# Patient Record
Sex: FEMALE | Race: WHITE | NOT HISPANIC OR LATINO | Employment: OTHER | URBAN - METROPOLITAN AREA
[De-identification: names, ages, dates, MRNs, and addresses within clinical notes are randomized per-mention and may not be internally consistent; named-entity substitution may affect disease eponyms.]

---

## 2017-01-09 ENCOUNTER — ALLSCRIPTS OFFICE VISIT (OUTPATIENT)
Dept: OTHER | Facility: OTHER | Age: 67
End: 2017-01-09

## 2017-01-09 DIAGNOSIS — R60.0 LOCALIZED EDEMA: ICD-10-CM

## 2017-01-10 ENCOUNTER — GENERIC CONVERSION - ENCOUNTER (OUTPATIENT)
Dept: OTHER | Facility: OTHER | Age: 67
End: 2017-01-10

## 2017-01-10 ENCOUNTER — APPOINTMENT (EMERGENCY)
Dept: CT IMAGING | Facility: HOSPITAL | Age: 67
DRG: 299 | End: 2017-01-10
Payer: COMMERCIAL

## 2017-01-10 ENCOUNTER — HOSPITAL ENCOUNTER (OUTPATIENT)
Dept: ULTRASOUND IMAGING | Facility: HOSPITAL | Age: 67
Discharge: HOME | DRG: 299 | End: 2017-01-10
Payer: COMMERCIAL

## 2017-01-10 ENCOUNTER — HOSPITAL ENCOUNTER (INPATIENT)
Facility: HOSPITAL | Age: 67
LOS: 2 days | Discharge: HOME | DRG: 299 | End: 2017-01-12
Attending: EMERGENCY MEDICINE | Admitting: HOSPITALIST
Payer: COMMERCIAL

## 2017-01-10 DIAGNOSIS — I82.401 RIGHT LEG DVT (HCC): ICD-10-CM

## 2017-01-10 DIAGNOSIS — I26.99 BILATERAL PULMONARY EMBOLISM (HCC): Primary | ICD-10-CM

## 2017-01-10 DIAGNOSIS — R60.0 LOCALIZED EDEMA: ICD-10-CM

## 2017-01-10 PROBLEM — E03.9 HYPOTHYROIDISM: Chronic | Status: ACTIVE | Noted: 2017-01-10

## 2017-01-10 PROBLEM — I82.409 DVT, LOWER EXTREMITY (HCC): Status: ACTIVE | Noted: 2017-01-10

## 2017-01-10 PROBLEM — M35.3 POLYMYALGIA RHEUMATICA (HCC): Chronic | Status: ACTIVE | Noted: 2017-01-10

## 2017-01-10 LAB
ANION GAP SERPL CALCULATED.3IONS-SCNC: 7 MMOL/L (ref 4–13)
APTT PPP: 23 SECONDS (ref 24–36)
BASOPHILS # BLD AUTO: 0.06 THOUSANDS/ΜL (ref 0–0.1)
BASOPHILS NFR BLD AUTO: 1 % (ref 0–1)
BUN SERPL-MCNC: 20 MG/DL (ref 5–25)
CALCIUM SERPL-MCNC: 9.7 MG/DL (ref 8.3–10.1)
CHLORIDE SERPL-SCNC: 107 MMOL/L (ref 100–108)
CO2 SERPL-SCNC: 30 MMOL/L (ref 21–32)
CREAT SERPL-MCNC: 0.45 MG/DL (ref 0.6–1.3)
EOSINOPHIL # BLD AUTO: 0.32 THOUSAND/ΜL (ref 0–0.61)
EOSINOPHIL NFR BLD AUTO: 3 % (ref 0–6)
ERYTHROCYTE [DISTWIDTH] IN BLOOD BY AUTOMATED COUNT: 14.4 % (ref 11.6–15.1)
GFR SERPL CREATININE-BSD FRML MDRD: >60 ML/MIN/1.73SQ M
GLUCOSE SERPL-MCNC: 81 MG/DL (ref 65–140)
HCT VFR BLD AUTO: 41.5 % (ref 34.8–46.1)
HGB BLD-MCNC: 13 G/DL (ref 11.5–15.4)
INR PPP: 1.1 (ref 0.86–1.16)
LYMPHOCYTES # BLD AUTO: 2.57 THOUSANDS/ΜL (ref 0.6–4.47)
LYMPHOCYTES NFR BLD AUTO: 20 % (ref 14–44)
MCH RBC QN AUTO: 27.8 PG (ref 26.8–34.3)
MCHC RBC AUTO-ENTMCNC: 31.3 G/DL (ref 31.4–37.4)
MCV RBC AUTO: 89 FL (ref 82–98)
MONOCYTES # BLD AUTO: 0.95 THOUSAND/ΜL (ref 0.17–1.22)
MONOCYTES NFR BLD AUTO: 7 % (ref 4–12)
NEUTROPHILS # BLD AUTO: 9.07 THOUSANDS/ΜL (ref 1.85–7.62)
NEUTS SEG NFR BLD AUTO: 69 % (ref 43–75)
PLATELET # BLD AUTO: 340 THOUSANDS/UL (ref 149–390)
PMV BLD AUTO: 8.6 FL (ref 8.9–12.7)
POTASSIUM SERPL-SCNC: 4.3 MMOL/L (ref 3.5–5.3)
PROTHROMBIN TIME: 13.9 SECONDS (ref 12–14.3)
RBC # BLD AUTO: 4.68 MILLION/UL (ref 3.81–5.12)
SODIUM SERPL-SCNC: 144 MMOL/L (ref 136–145)
TROPONIN I SERPL-MCNC: <0.02 NG/ML
TROPONIN I SERPL-MCNC: <0.02 NG/ML
WBC # BLD AUTO: 12.97 THOUSAND/UL (ref 4.31–10.16)

## 2017-01-10 PROCEDURE — 86147 CARDIOLIPIN ANTIBODY EA IG: CPT | Performed by: EMERGENCY MEDICINE

## 2017-01-10 PROCEDURE — 93970 EXTREMITY STUDY: CPT

## 2017-01-10 PROCEDURE — 36415 COLL VENOUS BLD VENIPUNCTURE: CPT | Performed by: EMERGENCY MEDICINE

## 2017-01-10 PROCEDURE — 99285 EMERGENCY DEPT VISIT HI MDM: CPT

## 2017-01-10 PROCEDURE — 85610 PROTHROMBIN TIME: CPT | Performed by: EMERGENCY MEDICINE

## 2017-01-10 PROCEDURE — 84484 ASSAY OF TROPONIN QUANT: CPT | Performed by: EMERGENCY MEDICINE

## 2017-01-10 PROCEDURE — 85025 COMPLETE CBC W/AUTO DIFF WBC: CPT | Performed by: EMERGENCY MEDICINE

## 2017-01-10 PROCEDURE — 80048 BASIC METABOLIC PNL TOTAL CA: CPT | Performed by: EMERGENCY MEDICINE

## 2017-01-10 PROCEDURE — 85730 THROMBOPLASTIN TIME PARTIAL: CPT | Performed by: EMERGENCY MEDICINE

## 2017-01-10 PROCEDURE — 93005 ELECTROCARDIOGRAM TRACING: CPT | Performed by: EMERGENCY MEDICINE

## 2017-01-10 PROCEDURE — 71275 CT ANGIOGRAPHY CHEST: CPT

## 2017-01-10 PROCEDURE — 84484 ASSAY OF TROPONIN QUANT: CPT | Performed by: PHYSICIAN ASSISTANT

## 2017-01-10 RX ORDER — B-COMPLEX WITH VITAMIN C
1 TABLET ORAL
Status: DISCONTINUED | OUTPATIENT
Start: 2017-01-11 | End: 2017-01-12 | Stop reason: HOSPADM

## 2017-01-10 RX ORDER — LEVOTHYROXINE SODIUM 0.05 MG/1
50 TABLET ORAL
Status: DISCONTINUED | OUTPATIENT
Start: 2017-01-11 | End: 2017-01-12 | Stop reason: HOSPADM

## 2017-01-10 RX ORDER — ACETAMINOPHEN 325 MG/1
650 TABLET ORAL EVERY 6 HOURS PRN
Status: DISCONTINUED | OUTPATIENT
Start: 2017-01-10 | End: 2017-01-11

## 2017-01-10 RX ORDER — CHLORAL HYDRATE 500 MG
2000 CAPSULE ORAL DAILY
Status: DISCONTINUED | OUTPATIENT
Start: 2017-01-11 | End: 2017-01-12 | Stop reason: HOSPADM

## 2017-01-10 RX ORDER — PREDNISONE 20 MG/1
20 TABLET ORAL
Status: DISCONTINUED | OUTPATIENT
Start: 2017-01-11 | End: 2017-01-10

## 2017-01-10 RX ORDER — UBIDECARENONE 50 MG
2 CAPSULE ORAL DAILY
Status: DISCONTINUED | OUTPATIENT
Start: 2017-01-11 | End: 2017-01-10

## 2017-01-10 RX ORDER — HEPARIN SODIUM 1000 [USP'U]/ML
5600 INJECTION, SOLUTION INTRAVENOUS; SUBCUTANEOUS ONCE
Status: COMPLETED | OUTPATIENT
Start: 2017-01-10 | End: 2017-01-10

## 2017-01-10 RX ORDER — ASCORBIC ACID 500 MG
250 TABLET ORAL DAILY
Status: DISCONTINUED | OUTPATIENT
Start: 2017-01-11 | End: 2017-01-12 | Stop reason: HOSPADM

## 2017-01-10 RX ORDER — ONDANSETRON 4 MG/1
4 TABLET, ORALLY DISINTEGRATING ORAL EVERY 8 HOURS PRN
Status: DISCONTINUED | OUTPATIENT
Start: 2017-01-10 | End: 2017-01-12 | Stop reason: HOSPADM

## 2017-01-10 RX ORDER — OXYCODONE HYDROCHLORIDE AND ACETAMINOPHEN 5; 325 MG/1; MG/1
1 TABLET ORAL EVERY 4 HOURS PRN
Status: DISCONTINUED | OUTPATIENT
Start: 2017-01-10 | End: 2017-01-11

## 2017-01-10 RX ORDER — PREDNISONE 20 MG/1
20 TABLET ORAL
Status: DISCONTINUED | OUTPATIENT
Start: 2017-01-10 | End: 2017-01-12 | Stop reason: HOSPADM

## 2017-01-10 RX ORDER — FLUTICASONE PROPIONATE 50 MCG
1 SPRAY, SUSPENSION (ML) NASAL AS NEEDED
Status: DISCONTINUED | OUTPATIENT
Start: 2017-01-10 | End: 2017-01-11

## 2017-01-10 RX ORDER — PREDNISONE 20 MG/1
7 TABLET ORAL DAILY
COMMUNITY
End: 2018-06-26

## 2017-01-10 RX ORDER — HEPARIN SODIUM 10000 [USP'U]/100ML
3-30 INJECTION, SOLUTION INTRAVENOUS
Status: DISCONTINUED | OUTPATIENT
Start: 2017-01-10 | End: 2017-01-12

## 2017-01-10 RX ORDER — PREDNISONE 20 MG/1
20 TABLET ORAL DAILY
Status: DISCONTINUED | OUTPATIENT
Start: 2017-01-11 | End: 2017-01-10

## 2017-01-10 RX ORDER — HEPARIN SODIUM 1000 [USP'U]/ML
5600 INJECTION, SOLUTION INTRAVENOUS; SUBCUTANEOUS AS NEEDED
Status: DISCONTINUED | OUTPATIENT
Start: 2017-01-10 | End: 2017-01-12

## 2017-01-10 RX ORDER — HEPARIN SODIUM 1000 [USP'U]/ML
2800 INJECTION, SOLUTION INTRAVENOUS; SUBCUTANEOUS AS NEEDED
Status: DISCONTINUED | OUTPATIENT
Start: 2017-01-10 | End: 2017-01-12

## 2017-01-10 RX ADMIN — PREDNISONE 20 MG: 20 TABLET ORAL at 22:51

## 2017-01-10 RX ADMIN — IOHEXOL 100 ML: 350 INJECTION, SOLUTION INTRAVENOUS at 17:24

## 2017-01-10 RX ADMIN — HEPARIN SODIUM AND DEXTROSE 18 UNITS/KG/HR: 10000; 5 INJECTION INTRAVENOUS at 20:10

## 2017-01-10 RX ADMIN — HEPARIN SODIUM 5600 UNITS: 1000 INJECTION, SOLUTION INTRAVENOUS; SUBCUTANEOUS at 20:10

## 2017-01-11 ENCOUNTER — APPOINTMENT (INPATIENT)
Dept: NON INVASIVE DIAGNOSTICS | Facility: HOSPITAL | Age: 67
DRG: 299 | End: 2017-01-11
Payer: COMMERCIAL

## 2017-01-11 PROBLEM — D72.829 LEUKOCYTOSIS: Status: ACTIVE | Noted: 2017-01-11

## 2017-01-11 LAB
ANION GAP SERPL CALCULATED.3IONS-SCNC: 7 MMOL/L (ref 4–13)
APTT PPP: 62 SECONDS (ref 24–36)
APTT PPP: 65 SECONDS (ref 24–36)
APTT PPP: 68 SECONDS (ref 24–36)
ATRIAL RATE: 77 BPM
BUN SERPL-MCNC: 17 MG/DL (ref 5–25)
CALCIUM SERPL-MCNC: 8.9 MG/DL (ref 8.3–10.1)
CHLORIDE SERPL-SCNC: 107 MMOL/L (ref 100–108)
CO2 SERPL-SCNC: 28 MMOL/L (ref 21–32)
CREAT SERPL-MCNC: 0.57 MG/DL (ref 0.6–1.3)
ERYTHROCYTE [DISTWIDTH] IN BLOOD BY AUTOMATED COUNT: 14.6 % (ref 11.6–15.1)
GFR SERPL CREATININE-BSD FRML MDRD: >60 ML/MIN/1.73SQ M
GLUCOSE SERPL-MCNC: 117 MG/DL (ref 65–140)
HCT VFR BLD AUTO: 38.1 % (ref 34.8–46.1)
HGB BLD-MCNC: 12 G/DL (ref 11.5–15.4)
MCH RBC QN AUTO: 28 PG (ref 26.8–34.3)
MCHC RBC AUTO-ENTMCNC: 31.5 G/DL (ref 31.4–37.4)
MCV RBC AUTO: 89 FL (ref 82–98)
P AXIS: 27 DEGREES
PLATELET # BLD AUTO: 329 THOUSANDS/UL (ref 149–390)
PMV BLD AUTO: 8.8 FL (ref 8.9–12.7)
POTASSIUM SERPL-SCNC: 4 MMOL/L (ref 3.5–5.3)
PR INTERVAL: 150 MS
QRS AXIS: -44 DEGREES
QRSD INTERVAL: 86 MS
QT INTERVAL: 370 MS
QTC INTERVAL: 418 MS
RBC # BLD AUTO: 4.29 MILLION/UL (ref 3.81–5.12)
SODIUM SERPL-SCNC: 142 MMOL/L (ref 136–145)
T WAVE AXIS: 13 DEGREES
TROPONIN I SERPL-MCNC: <0.02 NG/ML
VENTRICULAR RATE: 77 BPM
WBC # BLD AUTO: 11.99 THOUSAND/UL (ref 4.31–10.16)

## 2017-01-11 PROCEDURE — 93306 TTE W/DOPPLER COMPLETE: CPT

## 2017-01-11 PROCEDURE — 80048 BASIC METABOLIC PNL TOTAL CA: CPT | Performed by: PHYSICIAN ASSISTANT

## 2017-01-11 PROCEDURE — 85730 THROMBOPLASTIN TIME PARTIAL: CPT | Performed by: INTERNAL MEDICINE

## 2017-01-11 PROCEDURE — 84484 ASSAY OF TROPONIN QUANT: CPT | Performed by: PHYSICIAN ASSISTANT

## 2017-01-11 PROCEDURE — 85730 THROMBOPLASTIN TIME PARTIAL: CPT | Performed by: HOSPITALIST

## 2017-01-11 PROCEDURE — 85027 COMPLETE CBC AUTOMATED: CPT | Performed by: PHYSICIAN ASSISTANT

## 2017-01-11 RX ORDER — FLUTICASONE PROPIONATE 50 MCG
1 SPRAY, SUSPENSION (ML) NASAL DAILY
Status: DISCONTINUED | OUTPATIENT
Start: 2017-01-12 | End: 2017-01-12 | Stop reason: HOSPADM

## 2017-01-11 RX ORDER — LEVALBUTEROL 1.25 MG/.5ML
1.25 SOLUTION, CONCENTRATE RESPIRATORY (INHALATION) EVERY 6 HOURS PRN
Status: DISCONTINUED | OUTPATIENT
Start: 2017-01-11 | End: 2017-01-12 | Stop reason: HOSPADM

## 2017-01-11 RX ORDER — SODIUM CHLORIDE FOR INHALATION 0.9 %
3 VIAL, NEBULIZER (ML) INHALATION EVERY 6 HOURS PRN
Status: DISCONTINUED | OUTPATIENT
Start: 2017-01-11 | End: 2017-01-12 | Stop reason: HOSPADM

## 2017-01-11 RX ORDER — ACETAMINOPHEN 325 MG/1
650 TABLET ORAL EVERY 6 HOURS PRN
Status: DISCONTINUED | OUTPATIENT
Start: 2017-01-11 | End: 2017-01-12 | Stop reason: HOSPADM

## 2017-01-11 RX ORDER — OXYCODONE HYDROCHLORIDE 5 MG/1
5 TABLET ORAL EVERY 4 HOURS PRN
Status: DISCONTINUED | OUTPATIENT
Start: 2017-01-11 | End: 2017-01-12 | Stop reason: HOSPADM

## 2017-01-11 RX ADMIN — OXYCODONE HYDROCHLORIDE AND ACETAMINOPHEN 250 MG: 500 TABLET ORAL at 08:09

## 2017-01-11 RX ADMIN — Medication 2000 MG: at 08:09

## 2017-01-11 RX ADMIN — PREDNISONE 20 MG: 20 TABLET ORAL at 17:07

## 2017-01-11 RX ADMIN — Medication 1 TABLET: at 08:09

## 2017-01-11 RX ADMIN — HEPARIN SODIUM AND DEXTROSE 18 UNITS/KG/HR: 10000; 5 INJECTION INTRAVENOUS at 17:07

## 2017-01-11 RX ADMIN — LEVOTHYROXINE SODIUM 50 MCG: 50 TABLET ORAL at 06:06

## 2017-01-11 RX ADMIN — CALCIUM CARBONATE-VITAMIN D TAB 500 MG-200 UNIT 1 TABLET: 500-200 TAB at 08:09

## 2017-01-12 VITALS
TEMPERATURE: 98.2 F | WEIGHT: 164.24 LBS | DIASTOLIC BLOOD PRESSURE: 70 MMHG | HEART RATE: 90 BPM | OXYGEN SATURATION: 95 % | SYSTOLIC BLOOD PRESSURE: 106 MMHG | BODY MASS INDEX: 29.1 KG/M2 | HEIGHT: 63 IN | RESPIRATION RATE: 18 BRPM

## 2017-01-12 PROBLEM — R94.31 LEFT AXIS DEVIATION: Status: ACTIVE | Noted: 2017-01-12

## 2017-01-12 PROBLEM — I49.1 PREMATURE ATRIAL COMPLEXES: Status: ACTIVE | Noted: 2017-01-12

## 2017-01-12 PROBLEM — E55.9 VITAMIN D INSUFFICIENCY: Status: ACTIVE | Noted: 2017-01-12

## 2017-01-12 LAB
25(OH)D3 SERPL-MCNC: 25.8 NG/ML (ref 30–100)
ALBUMIN SERPL BCP-MCNC: 2.7 G/DL (ref 3.5–5)
ALP SERPL-CCNC: 57 U/L (ref 46–116)
ALT SERPL W P-5'-P-CCNC: 19 U/L (ref 12–78)
ANION GAP SERPL CALCULATED.3IONS-SCNC: 7 MMOL/L (ref 4–13)
APTT PPP: 72 SECONDS (ref 24–36)
AST SERPL W P-5'-P-CCNC: 13 U/L (ref 5–45)
BASOPHILS # BLD AUTO: 0.01 THOUSANDS/ΜL (ref 0–0.1)
BASOPHILS NFR BLD AUTO: 0 % (ref 0–1)
BILIRUB SERPL-MCNC: 0.3 MG/DL (ref 0.2–1)
BUN SERPL-MCNC: 13 MG/DL (ref 5–25)
CALCIUM SERPL-MCNC: 9.1 MG/DL (ref 8.3–10.1)
CHLORIDE SERPL-SCNC: 109 MMOL/L (ref 100–108)
CK SERPL-CCNC: 9 U/L (ref 26–192)
CO2 SERPL-SCNC: 27 MMOL/L (ref 21–32)
CREAT SERPL-MCNC: 0.49 MG/DL (ref 0.6–1.3)
EOSINOPHIL # BLD AUTO: 0.01 THOUSAND/ΜL (ref 0–0.61)
EOSINOPHIL NFR BLD AUTO: 0 % (ref 0–6)
ERYTHROCYTE [DISTWIDTH] IN BLOOD BY AUTOMATED COUNT: 14.4 % (ref 11.6–15.1)
EST. AVERAGE GLUCOSE BLD GHB EST-MCNC: 120 MG/DL
GFR SERPL CREATININE-BSD FRML MDRD: >60 ML/MIN/1.73SQ M
GLUCOSE SERPL-MCNC: 128 MG/DL (ref 65–140)
HBA1C MFR BLD: 5.8 % (ref 4.2–6.3)
HCT VFR BLD AUTO: 38.2 % (ref 34.8–46.1)
HGB BLD-MCNC: 11.9 G/DL (ref 11.5–15.4)
LACTATE SERPL-SCNC: 1 MMOL/L (ref 0.5–2)
LYMPHOCYTES # BLD AUTO: 1.55 THOUSANDS/ΜL (ref 0.6–4.47)
LYMPHOCYTES NFR BLD AUTO: 16 % (ref 14–44)
MAGNESIUM SERPL-MCNC: 2.2 MG/DL (ref 1.6–2.6)
MCH RBC QN AUTO: 27.7 PG (ref 26.8–34.3)
MCHC RBC AUTO-ENTMCNC: 31.2 G/DL (ref 31.4–37.4)
MCV RBC AUTO: 89 FL (ref 82–98)
MONOCYTES # BLD AUTO: 0.55 THOUSAND/ΜL (ref 0.17–1.22)
MONOCYTES NFR BLD AUTO: 6 % (ref 4–12)
NEUTROPHILS # BLD AUTO: 7.57 THOUSANDS/ΜL (ref 1.85–7.62)
NEUTS SEG NFR BLD AUTO: 78 % (ref 43–75)
PHOSPHATE SERPL-MCNC: 3.3 MG/DL (ref 2.3–4.1)
PLATELET # BLD AUTO: 335 THOUSANDS/UL (ref 149–390)
PMV BLD AUTO: 8.7 FL (ref 8.9–12.7)
POTASSIUM SERPL-SCNC: 4.1 MMOL/L (ref 3.5–5.3)
PROT SERPL-MCNC: 6 G/DL (ref 6.4–8.2)
RBC # BLD AUTO: 4.29 MILLION/UL (ref 3.81–5.12)
SODIUM SERPL-SCNC: 143 MMOL/L (ref 136–145)
TSH SERPL DL<=0.05 MIU/L-ACNC: 0.99 UIU/ML (ref 0.36–3.74)
WBC # BLD AUTO: 9.69 THOUSAND/UL (ref 4.31–10.16)

## 2017-01-12 PROCEDURE — 97165 OT EVAL LOW COMPLEX 30 MIN: CPT

## 2017-01-12 PROCEDURE — G8980 MOBILITY D/C STATUS: HCPCS | Performed by: PHYSICAL THERAPIST

## 2017-01-12 PROCEDURE — 80053 COMPREHEN METABOLIC PANEL: CPT | Performed by: INTERNAL MEDICINE

## 2017-01-12 PROCEDURE — 82550 ASSAY OF CK (CPK): CPT | Performed by: INTERNAL MEDICINE

## 2017-01-12 PROCEDURE — 82306 VITAMIN D 25 HYDROXY: CPT | Performed by: INTERNAL MEDICINE

## 2017-01-12 PROCEDURE — G8978 MOBILITY CURRENT STATUS: HCPCS | Performed by: PHYSICAL THERAPIST

## 2017-01-12 PROCEDURE — G8988 SELF CARE GOAL STATUS: HCPCS

## 2017-01-12 PROCEDURE — 85730 THROMBOPLASTIN TIME PARTIAL: CPT | Performed by: INTERNAL MEDICINE

## 2017-01-12 PROCEDURE — 84100 ASSAY OF PHOSPHORUS: CPT | Performed by: INTERNAL MEDICINE

## 2017-01-12 PROCEDURE — 85025 COMPLETE CBC W/AUTO DIFF WBC: CPT | Performed by: INTERNAL MEDICINE

## 2017-01-12 PROCEDURE — G8979 MOBILITY GOAL STATUS: HCPCS | Performed by: PHYSICAL THERAPIST

## 2017-01-12 PROCEDURE — G8987 SELF CARE CURRENT STATUS: HCPCS

## 2017-01-12 PROCEDURE — 83036 HEMOGLOBIN GLYCOSYLATED A1C: CPT | Performed by: INTERNAL MEDICINE

## 2017-01-12 PROCEDURE — 83605 ASSAY OF LACTIC ACID: CPT | Performed by: INTERNAL MEDICINE

## 2017-01-12 PROCEDURE — 97116 GAIT TRAINING THERAPY: CPT | Performed by: PHYSICAL THERAPIST

## 2017-01-12 PROCEDURE — 97161 PT EVAL LOW COMPLEX 20 MIN: CPT | Performed by: PHYSICAL THERAPIST

## 2017-01-12 PROCEDURE — 83735 ASSAY OF MAGNESIUM: CPT | Performed by: INTERNAL MEDICINE

## 2017-01-12 PROCEDURE — 84443 ASSAY THYROID STIM HORMONE: CPT | Performed by: INTERNAL MEDICINE

## 2017-01-12 RX ORDER — MELATONIN
1000 DAILY
Qty: 30 TABLET | Refills: 0 | Status: SHIPPED | OUTPATIENT
Start: 2017-01-12 | End: 2017-05-23

## 2017-01-12 RX ORDER — LEVOTHYROXINE SODIUM 0.05 MG/1
50 TABLET ORAL
Refills: 0
Start: 2017-01-13 | End: 2018-03-21 | Stop reason: SDUPTHER

## 2017-01-12 RX ORDER — FLUTICASONE PROPIONATE 50 MCG
1 SPRAY, SUSPENSION (ML) NASAL DAILY PRN
Qty: 16 G | Refills: 0
Start: 2017-01-12 | End: 2017-05-23 | Stop reason: ALTCHOICE

## 2017-01-12 RX ADMIN — OXYCODONE HYDROCHLORIDE AND ACETAMINOPHEN 250 MG: 500 TABLET ORAL at 08:07

## 2017-01-12 RX ADMIN — APIXABAN 10 MG: 5 TABLET, FILM COATED ORAL at 08:06

## 2017-01-12 RX ADMIN — CALCIUM CARBONATE-VITAMIN D TAB 500 MG-200 UNIT 1 TABLET: 500-200 TAB at 08:06

## 2017-01-12 RX ADMIN — LEVOTHYROXINE SODIUM 50 MCG: 50 TABLET ORAL at 05:11

## 2017-01-12 RX ADMIN — Medication 1 TABLET: at 08:06

## 2017-01-12 RX ADMIN — Medication 2000 MG: at 08:07

## 2017-01-13 LAB
CARDIOLIPIN IGA SER IA-ACNC: <9 APL U/ML (ref 0–11)
CARDIOLIPIN IGG SER IA-ACNC: <9 GPL U/ML (ref 0–14)
CARDIOLIPIN IGM SER IA-ACNC: <9 MPL U/ML (ref 0–12)

## 2017-01-23 ENCOUNTER — ALLSCRIPTS OFFICE VISIT (OUTPATIENT)
Dept: OTHER | Facility: OTHER | Age: 67
End: 2017-01-23

## 2017-02-16 ENCOUNTER — TRANSCRIBE ORDERS (OUTPATIENT)
Dept: ADMINISTRATIVE | Facility: HOSPITAL | Age: 67
End: 2017-02-16

## 2017-02-16 DIAGNOSIS — R60.0 LOCALIZED EDEMA: Primary | ICD-10-CM

## 2017-03-07 ENCOUNTER — HOSPITAL ENCOUNTER (OUTPATIENT)
Dept: ULTRASOUND IMAGING | Facility: HOSPITAL | Age: 67
Discharge: HOME | End: 2017-03-07
Payer: COMMERCIAL

## 2017-03-07 DIAGNOSIS — R60.0 LOCALIZED EDEMA: ICD-10-CM

## 2017-03-07 PROCEDURE — 93971 EXTREMITY STUDY: CPT

## 2017-03-09 ENCOUNTER — ALLSCRIPTS OFFICE VISIT (OUTPATIENT)
Dept: OTHER | Facility: OTHER | Age: 67
End: 2017-03-09

## 2017-03-09 DIAGNOSIS — R60.0 LOCALIZED EDEMA: ICD-10-CM

## 2017-03-09 DIAGNOSIS — I82.409 ACUTE EMBOLISM AND THROMBOSIS OF DEEP VEIN OF LOWER EXTREMITY (HCC): ICD-10-CM

## 2017-03-09 DIAGNOSIS — E03.9 HYPOTHYROIDISM: ICD-10-CM

## 2017-04-17 ENCOUNTER — GENERIC CONVERSION - ENCOUNTER (OUTPATIENT)
Dept: OTHER | Facility: OTHER | Age: 67
End: 2017-04-17

## 2017-05-23 ENCOUNTER — APPOINTMENT (EMERGENCY)
Dept: CT IMAGING | Facility: HOSPITAL | Age: 67
DRG: 202 | End: 2017-05-23
Payer: MEDICARE

## 2017-05-23 ENCOUNTER — HOSPITAL ENCOUNTER (INPATIENT)
Facility: HOSPITAL | Age: 67
LOS: 5 days | Discharge: ANOTHER HOSPITAL | DRG: 202 | End: 2017-05-29
Attending: EMERGENCY MEDICINE | Admitting: FAMILY MEDICINE
Payer: MEDICARE

## 2017-05-23 ENCOUNTER — APPOINTMENT (EMERGENCY)
Dept: RADIOLOGY | Facility: HOSPITAL | Age: 67
DRG: 202 | End: 2017-05-23
Payer: MEDICARE

## 2017-05-23 DIAGNOSIS — J40 BRONCHITIS: ICD-10-CM

## 2017-05-23 DIAGNOSIS — I47.1 SVT (SUPRAVENTRICULAR TACHYCARDIA) (HCC): Primary | ICD-10-CM

## 2017-05-23 PROBLEM — I10 ESSENTIAL HYPERTENSION: Status: ACTIVE | Noted: 2017-05-23

## 2017-05-23 PROBLEM — N20.0 CALCULUS OF KIDNEY: Status: ACTIVE | Noted: 2017-05-23

## 2017-05-23 PROBLEM — E78.00 HYPERCHOLESTEROLEMIA: Status: ACTIVE | Noted: 2017-05-23

## 2017-05-23 LAB
ALBUMIN SERPL BCP-MCNC: 3.1 G/DL (ref 3.5–5)
ALP SERPL-CCNC: 56 U/L (ref 46–116)
ALT SERPL W P-5'-P-CCNC: 19 U/L (ref 12–78)
ANION GAP SERPL CALCULATED.3IONS-SCNC: 8 MMOL/L (ref 4–13)
APTT PPP: 31 SECONDS (ref 23–35)
AST SERPL W P-5'-P-CCNC: 30 U/L (ref 5–45)
BASOPHILS # BLD AUTO: 0.03 THOUSANDS/ΜL (ref 0–0.1)
BASOPHILS NFR BLD AUTO: 0 % (ref 0–1)
BILIRUB SERPL-MCNC: 0.4 MG/DL (ref 0.2–1)
BUN SERPL-MCNC: 14 MG/DL (ref 5–25)
CALCIUM SERPL-MCNC: 9.8 MG/DL (ref 8.3–10.1)
CHLORIDE SERPL-SCNC: 104 MMOL/L (ref 100–108)
CO2 SERPL-SCNC: 28 MMOL/L (ref 21–32)
CREAT SERPL-MCNC: 0.65 MG/DL (ref 0.6–1.3)
EOSINOPHIL # BLD AUTO: 0.16 THOUSAND/ΜL (ref 0–0.61)
EOSINOPHIL NFR BLD AUTO: 2 % (ref 0–6)
ERYTHROCYTE [DISTWIDTH] IN BLOOD BY AUTOMATED COUNT: 15 % (ref 11.6–15.1)
GFR SERPL CREATININE-BSD FRML MDRD: >60 ML/MIN/1.73SQ M
GLUCOSE SERPL-MCNC: 121 MG/DL (ref 65–140)
HCT VFR BLD AUTO: 42.1 % (ref 34.8–46.1)
HGB BLD-MCNC: 13.7 G/DL (ref 11.5–15.4)
INR PPP: 1.38 (ref 0.86–1.16)
LYMPHOCYTES # BLD AUTO: 1.61 THOUSANDS/ΜL (ref 0.6–4.47)
LYMPHOCYTES NFR BLD AUTO: 16 % (ref 14–44)
MAGNESIUM SERPL-MCNC: 1.8 MG/DL (ref 1.6–2.6)
MCH RBC QN AUTO: 27.3 PG (ref 26.8–34.3)
MCHC RBC AUTO-ENTMCNC: 32.5 G/DL (ref 31.4–37.4)
MCV RBC AUTO: 84 FL (ref 82–98)
MONOCYTES # BLD AUTO: 0.69 THOUSAND/ΜL (ref 0.17–1.22)
MONOCYTES NFR BLD AUTO: 7 % (ref 4–12)
NEUTROPHILS # BLD AUTO: 7.8 THOUSANDS/ΜL (ref 1.85–7.62)
NEUTS SEG NFR BLD AUTO: 75 % (ref 43–75)
PLATELET # BLD AUTO: 424 THOUSANDS/UL (ref 149–390)
PMV BLD AUTO: 8.6 FL (ref 8.9–12.7)
POTASSIUM SERPL-SCNC: 3.6 MMOL/L (ref 3.5–5.3)
PROT SERPL-MCNC: 7 G/DL (ref 6.4–8.2)
PROTHROMBIN TIME: 16.9 SECONDS (ref 12.1–14.4)
RBC # BLD AUTO: 5.01 MILLION/UL (ref 3.81–5.12)
SODIUM SERPL-SCNC: 140 MMOL/L (ref 136–145)
TROPONIN I SERPL-MCNC: 0.02 NG/ML
WBC # BLD AUTO: 10.29 THOUSAND/UL (ref 4.31–10.16)

## 2017-05-23 PROCEDURE — 83735 ASSAY OF MAGNESIUM: CPT | Performed by: EMERGENCY MEDICINE

## 2017-05-23 PROCEDURE — 93005 ELECTROCARDIOGRAM TRACING: CPT | Performed by: EMERGENCY MEDICINE

## 2017-05-23 PROCEDURE — 96365 THER/PROPH/DIAG IV INF INIT: CPT

## 2017-05-23 PROCEDURE — 84484 ASSAY OF TROPONIN QUANT: CPT | Performed by: EMERGENCY MEDICINE

## 2017-05-23 PROCEDURE — 71275 CT ANGIOGRAPHY CHEST: CPT

## 2017-05-23 PROCEDURE — 96361 HYDRATE IV INFUSION ADD-ON: CPT

## 2017-05-23 PROCEDURE — 85730 THROMBOPLASTIN TIME PARTIAL: CPT | Performed by: EMERGENCY MEDICINE

## 2017-05-23 PROCEDURE — 80053 COMPREHEN METABOLIC PANEL: CPT | Performed by: EMERGENCY MEDICINE

## 2017-05-23 PROCEDURE — 85610 PROTHROMBIN TIME: CPT | Performed by: EMERGENCY MEDICINE

## 2017-05-23 PROCEDURE — 71010 HB CHEST X-RAY 1 VIEW FRONTAL (PORTABLE): CPT

## 2017-05-23 PROCEDURE — 36415 COLL VENOUS BLD VENIPUNCTURE: CPT | Performed by: EMERGENCY MEDICINE

## 2017-05-23 PROCEDURE — 84443 ASSAY THYROID STIM HORMONE: CPT | Performed by: INTERNAL MEDICINE

## 2017-05-23 PROCEDURE — 85025 COMPLETE CBC W/AUTO DIFF WBC: CPT | Performed by: EMERGENCY MEDICINE

## 2017-05-23 RX ORDER — IBUPROFEN 400 MG/1
400 TABLET ORAL ONCE
Status: COMPLETED | OUTPATIENT
Start: 2017-05-23 | End: 2017-05-23

## 2017-05-23 RX ORDER — LEVALBUTEROL INHALATION SOLUTION 0.63 MG/3ML
0.63 SOLUTION RESPIRATORY (INHALATION) ONCE
Status: COMPLETED | OUTPATIENT
Start: 2017-05-23 | End: 2017-05-23

## 2017-05-23 RX ORDER — ACETAMINOPHEN 325 MG/1
650 TABLET ORAL ONCE
Status: COMPLETED | OUTPATIENT
Start: 2017-05-23 | End: 2017-05-23

## 2017-05-23 RX ORDER — IPRATROPIUM BROMIDE AND ALBUTEROL SULFATE 2.5; .5 MG/3ML; MG/3ML
3 SOLUTION RESPIRATORY (INHALATION) ONCE
Status: COMPLETED | OUTPATIENT
Start: 2017-05-23 | End: 2017-05-23

## 2017-05-23 RX ORDER — IBUPROFEN 400 MG/1
TABLET ORAL
Status: COMPLETED
Start: 2017-05-23 | End: 2017-05-23

## 2017-05-23 RX ORDER — DILTIAZEM HYDROCHLORIDE 5 MG/ML
INJECTION INTRAVENOUS
Status: COMPLETED
Start: 2017-05-23 | End: 2017-05-23

## 2017-05-23 RX ADMIN — IPRATROPIUM BROMIDE AND ALBUTEROL SULFATE 3 ML: .5; 3 SOLUTION RESPIRATORY (INHALATION) at 22:50

## 2017-05-23 RX ADMIN — IOHEXOL 85 ML: 350 INJECTION, SOLUTION INTRAVENOUS at 21:47

## 2017-05-23 RX ADMIN — IBUPROFEN 400 MG: 400 TABLET ORAL at 23:13

## 2017-05-23 RX ADMIN — ALBUTEROL SULFATE 5 MG: 2.5 SOLUTION RESPIRATORY (INHALATION) at 22:15

## 2017-05-23 RX ADMIN — SODIUM CHLORIDE 1000 ML: 0.9 INJECTION, SOLUTION INTRAVENOUS at 21:02

## 2017-05-23 RX ADMIN — DILTIAZEM HYDROCHLORIDE 1 MG/HR: 5 INJECTION INTRAVENOUS at 23:41

## 2017-05-23 RX ADMIN — ACETAMINOPHEN 650 MG: 325 TABLET, FILM COATED ORAL at 22:14

## 2017-05-23 RX ADMIN — LEVALBUTEROL HYDROCHLORIDE 0.63 MG: 0.63 SOLUTION RESPIRATORY (INHALATION) at 21:33

## 2017-05-23 RX ADMIN — SODIUM CHLORIDE 1000 ML: 0.9 INJECTION, SOLUTION INTRAVENOUS at 23:41

## 2017-05-24 PROBLEM — R06.02 SOB (SHORTNESS OF BREATH): Status: ACTIVE | Noted: 2017-05-24

## 2017-05-24 PROBLEM — I47.1 SVT (SUPRAVENTRICULAR TACHYCARDIA) (HCC): Status: ACTIVE | Noted: 2017-05-24

## 2017-05-24 PROBLEM — I47.10 SVT (SUPRAVENTRICULAR TACHYCARDIA): Status: ACTIVE | Noted: 2017-05-24

## 2017-05-24 LAB
TROPONIN I SERPL-MCNC: 0.02 NG/ML
TROPONIN I SERPL-MCNC: 0.02 NG/ML
TSH SERPL DL<=0.05 MIU/L-ACNC: 3.44 UIU/ML (ref 0.36–3.74)

## 2017-05-24 PROCEDURE — 94640 AIRWAY INHALATION TREATMENT: CPT

## 2017-05-24 PROCEDURE — 36415 COLL VENOUS BLD VENIPUNCTURE: CPT | Performed by: INTERNAL MEDICINE

## 2017-05-24 PROCEDURE — 93005 ELECTROCARDIOGRAM TRACING: CPT | Performed by: INTERNAL MEDICINE

## 2017-05-24 PROCEDURE — 94664 DEMO&/EVAL PT USE INHALER: CPT

## 2017-05-24 PROCEDURE — 84484 ASSAY OF TROPONIN QUANT: CPT | Performed by: INTERNAL MEDICINE

## 2017-05-24 PROCEDURE — 99285 EMERGENCY DEPT VISIT HI MDM: CPT

## 2017-05-24 PROCEDURE — 93005 ELECTROCARDIOGRAM TRACING: CPT

## 2017-05-24 PROCEDURE — 94760 N-INVAS EAR/PLS OXIMETRY 1: CPT

## 2017-05-24 RX ORDER — MELATONIN
1000 DAILY
Status: DISCONTINUED | OUTPATIENT
Start: 2017-05-24 | End: 2017-05-29 | Stop reason: HOSPADM

## 2017-05-24 RX ORDER — UBIDECARENONE 50 MG
2 CAPSULE ORAL DAILY
Status: DISCONTINUED | OUTPATIENT
Start: 2017-05-24 | End: 2017-05-24

## 2017-05-24 RX ORDER — OXYCODONE HYDROCHLORIDE 5 MG/1
5 TABLET ORAL EVERY 4 HOURS PRN
Status: DISCONTINUED | OUTPATIENT
Start: 2017-05-24 | End: 2017-05-29 | Stop reason: HOSPADM

## 2017-05-24 RX ORDER — METOPROLOL TARTRATE 50 MG/1
25 TABLET, FILM COATED ORAL ONCE
Status: COMPLETED | OUTPATIENT
Start: 2017-05-24 | End: 2017-05-24

## 2017-05-24 RX ORDER — IBUPROFEN 200 MG
200 TABLET ORAL EVERY 8 HOURS PRN
Status: DISCONTINUED | OUTPATIENT
Start: 2017-05-24 | End: 2017-05-29 | Stop reason: HOSPADM

## 2017-05-24 RX ORDER — B-COMPLEX WITH VITAMIN C
1 TABLET ORAL DAILY
Status: DISCONTINUED | OUTPATIENT
Start: 2017-05-24 | End: 2017-05-29 | Stop reason: HOSPADM

## 2017-05-24 RX ORDER — ONDANSETRON 2 MG/ML
4 INJECTION INTRAMUSCULAR; INTRAVENOUS EVERY 4 HOURS PRN
Status: DISCONTINUED | OUTPATIENT
Start: 2017-05-24 | End: 2017-05-29 | Stop reason: HOSPADM

## 2017-05-24 RX ORDER — POTASSIUM CHLORIDE 20 MEQ/1
40 TABLET, EXTENDED RELEASE ORAL 2 TIMES DAILY
Status: COMPLETED | OUTPATIENT
Start: 2017-05-24 | End: 2017-05-24

## 2017-05-24 RX ORDER — GUAIFENESIN 600 MG
600 TABLET, EXTENDED RELEASE 12 HR ORAL EVERY 12 HOURS SCHEDULED
Status: DISCONTINUED | OUTPATIENT
Start: 2017-05-24 | End: 2017-05-29 | Stop reason: HOSPADM

## 2017-05-24 RX ORDER — LEVOTHYROXINE SODIUM 0.05 MG/1
50 TABLET ORAL
Status: DISCONTINUED | OUTPATIENT
Start: 2017-05-24 | End: 2017-05-29 | Stop reason: HOSPADM

## 2017-05-24 RX ORDER — LEVALBUTEROL INHALATION SOLUTION 0.63 MG/3ML
0.63 SOLUTION RESPIRATORY (INHALATION) EVERY 8 HOURS PRN
Status: DISCONTINUED | OUTPATIENT
Start: 2017-05-24 | End: 2017-05-29 | Stop reason: HOSPADM

## 2017-05-24 RX ORDER — ACETAMINOPHEN 325 MG/1
650 TABLET ORAL EVERY 6 HOURS PRN
Status: DISCONTINUED | OUTPATIENT
Start: 2017-05-24 | End: 2017-05-29 | Stop reason: HOSPADM

## 2017-05-24 RX ORDER — AMPICILLIN TRIHYDRATE 250 MG
500 CAPSULE ORAL DAILY
Status: DISCONTINUED | OUTPATIENT
Start: 2017-05-24 | End: 2017-05-24

## 2017-05-24 RX ORDER — DIGOXIN 0.25 MG/ML
250 INJECTION INTRAMUSCULAR; INTRAVENOUS EVERY 6 HOURS
Status: DISCONTINUED | OUTPATIENT
Start: 2017-05-24 | End: 2017-05-24

## 2017-05-24 RX ORDER — SODIUM CHLORIDE 9 MG/ML
125 INJECTION, SOLUTION INTRAVENOUS CONTINUOUS
Status: DISCONTINUED | OUTPATIENT
Start: 2017-05-24 | End: 2017-05-25

## 2017-05-24 RX ADMIN — LEVOTHYROXINE SODIUM 50 MCG: 50 TABLET ORAL at 05:17

## 2017-05-24 RX ADMIN — CHOLECALCIFEROL TAB 25 MCG (1000 UNIT) 1000 UNITS: 25 TAB at 08:47

## 2017-05-24 RX ADMIN — OXYCODONE HYDROCHLORIDE 5 MG: 5 TABLET ORAL at 16:10

## 2017-05-24 RX ADMIN — Medication 1 TABLET: at 08:48

## 2017-05-24 RX ADMIN — PREDNISONE 7 MG: 5 TABLET ORAL at 17:56

## 2017-05-24 RX ADMIN — APIXABAN 5 MG: 5 TABLET, FILM COATED ORAL at 08:47

## 2017-05-24 RX ADMIN — CEFTRIAXONE 1000 MG: 1 INJECTION, POWDER, FOR SOLUTION INTRAMUSCULAR; INTRAVENOUS at 14:13

## 2017-05-24 RX ADMIN — OXYCODONE HYDROCHLORIDE 5 MG: 5 TABLET ORAL at 20:49

## 2017-05-24 RX ADMIN — DIGOXIN 250 MCG: 0.25 INJECTION INTRAMUSCULAR; INTRAVENOUS at 03:47

## 2017-05-24 RX ADMIN — METOPROLOL TARTRATE 25 MG: 50 TABLET ORAL at 00:19

## 2017-05-24 RX ADMIN — IBUPROFEN 200 MG: 200 TABLET, FILM COATED ORAL at 14:13

## 2017-05-24 RX ADMIN — GUAIFENESIN 600 MG: 600 TABLET, EXTENDED RELEASE ORAL at 02:15

## 2017-05-24 RX ADMIN — IBUPROFEN 200 MG: 200 TABLET, FILM COATED ORAL at 22:49

## 2017-05-24 RX ADMIN — Medication 400 MG: at 17:36

## 2017-05-24 RX ADMIN — AMIODARONE HYDROCHLORIDE 1 MG/MIN: 50 INJECTION, SOLUTION INTRAVENOUS at 09:07

## 2017-05-24 RX ADMIN — APIXABAN 5 MG: 5 TABLET, FILM COATED ORAL at 17:35

## 2017-05-24 RX ADMIN — GUAIFENESIN 600 MG: 600 TABLET, EXTENDED RELEASE ORAL at 20:46

## 2017-05-24 RX ADMIN — LEVALBUTEROL 0.63 MG: 0.63 SOLUTION RESPIRATORY (INHALATION) at 10:21

## 2017-05-24 RX ADMIN — SODIUM CHLORIDE 1000 ML: 0.9 INJECTION, SOLUTION INTRAVENOUS at 01:14

## 2017-05-24 RX ADMIN — METOPROLOL TARTRATE 25 MG: 25 TABLET ORAL at 20:46

## 2017-05-24 RX ADMIN — GUAIFENESIN 600 MG: 600 TABLET, EXTENDED RELEASE ORAL at 08:48

## 2017-05-24 RX ADMIN — AMIODARONE HYDROCHLORIDE 150 MG: 50 INJECTION, SOLUTION INTRAVENOUS at 08:52

## 2017-05-24 RX ADMIN — POTASSIUM CHLORIDE 40 MEQ: 1500 TABLET, EXTENDED RELEASE ORAL at 12:53

## 2017-05-24 RX ADMIN — DILTIAZEM HYDROCHLORIDE 2.5 MG/HR: 5 INJECTION INTRAVENOUS at 01:12

## 2017-05-24 RX ADMIN — ACETAMINOPHEN 650 MG: 325 TABLET, FILM COATED ORAL at 11:39

## 2017-05-24 RX ADMIN — SODIUM CHLORIDE 125 ML/HR: 0.9 INJECTION, SOLUTION INTRAVENOUS at 20:00

## 2017-05-24 RX ADMIN — SODIUM CHLORIDE 125 ML/HR: 0.9 INJECTION, SOLUTION INTRAVENOUS at 03:35

## 2017-05-24 RX ADMIN — SODIUM CHLORIDE 125 ML/HR: 0.9 INJECTION, SOLUTION INTRAVENOUS at 11:00

## 2017-05-24 RX ADMIN — CALCIUM CARBONATE-VITAMIN D TAB 500 MG-200 UNIT 1 TABLET: 500-200 TAB at 08:47

## 2017-05-24 RX ADMIN — METOPROLOL TARTRATE 25 MG: 25 TABLET ORAL at 08:47

## 2017-05-24 RX ADMIN — Medication 400 MG: at 12:53

## 2017-05-24 RX ADMIN — POTASSIUM CHLORIDE 40 MEQ: 1500 TABLET, EXTENDED RELEASE ORAL at 17:35

## 2017-05-25 LAB
ANION GAP SERPL CALCULATED.3IONS-SCNC: 9 MMOL/L (ref 4–13)
ATRIAL RATE: 106 BPM
ATRIAL RATE: 394 BPM
ATRIAL RATE: 468 BPM
ATRIAL RATE: 65 BPM
BASOPHILS # BLD AUTO: 0.03 THOUSANDS/ΜL (ref 0–0.1)
BASOPHILS NFR BLD AUTO: 0 % (ref 0–1)
BUN SERPL-MCNC: 7 MG/DL (ref 5–25)
CALCIUM SERPL-MCNC: 9.4 MG/DL (ref 8.3–10.1)
CHLORIDE SERPL-SCNC: 108 MMOL/L (ref 100–108)
CO2 SERPL-SCNC: 23 MMOL/L (ref 21–32)
CREAT SERPL-MCNC: 0.59 MG/DL (ref 0.6–1.3)
EOSINOPHIL # BLD AUTO: 0.25 THOUSAND/ΜL (ref 0–0.61)
EOSINOPHIL NFR BLD AUTO: 3 % (ref 0–6)
ERYTHROCYTE [DISTWIDTH] IN BLOOD BY AUTOMATED COUNT: 15.4 % (ref 11.6–15.1)
GFR SERPL CREATININE-BSD FRML MDRD: >60 ML/MIN/1.73SQ M
GLUCOSE SERPL-MCNC: 98 MG/DL (ref 65–140)
HCT VFR BLD AUTO: 35.8 % (ref 34.8–46.1)
HGB BLD-MCNC: 11.1 G/DL (ref 11.5–15.4)
LYMPHOCYTES # BLD AUTO: 1.43 THOUSANDS/ΜL (ref 0.6–4.47)
LYMPHOCYTES NFR BLD AUTO: 15 % (ref 14–44)
MAGNESIUM SERPL-MCNC: 1.9 MG/DL (ref 1.6–2.6)
MCH RBC QN AUTO: 27.1 PG (ref 26.8–34.3)
MCHC RBC AUTO-ENTMCNC: 31 G/DL (ref 31.4–37.4)
MCV RBC AUTO: 88 FL (ref 82–98)
MONOCYTES # BLD AUTO: 0.69 THOUSAND/ΜL (ref 0.17–1.22)
MONOCYTES NFR BLD AUTO: 7 % (ref 4–12)
NEUTROPHILS # BLD AUTO: 7.37 THOUSANDS/ΜL (ref 1.85–7.62)
NEUTS SEG NFR BLD AUTO: 75 % (ref 43–75)
P AXIS: 33 DEGREES
P AXIS: 56 DEGREES
PLATELET # BLD AUTO: 331 THOUSANDS/UL (ref 149–390)
PMV BLD AUTO: 9 FL (ref 8.9–12.7)
POTASSIUM SERPL-SCNC: 4.4 MMOL/L (ref 3.5–5.3)
PR INTERVAL: 144 MS
PR INTERVAL: 162 MS
QRS AXIS: -42 DEGREES
QRS AXIS: -44 DEGREES
QRS AXIS: -49 DEGREES
QRS AXIS: -52 DEGREES
QRSD INTERVAL: 80 MS
QRSD INTERVAL: 84 MS
QRSD INTERVAL: 86 MS
QRSD INTERVAL: 90 MS
QT INTERVAL: 312 MS
QT INTERVAL: 316 MS
QT INTERVAL: 330 MS
QT INTERVAL: 364 MS
QTC INTERVAL: 378 MS
QTC INTERVAL: 414 MS
QTC INTERVAL: 425 MS
QTC INTERVAL: 450 MS
RBC # BLD AUTO: 4.09 MILLION/UL (ref 3.81–5.12)
SODIUM SERPL-SCNC: 140 MMOL/L (ref 136–145)
T WAVE AXIS: 44 DEGREES
T WAVE AXIS: 56 DEGREES
T WAVE AXIS: 57 DEGREES
T WAVE AXIS: 63 DEGREES
VENTRICULAR RATE: 100 BPM
VENTRICULAR RATE: 106 BPM
VENTRICULAR RATE: 122 BPM
VENTRICULAR RATE: 65 BPM
WBC # BLD AUTO: 9.77 THOUSAND/UL (ref 4.31–10.16)

## 2017-05-25 PROCEDURE — 83735 ASSAY OF MAGNESIUM: CPT | Performed by: FAMILY MEDICINE

## 2017-05-25 PROCEDURE — 80048 BASIC METABOLIC PNL TOTAL CA: CPT | Performed by: FAMILY MEDICINE

## 2017-05-25 PROCEDURE — 85025 COMPLETE CBC W/AUTO DIFF WBC: CPT | Performed by: FAMILY MEDICINE

## 2017-05-25 RX ADMIN — CEFTRIAXONE 1000 MG: 1 INJECTION, POWDER, FOR SOLUTION INTRAMUSCULAR; INTRAVENOUS at 14:21

## 2017-05-25 RX ADMIN — CALCIUM CARBONATE-VITAMIN D TAB 500 MG-200 UNIT 1 TABLET: 500-200 TAB at 08:22

## 2017-05-25 RX ADMIN — METOPROLOL TARTRATE 5 MG: 5 INJECTION, SOLUTION INTRAVENOUS at 17:06

## 2017-05-25 RX ADMIN — GUAIFENESIN 600 MG: 600 TABLET, EXTENDED RELEASE ORAL at 22:08

## 2017-05-25 RX ADMIN — IBUPROFEN 200 MG: 200 TABLET, FILM COATED ORAL at 12:06

## 2017-05-25 RX ADMIN — OXYCODONE HYDROCHLORIDE 5 MG: 5 TABLET ORAL at 08:22

## 2017-05-25 RX ADMIN — OXYCODONE HYDROCHLORIDE 5 MG: 5 TABLET ORAL at 22:08

## 2017-05-25 RX ADMIN — GUAIFENESIN 600 MG: 600 TABLET, EXTENDED RELEASE ORAL at 08:22

## 2017-05-25 RX ADMIN — Medication 400 MG: at 18:41

## 2017-05-25 RX ADMIN — APIXABAN 5 MG: 5 TABLET, FILM COATED ORAL at 08:22

## 2017-05-25 RX ADMIN — PREDNISONE 7 MG: 5 TABLET ORAL at 18:41

## 2017-05-25 RX ADMIN — APIXABAN 5 MG: 5 TABLET, FILM COATED ORAL at 18:41

## 2017-05-25 RX ADMIN — LEVOTHYROXINE SODIUM 50 MCG: 50 TABLET ORAL at 05:48

## 2017-05-25 RX ADMIN — CHOLECALCIFEROL TAB 25 MCG (1000 UNIT) 1000 UNITS: 25 TAB at 08:22

## 2017-05-25 RX ADMIN — METOPROLOL TARTRATE 25 MG: 25 TABLET ORAL at 09:48

## 2017-05-25 RX ADMIN — ACETAMINOPHEN 650 MG: 325 TABLET, FILM COATED ORAL at 02:21

## 2017-05-25 RX ADMIN — OXYCODONE HYDROCHLORIDE 5 MG: 5 TABLET ORAL at 16:37

## 2017-05-25 RX ADMIN — Medication 400 MG: at 08:22

## 2017-05-25 RX ADMIN — DILTIAZEM HYDROCHLORIDE 5 MG/HR: 5 INJECTION INTRAVENOUS at 18:44

## 2017-05-25 RX ADMIN — Medication 1 TABLET: at 08:22

## 2017-05-26 ENCOUNTER — APPOINTMENT (INPATIENT)
Dept: RADIOLOGY | Facility: HOSPITAL | Age: 67
DRG: 202 | End: 2017-05-26
Payer: MEDICARE

## 2017-05-26 PROCEDURE — 71020 HB CHEST X-RAY 2VW FRONTAL&LATL: CPT

## 2017-05-26 RX ADMIN — APIXABAN 5 MG: 5 TABLET, FILM COATED ORAL at 07:56

## 2017-05-26 RX ADMIN — GUAIFENESIN 600 MG: 600 TABLET, EXTENDED RELEASE ORAL at 20:33

## 2017-05-26 RX ADMIN — OXYCODONE HYDROCHLORIDE 5 MG: 5 TABLET ORAL at 23:06

## 2017-05-26 RX ADMIN — CEFTRIAXONE 1000 MG: 1 INJECTION, POWDER, FOR SOLUTION INTRAMUSCULAR; INTRAVENOUS at 14:44

## 2017-05-26 RX ADMIN — DILTIAZEM HYDROCHLORIDE 10 MG/HR: 5 INJECTION INTRAVENOUS at 04:32

## 2017-05-26 RX ADMIN — METOPROLOL TARTRATE 5 MG: 5 INJECTION, SOLUTION INTRAVENOUS at 01:45

## 2017-05-26 RX ADMIN — OXYCODONE HYDROCHLORIDE 5 MG: 5 TABLET ORAL at 13:54

## 2017-05-26 RX ADMIN — DOXYCYCLINE 100 MG: 100 INJECTION, POWDER, LYOPHILIZED, FOR SOLUTION INTRAVENOUS at 22:27

## 2017-05-26 RX ADMIN — METOPROLOL TARTRATE 25 MG: 25 TABLET ORAL at 20:33

## 2017-05-26 RX ADMIN — OXYCODONE HYDROCHLORIDE 5 MG: 5 TABLET ORAL at 18:38

## 2017-05-26 RX ADMIN — CHOLECALCIFEROL TAB 25 MCG (1000 UNIT) 1000 UNITS: 25 TAB at 07:57

## 2017-05-26 RX ADMIN — METOPROLOL TARTRATE 25 MG: 25 TABLET ORAL at 13:56

## 2017-05-26 RX ADMIN — AMIODARONE HYDROCHLORIDE 0.5 MG/MIN: 50 INJECTION, SOLUTION INTRAVENOUS at 10:57

## 2017-05-26 RX ADMIN — OXYCODONE HYDROCHLORIDE 5 MG: 5 TABLET ORAL at 02:21

## 2017-05-26 RX ADMIN — GUAIFENESIN 600 MG: 600 TABLET, EXTENDED RELEASE ORAL at 07:57

## 2017-05-26 RX ADMIN — PREDNISONE 7 MG: 5 TABLET ORAL at 18:44

## 2017-05-26 RX ADMIN — Medication 1 TABLET: at 07:57

## 2017-05-26 RX ADMIN — Medication 400 MG: at 18:38

## 2017-05-26 RX ADMIN — Medication 400 MG: at 07:57

## 2017-05-26 RX ADMIN — METOPROLOL TARTRATE 25 MG: 25 TABLET ORAL at 07:57

## 2017-05-26 RX ADMIN — CALCIUM CARBONATE-VITAMIN D TAB 500 MG-200 UNIT 1 TABLET: 500-200 TAB at 07:57

## 2017-05-26 RX ADMIN — LEVOTHYROXINE SODIUM 50 MCG: 50 TABLET ORAL at 06:49

## 2017-05-26 RX ADMIN — APIXABAN 5 MG: 5 TABLET, FILM COATED ORAL at 18:39

## 2017-05-26 RX ADMIN — SODIUM CHLORIDE 1000 ML: 0.9 INJECTION, SOLUTION INTRAVENOUS at 09:54

## 2017-05-27 LAB
ALBUMIN SERPL BCP-MCNC: 2 G/DL (ref 3.5–5)
ALP SERPL-CCNC: 58 U/L (ref 46–116)
ALT SERPL W P-5'-P-CCNC: 27 U/L (ref 12–78)
ANION GAP SERPL CALCULATED.3IONS-SCNC: 5 MMOL/L (ref 4–13)
AST SERPL W P-5'-P-CCNC: 19 U/L (ref 5–45)
BASOPHILS # BLD AUTO: 0.01 THOUSANDS/ΜL (ref 0–0.1)
BASOPHILS NFR BLD AUTO: 0 % (ref 0–1)
BILIRUB SERPL-MCNC: 0.4 MG/DL (ref 0.2–1)
BUN SERPL-MCNC: 7 MG/DL (ref 5–25)
CALCIUM SERPL-MCNC: 9 MG/DL (ref 8.3–10.1)
CHLORIDE SERPL-SCNC: 104 MMOL/L (ref 100–108)
CO2 SERPL-SCNC: 30 MMOL/L (ref 21–32)
CREAT SERPL-MCNC: 0.51 MG/DL (ref 0.6–1.3)
EOSINOPHIL # BLD AUTO: 0.13 THOUSAND/ΜL (ref 0–0.61)
EOSINOPHIL NFR BLD AUTO: 1 % (ref 0–6)
ERYTHROCYTE [DISTWIDTH] IN BLOOD BY AUTOMATED COUNT: 15.1 % (ref 11.6–15.1)
GFR SERPL CREATININE-BSD FRML MDRD: >60 ML/MIN/1.73SQ M
GLUCOSE SERPL-MCNC: 98 MG/DL (ref 65–140)
HCT VFR BLD AUTO: 34.6 % (ref 34.8–46.1)
HGB BLD-MCNC: 10.8 G/DL (ref 11.5–15.4)
L PNEUMO1 AG UR QL IA.RAPID: NEGATIVE
LYMPHOCYTES # BLD AUTO: 1.03 THOUSANDS/ΜL (ref 0.6–4.47)
LYMPHOCYTES NFR BLD AUTO: 9 % (ref 14–44)
MAGNESIUM SERPL-MCNC: 1.9 MG/DL (ref 1.6–2.6)
MCH RBC QN AUTO: 27 PG (ref 26.8–34.3)
MCHC RBC AUTO-ENTMCNC: 31.2 G/DL (ref 31.4–37.4)
MCV RBC AUTO: 87 FL (ref 82–98)
MONOCYTES # BLD AUTO: 0.79 THOUSAND/ΜL (ref 0.17–1.22)
MONOCYTES NFR BLD AUTO: 7 % (ref 4–12)
NEUTROPHILS # BLD AUTO: 9.52 THOUSANDS/ΜL (ref 1.85–7.62)
NEUTS SEG NFR BLD AUTO: 83 % (ref 43–75)
PLATELET # BLD AUTO: 398 THOUSANDS/UL (ref 149–390)
PMV BLD AUTO: 8.8 FL (ref 8.9–12.7)
POTASSIUM SERPL-SCNC: 3.8 MMOL/L (ref 3.5–5.3)
PROT SERPL-MCNC: 5.6 G/DL (ref 6.4–8.2)
RBC # BLD AUTO: 4 MILLION/UL (ref 3.81–5.12)
S PNEUM AG UR QL: NEGATIVE
SODIUM SERPL-SCNC: 139 MMOL/L (ref 136–145)
WBC # BLD AUTO: 11.48 THOUSAND/UL (ref 4.31–10.16)

## 2017-05-27 PROCEDURE — 93005 ELECTROCARDIOGRAM TRACING: CPT | Performed by: FAMILY MEDICINE

## 2017-05-27 PROCEDURE — 83735 ASSAY OF MAGNESIUM: CPT | Performed by: FAMILY MEDICINE

## 2017-05-27 PROCEDURE — 80053 COMPREHEN METABOLIC PANEL: CPT | Performed by: FAMILY MEDICINE

## 2017-05-27 PROCEDURE — 85025 COMPLETE CBC W/AUTO DIFF WBC: CPT | Performed by: FAMILY MEDICINE

## 2017-05-27 PROCEDURE — 87040 BLOOD CULTURE FOR BACTERIA: CPT | Performed by: FAMILY MEDICINE

## 2017-05-27 PROCEDURE — 87449 NOS EACH ORGANISM AG IA: CPT | Performed by: FAMILY MEDICINE

## 2017-05-27 RX ORDER — METOPROLOL TARTRATE 50 MG/1
50 TABLET, FILM COATED ORAL EVERY 6 HOURS
Status: DISCONTINUED | OUTPATIENT
Start: 2017-05-27 | End: 2017-05-29 | Stop reason: HOSPADM

## 2017-05-27 RX ADMIN — AMIODARONE HYDROCHLORIDE 0.5 MG/MIN: 50 INJECTION, SOLUTION INTRAVENOUS at 15:19

## 2017-05-27 RX ADMIN — LEVOTHYROXINE SODIUM 50 MCG: 50 TABLET ORAL at 05:46

## 2017-05-27 RX ADMIN — APIXABAN 5 MG: 5 TABLET, FILM COATED ORAL at 17:09

## 2017-05-27 RX ADMIN — CALCIUM CARBONATE-VITAMIN D TAB 500 MG-200 UNIT 1 TABLET: 500-200 TAB at 08:04

## 2017-05-27 RX ADMIN — METOPROLOL TARTRATE 25 MG: 25 TABLET ORAL at 02:44

## 2017-05-27 RX ADMIN — METOPROLOL TARTRATE 5 MG: 5 INJECTION, SOLUTION INTRAVENOUS at 11:12

## 2017-05-27 RX ADMIN — OXYCODONE HYDROCHLORIDE 5 MG: 5 TABLET ORAL at 03:20

## 2017-05-27 RX ADMIN — CHOLECALCIFEROL TAB 25 MCG (1000 UNIT) 1000 UNITS: 25 TAB at 08:05

## 2017-05-27 RX ADMIN — APIXABAN 5 MG: 5 TABLET, FILM COATED ORAL at 08:05

## 2017-05-27 RX ADMIN — METOPROLOL TARTRATE 25 MG: 25 TABLET ORAL at 07:56

## 2017-05-27 RX ADMIN — CEFEPIME 2000 MG: 2 INJECTION, POWDER, FOR SOLUTION INTRAVENOUS at 23:26

## 2017-05-27 RX ADMIN — PREDNISONE 7 MG: 5 TABLET ORAL at 17:09

## 2017-05-27 RX ADMIN — ACETAMINOPHEN 650 MG: 325 TABLET, FILM COATED ORAL at 23:25

## 2017-05-27 RX ADMIN — ACETAMINOPHEN 650 MG: 325 TABLET, FILM COATED ORAL at 11:33

## 2017-05-27 RX ADMIN — VANCOMYCIN HYDROCHLORIDE 750 MG: 1 INJECTION, POWDER, LYOPHILIZED, FOR SOLUTION INTRAVENOUS at 12:33

## 2017-05-27 RX ADMIN — Medication 400 MG: at 08:04

## 2017-05-27 RX ADMIN — CEFEPIME 2000 MG: 2 INJECTION, POWDER, FOR SOLUTION INTRAVENOUS at 11:46

## 2017-05-27 RX ADMIN — GUAIFENESIN 600 MG: 600 TABLET, EXTENDED RELEASE ORAL at 08:04

## 2017-05-27 RX ADMIN — Medication 1 TABLET: at 08:04

## 2017-05-27 RX ADMIN — METOPROLOL TARTRATE 50 MG: 50 TABLET ORAL at 11:12

## 2017-05-27 RX ADMIN — Medication 400 MG: at 17:09

## 2017-05-27 RX ADMIN — GUAIFENESIN 600 MG: 600 TABLET, EXTENDED RELEASE ORAL at 21:16

## 2017-05-27 RX ADMIN — METOPROLOL TARTRATE 50 MG: 50 TABLET ORAL at 18:42

## 2017-05-27 RX ADMIN — AMIODARONE HYDROCHLORIDE 0.5 MG/MIN: 50 INJECTION, SOLUTION INTRAVENOUS at 15:26

## 2017-05-28 ENCOUNTER — APPOINTMENT (INPATIENT)
Dept: ULTRASOUND IMAGING | Facility: HOSPITAL | Age: 67
DRG: 202 | End: 2017-05-28
Payer: MEDICARE

## 2017-05-28 PROBLEM — I80.8 SUPERFICIAL THROMBOPHLEBITIS OF RIGHT UPPER EXTREMITY: Status: ACTIVE | Noted: 2017-05-28

## 2017-05-28 LAB
ANION GAP SERPL CALCULATED.3IONS-SCNC: 5 MMOL/L (ref 4–13)
BASOPHILS # BLD AUTO: 0.02 THOUSANDS/ΜL (ref 0–0.1)
BASOPHILS NFR BLD AUTO: 0 % (ref 0–1)
BUN SERPL-MCNC: 9 MG/DL (ref 5–25)
CALCIUM SERPL-MCNC: 9.2 MG/DL (ref 8.3–10.1)
CHLORIDE SERPL-SCNC: 108 MMOL/L (ref 100–108)
CO2 SERPL-SCNC: 31 MMOL/L (ref 21–32)
CREAT SERPL-MCNC: 0.53 MG/DL (ref 0.6–1.3)
EOSINOPHIL # BLD AUTO: 0.23 THOUSAND/ΜL (ref 0–0.61)
EOSINOPHIL NFR BLD AUTO: 3 % (ref 0–6)
ERYTHROCYTE [DISTWIDTH] IN BLOOD BY AUTOMATED COUNT: 14.9 % (ref 11.6–15.1)
GFR SERPL CREATININE-BSD FRML MDRD: >60 ML/MIN/1.73SQ M
GLUCOSE SERPL-MCNC: 90 MG/DL (ref 65–140)
HCT VFR BLD AUTO: 34.2 % (ref 34.8–46.1)
HGB BLD-MCNC: 10.8 G/DL (ref 11.5–15.4)
LYMPHOCYTES # BLD AUTO: 1.1 THOUSANDS/ΜL (ref 0.6–4.47)
LYMPHOCYTES NFR BLD AUTO: 14 % (ref 14–44)
MCH RBC QN AUTO: 27.3 PG (ref 26.8–34.3)
MCHC RBC AUTO-ENTMCNC: 31.6 G/DL (ref 31.4–37.4)
MCV RBC AUTO: 86 FL (ref 82–98)
MONOCYTES # BLD AUTO: 0.48 THOUSAND/ΜL (ref 0.17–1.22)
MONOCYTES NFR BLD AUTO: 6 % (ref 4–12)
NEUTROPHILS # BLD AUTO: 5.8 THOUSANDS/ΜL (ref 1.85–7.62)
NEUTS SEG NFR BLD AUTO: 77 % (ref 43–75)
PLATELET # BLD AUTO: 415 THOUSANDS/UL (ref 149–390)
PMV BLD AUTO: 8.7 FL (ref 8.9–12.7)
POTASSIUM SERPL-SCNC: 3.7 MMOL/L (ref 3.5–5.3)
RBC # BLD AUTO: 3.96 MILLION/UL (ref 3.81–5.12)
SODIUM SERPL-SCNC: 144 MMOL/L (ref 136–145)
WBC # BLD AUTO: 7.63 THOUSAND/UL (ref 4.31–10.16)

## 2017-05-28 PROCEDURE — 94640 AIRWAY INHALATION TREATMENT: CPT

## 2017-05-28 PROCEDURE — 93971 EXTREMITY STUDY: CPT

## 2017-05-28 PROCEDURE — 80048 BASIC METABOLIC PNL TOTAL CA: CPT | Performed by: FAMILY MEDICINE

## 2017-05-28 PROCEDURE — 94760 N-INVAS EAR/PLS OXIMETRY 1: CPT

## 2017-05-28 PROCEDURE — 93005 ELECTROCARDIOGRAM TRACING: CPT | Performed by: FAMILY MEDICINE

## 2017-05-28 PROCEDURE — 85025 COMPLETE CBC W/AUTO DIFF WBC: CPT | Performed by: FAMILY MEDICINE

## 2017-05-28 RX ORDER — MAGNESIUM HYDROXIDE/ALUMINUM HYDROXICE/SIMETHICONE 120; 1200; 1200 MG/30ML; MG/30ML; MG/30ML
30 SUSPENSION ORAL EVERY 4 HOURS PRN
Status: DISCONTINUED | OUTPATIENT
Start: 2017-05-28 | End: 2017-05-29 | Stop reason: HOSPADM

## 2017-05-28 RX ADMIN — OXYCODONE HYDROCHLORIDE 5 MG: 5 TABLET ORAL at 18:19

## 2017-05-28 RX ADMIN — Medication 400 MG: at 17:25

## 2017-05-28 RX ADMIN — IBUPROFEN 200 MG: 200 TABLET, FILM COATED ORAL at 09:34

## 2017-05-28 RX ADMIN — METOPROLOL TARTRATE 50 MG: 50 TABLET ORAL at 12:08

## 2017-05-28 RX ADMIN — METOPROLOL TARTRATE 50 MG: 50 TABLET ORAL at 17:25

## 2017-05-28 RX ADMIN — GUAIFENESIN 600 MG: 600 TABLET, EXTENDED RELEASE ORAL at 09:30

## 2017-05-28 RX ADMIN — CEFEPIME 2000 MG: 2 INJECTION, POWDER, FOR SOLUTION INTRAVENOUS at 12:09

## 2017-05-28 RX ADMIN — LEVALBUTEROL 0.63 MG: 0.63 SOLUTION RESPIRATORY (INHALATION) at 21:40

## 2017-05-28 RX ADMIN — Medication 400 MG: at 09:31

## 2017-05-28 RX ADMIN — VANCOMYCIN HYDROCHLORIDE 750 MG: 1 INJECTION, POWDER, LYOPHILIZED, FOR SOLUTION INTRAVENOUS at 00:17

## 2017-05-28 RX ADMIN — APIXABAN 5 MG: 5 TABLET, FILM COATED ORAL at 09:30

## 2017-05-28 RX ADMIN — ALUMINUM HYDROXIDE, MAGNESIUM HYDROXIDE, AND SIMETHICONE 30 ML: 200; 200; 20 SUSPENSION ORAL at 09:02

## 2017-05-28 RX ADMIN — LEVOTHYROXINE SODIUM 50 MCG: 50 TABLET ORAL at 06:05

## 2017-05-28 RX ADMIN — METOPROLOL TARTRATE 5 MG: 5 INJECTION, SOLUTION INTRAVENOUS at 10:30

## 2017-05-28 RX ADMIN — Medication 1 TABLET: at 09:30

## 2017-05-28 RX ADMIN — APIXABAN 5 MG: 5 TABLET, FILM COATED ORAL at 17:25

## 2017-05-28 RX ADMIN — ALUMINUM HYDROXIDE, MAGNESIUM HYDROXIDE, AND SIMETHICONE 30 ML: 200; 200; 20 SUSPENSION ORAL at 18:24

## 2017-05-28 RX ADMIN — GUAIFENESIN 600 MG: 600 TABLET, EXTENDED RELEASE ORAL at 21:40

## 2017-05-28 RX ADMIN — VANCOMYCIN HYDROCHLORIDE 750 MG: 1 INJECTION, POWDER, LYOPHILIZED, FOR SOLUTION INTRAVENOUS at 13:07

## 2017-05-28 RX ADMIN — CALCIUM CARBONATE-VITAMIN D TAB 500 MG-200 UNIT 1 TABLET: 500-200 TAB at 09:30

## 2017-05-28 RX ADMIN — METOPROLOL TARTRATE 50 MG: 50 TABLET ORAL at 06:05

## 2017-05-28 RX ADMIN — METOPROLOL TARTRATE 5 MG: 5 INJECTION, SOLUTION INTRAVENOUS at 22:53

## 2017-05-28 RX ADMIN — PREDNISONE 7 MG: 5 TABLET ORAL at 17:25

## 2017-05-28 RX ADMIN — CHOLECALCIFEROL TAB 25 MCG (1000 UNIT) 1000 UNITS: 25 TAB at 09:31

## 2017-05-28 RX ADMIN — METOPROLOL TARTRATE 50 MG: 50 TABLET ORAL at 00:17

## 2017-05-29 ENCOUNTER — GENERIC CONVERSION - ENCOUNTER (OUTPATIENT)
Dept: OTHER | Facility: OTHER | Age: 67
End: 2017-05-29

## 2017-05-29 VITALS
WEIGHT: 166.23 LBS | DIASTOLIC BLOOD PRESSURE: 66 MMHG | RESPIRATION RATE: 20 BRPM | SYSTOLIC BLOOD PRESSURE: 114 MMHG | OXYGEN SATURATION: 93 % | TEMPERATURE: 102.4 F | HEART RATE: 116 BPM | BODY MASS INDEX: 29.45 KG/M2 | HEIGHT: 63 IN

## 2017-05-29 LAB
ALBUMIN SERPL BCP-MCNC: 1.8 G/DL (ref 3.5–5)
ALP SERPL-CCNC: 54 U/L (ref 46–116)
ALT SERPL W P-5'-P-CCNC: 24 U/L (ref 12–78)
ANION GAP SERPL CALCULATED.3IONS-SCNC: 5 MMOL/L (ref 4–13)
AST SERPL W P-5'-P-CCNC: 20 U/L (ref 5–45)
BASOPHILS # BLD AUTO: 0.04 THOUSANDS/ΜL (ref 0–0.1)
BASOPHILS NFR BLD AUTO: 1 % (ref 0–1)
BILIRUB SERPL-MCNC: 0.2 MG/DL (ref 0.2–1)
BUN SERPL-MCNC: 10 MG/DL (ref 5–25)
CALCIUM SERPL-MCNC: 8.9 MG/DL (ref 8.3–10.1)
CHLORIDE SERPL-SCNC: 107 MMOL/L (ref 100–108)
CO2 SERPL-SCNC: 30 MMOL/L (ref 21–32)
CREAT SERPL-MCNC: 0.52 MG/DL (ref 0.6–1.3)
EOSINOPHIL # BLD AUTO: 0.21 THOUSAND/ΜL (ref 0–0.61)
EOSINOPHIL NFR BLD AUTO: 3 % (ref 0–6)
ERYTHROCYTE [DISTWIDTH] IN BLOOD BY AUTOMATED COUNT: 14.9 % (ref 11.6–15.1)
GFR SERPL CREATININE-BSD FRML MDRD: >60 ML/MIN/1.73SQ M
GLUCOSE SERPL-MCNC: 82 MG/DL (ref 65–140)
HCT VFR BLD AUTO: 34.8 % (ref 34.8–46.1)
HGB BLD-MCNC: 11.1 G/DL (ref 11.5–15.4)
LYMPHOCYTES # BLD AUTO: 1.54 THOUSANDS/ΜL (ref 0.6–4.47)
LYMPHOCYTES NFR BLD AUTO: 19 % (ref 14–44)
MAGNESIUM SERPL-MCNC: 2 MG/DL (ref 1.6–2.6)
MCH RBC QN AUTO: 27.6 PG (ref 26.8–34.3)
MCHC RBC AUTO-ENTMCNC: 31.9 G/DL (ref 31.4–37.4)
MCV RBC AUTO: 87 FL (ref 82–98)
MONOCYTES # BLD AUTO: 0.66 THOUSAND/ΜL (ref 0.17–1.22)
MONOCYTES NFR BLD AUTO: 8 % (ref 4–12)
NEUTROPHILS # BLD AUTO: 5.75 THOUSANDS/ΜL (ref 1.85–7.62)
NEUTS SEG NFR BLD AUTO: 69 % (ref 43–75)
PLATELET # BLD AUTO: 453 THOUSANDS/UL (ref 149–390)
PMV BLD AUTO: 8.5 FL (ref 8.9–12.7)
POTASSIUM SERPL-SCNC: 3.8 MMOL/L (ref 3.5–5.3)
PROT SERPL-MCNC: 5.5 G/DL (ref 6.4–8.2)
RBC # BLD AUTO: 4.02 MILLION/UL (ref 3.81–5.12)
SODIUM SERPL-SCNC: 142 MMOL/L (ref 136–145)
VANCOMYCIN TROUGH SERPL-MCNC: 8.3 UG/ML (ref 10–20)
WBC # BLD AUTO: 8.2 THOUSAND/UL (ref 4.31–10.16)

## 2017-05-29 PROCEDURE — 83735 ASSAY OF MAGNESIUM: CPT | Performed by: FAMILY MEDICINE

## 2017-05-29 PROCEDURE — 80053 COMPREHEN METABOLIC PANEL: CPT | Performed by: FAMILY MEDICINE

## 2017-05-29 PROCEDURE — 80202 ASSAY OF VANCOMYCIN: CPT | Performed by: FAMILY MEDICINE

## 2017-05-29 PROCEDURE — 85025 COMPLETE CBC W/AUTO DIFF WBC: CPT | Performed by: FAMILY MEDICINE

## 2017-05-29 RX ADMIN — CEFEPIME 2000 MG: 2 INJECTION, POWDER, FOR SOLUTION INTRAVENOUS at 11:21

## 2017-05-29 RX ADMIN — APIXABAN 5 MG: 5 TABLET, FILM COATED ORAL at 08:40

## 2017-05-29 RX ADMIN — Medication 1 TABLET: at 08:40

## 2017-05-29 RX ADMIN — CEFEPIME 2000 MG: 2 INJECTION, POWDER, FOR SOLUTION INTRAVENOUS at 00:34

## 2017-05-29 RX ADMIN — OXYCODONE HYDROCHLORIDE 5 MG: 5 TABLET ORAL at 10:35

## 2017-05-29 RX ADMIN — ACETAMINOPHEN 650 MG: 325 TABLET, FILM COATED ORAL at 13:45

## 2017-05-29 RX ADMIN — Medication 400 MG: at 08:40

## 2017-05-29 RX ADMIN — OXYCODONE HYDROCHLORIDE 5 MG: 5 TABLET ORAL at 06:50

## 2017-05-29 RX ADMIN — OXYCODONE HYDROCHLORIDE 5 MG: 5 TABLET ORAL at 00:33

## 2017-05-29 RX ADMIN — METOPROLOL TARTRATE 5 MG: 5 INJECTION, SOLUTION INTRAVENOUS at 09:25

## 2017-05-29 RX ADMIN — METOPROLOL TARTRATE 50 MG: 50 TABLET ORAL at 11:20

## 2017-05-29 RX ADMIN — VANCOMYCIN HYDROCHLORIDE 750 MG: 1 INJECTION, POWDER, LYOPHILIZED, FOR SOLUTION INTRAVENOUS at 01:24

## 2017-05-29 RX ADMIN — CHOLECALCIFEROL TAB 25 MCG (1000 UNIT) 1000 UNITS: 25 TAB at 08:40

## 2017-05-29 RX ADMIN — ONDANSETRON 4 MG: 2 INJECTION INTRAMUSCULAR; INTRAVENOUS at 12:38

## 2017-05-29 RX ADMIN — SODIUM CHLORIDE 500 ML: 0.9 INJECTION, SOLUTION INTRAVENOUS at 02:58

## 2017-05-29 RX ADMIN — GUAIFENESIN 600 MG: 600 TABLET, EXTENDED RELEASE ORAL at 08:40

## 2017-05-29 RX ADMIN — CALCIUM CARBONATE-VITAMIN D TAB 500 MG-200 UNIT 1 TABLET: 500-200 TAB at 08:40

## 2017-05-29 RX ADMIN — METOPROLOL TARTRATE 50 MG: 50 TABLET ORAL at 00:31

## 2017-05-29 RX ADMIN — LEVOTHYROXINE SODIUM 50 MCG: 50 TABLET ORAL at 06:15

## 2017-05-29 RX ADMIN — SODIUM CHLORIDE 1000 ML: 0.9 INJECTION, SOLUTION INTRAVENOUS at 09:07

## 2017-05-30 ENCOUNTER — GENERIC CONVERSION - ENCOUNTER (OUTPATIENT)
Dept: OTHER | Facility: OTHER | Age: 67
End: 2017-05-30

## 2017-05-30 LAB
ATRIAL RATE: 137 BPM
ATRIAL RATE: 326 BPM
ATRIAL RATE: 64 BPM
ATRIAL RATE: 72 BPM
P AXIS: 31 DEGREES
P AXIS: 42 DEGREES
PR INTERVAL: 144 MS
PR INTERVAL: 162 MS
QRS AXIS: -26 DEGREES
QRS AXIS: -37 DEGREES
QRS AXIS: -40 DEGREES
QRS AXIS: -42 DEGREES
QRSD INTERVAL: 80 MS
QRSD INTERVAL: 80 MS
QRSD INTERVAL: 82 MS
QRSD INTERVAL: 84 MS
QT INTERVAL: 246 MS
QT INTERVAL: 324 MS
QT INTERVAL: 376 MS
QT INTERVAL: 414 MS
QTC INTERVAL: 371 MS
QTC INTERVAL: 411 MS
QTC INTERVAL: 427 MS
QTC INTERVAL: 450 MS
T WAVE AXIS: 215 DEGREES
T WAVE AXIS: 38 DEGREES
T WAVE AXIS: 50 DEGREES
T WAVE AXIS: 98 DEGREES
VENTRICULAR RATE: 116 BPM
VENTRICULAR RATE: 137 BPM
VENTRICULAR RATE: 64 BPM
VENTRICULAR RATE: 72 BPM

## 2017-06-01 LAB
BACTERIA BLD CULT: NORMAL
BACTERIA BLD CULT: NORMAL

## 2017-06-06 ENCOUNTER — GENERIC CONVERSION - ENCOUNTER (OUTPATIENT)
Dept: OTHER | Facility: OTHER | Age: 67
End: 2017-06-06

## 2017-06-08 ENCOUNTER — GENERIC CONVERSION - ENCOUNTER (OUTPATIENT)
Dept: OTHER | Facility: OTHER | Age: 67
End: 2017-06-08

## 2017-06-09 ENCOUNTER — ALLSCRIPTS OFFICE VISIT (OUTPATIENT)
Dept: OTHER | Facility: OTHER | Age: 67
End: 2017-06-09

## 2017-06-29 ENCOUNTER — GENERIC CONVERSION - ENCOUNTER (OUTPATIENT)
Dept: OTHER | Facility: OTHER | Age: 67
End: 2017-06-29

## 2017-07-10 ENCOUNTER — ALLSCRIPTS OFFICE VISIT (OUTPATIENT)
Dept: OTHER | Facility: OTHER | Age: 67
End: 2017-07-10

## 2017-07-17 ENCOUNTER — ALLSCRIPTS OFFICE VISIT (OUTPATIENT)
Dept: OTHER | Facility: OTHER | Age: 67
End: 2017-07-17

## 2017-09-01 ENCOUNTER — GENERIC CONVERSION - ENCOUNTER (OUTPATIENT)
Dept: OTHER | Facility: OTHER | Age: 67
End: 2017-09-01

## 2017-09-13 ENCOUNTER — HOSPITAL ENCOUNTER (OUTPATIENT)
Dept: ULTRASOUND IMAGING | Facility: HOSPITAL | Age: 67
Discharge: HOME | End: 2017-09-13
Payer: MEDICARE

## 2017-09-13 DIAGNOSIS — I82.409 ACUTE EMBOLISM AND THROMBOSIS OF DEEP VEIN OF LOWER EXTREMITY (HCC): ICD-10-CM

## 2017-09-13 DIAGNOSIS — R60.0 LOCALIZED EDEMA: ICD-10-CM

## 2017-09-13 PROCEDURE — 93971 EXTREMITY STUDY: CPT

## 2017-09-20 ENCOUNTER — ALLSCRIPTS OFFICE VISIT (OUTPATIENT)
Dept: OTHER | Facility: OTHER | Age: 67
End: 2017-09-20

## 2017-09-20 DIAGNOSIS — R06.89 OTHER ABNORMALITIES OF BREATHING: ICD-10-CM

## 2017-09-20 DIAGNOSIS — E03.9 HYPOTHYROIDISM: ICD-10-CM

## 2017-10-11 ENCOUNTER — TRANSCRIBE ORDERS (OUTPATIENT)
Dept: ADMINISTRATIVE | Facility: HOSPITAL | Age: 67
End: 2017-10-11

## 2017-10-11 DIAGNOSIS — Z12.39 SCREENING BREAST EXAMINATION: Primary | ICD-10-CM

## 2017-10-20 ENCOUNTER — GENERIC CONVERSION - ENCOUNTER (OUTPATIENT)
Dept: OTHER | Facility: OTHER | Age: 67
End: 2017-10-20

## 2017-10-27 ENCOUNTER — HOSPITAL ENCOUNTER (OUTPATIENT)
Dept: MAMMOGRAPHY | Facility: HOSPITAL | Age: 67
Discharge: HOME | End: 2017-10-27
Payer: MEDICARE

## 2017-10-27 DIAGNOSIS — Z12.31 ENCOUNTER FOR SCREENING MAMMOGRAM FOR MALIGNANT NEOPLASM OF BREAST: ICD-10-CM

## 2017-10-27 DIAGNOSIS — Z12.39 SCREENING BREAST EXAMINATION: ICD-10-CM

## 2017-10-27 PROCEDURE — G0202 SCR MAMMO BI INCL CAD: HCPCS

## 2017-10-27 PROCEDURE — 77063 BREAST TOMOSYNTHESIS BI: CPT

## 2017-11-01 ENCOUNTER — GENERIC CONVERSION - ENCOUNTER (OUTPATIENT)
Dept: OTHER | Facility: OTHER | Age: 67
End: 2017-11-01

## 2017-11-01 ENCOUNTER — ALLSCRIPTS OFFICE VISIT (OUTPATIENT)
Dept: OTHER | Facility: OTHER | Age: 67
End: 2017-11-01

## 2017-11-01 DIAGNOSIS — E03.9 HYPOTHYROIDISM: ICD-10-CM

## 2017-11-08 ENCOUNTER — GENERIC CONVERSION - ENCOUNTER (OUTPATIENT)
Dept: OTHER | Facility: OTHER | Age: 67
End: 2017-11-08

## 2017-11-29 ENCOUNTER — HOSPITAL ENCOUNTER (OUTPATIENT)
Dept: ULTRASOUND IMAGING | Facility: HOSPITAL | Age: 67
Discharge: HOME | End: 2017-11-29
Payer: MEDICARE

## 2017-11-29 DIAGNOSIS — E03.9 HYPOTHYROIDISM: ICD-10-CM

## 2017-11-29 PROCEDURE — 76536 US EXAM OF HEAD AND NECK: CPT

## 2017-12-04 ENCOUNTER — ALLSCRIPTS OFFICE VISIT (OUTPATIENT)
Dept: OTHER | Facility: OTHER | Age: 67
End: 2017-12-04

## 2017-12-06 NOTE — PROGRESS NOTES
Assessment    1  Acute maxillary sinusitis (461 0) (J01 00)   2  Breathing difficulty (786 09) (R06 89)    Plan  Acute maxillary sinusitis    · Amoxicillin-Pot Clavulanate 875-125 MG Oral Tablet; TAKE 1 TABLET EVERY 12HOURS DAILY    Discussion/Summary    Keep follow up with pulmonology  The patient was counseled regarding diagnostic results,-- instructions for management,-- risk factor reductions,-- prognosis,-- patient and family education,-- impressions,-- risks and benefits of treatment options,-- importance of compliance with treatment  Possible side effects of new medications were reviewed with the patient/guardian today  The treatment plan was reviewed with the patient/guardian  The patient/guardian understands and agrees with the treatment plan      Chief Complaint  sore throat, left side ear pain, feels tired, body aches, had fever past 3 nights  took Tylenol for Sx and Robitussin      History of Present Illness  HPI: pt complains of cold pt is hoarse, cough, stuffy nose, left ear ache, it started 3-4 days ago, pt has had some ill contacts, pt tried robatussin, tylenol, and advil these things did help, pt denies nausea, vomiting or diarrhea, pt had a recent PFT test and seeing the pulmonary doctor      Review of Systems   Constitutional: fever-- and-- chills  Respiratory: no shortness of breath-- and-- no wheezing  Active Problems  1  Atrial fibrillation (427 31) (I48 91)   2  Breathing difficulty (786 09) (R06 89)   3  Colon cancer screening (V76 51) (Z12 11)   4  Diffuse arthralgia (719 40) (M25 50)   5  DVT (deep venous thrombosis) (453 40) (I82 409)   6  Encounter for mammogram to establish baseline mammogram (V76 12) (Z12 31)   7  Essential hypertension (401 9) (I10)   8  Hypercholesterolemia (272 0) (E78 00)   9  Hypothyroidism (244 9) (E03 9)   10  Kidney stones (592 0) (N20 0)   11  Need for influenza vaccination (V04 81) (Z23)   12  Screening for cervical cancer (V76 2) (Z12 4)   13  Screening for colon cancer (V76 51) (Z12 11)    Past Medical History  1  History of migraine (V12 49) (Z86 69)    Family History  Mother    1  Family history of malignant neoplasm of breast (V16 3) (Z80 3)  Father    2  Family history of     Social History   ·    · Never a smoker   · No drug use   · Occupation   · Social alcohol use (Z78 9)   · Three children  The social history was reviewed and updated today  The social history was reviewed and is unchanged  Surgical History    1  History of  Section   2  History of Hernia Repair   3  History of Tonsillectomy With Adenoidectomy   4  History of Venous Ligation With Stripping   5  History of Wrist Surgery    Current Meds   1  Levothyroxine Sodium 100 MCG Oral Tablet; TAKE 1 TABLET DAILY  Requested for: 67PCW9330; Last Rx:2017 Ordered   2  Metoprolol Succinate ER 50 MG Oral Tablet Extended Release 24 Hour; take 1 tablet by mouth once daily  Requested for: 94CTZ9071; Last Rx:25Pgf6344 Ordered   3  PredniSONE TABS; 4mg daily; Therapy: (Recorded:96Pue6492) to Recorded   4  Xarelto 10 MG Oral Tablet; Take 1 tablet twice daily  Requested for: 06Uss1435; Last Rx:57Jdj7953 Ordered    The medication list was reviewed and updated today  Allergies  1  Bactrim   2  Mevacor    Vitals   Recorded: 26YLU3688 03:02PM   Temperature 98 7 F, Tympanic   Heart Rate 81   Systolic 702   Diastolic 74   Height 5 ft 2 5 in   Weight 165 lb    BMI Calculated 29 7   BSA Calculated 1 77   O2 Saturation 98       Physical Exam   Constitutional  General appearance: No acute distress, well appearing and well nourished  well developed,-- appears healthy,-- well nourished-- and-- well hydrated  Pulmonary  Respiratory effort: No increased work of breathing or signs of respiratory distress  Respiratory rate: normal  Assessment of respiratory effort revealed normal rhythm and effort  Auscultation of lungs: Clear to auscultation    no rales or crackles were heard bilaterally  no rhonchi  no friction rub  no wheezing  Cardiovascular  Auscultation of heart: Normal rate and rhythm, normal S1 and S2, without murmurs  The heart rate was normal  The rhythm was regular  Heart sounds: normal S1-- and-- normal S2  no murmurs were heard  Lymphatic  Palpation of lymph nodes in neck: No lymphadenopathy  Skin  Skin and subcutaneous tissue: Normal without rashes or lesions     Psychiatric  Mood and affect: Normal          Future Appointments    Date/Time Provider Specialty Site   12/20/2017 11:15 AM Betty Agee DO Family Medicine [facility] 10       Signatures   Electronically signed by : Tyler Jj DO; Dec  4 2017  3:16PM EST                       (Author)

## 2017-12-15 ENCOUNTER — GENERIC CONVERSION - ENCOUNTER (OUTPATIENT)
Dept: FAMILY MEDICINE CLINIC | Facility: CLINIC | Age: 67
End: 2017-12-15

## 2017-12-18 ENCOUNTER — GENERIC CONVERSION - ENCOUNTER (OUTPATIENT)
Dept: FAMILY MEDICINE CLINIC | Facility: CLINIC | Age: 67
End: 2017-12-18

## 2017-12-20 ENCOUNTER — GENERIC CONVERSION - ENCOUNTER (OUTPATIENT)
Dept: OTHER | Facility: OTHER | Age: 67
End: 2017-12-20

## 2017-12-20 DIAGNOSIS — R11.2 NAUSEA WITH VOMITING: ICD-10-CM

## 2017-12-20 DIAGNOSIS — M89.8X1 OTHER SPECIFIED DISORDERS OF BONE, SHOULDER: ICD-10-CM

## 2017-12-20 DIAGNOSIS — R10.2 PELVIC AND PERINEAL PAIN: ICD-10-CM

## 2017-12-20 DIAGNOSIS — R19.7 DIARRHEA: ICD-10-CM

## 2017-12-20 DIAGNOSIS — E04.1 NONTOXIC SINGLE THYROID NODULE: ICD-10-CM

## 2017-12-20 DIAGNOSIS — I50.9 HEART FAILURE (HCC): ICD-10-CM

## 2017-12-20 DIAGNOSIS — R10.11 RIGHT UPPER QUADRANT PAIN: ICD-10-CM

## 2017-12-20 DIAGNOSIS — R06.89 OTHER ABNORMALITIES OF BREATHING: ICD-10-CM

## 2017-12-20 DIAGNOSIS — I48.91 ATRIAL FIBRILLATION (HCC): ICD-10-CM

## 2017-12-20 DIAGNOSIS — N20.0 CALCULUS OF KIDNEY: ICD-10-CM

## 2018-01-09 NOTE — MISCELLANEOUS
Message  PT CALLED ASKING FOR AN APPT  FOR RIGHT SIDED RIB/CHEST PAIN RADIATING INTO HER ARM  I TOLD HER THAT WITH THOSE SYMPTOMS SHE NEEDED TO GO TO AN ER OR AN URGENT CARE TODAY  SHE REFUSED STATING THAT SHE HAD ALOT OF HEART TESTS IN THE HOSPITAL YESTERDAY AND SHE IS FINE, IT IS NOT HER HEART AND SHE DOESN'T HAVE TIME TO GO TO THE ER OR ANYWHERE ELSE TODAY  I EXPLAINED THAT IT IS IMPORTANT THAT SHE SEEKS MEDICAL ATTENTION TODAY, SHE AGAIN STATED NO SHE WOULD NOT  Active Problems    1  Atrial fibrillation (427 31) (I48 91)   2  Colon cancer screening (V76 51) (Z12 11)   3  Diffuse arthralgia (719 40) (M25 50)   4  DVT (deep venous thrombosis) (453 40) (I82 409)   5  Edema of right lower extremity (782 3) (R60 0)   6  Encounter for mammogram to establish baseline mammogram (V76 12) (Z12 31)   7  Essential hypertension (401 9) (I10)   8  Hypercholesterolemia (272 0) (E78 00)   9  Hyperthyroidism (242 90) (E05 90)   10  Hypothyroidism (244 9) (E03 9)   11  Kidney stones (592 0) (N20 0)   12  Need for influenza vaccination (V04 81) (Z23)   13  Screening for cervical cancer (V76 2) (Z12 4)   14  Screening for colon cancer (V76 51) (Z12 11)   15  Skin rash (782 1) (R21)   16  UTI (urinary tract infection) (599 0) (N39 0)    Current Meds   1  Amiodarone HCl - 200 MG Oral Tablet; Therapy: (Recorded:09Jun2017) to Recorded   2  Levothyroxine Sodium 50 MCG Oral Tablet; Therapy: (Recorded:09Jun2017) to Recorded   3  PredniSONE 10 MG Oral Tablet; TAKE 4 TABLETS DAILY FOR 3 DAYS,3 TABLETS   DAILY FOR 3 DAYS, 2 TABLETS DAILY FOR 3 DAYS AND 1 TABLET DAILY FOR   3 DAYS, THEN STOP; Therapy: 12RTP9970 to (Last Rx:59Fom4311)  Requested for: 92IWA4479 Ordered   4  Toprol XL 50 MG Oral Tablet Extended Release 24 Hour (Metoprolol Succinate ER); Therapy: (Recorded:09Jun2017) to Recorded   5  Xarelto 10 MG Oral Tablet; Take 1 tablet twice daily  Requested for: 83Seg9961; Last   Rx:87Hjz3125 Ordered    Allergies    1  Bactrim   2   Mevacor    Signatures   Electronically signed by : Marvin Rider, ; Sep  1 2017 10:08AM EST                       (Author)

## 2018-01-10 ENCOUNTER — ALLSCRIPTS OFFICE VISIT (OUTPATIENT)
Dept: OTHER | Facility: OTHER | Age: 68
End: 2018-01-10

## 2018-01-10 NOTE — RESULT NOTES
Verified Results  * XR ABDOMEN 1 VIEW KUB 97Yod5981 09:06AM Rita Kussmaul Order Number: [id number]     Test Name Result Flag Reference   XR ABDOMEN 1 VIEW KUB (Report)     ABDOMEN     INDICATION: History of previous calculus  COMPARISON: CT abdomen pelvis 11/27/2016     VIEWS: AP supine; 1 image     FINDINGS: The upper abdomen was not fully visualized  No renal calculi are seen  No convincing ureteral calculi detected  Probable bilateral pelvic phleboliths  Nonobstructive bowel gas pattern  Degenerative changes lower lumbar spine  IMPRESSION:     No urinary tract calculi         Workstation performed: [id number]     Signed by:   Wing Melissa DO   12/14/16

## 2018-01-11 ENCOUNTER — TRANSCRIBE ORDERS (OUTPATIENT)
Dept: ADMINISTRATIVE | Facility: HOSPITAL | Age: 68
End: 2018-01-11

## 2018-01-11 ENCOUNTER — GENERIC CONVERSION - ENCOUNTER (OUTPATIENT)
Dept: OTHER | Facility: OTHER | Age: 68
End: 2018-01-11

## 2018-01-11 ENCOUNTER — HOSPITAL ENCOUNTER (OUTPATIENT)
Dept: ULTRASOUND IMAGING | Facility: HOSPITAL | Age: 68
Discharge: HOME | End: 2018-01-11
Payer: MEDICARE

## 2018-01-11 ENCOUNTER — APPOINTMENT (OUTPATIENT)
Dept: LAB | Facility: HOSPITAL | Age: 68
End: 2018-01-11
Payer: MEDICARE

## 2018-01-11 DIAGNOSIS — R19.7 DIARRHEA: ICD-10-CM

## 2018-01-11 DIAGNOSIS — R11.2 NAUSEA WITH VOMITING: ICD-10-CM

## 2018-01-11 DIAGNOSIS — I48.91 ATRIAL FIBRILLATION (HCC): ICD-10-CM

## 2018-01-11 DIAGNOSIS — M89.8X1 OTHER SPECIFIED DISORDERS OF BONE, SHOULDER: ICD-10-CM

## 2018-01-11 DIAGNOSIS — R10.11 RIGHT UPPER QUADRANT PAIN: ICD-10-CM

## 2018-01-11 DIAGNOSIS — N20.0 CALCULUS OF KIDNEY: ICD-10-CM

## 2018-01-11 LAB
ALBUMIN SERPL BCP-MCNC: 3.1 G/DL (ref 3.5–5)
ALP SERPL-CCNC: 73 U/L (ref 46–116)
ALT SERPL W P-5'-P-CCNC: 21 U/L (ref 12–78)
AMYLASE SERPL-CCNC: 28 IU/L (ref 25–115)
ANION GAP SERPL CALCULATED.3IONS-SCNC: 11 MMOL/L (ref 4–13)
AST SERPL W P-5'-P-CCNC: 20 U/L (ref 5–45)
BACTERIA UR QL AUTO: ABNORMAL /HPF
BASOPHILS # BLD AUTO: 0.01 THOUSANDS/ΜL (ref 0–0.1)
BASOPHILS NFR BLD AUTO: 0 % (ref 0–1)
BILIRUB SERPL-MCNC: 0.5 MG/DL (ref 0.2–1)
BILIRUB UR QL STRIP: ABNORMAL
BUN SERPL-MCNC: 18 MG/DL (ref 5–25)
CALCIUM SERPL-MCNC: 9.5 MG/DL (ref 8.3–10.1)
CAOX CRY URNS QL MICRO: ABNORMAL /HPF
CHLORIDE SERPL-SCNC: 103 MMOL/L (ref 100–108)
CLARITY UR: CLEAR
CO2 SERPL-SCNC: 26 MMOL/L (ref 21–32)
COLOR UR: YELLOW
CREAT SERPL-MCNC: 0.56 MG/DL (ref 0.6–1.3)
EOSINOPHIL # BLD AUTO: 0.23 THOUSAND/ΜL (ref 0–0.61)
EOSINOPHIL NFR BLD AUTO: 3 % (ref 0–6)
ERYTHROCYTE [DISTWIDTH] IN BLOOD BY AUTOMATED COUNT: 13.9 % (ref 11.6–15.1)
GFR SERPL CREATININE-BSD FRML MDRD: 98 ML/MIN/1.73SQ M
GLUCOSE P FAST SERPL-MCNC: 87 MG/DL (ref 65–99)
GLUCOSE UR STRIP-MCNC: NEGATIVE MG/DL
HCT VFR BLD AUTO: 39 % (ref 34.8–46.1)
HGB BLD-MCNC: 12.5 G/DL (ref 11.5–15.4)
HGB UR QL STRIP.AUTO: ABNORMAL
KETONES UR STRIP-MCNC: ABNORMAL MG/DL
LEUKOCYTE ESTERASE UR QL STRIP: NEGATIVE
LIPASE SERPL-CCNC: 94 U/L (ref 73–393)
LYMPHOCYTES # BLD AUTO: 1.34 THOUSANDS/ΜL (ref 0.6–4.47)
LYMPHOCYTES NFR BLD AUTO: 18 % (ref 14–44)
MCH RBC QN AUTO: 27 PG (ref 26.8–34.3)
MCHC RBC AUTO-ENTMCNC: 32.1 G/DL (ref 31.4–37.4)
MCV RBC AUTO: 84 FL (ref 82–98)
MONOCYTES # BLD AUTO: 0.58 THOUSAND/ΜL (ref 0.17–1.22)
MONOCYTES NFR BLD AUTO: 8 % (ref 4–12)
NEUTROPHILS # BLD AUTO: 5.43 THOUSANDS/ΜL (ref 1.85–7.62)
NEUTS SEG NFR BLD AUTO: 71 % (ref 43–75)
NITRITE UR QL STRIP: NEGATIVE
NON-SQ EPI CELLS URNS QL MICRO: ABNORMAL /HPF
PH UR STRIP.AUTO: 5.5 [PH] (ref 4.5–8)
PLATELET # BLD AUTO: 393 THOUSANDS/UL (ref 149–390)
PMV BLD AUTO: 8.2 FL (ref 8.9–12.7)
POTASSIUM SERPL-SCNC: 3.8 MMOL/L (ref 3.5–5.3)
PROT SERPL-MCNC: 6.7 G/DL (ref 6.4–8.2)
PROT UR STRIP-MCNC: NEGATIVE MG/DL
RBC # BLD AUTO: 4.63 MILLION/UL (ref 3.81–5.12)
RBC #/AREA URNS AUTO: ABNORMAL /HPF
SODIUM SERPL-SCNC: 140 MMOL/L (ref 136–145)
SP GR UR STRIP.AUTO: >=1.03 (ref 1–1.03)
UROBILINOGEN UR QL STRIP.AUTO: 0.2 E.U./DL
WBC # BLD AUTO: 7.59 THOUSAND/UL (ref 4.31–10.16)
WBC #/AREA URNS AUTO: ABNORMAL /HPF

## 2018-01-11 PROCEDURE — 80053 COMPREHEN METABOLIC PANEL: CPT

## 2018-01-11 PROCEDURE — 76705 ECHO EXAM OF ABDOMEN: CPT

## 2018-01-11 PROCEDURE — 85025 COMPLETE CBC W/AUTO DIFF WBC: CPT

## 2018-01-11 PROCEDURE — 82150 ASSAY OF AMYLASE: CPT

## 2018-01-11 PROCEDURE — 83690 ASSAY OF LIPASE: CPT

## 2018-01-11 PROCEDURE — 36415 COLL VENOUS BLD VENIPUNCTURE: CPT

## 2018-01-11 PROCEDURE — 81001 URINALYSIS AUTO W/SCOPE: CPT

## 2018-01-12 ENCOUNTER — GENERIC CONVERSION - ENCOUNTER (OUTPATIENT)
Dept: OTHER | Facility: OTHER | Age: 68
End: 2018-01-12

## 2018-01-12 VITALS
WEIGHT: 166 LBS | DIASTOLIC BLOOD PRESSURE: 68 MMHG | SYSTOLIC BLOOD PRESSURE: 120 MMHG | HEIGHT: 63 IN | TEMPERATURE: 98.4 F | BODY MASS INDEX: 29.41 KG/M2

## 2018-01-12 NOTE — MISCELLANEOUS
Chief Complaint  Chief Complaint Free Text Note Form: FOLLOW UP FROM [facility]   Chief Complaint Chronic Condition [facility]: Patient is here today for follow up of chronic conditions described in HPI  History of Present Illness  TCM Communication [facility]: The patient is being contacted for follow-up after hospitalization  She was hospitalized at [facility]  The dates of hospitalization: 17-1/10/17  She was discharged to home  The patient is currently asymptomatic  Counseling was provided to patient's family    Communication performed and completed by DOUG      Active Problems    1  Colon cancer screening (V76 51) (Z12 11)   2  Diffuse arthralgia (719 40) (M25 50)   3  Edema of right lower extremity (782 3) (R60 0)   4  Encounter for mammogram to establish baseline mammogram (V76 12) (Z12 31)   5  Essential hypertension (401 9) (I10)   6  Hypercholesterolemia (272 0) (E78 00)   7  Hyperthyroidism (242 90) (E05 90)   8  Hypothyroidism (244 9) (E03 9)   9  Kidney stones (592 0) (N20 0)   10  Need for influenza vaccination (V04 81) (Z23)   11  Screening for cervical cancer (V76 2) (Z12 4)   12  Screening for colon cancer (V76 51) (Z12 11)   13  UTI (urinary tract infection) (599 0) (N39 0)    Past Medical History    1  History of migraine (V12 49) (Z86 69)    Surgical History    1  History of  Section   2  History of Hernia Repair   3  History of Tonsillectomy With Adenoidectomy   4  History of Venous Ligation With Stripping   5  History of Wrist Surgery    Family History  Mother    1  Family history of malignant neoplasm of breast (V16 3) (Z80 3)  Father    2  Family history of     Social History    ·    · Never a smoker   · No drug use   · Occupation   · Social alcohol use (Z78 9)   · Three children    Current Meds   1  Calcium 600 600 MG Oral Tablet; Therapy: (Recorded:63Xbg8174) to Recorded   2  Cinnamon 500 MG Oral Capsule;    Therapy: (Recorded:61Gea6652) to Recorded   3  Flonase 50 MCG/ACT SUSP; Therapy: (Recorded:56Fqb3888) to Recorded   4  Levothyroxine Sodium 50 MCG Oral Tablet; take one tablet by mouth every day; Therapy: 02CNM5207 to (Dean Mai)  Requested for: 21FZD9500; Last   Rx:22Evm3086 Ordered   5  Multi-Vitamin TABS; Therapy: (Recorded:20Poh2160) to Recorded   6  Ocuvite Oral Tablet; Therapy: (Recorded:24Tyq7517) to Recorded   7  Omega-3 1000 MG Oral Capsule; Therapy: (Recorded:53Dra2510) to Recorded   8  PredniSONE 10 MG Oral Tablet; TAKE 4 TABLETS DAILY FOR 3 DAYS,3 TABLETS DAILY   FOR 3 DAYS, 2 TABLETS DAILY FOR 3 DAYS AND 1 TABLET DAILY FOR 3 DAYS,   THEN STOP; Therapy: 49GNA1547 to (Last Rx:76Wxg6268)  Requested for: 19KUO8223 Ordered   9  Red Yeast Rice CAPS; Therapy: (Recorded:67Xyg6828) to Recorded   10  Vitamin C 100 MG Oral Tablet; Therapy: (Recorded:15Nvw3489) to Recorded    Allergies    1  Bactrim   2   Mevacor    Future Appointments    Date/Time Provider Specialty Site   03/09/2017 11:00 AM Kaylen Flor DO Family [facility] 10     Signatures   Electronically signed by : Chelsy Everett DO; Jan 25 2017  9:52AM EST

## 2018-01-12 NOTE — CONSULTS
Assessment   1  Hypothyroidism (244 9) (E03 9)    Plan   Hypothyroidism    · Changed: From  Levothyroxine Sodium 100 MCG Oral Tablet TAKE 1 TABLET DAILY To    Levothyroxine Sodium 112 MCG Oral Tablet 1 Tab daily  Rx By: Carmela Castro; Dispense: 30 Days ; #:30 Tablet; Refill: 5;For: Hypothyroidism;     GERSON = N; Faxed To: [facility]   · (1) T4, FREE; Status:Active; Requested for:62Pny0894; Perform:[facility]; PETERSON:56MTD6383;GUQFUNZ; For:Hypothyroidism; Ordered     By:Liss Meek;   · (1) TSH; Status:Active; Requested for:95Uzh7105; Perform:[facility]; GPI:[id number];DAHDUQU; For:Hypothyroidism; Ordered     By:Liss Meek;   · Follow-up visit in 3 months Evaluation and Treatment  Follow-up  Status: Complete     Done: 64CED0074  Ordered; For: Hypothyroidism;  Ordered By: Carmela Castro  Performed:   Due:     49EGU0248; Last Updated By: Robert Aguilar; 1/10/2018 11:13:44 AM    Discussion/Summary   Discussion Summary:    1  Hypothyroidism: Though her most recent TSH is within normal range, I would still aim for a TSH between 1 and 2 to optimally treat her hypothyroidism  She may 1  note improvement in her symptoms on 1  an increased dose and a TSH closer to this goal  I did discuss the contribution of amiodarone to the patient's 1  hypothyroidism and increased thyroid hormone requirement as well as long half life of amiodarone  1  We will increase her levothyroxine to 112 micrograms daily and recheck TSH and free T4 in 6 weeks  Asked her to separate her coffee and thyroid hormone by least a half an hour to 1 hour  I discussed that once the amiodarone is out of her system, we need to monitor for hyperthyroidism due to too high of a dose of levothyroxine  We will see her back in 3 months and will likely order thyroid function tests just prior to this appointment      Counseling Documentation With Imm: The patient, patient's family was counseled regarding diagnostic results,-- instructions for management,-- impressions  Medication SE Review and Pt Understands Tx: The treatment plan was reviewed with the patient/guardian  The patient/guardian understands and agrees with the treatment plan       1 Amended By: Francois Reyes; Prabhjot 10 2018 8:11 PM EST      Chief Complaint   Chief Complaint Free Text Note Form: consult      History of Present Illness   HPI: 51-year-old female with history of polymyalgia rheumatica, hypothyroidism, DVT, atrial fibrillation, hyperlipidemia presents to establish care for hypothyroidism  Has had hypothyroidism for many years treated on thyroid hormone replacement  Over the summer in June of 2017, she reports she was hospitalized for a pericardial effusion along with an 1  arrhythmia  At that time she received amiodarone and was 1  discharged home on amiodarone  She was on amiodarone until about September 2017  While on this medication her thyroid hormone requirements went up  Since then she reports cold intolerance and fatigue  She is taking her medication (thyroid hormone) 1st thing in the morning but does drink coffee shortly after  She takes her calcium and other vitamins later in the day  She also has history of polymyalgia rheumatica and follows closely with her rheumatologist at [place] [address]  and is down to 3 milligrams total of prednisone per day  1 Amended By: Francois Reyes; Prabhjot 10 2018 8:10 PM EST      Review of Systems   Endo Adult ROS Female New Patient:      Constitutional/General: no change in ring size,-- no change in shoe size,-- chills,-- no dizziness,-- no fainting,-- fatigue,-- fever,-- no forgetfulness,-- headache,-- no loss of sleep,-- weight loss,-- no nervousness,-- numbness,-- no temperature intolerance,-- excessive sweating-- and-- weight gain        Muscle/Joint/Bone: back pain,-- leg pain,-- hand pain,-- shoulder pain,-- leg weakness,-- hand weakness,-- foot numbness-- and-- hand numbness, but-- no arm pain,-- no hip pain,-- no foot pain,-- no neck pain,-- no arm weakness,-- no back weakness,-- no hip weakness,-- no foot weakness,-- no neck weakness,-- no shoulder weakness,-- no arm numbness,-- no back numbness,-- no hip numbness,-- no leg numbness,-- no neck numbness-- and-- no shoulder numbness  Gastrointestinal: constipation,-- diarrhea,-- nausea,-- stomach pain-- and-- vomiting, but-- no excessive hunger,-- no excessive thirst,-- no poor appetite,-- no rectal bleeding-- and-- no vomiting blood  Cardiovascular: irregular heart beat,-- hypotension,-- poor circulation,-- rapid heart beat-- and-- ankle swelling, but-- no chest pain-- and-- no hypertension  Eye/Ear/Nose/Throat: hoarseness,-- hearing loss,-- persistent cough-- and-- sinus problems, but-- no bleeding gums,-- no blurred vision,-- no difficulty swallowing,-- no double vision,-- no gritty eyes,-- not seeing flashes-- and-- not seeing halos  Skin: hives-- and-- itching, but-- no easy bruising,-- no change in a mole,-- no rashes,-- no scar-- and-- no slow healing sores  Genitourinary no blood in urine,-- no frequent urination,-- no night time urination-- and-- no painful urination  Genitourinary - Reproductive hot flashes-- and-- 3 Children, but-- normal period,-- no bleeding between periods,-- no breast lump,-- no nipple discharge,-- no vaginal discharge-- and-- not pregnant  date of LMP is not applicable  LMP intervals not applicable  the interval length of the last menstruation is not applicable             ROS Reviewed:    ROS reviewed  Active Problems   1  Acute maxillary sinusitis (461 0) (J01 00)  2  Atrial fibrillation (427 31) (I48 91)  3  Breathing difficulty (786 09) (R06 89)  4  Colon cancer screening (V76 51) (Z12 11)  5  Diffuse arthralgia (719 40) (M25 50)  6  DVT (deep venous thrombosis) (453 40) (I82 409)  7  Encounter for mammogram to establish baseline mammogram (V76 12) (Z12 31)  8   Essential hypertension (401 9) (I10)  9  Hypercholesterolemia (272 0) (E78 00)  10  Hypothyroidism (244 9) (E03 9)  11  Kidney stones (592 0) (N20 0)  12  Need for influenza vaccination (V04 81) (Z23)  13  Screening for cervical cancer (V76 2) (Z12 4)  14  Screening for colon cancer (V76 51) (Z12 11)  15  Thyroid nodule (241 0) (E04 1)    Past Medical History   1  History of migraine (V12 49) (Z86 69)  Active Problems And Past Medical History Reviewed: The active problems and past medical history were reviewed and updated today  Surgical History   1  History of  Section  2  History of Hernia Repair  3  History of Tonsillectomy With Adenoidectomy  4  History of Venous Ligation With Stripping  5  History of Wrist Surgery  Surgical History Reviewed: The surgical history was reviewed and updated today  Family History   Mother   1  Family history of malignant neoplasm of breast (V16 3) (Z80 3)  Father   2  Family history of   Family History Reviewed: The family history was reviewed and updated today  Social History    ·    · Never a smoker   · No drug use   · Occupation   · Social alcohol use (Z78 9)   · Three children  Social History Reviewed: The social history was reviewed and updated today  Current Meds   1  Caltrate 600+D TABS; Therapy: (Recorded:2018) to Recorded  2  Cinnamon 500 MG Oral Tablet; Therapy: (Recorded:2018) to Recorded  3  Levothyroxine Sodium 100 MCG Oral Tablet; TAKE 1 TABLET DAILY  Requested for:     71JIV7726; Last Rx:2017 Ordered  4  Metoprolol Succinate ER 50 MG Oral Tablet Extended Release 24 Hour; take 1 tablet by     mouth once daily  Requested for: 60RKL6236; Last Rx:2017 Ordered  5  Multi-Vitamin TABS; Therapy: (Recorded:2018) to Recorded  6  Ocuvite TABS; Therapy: (Recorded:2018) to Recorded  7  Osteo Bi-Flex Joint Shield TABS; Therapy: (Recorded:2018) to Recorded  8   PredniSONE 1 MG Oral Tablet; 3 tablets daily; Therapy: (Recorded:10Jan2018) to Recorded  9  Red Yeast Rice 600 MG Oral Tablet; Therapy: (Recorded:10Jan2018) to Recorded  10  Turmeric CAPS; Therapy: (Recorded:10Jan2018) to Recorded  11  Vitamin B12 TABS; Therapy: (Recorded:10Jan2018) to Recorded  12  Vitamin C TABS; Therapy: (Recorded:10Jan2018) to Recorded  13  Vitamin D 1000 UNIT CAPS; Therapy: (Recorded:10Jan2018) to Recorded  14  Xarelto 20 MG Oral Tablet; Take 1 tablet daily  Requested for: 20Nzc0778; Last      Rx:58Cms7132 Ordered  Medication List Reviewed: The medication list was reviewed and updated today  Allergies   1  Bactrim  2  Mevacor    Vitals   Vital Signs    Recorded: 62AZO7603 10:26AM   Heart Rate 76   Systolic 364   Diastolic 78   Height 5 ft 2 5 in   Weight 160 lb 8 oz   BMI Calculated 28 89   BSA Calculated 1 75     Physical Exam        Constitutional      General appearance: No acute distress, well appearing and well nourished  Eyes      Conjunctiva and lids: No swelling, erythema, or discharge  Pupils: Equal, round and reactive to light  The sclera are anicteric  Extraocular movements are intact  Ears, Nose, Mouth, and Throat      Neck: The neck is supple  The thyroid is normal in size with no palpable nodules  Pulmonary      Auscultation of lungs: Clear to auscultation bilaterally with normal chest expansion  Cardiovascular      Auscultation of heart: Normal rate and rhythm with no murmurs, gallops or rubs  Examination of extremities for edema and/or varicosities: Normal        Abdomen      Abdomen: Abdomen is soft, non-tender with normal bowel sounds  Lymphatic      Palpation of lymph nodes: No supraclavicular or suboccipital lymphadenopathy  Skin      Skin and subcutaneous tissue: Normal skin temperature and color  Neurologic      Motor Strength: Strength is 5/5 bilaterally         Psychiatric      Orientation to person, place and time: Normal        Mood and affect: Affect and attention span are normal        Results/Data   Office Record Review: Labs from 12/13/17: 4 86  TSH 7 17        Future Appointments      Date/Time Provider Specialty Site   03/21/2018 09:00 AM Jarrod Mcwilliams DO [facility] 10     Signatures    Electronically signed by : ROQUE Ovalle ; Prabhjot 10 2018 11:14AM EST                       (Author)     Electronically signed by : ROQUE Ovlale ; Prabhjot 10 2018  8:11PM EST                       (Author)

## 2018-01-13 ENCOUNTER — HOSPITAL ENCOUNTER (OUTPATIENT)
Dept: CT IMAGING | Facility: HOSPITAL | Age: 68
Discharge: HOME | End: 2018-01-13
Payer: MEDICARE

## 2018-01-13 VITALS
BODY MASS INDEX: 29.41 KG/M2 | TEMPERATURE: 97.9 F | SYSTOLIC BLOOD PRESSURE: 120 MMHG | HEIGHT: 63 IN | DIASTOLIC BLOOD PRESSURE: 64 MMHG | WEIGHT: 166 LBS

## 2018-01-13 VITALS
TEMPERATURE: 97.7 F | SYSTOLIC BLOOD PRESSURE: 136 MMHG | BODY MASS INDEX: 28.53 KG/M2 | WEIGHT: 161 LBS | DIASTOLIC BLOOD PRESSURE: 74 MMHG | HEIGHT: 63 IN

## 2018-01-13 DIAGNOSIS — R10.2 PELVIC AND PERINEAL PAIN: ICD-10-CM

## 2018-01-13 DIAGNOSIS — R10.11 RIGHT UPPER QUADRANT PAIN: ICD-10-CM

## 2018-01-13 DIAGNOSIS — R11.2 NAUSEA WITH VOMITING: ICD-10-CM

## 2018-01-13 PROCEDURE — 74177 CT ABD & PELVIS W/CONTRAST: CPT

## 2018-01-13 RX ADMIN — IOHEXOL 100 ML: 350 INJECTION, SOLUTION INTRAVENOUS at 08:00

## 2018-01-14 VITALS
TEMPERATURE: 98.8 F | DIASTOLIC BLOOD PRESSURE: 60 MMHG | BODY MASS INDEX: 27.71 KG/M2 | SYSTOLIC BLOOD PRESSURE: 98 MMHG | HEIGHT: 63 IN | WEIGHT: 156.38 LBS

## 2018-01-14 VITALS
DIASTOLIC BLOOD PRESSURE: 70 MMHG | WEIGHT: 165 LBS | HEIGHT: 63 IN | SYSTOLIC BLOOD PRESSURE: 118 MMHG | BODY MASS INDEX: 29.23 KG/M2 | TEMPERATURE: 97.6 F

## 2018-01-14 VITALS
TEMPERATURE: 98.1 F | HEIGHT: 63 IN | WEIGHT: 161 LBS | HEART RATE: 66 BPM | SYSTOLIC BLOOD PRESSURE: 132 MMHG | OXYGEN SATURATION: 98 % | DIASTOLIC BLOOD PRESSURE: 72 MMHG | BODY MASS INDEX: 28.53 KG/M2

## 2018-01-14 NOTE — MISCELLANEOUS
Assessment    1  Atrial fibrillation (427 31) (I48 91)    Discussion/Summary  Discussion Summary:   Follow up with cardiology follow up in 1 month  Counseling Documentation With Imm: The patient was counseled regarding diagnostic results, instructions for management, risk factor reductions, prognosis, patient and family education, impressions, risks and benefits of treatment options, importance of compliance with treatment  Chief Complaint  Chief Complaint Free Text Note Form: PT TRANSFERED TO [facility] WILL F/U AFTER SHE IS DISCHARGED  PT SEEN FOR INFECTION IN LUNGS AND HEART      History of Present Illness  TCM Communication [facility]: The patient is being contacted for follow-up after hospitalization  She was hospitalized at [facility]  She was discharged to home  Medications were not reviewed today  She scheduled a follow up appointment  Symptoms: fatigue, cough and shortness of breath  Counseling was provided to the patient  Communication performed and completed by   HPI: hospital follow up for atrial flutter, SVT, pt is now on amiodarone and metoprolol, eliquis was stopped and changed to pradaxa, pt feels well today, pt is set up for a holter monitor      Review of Systems  Complete-Female:   Constitutional: no fever and no chills  Cardiovascular: no chest pain and no palpitations  Respiratory: no shortness of breath and no wheezing  Gastrointestinal: no nausea and no vomiting  Active Problems    1  Colon cancer screening (V76 51) (Z12 11)   2  Diffuse arthralgia (719 40) (M25 50)   3  DVT (deep venous thrombosis) (453 40) (I82 409)   4  Edema of right lower extremity (782 3) (R60 0)   5  Encounter for mammogram to establish baseline mammogram (V76 12) (Z12 31)   6  Essential hypertension (401 9) (I10)   7  Hypercholesterolemia (272 0) (E78 00)   8  Hyperthyroidism (242 90) (E05 90)   9  Hypothyroidism (244 9) (E03 9)   10  Kidney stones (592 0) (N20 0)   11   Need for influenza vaccination (V04 81) (Z23)   12  Screening for cervical cancer (V76 2) (Z12 4)   13  Screening for colon cancer (V76 51) (Z12 11)   14  UTI (urinary tract infection) (599 0) (N39 0)    Past Medical History    1  History of migraine (V12 49) (Z86 69)    Surgical History    1  History of  Section   2  History of Hernia Repair   3  History of Tonsillectomy With Adenoidectomy   4  History of Venous Ligation With Stripping   5  History of Wrist Surgery    Family History  Mother    1  Family history of malignant neoplasm of breast (V16 3) (Z80 3)  Father    2  Family history of     Social History    ·    · Never a smoker   · No drug use   · Occupation   · Social alcohol use (Z78 9)   · Three children    Current Meds   1  Amiodarone HCl - 200 MG Oral Tablet; Therapy: (Recorded:2017) to Recorded   2  Eliquis 5 MG Oral Tablet; Take 1 tablet twice daily; Therapy: 09BQO7799 to (Evaluate:25Qod1049)  Requested for: 93JPU6448; Last   Rx:2017 Ordered   3  Levothyroxine Sodium 50 MCG Oral Tablet; take one tablet by mouth every day; Therapy: 65OOP1050 to ([phone])  Requested for: 99IAD3628; Last   Rx:2017 Ordered   4  Levothyroxine Sodium 50 MCG Oral Tablet; Therapy: (Recorded:2017) to Recorded   5  Pantoprazole Sodium 40 MG Oral Tablet Delayed Release; Therapy: (Recorded:2017) to Recorded   6  PredniSONE 1 MG Oral Tablet; Therapy: (Recorded:2017) to Recorded   7  Toprol XL 50 MG Oral Tablet Extended Release 24 Hour; Therapy: (Recorded:2017) to Recorded   8  Xarelto 10 MG Oral Tablet; Therapy: (Recorded:2017) to Recorded    Allergies    1  Bactrim   2  Mevacor    Vitals  Signs   Recorded: 04VOA5434 11:00AM   Temperature: 77 1 F  Systolic: 98  Diastolic: 60  Height: 5 ft 2 5 in  Weight: 156 lb 6 oz  BMI Calculated: 28 15  BSA Calculated: 1 73    Physical Exam    Constitutional   General appearance: No acute distress, well appearing and well nourished  Pulmonary   Respiratory effort: No increased work of breathing or signs of respiratory distress  Auscultation of lungs: Clear to auscultation  Cardiovascular   Auscultation of heart: Normal rate and rhythm, normal S1 and S2, without murmurs  Examination of extremities for edema and/or varicosities: Normal     Lymphatic   Palpation of lymph nodes in neck: No lymphadenopathy  Skin   Skin and subcutaneous tissue: Normal without rashes or lesions      Psychiatric   Mood and affect: Normal          Future Appointments    Date/Time Provider Specialty Site   09/20/2017 09:00 AM Dee Garcia DO Family Medicine [facility] 10     Signatures   Electronically signed by : Yasmin Rene DO; Jun 9 2017 11:18AM EST                       (Author)

## 2018-01-15 NOTE — PROGRESS NOTES
Assessment    1  Essential hypertension (401 9) (I10)   2  Hypercholesterolemia (272 0) (E78 00)   3  Hypothyroidism (244 9) (E03 9)   4  DVT (deep venous thrombosis) (453 40) (I82 409)    Plan  DVT (deep venous thrombosis)    · Eliquis 5 MG Oral Tablet; Take 1 tablet twice daily  DVT (deep venous thrombosis), Edema of right lower extremity    · VAS LOWER LIMB VENOUS DUPLEX STUDY, UNILATERAL/LIMITED; Unilateral  Side:Right; Status:Hold For - Scheduling; Requested for:2017;   Hypothyroidism    · Levothyroxine Sodium 50 MCG Oral Tablet; take one tablet by mouth every day   · (1) TSH; Status:Active; Requested for:2017;     Chief Complaint  LAB REVIEW      History of Present Illness  follow up for hypothyroidism, pt complains of feeling tired all the time, pt is being treated for polymyalgia rheuma      Review of Systems    Cardiovascular: no chest pain and no palpitations  Respiratory: no shortness of breath and no wheezing  Active Problems    1  Colon cancer screening (V76 51) (Z12 11)   2  Diffuse arthralgia (719 40) (M25 50)   3  Edema of right lower extremity (782 3) (R60 0)   4  Encounter for mammogram to establish baseline mammogram (V76 12) (Z12 31)   5  Essential hypertension (401 9) (I10)   6  Hypercholesterolemia (272 0) (E78 00)   7  Hyperthyroidism (242 90) (E05 90)   8  Hypothyroidism (244 9) (E03 9)   9  Kidney stones (592 0) (N20 0)   10  Need for influenza vaccination (V04 81) (Z23)   11  Screening for cervical cancer (V76 2) (Z12 4)   12  Screening for colon cancer (V76 51) (Z12 11)   13  UTI (urinary tract infection) (599 0) (N39 0)    Past Medical History    1  History of migraine (V12 49) (Z86 69)    Surgical History    1  History of  Section   2  History of Hernia Repair   3  History of Tonsillectomy With Adenoidectomy   4  History of Venous Ligation With Stripping   5  History of Wrist Surgery    Family History  Mother    1   Family history of malignant neoplasm of breast (V16 3) (Z80 3)  Father    2  Family history of     Social History    ·    · Never a smoker   · No drug use   · Occupation   · Social alcohol use (Z78 9)   · Three children    Current Meds   1  Calcium 600 600 MG Oral Tablet; Therapy: (Recorded:72Xpr6384) to Recorded   2  Cinnamon 500 MG Oral Capsule; Therapy: (Recorded:47His3382) to Recorded   3  Levothyroxine Sodium 50 MCG Oral Tablet; take one tablet by mouth every day; Therapy: 27AYX7414 to ([phone])  Requested for: 65YYL3128; Last   Rx:42Dnl5697 Ordered   4  Multi-Vitamin TABS; Therapy: (Recorded:31Thb9062) to Recorded   5  Ocuvite Oral Tablet; Therapy: (Recorded:80Xil2292) to Recorded   6  Red Yeast Rice CAPS; Therapy: (Recorded:04Tjz9111) to Recorded    The medication list was reviewed and updated today  Allergies    1  Bactrim   2  Mevacor    Vitals  Vital Signs    Recorded: 33PUO6493 90:58QF   Systolic 345   Diastolic 84   Height 5 ft 2 5 in   Weight 166 lb 12 8 oz   BMI Calculated 30 02   BSA Calculated 1 78     Physical Exam    Constitutional   General appearance: No acute distress, well appearing and well nourished  Pulmonary   Respiratory effort: No increased work of breathing or signs of respiratory distress  Auscultation of lungs: Clear to auscultation  Cardiovascular   Auscultation of heart: Normal rate and rhythm, normal S1 and S2, without murmurs  Examination of extremities for edema and/or varicosities: Normal     Abdomen   Abdomen: Non-tender, no masses  Liver and spleen: No hepatomegaly or splenomegaly  Lymphatic   Palpation of lymph nodes in neck: No lymphadenopathy      Psychiatric   Mood and affect: Normal          Signatures   Electronically signed by : Dana Wagner DO; Mar  9 2017  2:34PM EST                       (Author)

## 2018-01-15 NOTE — RESULT NOTES
Verified Results  (1) CALCULI, RENAL [id number] 04:55PM Alex Albarado     Test Name Result Flag Reference   COLOR White     SIZE Comment mm     Not applicable  Specimen received not a urinary calculi  STONE WEIGHT 6 0 mg     COMPOSITION Comment     Percentage (Represents the % composition)   COMMENT-STONE2 Note:     Specimen consisted of fibers   PLEASE NOTE Comment     Calculi report with photograph will follow via computer, mail or   delivery  COMMENT-STONE3 Comment     Physician questions regarding Calculi Analysis contact [employer] at:  [phone]  PHOTO Comment     Photograph will follow under separate cover  Performed at:  [address]  [id number]  : Dorina Valle MD, Phone:  [phone]   RENAL STONE DISCLAIMER Comment     This test was developed and its performance characteristics  determined by [facility] [facility]  It has not been cleared or approved  by the Food and Drug Administration

## 2018-01-19 ENCOUNTER — ALLSCRIPTS OFFICE VISIT (OUTPATIENT)
Dept: OTHER | Facility: OTHER | Age: 68
End: 2018-01-19

## 2018-01-22 VITALS
WEIGHT: 166.8 LBS | DIASTOLIC BLOOD PRESSURE: 84 MMHG | SYSTOLIC BLOOD PRESSURE: 138 MMHG | BODY MASS INDEX: 29.55 KG/M2 | HEIGHT: 63 IN

## 2018-01-22 VITALS
HEART RATE: 76 BPM | HEIGHT: 63 IN | BODY MASS INDEX: 28.44 KG/M2 | WEIGHT: 160.5 LBS | DIASTOLIC BLOOD PRESSURE: 78 MMHG | SYSTOLIC BLOOD PRESSURE: 122 MMHG

## 2018-01-22 VITALS
TEMPERATURE: 99 F | BODY MASS INDEX: 28.88 KG/M2 | HEIGHT: 63 IN | WEIGHT: 163 LBS | DIASTOLIC BLOOD PRESSURE: 78 MMHG | SYSTOLIC BLOOD PRESSURE: 110 MMHG

## 2018-01-23 VITALS
BODY MASS INDEX: 28.1 KG/M2 | SYSTOLIC BLOOD PRESSURE: 110 MMHG | DIASTOLIC BLOOD PRESSURE: 78 MMHG | WEIGHT: 158.6 LBS | TEMPERATURE: 98 F | HEART RATE: 67 BPM | HEIGHT: 63 IN | OXYGEN SATURATION: 96 %

## 2018-01-23 VITALS
TEMPERATURE: 98.7 F | DIASTOLIC BLOOD PRESSURE: 74 MMHG | BODY MASS INDEX: 29.23 KG/M2 | HEART RATE: 81 BPM | SYSTOLIC BLOOD PRESSURE: 125 MMHG | OXYGEN SATURATION: 98 % | WEIGHT: 165 LBS | HEIGHT: 63 IN

## 2018-01-23 NOTE — MISCELLANEOUS
Assessment    1  Atrial fibrillation (427 31) (I48 91)   2  CHF (congestive heart failure) (428 0) (I50 9)   3  Pain of left side of body (780 96) (R52)   4  Polymyalgia rheumatica (725) (M35 3)    Plan  Atrial fibrillation, Breathing difficulty, CHF (congestive heart failure)    · (1) CBC/PLT/DIFF; Status:Active; Requested ETX:[id number]; Perform:[facility]; KDT:01CPY7538;HLYJSDD; For:Atrial fibrillation, Breathing difficulty, CHF (congestive heart failure); Ordered By:Gail Tee;  CHF (congestive heart failure)    · Furosemide 20 MG Oral Tablet; TAKE 1 TABLET DAILY AS NEEDED   Rx By: Mine Espino; Dispense: 90 Days ; #:90 Tablet; Refill: 3; For: CHF (congestive heart failure); GERSON = N; Verified Transmission to [address] [place] [address]; Last Updated By: SystemAstrapi; 1/19/2018 11:25:58 AM   · (1) COMPREHENSIVE METABOLIC PANEL; Status:Active; Requested HJS:[id number]; Perform:[facility]; ZOQ:86WOA8852;DLIGEBL; For:CHF (congestive heart failure); Ordered By:Destini Tee;    Discussion/Summary  Discussion Summary:   Pt started on Lasix 20mg for use with increase of 3 lb weight gain or shortness of breath only   pt will f/u with cardiology, rheumatology, endocrinology and this office end of March  [facility]  Pt is tapering Prednisone 3mg daily  Counseling Documentation With Imm: The patient was counseled regarding diagnostic results, instructions for management  Medication SE Review and Pt Understands Tx: Possible side effects of new medications were reviewed with the patient/guardian today  The treatment plan was reviewed with the patient/guardian  The patient/guardian understands and agrees with the treatment plan      History of Present Illness  TCM Communication [facility]: The patient is being contacted for follow-up after hospitalization  Hospital records were reviewed  She was hospitalized at [facility]   The date of admission: 1/13/2018, date of discharge: 1/17/2018  Diagnosis: a-fib  She was discharged to home  Medications reviewed and updated today  Follow-up appointments with other specialists: cardiology, Dr Edy Jackson 2/26/2018  Symptoms: cough, but no shortness of breath  The patient is currently experiencing symptoms  L sided pain Pain is located in the left lateral chest  There is no radiation  The patient describes the pain as aching  The symptoms occur intermittently  No exacerbating factors are noted  Relieving factors:  nonsteroidal anti-inflammatory drugs  Counseling was provided to the patient  Topics counseled included diagnostic results, instructions for management, prognosis and patient and family education  Communication performed and completed by PRESTON   HPI: pt here for STORM, post hosp for rapid HR, was diagnosed with afib and chf and pleural effusion now started on Flecanide    Pt reports she is much improved on Flecanide and only c/o L sided pain like a side sticker, R sided pain has resolved      Review of Systems  Complete-Female:   Constitutional: no fever, not feeling poorly and not feeling tired  Eyes: No complaints of eye pain, no red eyes, no eyesight problems, no discharge, no dry eyes, no itching of eyes and no eyesight problems  ENT: no earache, no sore throat and no nasal discharge  Cardiovascular: no chest pain, the heart rate was not fast, no palpitations and no lower extremity edema  Respiratory: shortness of breath    The patient presents with complaints of mild cough, described as hacking  Gastrointestinal: no abdominal pain, no constipation and no diarrhea  Genitourinary: No complaints of dysuria, no incontinence, no pelvic pain, no dysmenorrhea, no vaginal discharge or bleeding  Musculoskeletal: joint swelling and ball feet and both hands, but no arthralgias and no myalgias  Integumentary: no rashes and no itching  Psychiatric: no anxiety and no depression     Endocrine: No complaints of proptosis, no hot flashes, no muscle weakness, no deepening of the voice, no feelings of weakness  Hematologic/Lymphatic: no swollen glands  ROS Reviewed:   ROS reviewed  Active Problems    1  Acute maxillary sinusitis (461 0) (J01 00)   2  Atrial fibrillation (427 31) (I48 91)   3  Breathing difficulty (786 09) (R06 89)   4  Colon cancer screening (V76 51) (Z12 11)   5  Diarrhea (787 91) (R19 7)   6  Diffuse arthralgia (719 40) (M25 50)   7  DVT (deep venous thrombosis) (453 40) (I82 409)   8  Encounter for mammogram to establish baseline mammogram (V76 12) (Z12 31)   9  Essential hypertension (401 9) (I10)   10  Hypercholesterolemia (272 0) (E78 00)   11  Hypothyroidism (244 9) (E03 9)   12  Kidney stones (592 0) (N20 0)   13  Nausea and/or vomiting (787 01) (R11 2)   14  Need for influenza vaccination (V04 81) (Z23)   15  Pain of right scapula (733 90) (M89 8X1)   16  Pelvic pain (R10 2)   17  Polymyalgia rheumatica (725) (M35 3)   18  Pulmonary embolism (415 19) (I26 99)   19  Right upper quadrant abdominal pain (789 01) (R10 11)   20  Screening for cervical cancer (V76 2) (Z12 4)   21  Screening for colon cancer (V76 51) (Z12 11)   22  Thyroid nodule (241 0) (E04 1)    Past Medical History    1  History of migraine (V12 49) (Z86 69)    Surgical History    1  History of  Section   2  History of Hernia Repair   3  History of Tonsillectomy With Adenoidectomy   4  History of Venous Ligation With Stripping   5  History of Wrist Surgery  Surgical History Reviewed: The surgical history was reviewed and updated today  Family History  Mother    1  Family history of malignant neoplasm of breast (V16 3) (Z80 3)  Father    2  Family history of   Family History Reviewed: The family history was reviewed and updated today  Social History    ·    · Never a smoker   · No drug use   · Occupation   · Social alcohol use (Z78 9)   · Three children  Social History Reviewed:  The social history was reviewed and updated today  The social history was reviewed and is unchanged  Current Meds   1  Caltrate 600+D TABS; Therapy: (Recorded:10Jan2018) to Recorded   2  Cinnamon 500 MG Oral Tablet; Therapy: (Recorded:10Jan2018) to Recorded   3  Flecainide Acetate 100 MG Oral Tablet; TAKE 1 TABLET EVERY 12 HOURS; Therapy: (Recorded:19Jan2018) to Recorded   4  Levothyroxine Sodium 112 MCG Oral Tablet; 1 Tab daily  Requested for: 99LIL7285; Last   Rx:10Jan2018 Ordered   5  Metoprolol Succinate ER 50 MG Oral Tablet Extended Release 24 Hour; take 1 tablet by   mouth once daily  Requested for: 89YNG0011; Last Rx:20Sep2017 Ordered   6  Multi-Vitamin TABS; Therapy: (Recorded:10Jan2018) to Recorded   7  Ocuvite TABS; Therapy: (Recorded:10Jan2018) to Recorded   8  Osteo Bi-Flex Joint Shield TABS; Therapy: (Recorded:10Jan2018) to Recorded   9  PredniSONE 1 MG Oral Tablet; 3 tablets daily; Therapy: (Recorded:10Jan2018) to Recorded   10  Red Yeast Rice 600 MG Oral Tablet; Therapy: (Recorded:10Jan2018) to Recorded   11  Turmeric CAPS; Therapy: (Recorded:10Jan2018) to Recorded   12  Vitamin B12 TABS; Therapy: (Recorded:10Jan2018) to Recorded   13  Vitamin C TABS; Therapy: (Recorded:10Jan2018) to Recorded   14  Vitamin D 1000 UNIT CAPS; Therapy: (Recorded:10Jan2018) to Recorded   15  Xarelto 20 MG Oral Tablet; Take 1 tablet daily  Requested for: 21Unx1204; Last    Rx:73Zfo0851 Ordered  Medication List Reviewed: The medication list was reviewed and updated today  Allergies    1  Bactrim   2  Mevacor    Vitals  Signs   Recorded: 22ORZ5208 10:46AM   Temperature: 98 F, Tympanic  Heart Rate: 67  Systolic: 134  Diastolic: 78  Height: 5 ft 2 5 in  Weight: 158 lb 9 6 oz  BMI Calculated: 28 55  BSA Calculated: 1 74  O2 Saturation: 96    Physical Exam    Constitutional   General appearance: No acute distress, well appearing and well nourished      Eyes   Conjunctiva and lids: No swelling, erythema or discharge  Pupils and irises: Equal, round and reactive to light  Ears, Nose, Mouth, and Throat   External inspection of ears and nose: Normal     Otoscopic examination: Tympanic membranes translucent with normal light reflex  Canals patent without erythema  Nasal mucosa, septum, and turbinates: Normal without edema or erythema  Oropharynx: Normal with no erythema, edema, exudate or lesions  Pulmonary   Respiratory effort: No increased work of breathing or signs of respiratory distress  Auscultation of lungs: Abnormal   rales/crackles over both bases  Cardiovascular   Palpation of heart: Normal PMI, no thrills  Auscultation of heart: Normal rate and rhythm, normal S1 and S2, without murmurs  Examination of extremities for edema and/or varicosities: Abnormal   bilateral ankle pitting edema and bilateral pretibial pitting edema  Abdomen   Abdomen: Non-tender, no masses  Liver and spleen: No hepatomegaly or splenomegaly  Lymphatic   Palpation of lymph nodes in neck: No lymphadenopathy  Musculoskeletal   Gait and station: Normal     Digits and nails: Normal without clubbing or cyanosis  Skin   Skin and subcutaneous tissue: Normal without rashes or lesions  Neurologic   Cranial nerves: Cranial nerves 2-12 intact  Sensation: No sensory loss      Psychiatric   Orientation to person, place, and time: Normal     Mood and affect: Normal          Future Appointments    Date/Time Provider Specialty Site   03/21/2018 09:00 AM Mateo Hernandez DO Family Medicine [facility] 10     Signatures   Electronically signed by : Linh Louie [facility]; Jan 19 2018 12:39PM EST                       (Author)    Electronically signed by : Merlene Shelton DO; Jan 19 2018  1:45PM EST

## 2018-01-24 VITALS
HEIGHT: 63 IN | DIASTOLIC BLOOD PRESSURE: 70 MMHG | SYSTOLIC BLOOD PRESSURE: 118 MMHG | HEART RATE: 78 BPM | OXYGEN SATURATION: 95 % | BODY MASS INDEX: 28.81 KG/M2 | WEIGHT: 162.6 LBS | TEMPERATURE: 99 F

## 2018-01-24 VITALS
SYSTOLIC BLOOD PRESSURE: 136 MMHG | HEIGHT: 63 IN | WEIGHT: 166 LBS | BODY MASS INDEX: 29.41 KG/M2 | DIASTOLIC BLOOD PRESSURE: 88 MMHG

## 2018-02-21 DIAGNOSIS — E03.9 HYPOTHYROIDISM: ICD-10-CM

## 2018-02-22 ENCOUNTER — TELEPHONE (OUTPATIENT)
Dept: ENDOCRINOLOGY | Facility: HOSPITAL | Age: 68
End: 2018-02-22

## 2018-02-22 NOTE — TELEPHONE ENCOUNTER
Please let pt know her thyroid labs look good  2/21/18:  TSH 0 52, Free T4 1 27  On levothyroxine 112 mcg daily

## 2018-03-21 ENCOUNTER — OFFICE VISIT (OUTPATIENT)
Dept: FAMILY MEDICINE CLINIC | Facility: CLINIC | Age: 68
End: 2018-03-21
Payer: MEDICARE

## 2018-03-21 ENCOUNTER — TELEPHONE (OUTPATIENT)
Dept: ENDOCRINOLOGY | Facility: HOSPITAL | Age: 68
End: 2018-03-21

## 2018-03-21 VITALS
TEMPERATURE: 97.5 F | BODY MASS INDEX: 28.88 KG/M2 | DIASTOLIC BLOOD PRESSURE: 80 MMHG | HEIGHT: 63 IN | HEART RATE: 60 BPM | OXYGEN SATURATION: 96 % | SYSTOLIC BLOOD PRESSURE: 120 MMHG | WEIGHT: 163 LBS

## 2018-03-21 DIAGNOSIS — M35.3 POLYMYALGIA RHEUMATICA (HCC): Chronic | ICD-10-CM

## 2018-03-21 DIAGNOSIS — J01.00 ACUTE NON-RECURRENT MAXILLARY SINUSITIS: ICD-10-CM

## 2018-03-21 DIAGNOSIS — E03.9 HYPOTHYROIDISM, UNSPECIFIED TYPE: Primary | ICD-10-CM

## 2018-03-21 DIAGNOSIS — I48.91 ATRIAL FIBRILLATION, UNSPECIFIED TYPE (HCC): ICD-10-CM

## 2018-03-21 DIAGNOSIS — I10 ESSENTIAL HYPERTENSION: ICD-10-CM

## 2018-03-21 DIAGNOSIS — E03.9 ACQUIRED HYPOTHYROIDISM: Primary | Chronic | ICD-10-CM

## 2018-03-21 PROCEDURE — 99214 OFFICE O/P EST MOD 30 MIN: CPT | Performed by: FAMILY MEDICINE

## 2018-03-21 RX ORDER — FUROSEMIDE 20 MG/1
1 TABLET ORAL DAILY PRN
COMMUNITY
Start: 2018-01-19 | End: 2019-08-05 | Stop reason: ALTCHOICE

## 2018-03-21 RX ORDER — RIVAROXABAN 20 MG/1
1 TABLET, FILM COATED ORAL
COMMUNITY
Start: 2018-03-03

## 2018-03-21 RX ORDER — ASCORBATE CALCIUM 500 MG
500 TABLET ORAL DAILY
COMMUNITY

## 2018-03-21 RX ORDER — LEVOTHYROXINE SODIUM 112 UG/1
1 TABLET ORAL DAILY
COMMUNITY
Start: 2018-03-12 | End: 2018-03-21

## 2018-03-21 RX ORDER — PREDNISONE 1 MG/1
1 TABLET ORAL DAILY
COMMUNITY
Start: 2018-01-20 | End: 2019-07-10

## 2018-03-21 RX ORDER — AMOXICILLIN AND CLAVULANATE POTASSIUM 875; 125 MG/1; MG/1
1 TABLET, FILM COATED ORAL EVERY 12 HOURS SCHEDULED
Qty: 20 TABLET | Refills: 0 | Status: SHIPPED | OUTPATIENT
Start: 2018-03-21 | End: 2018-03-31

## 2018-03-21 RX ORDER — LEVOTHYROXINE SODIUM 0.1 MG/1
TABLET ORAL
Qty: 30 TABLET | Refills: 5 | Status: SHIPPED | OUTPATIENT
Start: 2018-03-21 | End: 2018-11-15 | Stop reason: SDUPTHER

## 2018-03-21 RX ORDER — FLECAINIDE ACETATE 100 MG/1
100 TABLET ORAL 2 TIMES DAILY
COMMUNITY
Start: 2018-02-12 | End: 2018-06-26

## 2018-03-21 RX ORDER — METOPROLOL SUCCINATE 50 MG/1
50 TABLET, EXTENDED RELEASE ORAL DAILY
COMMUNITY
Start: 2017-06-04 | End: 2018-05-02 | Stop reason: SDUPTHER

## 2018-03-21 NOTE — PROGRESS NOTES
Assessment/Plan:    No problem-specific Assessment & Plan notes found for this encounter  Diagnoses and all orders for this visit:    Acquired hypothyroidism  Comments:  no change in the medications, keep follow up with endocrinology  Orders:  -     CBC and differential; Future  -     Comprehensive metabolic panel; Future  -     TSH, 3rd generation with T4 reflex; Future    Polymyalgia rheumatica (HCC)  Comments:  continue steroids    Atrial fibrillation, unspecified type ([facility] [address] )  Comments:  no change in the medication  Orders:  -     amoxicillin-clavulanate (AUGMENTIN) 875-125 mg per tablet; Take 1 tablet by mouth every 12 (twelve) hours for 10 days  -     Comprehensive metabolic panel; Future    Acute non-recurrent maxillary sinusitis    Essential hypertension  -     Lipid panel; Future    Other orders  -     Calcium Ascorbate 500 MG TABS; Take 500 mg by mouth  -     cholecalciferol (VITAMIN D3) 1,000 units tablet; Take 1,000 Units by mouth daily  -     Cyanocobalamin (VITAMIN B 12 PO); Take 500 mcg by mouth  -     flecainide (TAMBOCOR) 100 mg tablet; Take 100 mg by mouth 2 (two) times a day  -     furosemide (LASIX) 20 mg tablet; Take 1 tablet by mouth daily as needed  -     Misc Natural Products (OSTEO BI-FLEX JOINT SHIELD PO); Take by mouth  -     Discontinue: levothyroxine 112 mcg tablet; Take 1 tablet by mouth daily  -     metoprolol succinate (TOPROL-XL) 50 mg 24 hr tablet; Take 50 mg by mouth daily  -     predniSONE 1 mg tablet; Take 2 mg by mouth daily  -     XARELTO 20 MG tablet; Take 1 tablet by mouth daily          Subjective:      Patient ID: Mary Beth Degroot is a 74 y o  female      Follow up for hypothyroidism, pt saw endocrinology and her thyroid is balanced her current dose of synthroid is 112mcg, pt is also seeing rheumatology who thinks that she has residual inflammation from a previous infection, pt is on chronic prednisone for this, pt has had persistently high CRP and sed rate, pt complains of a sore throat today with PND which started about 1 week ago, follow up for afib, palpitations under control on flecinide        The following portions of the patient's history were reviewed and updated as appropriate: allergies, current medications, past family history, past medical history, past social history, past surgical history and problem list     Review of Systems   Constitutional: Positive for chills and fatigue  HENT: Negative for sinus pain and sinus pressure  Respiratory: Negative for shortness of breath and wheezing  Cardiovascular: Negative for palpitations  Gastrointestinal: Negative for diarrhea and vomiting  Objective:      /80 (BP Location: Left arm, Patient Position: Sitting, Cuff Size: Adult)   Pulse 60   Temp 97 5 °F (36 4 °C) (Tympanic)   Ht 5' 3" (1 6 m)   Wt 73 9 kg (163 lb)   SpO2 96%   BMI 28 87 kg/m²          Physical Exam   Constitutional: She is oriented to person, place, and time  She appears well-developed and well-nourished  No distress  HENT:   Head: Normocephalic and atraumatic  Right Ear: Tympanic membrane, external ear and ear canal normal    Left Ear: Tympanic membrane, external ear and ear canal normal    Nose: Mucosal edema and rhinorrhea present  Mouth/Throat: No oropharyngeal exudate  [place] OP   Eyes: No scleral icterus  Neck: Normal range of motion  Neck supple  Cardiovascular: Normal rate, regular rhythm and normal heart sounds  No murmur heard  Pulmonary/Chest: Effort normal and breath sounds normal  No respiratory distress  She has no wheezes  She has no rales  Lymphadenopathy:     She has no cervical adenopathy  Neurological: She is alert and oriented to person, place, and time  Skin: Skin is warm and dry  No rash noted  She is not diaphoretic  No erythema  No pallor  Psychiatric: She has a normal mood and affect   Her behavior is normal  Judgment and thought content normal    Nursing note and vitals reviewed

## 2018-03-21 NOTE — TELEPHONE ENCOUNTER
I would have the patient reduce her dose to 100 mcg daily which I will send to her pharmacy  At her follow-up we can discuss follow-up testing  Received labs done on 03/14/2018 at [facility] laboratory medicine:  Free T4 1 61 (0 61-1 12), TSH 0 18 (0 45-5 33)

## 2018-04-04 ENCOUNTER — OFFICE VISIT (OUTPATIENT)
Dept: ENDOCRINOLOGY | Facility: HOSPITAL | Age: 68
End: 2018-04-04
Payer: MEDICARE

## 2018-04-04 VITALS
HEART RATE: 60 BPM | WEIGHT: 162.8 LBS | BODY MASS INDEX: 29.96 KG/M2 | HEIGHT: 62 IN | DIASTOLIC BLOOD PRESSURE: 80 MMHG | SYSTOLIC BLOOD PRESSURE: 130 MMHG

## 2018-04-04 DIAGNOSIS — I10 ESSENTIAL HYPERTENSION: ICD-10-CM

## 2018-04-04 DIAGNOSIS — E55.9 VITAMIN D INSUFFICIENCY: ICD-10-CM

## 2018-04-04 DIAGNOSIS — E03.9 ACQUIRED HYPOTHYROIDISM: Primary | ICD-10-CM

## 2018-04-04 PROCEDURE — 99214 OFFICE O/P EST MOD 30 MIN: CPT | Performed by: NURSE PRACTITIONER

## 2018-04-04 RX ORDER — FOLIC ACID 1 MG/1
1 TABLET ORAL DAILY
COMMUNITY
Start: 2018-03-28 | End: 2022-02-03 | Stop reason: ALTCHOICE

## 2018-04-04 RX ORDER — PREDNISONE 5 MG/1
5 TABLET ORAL 2 TIMES DAILY
COMMUNITY
End: 2018-06-26

## 2018-04-04 NOTE — PATIENT INSTRUCTIONS
Continue Levothyroxine at 100 mcg daily  Check TSH and Free T4 in 4 weeks to reassess  Will make further changes to dose if needed based on updated lab work results  Continue with Vitamin D supplementation daily

## 2018-04-04 NOTE — PROGRESS NOTES
Royer Claros 74 y o  female MRN: [id number]    Encounter: [id number]      Assessment/Plan     Assessment: This is a 74y o -year-old female with Hypothyroidism  Plan:  1  Hypothyroidism:  Her most recent TSH was low with a high-normal free T4  Her levothyroxine dose was recently decreased to 100 mcg daily approximately 2 weeks ago  She currently has no symptoms of hypo or hyperthyroidism  Check TSH and free T4 in approximately 4 weeks to review after recent dosage change  Further titration of her levothyroxine dose will take place after reviewing the updated lab work results  2   Hypertension:  She is normotensive in the office today  Continue current medication  3   Vitamin-D deficiency:  Continue supplementation with 1000 units of vitamin D3 daily  CC:  Hypothyroidism follow-up    History of Present Illness     HPI:  80-year-old female with history of polymyalgia rheumatica, hypothyroidism, DVT, atrial fibrillation, hyperlipidemia presents to establish care for hypothyroidism  Has had hypothyroidism for many years treated on thyroid hormone replacement  Over the summer in June of 2017, she reports she was hospitalized for a pericardial effusion along with arrhythmia  At that time she received amiodarone and was  discharged home on amiodarone  She was on amiodarone until about September 2017  While on this medication her thyroid hormone requirements went up  Since then she reports cold intolerance and fatigue  She is currently taking levothyroxine 100 mcg daily which is a recent decrease over the past 2 weeks since having her lab work completed and reviewed  She is taking her medication (thyroid hormone) 1st thing in the morning but does drink coffee shortly after  She takes her calcium and other vitamins later in the day  Her most recent TSH from March 14, 2018 was 0 18 with a free T4 of 1 61    She also has history of polymyalgia rheumatica and follows closely with her rheumatologist at [facility] and is taking 10 milligrams total of prednisone per day  Her hypertension is treated with Lasix 20 mg daily and metoprolol 50 mg daily  For her vitamin-D deficiency she supplements with 1000 units of vitamin D3 daily  Review of Systems   Constitutional: Negative  Negative for chills and fever  HENT: Negative  Eyes: Negative  Negative for photophobia, pain, discharge, redness, itching and visual disturbance  Respiratory: Negative  Negative for chest tightness and shortness of breath  Cardiovascular: Negative  Negative for chest pain  Gastrointestinal: Negative  Negative for abdominal pain, constipation, diarrhea and vomiting  Endocrine: Negative for cold intolerance, heat intolerance, polydipsia, polyphagia and polyuria  Genitourinary: Negative  Musculoskeletal: Positive for arthralgias (left wrist from fall)  Skin: Negative  Allergic/Immunologic: Negative  Neurological: Negative  Negative for dizziness, syncope, light-headedness and headaches  Hematological: Negative  Psychiatric/Behavioral: Negative  All other systems reviewed and are negative        Historical Information   Past Medical History:   Diagnosis Date    Arthritis     Disease of thyroid gland     DVT (deep venous thrombosis) (HCC)     Lyme disease     Migraine     Polymyalgia rheumatica (HCC)     Pulmonary emboli (HCC)     Renal disorder     right sided kidney stones    Vitamin D deficiency      Past Surgical History:   Procedure Laterality Date     SECTION      x3 - last impression 16    FRACTURE SURGERY Left     wrist    HERNIA REPAIR      KNEE CARTILAGE SURGERY Left     TONSILECTOMY AND ADNOIDECTOMY      VEIN SURGERY      venous ligation with stripping    WRIST SURGERY Left     ORIF wrist - last impression 16     Social History   History   Alcohol Use    Yes     Comment: occasionally; social     History   Drug Use No     History   Smoking Status  Never Smoker   Smokeless Tobacco    Never Used     Family History:   Family History   Problem Relation Age of Onset    Breast cancer Mother     Aneurysm Father      aortic       Meds/Allergies   Current Outpatient Prescriptions   Medication Sig Dispense Refill    Calcium Ascorbate 500 MG TABS Take 500 mg by mouth      CALCIUM-VITAMIN D PO Take 1 tablet by mouth daily      cholecalciferol (VITAMIN D3) 1,000 units tablet Take 1,000 Units by mouth daily      Cinnamon 500 MG capsule Take 500 mg by mouth daily      Cyanocobalamin (VITAMIN B 12 PO) Take 500 mcg by mouth      flecainide (TAMBOCOR) 100 mg tablet Take 100 mg by mouth 2 (two) times a day Take 1 tablet in the morning and a half tablet in the pm        folic acid (FOLVITE) 1 mg tablet       furosemide (LASIX) 20 mg tablet Take 1 tablet by mouth daily as needed      levothyroxine 100 mcg tablet 1 tab daily  30 tablet 5    methotrexate 2 5 mg tablet       metoprolol succinate (TOPROL-XL) 50 mg 24 hr tablet Take 50 mg by mouth daily      Misc Natural Products (OSTEO BI-FLEX JOINT SHIELD PO) Take by mouth      Multiple Vitamins-Minerals (OCUVITE ADULT 50+) CAPS Take 1 capsule by mouth daily      PredniSONE 5 MG (21) TBPK Take 5 each by mouth 2 (two) times a day      Red Yeast Rice 600 MG TABS Take 2 tablets by mouth daily      XARELTO 20 MG tablet Take 1 tablet by mouth daily      cholecalciferol (VITAMIN D3) 1,000 units tablet Take 1 tablet by mouth daily for 30 days 30 tablet 0    predniSONE 1 mg tablet Take 2 mg by mouth daily      predniSONE 20 mg tablet Take 7 mg by mouth daily         No current facility-administered medications for this visit  Allergies   Allergen Reactions    Amiodarone      Other reaction(s):  Other (See Comments)  Reports caused elevated TSH    Sulfa Antibiotics Itching and Rash    Sulfamethoxazole-Trimethoprim Itching and Rash       Objective   Vitals: Blood pressure 130/80, pulse 60, height 5' 1 93" (1 573 m), weight 73 8 kg (162 lb 12 8 oz)  Physical Exam   Constitutional: She is oriented to person, place, and time  She appears well-developed and well-nourished  No distress  HENT:   Head: Normocephalic and atraumatic  Mouth/Throat: Oropharynx is clear and moist    Eyes: Conjunctivae and EOM are normal  Pupils are equal, round, and reactive to light  Right eye exhibits no discharge  Left eye exhibits no discharge  Bruising to left eye from recent fall  Neck: Normal range of motion  Neck supple  No tracheal deviation present  No thyromegaly present  Cardiovascular: Normal rate, regular rhythm, normal heart sounds and intact distal pulses  Pulmonary/Chest: Effort normal and breath sounds normal  No respiratory distress  She has no wheezes  She has no rales  She exhibits no tenderness  Abdominal: Soft  Bowel sounds are normal  She exhibits no distension  There is no tenderness  Musculoskeletal: Normal range of motion  She exhibits no edema, tenderness or deformity  Splint to left wrist and hand   Lymphadenopathy:     She has no cervical adenopathy  Neurological: She is alert and oriented to person, place, and time  She displays normal reflexes  No cranial nerve deficit  Coordination normal    Skin: Skin is warm and dry  Dry skin   Psychiatric: She has a normal mood and affect  Her behavior is normal  Judgment and thought content normal    Vitals reviewed  Lab Results:   Lab Results   Component Value Date/Time    TSH 3RD Gracie Chen 3 443 05/23/2017 08:55 PM       Imaging Studies:   Results for orders placed during the hospital encounter of 11/29/17   US thyroid    Impression Left mid gland nodule which does not meet current ACR criteria for requiring biopsy but followup ultrasound is recommended in 1 year  Reference: ACR Thyroid Imaging, Reporting and Data System (TI-RADS): White Paper of the [employer]   J AM Cherelle Radiol 2352;60:417-502  (additional recommendations based on American Thyroid Association 2015 guidelines )      Workstation performed: [id number]       Portions of the record may have been created with voice recognition software  Occasional wrong word or "sound a like" substitutions may have occurred due to the inherent limitations of voice recognition software  Read the chart carefully and recognize, using context, where substitutions have occurred

## 2018-04-22 ENCOUNTER — OFFICE VISIT (OUTPATIENT)
Dept: URGENT CARE | Facility: CLINIC | Age: 68
End: 2018-04-22
Payer: MEDICARE

## 2018-04-22 VITALS
DIASTOLIC BLOOD PRESSURE: 69 MMHG | HEART RATE: 67 BPM | TEMPERATURE: 97.4 F | OXYGEN SATURATION: 95 % | SYSTOLIC BLOOD PRESSURE: 143 MMHG | RESPIRATION RATE: 18 BRPM

## 2018-04-22 DIAGNOSIS — J01.90 ACUTE SINUSITIS, RECURRENCE NOT SPECIFIED, UNSPECIFIED LOCATION: Primary | ICD-10-CM

## 2018-04-22 PROCEDURE — 99213 OFFICE O/P EST LOW 20 MIN: CPT | Performed by: PHYSICIAN ASSISTANT

## 2018-04-22 PROCEDURE — G0463 HOSPITAL OUTPT CLINIC VISIT: HCPCS | Performed by: PHYSICIAN ASSISTANT

## 2018-04-22 RX ORDER — AMOXICILLIN AND CLAVULANATE POTASSIUM 875; 125 MG/1; MG/1
1 TABLET, FILM COATED ORAL EVERY 12 HOURS SCHEDULED
Qty: 20 TABLET | Refills: 0 | Status: SHIPPED | OUTPATIENT
Start: 2018-04-22 | End: 2018-05-02

## 2018-04-22 NOTE — PROGRESS NOTES
[facility]    NAME: Mamta Addison is a 74 y o  female  : 1950    MRN: [id number]  DATE: 2018  TIME: 11:50 AM    Assessment and Plan   Acute sinusitis, recurrence not specified, unspecified location [J01 90]  1  Acute sinusitis, recurrence not specified, unspecified location  amoxicillin-clavulanate (AUGMENTIN) 875-125 mg per tablet       Patient Instructions     Patient Instructions   I have prescribed an antibiotic for the infection  Please take the antibiotic as prescribed and finish the entire prescription  I recommend that the patient takes an over the counter probiotic or eats yogurt with live cultures in it [place]) to keep good bacteria in the gut and help prevent diarrhea  Wash hands frequently to prevent the spread of infection  Can use over the counter cough and cold medications to help with symptoms  Ibuprofen and/or tylenol as needed for pain or fever  If not improving over the next 7-10 days, follow up with PCP  Chief Complaint     Chief Complaint   Patient presents with    Sore Throat     Pt c/o a sore throat for three days  History of Present Illness   62-year-old female here with complaint of sore throat, nasal congestion and postnasal drip for the last 3 days  Patient states that she has been getting much better using over-the-counter medications  She is going to be traveling later this week 15  in [place] and is concerned about flying  She denies any fever or chills  Review of Systems   Review of Systems   Constitutional: Negative for activity change, appetite change, chills, diaphoresis, fatigue, fever and unexpected weight change  HENT: Positive for congestion, postnasal drip and sore throat  Negative for dental problem, hearing loss, sinus pressure, sneezing, tinnitus, trouble swallowing and voice change  Eyes: Negative for photophobia, redness and visual disturbance  Respiratory: Positive for cough   Negative for apnea, chest tightness, shortness of breath, wheezing and stridor  Cardiovascular: Negative for chest pain, palpitations and leg swelling  Gastrointestinal: Negative for abdominal distention, abdominal pain, blood in stool, constipation, diarrhea, nausea and vomiting  Endocrine: Negative for cold intolerance, heat intolerance, polydipsia, polyphagia and polyuria  Genitourinary: Negative for difficulty urinating, dysuria, flank pain, frequency, hematuria and urgency  Musculoskeletal: Negative for arthralgias, back pain, gait problem, joint swelling, myalgias, neck pain and neck stiffness  Skin: Negative for pallor, rash and wound  Neurological: Negative for dizziness, tremors, seizures, speech difficulty, weakness and headaches  Hematological: Negative for adenopathy  Does not bruise/bleed easily  Psychiatric/Behavioral: Negative for agitation, confusion, dysphoric mood and sleep disturbance  The patient is not nervous/anxious  All other systems reviewed and are negative        Current Medications     Current Outpatient Prescriptions:     amoxicillin-clavulanate (AUGMENTIN) 875-125 mg per tablet, Take 1 tablet by mouth every 12 (twelve) hours for 10 days, Disp: 20 tablet, Rfl: 0    Calcium Ascorbate 500 MG TABS, Take 500 mg by mouth, Disp: , Rfl:     CALCIUM-VITAMIN D PO, Take 1 tablet by mouth daily, Disp: , Rfl:     cholecalciferol (VITAMIN D3) 1,000 units tablet, Take 1 tablet by mouth daily for 30 days, Disp: 30 tablet, Rfl: 0    cholecalciferol (VITAMIN D3) 1,000 units tablet, Take 1,000 Units by mouth daily, Disp: , Rfl:     Cinnamon 500 MG capsule, Take 500 mg by mouth daily, Disp: , Rfl:     Cyanocobalamin (VITAMIN B 12 PO), Take 500 mcg by mouth, Disp: , Rfl:     flecainide (TAMBOCOR) 100 mg tablet, Take 100 mg by mouth 2 (two) times a day Take 1 tablet in the morning and a half tablet in the pm  , Disp: , Rfl:     folic acid (FOLVITE) 1 mg tablet, , Disp: , Rfl:     furosemide (LASIX) 20 mg tablet, Take 1 tablet by mouth daily as needed, Disp: , Rfl:     levothyroxine 100 mcg tablet, 1 tab daily  , Disp: 30 tablet, Rfl: 5    methotrexate 2 5 mg tablet, , Disp: , Rfl:     metoprolol succinate (TOPROL-XL) 50 mg 24 hr tablet, Take 50 mg by mouth daily, Disp: , Rfl:     Misc Natural Products (OSTEO BI-FLEX JOINT SHIELD PO), Take by mouth, Disp: , Rfl:     Multiple Vitamins-Minerals (OCUVITE ADULT 50+) CAPS, Take 1 capsule by mouth daily, Disp: , Rfl:     predniSONE 1 mg tablet, Take 2 mg by mouth daily, Disp: , Rfl:     predniSONE 20 mg tablet, Take 7 mg by mouth daily  , Disp: , Rfl:     PredniSONE 5 MG (21) TBPK, Take 5 each by mouth 2 (two) times a day, Disp: , Rfl:     Red Yeast Rice 600 MG TABS, Take 2 tablets by mouth daily, Disp: , Rfl:     XARELTO 20 MG tablet, Take 1 tablet by mouth daily, Disp: , Rfl:     Current Allergies     Allergies as of 2018 - Reviewed 2018   Allergen Reaction Noted    Amiodarone  2018    Sulfa antibiotics Itching and Rash 10/24/2016    Sulfamethoxazole-trimethoprim Itching and Rash 2016          The following portions of the patient's history were reviewed and updated as appropriate: allergies, current medications, past family history, past medical history, past social history, past surgical history and problem list    Past Medical History:   Diagnosis Date    Arthritis     Disease of thyroid gland     [facility]) ([facility])     Lyme disease     Migraine     [facility]ym[facility]a ([facility] [address] [facility]     Pulmonary emboli ([facility] [address] [facility]     Renal disorder     right sided kidney stones    Vitamin D deficiency      Past Surgical History:   Procedure Laterality Date     SECTION      x3 - last impression 16    FRACTURE SURGERY Left     wrist    HERNIA REPAIR      KNEE CARTILAGE SURGERY Left     TONSILECTOMY AND ADNOIDECTOMY      VEIN SURGERY      venous ligation with stripping    WRIST SURGERY Left     ORIF wrist - last impression 5/27/16     Family History   Problem Relation Age of Onset    Breast cancer Mother     Aneurysm Father      aortic     Medications have been verified  Objective   /69   Pulse 67   Temp (!) 97 4 °F (36 3 °C)   Resp 18   SpO2 95%      Physical Exam   Physical Exam   Constitutional: She appears well-developed and well-nourished  No distress  HENT:   Head: Normocephalic  Right Ear: Tympanic membrane and external ear normal    Left Ear: Tympanic membrane and external ear normal    Nose: Mucosal edema present  Right sinus exhibits maxillary sinus tenderness  Left sinus exhibits maxillary sinus tenderness  Mouth/Throat: Posterior oropharyngeal erythema present  No oropharyngeal exudate  Neck: Normal range of motion  Neck supple  Cardiovascular: Normal rate, regular rhythm and normal heart sounds  No murmur heard  Pulmonary/Chest: Effort normal and breath sounds normal  No respiratory distress  She has no wheezes  She has no rales  Abdominal: Soft  Bowel sounds are normal  There is no tenderness  Musculoskeletal: Normal range of motion  Lymphadenopathy:     She has no cervical adenopathy  Skin: Skin is warm  No rash noted

## 2018-05-02 DIAGNOSIS — I10 ESSENTIAL HYPERTENSION: Primary | ICD-10-CM

## 2018-05-02 RX ORDER — METOPROLOL SUCCINATE 50 MG/1
TABLET, EXTENDED RELEASE ORAL
Qty: 30 TABLET | Refills: 6 | Status: SHIPPED | OUTPATIENT
Start: 2018-05-02 | End: 2019-12-17 | Stop reason: HOSPADM

## 2018-05-08 DIAGNOSIS — E03.9 HYPOTHYROIDISM, UNSPECIFIED TYPE: Primary | ICD-10-CM

## 2018-05-08 NOTE — PROGRESS NOTES
Please call the patient regarding abnormal result  TSH is low with an elevated Free T4  According to my last note she is taking levothyroxine 100 mcg daily  Please ask her to omit her Sunday dose and recheck her TSH and free T4 in 6 weeks to reassess

## 2018-06-22 ENCOUNTER — TELEPHONE (OUTPATIENT)
Dept: ENDOCRINOLOGY | Facility: HOSPITAL | Age: 68
End: 2018-06-22

## 2018-06-22 NOTE — TELEPHONE ENCOUNTER
Labs from [facility] laboratory Medicine on 06/20/2018:  Free T4 1 57 (0 61-1 12), TSH 0 12 (0 45-5 33), total T4 12 68 (6 09-12 23)  To be discussed at upcoming appointment on 07/11/2018

## 2018-06-26 ENCOUNTER — OFFICE VISIT (OUTPATIENT)
Dept: FAMILY MEDICINE CLINIC | Facility: CLINIC | Age: 68
End: 2018-06-26

## 2018-06-26 ENCOUNTER — OFFICE VISIT (OUTPATIENT)
Dept: FAMILY MEDICINE CLINIC | Facility: CLINIC | Age: 68
End: 2018-06-26
Payer: MEDICARE

## 2018-06-26 VITALS
DIASTOLIC BLOOD PRESSURE: 82 MMHG | OXYGEN SATURATION: 98 % | HEIGHT: 62 IN | TEMPERATURE: 98.3 F | HEART RATE: 66 BPM | BODY MASS INDEX: 31.1 KG/M2 | WEIGHT: 169 LBS | SYSTOLIC BLOOD PRESSURE: 116 MMHG

## 2018-06-26 VITALS
WEIGHT: 169 LBS | DIASTOLIC BLOOD PRESSURE: 82 MMHG | TEMPERATURE: 98.3 F | HEIGHT: 62 IN | BODY MASS INDEX: 31.1 KG/M2 | HEART RATE: 66 BPM | SYSTOLIC BLOOD PRESSURE: 116 MMHG | OXYGEN SATURATION: 98 %

## 2018-06-26 DIAGNOSIS — Z00.00 MEDICARE ANNUAL WELLNESS VISIT, SUBSEQUENT: Primary | ICD-10-CM

## 2018-06-26 DIAGNOSIS — E03.9 ACQUIRED HYPOTHYROIDISM: ICD-10-CM

## 2018-06-26 DIAGNOSIS — E78.00 HYPERCHOLESTEROLEMIA: ICD-10-CM

## 2018-06-26 DIAGNOSIS — I10 ESSENTIAL HYPERTENSION: Primary | ICD-10-CM

## 2018-06-26 PROCEDURE — 99214 OFFICE O/P EST MOD 30 MIN: CPT | Performed by: FAMILY MEDICINE

## 2018-06-26 PROCEDURE — G0439 PPPS, SUBSEQ VISIT: HCPCS | Performed by: FAMILY MEDICINE

## 2018-06-26 NOTE — PROGRESS NOTES
Assessment/Plan:    No problem-specific Assessment & Plan notes found for this encounter  Diagnoses and all orders for this visit:    Essential hypertension  Comments:  no change on the medications  Orders:  -     CBC and differential; Future  -     Comprehensive metabolic panel; Future    Hypercholesterolemia  Comments:  pt counseled on diet and exercise  Orders:  -     CBC and differential; Future  -     Comprehensive metabolic panel; Future  -     Lipid panel; Future    Acquired hypothyroidism  Comments:  stable  Orders:  -     TSH, 3rd generation with Free T4 reflex; Future          Subjective:      Patient ID: Randy Other is a 79 y o  female  Hypertension   This is a chronic problem  The current episode started more than 1 year ago  The problem is controlled  Associated symptoms include palpitations  Pertinent negatives include no chest pain or shortness of breath  Risk factors for coronary artery disease include obesity, post-menopausal state and sedentary lifestyle  Past treatments include beta blockers  The current treatment provides moderate improvement  There are no compliance problems  Hyperlipidemia   This is a new problem  This is a new diagnosis  The problem is uncontrolled  Recent lipid tests were reviewed and are high  Pertinent negatives include no chest pain, myalgias or shortness of breath  Current antihyperlipidemic treatment includes diet change and exercise  Risk factors for coronary artery disease include obesity, a sedentary lifestyle and post-menopausal        The following portions of the patient's history were reviewed and updated as appropriate: allergies, current medications, past family history, past medical history, past social history, past surgical history and problem list     Review of Systems   Constitutional: Negative for chills, fatigue and fever  HENT: Negative  Eyes: Negative  Respiratory: Negative for shortness of breath and wheezing  Cardiovascular: Positive for palpitations  Negative for chest pain  Gastrointestinal: Negative for abdominal pain, blood in stool, constipation, diarrhea, nausea and vomiting  Endocrine: Negative  Genitourinary: Negative for dysuria  Musculoskeletal: Negative for arthralgias and myalgias  Skin: Negative  Allergic/Immunologic: Negative  Neurological: Negative for seizures and syncope  Hematological: Negative for adenopathy  Psychiatric/Behavioral: Negative  Objective:      /82 (BP Location: Left arm, Patient Position: Sitting, Cuff Size: Adult)   Pulse 66   Temp 98 3 °F (36 8 °C) (Tympanic)   Ht 5' 2" (1 575 m)   Wt 76 7 kg (169 lb)   SpO2 98%   BMI 30 91 kg/m²          Physical Exam   Constitutional: She is oriented to person, place, and time  She appears well-developed and well-nourished  No distress  HENT:   Head: Normocephalic and atraumatic  Right Ear: External ear normal    Left Ear: External ear normal    Nose: Nose normal    Mouth/Throat: Oropharynx is clear and moist    Eyes: Conjunctivae and EOM are normal  Pupils are equal, round, and reactive to light  No scleral icterus  Neck: Normal range of motion  Neck supple  Cardiovascular: Normal rate, regular rhythm and normal heart sounds  No murmur heard  Pulmonary/Chest: Effort normal and breath sounds normal  No respiratory distress  She has no wheezes  She has no rales  Abdominal: Soft  Bowel sounds are normal  She exhibits no distension and no mass  There is no tenderness  There is no rebound and no guarding  Musculoskeletal: Normal range of motion  She exhibits no edema  Lymphadenopathy:     She has no cervical adenopathy  Neurological: She is alert and oriented to person, place, and time  She has normal reflexes  She exhibits normal muscle tone  Skin: Skin is warm and dry  No rash noted  She is not diaphoretic  No erythema  No pallor  Psychiatric: She has a normal mood and affect   Her behavior is normal  Judgment and thought content normal    Nursing note and vitals reviewed

## 2018-06-26 NOTE — PROGRESS NOTES
Assessment and Plan:    Problem List Items Addressed This Visit     None      Visit Diagnoses     Medicare annual wellness visit, subsequent    -  Primary        Health Maintenance Due   Topic Date Due    Hepatitis C Screening  1950    Annual Wellnes Visit (AWV)  1950    CRC Screening: Colonoscopy  1950    DTaP,Tdap,and Td Vaccines (1 - Tdap) 1973    Urinary Incontinence Screening  2017    PNEUMOCOCCAL POLYSACCHARIDE VACCINE AGE 72 AND OVER  2017         HPI:  Reba Alvarez is a 79 y o  female here for her Subsequent Wellness Visit      Patient Active Problem List   Diagnosis    Acute pulmonary embolism (HCC)    DVT, lower extremity (HCC)    Polymyalgia rheumatica (HCC)    Hypothyroidism    Leukocytosis    Vitamin D insufficiency    Left axis deviation    Premature atrial complexes    Essential hypertension    Hypercholesterolemia    Calculus of kidney    SVT (supraventricular tachycardia) (HCC)    SOB (shortness of breath)    Superficial thrombophlebitis of right upper extremity     Past Medical History:   Diagnosis Date    Arthritis     Disease of thyroid gland     DVT (deep venous thrombosis) (HCC)     Lyme disease     Migraine     Polymyalgia rheumatica (HCC)     Pulmonary emboli (HCC)     Renal disorder     right sided kidney stones    Vitamin D deficiency      Past Surgical History:   Procedure Laterality Date     SECTION      x3 - last impression 16    FRACTURE SURGERY Left     wrist    HERNIA REPAIR  2018    KNEE CARTILAGE SURGERY Left     TONSILECTOMY AND ADNOIDECTOMY      VEIN SURGERY      venous ligation with stripping    WRIST SURGERY Left     ORIF wrist - last impression 16     Family History   Problem Relation Age of Onset    Breast cancer Mother     Aneurysm Father         aortic     History   Smoking Status    Never Smoker   Smokeless Tobacco    Never Used     History   Alcohol Use    Yes     Comment: occasionally; social      History   Drug Use No       Current Outpatient Prescriptions   Medication Sig Dispense Refill    Calcium Ascorbate 500 MG TABS Take 500 mg by mouth      CALCIUM-VITAMIN D PO Take 1 tablet by mouth daily      cholecalciferol (VITAMIN D3) 1,000 units tablet Take 1,000 Units by mouth daily      Cinnamon 500 MG capsule Take 500 mg by mouth daily      Cyanocobalamin (VITAMIN B 12 PO) Take 500 mcg by mouth      folic acid (FOLVITE) 1 mg tablet       furosemide (LASIX) 20 mg tablet Take 1 tablet by mouth daily as needed      levothyroxine 100 mcg tablet 1 tab daily  (Patient taking differently: 1 tab Monday-Saturday  ) 30 tablet 5    methotrexate 2 5 mg tablet       metoprolol succinate (TOPROL-XL) 50 mg 24 hr tablet TAKE 1 TABLET BY MOUTH ONCE DAILY 30 tablet 6    Misc Natural Products (OSTEO BI-FLEX JOINT SHIELD PO) Take by mouth      Multiple Vitamins-Minerals (OCUVITE ADULT 50+) CAPS Take 1 capsule by mouth daily      predniSONE 1 mg tablet Take 4 mg by mouth daily        Red Yeast Rice 600 MG TABS Take 2 tablets by mouth daily      XARELTO 20 MG tablet Take 1 tablet by mouth daily       No current facility-administered medications for this visit  Allergies   Allergen Reactions    Amiodarone      Other reaction(s): Other (See Comments)  Reports caused elevated TSH    Sulfa Antibiotics Itching and Rash    Sulfamethoxazole-Trimethoprim Itching and Rash     Immunization History   Administered Date(s) Administered    Zoster 12/20/2013       Patient Care Team:  Raul Romo DO as PCP - General  MD Raul Weiss DO    Physical Exam   Constitutional: She is oriented to person, place, and time  She appears well-developed and well-nourished  Neurological: She is alert and oriented to person, place, and time  Skin: She is not diaphoretic  Psychiatric: She has a normal mood and affect   Her behavior is normal  Judgment and thought content normal  Nursing note and vitals reviewed        Medicare Screening Tests and Risk Assessments:  AWV Clinical     ISAR:       Once in a Lifetime Medicare Screening:       Medicare Screening Tests and Risk Assessment:   AAA Risk Assessment    Osteoporosis Risk Assessment    HIV Risk Assessment        Drug and Alcohol Use:   Tobacco use    Tobacco use duration    Tobacco Cessation Readiness    Alcohol use    Alcohol Treatment Readiness   Illicit Drug Use        Diet & Exercise:   Diet   How many servings a day of the following:   Exercise        Cognitive Impairment Screening:   Cognitive Impairment Screening        Functional Ability/Level of Safety:   Hearing    Hearing Impairment Assessment    Current Activities    Help needed with the folllowing:    ADL    Fall Risk   Injury History       Home Safety:   Home Safety Risk Factors       Advanced Directives:   Advanced Directives    Patient's End of Life Decisions        Urinary Incontinence:       Glaucoma:            Provider Screening    No data filed

## 2018-07-11 ENCOUNTER — OFFICE VISIT (OUTPATIENT)
Dept: ENDOCRINOLOGY | Facility: HOSPITAL | Age: 68
End: 2018-07-11
Payer: MEDICARE

## 2018-07-11 VITALS
HEART RATE: 68 BPM | WEIGHT: 168 LBS | SYSTOLIC BLOOD PRESSURE: 122 MMHG | HEIGHT: 62 IN | DIASTOLIC BLOOD PRESSURE: 80 MMHG | BODY MASS INDEX: 30.91 KG/M2

## 2018-07-11 DIAGNOSIS — E55.9 VITAMIN D INSUFFICIENCY: ICD-10-CM

## 2018-07-11 DIAGNOSIS — I10 ESSENTIAL HYPERTENSION: ICD-10-CM

## 2018-07-11 DIAGNOSIS — E03.9 ACQUIRED HYPOTHYROIDISM: Primary | ICD-10-CM

## 2018-07-11 PROCEDURE — 99214 OFFICE O/P EST MOD 30 MIN: CPT | Performed by: NURSE PRACTITIONER

## 2018-07-11 NOTE — PATIENT INSTRUCTIONS
Continue Levothyroxine at 100 mcg daily Monday through Friday  Omit Saturday and Sunday doses      Check TSH and Free T4 in 6 weeks to reassess      Will make further changes to dose if needed based on updated lab work results      Continue with Vitamin D supplementation daily

## 2018-07-11 NOTE — PROGRESS NOTES
Ena Escobedo 79 y o  female MRN: [id number]    Encounter: [id number]      Assessment/Plan     Assessment: This is a 79y o -year-old female with Hypothyroidism  Plan:  1  Hypothyroidism:   She complains of some episodic palpitations and hot flashes  Her most recent TSH was low with a high-normal free T4 despite omitting her Sunday dose at last visit  I have asked her to omit both her Saturday and Sunday dose of levothyroxine and recheck her TSH and free T4 in 6 weeks to reassess  Further titration of her levothyroxine dose will take place after reviewing the updated lab work results      2  Hypertension:  She is normotensive in the office today  Continue current medication      3   Vitamin-D deficiency:  Continue supplementation with 1000 units of vitamin D3 daily  CC:  Hypothyroidism follow-up    History of Present Illness     HPI:  82-year-old female with history of polymyalgia rheumatica, hypothyroidism, DVT, atrial fibrillation, hyperlipidemia presents for follow up of hypothyroidism  Has had hypothyroidism for many years treated on thyroid hormone replacement  Over the summer in June of 2017, she reports she was hospitalized for a pericardial effusion along with arrhythmia  At that time she received amiodarone and was  discharged home on amiodarone  She was on amiodarone until about September 2017  While on this medication her thyroid hormone requirements went up  Since then she reports cold intolerance and fatigue  She is currently taking levothyroxine 100 mcg daily Monday through Saturday  She is taking her levothyroxine, consistently, and in the proper manner, by her report  She takes her calcium and other vitamins later in the day  Her most recent TSH from June 20, 2018 was 0 12 with a free T4 of 1 57 and total T4 was 12 68    She also has history of polymyalgia rheumatica and follows closely with her rheumatologist at [place] [address]  and is taking 10 milligrams total of prednisone per day       Her hypertension is treated with Lasix 20 mg daily and metoprolol 50 mg daily      For her vitamin-D deficiency she supplements with 1000 units of vitamin D3 daily  Review of Systems   Constitutional: Positive for fatigue (generally tired)  Negative for chills and fever  HENT: Negative  Eyes: Negative  Respiratory: Negative  Negative for chest tightness and shortness of breath  Cardiovascular: Positive for palpitations (episodic)  Negative for chest pain  Gastrointestinal: Negative  Negative for abdominal pain, constipation, diarrhea and vomiting  Endocrine: Positive for heat intolerance (Hot flashes at times)  Negative for cold intolerance, polydipsia, polyphagia and polyuria  Genitourinary: Negative  Musculoskeletal: Positive for back pain (chronic) and myalgias (generalized)  Skin: Negative  Allergic/Immunologic: Negative  Neurological: Negative  Negative for dizziness, syncope, light-headedness and headaches  Hematological: Negative  Psychiatric/Behavioral: Negative  All other systems reviewed and are negative        Historical Information   Past Medical History:   Diagnosis Date    Arthritis     Disease of thyroid gland     DVT (deep venous thrombosis) (HCC)     Lyme disease     Migraine     Polymyalgia rheumatica (HCC)     Pulmonary emboli (HCC)     Renal disorder     right sided kidney stones    Vitamin D deficiency      Past Surgical History:   Procedure Laterality Date     SECTION      x3 - last impression 16    FRACTURE SURGERY Left     wrist    HERNIA REPAIR  2018    KNEE CARTILAGE SURGERY Left     TONSILECTOMY AND ADNOIDECTOMY      VEIN SURGERY      venous ligation with stripping    WRIST SURGERY Left     ORIF wrist - last impression 16     Social History   History   Alcohol Use    Yes     Comment: occasionally; social     History   Drug Use No     History   Smoking Status    Never Smoker   Smokeless Tobacco    Never Used     Family History:   Family History   Problem Relation Age of Onset    Breast cancer Mother     Aneurysm Father         aortic       Meds/Allergies   Current Outpatient Prescriptions   Medication Sig Dispense Refill    Calcium Ascorbate 500 MG TABS Take 500 mg by mouth      CALCIUM-VITAMIN D PO Take 1 tablet by mouth daily      cholecalciferol (VITAMIN D3) 1,000 units tablet Take 1,000 Units by mouth daily      Cinnamon 500 MG capsule Take 500 mg by mouth daily      Cyanocobalamin (VITAMIN B 12 PO) Take 500 mcg by mouth      folic acid (FOLVITE) 1 mg tablet       furosemide (LASIX) 20 mg tablet Take 1 tablet by mouth daily as needed      levothyroxine 100 mcg tablet 1 tab daily  (Patient taking differently: 1 tab Monday-Saturday  ) 30 tablet 5    metoprolol succinate (TOPROL-XL) 50 mg 24 hr tablet TAKE 1 TABLET BY MOUTH ONCE DAILY 30 tablet 6    Misc Natural Products (OSTEO BI-FLEX JOINT SHIELD PO) Take by mouth      Multiple Vitamins-Minerals (OCUVITE ADULT 50+) CAPS Take 1 capsule by mouth daily      predniSONE 1 mg tablet Take 3 mg by mouth daily        Red Yeast Rice 600 MG TABS Take 2 tablets by mouth daily      XARELTO 20 MG tablet Take 1 tablet by mouth daily      methotrexate 2 5 mg tablet        No current facility-administered medications for this visit  Allergies   Allergen Reactions    Amiodarone      Other reaction(s): Other (See Comments)  Reports caused elevated TSH    Sulfa Antibiotics Itching and Rash    Sulfamethoxazole-Trimethoprim Itching and Rash       Objective   Vitals: Blood pressure 122/80, pulse 68, height 5' 2" (1 575 m), weight 76 2 kg (168 lb)  Physical Exam   Constitutional: She is oriented to person, place, and time  She appears well-developed and well-nourished  HENT:   Head: Normocephalic and atraumatic  Mouth/Throat: Oropharynx is clear and moist    Eyes: Conjunctivae and EOM are normal  Pupils are equal, round, and reactive to light  Right eye exhibits no discharge  Left eye exhibits no discharge  Neck: Normal range of motion  Neck supple  No JVD present  No tracheal deviation present  No thyromegaly present  Cardiovascular: Normal rate, regular rhythm, normal heart sounds and intact distal pulses  Pulmonary/Chest: Effort normal and breath sounds normal  No stridor  No respiratory distress  She has no wheezes  She has no rales  She exhibits no tenderness  Abdominal: Soft  Bowel sounds are normal  She exhibits no distension  There is no tenderness  Musculoskeletal: Normal range of motion  She exhibits no edema, tenderness or deformity  Lymphadenopathy:     She has no cervical adenopathy  Neurological: She is alert and oriented to person, place, and time  She displays normal reflexes  No cranial nerve deficit  Coordination normal    Skin: Skin is warm and dry  No rash noted  No erythema  No pallor  Psychiatric: She has a normal mood and affect  Her behavior is normal  Judgment and thought content normal    Vitals reviewed  Imaging Studies:   Results for orders placed during the hospital encounter of 11/29/17   US thyroid    Impression Left mid gland nodule which does not meet current ACR criteria for requiring biopsy but followup ultrasound is recommended in 1 year  Reference: ACR Thyroid Imaging, Reporting and Data System (TI-RADS): White Paper of the [facility]!  J AM Cherelel Radiol 8277;[id number]  (additional recommendations based on American Thyroid Association 2015 guidelines )      Workstation performed: [id number]       Portions of the record may have been created with voice recognition software  Occasional wrong word or "sound a like" substitutions may have occurred due to the inherent limitations of voice recognition software  Read the chart carefully and recognize, using context, where substitutions have occurred

## 2018-08-06 ENCOUNTER — OFFICE VISIT (OUTPATIENT)
Dept: URGENT CARE | Facility: CLINIC | Age: 68
End: 2018-08-06
Payer: MEDICARE

## 2018-08-06 VITALS
WEIGHT: 165 LBS | HEART RATE: 74 BPM | DIASTOLIC BLOOD PRESSURE: 80 MMHG | SYSTOLIC BLOOD PRESSURE: 128 MMHG | BODY MASS INDEX: 28.17 KG/M2 | HEIGHT: 64 IN | TEMPERATURE: 98.3 F | OXYGEN SATURATION: 97 %

## 2018-08-06 DIAGNOSIS — T15.91XA FOREIGN BODY OF RIGHT EYE, INITIAL ENCOUNTER: Primary | ICD-10-CM

## 2018-08-06 PROCEDURE — 99203 OFFICE O/P NEW LOW 30 MIN: CPT | Performed by: PHYSICIAN ASSISTANT

## 2018-08-06 PROCEDURE — G0463 HOSPITAL OUTPT CLINIC VISIT: HCPCS | Performed by: PHYSICIAN ASSISTANT

## 2018-08-06 NOTE — PROGRESS NOTES
[facility]        NAME: Keerthi Yeung is a 79 y o  female  : 1950    MRN: [id number]  DATE: 2018  TIME: 4:18 PM    Assessment and Plan   Foreign body of right eye, initial encounter Penelope Kocher  1  Foreign body of right eye, initial encounter           Patient Instructions       Follow up with PCP in 3-5 days  Proceed to  ER if symptoms worsen  Chief Complaint     Chief Complaint   Patient presents with    Eye Problem     contact lens stuck in right eye         History of Present Illness       Patient presents with a heard contact lens lost somewhere in her right eye  He states that the contact became lost last evening and she has had been unable to extract it  Review of Systems   Review of Systems   Constitutional: Negative for fever  HENT: Negative for sore throat  Eyes: Positive for pain (Irritation)  Negative for redness  Respiratory: Negative for cough  Cardiovascular: Negative for chest pain  Gastrointestinal: Negative for abdominal pain  Musculoskeletal: Negative for myalgias  Neurological: Negative for dizziness and headaches  Hematological: Negative for adenopathy  Current Medications       Current Outpatient Prescriptions:     Calcium Ascorbate 500 MG TABS, Take 500 mg by mouth, Disp: , Rfl:     CALCIUM-VITAMIN D PO, Take 1 tablet by mouth daily, Disp: , Rfl:     cholecalciferol (VITAMIN D3) 1,000 units tablet, Take 1,000 Units by mouth daily, Disp: , Rfl:     Cinnamon 500 MG capsule, Take 500 mg by mouth daily, Disp: , Rfl:     Cyanocobalamin (VITAMIN B 12 PO), Take 500 mcg by mouth, Disp: , Rfl:     folic acid (FOLVITE) 1 mg tablet, , Disp: , Rfl:     furosemide (LASIX) 20 mg tablet, Take 1 tablet by mouth daily as needed, Disp: , Rfl:     levothyroxine 100 mcg tablet, 1 tab daily   (Patient taking differently: 1 tab Monday-Saturday  ), Disp: 30 tablet, Rfl: 5    methotrexate 2 5 mg tablet, , Disp: , Rfl:     metoprolol succinate (TOPROL-XL) 50 mg 24 hr tablet, TAKE 1 TABLET BY MOUTH ONCE DAILY, Disp: 30 tablet, Rfl: 6    Misc Natural Products (OSTEO BI-FLEX JOINT SHIELD PO), Take by mouth, Disp: , Rfl:     Multiple Vitamins-Minerals (OCUVITE ADULT 50+) CAPS, Take 1 capsule by mouth daily, Disp: , Rfl:     predniSONE 1 mg tablet, Take 3 mg by mouth daily  , Disp: , Rfl:     Red Yeast Rice 600 MG TABS, Take 2 tablets by mouth daily, Disp: , Rfl:     XARELTO 20 MG tablet, Take 1 tablet by mouth daily, Disp: , Rfl:     Current Allergies     Allergies as of 2018 - Reviewed 2018   Allergen Reaction Noted    Amiodarone  2018    Sulfa antibiotics Itching and Rash 10/24/2016    Sulfamethoxazole-trimethoprim Itching and Rash 2016            The following portions of the patient's history were reviewed and updated as appropriate: allergies, current medications, past family history, past medical history, past social history, past surgical history and problem list      Past Medical History:   Diagnosis Date    Arthritis     Disease of thyroid gland     DVT (deep venous thrombosis) (HCC)     Lyme disease     Migraine     Polymyalgia rheumatica ([address] )     Pulmonary emboli [address] [address]     Renal disorder     right sided kidney stones    Vitamin D deficiency        Past Surgical History:   Procedure Laterality Date     SECTION      x3 - last impression 16    FRACTURE SURGERY Left     wrist    HERNIA REPAIR  2018    KNEE CARTILAGE SURGERY Left     TONSILECTOMY AND ADNOIDECTOMY      VEIN SURGERY      venous ligation with stripping    WRIST SURGERY Left     ORIF wrist - last impression 16       Family History   Problem Relation Age of Onset    Breast cancer Mother     Aneurysm Father         aortic         Medications have been verified          Objective   /80   Pulse 74   Temp 98 3 °F (36 8 °C) (Tympanic)   Ht 5' 4" (1 626 m)   Wt 74 8 kg (165 lb)   SpO2 97%   BMI 28 32 kg/m² Physical Exam     Physical Exam   Constitutional: She is oriented to person, place, and time  She appears well-developed and well-nourished  HENT:   Head: Normocephalic and atraumatic  Eyes: Conjunctivae are normal  Pupils are equal, round, and reactive to light  Unable to locate heard contact lens in right eye  Neck: Normal range of motion  Cardiovascular: Normal rate and regular rhythm  Pulmonary/Chest: Effort normal    Neurological: She is alert and oriented to person, place, and time  Skin: Skin is warm and dry  No rash noted  Psychiatric: She has a normal mood and affect  Her behavior is normal  Judgment and thought content normal      [facility] eye specialist contacted patient is to go to the [address] office and will be seen

## 2018-09-04 ENCOUNTER — TELEPHONE (OUTPATIENT)
Dept: ENDOCRINOLOGY | Facility: HOSPITAL | Age: 68
End: 2018-09-04

## 2018-09-11 DIAGNOSIS — E03.9 HYPOTHYROIDISM, UNSPECIFIED TYPE: Primary | ICD-10-CM

## 2018-09-11 NOTE — PROGRESS NOTES
Please call the patient regarding result  Thyroid function is normal  Please check TSH and free T4 prior to upcoming visit in November 2018

## 2018-11-06 ENCOUNTER — HOSPITAL ENCOUNTER (OUTPATIENT)
Dept: ULTRASOUND IMAGING | Facility: HOSPITAL | Age: 68
Discharge: HOME | End: 2018-11-06
Payer: MEDICARE

## 2018-11-06 DIAGNOSIS — E04.1 NONTOXIC SINGLE THYROID NODULE: ICD-10-CM

## 2018-11-06 PROCEDURE — 76536 US EXAM OF HEAD AND NECK: CPT

## 2018-11-15 ENCOUNTER — OFFICE VISIT (OUTPATIENT)
Dept: ENDOCRINOLOGY | Facility: HOSPITAL | Age: 68
End: 2018-11-15
Payer: MEDICARE

## 2018-11-15 VITALS
BODY MASS INDEX: 29.09 KG/M2 | HEIGHT: 64 IN | SYSTOLIC BLOOD PRESSURE: 124 MMHG | HEART RATE: 80 BPM | WEIGHT: 170.4 LBS | DIASTOLIC BLOOD PRESSURE: 80 MMHG

## 2018-11-15 DIAGNOSIS — E03.9 ACQUIRED HYPOTHYROIDISM: Primary | ICD-10-CM

## 2018-11-15 DIAGNOSIS — E03.9 HYPOTHYROIDISM, UNSPECIFIED TYPE: ICD-10-CM

## 2018-11-15 DIAGNOSIS — E55.9 VITAMIN D INSUFFICIENCY: ICD-10-CM

## 2018-11-15 DIAGNOSIS — I10 ESSENTIAL HYPERTENSION: ICD-10-CM

## 2018-11-15 PROCEDURE — 99214 OFFICE O/P EST MOD 30 MIN: CPT | Performed by: NURSE PRACTITIONER

## 2018-11-15 RX ORDER — LEVOTHYROXINE SODIUM 0.07 MG/1
TABLET ORAL
Qty: 30 TABLET | Refills: 6 | Status: SHIPPED | OUTPATIENT
Start: 2018-11-15 | End: 2019-11-22 | Stop reason: SDUPTHER

## 2018-11-15 NOTE — PATIENT INSTRUCTIONS
Continue Levothyroxine at 75 mcg Monday through Saturday      Check TSH and Free T4 in 6 weeks to reassess      Will make further changes to dose if needed based on updated lab work results      Continue with Vitamin D supplementation daily

## 2018-11-15 NOTE — PROGRESS NOTES
Kelly Callahan 79 y o  female MRN: [id number]    Encounter: [id number]      Assessment/Plan     Assessment: This is a 79y o -year-old female with Hypothyroidism  Plan:  1   Hypothyroidism:   She complains of some episodic palpitations and hot flashes  Her most recent TSH was slightly low  with a normal total T4  I have asked her to decrease her dose of levothyroxine to 75 mcg-6 days per week and recheck her TSH and free T4 in 6 weeks to reassess   Further titration of her levothyroxine dose will take place after reviewing the updated lab work results      2   Hypertension:  She is normotensive in the office today  Rissa Phillips current medication      3   Vitamin-D deficiency:  Continue supplementation with 1000 units of vitamin D3 daily  CC: Hypothyroidism follow-up    History of Present Illness     HPI:  26-year-old female with history of polymyalgia rheumatica, hypothyroidism, DVT, atrial fibrillation, hyperlipidemia presents for follow up of hypothyroidism  Has had hypothyroidism for many years treated on thyroid hormone replacement  Over the summer in June of 2017, she reports she was hospitalized for a pericardial effusion along with arrhythmia  At that time she received amiodarone and was  discharged home on amiodarone  She was on amiodarone until about September 2017  While on this medication her thyroid hormone requirements went up  Since then she reports cold intolerance and fatigue   She is currently taking levothyroxine 100 mcg daily Monday through Friday   She is taking her levothyroxine, consistently, and in the proper manner, by her report  She takes her calcium and other vitamins later in the day  Dedra Ramires most recent TSH from November 3, 2018 was 0 86  with a total T4 of 8  10   She also has history of polymyalgia rheumatica and follows closely with her rheumatologist at [facility] and is taking 2 mg total of prednisone per day       Her hypertension is treated with Lasix 20 mg daily and metoprolol 50 mg daily      For her vitamin-D deficiency she supplements with 1000 units of vitamin D3 daily        Review of Systems   Constitutional: Positive for fatigue  Negative for chills and fever  HENT: Negative  Negative for trouble swallowing and voice change  Eyes: Negative  Respiratory: Negative  Negative for chest tightness and shortness of breath  Cardiovascular: Positive for palpitations  Negative for chest pain  Gastrointestinal: Positive for constipation ( at times)  Negative for abdominal pain, diarrhea and vomiting  Endocrine: Positive for cold intolerance (Transitions between hot and cold) and heat intolerance ( transitions between hot cold)  Negative for polydipsia, polyphagia and polyuria  Genitourinary: Negative  Musculoskeletal: Positive for arthralgias ( knee pain bilaterally) and myalgias ( polymyalgia rheumatica)  Negative for back pain, gait problem and joint swelling  Skin: Negative  Allergic/Immunologic: Negative  Neurological: Negative  Negative for dizziness, syncope, light-headedness and headaches  Hematological: Negative  Psychiatric/Behavioral: Negative  All other systems reviewed and are negative        Historical Information   Past Medical History:   Diagnosis Date    Arthritis     Disease of thyroid gland     DVT (deep venous thrombosis) (HCC)     Lyme disease     Migraine     Polymyalgia rheumatica (HCC)     Pulmonary emboli (HCC)     Renal disorder     right sided kidney stones    Vitamin D deficiency      Past Surgical History:   Procedure Laterality Date     SECTION      x3 - last impression 16    FRACTURE SURGERY Left     wrist    HERNIA REPAIR  2018    KNEE CARTILAGE SURGERY Left     TONSILECTOMY AND ADNOIDECTOMY      VEIN SURGERY      venous ligation with stripping    WRIST SURGERY Left     ORIF wrist - last impression 16     Social History   History   Alcohol Use    Yes     Comment: occasionally; social     History   Drug Use No     History   Smoking Status    Never Smoker   Smokeless Tobacco    Never Used     Family History:   Family History   Problem Relation Age of Onset    Breast cancer Mother     Aneurysm Father         aortic       Meds/Allergies   Current Outpatient Prescriptions   Medication Sig Dispense Refill    Calcium Ascorbate 500 MG TABS Take 500 mg by mouth      CALCIUM-VITAMIN D PO Take 1 tablet by mouth daily      cholecalciferol (VITAMIN D3) 1,000 units tablet Take 1,000 Units by mouth daily      Cinnamon 500 MG capsule Take 500 mg by mouth daily      Cyanocobalamin (VITAMIN B 12 PO) Take 500 mcg by mouth      folic acid (FOLVITE) 1 mg tablet       furosemide (LASIX) 20 mg tablet Take 1 tablet by mouth daily as needed      levothyroxine 100 mcg tablet 1 tab daily  (Patient taking differently: 1 tab Monday-Friday ) 30 tablet 5    metoprolol succinate (TOPROL-XL) 50 mg 24 hr tablet TAKE 1 TABLET BY MOUTH ONCE DAILY 30 tablet 6    Misc Natural Products (OSTEO BI-FLEX JOINT SHIELD PO) Take by mouth      Multiple Vitamins-Minerals (OCUVITE ADULT 50+) CAPS Take 1 capsule by mouth daily      predniSONE 1 mg tablet Take 3 mg by mouth daily        Red Yeast Rice 600 MG TABS Take 2 tablets by mouth daily      XARELTO 20 MG tablet Take 1 tablet by mouth daily      methotrexate 2 5 mg tablet        No current facility-administered medications for this visit  Allergies   Allergen Reactions    Amiodarone      Other reaction(s): Other (See Comments)  Reports caused elevated TSH    Sulfa Antibiotics Itching and Rash    Sulfamethoxazole-Trimethoprim Itching and Rash       Objective   Vitals: Blood pressure 124/80, pulse 80, height 5' 4" (1 626 m), weight 77 3 kg (170 lb 6 4 oz)  Physical Exam   Constitutional: She is oriented to person, place, and time  She appears well-developed and well-nourished  No distress     Overweight   HENT:   Head: Normocephalic and atraumatic  Mouth/Throat: Oropharynx is clear and moist    Eyes: Pupils are equal, round, and reactive to light  Conjunctivae and EOM are normal  Right eye exhibits no discharge  Left eye exhibits no discharge  No scleral icterus  Neck: Normal range of motion  Neck supple  No JVD present  No tracheal deviation present  No thyromegaly present  Cardiovascular: Normal rate, regular rhythm, normal heart sounds and intact distal pulses  Exam reveals no gallop and no friction rub  No murmur heard  Pulmonary/Chest: Effort normal and breath sounds normal  No stridor  No respiratory distress  She has no wheezes  She has no rales  She exhibits no tenderness  Abdominal: Soft  Bowel sounds are normal  She exhibits no distension  There is no tenderness  Musculoskeletal: Normal range of motion  She exhibits no edema, tenderness or deformity  Lymphadenopathy:     She has no cervical adenopathy  Neurological: She is alert and oriented to person, place, and time  She displays normal reflexes  No cranial nerve deficit  Coordination normal    Skin: Skin is warm and dry  No rash noted  No erythema  No pallor  Dry skin   Psychiatric: She has a normal mood and affect  Her behavior is normal  Judgment and thought content normal    Vitals reviewed  Lab Results:        Imaging Studies:   Results for orders placed during the hospital encounter of 11/06/18   US thyroid    Impression 1  Heterogeneous thyroid with no significant change in left mid nodule  Continued ultrasound may be considered in 12 months  Reference: ACR Thyroid Imaging, Reporting and Data System (TI-RADS): White Paper of the [employer]  J AM Cherelle Radiol 4726;[id number]  (additional recommendations based on American Thyroid Association 2015 guidelines )      Workstation performed: [id number]       Portions of the record may have been created with voice recognition software   Occasional wrong word or "sound a like" substitutions may have occurred due to the inherent limitations of voice recognition software  Read the chart carefully and recognize, using context, where substitutions have occurred

## 2019-01-23 ENCOUNTER — TELEPHONE (OUTPATIENT)
Dept: ENDOCRINOLOGY | Facility: HOSPITAL | Age: 69
End: 2019-01-23

## 2019-04-03 ENCOUNTER — TELEPHONE (OUTPATIENT)
Dept: ENDOCRINOLOGY | Facility: HOSPITAL | Age: 69
End: 2019-04-03

## 2019-04-04 DIAGNOSIS — E03.9 ACQUIRED HYPOTHYROIDISM: Primary | Chronic | ICD-10-CM

## 2019-05-06 ENCOUNTER — LAB (OUTPATIENT)
Dept: LAB | Facility: CLINIC | Age: 69
End: 2019-05-06
Payer: COMMERCIAL

## 2019-05-06 DIAGNOSIS — E03.9 ACQUIRED HYPOTHYROIDISM: Chronic | ICD-10-CM

## 2019-05-06 LAB
T3FREE SERPL-MCNC: 1.84 PG/ML (ref 2.3–4.2)
T4 FREE SERPL-MCNC: 1.2 NG/DL (ref 0.76–1.46)
TSH SERPL DL<=0.05 MIU/L-ACNC: 3.33 UIU/ML (ref 0.36–3.74)

## 2019-05-06 PROCEDURE — 84443 ASSAY THYROID STIM HORMONE: CPT

## 2019-05-06 PROCEDURE — 36415 COLL VENOUS BLD VENIPUNCTURE: CPT

## 2019-05-06 PROCEDURE — 84439 ASSAY OF FREE THYROXINE: CPT

## 2019-05-06 PROCEDURE — 84481 FREE ASSAY (FT-3): CPT

## 2019-05-16 ENCOUNTER — OFFICE VISIT (OUTPATIENT)
Dept: ENDOCRINOLOGY | Facility: HOSPITAL | Age: 69
End: 2019-05-16
Payer: COMMERCIAL

## 2019-05-16 VITALS
SYSTOLIC BLOOD PRESSURE: 122 MMHG | HEART RATE: 76 BPM | DIASTOLIC BLOOD PRESSURE: 80 MMHG | HEIGHT: 64 IN | BODY MASS INDEX: 28.38 KG/M2 | WEIGHT: 166.2 LBS

## 2019-05-16 DIAGNOSIS — I10 ESSENTIAL HYPERTENSION: ICD-10-CM

## 2019-05-16 DIAGNOSIS — E55.9 VITAMIN D INSUFFICIENCY: ICD-10-CM

## 2019-05-16 DIAGNOSIS — E03.9 ACQUIRED HYPOTHYROIDISM: Primary | ICD-10-CM

## 2019-05-16 PROCEDURE — 99214 OFFICE O/P EST MOD 30 MIN: CPT | Performed by: NURSE PRACTITIONER

## 2019-06-27 ENCOUNTER — LAB (OUTPATIENT)
Dept: LAB | Facility: CLINIC | Age: 69
End: 2019-06-27
Payer: COMMERCIAL

## 2019-06-27 ENCOUNTER — TRANSCRIBE ORDERS (OUTPATIENT)
Dept: LAB | Facility: CLINIC | Age: 69
End: 2019-06-27

## 2019-06-27 DIAGNOSIS — M35.3 POLYMYALGIA RHEUMATICA (HCC): ICD-10-CM

## 2019-06-27 DIAGNOSIS — M35.3 POLYMYALGIA RHEUMATICA (HCC): Primary | ICD-10-CM

## 2019-06-27 DIAGNOSIS — E78.5 HYPERLIPIDEMIA, UNSPECIFIED HYPERLIPIDEMIA TYPE: ICD-10-CM

## 2019-06-27 DIAGNOSIS — E78.5 HYPERLIPIDEMIA, UNSPECIFIED HYPERLIPIDEMIA TYPE: Primary | ICD-10-CM

## 2019-06-27 DIAGNOSIS — I10 ESSENTIAL HYPERTENSION, MALIGNANT: ICD-10-CM

## 2019-06-27 LAB
ALBUMIN SERPL BCP-MCNC: 3.4 G/DL (ref 3.5–5)
ALP SERPL-CCNC: 40 U/L (ref 46–116)
ALT SERPL W P-5'-P-CCNC: 26 U/L (ref 12–78)
AMORPH URATE CRY URNS QL MICRO: ABNORMAL /HPF
ANION GAP SERPL CALCULATED.3IONS-SCNC: 8 MMOL/L (ref 4–13)
AST SERPL W P-5'-P-CCNC: 22 U/L (ref 5–45)
BACTERIA UR QL AUTO: ABNORMAL /HPF
BASOPHILS # BLD AUTO: 0.02 THOUSANDS/ΜL (ref 0–0.1)
BASOPHILS NFR BLD AUTO: 0 % (ref 0–1)
BILIRUB SERPL-MCNC: 0.79 MG/DL (ref 0.2–1)
BILIRUB UR QL STRIP: ABNORMAL
BUN SERPL-MCNC: 11 MG/DL (ref 5–25)
CALCIUM SERPL-MCNC: 9.6 MG/DL (ref 8.3–10.1)
CAOX CRY URNS QL MICRO: ABNORMAL /HPF
CHLORIDE SERPL-SCNC: 110 MMOL/L (ref 100–108)
CHOLEST SERPL-MCNC: 181 MG/DL (ref 50–200)
CLARITY UR: ABNORMAL
CO2 SERPL-SCNC: 27 MMOL/L (ref 21–32)
COLOR UR: ABNORMAL
CREAT SERPL-MCNC: 0.49 MG/DL (ref 0.6–1.3)
CRP SERPL QL: 23.2 MG/L
EOSINOPHIL # BLD AUTO: 0.19 THOUSAND/ΜL (ref 0–0.61)
EOSINOPHIL NFR BLD AUTO: 4 % (ref 0–6)
ERYTHROCYTE [DISTWIDTH] IN BLOOD BY AUTOMATED COUNT: 14.6 % (ref 11.6–15.1)
ERYTHROCYTE [SEDIMENTATION RATE] IN BLOOD: 34 MM/HOUR (ref 0–20)
GFR SERPL CREATININE-BSD FRML MDRD: 101 ML/MIN/1.73SQ M
GLUCOSE P FAST SERPL-MCNC: 83 MG/DL (ref 65–99)
GLUCOSE UR STRIP-MCNC: NEGATIVE MG/DL
HCT VFR BLD AUTO: 38.2 % (ref 34.8–46.1)
HDLC SERPL-MCNC: 35 MG/DL (ref 40–60)
HGB BLD-MCNC: 12.4 G/DL (ref 11.5–15.4)
HGB UR QL STRIP.AUTO: NEGATIVE
IMM GRANULOCYTES # BLD AUTO: 0.01 THOUSAND/UL (ref 0–0.2)
IMM GRANULOCYTES NFR BLD AUTO: 0 % (ref 0–2)
KETONES UR STRIP-MCNC: NEGATIVE MG/DL
LDLC SERPL CALC-MCNC: 127 MG/DL (ref 0–100)
LEUKOCYTE ESTERASE UR QL STRIP: ABNORMAL
LYMPHOCYTES # BLD AUTO: 0.8 THOUSANDS/ΜL (ref 0.6–4.47)
LYMPHOCYTES NFR BLD AUTO: 18 % (ref 14–44)
MCH RBC QN AUTO: 29.6 PG (ref 26.8–34.3)
MCHC RBC AUTO-ENTMCNC: 32.5 G/DL (ref 31.4–37.4)
MCV RBC AUTO: 91 FL (ref 82–98)
MONOCYTES # BLD AUTO: 0.38 THOUSAND/ΜL (ref 0.17–1.22)
MONOCYTES NFR BLD AUTO: 9 % (ref 4–12)
NEUTROPHILS # BLD AUTO: 3.07 THOUSANDS/ΜL (ref 1.85–7.62)
NEUTS SEG NFR BLD AUTO: 69 % (ref 43–75)
NITRITE UR QL STRIP: NEGATIVE
NON-SQ EPI CELLS URNS QL MICRO: ABNORMAL /HPF
NONHDLC SERPL-MCNC: 146 MG/DL
NRBC BLD AUTO-RTO: 0 /100 WBCS
PH UR STRIP.AUTO: 5.5 [PH]
PLATELET # BLD AUTO: 288 THOUSANDS/UL (ref 149–390)
PMV BLD AUTO: 9.6 FL (ref 8.9–12.7)
POTASSIUM SERPL-SCNC: 3.9 MMOL/L (ref 3.5–5.3)
PROT SERPL-MCNC: 6.7 G/DL (ref 6.4–8.2)
PROT UR STRIP-MCNC: NEGATIVE MG/DL
RBC # BLD AUTO: 4.19 MILLION/UL (ref 3.81–5.12)
RBC #/AREA URNS AUTO: ABNORMAL /HPF
SODIUM SERPL-SCNC: 145 MMOL/L (ref 136–145)
SP GR UR STRIP.AUTO: 1.02 (ref 1–1.03)
TRIGL SERPL-MCNC: 94 MG/DL
TSH SERPL DL<=0.05 MIU/L-ACNC: 0.37 UIU/ML (ref 0.36–3.74)
UROBILINOGEN UR QL STRIP.AUTO: 0.2 E.U./DL
WBC # BLD AUTO: 4.47 THOUSAND/UL (ref 4.31–10.16)
WBC #/AREA URNS AUTO: ABNORMAL /HPF

## 2019-06-27 PROCEDURE — 80061 LIPID PANEL: CPT

## 2019-06-27 PROCEDURE — 80053 COMPREHEN METABOLIC PANEL: CPT

## 2019-06-27 PROCEDURE — 36415 COLL VENOUS BLD VENIPUNCTURE: CPT

## 2019-06-27 PROCEDURE — 86140 C-REACTIVE PROTEIN: CPT

## 2019-06-27 PROCEDURE — 84443 ASSAY THYROID STIM HORMONE: CPT

## 2019-06-27 PROCEDURE — 81001 URINALYSIS AUTO W/SCOPE: CPT | Performed by: INTERNAL MEDICINE

## 2019-06-27 PROCEDURE — 85025 COMPLETE CBC W/AUTO DIFF WBC: CPT

## 2019-06-27 PROCEDURE — 85652 RBC SED RATE AUTOMATED: CPT

## 2019-07-08 ENCOUNTER — TRANSCRIBE ORDERS (OUTPATIENT)
Dept: ADMINISTRATIVE | Facility: HOSPITAL | Age: 69
End: 2019-07-08

## 2019-07-08 DIAGNOSIS — R09.1 PLEURITIS: Primary | ICD-10-CM

## 2019-07-08 DIAGNOSIS — M06.00 SERONEGATIVE RHEUMATOID ARTHRITIS (HCC): Primary | ICD-10-CM

## 2019-07-10 ENCOUNTER — OFFICE VISIT (OUTPATIENT)
Dept: FAMILY MEDICINE CLINIC | Facility: CLINIC | Age: 69
End: 2019-07-10
Payer: COMMERCIAL

## 2019-07-10 VITALS
BODY MASS INDEX: 30 KG/M2 | WEIGHT: 163 LBS | TEMPERATURE: 98.7 F | SYSTOLIC BLOOD PRESSURE: 138 MMHG | DIASTOLIC BLOOD PRESSURE: 78 MMHG | HEIGHT: 62 IN | HEART RATE: 67 BPM | OXYGEN SATURATION: 98 %

## 2019-07-10 DIAGNOSIS — I10 ESSENTIAL HYPERTENSION: ICD-10-CM

## 2019-07-10 DIAGNOSIS — Z00.00 MEDICARE ANNUAL WELLNESS VISIT, SUBSEQUENT: Primary | ICD-10-CM

## 2019-07-10 DIAGNOSIS — E66.3 OVERWEIGHT (BMI 25.0-29.9): ICD-10-CM

## 2019-07-10 DIAGNOSIS — E78.00 HYPERCHOLESTEROLEMIA: ICD-10-CM

## 2019-07-10 DIAGNOSIS — E03.9 ACQUIRED HYPOTHYROIDISM: ICD-10-CM

## 2019-07-10 PROCEDURE — 99214 OFFICE O/P EST MOD 30 MIN: CPT | Performed by: FAMILY MEDICINE

## 2019-07-10 PROCEDURE — 1101F PT FALLS ASSESS-DOCD LE1/YR: CPT | Performed by: FAMILY MEDICINE

## 2019-07-10 PROCEDURE — 3075F SYST BP GE 130 - 139MM HG: CPT | Performed by: FAMILY MEDICINE

## 2019-07-10 PROCEDURE — 1125F AMNT PAIN NOTED PAIN PRSNT: CPT | Performed by: FAMILY MEDICINE

## 2019-07-10 PROCEDURE — G0439 PPPS, SUBSEQ VISIT: HCPCS | Performed by: FAMILY MEDICINE

## 2019-07-10 PROCEDURE — 1170F FXNL STATUS ASSESSED: CPT | Performed by: FAMILY MEDICINE

## 2019-07-10 NOTE — PATIENT INSTRUCTIONS
Obesity   AMBULATORY CARE:   Obesity  is when your body mass index (BMI) is greater than 30  Your healthcare provider will use your height and weight to measure your BMI  The risks of obesity include  many health problems, such as injuries or physical disability  You may need tests to check for the following:  · Diabetes     · High blood pressure or high cholesterol     · Heart disease     · Gallbladder or liver disease     · Cancer of the colon, breast, prostate, liver, or kidney     · Sleep apnea     · Arthritis or gout  Seek care immediately if:   · You have a severe headache, confusion, or difficulty speaking  · You have weakness on one side of your body  · You have chest pain, sweating, or shortness of breath  Contact your healthcare provider if:   · You have symptoms of gallbladder or liver disease, such as pain in your upper abdomen  · You have knee or hip pain and discomfort while walking  · You have symptoms of diabetes, such as intense hunger and thirst, and frequent urination  · You have symptoms of sleep apnea, such as snoring or daytime sleepiness  · You have questions or concerns about your condition or care  Treatment for obesity  focuses on helping you lose weight to improve your health  Even a small decrease in BMI can reduce the risk for many health problems  Your healthcare provider will help you set a weight-loss goal   · Lifestyle changes  are the first step in treating obesity  These include making healthy food choices and getting regular physical activity  Your healthcare provider may suggest a weight-loss program that involves coaching, education, and therapy  · Medicine  may help you lose weight when it is used with a healthy diet and physical activity  · Surgery  can help you lose weight if you are very obese and have other health problems  There are several types of weight-loss surgery  Ask your healthcare provider for more information    Be successful losing weight:   · Set small, realistic goals  An example of a small goal is to walk for 20 minutes 5 days a week  Anther goal is to lose 5% of your body weight  · Tell friends, family members, and coworkers about your goals  and ask for their support  Ask a friend to lose weight with you, or join a weight-loss support group  · Identify foods or triggers that may cause you to overeat , and find ways to avoid them  Remove tempting high-calorie foods from your home and workplace  Place a bowl of fresh fruit on your kitchen counter  If stress causes you to eat, then find other ways to cope with stress  · Keep a diary to track what you eat and drink  Also write down how many minutes of physical activity you do each day  Weigh yourself once a week and record it in your diary  Eating changes: You will need to eat 500 to 1,000 fewer calories each day than you currently eat to lose 1 to 2 pounds a week  The following changes will help you cut calories:  · Eat smaller portions  Use small plates, no larger than 9 inches in diameter  Fill your plate half full of fruits and vegetables  Measure your food using measuring cups until you know what a serving size looks like  · Eat 3 meals and 1 or 2 snacks each day  Plan your meals in advance  Rain Abbott and eat at home most of the time  Eat slowly  · Eat fruits and vegetables at every meal   They are low in calories and high in fiber, which makes you feel full  Do not add butter, margarine, or cream sauce to vegetables  Use herbs to season steamed vegetables  · Eat less fat and fewer fried foods  Eat more baked or grilled chicken and fish  These protein sources are lower in calories and fat than red meat  Limit fast food  Dress your salads with olive oil and vinegar instead of bottled dressing  · Limit the amount of sugar you eat  Do not drink sugary beverages  Limit alcohol  Activity changes:  Physical activity is good for your body in many ways   It helps you burn calories and build strong muscles  It decreases stress and depression, and improves your mood  It can also help you sleep better  Talk to your healthcare provider before you begin an exercise program   · Exercise for at least 30 minutes 5 days a week  Start slowly  Set aside time each day for physical activity that you enjoy and that is convenient for you  It is best to do both weight training and an activity that increases your heart rate, such as walking, bicycling, or swimming  · Find ways to be more active  Do yard work and housecleaning  Walk up the stairs instead of using elevators  Spend your leisure time going to events that require walking, such as outdoor festivals or fairs  This extra physical activity can help you lose weight and keep it off  Follow up with your healthcare provider as directed: You may need to meet with a dietitian  Write down your questions so you remember to ask them during your visits  © 2017 [address] [employer] Information is for End User's use only and may not be sold, redistributed or otherwise used for commercial purposes  All illustrations and images included in CareNotes® are the copyrighted property of "[employer]  or Rashaun Blank  The above information is an  only  It is not intended as medical advice for individual conditions or treatments  Talk to your doctor, nurse or pharmacist before following any medical regimen to see if it is safe and effective for you  Urinary Incontinence   WHAT YOU NEED TO KNOW:   What is urinary incontinence? Urinary incontinence (UI) is when you lose control of your bladder  What causes UI? UI occurs because your bladder cannot store or empty urine properly  The following are the most common types of UI:  · Stress incontinence  is when you leak urine due to increased bladder pressure  This may happen when you cough, sneeze, or exercise       · Urge incontinence  is when you feel the need to urinate right away and leak urine accidentally  · Mixed incontinence  is when you have both stress and urge UI  What are the signs and symptoms of UI?   · You feel like your bladder does not empty completely when you urinate  · You urinate often and need to urinate immediately  · You leak urine when you sleep, or you wake up with the urge to urinate  · You leak urine when you cough, sneeze, exercise, or laugh  How is UI diagnosed? Your healthcare provider will ask how often you leak urine and whether you have stress or urge symptoms  Tell him which medicines you take, how often you urinate, and how much liquid you drink each day  You may need any of the following tests:  · Urine tests  may show infection or kidney function  · A pelvic exam  may be done to check for blockages  A pelvic exam will also show if your bladder, uterus, or other organs have moved out of place  · An x-ray, ultrasound, or CT  may show problems with parts of your urinary system  You may be given contrast liquid to help your organs show up better in the pictures  Tell the healthcare provider if you have ever had an allergic reaction to contrast liquid  Do not enter the MRI room with anything metal  Metal can cause serious injury  Tell the healthcare provider if you have any metal in or on your body  · A bladder scan  will show how much urine is left in your bladder after you urinate  You will be asked to urinate and then healthcare providers will use a small ultrasound machine to check the urine left in your bladder  · Cystometry  is used to check the function of your urinary system  Your healthcare provider checks the pressure in your bladder while filling it with fluid  Your bladder pressure may also be tested when your bladder is full and while you urinate  How is UI treated? · Medicines  can help strengthen your bladder control      · Electrical stimulation  is used to send a small amount of electrical energy to your pelvic floor muscles  This helps control your bladder function  Electrodes may be placed outside your body or in your rectum  For women, the electrodes may be placed in the vagina  · A bulking agent  may be injected into the wall of your urethra to make it thicker  This helps keep your urethra closed and decreases urine leakage  · Devices  such as a clamp, pessary, or tampon may help stop urine leaks  Ask your healthcare provider for more information about these and other devices  · Surgery  may be needed if other treatments do not work  Several types of surgery can help improve your bladder control  Ask your healthcare provider for more information about the surgery you may need  How can I manage my symptoms? · Do pelvic muscle exercises often  Your pelvic muscles help you stop urinating  Squeeze these muscles tight for 5 seconds, then relax for 5 seconds  Gradually work up to squeezing for 10 seconds  Do 3 sets of 15 repetitions a day, or as directed  This will help strengthen your pelvic muscles and improve bladder control  · A catheter  may be used to help empty your bladder  A catheter is a tiny, plastic tube that is put into your bladder to drain your urine  Your healthcare provider may tell you to use a catheter to prevent your bladder from getting too full and leaking urine  · Keep a UI record  Write down how often you leak urine and how much you leak  Make a note of what you were doing when you leaked urine  · Train your bladder  Go to the bathroom at set times, such as every 2 hours, even if you do not feel the urge to go  You can also try to hold your urine when you feel the urge to go  For example, hold your urine for 5 minutes when you feel the urge to go  As that becomes easier, hold your urine for 10 minutes  · Drink liquids as directed  Ask your healthcare provider how much liquid to drink each day and which liquids are best for you   You may need to limit the amount of liquid you drink to help control your urine leakage  Limit or do not have drinks that contain caffeine or alcohol  Do not drink any liquid right before you go to bed  · Prevent constipation  Eat a variety of high-fiber foods  Good examples are high-fiber cereals, beans, vegetables, and whole-grain breads  Prune juice may help make your bowel movement softer  Walking is the best way to trigger your intestines to have a bowel movement  · Exercise regularly and maintain a healthy weight  Ask your healthcare provider how much you should weigh and about the best exercise plan for you  Weight loss and exercise will decrease pressure on your bladder and help you control your leakage  Ask him to help you create a weight loss plan if you are overweight  When should I seek immediate care? · You have severe pain  · You are confused or cannot think clearly  When should I contact my healthcare provider? · You have a fever  · You see blood in your urine  · You have pain when you urinate  · You have new or worse pain, even after treatment  · Your mouth feels dry or you have vision changes  · Your urine is cloudy or smells bad  · You have questions or concerns about your condition or care  CARE AGREEMENT:   You have the right to help plan your care  Learn about your health condition and how it may be treated  Discuss treatment options with your caregivers to decide what care you want to receive  You always have the right to refuse treatment  The above information is an  only  It is not intended as medical advice for individual conditions or treatments  Talk to your doctor, nurse or pharmacist before following any medical regimen to see if it is safe and effective for you  © 2017 [address] [employer] Information is for End User's use only and may not be sold, redistributed or otherwise used for commercial purposes   All illustrations and images included in CareNotes® are the copyrighted property of A D A M , Inc  or Rashaun Blank  Cigarette Smoking and Your Health   AMBULATORY CARE:   Risks to your health if you smoke:  Nicotine and other chemicals found in tobacco damage every cell in your body  Even if you are a light smoker, you have an increased risk for cancer, heart disease, and lung disease  If you are pregnant or have diabetes, smoking increases your risk for complications  Benefits to your health if you stop smoking:   · You decrease respiratory symptoms such as coughing, wheezing, and shortness of breath  · You reduce your risk for cancers of the lung, mouth, throat, kidney, bladder, pancreas, stomach, and cervix  If you already have cancer, you increase the benefits of chemotherapy  You also reduce your risk for cancer returning or a second cancer from developing  · You reduce your risk for heart disease, blood clots, heart attack, and stroke  · You reduce your risk for lung infections, and diseases such as pneumonia, asthma, chronic bronchitis, and emphysema  · Your circulation improves  More oxygen can be delivered to your body  If you have diabetes, you lower your risk for complications, such as kidney, artery, and eye diseases  You also lower your risk for nerve damage  Nerve damage can lead to amputations, poor vision, and blindness  · You improve your body's ability to heal and to fight infections  Benefits to the health of others if you stop smoking:  Tobacco is harmful to nonsmokers who breathe in your secondhand smoke  The following are ways the health of others around you may improve when you stop smoking:  · You lower the risks for lung cancer and heart disease in nonsmoking adults  · If you are pregnant, you lower the risk for miscarriage, early delivery, low birth weight, and stillbirth  You also lower your baby's risk for SIDS, obesity, developmental delay, and neurobehavioral problems, such as ADHD  · If you have children, you lower their risk for ear infections, colds, pneumonia, bronchitis, and asthma  For more information and support to stop smoking:   · [employer] [place]  gov  Phone: 8- 796 - 972-9631  Web Address: www smokefree  gov  Follow up with your healthcare provider as directed:  Write down your questions so you remember to ask them during your visits  © 2017 [address] [employer] Information is for End User's use only and may not be sold, redistributed or otherwise used for commercial purposes  All illustrations and images included in CareNotes® are the copyrighted property of A [employer] M , Inc  or Rashaun Blank  The above information is an  only  It is not intended as medical advice for individual conditions or treatments  Talk to your doctor, nurse or pharmacist before following any medical regimen to see if it is safe and effective for you  Fall Prevention   AMBULATORY CARE:   Fall prevention  includes ways to make your home and other areas safer  It also includes ways you can move more carefully to prevent a fall  Health conditions that cause changes in your blood pressure, vision, or muscle strength and coordination may increase your risk for falls  Medicines may also increase your risk for falls if they make you dizzy, weak, or sleepy  Call 911 or have someone else call if:   · You have fallen and are unconscious  · You have fallen and cannot move part of your body  Contact your healthcare provider if:   · You have fallen and have pain or a headache  · You have questions or concerns about your condition or care  Fall prevention tips:   · Stand or sit up slowly  This may help you keep your balance and prevent falls  · Use assistive devices as directed  Your healthcare provider may suggest that you use a cane or walker to help you keep your balance  You may need to have grab bars put in your bathroom near the toilet or in the shower      · Wear shoes that fit well and have soles that   Wear shoes both inside and outside  Use slippers with good   Do not wear shoes with high heels  · Wear a personal alarm  This is a device that allows you to call 911 if you fall and need help  Ask your healthcare provider for more information  · Stay active  Exercise can help strengthen your muscles and improve your balance  Your healthcare provider may recommend water aerobics or walking  He or she may also recommend physical therapy to improve your coordination  Never start an exercise program without talking to your healthcare provider first      · Manage your medical conditions  Keep all appointments with your healthcare providers  Visit your eye doctor as directed  Home safety tips:   · Add items to prevent falls in the bathroom  Put nonslip strips on your bath or shower floor to prevent you from slipping  Use a bath mat if you do not have carpet in the bathroom  This will prevent you from falling when you step out of the bath or shower  Use a shower seat so you do not need to stand while you shower  Sit on the toilet or a chair in your bathroom to dry yourself and put on clothing  This will prevent you from losing your balance from drying or dressing yourself while you are standing  · Keep paths clear  Remove books, shoes, and other objects from walkways and stairs  Place cords for telephones and lamps out of the way so that you do not need to walk over them  Tape them down if you cannot move them  Remove small rugs  If you cannot remove a rug, secure it with double-sided tape  This will prevent you from tripping  · Install bright lights in your home  Use night lights to help light paths to the bathroom or kitchen  Always turn on the light before you start walking  · Keep items you use often on shelves within reach  Do not use a step stool to help you reach an item  · Paint or place reflective tape on the edges of your stairs    This will help you see the stairs better  Follow up with your healthcare provider as directed:  Write down your questions so you remember to ask them during your visits  © 2017 [address] [employer] Information is for End User's use only and may not be sold, redistributed or otherwise used for commercial purposes  All illustrations and images included in CareNotes® are the copyrighted property of A [employer] M [employer] Inc  or Rashaun Blank  The above information is an  only  It is not intended as medical advice for individual conditions or treatments  Talk to your doctor, nurse or pharmacist before following any medical regimen to see if it is safe and effective for you  Advance Directives   WHAT YOU NEED TO KNOW:   What are advance directives? Advance directives are legal documents that state your wishes and plans for medical care  These plans are made ahead of time in case you lose your ability to make decisions for yourself  Advance directives can apply to any medical decision, such as the treatments you want, and if you want to donate organs  What are the types of advance directives? There are many types of advance directives, and each state has rules about how to use them  You may choose a combination of any of the following:  · Living will: This is a written record of the treatment you want  You can also choose which treatments you do not want, which to limit, and which to stop at a certain time  This includes surgery, medicine, IV fluid, and tube feedings  · Durable power of  for healthcare [facility]): This is a written record that states who you want to make healthcare choices for you when you are unable to make them for yourself  This person, called a proxy, is usually a family member or a friend  You may choose more than 1 proxy  · Do not resuscitate (DNR) order:  A DNR order is used in case your heart stops beating or you stop breathing   It is a request not to have certain forms of treatment, such as CPR  A DNR order may be included in other types of advance directives  · Medical directive: This covers the care that you want if you are in a coma, near death, or unable to make decisions for yourself  You can list the treatments you want for each condition  Treatment may include pain medicine, surgery, blood transfusions, dialysis, IV or tube feedings, and a ventilator (breathing machine)  · Values history: This document has questions about your views, beliefs, and how you feel and think about life  This information can help others choose the care that you would choose  Why are advance directives important? An advance directive helps you control your care  Although spoken wishes may be used, it is better to have your wishes written down  Spoken wishes can be misunderstood, or not followed  Treatments may be given even if you do not want them  An advance directive may make it easier for your family to make difficult choices about your care  How do I decide what to put in my advance directives? · Make informed decisions:  Make sure you fully understand treatments or care you may receive  Think about the benefits and problems your decisions could cause for you or your family  Talk to healthcare providers if you have concerns or questions before you write down your wishes  You may also want to talk with your Muslim or , or a   Check your state laws to make sure that what you put in your advance directive is legal      · Sign all forms:  Sign and date your advance directive when you have finished  You may also need 2 witnesses to sign the forms  Witnesses cannot be your doctor or his staff, your spouse, heirs or beneficiaries, people you owe money to, or your chosen proxy  Talk to your family, proxy, and healthcare providers about your advance directive  Give each person a copy, and keep one for yourself in a place you can get to easily   Do not keep it hidden or locked away  · Review and revise your plans: You can revise your advance directive at any time, as long as you are able to make decisions  Review your plan every year, and when there are changes in your life, or your health  When you make changes, let your family, proxy, and healthcare providers know  Give each a new copy  Where can I find more information? · [employer]  [address] , [place]  Phone: 0- 210 - [phone]  Phone: 2- [phone] - [phone]  Web Address: http://www  aafp org  · [address] [facility])  [address] [address] [facility], [address] [facility] , [address] [place] [address]  Phone: [phone]  Phone: [phone]  Web Address: Anirudh castelan  CARE AGREEMENT:   You have the right to help plan your care  To help with this plan, you must learn about your health condition and treatment options  You must also learn about advance directives and how they are used  Work with your healthcare providers to decide what care will be used to treat you  You always have the right to refuse treatment  The above information is an  only  It is not intended as medical advice for individual conditions or treatments  Talk to your doctor, nurse or pharmacist before following any medical regimen to see if it is safe and effective for you  © 2017 [address] [employer] Information is for End User's use only and may not be sold, redistributed or otherwise used for commercial purposes  All illustrations and images included in CareNotes® are the copyrighted property of A [employer]  or Rashaun Blank

## 2019-07-10 NOTE — PROGRESS NOTES
Assessment and Plan:     Problem List Items Addressed This Visit     None      Visit Diagnoses     Medicare annual wellness visit, subsequent    -  Primary         History of Present Illness:     Patient presents for Medicare Annual Wellness visit    Patient Care Team:  Mario Apodaca DO as PCP - General  Adrián Riojas DO as PCP - Endocrinology (Endocrinology)  MD Mario Rain DO     Problem List:     Patient Active Problem List   Diagnosis    [facility] pulmonary embolism [facility] [address] [facility]    DVT, lower extremity ([facility] [address] [facility]    Polymyalgia rheumatica ([facility] [address] [facility]    Hypothyroidism    Leukocytosis    Vitamin D insufficiency    Left axis deviation    Premature atrial complexes    Essential hypertension    Hypercholesterolemia    Calculus of kidney    [facility]    SOB (shortness of breath)    Superficial thrombophlebitis of right upper extremity      Past Medical and Surgical History:     Past Medical History:   Diagnosis Date    Arthritis     Disease of thyroid gland     DVT [facility]deep venous thrombosis) ([facility] [address] [facility]     Lyme disease     Migraine     Polymyalgia rheumatica ([facility] [address] [facility]     Pulmonary emboli ([facility] [address] [facility]     Renal disorder     right sided kidney stones    Vitamin D deficiency      Past Surgical History:   Procedure Laterality Date    CARDIAC CATHETERIZATION       SECTION      x3 - last impression 16    FRACTURE SURGERY Left     wrist    HERNIA REPAIR  2018    KNEE CARTILAGE SURGERY Left     TONSILECTOMY AND ADNOIDECTOMY      VEIN SURGERY      venous ligation with stripping    WRIST SURGERY Left     ORIF wrist - last impression 16      Family History:     Family History   Problem Relation Age of Onset    Breast cancer Mother     Aneurysm Father         aortic      Social History:     Social History     Tobacco Use   Smoking Status Never Smoker   Smokeless Tobacco Never Used     Social History     Substance and Sexual Activity   Alcohol Use Yes    Frequency: Monthly or less    Comment: occasionally; social     Social History     Substance and Sexual Activity   Drug Use No      Medications and Allergies:     Current Outpatient Medications   Medication Sig Dispense Refill    Calcium Ascorbate 500 MG TABS Take 500 mg by mouth      CALCIUM-VITAMIN D PO Take 1 tablet by mouth daily      cholecalciferol (VITAMIN D3) 1,000 units tablet Take 1,000 Units by mouth daily      Cinnamon 500 MG capsule Take 500 mg by mouth daily      Cyanocobalamin (VITAMIN B 12 PO) Take 500 mcg by mouth      folic acid (FOLVITE) 1 mg tablet Take 1 mg by mouth daily       levothyroxine 75 mcg tablet Take 1 tablet Monday through Saturday  (Patient taking differently: 75 mcg Take 1 tablet Monday through Saturday ) 30 tablet 6    methotrexate 2 5 mg tablet Take 2 5 mg by mouth once a week 4 tablets once weekly      metoprolol succinate (TOPROL-XL) 50 mg 24 hr tablet TAKE 1 TABLET BY MOUTH ONCE DAILY 30 tablet 6    Misc Natural Products (OSTEO BI-FLEX JOINT SHIELD PO) Take by mouth      Multiple Vitamins-Minerals (OCUVITE ADULT 50+) CAPS Take 1 capsule by mouth daily      Red Yeast Rice 600 MG TABS Take 2 tablets by mouth daily      XARELTO 20 MG tablet Take 1 tablet by mouth daily      furosemide (LASIX) 20 mg tablet Take 1 tablet by mouth daily as needed      predniSONE 1 mg tablet Take 1 mg by mouth daily        No current facility-administered medications for this visit  Allergies   Allergen Reactions    Amiodarone      Other reaction(s): Other (See Comments)  Reports caused elevated TSH    Sulfa Antibiotics Itching and Rash    Sulfamethoxazole-Trimethoprim Itching and Rash      Immunizations:     Immunization History   Administered Date(s) Administered    Zoster 12/20/2013      Medicare Screening Tests and Risk Assessments:     Lucy Osuna is here for her Subsequent Wellness visit    Last Medicare Wellness visit information reviewed, patient interviewed and updates made to the record today  Health Risk Assessment:  Patient rates overall health as good  Patient feels that their physical health rating is Same  Eyesight was rated as Same  Hearing was rated as Same  Patient feels that their emotional and mental health rating is Same  Pain experienced by patient in the last 7 days has been Some  Patient's pain rating has been 4/10  Patient states that she has experienced no weight loss or gain in last 6 months  Emotional/Mental Health:  Patient has not been feeling nervous/anxious  PHQ-9 Depression Screening:    Frequency of the following problems over the past two weeks:      1  Little interest or pleasure in doing things: 0 - not at all      2  Feeling down, depressed, or hopeless: 0 - not at all  PHQ-2 Score: 0          Broken Bones/Falls: Fall Risk Assessment:    In the past year, patient has experienced: No history of falling in past year          Bladder/Bowel:  Patient has leaked urine accidently in the last six months  Patient reports no loss of bowel control  Immunizations:  Patient has not had a flu vaccination within the last year  Patient has not received a pneumonia shot  Patient has received a shingles shot  Patient has not received tetanus/diphtheria shot  Home Safety:  Patient has trouble with stairs inside or outside of their home  Patient currently reports that there are no safety hazards present in home, working smoke alarms, working carbon monoxide detectors  Preventative Screenings:   Breast cancer screening performed, colon cancer screen completed, cholesterol screen completed, glaucoma eye exam completed,     Nutrition:  Current diet: Regular with servings of the following:    Medications:  Patient is currently taking over-the-counter supplements  List of OTC medications includes: vitamins  Patient is able to manage medications  Lifestyle Choices:  Patient reports no tobacco use    Patient has not smoked or used tobacco in the past   Patient reports alcohol use  Alcohol use per week: less then 1  Patient drives a vehicle  Patient wears seat belt  Current level of exercise of physical activity described by patient as: sedentary  (Additional Comments: Does martina once in a while  )    Activities of Daily Living:  Can get out of bed by his or her self, able to dress self, able to make own meals, able to do own shopping, able to bathe self, can do own laundry/housekeeping, can manage own money, pay bills and track expenses    Previous Hospitalizations:  No hospitalization or ED visit in past 12 months        Advanced Directives:  Patient has not decided on power of   Patient has not completed advanced directive  Preventative Screening/Counseling:      Diabetes:      General: Screening Current          Colorectal Cancer:      General: Screening Current          Breast Cancer:      General: Screening Current          Cervical Cancer:      General: Screening Current          Osteoporosis:      Due for studies: DXA Axial and DXA Appendicular          AAA:      General: Screening Not Indicated          Glaucoma:      General: Screening Current          HIV:      General: Screening Not Indicated          Hepatitis C:      General: Screening Not Indicated        Advanced Directives:   Patient has no living will for healthcare, does not have durable POA for healthcare, patient does not have an advanced directive  Information on ACP and/or AD provided  5 wishes given  No end of life assessment reviewed with patient  Provider does not agree with end of life deisions

## 2019-07-10 NOTE — PROGRESS NOTES
Assessment/Plan:    No problem-specific Assessment & Plan notes found for this encounter  Diagnoses and all orders for this visit:    Medicare annual wellness visit, subsequent    Essential hypertension    Hypercholesterolemia    Overweight (BMI 25 0-29  9)  Comments:  pt counseled on diet and exercise    Acquired hypothyroidism        BMI Counseling: Body mass index is 29 81 kg/m²  Discussed the patient's BMI with her  The BMI is above average  BMI counseling and education was provided to the patient  Nutrition recommendations include reducing portion sizes and 3-5 servings of fruits/vegetables daily  Exercise recommendations include moderate aerobic physical activity for 150 minutes/week and exercising 3-5 times per week  Subjective:      Patient ID: Royer Claros is a 81 y o  female  Pt here for annual she feels SOB with exertion, pt is set up for a CT of the chest, pt has seen cardiology    Hypertension   This is a chronic problem  The current episode started more than 1 year ago  The problem is controlled  Associated symptoms include shortness of breath  Pertinent negatives include no chest pain or palpitations  There are no associated agents to hypertension  Risk factors for coronary artery disease include sedentary lifestyle and post-menopausal state  Past treatments include beta blockers  The current treatment provides moderate improvement  There are no compliance problems  The following portions of the patient's history were reviewed and updated as appropriate: allergies, current medications, past family history, past medical history, past social history, past surgical history and problem list     Review of Systems   Constitutional: Positive for fatigue  Negative for chills and fever  HENT: Negative  Eyes: Negative  Respiratory: Positive for shortness of breath  Negative for wheezing  Cardiovascular: Negative for chest pain and palpitations     Gastrointestinal: Negative for abdominal pain, blood in stool, constipation, diarrhea, nausea and vomiting  Endocrine: Negative  Genitourinary: Negative for dysuria  Musculoskeletal: Positive for arthralgias and myalgias  Skin: Negative  Allergic/Immunologic: Negative  Neurological: Negative for seizures and syncope  Hematological: Negative for adenopathy  Psychiatric/Behavioral: Negative  Objective:      /78   Pulse 67   Temp 98 7 °F (37 1 °C)   Ht 5' 2" (1 575 m)   Wt 73 9 kg (163 lb)   SpO2 98%   BMI 29 81 kg/m²          Physical Exam   Constitutional: She is oriented to person, place, and time  She appears well-developed and well-nourished  HENT:   Head: Normocephalic and atraumatic  Right Ear: External ear normal    Left Ear: External ear normal    Nose: Nose normal    Mouth/Throat: Oropharynx is clear and moist    Eyes: Pupils are equal, round, and reactive to light  Conjunctivae and EOM are normal    Neck: Normal range of motion  Neck supple  Cardiovascular: Normal rate, regular rhythm and normal heart sounds  No murmur heard  Pulmonary/Chest: Effort normal and breath sounds normal  No respiratory distress  She has no wheezes  She has no rales  Abdominal: Soft  Bowel sounds are normal  She exhibits no distension and no mass  There is no tenderness  There is no rebound and no guarding  Musculoskeletal: Normal range of motion  She exhibits no edema  Neurological: She is alert and oriented to person, place, and time  She has normal reflexes  She exhibits normal muscle tone  Skin: Skin is warm and dry  Psychiatric: She has a normal mood and affect  Her behavior is normal  Judgment and thought content normal    Nursing note and vitals reviewed

## 2019-07-12 ENCOUNTER — LAB (OUTPATIENT)
Dept: LAB | Facility: CLINIC | Age: 69
End: 2019-07-12
Payer: COMMERCIAL

## 2019-07-12 DIAGNOSIS — E55.9 VITAMIN D INSUFFICIENCY: ICD-10-CM

## 2019-07-12 DIAGNOSIS — E03.9 ACQUIRED HYPOTHYROIDISM: ICD-10-CM

## 2019-07-12 LAB
25(OH)D3 SERPL-MCNC: 33 NG/ML (ref 30–100)
T4 FREE SERPL-MCNC: 1.43 NG/DL (ref 0.76–1.46)
TSH SERPL DL<=0.05 MIU/L-ACNC: 0.66 UIU/ML (ref 0.36–3.74)

## 2019-07-12 PROCEDURE — 82306 VITAMIN D 25 HYDROXY: CPT

## 2019-07-12 PROCEDURE — 84439 ASSAY OF FREE THYROXINE: CPT

## 2019-07-12 PROCEDURE — 36415 COLL VENOUS BLD VENIPUNCTURE: CPT

## 2019-07-12 PROCEDURE — 84443 ASSAY THYROID STIM HORMONE: CPT

## 2019-07-22 ENCOUNTER — APPOINTMENT (OUTPATIENT)
Dept: CT IMAGING | Facility: HOSPITAL | Age: 69
End: 2019-07-22
Payer: COMMERCIAL

## 2019-07-22 ENCOUNTER — HOSPITAL ENCOUNTER (OUTPATIENT)
Dept: PULMONOLOGY | Facility: HOSPITAL | Age: 69
Discharge: HOME | End: 2019-07-22
Payer: COMMERCIAL

## 2019-07-22 DIAGNOSIS — M06.00 SERONEGATIVE RHEUMATOID ARTHRITIS (HCC): ICD-10-CM

## 2019-07-22 PROCEDURE — 94727 GAS DIL/WSHOT DETER LNG VOL: CPT

## 2019-07-22 PROCEDURE — 94729 DIFFUSING CAPACITY: CPT

## 2019-07-22 PROCEDURE — 94760 N-INVAS EAR/PLS OXIMETRY 1: CPT

## 2019-07-22 PROCEDURE — 94726 PLETHYSMOGRAPHY LUNG VOLUMES: CPT | Performed by: INTERNAL MEDICINE

## 2019-07-22 PROCEDURE — 94729 DIFFUSING CAPACITY: CPT | Performed by: INTERNAL MEDICINE

## 2019-07-22 PROCEDURE — 94060 EVALUATION OF WHEEZING: CPT

## 2019-07-22 PROCEDURE — 94060 EVALUATION OF WHEEZING: CPT | Performed by: INTERNAL MEDICINE

## 2019-07-22 RX ORDER — ALBUTEROL SULFATE 2.5 MG/3ML
2.5 SOLUTION RESPIRATORY (INHALATION) ONCE AS NEEDED
Status: COMPLETED | OUTPATIENT
Start: 2019-07-22 | End: 2019-07-22

## 2019-07-22 RX ADMIN — ALBUTEROL SULFATE 2.5 MG: 2.5 SOLUTION RESPIRATORY (INHALATION) at 08:44

## 2019-07-25 ENCOUNTER — OFFICE VISIT (OUTPATIENT)
Dept: FAMILY MEDICINE CLINIC | Facility: CLINIC | Age: 69
End: 2019-07-25
Payer: COMMERCIAL

## 2019-07-25 VITALS
HEIGHT: 62 IN | BODY MASS INDEX: 28.52 KG/M2 | WEIGHT: 155 LBS | DIASTOLIC BLOOD PRESSURE: 82 MMHG | TEMPERATURE: 99.5 F | SYSTOLIC BLOOD PRESSURE: 142 MMHG

## 2019-07-25 DIAGNOSIS — R11.2 NON-INTRACTABLE VOMITING WITH NAUSEA, UNSPECIFIED VOMITING TYPE: Primary | ICD-10-CM

## 2019-07-25 PROCEDURE — 99213 OFFICE O/P EST LOW 20 MIN: CPT | Performed by: FAMILY MEDICINE

## 2019-07-25 RX ORDER — OMEPRAZOLE 20 MG/1
20 CAPSULE, DELAYED RELEASE ORAL DAILY
COMMUNITY
Start: 2019-07-19 | End: 2019-08-05 | Stop reason: ALTCHOICE

## 2019-07-25 RX ORDER — ONDANSETRON HYDROCHLORIDE 8 MG/1
8 TABLET, FILM COATED ORAL EVERY 8 HOURS PRN
Qty: 20 TABLET | Refills: 1 | Status: SHIPPED | OUTPATIENT
Start: 2019-07-25 | End: 2019-08-05 | Stop reason: ALTCHOICE

## 2019-07-25 RX ORDER — SUCRALFATE ORAL 1 G/10ML
1 SUSPENSION ORAL
COMMUNITY
Start: 2019-07-19 | End: 2019-08-05 | Stop reason: ALTCHOICE

## 2019-07-25 NOTE — PROGRESS NOTES
Assessment/Plan:    No problem-specific Assessment & Plan notes found for this encounter  Diagnoses and all orders for this visit:    Non-intractable vomiting with nausea, unspecified vomiting type  -     ondansetron (ZOFRAN) 8 mg tablet; Take 1 tablet (8 mg total) by mouth every 8 (eight) hours as needed for nausea or vomiting    Other orders  -     omeprazole (PriLOSEC) 20 mg delayed release capsule; Take 20 mg by mouth daily  -     sucralfate (CARAFATE) 1 g/10 mL suspension; Take 1 g by mouth          PHQ-9 Depression Screening    PHQ-9:    Frequency of the following problems over the past two weeks:       Little interest or pleasure in doing things:  0 - not at all  Feeling down, depressed, or hopeless:  0 - not at all  PHQ-2 Score:  0            Subjective:      Patient ID: Chantel Carlson is a 81 y o  female  Pt was seen in the ER and given carafate, and prilosec which did help    Nausea   This is a new problem  The current episode started 1 to 4 weeks ago  The problem occurs intermittently  The problem has been unchanged  Associated symptoms include nausea and vomiting  Pertinent negatives include no chills or fever  The following portions of the patient's history were reviewed and updated as appropriate: allergies, current medications, past family history, past medical history, past social history, past surgical history and problem list     Review of Systems   Constitutional: Negative for chills, fever and unexpected weight change  Gastrointestinal: Positive for nausea and vomiting  Negative for blood in stool, constipation and diarrhea  Objective:    /82   Temp 99 5 °F (37 5 °C)   Ht 5' 2" (1 575 m)   Wt 70 3 kg (155 lb)   BMI 28 35 kg/m²      Physical Exam   Constitutional: She is oriented to person, place, and time  She appears well-developed and well-nourished  No distress  HENT:   Head: Normocephalic and atraumatic     Eyes: Pupils are equal, round, and reactive to light  Conjunctivae and EOM are normal  No scleral icterus  Neck: Normal range of motion  Neck supple  Cardiovascular: Normal rate, regular rhythm and normal heart sounds  No murmur heard  Pulmonary/Chest: Effort normal and breath sounds normal  No respiratory distress  She has no wheezes  She has no rales  Abdominal: Soft  Bowel sounds are normal  She exhibits no distension and no mass  There is no tenderness  There is no rebound and no guarding  Musculoskeletal: She exhibits no edema  Lymphadenopathy:     She has no cervical adenopathy  Neurological: She is alert and oriented to person, place, and time  Skin: Skin is warm and dry  She is not diaphoretic  Psychiatric: She has a normal mood and affect  Her behavior is normal  Judgment and thought content normal    Nursing note and vitals reviewed

## 2019-08-05 ENCOUNTER — OFFICE VISIT (OUTPATIENT)
Dept: FAMILY MEDICINE CLINIC | Facility: CLINIC | Age: 69
End: 2019-08-05
Payer: COMMERCIAL

## 2019-08-05 VITALS
HEART RATE: 75 BPM | WEIGHT: 154 LBS | SYSTOLIC BLOOD PRESSURE: 118 MMHG | HEIGHT: 62 IN | TEMPERATURE: 98.8 F | DIASTOLIC BLOOD PRESSURE: 74 MMHG | OXYGEN SATURATION: 98 % | BODY MASS INDEX: 28.34 KG/M2

## 2019-08-05 DIAGNOSIS — R11.2 NAUSEA AND VOMITING, INTRACTABILITY OF VOMITING NOT SPECIFIED, UNSPECIFIED VOMITING TYPE: Primary | ICD-10-CM

## 2019-08-05 DIAGNOSIS — I50.31 ACUTE DIASTOLIC CHF (CONGESTIVE HEART FAILURE) (HCC): ICD-10-CM

## 2019-08-05 PROCEDURE — 99213 OFFICE O/P EST LOW 20 MIN: CPT | Performed by: FAMILY MEDICINE

## 2019-08-05 PROCEDURE — 1036F TOBACCO NON-USER: CPT | Performed by: FAMILY MEDICINE

## 2019-08-05 PROCEDURE — 1160F RVW MEDS BY RX/DR IN RCRD: CPT | Performed by: FAMILY MEDICINE

## 2019-08-05 PROCEDURE — 3725F SCREEN DEPRESSION PERFORMED: CPT | Performed by: FAMILY MEDICINE

## 2019-08-05 PROCEDURE — 3008F BODY MASS INDEX DOCD: CPT | Performed by: FAMILY MEDICINE

## 2019-08-05 NOTE — PROGRESS NOTES
Assessment/Plan:    No problem-specific Assessment & Plan notes found for this encounter  There are no diagnoses linked to this encounter  PHQ-9 Depression Screening    PHQ-9:    Frequency of the following problems over the past two weeks:       Little interest or pleasure in doing things:  0 - not at all  Feeling down, depressed, or hopeless:  0 - not at all  PHQ-2 Score:  0            Subjective:      Patient ID: Duane Vásquez is a 81 y o  female  2 week follow up for nausea and vomiting pt was placed on zofran, pt no longer has nausea or vomiting, pt did  Go to the ER for chest pain but had a negative workup, pt still has SOB, pt did see the pulmonologist who thinks her pulmonary artry is "swollen" and she is set up for a right heart cath upcoming      The following portions of the patient's history were reviewed and updated as appropriate: allergies, current medications, past family history, past medical history, past social history, past surgical history and problem list     Review of Systems   Constitutional: Negative for chills and fever  Respiratory: Positive for shortness of breath  Negative for wheezing  Cardiovascular: Negative for chest pain and palpitations  Gastrointestinal: Negative for abdominal pain, nausea and vomiting  Objective:    /74   Pulse 75   Temp 98 8 °F (37 1 °C) (Tympanic)   Ht 5' 2" (1 575 m)   Wt 69 9 kg (154 lb)   SpO2 98%   BMI 28 17 kg/m²      Physical Exam   Constitutional: She is oriented to person, place, and time  She appears well-developed and well-nourished  No distress  HENT:   Head: Normocephalic and atraumatic  Eyes: Pupils are equal, round, and reactive to light  Conjunctivae and EOM are normal  No scleral icterus  Neck: Normal range of motion  Neck supple  Cardiovascular: Normal rate, regular rhythm and normal heart sounds  No murmur heard    Pulmonary/Chest: Effort normal and breath sounds normal  No respiratory distress  She has no wheezes  She has no rales  Abdominal: Soft  Bowel sounds are normal  She exhibits no distension and no mass  There is no tenderness  There is no rebound and no guarding  Musculoskeletal: She exhibits no edema  Lymphadenopathy:     She has no cervical adenopathy  Neurological: She is alert and oriented to person, place, and time  Skin: Skin is warm and dry  She is not diaphoretic  Psychiatric: She has a normal mood and affect  Her behavior is normal  Judgment and thought content normal    Nursing note and vitals reviewed

## 2019-08-07 PROBLEM — I50.31 ACUTE DIASTOLIC CHF (CONGESTIVE HEART FAILURE) (HCC): Status: ACTIVE | Noted: 2019-08-07

## 2019-08-12 ENCOUNTER — TRANSCRIBE ORDERS (OUTPATIENT)
Dept: LAB | Facility: CLINIC | Age: 69
End: 2019-08-12

## 2019-08-12 ENCOUNTER — APPOINTMENT (OUTPATIENT)
Dept: LAB | Facility: CLINIC | Age: 69
End: 2019-08-12
Payer: COMMERCIAL

## 2019-08-12 DIAGNOSIS — M06.00 SERONEGATIVE RHEUMATOID ARTHRITIS (HCC): ICD-10-CM

## 2019-08-12 DIAGNOSIS — M06.00 SERONEGATIVE RHEUMATOID ARTHRITIS (HCC): Primary | ICD-10-CM

## 2019-08-12 LAB
ALBUMIN SERPL BCP-MCNC: 3.7 G/DL (ref 3.5–5)
ALP SERPL-CCNC: 84 U/L (ref 46–116)
ALT SERPL W P-5'-P-CCNC: 37 U/L (ref 12–78)
ANION GAP SERPL CALCULATED.3IONS-SCNC: 8 MMOL/L (ref 4–13)
AST SERPL W P-5'-P-CCNC: 34 U/L (ref 5–45)
BASOPHILS # BLD AUTO: 0.02 THOUSANDS/ΜL (ref 0–0.1)
BASOPHILS NFR BLD AUTO: 0 % (ref 0–1)
BILIRUB SERPL-MCNC: 0.67 MG/DL (ref 0.2–1)
BUN SERPL-MCNC: 13 MG/DL (ref 5–25)
CALCIUM ALBUM COR SERPL-MCNC: 10.5 MG/DL (ref 8.3–10.1)
CALCIUM SERPL-MCNC: 10.3 MG/DL (ref 8.3–10.1)
CHLORIDE SERPL-SCNC: 110 MMOL/L (ref 100–108)
CO2 SERPL-SCNC: 26 MMOL/L (ref 21–32)
CREAT SERPL-MCNC: 0.51 MG/DL (ref 0.6–1.3)
CRP SERPL QL: 60 MG/L
EOSINOPHIL # BLD AUTO: 0.17 THOUSAND/ΜL (ref 0–0.61)
EOSINOPHIL NFR BLD AUTO: 3 % (ref 0–6)
ERYTHROCYTE [DISTWIDTH] IN BLOOD BY AUTOMATED COUNT: 14.6 % (ref 11.6–15.1)
ERYTHROCYTE [SEDIMENTATION RATE] IN BLOOD: 40 MM/HOUR (ref 0–20)
GFR SERPL CREATININE-BSD FRML MDRD: 100 ML/MIN/1.73SQ M
GLUCOSE SERPL-MCNC: 81 MG/DL (ref 65–140)
HCT VFR BLD AUTO: 40.3 % (ref 34.8–46.1)
HGB BLD-MCNC: 12.3 G/DL (ref 11.5–15.4)
IMM GRANULOCYTES # BLD AUTO: 0.02 THOUSAND/UL (ref 0–0.2)
IMM GRANULOCYTES NFR BLD AUTO: 0 % (ref 0–2)
LYMPHOCYTES # BLD AUTO: 1.31 THOUSANDS/ΜL (ref 0.6–4.47)
LYMPHOCYTES NFR BLD AUTO: 20 % (ref 14–44)
MCH RBC QN AUTO: 28 PG (ref 26.8–34.3)
MCHC RBC AUTO-ENTMCNC: 30.5 G/DL (ref 31.4–37.4)
MCV RBC AUTO: 92 FL (ref 82–98)
MONOCYTES # BLD AUTO: 0.57 THOUSAND/ΜL (ref 0.17–1.22)
MONOCYTES NFR BLD AUTO: 9 % (ref 4–12)
NEUTROPHILS # BLD AUTO: 4.41 THOUSANDS/ΜL (ref 1.85–7.62)
NEUTS SEG NFR BLD AUTO: 68 % (ref 43–75)
NRBC BLD AUTO-RTO: 0 /100 WBCS
PLATELET # BLD AUTO: 288 THOUSANDS/UL (ref 149–390)
PMV BLD AUTO: 10.5 FL (ref 8.9–12.7)
POTASSIUM SERPL-SCNC: 4 MMOL/L (ref 3.5–5.3)
PROT SERPL-MCNC: 7.5 G/DL (ref 6.4–8.2)
RBC # BLD AUTO: 4.4 MILLION/UL (ref 3.81–5.12)
SODIUM SERPL-SCNC: 144 MMOL/L (ref 136–145)
WBC # BLD AUTO: 6.5 THOUSAND/UL (ref 4.31–10.16)

## 2019-08-12 PROCEDURE — 85652 RBC SED RATE AUTOMATED: CPT

## 2019-08-12 PROCEDURE — 36415 COLL VENOUS BLD VENIPUNCTURE: CPT

## 2019-08-12 PROCEDURE — 85025 COMPLETE CBC W/AUTO DIFF WBC: CPT

## 2019-08-12 PROCEDURE — 86140 C-REACTIVE PROTEIN: CPT

## 2019-08-12 PROCEDURE — 80053 COMPREHEN METABOLIC PANEL: CPT

## 2019-08-13 ENCOUNTER — APPOINTMENT (OUTPATIENT)
Dept: LAB | Facility: CLINIC | Age: 69
End: 2019-08-13
Payer: COMMERCIAL

## 2019-08-13 LAB
BACTERIA UR QL AUTO: ABNORMAL /HPF
BILIRUB UR QL STRIP: NEGATIVE
CAOX CRY URNS QL MICRO: ABNORMAL /HPF
CLARITY UR: ABNORMAL
COLOR UR: ABNORMAL
GLUCOSE UR STRIP-MCNC: NEGATIVE MG/DL
HGB UR QL STRIP.AUTO: NEGATIVE
KETONES UR STRIP-MCNC: NEGATIVE MG/DL
LEUKOCYTE ESTERASE UR QL STRIP: ABNORMAL
MUCOUS THREADS UR QL AUTO: ABNORMAL
NITRITE UR QL STRIP: NEGATIVE
NON-SQ EPI CELLS URNS QL MICRO: ABNORMAL /HPF
PH UR STRIP.AUTO: 5.5 [PH]
PROT UR STRIP-MCNC: NEGATIVE MG/DL
RBC #/AREA URNS AUTO: ABNORMAL /HPF
SP GR UR STRIP.AUTO: 1.02 (ref 1–1.03)
UROBILINOGEN UR QL STRIP.AUTO: 1 E.U./DL
WBC #/AREA URNS AUTO: ABNORMAL /HPF

## 2019-08-13 PROCEDURE — 81001 URINALYSIS AUTO W/SCOPE: CPT

## 2019-09-30 ENCOUNTER — TELEPHONE (OUTPATIENT)
Dept: ENDOCRINOLOGY | Facility: HOSPITAL | Age: 69
End: 2019-09-30

## 2019-09-30 NOTE — TELEPHONE ENCOUNTER
Pt has appt with you on 11/22  She left message asking if you want her to get any labs done before that visit?

## 2019-10-01 DIAGNOSIS — E55.9 VITAMIN D INSUFFICIENCY: ICD-10-CM

## 2019-10-01 DIAGNOSIS — E03.9 ACQUIRED HYPOTHYROIDISM: Primary | Chronic | ICD-10-CM

## 2019-10-01 DIAGNOSIS — E78.00 HYPERCHOLESTEROLEMIA: ICD-10-CM

## 2019-10-01 NOTE — TELEPHONE ENCOUNTER
Can we set her up with a TSH, free T3, free T4, comprehensive metabolic panel and 25 hydroxy vitamin-D level? Please

## 2019-10-09 ENCOUNTER — APPOINTMENT (OUTPATIENT)
Dept: RADIOLOGY | Facility: CLINIC | Age: 69
End: 2019-10-09
Payer: COMMERCIAL

## 2019-10-09 ENCOUNTER — OFFICE VISIT (OUTPATIENT)
Dept: FAMILY MEDICINE CLINIC | Facility: CLINIC | Age: 69
End: 2019-10-09
Payer: COMMERCIAL

## 2019-10-09 VITALS
BODY MASS INDEX: 28.19 KG/M2 | DIASTOLIC BLOOD PRESSURE: 82 MMHG | TEMPERATURE: 99.4 F | SYSTOLIC BLOOD PRESSURE: 128 MMHG | WEIGHT: 153.2 LBS | OXYGEN SATURATION: 94 % | HEIGHT: 62 IN | HEART RATE: 90 BPM

## 2019-10-09 DIAGNOSIS — M79.641 RIGHT HAND PAIN: ICD-10-CM

## 2019-10-09 DIAGNOSIS — Z23 NEED FOR VACCINATION: ICD-10-CM

## 2019-10-09 DIAGNOSIS — R06.02 SOB (SHORTNESS OF BREATH): Primary | ICD-10-CM

## 2019-10-09 DIAGNOSIS — Z13.31 NEGATIVE DEPRESSION SCREENING: ICD-10-CM

## 2019-10-09 PROCEDURE — G0009 ADMIN PNEUMOCOCCAL VACCINE: HCPCS | Performed by: FAMILY MEDICINE

## 2019-10-09 PROCEDURE — 99213 OFFICE O/P EST LOW 20 MIN: CPT | Performed by: FAMILY MEDICINE

## 2019-10-09 PROCEDURE — 3008F BODY MASS INDEX DOCD: CPT | Performed by: FAMILY MEDICINE

## 2019-10-09 PROCEDURE — 73130 X-RAY EXAM OF HAND: CPT

## 2019-10-09 PROCEDURE — 90670 PCV13 VACCINE IM: CPT | Performed by: FAMILY MEDICINE

## 2019-10-09 NOTE — PROGRESS NOTES
Assessment/Plan:    No problem-specific Assessment & Plan notes found for this encounter  Diagnoses and all orders for this visit:    SOB (shortness of breath)  Comments:  per pulmonology    Need for vaccination  -     PNEUMOCOCCAL CONJUGATE VACCINE 13-VALENT GREATER THAN 6 MONTHS    Right hand pain  -     XR hand 3+ vw right; Future    Negative depression screening          PHQ-9 Depression Screening    PHQ-9:    Frequency of the following problems over the past two weeks:       Little interest or pleasure in doing things:  0 - not at all  Feeling down, depressed, or hopeless:  0 - not at all  PHQ-2 Score:  0            Subjective:      Patient ID: Nyla John is a 81 y o  female  Pt was found to have nodules in her right lung on CT, pt had a subsequent CT which showed the nodule has shrunk pt had a fall 3 weeks ago and has right hand pain    Shortness of Breath   This is a new problem  The current episode started 1 to 4 weeks ago  The problem occurs intermittently  The problem has been waxing and waning  Pertinent negatives include no chest pain, fever or wheezing  The patient has no known risk factors for DVT/PE  Treatments tried: pulmonary PT  The treatment provided moderate relief  The following portions of the patient's history were reviewed and updated as appropriate: allergies, current medications, past family history, past medical history, past social history, past surgical history and problem list     Review of Systems   Constitutional: Negative for chills and fever  Respiratory: Positive for shortness of breath  Negative for wheezing  Cardiovascular: Negative for chest pain and palpitations  Objective:    /82   Pulse 90   Temp 99 4 °F (37 4 °C) (Tympanic)   Ht 5' 2" (1 575 m)   Wt 69 5 kg (153 lb 3 2 oz)   SpO2 94%   BMI 28 02 kg/m²      Physical Exam   Constitutional: She is oriented to person, place, and time  She appears well-developed and well-nourished  No distress  HENT:   Head: Normocephalic and atraumatic  Right Ear: External ear normal    Left Ear: External ear normal    Nose: Nose normal    Mouth/Throat: Oropharynx is clear and moist  No oropharyngeal exudate  Eyes: Pupils are equal, round, and reactive to light  Conjunctivae and EOM are normal  No scleral icterus  Neck: Normal range of motion  Neck supple  Cardiovascular: Normal rate, regular rhythm and normal heart sounds  No murmur heard  Pulmonary/Chest: Effort normal and breath sounds normal  No respiratory distress  She has no wheezes  She has no rales  Musculoskeletal: She exhibits no edema  Lymphadenopathy:     She has no cervical adenopathy  Neurological: She is alert and oriented to person, place, and time  Skin: Skin is warm and dry  She is not diaphoretic  Psychiatric: She has a normal mood and affect  Her behavior is normal  Judgment and thought content normal    Nursing note and vitals reviewed

## 2019-10-18 ENCOUNTER — TRANSCRIBE ORDERS (OUTPATIENT)
Dept: ADMINISTRATIVE | Facility: HOSPITAL | Age: 69
End: 2019-10-18

## 2019-10-18 DIAGNOSIS — J98.4 DISEASE OF LUNG: Primary | ICD-10-CM

## 2019-10-24 ENCOUNTER — HOSPITAL ENCOUNTER (OUTPATIENT)
Dept: PULMONOLOGY | Facility: HOSPITAL | Age: 69
Discharge: HOME | End: 2019-10-24
Payer: COMMERCIAL

## 2019-10-24 DIAGNOSIS — J98.4 DISEASE OF LUNG: ICD-10-CM

## 2019-10-24 PROCEDURE — 94729 DIFFUSING CAPACITY: CPT | Performed by: INTERNAL MEDICINE

## 2019-10-24 PROCEDURE — 94726 PLETHYSMOGRAPHY LUNG VOLUMES: CPT | Performed by: INTERNAL MEDICINE

## 2019-10-24 PROCEDURE — 94060 EVALUATION OF WHEEZING: CPT

## 2019-10-24 PROCEDURE — 94729 DIFFUSING CAPACITY: CPT

## 2019-10-24 PROCEDURE — 94060 EVALUATION OF WHEEZING: CPT | Performed by: INTERNAL MEDICINE

## 2019-10-24 PROCEDURE — 94010 BREATHING CAPACITY TEST: CPT

## 2019-10-24 PROCEDURE — 94760 N-INVAS EAR/PLS OXIMETRY 1: CPT

## 2019-10-24 RX ORDER — ALBUTEROL SULFATE 2.5 MG/3ML
2.5 SOLUTION RESPIRATORY (INHALATION) ONCE AS NEEDED
Status: COMPLETED | OUTPATIENT
Start: 2019-10-24 | End: 2019-10-24

## 2019-10-24 RX ADMIN — ALBUTEROL SULFATE 2.5 MG: 2.5 SOLUTION RESPIRATORY (INHALATION) at 09:24

## 2019-11-06 ENCOUNTER — LAB (OUTPATIENT)
Dept: LAB | Facility: CLINIC | Age: 69
End: 2019-11-06
Payer: COMMERCIAL

## 2019-11-06 DIAGNOSIS — E78.00 HYPERCHOLESTEROLEMIA: ICD-10-CM

## 2019-11-06 DIAGNOSIS — E03.9 ACQUIRED HYPOTHYROIDISM: Chronic | ICD-10-CM

## 2019-11-06 DIAGNOSIS — E55.9 VITAMIN D INSUFFICIENCY: ICD-10-CM

## 2019-11-06 DIAGNOSIS — E03.9 HYPOTHYROIDISM, UNSPECIFIED TYPE: ICD-10-CM

## 2019-11-06 DIAGNOSIS — M35.3 POLYMYALGIA RHEUMATICA (HCC): Primary | ICD-10-CM

## 2019-11-06 LAB
25(OH)D3 SERPL-MCNC: 38.7 NG/ML (ref 30–100)
ALBUMIN SERPL BCP-MCNC: 3.4 G/DL (ref 3.5–5)
ALP SERPL-CCNC: 84 U/L (ref 46–116)
ALT SERPL W P-5'-P-CCNC: 19 U/L (ref 12–78)
ANION GAP SERPL CALCULATED.3IONS-SCNC: 5 MMOL/L (ref 4–13)
AST SERPL W P-5'-P-CCNC: 21 U/L (ref 5–45)
BASOPHILS # BLD AUTO: 0.03 THOUSANDS/ΜL (ref 0–0.1)
BASOPHILS NFR BLD AUTO: 1 % (ref 0–1)
BILIRUB SERPL-MCNC: 0.31 MG/DL (ref 0.2–1)
BILIRUB UR QL STRIP: NEGATIVE
BUN SERPL-MCNC: 17 MG/DL (ref 5–25)
CALCIUM SERPL-MCNC: 10 MG/DL (ref 8.3–10.1)
CHLORIDE SERPL-SCNC: 109 MMOL/L (ref 100–108)
CLARITY UR: NORMAL
CO2 SERPL-SCNC: 27 MMOL/L (ref 21–32)
COLOR UR: YELLOW
CREAT SERPL-MCNC: 0.63 MG/DL (ref 0.6–1.3)
EOSINOPHIL # BLD AUTO: 0.25 THOUSAND/ΜL (ref 0–0.61)
EOSINOPHIL NFR BLD AUTO: 5 % (ref 0–6)
ERYTHROCYTE [DISTWIDTH] IN BLOOD BY AUTOMATED COUNT: 14 % (ref 11.6–15.1)
ERYTHROCYTE [SEDIMENTATION RATE] IN BLOOD: 44 MM/HOUR (ref 0–20)
GFR SERPL CREATININE-BSD FRML MDRD: 93 ML/MIN/1.73SQ M
GLUCOSE SERPL-MCNC: 74 MG/DL (ref 65–140)
GLUCOSE UR STRIP-MCNC: NEGATIVE MG/DL
HCT VFR BLD AUTO: 40.1 % (ref 34.8–46.1)
HGB BLD-MCNC: 12.5 G/DL (ref 11.5–15.4)
HGB UR QL STRIP.AUTO: NEGATIVE
IMM GRANULOCYTES # BLD AUTO: 0.01 THOUSAND/UL (ref 0–0.2)
IMM GRANULOCYTES NFR BLD AUTO: 0 % (ref 0–2)
KETONES UR STRIP-MCNC: NEGATIVE MG/DL
LEUKOCYTE ESTERASE UR QL STRIP: NEGATIVE
LYMPHOCYTES # BLD AUTO: 1.44 THOUSANDS/ΜL (ref 0.6–4.47)
LYMPHOCYTES NFR BLD AUTO: 26 % (ref 14–44)
MCH RBC QN AUTO: 28.2 PG (ref 26.8–34.3)
MCHC RBC AUTO-ENTMCNC: 31.2 G/DL (ref 31.4–37.4)
MCV RBC AUTO: 90 FL (ref 82–98)
MONOCYTES # BLD AUTO: 0.32 THOUSAND/ΜL (ref 0.17–1.22)
MONOCYTES NFR BLD AUTO: 6 % (ref 4–12)
NEUTROPHILS # BLD AUTO: 3.42 THOUSANDS/ΜL (ref 1.85–7.62)
NEUTS SEG NFR BLD AUTO: 62 % (ref 43–75)
NITRITE UR QL STRIP: NEGATIVE
NRBC BLD AUTO-RTO: 0 /100 WBCS
PH UR STRIP.AUTO: 5.5 [PH]
PLATELET # BLD AUTO: 294 THOUSANDS/UL (ref 149–390)
PMV BLD AUTO: 9.3 FL (ref 8.9–12.7)
POTASSIUM SERPL-SCNC: 4.2 MMOL/L (ref 3.5–5.3)
PROT SERPL-MCNC: 7.3 G/DL (ref 6.4–8.2)
PROT UR STRIP-MCNC: NEGATIVE MG/DL
RBC # BLD AUTO: 4.44 MILLION/UL (ref 3.81–5.12)
SODIUM SERPL-SCNC: 141 MMOL/L (ref 136–145)
SP GR UR STRIP.AUTO: 1.03 (ref 1–1.03)
T3 SERPL-MCNC: 0.9 NG/ML (ref 0.6–1.8)
T4 FREE SERPL-MCNC: 1.16 NG/DL (ref 0.76–1.46)
TSH SERPL DL<=0.05 MIU/L-ACNC: 2.99 UIU/ML (ref 0.36–3.74)
UROBILINOGEN UR QL STRIP.AUTO: 0.2 E.U./DL
WBC # BLD AUTO: 5.47 THOUSAND/UL (ref 4.31–10.16)

## 2019-11-06 PROCEDURE — 86140 C-REACTIVE PROTEIN: CPT

## 2019-11-06 PROCEDURE — 84480 ASSAY TRIIODOTHYRONINE (T3): CPT

## 2019-11-06 PROCEDURE — 85652 RBC SED RATE AUTOMATED: CPT

## 2019-11-06 PROCEDURE — 84481 FREE ASSAY (FT-3): CPT

## 2019-11-06 PROCEDURE — 81003 URINALYSIS AUTO W/O SCOPE: CPT

## 2019-11-06 PROCEDURE — 84443 ASSAY THYROID STIM HORMONE: CPT

## 2019-11-06 PROCEDURE — 80053 COMPREHEN METABOLIC PANEL: CPT

## 2019-11-06 PROCEDURE — 85025 COMPLETE CBC W/AUTO DIFF WBC: CPT

## 2019-11-06 PROCEDURE — 36415 COLL VENOUS BLD VENIPUNCTURE: CPT

## 2019-11-06 PROCEDURE — 84439 ASSAY OF FREE THYROXINE: CPT

## 2019-11-06 PROCEDURE — 82306 VITAMIN D 25 HYDROXY: CPT

## 2019-11-07 LAB
CRP SERPL QL: 17.5 MG/L
T3FREE SERPL-MCNC: 1.95 PG/ML (ref 2.3–4.2)

## 2019-11-13 ENCOUNTER — TRANSCRIBE ORDERS (OUTPATIENT)
Dept: ADMINISTRATIVE | Facility: HOSPITAL | Age: 69
End: 2019-11-13

## 2019-11-13 DIAGNOSIS — Z12.31 ENCOUNTER FOR SCREENING MAMMOGRAM FOR BREAST CANCER: Primary | ICD-10-CM

## 2019-11-22 ENCOUNTER — OFFICE VISIT (OUTPATIENT)
Dept: ENDOCRINOLOGY | Facility: HOSPITAL | Age: 69
End: 2019-11-22
Payer: COMMERCIAL

## 2019-11-22 VITALS
DIASTOLIC BLOOD PRESSURE: 78 MMHG | HEIGHT: 62 IN | BODY MASS INDEX: 27.79 KG/M2 | HEART RATE: 66 BPM | SYSTOLIC BLOOD PRESSURE: 114 MMHG | WEIGHT: 151 LBS

## 2019-11-22 DIAGNOSIS — E03.9 HYPOTHYROIDISM, UNSPECIFIED TYPE: ICD-10-CM

## 2019-11-22 DIAGNOSIS — E04.1 THYROID NODULE: ICD-10-CM

## 2019-11-22 DIAGNOSIS — I10 ESSENTIAL HYPERTENSION: ICD-10-CM

## 2019-11-22 DIAGNOSIS — E78.00 HYPERCHOLESTEROLEMIA: ICD-10-CM

## 2019-11-22 DIAGNOSIS — E55.9 VITAMIN D INSUFFICIENCY: ICD-10-CM

## 2019-11-22 DIAGNOSIS — E03.9 ACQUIRED HYPOTHYROIDISM: Primary | ICD-10-CM

## 2019-11-22 PROCEDURE — 99214 OFFICE O/P EST MOD 30 MIN: CPT | Performed by: NURSE PRACTITIONER

## 2019-11-22 RX ORDER — LEVOTHYROXINE SODIUM 0.07 MG/1
TABLET ORAL
Qty: 30 TABLET | Refills: 7 | Status: SHIPPED | OUTPATIENT
Start: 2019-11-22 | End: 2020-07-17 | Stop reason: SDUPTHER

## 2019-11-22 RX ORDER — MINOCYCLINE HYDROCHLORIDE 50 MG/1
50 CAPSULE ORAL EVERY OTHER DAY
Refills: 5 | COMMUNITY
Start: 2019-11-13 | End: 2022-03-02

## 2019-11-22 NOTE — PATIENT INSTRUCTIONS
Continue Levothyroxine at 75 mcg daily      Check TSH and Free T4 in 6-8 weeks to reassess      Will make further changes to dose if needed based on updated lab work results      Continue with Vitamin D supplementation daily      You will be due for a repeat thyroid ultrasound in November 2019

## 2019-11-22 NOTE — PROGRESS NOTES
Hermilo Cronin 81 y o  female MRN: [id number]    Encounter: [id number]      Assessment/Plan     Assessment: This is a 81y o -year-old female with Hypothyroidism, hypertension and vitamin-D deficiency  Plan:  1   Hypothyroidism: Tirso Mendez most recent TSH is 2 990  She complains of some lingering fatigue  I have asked her to adjust her dose of levothyroxine to 75 mcg daily  Recheck her TSH and free T4 in 6 weeks to reassess   Goal for TSH is between 1 0 in 2 0   Further titration of her levothyroxine dose will take place after reviewing the updated lab work results  Goal for her TSH is between 1 and 2       2   Hypertension:  She is normotensive in the office today   Continue current medication      3   Vitamin-D deficiency:  Most recent vitamin-D 25 hydroxy level is normal at 38 7   Continue supplementation with 1000 units of vitamin D3 daily  CC:  Hypothyroidism follow-up    History of Present Illness     HPI:  56-year-old female with history of polymyalgia rheumatica, hypothyroidism, DVT, atrial fibrillation, hyperlipidemia presents for follow up of hypothyroidism  Has had hypothyroidism for many years treated on thyroid hormone replacement  Over the summer in June of 2017, she reports she was hospitalized for a pericardial effusion along with arrhythmia  At that time she received amiodarone and was  discharged home on amiodarone  She was on amiodarone until about September 2017  While on this medication her thyroid hormone requirements went up   Since then she reports cold intolerance and fatigue  Aminta Burgess is currently taking levothyroxine 75 mcg daily Monday through Saturday with a half tablet on Sunday   She is taking her levothyroxine, consistently, and in the proper manner, by her report   She takes her calcium and other vitamins later in the day  Tirso Mendez most recent TSH from November 6, 2019 was 2 990 with free T3 of 1 95 a  free  T4 of 1 16   She also has history of polymyalgia rheumatica and follows closely with her rheumatologist at [place] [address]  and is taking 1 mg total of prednisone per day       Her hypertension is treated with Lasix 20 mg daily and metoprolol 50 mg daily      For her vitamin-D deficiency she supplements with 1000 units of vitamin D3 daily  Her most recent 25 hydroxy vitamin-D level from 2019 is 38 7       Review of Systems   Constitutional: Positive for fatigue  Negative for chills and fever  HENT: Negative  Negative for trouble swallowing and voice change  Eyes: Negative  Negative for photophobia, pain, discharge, redness, itching and visual disturbance  Respiratory: Negative  Negative for chest tightness and shortness of breath  Cardiovascular: Negative  Negative for chest pain  Gastrointestinal: Negative  Negative for abdominal pain, constipation, diarrhea and vomiting  Endocrine: Positive for cold intolerance ( at times)  Negative for heat intolerance, polydipsia, polyphagia and polyuria  Genitourinary: Negative  Musculoskeletal: Positive for arthralgias ( bilateral knees) and myalgias (Polymyalgia rheumatica)  Skin: Negative  Allergic/Immunologic: Negative  Neurological: Negative  Negative for dizziness, syncope, light-headedness and headaches  Hematological: Negative  Psychiatric/Behavioral: Negative  All other systems reviewed and are negative        Historical Information   Past Medical History:   Diagnosis Date    Arthritis     Disease of thyroid gland     DVT (deep venous thrombosis) (HCC)     Lyme disease     Migraine     Polymyalgia rheumatica (HCC)     Pulmonary emboli (HCC)     Renal disorder     right sided kidney stones    Vitamin D deficiency      Past Surgical History:   Procedure Laterality Date    CARDIAC CATHETERIZATION       SECTION      x3 - last impression 16    FRACTURE SURGERY Left     wrist    HERNIA REPAIR  2018    KNEE CARTILAGE SURGERY Left     TONSILECTOMY AND ADNOIDECTOMY      VEIN SURGERY      venous ligation with stripping    WRIST SURGERY Left     ORIF wrist - last impression 5/27/16     Social History   Social History     Substance and Sexual Activity   Alcohol Use Yes    Frequency: Monthly or less    Comment: occasionally; social     Social History     Substance and Sexual Activity   Drug Use No     Social History     Tobacco Use   Smoking Status Never Smoker   Smokeless Tobacco Never Used     Family History:   Family History   Problem Relation Age of Onset    Breast cancer Mother     Aneurysm Father         aortic       Meds/Allergies   Current Outpatient Medications   Medication Sig Dispense Refill    Calcium Ascorbate 500 MG TABS Take 500 mg by mouth      cholecalciferol (VITAMIN D3) 1,000 units tablet Take 1,000 Units by mouth daily      Cinnamon 500 MG capsule Take 500 mg by mouth daily      Cyanocobalamin (VITAMIN B 12 PO) Take 500 mcg by mouth      folic acid (FOLVITE) 1 mg tablet Take 1 mg by mouth daily       levothyroxine 75 mcg tablet Take 1 tablet Monday through Saturday  (Patient taking differently: 75 mcg Take 1 tablet Monday through Saturday and half tablet on Sunday) 30 tablet 6    metoprolol succinate (TOPROL-XL) 50 mg 24 hr tablet TAKE 1 TABLET BY MOUTH ONCE DAILY 30 tablet 6    minocycline (MINOCIN) 50 mg capsule Take 50 mg by mouth daily  5    Misc Natural Products (OSTEO BI-FLEX JOINT SHIELD PO) Take by mouth      Multiple Vitamins-Minerals (OCUVITE ADULT 50+) CAPS Take 1 capsule by mouth daily      Red Yeast Rice 600 MG TABS Take 2 tablets by mouth daily      XARELTO 20 MG tablet Take 1 tablet by mouth daily      CALCIUM-VITAMIN D PO Take 1 tablet by mouth daily       No current facility-administered medications for this visit  Allergies   Allergen Reactions    Amiodarone      Other reaction(s):  Other (See Comments)  Reports caused elevated TSH    Sulfa Antibiotics Itching and Rash    Sulfamethoxazole-Trimethoprim Itching and Rash       Objective   Vitals: Blood pressure 114/78, pulse 66, height 5' 2" (1 575 m), weight 68 5 kg (151 lb)  Physical Exam   Constitutional: She is oriented to person, place, and time  She appears well-developed and well-nourished  Overweight   HENT:   Head: Normocephalic and atraumatic  Mouth/Throat: Oropharynx is clear and moist    Eyes: Pupils are equal, round, and reactive to light  Conjunctivae and EOM are normal    Neck: Normal range of motion  Neck supple  Cardiovascular: Normal rate, regular rhythm, normal heart sounds and intact distal pulses  Pulmonary/Chest: Effort normal and breath sounds normal    Abdominal: Soft  Bowel sounds are normal    Musculoskeletal: Normal range of motion  Neurological: She is alert and oriented to person, place, and time  Skin: Skin is warm and dry  Dry skin   Psychiatric: She has a normal mood and affect  Her behavior is normal  Judgment and thought content normal    Vitals reviewed  Lab Results:   Lab Results   Component Value Date/Time    TSH 3RD GENERATON 2 990 11/06/2019 09:45 AM    TSH 3RD GENERATON 0 664 07/12/2019 08:20 AM    TSH 3RD GENERATON 0 372 06/27/2019 09:39 AM    Free T4 1 16 11/06/2019 09:45 AM    Free T4 1 43 07/12/2019 08:20 AM    Free T4 1 20 05/06/2019 10:02 AM       Imaging Studies:   Results for orders placed during the hospital encounter of 11/06/18   US thyroid    Impression 1  Heterogeneous thyroid with no significant change in left mid nodule  Continued ultrasound may be considered in 12 months  Reference: ACR Thyroid Imaging, Reporting and Data System (TI-RADS): White Paper of the [employer]  J AM Cherelle Radiol 7228;23:603-758  (additional recommendations based on American Thyroid Association 2015 guidelines )      Workstation performed: [id number]         Portions of the record may have been created with voice recognition software   Occasional wrong word or "sound a like" substitutions may have occurred due to the inherent limitations of voice recognition software  Read the chart carefully and recognize, using context, where substitutions have occurred

## 2019-12-02 ENCOUNTER — HOSPITAL ENCOUNTER (OUTPATIENT)
Dept: ULTRASOUND IMAGING | Facility: HOSPITAL | Age: 69
Discharge: HOME | End: 2019-12-02
Payer: COMMERCIAL

## 2019-12-02 DIAGNOSIS — E03.9 ACQUIRED HYPOTHYROIDISM: ICD-10-CM

## 2019-12-02 DIAGNOSIS — E04.1 THYROID NODULE: ICD-10-CM

## 2019-12-02 PROCEDURE — 76536 US EXAM OF HEAD AND NECK: CPT

## 2019-12-04 NOTE — RESULT ENCOUNTER NOTE
Please call the patient regarding result  Ultrasound of the thyroid reveals a small thyroid gland with a small left-sided nodule that does not meet criteria for biopsy  Will follow up with another ultrasound in surveillance in 1 year

## 2019-12-06 ENCOUNTER — OFFICE VISIT (OUTPATIENT)
Dept: URGENT CARE | Facility: CLINIC | Age: 69
End: 2019-12-06
Payer: COMMERCIAL

## 2019-12-06 ENCOUNTER — APPOINTMENT (OUTPATIENT)
Dept: RADIOLOGY | Facility: CLINIC | Age: 69
End: 2019-12-06
Payer: COMMERCIAL

## 2019-12-06 VITALS
RESPIRATION RATE: 18 BRPM | OXYGEN SATURATION: 96 % | HEIGHT: 62 IN | SYSTOLIC BLOOD PRESSURE: 100 MMHG | BODY MASS INDEX: 27.79 KG/M2 | DIASTOLIC BLOOD PRESSURE: 70 MMHG | WEIGHT: 151 LBS | TEMPERATURE: 101.1 F | HEART RATE: 100 BPM

## 2019-12-06 DIAGNOSIS — R05.9 COUGH: ICD-10-CM

## 2019-12-06 DIAGNOSIS — R68.89 FLU-LIKE SYMPTOMS: Primary | ICD-10-CM

## 2019-12-06 LAB
FLUAV RNA NPH QL NAA+PROBE: NORMAL
FLUBV RNA NPH QL NAA+PROBE: NORMAL
RSV RNA NPH QL NAA+PROBE: NORMAL

## 2019-12-06 PROCEDURE — G0463 HOSPITAL OUTPT CLINIC VISIT: HCPCS | Performed by: PHYSICIAN ASSISTANT

## 2019-12-06 PROCEDURE — 87631 RESP VIRUS 3-5 TARGETS: CPT | Performed by: PHYSICIAN ASSISTANT

## 2019-12-06 PROCEDURE — 71046 X-RAY EXAM CHEST 2 VIEWS: CPT

## 2019-12-06 PROCEDURE — 99213 OFFICE O/P EST LOW 20 MIN: CPT | Performed by: PHYSICIAN ASSISTANT

## 2019-12-06 RX ORDER — AZITHROMYCIN 250 MG/1
TABLET, FILM COATED ORAL
Qty: 6 TABLET | Refills: 0 | Status: SHIPPED | OUTPATIENT
Start: 2019-12-06 | End: 2019-12-11

## 2019-12-06 RX ORDER — DOXYCYCLINE 100 MG/1
100 CAPSULE ORAL 2 TIMES DAILY
Qty: 14 CAPSULE | Refills: 0 | Status: SHIPPED | OUTPATIENT
Start: 2019-12-06 | End: 2019-12-06

## 2019-12-06 RX ORDER — OSELTAMIVIR PHOSPHATE 75 MG/1
75 CAPSULE ORAL 2 TIMES DAILY
Qty: 10 CAPSULE | Refills: 0 | Status: SHIPPED | OUTPATIENT
Start: 2019-12-06 | End: 2019-12-11

## 2019-12-06 NOTE — PATIENT INSTRUCTIONS
Infection appears viral   Recommend symptomatic treatment  Can take ibuprofen or tylenol as needed for pain or fever  Over the counter cough and cold medications to help with symptoms  Use salt water gargles for sore throat and throat lozenges  Cough drops as needed  Wash hands frequently to prevent the spread of infection  If not improving over the next 7-10 days, follow up with PCP  Symptoms may persist for 10-14 days  Flu like, will start on tamiflu  Flu swab sent to the lab  Questionable infiltrate LLL will treat with doxycycline  Hydrate  Go to the emergency room if symptoms are worsening or if you are short of breath

## 2019-12-06 NOTE — PROGRESS NOTES
[facility]    NAME: Fabiola Mcdaniel is a 81 y o  female  : 1950    MRN: [id number]  DATE: 2019  TIME: 12:39 PM    Assessment and Plan   Flu-like symptoms [R68 89]  1  Flu-like symptoms  Influenza A/B and RSV by PCR    oseltamivir (TAMIFLU) 75 mg capsule   2  Cough  XR chest pa & lateral    doxycycline monohydrate (MONODOX) 100 mg capsule       Patient Instructions   Patient Instructions   Infection appears viral   Recommend symptomatic treatment  Can take ibuprofen or tylenol as needed for pain or fever  Over the counter cough and cold medications to help with symptoms  Use salt water gargles for sore throat and throat lozenges  Cough drops as needed  Wash hands frequently to prevent the spread of infection  If not improving over the next 7-10 days, follow up with PCP  Symptoms may persist for 10-14 days  Flu like, will start on tamiflu  Flu swab sent to the lab  Questionable infiltrate LLL will treat with doxycycline  Hydrate  Go to the emergency room if symptoms are worsening or if you are short of breath  Chief Complaint     Chief Complaint   Patient presents with    Cold Like Symptoms     C/O weakness, body aches, nausea, dry cough, sore throat and fever x 2 days  Pt did not have the flu vaccine  History of Present Illness   51-year-old female here with complaint of cough, congestion, fever and upset stomach for the last 2 days  Has myalgias and body aches  Very fatigued and tired  Feels like sleeping all the time  Patient does have a history of restrictive lung disease  Has not gotten her flu shot  Review of Systems   Review of Systems   Constitutional: Positive for appetite change, chills and fever  HENT: Positive for congestion and sore throat  Negative for ear discharge, ear pain, facial swelling, postnasal drip, sinus pressure and sneezing  Respiratory: Positive for cough  Negative for shortness of breath and wheezing  Gastrointestinal: Positive for nausea  Negative for abdominal pain and vomiting  Musculoskeletal: Positive for myalgias  Neurological: Positive for headaches         Current Medications     Current Outpatient Medications:     Calcium Ascorbate 500 MG TABS, Take 500 mg by mouth, Disp: , Rfl:     CALCIUM-VITAMIN D PO, Take 1 tablet by mouth daily, Disp: , Rfl:     cholecalciferol (VITAMIN D3) 1,000 units tablet, Take 1,000 Units by mouth daily, Disp: , Rfl:     Cinnamon 500 MG capsule, Take 500 mg by mouth daily, Disp: , Rfl:     Cyanocobalamin (VITAMIN B 12 PO), Take 500 mcg by mouth, Disp: , Rfl:     doxycycline monohydrate (MONODOX) 100 mg capsule, Take 1 capsule (100 mg total) by mouth 2 (two) times a day for 7 days, Disp: 14 capsule, Rfl: 0    folic acid (FOLVITE) 1 mg tablet, Take 1 mg by mouth daily , Disp: , Rfl:     levothyroxine 75 mcg tablet, Take 1 tablet daily, Disp: 30 tablet, Rfl: 7    metoprolol succinate (TOPROL-XL) 50 mg 24 hr tablet, TAKE 1 TABLET BY MOUTH ONCE DAILY, Disp: 30 tablet, Rfl: 6    minocycline (MINOCIN) 50 mg capsule, Take 50 mg by mouth daily, Disp: , Rfl: 5    Misc Natural Products (OSTEO BI-FLEX JOINT SHIELD PO), Take by mouth, Disp: , Rfl:     Multiple Vitamins-Minerals (OCUVITE ADULT 50+) CAPS, Take 1 capsule by mouth daily, Disp: , Rfl:     oseltamivir (TAMIFLU) 75 mg capsule, Take 1 capsule (75 mg total) by mouth 2 (two) times a day for 5 days, Disp: 10 capsule, Rfl: 0    Red Yeast Rice 600 MG TABS, Take 2 tablets by mouth daily, Disp: , Rfl:     XARELTO 20 MG tablet, Take 1 tablet by mouth daily, Disp: , Rfl:     Current Allergies     Allergies as of 12/06/2019 - Reviewed 12/06/2019   Allergen Reaction Noted    Amiodarone  01/13/2018    Sulfa antibiotics Itching and Rash 10/24/2016    Sulfamethoxazole-trimethoprim Itching and Rash 05/27/2016          The following portions of the patient's history were reviewed and updated as appropriate: allergies, current medications, past family history, past medical history, past social history, past surgical history and problem list    Past Medical History:   Diagnosis Date    Arthritis     Atrial fibrillation ([facility] )     Disease of thyroid gland     DVT (deep venous thrombosis) ([facility] )     Lyme disease     Migraine     [facility]     Pulmonary emboli ([facility] )     Renal disorder     right sided kidney stones    Vitamin D deficiency      Past Surgical History:   Procedure Laterality Date    CARDIAC CATHETERIZATION       SECTION      x3 - last impression 16    FRACTURE SURGERY Left     wrist    HERNIA REPAIR  2018    KNEE CARTILAGE SURGERY Left     TONSILECTOMY AND ADNOIDECTOMY      VEIN SURGERY      venous ligation with stripping    WRIST SURGERY Left     ORIF wrist - last impression 16     Family History   Problem Relation Age of Onset    Breast cancer Mother     Aneurysm Father         aortic     Social History     Socioeconomic History    Marital status: /Civil Union     Spouse name: Not on file    Number of children: 3    Years of education: Not on file    Highest education level: Not on file   Occupational History    Occupation: Manager     Comment: [employer]   Social Needs    Financial resource strain: Not on file    Food insecurity:     Worry: Not on file     Inability: Not on file   Lost My Name needs:     Medical: Not on file     Non-medical: Not on file   Tobacco Use    Smoking status: Never Smoker    Smokeless tobacco: Never Used   Substance and Sexual Activity    Alcohol use: Yes     Frequency: Monthly or less     Comment: occasionally; social    Drug use: No    Sexual activity: Not on file   Lifestyle    Physical activity:     Days per week: Not on file     Minutes per session: Not on file    Stress: Not on file   Relationships    Social connections:     Talks on phone: Not on file     Gets together: Not on file     Attends Anabaptism service: Not on file     Active member of club or organization: Not on file     Attends meetings of clubs or organizations: Not on file     Relationship status: Not on file    Intimate partner violence:     Fear of current or ex partner: Not on file     Emotionally abused: Not on file     Physically abused: Not on file     Forced sexual activity: Not on file   Other Topics Concern    Not on file   Social History Narrative    Not on file     Medications have been verified  Objective   /70   Pulse 100   Temp (!) 101 1 °F (38 4 °C) (Tympanic)   Resp 18   Ht 5' 2" (1 575 m)   Wt 68 5 kg (151 lb)   SpO2 96%   BMI 27 62 kg/m²      Physical Exam   Physical Exam   Constitutional: She appears well-developed and well-nourished  No distress  HENT:   Head: Normocephalic and atraumatic  Right Ear: Tympanic membrane normal    Left Ear: Tympanic membrane normal    Nose: Mucosal edema present  Right sinus exhibits no maxillary sinus tenderness and no frontal sinus tenderness  Left sinus exhibits no maxillary sinus tenderness and no frontal sinus tenderness  Mouth/Throat: Posterior oropharyngeal erythema present  No oropharyngeal exudate or posterior oropharyngeal edema  Eyes: Conjunctivae are normal    Cardiovascular: Normal rate, regular rhythm and normal heart sounds  No murmur heard  Pulmonary/Chest: Effort normal  She has wheezes  Nursing note and vitals reviewed

## 2019-12-11 ENCOUNTER — HOSPITAL ENCOUNTER (OUTPATIENT)
Dept: MAMMOGRAPHY | Facility: HOSPITAL | Age: 69
Discharge: HOME | End: 2019-12-11
Payer: COMMERCIAL

## 2019-12-11 VITALS — BODY MASS INDEX: 27.79 KG/M2 | WEIGHT: 151 LBS | HEIGHT: 62 IN

## 2019-12-11 DIAGNOSIS — Z12.31 ENCOUNTER FOR SCREENING MAMMOGRAM FOR BREAST CANCER: ICD-10-CM

## 2019-12-11 PROCEDURE — 77067 SCR MAMMO BI INCL CAD: CPT

## 2019-12-11 PROCEDURE — 77063 BREAST TOMOSYNTHESIS BI: CPT

## 2019-12-14 ENCOUNTER — APPOINTMENT (EMERGENCY)
Dept: CT IMAGING | Facility: HOSPITAL | Age: 69
DRG: 308 | End: 2019-12-14
Payer: COMMERCIAL

## 2019-12-14 ENCOUNTER — HOSPITAL ENCOUNTER (INPATIENT)
Facility: HOSPITAL | Age: 69
LOS: 2 days | Discharge: HOME | DRG: 308 | End: 2019-12-17
Attending: EMERGENCY MEDICINE | Admitting: FAMILY MEDICINE
Payer: COMMERCIAL

## 2019-12-14 ENCOUNTER — APPOINTMENT (EMERGENCY)
Dept: RADIOLOGY | Facility: HOSPITAL | Age: 69
DRG: 308 | End: 2019-12-14
Payer: COMMERCIAL

## 2019-12-14 DIAGNOSIS — I48.92 ATRIAL FLUTTER WITH RAPID VENTRICULAR RESPONSE (HCC): Primary | ICD-10-CM

## 2019-12-14 DIAGNOSIS — I10 ESSENTIAL HYPERTENSION: ICD-10-CM

## 2019-12-14 LAB
ALBUMIN SERPL BCP-MCNC: 2.6 G/DL (ref 3.5–5)
ALP SERPL-CCNC: 65 U/L (ref 46–116)
ALT SERPL W P-5'-P-CCNC: 15 U/L (ref 12–78)
ANION GAP SERPL CALCULATED.3IONS-SCNC: 7 MMOL/L (ref 4–13)
APTT PPP: 35 SECONDS (ref 23–37)
AST SERPL W P-5'-P-CCNC: 13 U/L (ref 5–45)
BASOPHILS # BLD MANUAL: 0 THOUSAND/UL (ref 0–0.1)
BASOPHILS NFR MAR MANUAL: 0 % (ref 0–1)
BILIRUB SERPL-MCNC: 0.9 MG/DL (ref 0.2–1)
BUN SERPL-MCNC: 15 MG/DL (ref 5–25)
CALCIUM SERPL-MCNC: 9 MG/DL (ref 8.3–10.1)
CHLORIDE SERPL-SCNC: 107 MMOL/L (ref 100–108)
CK SERPL-CCNC: 13 U/L (ref 26–192)
CO2 SERPL-SCNC: 26 MMOL/L (ref 21–32)
CREAT SERPL-MCNC: 0.55 MG/DL (ref 0.6–1.3)
EOSINOPHIL # BLD MANUAL: 0.8 THOUSAND/UL (ref 0–0.4)
EOSINOPHIL NFR BLD MANUAL: 6 % (ref 0–6)
ERYTHROCYTE [DISTWIDTH] IN BLOOD BY AUTOMATED COUNT: 13.6 % (ref 11.6–15.1)
FLUAV RNA NPH QL NAA+PROBE: NORMAL
FLUBV RNA NPH QL NAA+PROBE: NORMAL
GFR SERPL CREATININE-BSD FRML MDRD: 97 ML/MIN/1.73SQ M
GLUCOSE SERPL-MCNC: 126 MG/DL (ref 65–140)
HCT VFR BLD AUTO: 39.4 % (ref 34.8–46.1)
HGB BLD-MCNC: 12.4 G/DL (ref 11.5–15.4)
INR PPP: 1.83 (ref 0.84–1.19)
LACTATE SERPL-SCNC: 1.5 MMOL/L (ref 0.5–2)
LIPASE SERPL-CCNC: 70 U/L (ref 73–393)
LYMPHOCYTES # BLD AUTO: 1.46 THOUSAND/UL (ref 0.6–4.47)
LYMPHOCYTES # BLD AUTO: 11 % (ref 14–44)
MAGNESIUM SERPL-MCNC: 1.7 MG/DL (ref 1.6–2.6)
MCH RBC QN AUTO: 28 PG (ref 26.8–34.3)
MCHC RBC AUTO-ENTMCNC: 31.5 G/DL (ref 31.4–37.4)
MCV RBC AUTO: 89 FL (ref 82–98)
MONOCYTES # BLD AUTO: 0.13 THOUSAND/UL (ref 0–1.22)
MONOCYTES NFR BLD: 1 % (ref 4–12)
NEUTROPHILS # BLD MANUAL: 10.9 THOUSAND/UL (ref 1.85–7.62)
NEUTS BAND NFR BLD MANUAL: 5 % (ref 0–8)
NEUTS SEG NFR BLD AUTO: 77 % (ref 43–75)
NRBC BLD AUTO-RTO: 0 /100 WBCS
NT-PROBNP SERPL-MCNC: 1961 PG/ML
PLATELET # BLD AUTO: 377 THOUSANDS/UL (ref 149–390)
PLATELET BLD QL SMEAR: ADEQUATE
PMV BLD AUTO: 8.4 FL (ref 8.9–12.7)
POTASSIUM SERPL-SCNC: 3.4 MMOL/L (ref 3.5–5.3)
PROT SERPL-MCNC: 5.9 G/DL (ref 6.4–8.2)
PROTHROMBIN TIME: 21.3 SECONDS (ref 11.6–14.5)
RBC # BLD AUTO: 4.43 MILLION/UL (ref 3.81–5.12)
RSV RNA NPH QL NAA+PROBE: NORMAL
SODIUM SERPL-SCNC: 140 MMOL/L (ref 136–145)
TOTAL CELLS COUNTED SPEC: 100
TROPONIN I SERPL-MCNC: <0.02 NG/ML
WBC # BLD AUTO: 13.29 THOUSAND/UL (ref 4.31–10.16)

## 2019-12-14 PROCEDURE — 74177 CT ABD & PELVIS W/CONTRAST: CPT

## 2019-12-14 PROCEDURE — 83605 ASSAY OF LACTIC ACID: CPT | Performed by: EMERGENCY MEDICINE

## 2019-12-14 PROCEDURE — 87631 RESP VIRUS 3-5 TARGETS: CPT | Performed by: EMERGENCY MEDICINE

## 2019-12-14 PROCEDURE — 96376 TX/PRO/DX INJ SAME DRUG ADON: CPT

## 2019-12-14 PROCEDURE — 85730 THROMBOPLASTIN TIME PARTIAL: CPT | Performed by: EMERGENCY MEDICINE

## 2019-12-14 PROCEDURE — 71275 CT ANGIOGRAPHY CHEST: CPT

## 2019-12-14 PROCEDURE — 99285 EMERGENCY DEPT VISIT HI MDM: CPT | Performed by: EMERGENCY MEDICINE

## 2019-12-14 PROCEDURE — 96365 THER/PROPH/DIAG IV INF INIT: CPT

## 2019-12-14 PROCEDURE — 83880 ASSAY OF NATRIURETIC PEPTIDE: CPT | Performed by: EMERGENCY MEDICINE

## 2019-12-14 PROCEDURE — 87040 BLOOD CULTURE FOR BACTERIA: CPT | Performed by: EMERGENCY MEDICINE

## 2019-12-14 PROCEDURE — 99285 EMERGENCY DEPT VISIT HI MDM: CPT

## 2019-12-14 PROCEDURE — 36415 COLL VENOUS BLD VENIPUNCTURE: CPT | Performed by: EMERGENCY MEDICINE

## 2019-12-14 PROCEDURE — 93005 ELECTROCARDIOGRAM TRACING: CPT

## 2019-12-14 PROCEDURE — 96361 HYDRATE IV INFUSION ADD-ON: CPT

## 2019-12-14 PROCEDURE — 85610 PROTHROMBIN TIME: CPT | Performed by: EMERGENCY MEDICINE

## 2019-12-14 PROCEDURE — 80053 COMPREHEN METABOLIC PANEL: CPT | Performed by: EMERGENCY MEDICINE

## 2019-12-14 PROCEDURE — 85007 BL SMEAR W/DIFF WBC COUNT: CPT | Performed by: EMERGENCY MEDICINE

## 2019-12-14 PROCEDURE — 82550 ASSAY OF CK (CPK): CPT | Performed by: EMERGENCY MEDICINE

## 2019-12-14 PROCEDURE — 84484 ASSAY OF TROPONIN QUANT: CPT | Performed by: EMERGENCY MEDICINE

## 2019-12-14 PROCEDURE — 83735 ASSAY OF MAGNESIUM: CPT | Performed by: EMERGENCY MEDICINE

## 2019-12-14 PROCEDURE — 85027 COMPLETE CBC AUTOMATED: CPT | Performed by: EMERGENCY MEDICINE

## 2019-12-14 PROCEDURE — 71046 X-RAY EXAM CHEST 2 VIEWS: CPT

## 2019-12-14 PROCEDURE — 96360 HYDRATION IV INFUSION INIT: CPT

## 2019-12-14 PROCEDURE — 96375 TX/PRO/DX INJ NEW DRUG ADDON: CPT

## 2019-12-14 PROCEDURE — 83690 ASSAY OF LIPASE: CPT | Performed by: EMERGENCY MEDICINE

## 2019-12-14 RX ORDER — ONDANSETRON 2 MG/ML
4 INJECTION INTRAMUSCULAR; INTRAVENOUS ONCE
Status: COMPLETED | OUTPATIENT
Start: 2019-12-14 | End: 2019-12-14

## 2019-12-14 RX ORDER — KETOROLAC TROMETHAMINE 30 MG/ML
15 INJECTION, SOLUTION INTRAMUSCULAR; INTRAVENOUS ONCE
Status: COMPLETED | OUTPATIENT
Start: 2019-12-14 | End: 2019-12-14

## 2019-12-14 RX ORDER — MORPHINE SULFATE 4 MG/ML
4 INJECTION, SOLUTION INTRAMUSCULAR; INTRAVENOUS ONCE
Status: COMPLETED | OUTPATIENT
Start: 2019-12-14 | End: 2019-12-14

## 2019-12-14 RX ORDER — MORPHINE SULFATE 4 MG/ML
4 INJECTION, SOLUTION INTRAMUSCULAR; INTRAVENOUS ONCE
Status: DISCONTINUED | OUTPATIENT
Start: 2019-12-14 | End: 2019-12-14

## 2019-12-14 RX ORDER — SODIUM CHLORIDE 9 MG/ML
3 INJECTION INTRAVENOUS AS NEEDED
Status: DISCONTINUED | OUTPATIENT
Start: 2019-12-14 | End: 2019-12-15

## 2019-12-14 RX ORDER — SODIUM CHLORIDE 9 MG/ML
250 INJECTION, SOLUTION INTRAVENOUS ONCE
Status: COMPLETED | OUTPATIENT
Start: 2019-12-15 | End: 2019-12-15

## 2019-12-14 RX ADMIN — MORPHINE SULFATE 4 MG: 4 INJECTION, SOLUTION INTRAMUSCULAR; INTRAVENOUS at 20:09

## 2019-12-14 RX ADMIN — SODIUM CHLORIDE 250 ML: 0.9 INJECTION, SOLUTION INTRAVENOUS at 20:17

## 2019-12-14 RX ADMIN — IOHEXOL 100 ML: 350 INJECTION, SOLUTION INTRAVENOUS at 21:39

## 2019-12-14 RX ADMIN — SODIUM CHLORIDE 250 ML/HR: 0.9 INJECTION, SOLUTION INTRAVENOUS at 23:54

## 2019-12-14 RX ADMIN — KETOROLAC TROMETHAMINE 15 MG: 30 INJECTION, SOLUTION INTRAMUSCULAR at 22:35

## 2019-12-14 RX ADMIN — MORPHINE SULFATE 4 MG: 4 INJECTION, SOLUTION INTRAMUSCULAR; INTRAVENOUS at 21:02

## 2019-12-14 RX ADMIN — SODIUM CHLORIDE 1000 ML: 0.9 INJECTION, SOLUTION INTRAVENOUS at 21:09

## 2019-12-14 RX ADMIN — ONDANSETRON 4 MG: 2 INJECTION INTRAMUSCULAR; INTRAVENOUS at 20:12

## 2019-12-14 RX ADMIN — Medication 2.5 MG/HR: at 23:18

## 2019-12-14 NOTE — LETTER
[facility]  [address] [place] [id number]  Dept: [phone]    December 17, 2019     Patient: Delisa Parsons   YOB: 1950   Date of Visit: 12/14/2019       To Whom it May Concern:    Chantel Quiroz is under my professional care  She was seen in the hospital from 12/14/2019   to 12/17/19  She pulmonary rehab without limitations on Thursday 12/19/19   If you have any questions or concerns, please don't hesitate to call           Sincerely,          Chaz Duvall PA-C

## 2019-12-15 PROBLEM — I48.92 ATRIAL FLUTTER WITH RAPID VENTRICULAR RESPONSE (HCC): Status: ACTIVE | Noted: 2019-12-15

## 2019-12-15 PROBLEM — I48.0 PAROXYSMAL ATRIAL FIBRILLATION (HCC): Status: ACTIVE | Noted: 2019-12-15

## 2019-12-15 PROBLEM — A41.9 SEPSIS, UNSPECIFIED ORGANISM (HCC): Status: ACTIVE | Noted: 2019-12-15

## 2019-12-15 LAB
ALBUMIN SERPL BCP-MCNC: 2.2 G/DL (ref 3.5–5)
ALP SERPL-CCNC: 60 U/L (ref 46–116)
ALT SERPL W P-5'-P-CCNC: 13 U/L (ref 12–78)
ANION GAP SERPL CALCULATED.3IONS-SCNC: 6 MMOL/L (ref 4–13)
AST SERPL W P-5'-P-CCNC: 15 U/L (ref 5–45)
BACTERIA UR QL AUTO: ABNORMAL /HPF
BASOPHILS # BLD AUTO: 0.02 THOUSANDS/ΜL (ref 0–0.1)
BASOPHILS NFR BLD AUTO: 0 % (ref 0–1)
BILIRUB SERPL-MCNC: 0.6 MG/DL (ref 0.2–1)
BILIRUB UR QL STRIP: NEGATIVE
BUN SERPL-MCNC: 16 MG/DL (ref 5–25)
CALCIUM SERPL-MCNC: 8.6 MG/DL (ref 8.3–10.1)
CHLORIDE SERPL-SCNC: 108 MMOL/L (ref 100–108)
CLARITY UR: CLEAR
CO2 SERPL-SCNC: 27 MMOL/L (ref 21–32)
COLOR UR: ABNORMAL
CREAT SERPL-MCNC: 0.49 MG/DL (ref 0.6–1.3)
EOSINOPHIL # BLD AUTO: 1.52 THOUSAND/ΜL (ref 0–0.61)
EOSINOPHIL NFR BLD AUTO: 13 % (ref 0–6)
ERYTHROCYTE [DISTWIDTH] IN BLOOD BY AUTOMATED COUNT: 13.8 % (ref 11.6–15.1)
GFR SERPL CREATININE-BSD FRML MDRD: 101 ML/MIN/1.73SQ M
GLUCOSE SERPL-MCNC: 91 MG/DL (ref 65–140)
GLUCOSE UR STRIP-MCNC: NEGATIVE MG/DL
HCT VFR BLD AUTO: 33.4 % (ref 34.8–46.1)
HGB BLD-MCNC: 10.3 G/DL (ref 11.5–15.4)
HGB UR QL STRIP.AUTO: NEGATIVE
IMM GRANULOCYTES # BLD AUTO: 0.04 THOUSAND/UL (ref 0–0.2)
IMM GRANULOCYTES NFR BLD AUTO: 0 % (ref 0–2)
KETONES UR STRIP-MCNC: NEGATIVE MG/DL
LEUKOCYTE ESTERASE UR QL STRIP: NEGATIVE
LYMPHOCYTES # BLD AUTO: 0.78 THOUSANDS/ΜL (ref 0.6–4.47)
LYMPHOCYTES NFR BLD AUTO: 7 % (ref 14–44)
MAGNESIUM SERPL-MCNC: 1.8 MG/DL (ref 1.6–2.6)
MCH RBC QN AUTO: 28 PG (ref 26.8–34.3)
MCHC RBC AUTO-ENTMCNC: 30.8 G/DL (ref 31.4–37.4)
MCV RBC AUTO: 91 FL (ref 82–98)
MONOCYTES # BLD AUTO: 0.24 THOUSAND/ΜL (ref 0.17–1.22)
MONOCYTES NFR BLD AUTO: 2 % (ref 4–12)
NEUTROPHILS # BLD AUTO: 9.34 THOUSANDS/ΜL (ref 1.85–7.62)
NEUTS SEG NFR BLD AUTO: 78 % (ref 43–75)
NITRITE UR QL STRIP: NEGATIVE
NON-SQ EPI CELLS URNS QL MICRO: ABNORMAL /HPF
NRBC BLD AUTO-RTO: 0 /100 WBCS
PH UR STRIP.AUTO: 5 [PH]
PHOSPHATE SERPL-MCNC: 2.9 MG/DL (ref 2.3–4.1)
PLATELET # BLD AUTO: 319 THOUSANDS/UL (ref 149–390)
PMV BLD AUTO: 8.5 FL (ref 8.9–12.7)
POTASSIUM SERPL-SCNC: 3.5 MMOL/L (ref 3.5–5.3)
PROCALCITONIN SERPL-MCNC: 0.17 NG/ML
PROT SERPL-MCNC: 5.3 G/DL (ref 6.4–8.2)
PROT UR STRIP-MCNC: ABNORMAL MG/DL
RBC # BLD AUTO: 3.68 MILLION/UL (ref 3.81–5.12)
RBC #/AREA URNS AUTO: ABNORMAL /HPF
SODIUM SERPL-SCNC: 141 MMOL/L (ref 136–145)
SP GR UR STRIP.AUTO: 1.01 (ref 1–1.03)
UROBILINOGEN UR QL STRIP.AUTO: 0.2 E.U./DL
WBC # BLD AUTO: 11.94 THOUSAND/UL (ref 4.31–10.16)
WBC #/AREA URNS AUTO: ABNORMAL /HPF

## 2019-12-15 PROCEDURE — G8988 SELF CARE GOAL STATUS: HCPCS

## 2019-12-15 PROCEDURE — 97162 PT EVAL MOD COMPLEX 30 MIN: CPT | Performed by: PHYSICAL THERAPIST

## 2019-12-15 PROCEDURE — 97166 OT EVAL MOD COMPLEX 45 MIN: CPT

## 2019-12-15 PROCEDURE — G8979 MOBILITY GOAL STATUS: HCPCS | Performed by: PHYSICAL THERAPIST

## 2019-12-15 PROCEDURE — 96361 HYDRATE IV INFUSION ADD-ON: CPT

## 2019-12-15 PROCEDURE — 84145 PROCALCITONIN (PCT): CPT | Performed by: FAMILY MEDICINE

## 2019-12-15 PROCEDURE — 80053 COMPREHEN METABOLIC PANEL: CPT | Performed by: FAMILY MEDICINE

## 2019-12-15 PROCEDURE — 84443 ASSAY THYROID STIM HORMONE: CPT | Performed by: PHYSICIAN ASSISTANT

## 2019-12-15 PROCEDURE — G8978 MOBILITY CURRENT STATUS: HCPCS | Performed by: PHYSICAL THERAPIST

## 2019-12-15 PROCEDURE — 96366 THER/PROPH/DIAG IV INF ADDON: CPT

## 2019-12-15 PROCEDURE — 99222 1ST HOSP IP/OBS MODERATE 55: CPT | Performed by: FAMILY MEDICINE

## 2019-12-15 PROCEDURE — 81001 URINALYSIS AUTO W/SCOPE: CPT | Performed by: EMERGENCY MEDICINE

## 2019-12-15 PROCEDURE — 84100 ASSAY OF PHOSPHORUS: CPT | Performed by: FAMILY MEDICINE

## 2019-12-15 PROCEDURE — 83735 ASSAY OF MAGNESIUM: CPT | Performed by: FAMILY MEDICINE

## 2019-12-15 PROCEDURE — 85025 COMPLETE CBC W/AUTO DIFF WBC: CPT | Performed by: FAMILY MEDICINE

## 2019-12-15 PROCEDURE — G8987 SELF CARE CURRENT STATUS: HCPCS

## 2019-12-15 RX ORDER — KETOROLAC TROMETHAMINE 30 MG/ML
15 INJECTION, SOLUTION INTRAMUSCULAR; INTRAVENOUS EVERY 6 HOURS PRN
Status: ACTIVE | OUTPATIENT
Start: 2019-12-15 | End: 2019-12-17

## 2019-12-15 RX ORDER — LEVOTHYROXINE SODIUM 0.07 MG/1
75 TABLET ORAL
Status: DISCONTINUED | OUTPATIENT
Start: 2019-12-15 | End: 2019-12-17 | Stop reason: HOSPADM

## 2019-12-15 RX ORDER — MAGNESIUM HYDROXIDE/ALUMINUM HYDROXICE/SIMETHICONE 120; 1200; 1200 MG/30ML; MG/30ML; MG/30ML
30 SUSPENSION ORAL EVERY 6 HOURS PRN
Status: DISCONTINUED | OUTPATIENT
Start: 2019-12-15 | End: 2019-12-17 | Stop reason: HOSPADM

## 2019-12-15 RX ORDER — CEFEPIME HYDROCHLORIDE 2 G/50ML
2000 INJECTION, SOLUTION INTRAVENOUS EVERY 12 HOURS
Status: DISCONTINUED | OUTPATIENT
Start: 2019-12-15 | End: 2019-12-17

## 2019-12-15 RX ORDER — ASCORBIC ACID 500 MG
500 TABLET ORAL DAILY
Status: DISCONTINUED | OUTPATIENT
Start: 2019-12-15 | End: 2019-12-17 | Stop reason: HOSPADM

## 2019-12-15 RX ORDER — B-COMPLEX WITH VITAMIN C
1 TABLET ORAL DAILY
Status: DISCONTINUED | OUTPATIENT
Start: 2019-12-15 | End: 2019-12-17 | Stop reason: HOSPADM

## 2019-12-15 RX ORDER — FOLIC ACID 1 MG/1
1 TABLET ORAL DAILY
Status: DISCONTINUED | OUTPATIENT
Start: 2019-12-15 | End: 2019-12-17 | Stop reason: HOSPADM

## 2019-12-15 RX ORDER — DOCUSATE SODIUM 100 MG/1
100 CAPSULE, LIQUID FILLED ORAL 2 TIMES DAILY PRN
Status: DISCONTINUED | OUTPATIENT
Start: 2019-12-15 | End: 2019-12-17 | Stop reason: HOSPADM

## 2019-12-15 RX ORDER — ACETAMINOPHEN 325 MG/1
650 TABLET ORAL EVERY 6 HOURS PRN
Status: DISCONTINUED | OUTPATIENT
Start: 2019-12-15 | End: 2019-12-17 | Stop reason: HOSPADM

## 2019-12-15 RX ORDER — KETOROLAC TROMETHAMINE 30 MG/ML
15 INJECTION, SOLUTION INTRAMUSCULAR; INTRAVENOUS ONCE
Status: COMPLETED | OUTPATIENT
Start: 2019-12-15 | End: 2019-12-15

## 2019-12-15 RX ORDER — MELATONIN
1000 DAILY
Status: DISCONTINUED | OUTPATIENT
Start: 2019-12-15 | End: 2019-12-17 | Stop reason: HOSPADM

## 2019-12-15 RX ORDER — ONDANSETRON 2 MG/ML
4 INJECTION INTRAMUSCULAR; INTRAVENOUS EVERY 6 HOURS PRN
Status: DISCONTINUED | OUTPATIENT
Start: 2019-12-15 | End: 2019-12-17 | Stop reason: HOSPADM

## 2019-12-15 RX ORDER — METHYLPREDNISOLONE SODIUM SUCCINATE 125 MG/2ML
125 INJECTION, POWDER, LYOPHILIZED, FOR SOLUTION INTRAMUSCULAR; INTRAVENOUS ONCE
Status: COMPLETED | OUTPATIENT
Start: 2019-12-15 | End: 2019-12-15

## 2019-12-15 RX ADMIN — OXYCODONE HYDROCHLORIDE AND ACETAMINOPHEN 500 MG: 500 TABLET ORAL at 10:48

## 2019-12-15 RX ADMIN — MULTIPLE VITAMINS W/ MINERALS TAB 1 TABLET: TAB at 10:48

## 2019-12-15 RX ADMIN — METHYLPREDNISOLONE SODIUM SUCCINATE 125 MG: 125 INJECTION, POWDER, FOR SOLUTION INTRAMUSCULAR; INTRAVENOUS at 07:50

## 2019-12-15 RX ADMIN — LEVOTHYROXINE SODIUM 75 MCG: 75 TABLET ORAL at 07:50

## 2019-12-15 RX ADMIN — FOLIC ACID 1 MG: 1 TABLET ORAL at 10:48

## 2019-12-15 RX ADMIN — METOPROLOL TARTRATE 25 MG: 25 TABLET, FILM COATED ORAL at 21:28

## 2019-12-15 RX ADMIN — VITAMIN D, TAB 1000IU (100/BT) 1000 UNITS: 25 TAB at 10:47

## 2019-12-15 RX ADMIN — SODIUM CHLORIDE 1000 ML: 0.9 INJECTION, SOLUTION INTRAVENOUS at 07:50

## 2019-12-15 RX ADMIN — Medication 1 TABLET: at 10:48

## 2019-12-15 RX ADMIN — CEFEPIME HYDROCHLORIDE 2000 MG: 2 INJECTION, SOLUTION INTRAVENOUS at 07:50

## 2019-12-15 RX ADMIN — METOPROLOL TARTRATE 25 MG: 25 TABLET, FILM COATED ORAL at 16:26

## 2019-12-15 RX ADMIN — SODIUM CHLORIDE 1000 ML: 0.9 INJECTION, SOLUTION INTRAVENOUS at 05:49

## 2019-12-15 RX ADMIN — CEFEPIME HYDROCHLORIDE 2000 MG: 2 INJECTION, SOLUTION INTRAVENOUS at 18:25

## 2019-12-15 RX ADMIN — CYANOCOBALAMIN TAB 500 MCG 500 MCG: 500 TAB at 10:48

## 2019-12-15 RX ADMIN — RIVAROXABAN 20 MG: 10 TABLET, FILM COATED ORAL at 10:47

## 2019-12-15 RX ADMIN — KETOROLAC TROMETHAMINE 15 MG: 30 INJECTION, SOLUTION INTRAMUSCULAR at 05:48

## 2019-12-15 RX ADMIN — ACETAMINOPHEN 650 MG: 325 TABLET, FILM COATED ORAL at 16:19

## 2019-12-15 NOTE — ED PROVIDER NOTES
History  Chief Complaint   Patient presents with    Flu Symptoms     pt has had flu like symptoms since last friday  was seen at urgent care 12/6, flu swab was negative  was put on tamaflu, felt better for a few days, symptoms now worse  HPI     Pt presents from home c/o fevers, generalized myalgias, arthralgias, generalized weakness, congestion and productive cough that has been worsening over the past 3 days  Pt had been treated 7 days ago by an Urgent Care for the presumed flu and bronchitis with tamiflu and zithromax  Pt states she felt better for a few days, but then she worsened again  She did take tylenol prior to coming to the ED tonight  Pt o/w denies any new meds or change in meds  Pt denies ha, cp, sob, n/v/d/c, abd pain, dysuria, focal def or syncope  Prior to Admission Medications   Prescriptions Last Dose Informant Patient Reported? Taking?    CALCIUM-VITAMIN D PO  Self Yes Yes   Sig: Take 1 tablet by mouth daily   Calcium Ascorbate 500 MG TABS Not Taking at Unknown time Self Yes No   Sig: Take 500 mg by mouth   Cinnamon 500 MG capsule  Self Yes Yes   Sig: Take 500 mg by mouth daily   Cyanocobalamin (VITAMIN B 12 PO)  Self Yes Yes   Sig: Take 500 mcg by mouth   Misc Natural Products (OSTEO BI-FLEX JOINT SHIELD PO)  Self Yes Yes   Sig: Take by mouth   Multiple Vitamins-Minerals (OCUVITE ADULT 50+) CAPS  Self Yes Yes   Sig: Take 1 capsule by mouth daily   Red Yeast Rice 600 MG TABS  Self Yes Yes   Sig: Take 2 tablets by mouth daily   XARELTO 20 MG tablet  Self Yes Yes   Sig: Take 1 tablet by mouth daily   cholecalciferol (VITAMIN D3) 1,000 units tablet  Self Yes Yes   Sig: Take 1,000 Units by mouth daily   folic acid (FOLVITE) 1 mg tablet  Self Yes Yes   Sig: Take 1 mg by mouth daily    levothyroxine 75 mcg tablet   No Yes   Sig: Take 1 tablet daily   metoprolol succinate (TOPROL-XL) 50 mg 24 hr tablet  Self No Yes   Sig: TAKE 1 TABLET BY MOUTH ONCE DAILY   minocycline (MINOCIN) 50 mg capsule Past Week at Unknown time Self Yes Yes   Sig: Take 50 mg by mouth daily      Facility-Administered Medications: None       Past Medical History:   Diagnosis Date    Arthritis     Atrial fibrillation ([address] )     Disease of thyroid gland     DVT (deep venous thrombosis) (HCC)     Lyme disease     Migraine     Polymyalgia rheumatica (HCC)     Pulmonary emboli (HCC)     Renal disorder     right sided kidney stones    Vitamin D deficiency        Past Surgical History:   Procedure Laterality Date    CARDIAC CATHETERIZATION      CARDIOVERSION N/A 2019    Procedure: CARDIOVERSION;  Surgeon: Alice Alvarado MD;  Location: [facility];  Service: Cardiology     SECTION      x3 - last impression 16    FRACTURE SURGERY Left     wrist    HERNIA REPAIR  2018    KNEE CARTILAGE SURGERY Left     TONSILECTOMY AND ADNOIDECTOMY      VEIN SURGERY      venous ligation with stripping    WRIST SURGERY Left     ORIF wrist - last impression 16       Family History   Problem Relation Age of Onset    Breast cancer Mother 46    Aneurysm Father         aortic    No Known Problems Sister     No Known Problems Maternal Grandmother     No Known Problems Maternal Grandfather     No Known Problems Paternal Grandmother     No Known Problems Paternal Grandfather     Rheum arthritis Maternal Aunt     Early death Maternal Aunt     No Known Problems Paternal Aunt     No Known Problems Paternal Aunt     No Known Problems Paternal Aunt      I have reviewed and agree with the history as documented  Social History     Tobacco Use    Smoking status: Never Smoker    Smokeless tobacco: Never Used   Substance Use Topics    Alcohol use: Yes     Frequency: Monthly or less     Comment: occasionally; social    Drug use: No        Review of Systems   Constitutional: Positive for activity change, fatigue and fever  Negative for appetite change and diaphoresis     HENT: Negative for congestion, facial swelling, mouth sores and trouble swallowing  Eyes: Negative for photophobia, discharge and visual disturbance  Respiratory: Positive for cough  Negative for apnea, shortness of breath and wheezing  Cardiovascular: Negative for chest pain and leg swelling  Gastrointestinal: Negative for abdominal pain, constipation, diarrhea, nausea and vomiting  Endocrine: Negative for heat intolerance and polydipsia  Genitourinary: Negative for dysuria, flank pain, frequency and hematuria  Musculoskeletal: Positive for arthralgias and myalgias  Negative for back pain, gait problem and neck pain  Skin: Negative for rash and wound  Allergic/Immunologic: Negative for immunocompromised state  Neurological: Positive for weakness  Negative for dizziness, syncope, light-headedness and headaches  Hematological: Negative for adenopathy  Psychiatric/Behavioral: Negative for agitation, confusion and self-injury  The patient is not nervous/anxious  Physical Exam  Physical Exam   Constitutional: She is oriented to person, place, and time  She appears well-developed and well-nourished  No distress  HENT:   Head: Normocephalic and atraumatic  Right Ear: External ear normal    Left Ear: External ear normal    Mouth/Throat: No oropharyngeal exudate  Nasal congestion and post-nasal drip   Eyes: Pupils are equal, round, and reactive to light  Conjunctivae and EOM are normal  Right eye exhibits no discharge  Left eye exhibits no discharge  No scleral icterus  Neck: Normal range of motion  Neck supple  No JVD present  No tracheal deviation present  No thyromegaly present  Cardiovascular: Regular rhythm and normal heart sounds  Tachycardia present  No murmur heard  Pulmonary/Chest: Effort normal and breath sounds normal  No stridor  No respiratory distress  She has no wheezes  She has no rales  She exhibits no tenderness  Abdominal: Soft  Bowel sounds are normal  She exhibits no distension and no mass  There is no tenderness  There is no rebound and no guarding  Musculoskeletal: Normal range of motion  She exhibits no edema, tenderness or deformity  Lymphadenopathy:     She has no cervical adenopathy  Neurological: She is alert and oriented to person, place, and time  She has normal reflexes  She displays normal reflexes  No cranial nerve deficit  She exhibits normal muscle tone  Coordination normal    Skin: Skin is warm and dry  No rash noted  She is not diaphoretic  No erythema  No pallor  Psychiatric: She has a normal mood and affect  Her behavior is normal  Judgment and thought content normal    Nursing note and vitals reviewed        Vital Signs  ED Triage Vitals [12/14/19 1859]   Temperature Pulse Respirations Blood Pressure SpO2   97 5 °F (36 4 °C) (!) 135 18 97/75 96 %      Temp Source Heart Rate Source Patient Position - Orthostatic VS BP Location FiO2 (%)   Temporal Monitor Lying Left arm --      Pain Score       9           Vitals:    12/17/19 0912 12/17/19 0915 12/17/19 0924 12/17/19 0930   BP: 117/62 111/69 110/63 108/63   Pulse: 72 74 79 74   Patient Position - Orthostatic VS:             Visual Acuity  Visual Acuity      Most Recent Value   L Pupil Size (mm)  3   R Pupil Size (mm)  3          ED Medications  Medications   ketorolac (TORADOL) injection 15 mg (has no administration in time range)   sodium chloride 0 9 % bolus 250 mL (0 mL Intravenous Stopped 12/14/19 2100)   ondansetron (ZOFRAN) injection 4 mg (4 mg Intravenous Given 12/14/19 2012)   morphine (PF) 4 mg/mL injection 4 mg (4 mg Intravenous Given 12/14/19 2009)   morphine (PF) 4 mg/mL injection 4 mg (4 mg Intravenous Given 12/14/19 2102)   iohexol (OMNIPAQUE) 350 MG/ML injection (SINGLE-DOSE) 100 mL (100 mL Intravenous Given 12/14/19 2139)   ketorolac (TORADOL) injection 15 mg (15 mg Intravenous Given 12/14/19 2235)   diltiazem (CARDIZEM) 125 mg in sodium chloride 0 9 % 125 mL infusion (10 mg/hr Intravenous Rate/Dose Change 12/15/19 0347)   sodium chloride 0 9 % infusion (0 mL/hr Intravenous Stopped 12/15/19 0155)   methylPREDNISolone sodium succinate (Solu-MEDROL) injection 125 mg (125 mg Intravenous Given 12/15/19 0750)   sodium chloride 0 9 % bolus 1,000 mL (1,000 mL Intravenous New Bag 12/15/19 0549)     Followed by   sodium chloride 0 9 % bolus 1,000 mL (0 mL Intravenous Stopped 12/15/19 1414)   ketorolac (TORADOL) injection 15 mg (15 mg Intravenous Given 12/15/19 0548)   influenza vaccine, high-dose (FLUZONE HIGH-DOSE) IM injection KELLY 0 5 mL (0 5 mL Intramuscular Given 12/16/19 1501)   diltiazem (CARDIZEM) injection 5 mg (5 mg Intravenous Given 12/16/19 0859)       Diagnostic Studies  Results Reviewed     Procedure Component Value Units Date/Time    Blood culture [[id number]] Collected:  12/14/19 2118    Lab Status:  Preliminary result Specimen:  Blood from Arm, Left Updated:  12/17/19 1402     Blood Culture No Growth at 48 hrs  Blood culture [[id number]] Collected:  12/14/19 2011    Lab Status:  Preliminary result Specimen:  Blood from Arm, Right Updated:  12/17/19 1402     Blood Culture No Growth at 48 hrs      Basic metabolic panel [[id number]]  (Abnormal) Collected:  12/17/19 0439    Lab Status:  Final result Specimen:  Blood from Arm, Left Updated:  12/17/19 0537     Sodium 143 mmol/L      Potassium 4 0 mmol/L      Chloride 110 mmol/L      CO2 27 mmol/L      ANION GAP 6 mmol/L      BUN 19 mg/dL      Creatinine 0 45 mg/dL      Glucose 93 mg/dL      Calcium 9 3 mg/dL      eGFR 104 ml/min/1 73sq m     Narrative:       [place] guidelines for Chronic Kidney Disease (CKD):     Stage 1 with normal or high GFR (GFR > 90 mL/min/1 73 square meters)    Stage 2 Mild CKD (GFR = 60-89 mL/min/1 73 square meters)    Stage 3A Moderate CKD (GFR = 45-59 mL/min/1 73 square meters)    Stage 3B Moderate CKD (GFR = 30-44 mL/min/1 73 square meters)    Stage 4 Severe CKD (GFR = 15-29 mL/min/1 73 square meters)   Stage 5 End Stage CKD (GFR <15 mL/min/1 73 square meters)  Note: GFR calculation is accurate only with a steady state creatinine    CBC and differential [[id number]]  (Abnormal) Collected:  12/17/19 0439    Lab Status:  Final result Specimen:  Blood from Arm, Left Updated:  12/17/19 0520     WBC 12 99 Thousand/uL      RBC 3 66 Million/uL      Hemoglobin 10 3 g/dL      Hematocrit 33 0 %      MCV 90 fL      MCH 28 1 pg      MCHC 31 2 g/dL      RDW 13 8 %      MPV 9 1 fL      Platelets 541 Thousands/uL      nRBC 0 /100 WBCs      Neutrophils Relative 77 %      Immat GRANS % 1 %      Lymphocytes Relative 16 %      Monocytes Relative 6 %      Eosinophils Relative 0 %      Basophils Relative 0 %      Neutrophils Absolute 10 14 Thousands/µL      Immature Grans Absolute 0 09 Thousand/uL      Lymphocytes Absolute 2 01 Thousands/µL      Monocytes Absolute 0 71 Thousand/µL      Eosinophils Absolute 0 03 Thousand/µL      Basophils Absolute 0 01 Thousands/µL     Procalcitonin [[id number]]  (Normal) Collected:  12/16/19 1111    Lab Status:  Final result Specimen:  Blood from Arm, Right Updated:  12/16/19 1639     Procalcitonin 0 15 ng/ml     Comprehensive metabolic panel [[id number]]  (Abnormal) Collected:  12/16/19 1111    Lab Status:  Final result Specimen:  Blood from Arm, Right Updated:  12/16/19 1133     Sodium 141 mmol/L      Potassium 4 2 mmol/L      Chloride 106 mmol/L      CO2 28 mmol/L      ANION GAP 7 mmol/L      BUN 17 mg/dL      Creatinine 0 57 mg/dL      Glucose 122 mg/dL      Calcium 10 1 mg/dL      AST 16 U/L      ALT 18 U/L      Alkaline Phosphatase 73 U/L      Total Protein 6 5 g/dL      Albumin 2 6 g/dL      Total Bilirubin 0 20 mg/dL      eGFR 96 ml/min/1 73sq m     Narrative:       [place] guidelines for Chronic Kidney Disease (CKD):     Stage 1 with normal or high GFR (GFR > 90 mL/min/1 73 square meters)    Stage 2 Mild CKD (GFR = 60-89 mL/min/1 73 square meters)    Stage 3A Moderate CKD (GFR = 45-59 mL/min/1 73 square meters)    Stage 3B Moderate CKD (GFR = 30-44 mL/min/1 73 square meters)    Stage 4 Severe CKD (GFR = 15-29 mL/min/1 73 square meters)    Stage 5 End Stage CKD (GFR <15 mL/min/1 73 square meters)  Note: GFR calculation is accurate only with a steady state creatinine    TSH, 3rd generation with Free T4 reflex [[id number]]  (Normal) Collected:  12/15/19 0746    Lab Status:  Final result Specimen:  Blood from Arm, Left Updated:  12/16/19 1130     TSH 3RD GENERATON 1 818 uIU/mL     Narrative:       Patients undergoing fluorescein dye angiography may retain small amounts of fluorescein in the body for 48-72 hours post procedure  Samples containing fluorescein can produce falsely depressed TSH values  If the patient had this procedure,a specimen should be resubmitted post fluorescein clearance        CBC and differential [[id number]]  (Abnormal) Collected:  12/16/19 1111    Lab Status:  Final result Specimen:  Blood from Arm, Right Updated:  12/16/19 1120     WBC 16 18 Thousand/uL      RBC 4 03 Million/uL      Hemoglobin 11 5 g/dL      Hematocrit 36 6 %      MCV 91 fL      MCH 28 5 pg      MCHC 31 4 g/dL      RDW 13 8 %      MPV 9 2 fL      Platelets 994 Thousands/uL      nRBC 0 /100 WBCs      Neutrophils Relative 88 %      Immat GRANS % 1 %      Lymphocytes Relative 7 %      Monocytes Relative 4 %      Eosinophils Relative 0 %      Basophils Relative 0 %      Neutrophils Absolute 14 22 Thousands/µL      Immature Grans Absolute 0 15 Thousand/uL      Lymphocytes Absolute 1 20 Thousands/µL      Monocytes Absolute 0 59 Thousand/µL      Eosinophils Absolute 0 00 Thousand/µL      Basophils Absolute 0 02 Thousands/µL     Procalcitonin [[id number]]  (Normal) Collected:  12/15/19 0746    Lab Status:  Final result Specimen:  Blood from Arm, Left Updated:  12/15/19 1336     Procalcitonin 0 17 ng/ml     Phosphorus [[id number]]  (Normal) Collected:  12/15/19 0746    Lab Status:  Final result Specimen:  Blood from Arm, Left Updated:  12/15/19 0823     Phosphorus 2 9 mg/dL     Magnesium [[id number]]  (Normal) Collected:  12/15/19 0746    Lab Status:  Final result Specimen:  Blood from Arm, Left Updated:  12/15/19 0823     Magnesium 1 8 mg/dL     Comprehensive metabolic panel [[id number]]  (Abnormal) Collected:  12/15/19 0746    Lab Status:  Final result Specimen:  Blood from Arm, Left Updated:  12/15/19 0818     Sodium 141 mmol/L      Potassium 3 5 mmol/L      Chloride 108 mmol/L      CO2 27 mmol/L      ANION GAP 6 mmol/L      BUN 16 mg/dL      Creatinine 0 49 mg/dL      Glucose 91 mg/dL      Calcium 8 6 mg/dL      AST 15 U/L      ALT 13 U/L      Alkaline Phosphatase 60 U/L      Total Protein 5 3 g/dL      Albumin 2 2 g/dL      Total Bilirubin 0 60 mg/dL      eGFR 101 ml/min/1 73sq m     Narrative:       [place] guidelines for Chronic Kidney Disease (CKD):     Stage 1 with normal or high GFR (GFR > 90 mL/min/1 73 square meters)    Stage 2 Mild CKD (GFR = 60-89 mL/min/1 73 square meters)    Stage 3A Moderate CKD (GFR = 45-59 mL/min/1 73 square meters)    Stage 3B Moderate CKD (GFR = 30-44 mL/min/1 73 square meters)    Stage 4 Severe CKD (GFR = 15-29 mL/min/1 73 square meters)    Stage 5 End Stage CKD (GFR <15 mL/min/1 73 square meters)  Note: GFR calculation is accurate only with a steady state creatinine    CBC and differential [[id number]]  (Abnormal) Collected:  12/15/19 0746    Lab Status:  Final result Specimen:  Blood from Arm, Left Updated:  12/15/19 0802     WBC 11 94 Thousand/uL      RBC 3 68 Million/uL      Hemoglobin 10 3 g/dL      Hematocrit 33 4 %      MCV 91 fL      MCH 28 0 pg      MCHC 30 8 g/dL      RDW 13 8 %      MPV 8 5 fL      Platelets 802 Thousands/uL      nRBC 0 /100 WBCs      Neutrophils Relative 78 %      Immat GRANS % 0 %      Lymphocytes Relative 7 %      Monocytes Relative 2 %      Eosinophils Relative 13 %      Basophils Relative 0 % Neutrophils Absolute 9 34 Thousands/µL      Immature Grans Absolute 0 04 Thousand/uL      Lymphocytes Absolute 0 78 Thousands/µL      Monocytes Absolute 0 24 Thousand/µL      Eosinophils Absolute 1 52 Thousand/µL      Basophils Absolute 0 02 Thousands/µL     Urine Microscopic [[id number]]  (Abnormal) Collected:  12/15/19 0112    Lab Status:  Final result Specimen:  Urine, Clean Catch Updated:  12/15/19 0155     RBC, UA 0-1 /hpf      WBC, UA None Seen /hpf      Epithelial Cells Occasional /hpf      Bacteria, UA Occasional /hpf     UA w Reflex to Microscopic w Reflex to Culture [[id number]]  (Abnormal) Collected:  12/15/19 0112    Lab Status:  Final result Specimen:  Urine, Clean Catch Updated:  12/15/19 0147     Color, UA Lilly     Clarity, UA Clear     Specific Gravity, UA 1 010     pH, UA 5 0     Leukocytes, UA Negative     Nitrite, UA Negative     Protein, UA Trace mg/dl      Glucose, UA Negative mg/dl      Ketones, UA Negative mg/dl      Urobilinogen, UA 0 2 E U /dl      Bilirubin, UA Negative     Blood, UA Negative    Influenza A/B and RSV PCR [[id number]]  (Normal) Collected:  12/14/19 2014    Lab Status:  Final result Specimen:  Nose Updated:  12/14/19 2054     INFLUENZA A PCR None Detected     INFLUENZA B PCR None Detected     RSV PCR None Detected    NT-BNP PRO [[id number]]  (Abnormal) Collected:  12/14/19 2011    Lab Status:  Final result Specimen:  Blood from Arm, Right Updated:  12/14/19 2049     NT-proBNP 1,961 pg/mL     Lipase [[id number]]  (Abnormal) Collected:  12/14/19 2011    Lab Status:  Final result Specimen:  Blood from Arm, Right Updated:  12/14/19 2049     Lipase 70 u/L     Magnesium [[id number]]  (Normal) Collected:  12/14/19 2011    Lab Status:  Final result Specimen:  Blood from Arm, Right Updated:  12/14/19 2049     Magnesium 1 7 mg/dL     CBC and differential [[id number]]  (Abnormal) Collected:  12/14/19 2011    Lab Status:  Final result Specimen:  Blood from Arm, Right Updated:  12/14/19 2048 WBC 13 29 Thousand/uL      RBC 4 43 Million/uL      Hemoglobin 12 4 g/dL      Hematocrit 39 4 %      MCV 89 fL      MCH 28 0 pg      MCHC 31 5 g/dL      RDW 13 6 %      MPV 8 4 fL      Platelets 905 Thousands/uL      nRBC 0 /100 WBCs     Narrative: This is an appended report  These results have been appended to a previously verified report  Comprehensive metabolic panel [[id number]]  (Abnormal) Collected:  12/14/19 2011    Lab Status:  Final result Specimen:  Blood from Arm, Right Updated:  12/14/19 2045     Sodium 140 mmol/L      Potassium 3 4 mmol/L      Chloride 107 mmol/L      CO2 26 mmol/L      ANION GAP 7 mmol/L      BUN 15 mg/dL      Creatinine 0 55 mg/dL      Glucose 126 mg/dL      Calcium 9 0 mg/dL      AST 13 U/L      ALT 15 U/L      Alkaline Phosphatase 65 U/L      Total Protein 5 9 g/dL      Albumin 2 6 g/dL      Total Bilirubin 0 90 mg/dL      eGFR 97 ml/min/1 73sq m     Narrative:       [place] guidelines for Chronic Kidney Disease (CKD):     Stage 1 with normal or high GFR (GFR > 90 mL/min/1 73 square meters)    Stage 2 Mild CKD (GFR = 60-89 mL/min/1 73 square meters)    Stage 3A Moderate CKD (GFR = 45-59 mL/min/1 73 square meters)    Stage 3B Moderate CKD (GFR = 30-44 mL/min/1 73 square meters)    Stage 4 Severe CKD (GFR = 15-29 mL/min/1 73 square meters)    Stage 5 End Stage CKD (GFR <15 mL/min/1 73 square meters)  Note: GFR calculation is accurate only with a steady state creatinine    Lactic acid, plasma [[id number]]  (Normal) Collected:  12/14/19 2011    Lab Status:  Final result Specimen:  Blood from Arm, Right Updated:  12/14/19 2039     LACTIC ACID 1 5 mmol/L     Narrative:       Result may be elevated if tourniquet was used during collection      Troponin I [[id number]]  (Normal) Collected:  12/14/19 2011    Lab Status:  Final result Specimen:  Blood from Arm, Right Updated:  12/14/19 2038     Troponin I <0 02 ng/mL     CK (with reflex to MB) [[id number]]  (Abnormal) Collected:  12/14/19 2011    Lab Status:  Final result Specimen:  Blood from Arm, Right Updated:  12/14/19 2035     Total CK 13 U/L     Protime-INR [[id number]]  (Abnormal) Collected:  12/14/19 2011    Lab Status:  Final result Specimen:  Blood from Arm, Right Updated:  12/14/19 2032     Protime 21 3 seconds      INR 1 83    APTT [[id number]]  (Normal) Collected:  12/14/19 2011    Lab Status:  Final result Specimen:  Blood from Arm, Right Updated:  12/14/19 2032     PTT 35 seconds              EKG: aflutter with RVR, no acute ischemia    CT pe study w abdomen pelvis w contrast   Final Result by Benjamin Reese MD (12/14 2331)      1  No evidence of pulmonary embolism  2   Enlargement of the main pulmonary artery which may be seen in the setting of pulmonary hypertension  3   Right greater than left small pleural effusions with adjacent compressive atelectasis  4   Duplicated left superior vena cava  5   The esophagus and gastroesophageal junction is patulous and thickened which may be due to gastroesophageal reflux or esophageal dysmotility  Correlate with endoscopy  6   3 8 x 1 1 x 3 2 cm fluid collection in the area of a ventral wall hernia repair which may represent a seroma        Workstation performed: [id number]         X-ray chest 2 views   Final Result by Zoya Martell MD (12/15 1051)      No acute consolidation   Small bilateral effusion, unchanged from the previous study   Mild increased lung markings may be due to mild congestion   The lungs appear mildly hazy as compared to previous study this may be due to technique or due to mild congestion            Workstation performed: [id number]                    Procedures  Procedures         ED Course                               MDM  Number of Diagnoses or Management Options  Atrial flutter with rapid ventricular response [facility]):   Diagnosis management comments: IMP: viral uri versus dehydration, electrolyte abnormality, medication side affect  Consider pneumonia, uti  Doubt acs, pe, dissection, tamponade, cva, bacteremia, surgical abd process  Plan: cardiac labs, ekg, cxr, urinalysis, give ivf and iv narcotic pain meds prn   - ekg aflutter with RVR  - leukocytosis 11 9  - cxr no acute  - BNP 2000  - Pt with rapid aflutter improving with a cardizem drip  Will admit to medicine  Amount and/or Complexity of Data Reviewed  Clinical lab tests: ordered and reviewed  Tests in the radiology section of CPT®: ordered and reviewed  Tests in the medicine section of CPT®: ordered and reviewed  Decide to obtain previous medical records or to obtain history from someone other than the patient: yes  Review and summarize past medical records: yes  Discuss the patient with other providers: yes (Hospitalist)  Independent visualization of images, tracings, or specimens: yes    Risk of Complications, Morbidity, and/or Mortality  Presenting problems: high  Diagnostic procedures: high  Management options: high    Patient Progress  Patient progress: stable        Disposition  Final diagnoses:   Atrial flutter with rapid ventricular response ([address] )     Time reflects when diagnosis was documented in both MDM as applicable and the Disposition within this note     Time User Action Codes Description Comment    12/15/2019  5:38 AM Oscar Kolb Add [I48 92] Atrial flutter with rapid ventricular response ([facility] [address] )     12/17/2019  2:13 PM Janna Hunt Add [I10] Essential hypertension     12/17/2019  2:13 PM Talita Little0 [facility] Essential hypertension     12/17/2019  2:14 PM Anabel [facility]0 [facility] Essential hypertension       ED Disposition     ED Disposition Condition Date/Time Comment    Admit Stable [place] Dec 15, 2019  5:37 AM Case was discussed with Sury and the patient's admission status was agreed to be Admission Status: observation status to the service of Dr Alexander Hsieh          Follow-up Information     Follow up With Specialties Details Why Contact Info    Moiz French MD Cardiovascular Perfusion Follow up Please call for a follow-up appointment [address]  [facility] [address], DO Family Medicine Follow up Please call for a followup appointment [address] [facility] [address]      Susan Moseley DO Endocrinology   [facility]  [address] [address]  [address] [address] [id number] [phone]            Discharge Medication List as of 12/17/2019  3:40 PM      CONTINUE these medications which have CHANGED    Details   metoprolol succinate (TOPROL-XL) 50 mg 24 hr tablet Take 1 tablet (50 mg total) by mouth 2 (two) times a day, Starting Tue 12/17/2019, Normal         CONTINUE these medications which have NOT CHANGED    Details   Calcium Ascorbate 500 MG TABS Take 500 mg by mouth, Historical Med      CALCIUM-VITAMIN D PO Take 1 tablet by mouth daily, Historical Med      cholecalciferol (VITAMIN D3) 1,000 units tablet Take 1,000 Units by mouth daily, Historical Med      Cinnamon 500 MG capsule Take 500 mg by mouth daily, Historical Med      Cyanocobalamin (VITAMIN B 12 PO) Take 500 mcg by mouth, Historical Med      folic acid (FOLVITE) 1 mg tablet Take 1 mg by mouth daily , Starting Wed 3/28/2018, Historical Med      levothyroxine 75 mcg tablet Take 1 tablet daily, Normal      minocycline (MINOCIN) 50 mg capsule Take 50 mg by mouth daily, Starting Wed 11/13/2019, Historical Med      Misc Natural Products (OSTEO BI-FLEX JOINT SHIELD PO) Take by mouth, Historical Med      Multiple Vitamins-Minerals (OCUVITE ADULT 50+) CAPS Take 1 capsule by mouth daily, Historical Med      Red Yeast Rice 600 MG TABS Take 2 tablets by mouth daily, Historical Med      XARELTO 20 MG tablet Take 1 tablet by mouth daily, Starting Sat 3/3/2018, Historical Med           Outpatient Discharge Orders   Activity as tolerated     Call provider for:  persistent dizziness or light-headedness     Call provider for:  severe uncontrolled pain       ED Provider  Electronically Signed by           Chika Guevara DO  12/18/19 6815

## 2019-12-15 NOTE — H&P
H&P Exam - Paddy Araujo 81 y o  female MRN: [id number]    Unit/Bed#: RM03 Encounter: [id number]      Assessment/Plan       * Sepsis, unspecified organism [facility])  Assessment & Plan  Patient presented to ER for flu-like symptoms including fever, chills, diffuse body aches, slightly productive cough, sore throat, and mild nausea  She reports developing symptoms initially over 1 week ago  She presented to primary care and was empirically treated with Tamiflu and azithromycin  She completed a 5-day course of each and reports gradual improvement over those 5 days  Flu testing at that visit was negative  Patient was asymptomatic for 2 days, completing routine daily activities  However, she woke this morning with same symptoms as the first episode, and symptoms worsened throughout the day  She measured temperature at home (101 8 oral), at which point she took tylenol and came to ER  4 of 4 SIRS criteria positive  ER initiated cefepime; will continue same  Patient received 1L IV fluid bolus in ER; will continue IV fluid rehydration  Procalcitonin and blood cultures pending  Atrial flutter with rapid ventricular response [facility])  Assessment & Plan  Patient has history of paroxysmal atrial fibrillation  Heart rate normally controlled with metoprolol as outpatient  Patient currently on Xarelto for anticoagulation  Patient developed atrial flutter with tachycardia while in ER, and was placed on Cardizem drip  Suspect secondary to sepsis  Will treat sepsis; anticipate will be able to stop Cardizem drip once patient's fluid status improves  Continue outpatient metoprolol and Xarelto  Seronegative rheumatoid arthritis ([facility] )  Assessment & Plan  Chronic condition  Continue outpatient medication regimen  Polymyalgia rheumatica ([facility] )  Assessment & Plan  Patient has a complex medical history including polymyalgia rheumatica and RA, which she reports have been exacerbated by her acute illness    She was previously on chronic systemic steroids, but has been off steroids for months  Consider acute flare of PMR as possible etiology for majority of patient's systemic pain and fever, especially in setting of relatively unremarkable physical exam   Will initiate solumedrol and continue clinical monitoring  History of Present Illness   HPI:  Bossman Luevano is a 81 y o  female who presents for flu-like symptoms including fever, chills, diffuse body aches, slightly productive cough, sore throat, and mild nausea  She reports developing symptoms initially over 1 week ago  She presented to primary care and was empirically treated with Tamiflu and azithromycin  She completed a 5-day course of each and reports gradual improvement over those 5 days  Flu testing at that visit was negative  Patient was asymptomatic for 2 days, completing routine daily activities  However, she woke this morning with same symptoms as the first episode, and symptoms worsened throughout the day  She measured temperature at home (101 8 oral), at which point she took tylenol and came to ER  PCP: Anna Dumont,     Review of Systems   Constitutional: Positive for chills, fatigue and fever  Negative for diaphoresis  HENT: Positive for postnasal drip and sore throat  Negative for congestion, sinus pressure and sinus pain  Respiratory: Positive for cough  Negative for shortness of breath  Cardiovascular: Negative for chest pain, palpitations and leg swelling  Gastrointestinal: Positive for nausea  Negative for abdominal pain, constipation, diarrhea and vomiting  Genitourinary: Negative for dysuria, flank pain, frequency, pelvic pain, urgency, vaginal discharge and vaginal pain  Musculoskeletal: Positive for arthralgias and myalgias  Skin: Negative for rash and wound  All other systems reviewed and are negative        Historical Information   Past Medical History:   Diagnosis Date    Arthritis     At[facility] fibrillation ([facility] )     Disease of thyroid gland     DVT (deep venous thrombosis) ([facility] )     Lyme disease     Migraine     Polymyalgia rheumatica (HCC)     Pulmonary emboli (HCC)     Renal disorder     right sided kidney stones    Vitamin D deficiency      Past Surgical History:   Procedure Laterality Date    CARDIAC CATHETERIZATION       SECTION      x3 - last impression 16    FRACTURE SURGERY Left     wrist    HERNIA REPAIR  2018    KNEE CARTILAGE SURGERY Left     TONSILECTOMY AND ADNOIDECTOMY      VEIN SURGERY      venous ligation with stripping    WRIST SURGERY Left     ORIF wrist - last impression 16     Social History   Social History     Substance and Sexual Activity   Alcohol Use Yes    Frequency: Monthly or less    Comment: occasionally; social     Social History     Substance and Sexual Activity   Drug Use No     Social History     Tobacco Use   Smoking Status Never Smoker   Smokeless Tobacco Never Used     Family History: non-contributory    Meds/Allergies   all medications and allergies reviewed  Allergies   Allergen Reactions    Amiodarone      Other reaction(s): Other (See Comments)  Reports caused elevated TSH    Sulfa Antibiotics Itching and Rash    Sulfamethoxazole-Trimethoprim Itching and Rash       Objective   Vitals: Blood pressure 109/53, pulse (!) 129, temperature 97 5 °F (36 4 °C), temperature source Temporal, resp  rate 15, height 5' 2" (1 575 m), weight 70 kg (154 lb 5 2 oz), SpO2 96 %  Intake/Output Summary (Last 24 hours) at 12/15/2019 0513  Last data filed at 12/15/2019 0155  Gross per 24 hour   Intake 854 17 ml   Output    Net 854 17 ml       Invasive Devices     Peripheral Intravenous Line            Peripheral IV 19 Left Wrist less than 1 day    Peripheral IV 19 Right Antecubital less than 1 day                Physical Exam   Constitutional: She is oriented to person, place, and time  She appears well-developed and well-nourished   No distress  HENT:   Head: Normocephalic and atraumatic  Right Ear: External ear normal    Left Ear: External ear normal    Nose: Nose normal    Mouth/Throat: Mucous membranes are dry  No oropharyngeal exudate or posterior oropharyngeal erythema  Eyes: Pupils are equal, round, and reactive to light  Conjunctivae and EOM are normal  Right eye exhibits no discharge  Left eye exhibits no discharge  No scleral icterus  Neck: No JVD present  No tracheal deviation present  No thyromegaly present  Cardiovascular: Normal rate, regular rhythm, normal heart sounds and intact distal pulses  Exam reveals no gallop and no friction rub  No murmur heard  Pulmonary/Chest: Effort normal and breath sounds normal  No stridor  No respiratory distress  She has no wheezes  She has no rales  She exhibits no tenderness  Abdominal: Soft  Bowel sounds are normal  She exhibits no distension and no mass  There is no tenderness  There is no rebound and no guarding  Musculoskeletal: She exhibits no edema, tenderness or deformity  Lymphadenopathy:     She has no cervical adenopathy  Neurological: She is alert and oriented to person, place, and time  Skin: Skin is warm and dry  Capillary refill takes less than 2 seconds  No rash noted  She is not diaphoretic  No erythema  No pallor  Psychiatric: She has a normal mood and affect  Her behavior is normal  Judgment and thought content normal    Nursing note and vitals reviewed  Lab Results: I have personally reviewed pertinent reports  Imaging: I have personally reviewed pertinent reports  and I have personally reviewed pertinent films in PACS  EKG, Pathology, and Other Studies: I have personally reviewed pertinent reports  Code Status: Level 1 - Full Code  Advance Directive and Living Will:      Power of :    POLST:      Counseling / Coordination of Care  Total floor / unit time spent today 50 minutes    Greater than 50% of total time was spent with the patient and / or family counseling and / or coordination of care  A description of the counseling / coordination of care:  Greater than 25 minutes spent with this patient discussing diagnosis, prognosis, and plan of care

## 2019-12-15 NOTE — PROGRESS NOTES
Patient seen and examined  She states that she feels considerably improved since her initial presentation to the hospital   She states that her sore throat has improved, continues to complain of sputum production  C/o generalized joint aches and pains  BP 96/55 (BP Location: Left arm)   Pulse (!) 121   Temp 97 6 °F (36 4 °C) (Temporal)   Resp (!) 25   Ht 5' 2" (1 575 m)   Wt 68 7 kg (151 lb 7 3 oz)   SpO2 98%   BMI 27 70 kg/m²    Gen NAD  Heart irr irr, tachycardic   Chest CTA b/l  Extr no edema    Labs/imaging reviewed    Sepsis, suspect viral  Flu/RSV negative  Continue antibiotic regimen and continue infectious workup  A fib RVR  Possibily due to above  Resume metoprolol - change from 50 mg XR to Toprol 25 mg TID for now  Attempt to wean off Cardizem  On Xarelto for [facility]  Rheumatoid arthritis  Continue home regimen  Tylenol/Toradol PRN  PMR  Not on maintenance steroids at this time  Outpateint f/u

## 2019-12-15 NOTE — ASSESSMENT & PLAN NOTE
Patient has history of paroxysmal atrial fibrillation  Patient currently on Xarelto for anticoagulation  Patient placed on Cardizem drip in ED, this was discontinued on 12/15/19  PO Cardizem 30 mg q6h started by cardiology today  Metoprolol 50 mg q24 hr changed to Metoprolol 25 mg TID  Continue outpatient Xarelto  Cardiology consult appreciated, patient for a planned cardioversion tomorrow 12/17/19  The patient no longer requires ICU monitoring, will transfer the patient to med/surg floor today 12/16/19

## 2019-12-15 NOTE — PLAN OF CARE
Problem: PHYSICAL THERAPY ADULT  Goal: Performs mobility at highest level of function for planned discharge setting  See evaluation for individualized goals  Description  Treatment/Interventions: Functional transfer training, LE strengthening/ROM, Elevations, Therapeutic exercise, Bed mobility, Gait training          See flowsheet documentation for full assessment, interventions and recommendations  Note:   Prognosis: Good  Problem List: Decreased strength, Decreased range of motion, Decreased endurance, Impaired balance, Decreased mobility, Decreased coordination  Assessment: Pt is 81 y o  female seen for PT evaluation s/p admit to [address] on 12/14/2019 w/ Sepsis, unspecified organism ([facility] )  PT consulted to assess pt's functional mobility and d/c needs  Order placed for PT eval and tx, w/ up and OOB as tolerated order  Comorbidities affecting pt's physical performance at time of assessment include: Sepsis, polymyalgia, rheumatica, and Atrial flutter  PTA, pt was independent w/ all functional mobility w/ No AD  Personal factors affecting pt at time of IE include: unable to perform dynamic tasks in community  Please find objective findings from PT assessment regarding body systems outlined above with impairments and limitations including weakness, decreased ROM, impaired balance, decreased endurance, impaired coordination, gait deviations, decreased activity tolerance and decreased functional mobility tolerance  Pt to benefit from continued PT tx to address deficits as defined above and maximize level of functional independent mobility and consistency  From PT/mobility standpoint, recommendation at time of d/c would be home with family support 24/7 pending progress in order to facilitate return to PLOF  Recommendation: Home with family support     PT - OK to Discharge: Yes    See flowsheet documentation for full assessment

## 2019-12-15 NOTE — PLAN OF CARE
Problem: OCCUPATIONAL THERAPY ADULT  Goal: Performs self-care activities at highest level of function for planned discharge setting  See evaluation for individualized goals  Description  Treatment Interventions: ADL retraining, Functional transfer training, UE strengthening/ROM, Endurance training, Patient/family training, Activityengagement          See flowsheet documentation for full assessment, interventions and recommendations  Note:   Limitation: Decreased ADL status, Decreased Safe judgement during ADL, Decreased endurance, Decreased self-care trans, Decreased high-level ADLs     Assessment: Pt is a 81 y o  female seen for OT evaluation s/p admit to [facility] on 12/14/2019 w/ Sepsis, unspecified organism ([facility] [address] )  Comorbidities affecting pt's functional performance at time of assessment include: disease of thyroid gland, lyme disease, arthritis, renal disorder, polymyalgia rheumatica, DVT, pulmonary emboli, vitamin D, a-fib  Personal factors affecting pt at time of IE include:steps to enter environment, difficulty performing ADLS, difficulty performing IADLS , limited insight into deficits, decreased initiation and engagement  and health management   Prior to admission, pt was (I) with ADLs and IADLs with no device during functional mobility  Upon evaluation: Pt requires (S) level with no device during functional mobility 2* the following deficits impacting occupational performance: weakness, decreased strength, decreased tolerance, impaired initiation, impaired memory, impaired problem solving, decreased safety awareness and impaired interpersonal skills  Pt to benefit from continued skilled OT tx while in the hospital to address deficits as defined above and maximize level of functional independence w ADL's and functional mobility  Occupational Performance areas to address include: grooming, bathing/shower, toilet hygiene, dressing, functional mobility, community mobility and clothing management   From OT standpoint, recommendation at time of d/c would be home with family support       OT Discharge Recommendation: Home with family support

## 2019-12-15 NOTE — ASSESSMENT & PLAN NOTE
Patient presented to ER for flu-like symptoms including fever, chills, diffuse body aches, slightly productive cough, sore throat, and mild nausea  4 of 4 SIRS criteria positive  ER initiated cefepime; will continue same  Patient received 1L IV fluid bolus in ER; will continue IV fluid rehydration  Procalcitonin negative x 1, 2nd set pending  Blood cultures: no growth at 24 hours  Likely viral etiology, patient's symptoms completely resolved

## 2019-12-15 NOTE — OCCUPATIONAL THERAPY NOTE
Occupational Therapy Evaluation     Patient Name: Mumtaz Lawrence  UYHFR'I Date: 12/15/2019  Problem List  Principal Problem:    Sepsis, unspecified organism [facility])  Active Problems:    Polymyalgia rheumatica ([facility] [address] )    [facility]gative [facility] arthritis ([facility] )    Atrial flutter with rapid ventricular response (HCC)    Past Medical History  Past Medical History:   Diagnosis Date    Arthritis     Atrial fibrillation ([facility] )     Disease of thyroid gland     [facility]) [facility]     Lyme disease     Migraine     Polymyalgia rheumatica (HCC)     Pulmonary emboli ([facility] )     Renal disorder     right sided kidney stones    Vitamin D deficiency      Past Surgical History  Past Surgical History:   Procedure Laterality Date    CARDIAC CATHETERIZATION       SECTION      x3 - last impression 16    FRACTURE SURGERY Left     wrist    HERNIA REPAIR  2018    KNEE CARTILAGE SURGERY Left     TONSILECTOMY AND ADNOIDECTOMY      VEIN SURGERY      venous ligation with stripping    WRIST SURGERY Left     ORIF wrist - last impression 5/27/16             12/15/19 1201   Note Type   Note type Eval/Treat  (Simultaneous filing  User may not have seen previous data )   Restrictions/Precautions   Weight Bearing Precautions Per Order No  (Simultaneous filing  User may not have seen previous data )   Other Precautions Multiple lines;Telemetry;O2;Fall Risk  (Simultaneous filing  User may not have seen previous data )   Pain Assessment   Pain Assessment No/denies pain   Home Living   Type of [address] Two level;Performs ADLs on one level; Able to live on main level with bedroom/bathroom;Stairs to enter with rails; Other (Comment)  (3-5 JUANIS c x2 HR; bathroom in basement)   Bathroom Shower/Tub Tub only   Bathroom Toilet Standard   Bathroom Accessibility Accessible   Home Equipment Other (Comment)  (no DME)   Additional Comments pt reports no device at baseline during functional mobility  (Simultaneous filing  User may not have seen previous data )   Prior Function   Level of [place] Independent with ADLs and functional mobility   Lives With Spouse   ADL Assistance Independent   IADLs Independent   Falls in the last 6 months 0   Comments pt is (I) with driving  (Simultaneous filing  User may not have seen previous data )   Psychosocial   Psychosocial (WDL) WDL   Subjective   Subjective "I have chronic arthritis pain"   ADL   Where Assessed Edge of bed   LB Dressing Assistance 5  Supervision/Setup   LB Dressing Deficit Don/doff R sock; Don/doff L sock   Bed Mobility   Supine to Sit 5  Supervision  (Simultaneous filing  User may not have seen previous data )   Additional items Bedrails; Increased time required  (Simultaneous filing  User may not have seen previous data )   Sit to Supine   (seated in chair at end of session)   Additional Comments pt on 2L O2 initially; SpO2 99% and trialed off O2, SpO2 at 98% at end of session  (Simultaneous filing  User may not have seen previous data )   Transfers   Sit to Stand 5  Supervision  (Simultaneous filing  User may not have seen previous data )   Stand to Sit 5  Supervision  (Simultaneous filing  User may not have seen previous data )   Additional Comments no device; no LOB or instability   Functional Mobility   Functional Mobility 5  Supervision   Additional Comments pt performed functional mobility with no device ~100ft with no LOB or instability   Balance   Static Sitting Good  (Simultaneous filing  User may not have seen previous data )   Dynamic Sitting Good  (Simultaneous filing  User may not have seen previous data )   Static Standing Fair +  (Simultaneous filing  User may not have seen previous data )   Dynamic Standing Fair +  (Simultaneous filing  User may not have seen previous data )   Ambulatory Fair +  (Simultaneous filing   User may not have seen previous data )   Activity Tolerance   Activity Tolerance Patient limited by fatigue RUE Assessment   RUE Assessment WFL   LUE Assessment   LUE Assessment WFL   Hand Function   Gross Motor Coordination Functional   Fine Motor Coordination Functional   Sensation   Light Touch No apparent deficits   Sharp/Dull No apparent deficits   Cognition   Overall Cognitive Status WFL  (Simultaneous filing  User may not have seen previous data )   Arousal/Participation Alert   Attention Within functional limits   Orientation Level Oriented X4  (Simultaneous filing  User may not have seen previous data )   Memory Within functional limits  (Simultaneous filing  User may not have seen previous data )   Following Commands Follows all commands and directions without difficulty  (Simultaneous filing  User may not have seen previous data )   Assessment   Limitation Decreased ADL status; Decreased Safe judgement during ADL;Decreased endurance;Decreased self-care trans;Decreased high-level ADLs   Assessment Pt is a 81 y o  female seen for OT evaluation s/p admit to [facility] on 12/14/2019 w/ Sepsis, unspecified organism ([facility] [address] )  Comorbidities affecting pt's functional performance at time of assessment include: disease of thyroid gland, lyme disease, arthritis, renal disorder, polymyalgia rheumatica, DVT, pulmonary emboli, vitamin D, a-fib  Personal factors affecting pt at time of IE include:steps to enter environment, difficulty performing ADLS, difficulty performing IADLS , limited insight into deficits, decreased initiation and engagement  and health management   Prior to admission, pt was (I) with ADLs and IADLs with no device during functional mobility  Upon evaluation: Pt requires (S) level with no device during functional mobility 2* the following deficits impacting occupational performance: weakness, decreased strength, decreased tolerance, impaired initiation, impaired memory, impaired problem solving, decreased safety awareness and impaired interpersonal skills   Pt to benefit from continued skilled OT tx while in the hospital to address deficits as defined above and maximize level of functional independence w ADL's and functional mobility  Occupational Performance areas to address include: grooming, bathing/shower, toilet hygiene, dressing, functional mobility, community mobility and clothing management  From OT standpoint, recommendation at time of d/c would be home with family support  Goals   Patient Goals to go home   Short Term Goal  pt will perform UE strengthening exercises    Long Term Goal #1 pt will perform UB/LB bathing and grooming tasks at (I) level    Long Term Goal #2 pt will demonstrate toilet transfers and hygiene at (I) level    Long Term Goal pt will increase independence and endurance to (I) level with no device   Plan   Treatment Interventions ADL retraining;Functional transfer training;UE strengthening/ROM; Endurance training;Patient/family training; Activityengagement   Goal Expiration Date 12/29/19   OT Frequency 3-5x/wk   Recommendation   OT Discharge Recommendation Home with family support   Barthel Index   Feeding 10   Bathing 0   Grooming Score 0   Dressing Score 5   Bladder Score 10   Bowels Score 10   Toilet Use Score 5   Transfers (Bed/Chair) Score 10   Mobility (Level Surface) Score 10   Stairs Score 0   Barthel Index Score 60      Pt will benefit from continued OT services in order to maximize (I) c ADL performance, FM c RW, and improve overall endurance/strength required to complete functional tasks in preparation for d/c  Pt left seated in chair at end of session; all needs within reach; all lines intact; scds connected and turned on

## 2019-12-15 NOTE — ASSESSMENT & PLAN NOTE
Patient has a complex medical history including polymyalgia rheumatica and RA, which she reports have been exacerbated by her acute illness  She was previously on chronic systemic steroids, but has been off steroids for months    Continue outpatient follow-up

## 2019-12-15 NOTE — UTILIZATION REVIEW
Initial Clinical Review    Admission: Date/Time/Statement: Inpatient Admission Orders (From admission, onward)     Ordered        12/15/19 0524  Inpatient Admission  Once                   Orders Placed This Encounter   Procedures    Inpatient Admission     Standing Status:   Standing     Number of Occurrences:   1     Order Specific Question:   Admitting Physician     Answer:   Radha Estes [[id number]]     Order Specific Question:   Level of Care     Answer:   Critical Care [15]     Order Specific Question:   Estimated length of stay     Answer:   More than 2 Midnights     Order Specific Question:   Certification     Answer:   I certify that inpatient services are medically necessary for this patient for a duration of greater than two midnights  See H&P and MD Progress Notes for additional information about the patient's course of treatment  ED Arrival Information     Expected Arrival Acuity Means of Arrival Escorted By Service Admission Type    - 12/14/2019 18:48 Urgent Wheelchair Spouse Hospitalist Urgent    Arrival Complaint    dehydrated/flu symptoms        Chief Complaint   Patient presents with    Flu Symptoms     pt has had flu like symptoms since last friday  was seen at urgent care 12/6, flu swab was negative  was put on tamaflu, felt better for a few days, symptoms now worse  Assessment/Plan:   80 yo female,  To ER from home,  Admitted INPT status at Critical level of care,  For treatment of  Sepsis, unknown source,  w/associated Atrial flutter/tachycardia  Pt w/ flu like symptoms for > week; Went to PCP where tested neg for flu;  Initiated empiric treatment w/Tamiflu and azithromycin (completed 5 D course of each)  W/some improvement of symptoms (for 2 days)   Today pt running fever, feeling worse  IN ER,  Pt developed Atrial flutter (h/o PAF, controlled w/metoprolol/ on xarelto)   And Cardizem IV gtt initiated      Pt w/complex hx including polymyalgia rheumatica and RA :  Consider acute flare of PMR as possible etiology for majority of patient's systemic pain and fever, will initiate steroid therapy    Will admit pt for IVF rehydration; Cont IV cefepime,  Follow procalcitonin and blood cultures;  Given 1 dose IV sollumedrol       12/15  Sepsis, suspect viral  Flu/RSV negative  Continue antibiotic regimen and continue infectious workup    A fib RVR  Possibily due to above  Resume metoprolol - change from 50 mg XR to Toprol 25 mg TID for now  Attempt to wean off Cardizem  On Xarelto for [facility]      CONTINUE CARDIZEM GTT;        ED Triage Vitals [12/14/19 1859]   Temperature Pulse Respirations Blood Pressure SpO2   97 5 °F (36 4 °C) (!) 135 18 97/75 96 %      Temp Source Heart Rate Source Patient Position - Orthostatic VS BP Location FiO2 (%)   Temporal Monitor Lying Left arm --      Pain Score       9        Wt Readings from Last 1 Encounters:   12/15/19 68 7 kg (151 lb 7 3 oz)     Additional Vital Signs:   12/14/19 2100 132Abnormal  20 103/59 95 % None (Room air)   12/14/19 2015 143Abnormal  16  95 %    12/14/19 2000 138Abnormal  26 115/74 98 % None (Room air)   12/14/19 1930 141Abnormal  15 102/65 97 %    12/14/19 1902    96 %    12/14/19 1900 113Abnormal  26 97/75         12/14/19 2300 137Abnormal  16 88/55Abnormal  98 % Nasal cannula   12/14/19 2215 136Abnormal  22 80/50Abnormal  94 % None (Room air)   12/14/19 2200 131Abnormal  17 82/56Abnormal  95 % None (Room     12/15/19 1227  96 9 °F (36 1 °C)Abnormal   110Abnormal   17  96/63  73  95 %  rm air      Pertinent Labs/Diagnostic Test Results:   Results from last 7 days   Lab Units 12/15/19  0746 12/14/19 2011   WBC Thousand/uL 11 94* 13 29*   HEMOGLOBIN g/dL 10 3* 12 4   HEMATOCRIT % 33 4* 39 4   PLATELETS Thousands/uL 319 377   NEUTROS ABS Thousands/µL 9 34*  --    BANDS PCT %  --  5 Results from last 7 days   Lab Units 12/15/19  0746 12/14/19 2011   SODIUM mmol/L 141 140   POTASSIUM mmol/L 3 5 3 4*   CHLORIDE mmol/L 108 107   CO2 mmol/L 27 26   ANION GAP mmol/L 6 7   BUN mg/dL 16 15   CREATININE mg/dL 0 49* 0 55*   EGFR ml/min/1 73sq m 101 97   CALCIUM mg/dL 8 6 9 0   MAGNESIUM mg/dL 1 8 1 7   PHOSPHORUS mg/dL 2 9  --      Results from last 7 days   Lab Units 12/15/19  0746 12/14/19 2011   AST U/L 15 13   ALT U/L 13 15   ALK PHOS U/L 60 65   TOTAL PROTEIN g/dL 5 3* 5 9*   ALBUMIN g/dL 2 2* 2 6*   TOTAL BILIRUBIN mg/dL 0 60 0 90         Results from last 7 days   Lab Units 12/15/19  0746 12/14/19 2011   GLUCOSE RANDOM mg/dL 91 126     Results from last 7 days   Lab Units 12/14/19 2011   CK TOTAL U/L 13*     Results from last 7 days   Lab Units 12/14/19 2011   TROPONIN I ng/mL <0 02         Results from last 7 days   Lab Units 12/14/19 2011   PROTIME seconds 21 3*   INR  1 83*   PTT seconds 35         Results from last 7 days   Lab Units 12/15/19  0746   PROCALCITONIN ng/ml 0 17     Results from last 7 days   Lab Units 12/14/19 2011   LACTIC ACID mmol/L 1 5             Results from last 7 days   Lab Units 12/14/19 2011   NT-PRO BNP pg/mL 1,961*     Results from last 7 days   Lab Units 12/14/19 2011   LIPASE u/L 70*     Results from last 7 days   Lab Units 12/15/19  0112   CLARITY UA  Clear   COLOR UA  Lilly   SPEC GRAV UA  1 010   PH UA  5 0   GLUCOSE UA mg/dl Negative   KETONES UA mg/dl Negative   BLOOD UA  Negative   PROTEIN UA mg/dl Trace*   NITRITE UA  Negative   BILIRUBIN UA  Negative   UROBILINOGEN UA E U /dl 0 2   LEUKOCYTES UA  Negative   WBC UA /hpf None Seen   RBC UA /hpf 0-1*   BACTERIA UA /hpf Occasional   EPITHELIAL CELLS WET PREP /hpf Occasional     Results from last 7 days   Lab Units 12/14/19 2014   INFLUENZA A PCR  None Detected   RSV PCR  None Detected     Results from last 7 days   Lab Units 12/14/19 2118 12/14/19 2011   BLOOD CULTURE  Received in Microbiology Lab  Culture in Progress  Received in Microbiology Lab  Culture in Progress  ED Treatment:   Medication Administration from 12/14/2019 1848 to 12/15/2019 7482       Date/Time Order Dose Route Action     12/14/2019 2017 sodium chloride 0 9 % bolus 250 mL 250 mL Intravenous New Bag     12/14/2019 2012 ondansetron (ZOFRAN) injection 4 mg 4 mg Intravenous Given     12/14/2019 2009 morphine (PF) 4 mg/mL injection 4 mg 4 mg Intravenous Given     12/14/2019 2109 sodium chloride 0 9 % bolus 1,000 mL 1,000 mL Intravenous New Bag     12/14/2019 2102 morphine (PF) 4 mg/mL injection 4 mg 4 mg Intravenous Given     12/14/2019 2235 ketorolac (TORADOL) injection 15 mg 15 mg Intravenous Given     12/14/2019 2318 diltiazem (CARDIZEM) 125 mg in sodium chloride 0 9 % 125 mL infusion 2 5 mg/hr Intravenous New Bag     12/14/2019 2354 sodium chloride 0 9 % infusion 250 mL/hr Intravenous New Bag     12/15/2019 0549 sodium chloride 0 9 % bolus 1,000 mL 1,000 mL Intravenous New Bag     12/15/2019 0548 ketorolac (TORADOL) injection 15 mg 15 mg Intravenous Given     12/15  @  4663  GIVEN  IVF NS 1 Liter      IVF NS  Contd at 250 cc/hr until 12/15 @  0155  12/15  @  0758  IV solumedrol  125 mg      Past Medical History:   Diagnosis Date    Arthritis     Atrial fibrillation (HCC)     Disease of thyroid gland     DVT (deep venous thrombosis) (HCC)     Lyme disease     Migraine     Polymyalgia rheumatica (HCC)     Pulmonary emboli (HCC)     Renal disorder     right sided kidney stones    Vitamin D deficiency      Present on Admission:   Polymyalgia rheumatica (HCC)   Seronegative rheumatoid arthritis ([address] )   Sepsis, unspecified organism ([address] )   Atrial flutter with rapid ventricular response ([facility] [address] )      Admitting Diagnosis: Weakness [R53 1]  Atrial flutter with rapid ventricular response ([address] ) [I48 92]  Flu-like symptoms [R68 89]  Age/Sex: 81 y o  female  Admission Orders:  Continuous CPR monitoring;  SCD's' PT/OT eval and treat;  Cardiac diet;  CONTINUOUS Cardizem gtt     Scheduled Medications:    Medications:  ascorbic acid 500 mg Oral Daily   calcium carbonate-vitamin D 1 tablet Oral Daily   cefepime 2,000 mg Intravenous Q12H   cholecalciferol 1,000 Units Oral Daily   cyanocobalamin 500 mcg Oral Daily   folic acid 1 mg Oral Daily   [START ON 12/16/2019] influenza vaccine 0 5 mL Intramuscular Once   levothyroxine 75 mcg Oral Early Morning   metoprolol tartrate 25 mg Oral TID   multivitamin-minerals 1 tablet Oral Daily   rivaroxaban 20 mg Oral Daily     Continuous IV Infusions:    diltiazem 1-15 mg/hr Intravenous Titrated     PRN Meds:    acetaminophen 650 mg Oral Q6H PRN   aluminum-magnesium hydroxide-simethicone 30 mL Oral Q6H PRN   docusate sodium 100 mg Oral BID PRN   ketorolac 15 mg Intravenous Q6H PRN   ondansetron 4 mg Intravenous Q6H PRN         Network Utilization Review Department  [email or web address]  ATTENTION: Please call with any questions or concerns to [phone] and carefully listen to the prompts so that you are directed to the right person  All voicemails are confidential   Evon Alvarez all requests for admission clinical reviews, approved or denied determinations and any other requests to dedicated fax number below belonging to the campus where the patient is receiving treatment  List of dedicated fax numbers for the Facilities:  FACILITY NAME UR FAX NUMBER   ADMISSION DENIALS (Administrative/Medical Necessity) [phone]   [address] (Maternity/NICU/Pediatrics) [phone]   Rebecca Velásquez [phone]   Titi Coon [phone]   [facility] [phone]   [address] [place] [address] [address] [place] [address] [phone]     [address] [phone]

## 2019-12-15 NOTE — PHYSICAL THERAPY NOTE
PT Evaluation        12/15/19 1201   Note Type   Note type Eval/Treat  (Simultaneous filing  User may not have seen previous data )   Pain Assessment   Pain Assessment No/denies pain   Home Living   Type of [address] Two level;Performs ADLs on one level; Able to live on main level with bedroom/bathroom;Stairs to enter with rails; Other (Comment)  (3-5 JUANIS c x2 HR; bathroom in basement)   Bathroom Shower/Tub Tub only   Bathroom Toilet Standard   Bathroom Accessibility Accessible   Home Equipment Other (Comment)  (no DME)   Additional Comments pt reports no device at baseline during functional mobility  (Simultaneous filing  User may not have seen previous data )   Prior Function   Level of [place] Independent with ADLs and functional mobility   Lives With Spouse   ADL Assistance Independent   IADLs Independent   Falls in the last 6 months 0   Comments pt is (I) with driving  (Simultaneous filing  User may not have seen previous data )   Restrictions/Precautions   Weight Bearing Precautions Per Order No  (Simultaneous filing  User may not have seen previous data )   Other Precautions Multiple lines;Telemetry;O2;Fall Risk  (Simultaneous filing  User may not have seen previous data )   Cognition   Overall Cognitive Status WFL  (Simultaneous filing  User may not have seen previous data )   Arousal/Participation Alert   Orientation Level Oriented X4  (Simultaneous filing  User may not have seen previous data )   Memory Within functional limits  (Simultaneous filing  User may not have seen previous data )   Following Commands Follows all commands and directions without difficulty  (Simultaneous filing  User may not have seen previous data )   RUE Assessment   RUE Assessment WFL   LUE Assessment   LUE Assessment WFL   RLE Assessment   RLE Assessment WFL   LLE Assessment   LLE Assessment WFL   Bed Mobility   Supine to Sit 5  Supervision  (Simultaneous filing   User may not have seen previous data ) Additional items Bedrails; Increased time required  (Simultaneous filing  User may not have seen previous data )   Sit to Supine   (seated in chair at end of session)   Additional Comments pt on 2L O2 initially; SpO2 99% and trialed off O2, SpO2 at 98% at end of session  (Simultaneous filing  User may not have seen previous data )   Transfers   Sit to Stand 5  Supervision  (Simultaneous filing  User may not have seen previous data )   Stand to Sit 5  Supervision  (Simultaneous filing  User may not have seen previous data )   Additional Comments no device; no LOB or instability   Ambulation/Elevation   Gait pattern WNL   Gait Assistance 5  Supervision   Assistive Device None   Distance 200 feet   Balance   Static Sitting Good  (Simultaneous filing  User may not have seen previous data )   Dynamic Sitting Good  (Simultaneous filing  User may not have seen previous data )   Static Standing Fair +  (Simultaneous filing  User may not have seen previous data )   Dynamic Standing Fair +  (Simultaneous filing  User may not have seen previous data )   Ambulatory Fair +  (Simultaneous filing  User may not have seen previous data )   Activity Tolerance   Activity Tolerance Patient limited by fatigue   Assessment   Prognosis Good   Problem List Decreased strength;Decreased range of motion;Decreased endurance; Impaired balance;Decreased mobility; Decreased coordination   Assessment Pt is 81 y o  female seen for PT evaluation s/p admit to [address] on 12/14/2019 w/ Sepsis, unspecified organism ([facility] [address] )  PT consulted to assess pt's functional mobility and d/c needs  Order placed for PT eval and tx, w/ up and OOB as tolerated order  Comorbidities affecting pt's physical performance at time of assessment include: Sepsis, polymyalgia, rheumatica, and Atrial flutter  PTA, pt was independent w/ all functional mobility w/ No AD  Personal factors affecting pt at time of IE include: unable to perform dynamic tasks in community   Please find objective findings from PT assessment regarding body systems outlined above with impairments and limitations including weakness, decreased ROM, impaired balance, decreased endurance, impaired coordination, gait deviations, decreased activity tolerance and decreased functional mobility tolerance  Pt to benefit from continued PT tx to address deficits as defined above and maximize level of functional independent mobility and consistency  From PT/mobility standpoint, recommendation at time of d/c would be home with family support 24/7 pending progress in order to facilitate return to PLOF  Goals   Patient Goals to go home    LTG Expiration Date 12/29/19   Long Term Goal #1 Pt will be (I) with all transfers and bed mobility    Long Term Goal #2 Pt will safely ambulate 500 feet to facilitate (I) at home and in the community    Plan   Treatment/Interventions Functional transfer training;LE strengthening/ROM; Elevations; Therapeutic exercise; Bed mobility;Gait training   PT Frequency 2-3x/wk   Recommendation   Recommendation Home with family support   PT - OK to Discharge Yes   Additional Comments when medically stable     Pt in bed when PT entered   Pt seated in chair when PT left, all lines intact, all needs with in reach

## 2019-12-16 ENCOUNTER — APPOINTMENT (INPATIENT)
Dept: NON INVASIVE DIAGNOSTICS | Facility: HOSPITAL | Age: 69
DRG: 308 | End: 2019-12-16
Payer: COMMERCIAL

## 2019-12-16 ENCOUNTER — ANESTHESIA EVENT (INPATIENT)
Dept: PERIOP | Facility: HOSPITAL | Age: 69
DRG: 308 | End: 2019-12-16
Payer: COMMERCIAL

## 2019-12-16 LAB
ALBUMIN SERPL BCP-MCNC: 2.6 G/DL (ref 3.5–5)
ALP SERPL-CCNC: 73 U/L (ref 46–116)
ALT SERPL W P-5'-P-CCNC: 18 U/L (ref 12–78)
ANION GAP SERPL CALCULATED.3IONS-SCNC: 7 MMOL/L (ref 4–13)
AST SERPL W P-5'-P-CCNC: 16 U/L (ref 5–45)
ATRIAL RATE: 300 BPM
BASOPHILS # BLD AUTO: 0.02 THOUSANDS/ΜL (ref 0–0.1)
BASOPHILS NFR BLD AUTO: 0 % (ref 0–1)
BILIRUB SERPL-MCNC: 0.2 MG/DL (ref 0.2–1)
BUN SERPL-MCNC: 17 MG/DL (ref 5–25)
CALCIUM SERPL-MCNC: 10.1 MG/DL (ref 8.3–10.1)
CHLORIDE SERPL-SCNC: 106 MMOL/L (ref 100–108)
CO2 SERPL-SCNC: 28 MMOL/L (ref 21–32)
CREAT SERPL-MCNC: 0.57 MG/DL (ref 0.6–1.3)
EOSINOPHIL # BLD AUTO: 0 THOUSAND/ΜL (ref 0–0.61)
EOSINOPHIL NFR BLD AUTO: 0 % (ref 0–6)
ERYTHROCYTE [DISTWIDTH] IN BLOOD BY AUTOMATED COUNT: 13.8 % (ref 11.6–15.1)
GFR SERPL CREATININE-BSD FRML MDRD: 96 ML/MIN/1.73SQ M
GLUCOSE SERPL-MCNC: 122 MG/DL (ref 65–140)
HCT VFR BLD AUTO: 36.6 % (ref 34.8–46.1)
HGB BLD-MCNC: 11.5 G/DL (ref 11.5–15.4)
IMM GRANULOCYTES # BLD AUTO: 0.15 THOUSAND/UL (ref 0–0.2)
IMM GRANULOCYTES NFR BLD AUTO: 1 % (ref 0–2)
LYMPHOCYTES # BLD AUTO: 1.2 THOUSANDS/ΜL (ref 0.6–4.47)
LYMPHOCYTES NFR BLD AUTO: 7 % (ref 14–44)
MCH RBC QN AUTO: 28.5 PG (ref 26.8–34.3)
MCHC RBC AUTO-ENTMCNC: 31.4 G/DL (ref 31.4–37.4)
MCV RBC AUTO: 91 FL (ref 82–98)
MONOCYTES # BLD AUTO: 0.59 THOUSAND/ΜL (ref 0.17–1.22)
MONOCYTES NFR BLD AUTO: 4 % (ref 4–12)
NEUTROPHILS # BLD AUTO: 14.22 THOUSANDS/ΜL (ref 1.85–7.62)
NEUTS SEG NFR BLD AUTO: 88 % (ref 43–75)
NRBC BLD AUTO-RTO: 0 /100 WBCS
P AXIS: 102 DEGREES
PLATELET # BLD AUTO: 393 THOUSANDS/UL (ref 149–390)
PMV BLD AUTO: 9.2 FL (ref 8.9–12.7)
POTASSIUM SERPL-SCNC: 4.2 MMOL/L (ref 3.5–5.3)
PROCALCITONIN SERPL-MCNC: 0.15 NG/ML
PROT SERPL-MCNC: 6.5 G/DL (ref 6.4–8.2)
QRS AXIS: -40 DEGREES
QRSD INTERVAL: 110 MS
QT INTERVAL: 360 MS
QTC INTERVAL: 515 MS
RBC # BLD AUTO: 4.03 MILLION/UL (ref 3.81–5.12)
SODIUM SERPL-SCNC: 141 MMOL/L (ref 136–145)
T WAVE AXIS: 161 DEGREES
TSH SERPL DL<=0.05 MIU/L-ACNC: 1.82 UIU/ML (ref 0.36–3.74)
VENTRICULAR RATE: 123 BPM
WBC # BLD AUTO: 16.18 THOUSAND/UL (ref 4.31–10.16)

## 2019-12-16 PROCEDURE — 99232 SBSQ HOSP IP/OBS MODERATE 35: CPT | Performed by: PHYSICIAN ASSISTANT

## 2019-12-16 PROCEDURE — 90662 IIV NO PRSV INCREASED AG IM: CPT | Performed by: PHYSICIAN ASSISTANT

## 2019-12-16 PROCEDURE — 93306 TTE W/DOPPLER COMPLETE: CPT

## 2019-12-16 PROCEDURE — 85025 COMPLETE CBC W/AUTO DIFF WBC: CPT | Performed by: PHYSICIAN ASSISTANT

## 2019-12-16 PROCEDURE — 93306 TTE W/DOPPLER COMPLETE: CPT | Performed by: INTERNAL MEDICINE

## 2019-12-16 PROCEDURE — 97116 GAIT TRAINING THERAPY: CPT

## 2019-12-16 PROCEDURE — 99223 1ST HOSP IP/OBS HIGH 75: CPT | Performed by: INTERNAL MEDICINE

## 2019-12-16 PROCEDURE — 93010 ELECTROCARDIOGRAM REPORT: CPT | Performed by: INTERNAL MEDICINE

## 2019-12-16 PROCEDURE — G0008 ADMIN INFLUENZA VIRUS VAC: HCPCS | Performed by: PHYSICIAN ASSISTANT

## 2019-12-16 PROCEDURE — 80053 COMPREHEN METABOLIC PANEL: CPT | Performed by: PHYSICIAN ASSISTANT

## 2019-12-16 PROCEDURE — 84145 PROCALCITONIN (PCT): CPT | Performed by: PHYSICIAN ASSISTANT

## 2019-12-16 RX ORDER — DILTIAZEM HYDROCHLORIDE 5 MG/ML
5 INJECTION INTRAVENOUS ONCE
Status: COMPLETED | OUTPATIENT
Start: 2019-12-16 | End: 2019-12-16

## 2019-12-16 RX ADMIN — RIVAROXABAN 20 MG: 10 TABLET, FILM COATED ORAL at 08:22

## 2019-12-16 RX ADMIN — FOLIC ACID 1 MG: 1 TABLET ORAL at 08:23

## 2019-12-16 RX ADMIN — CEFEPIME HYDROCHLORIDE 2000 MG: 2 INJECTION, SOLUTION INTRAVENOUS at 20:23

## 2019-12-16 RX ADMIN — METOPROLOL TARTRATE 25 MG: 25 TABLET, FILM COATED ORAL at 16:08

## 2019-12-16 RX ADMIN — CYANOCOBALAMIN TAB 500 MCG 500 MCG: 500 TAB at 08:23

## 2019-12-16 RX ADMIN — DILTIAZEM HYDROCHLORIDE 30 MG: 30 TABLET, FILM COATED ORAL at 08:59

## 2019-12-16 RX ADMIN — DILTIAZEM HYDROCHLORIDE 5 MG: 5 INJECTION INTRAVENOUS at 08:59

## 2019-12-16 RX ADMIN — DILTIAZEM HYDROCHLORIDE 30 MG: 30 TABLET, FILM COATED ORAL at 13:23

## 2019-12-16 RX ADMIN — MULTIPLE VITAMINS W/ MINERALS TAB 1 TABLET: TAB at 08:23

## 2019-12-16 RX ADMIN — VITAMIN D, TAB 1000IU (100/BT) 1000 UNITS: 25 TAB at 08:27

## 2019-12-16 RX ADMIN — CEFEPIME HYDROCHLORIDE 2000 MG: 2 INJECTION, SOLUTION INTRAVENOUS at 06:32

## 2019-12-16 RX ADMIN — INFLUENZA A VIRUS A/MICHIGAN/45/2015 X-275 (H1N1) ANTIGEN (FORMALDEHYDE INACTIVATED), INFLUENZA A VIRUS A/SINGAPORE/INFIMH-16-0019/2016 IVR-186 (H3N2) ANTIGEN (FORMALDEHYDE INACTIVATED), AND INFLUENZA B VIRUS B/MARYLAND/15/2016 BX-69A (A B/COLORADO/6/2017-LIKE VIRUS) ANTIGEN (FORMALDEHYDE INACTIVATED) 0.5 ML: 60; 60; 60 INJECTION, SUSPENSION INTRAMUSCULAR at 15:01

## 2019-12-16 RX ADMIN — METOPROLOL TARTRATE 25 MG: 25 TABLET, FILM COATED ORAL at 21:07

## 2019-12-16 RX ADMIN — METOPROLOL TARTRATE 25 MG: 25 TABLET, FILM COATED ORAL at 08:23

## 2019-12-16 RX ADMIN — OXYCODONE HYDROCHLORIDE AND ACETAMINOPHEN 500 MG: 500 TABLET ORAL at 08:23

## 2019-12-16 RX ADMIN — Medication 1 TABLET: at 08:23

## 2019-12-16 RX ADMIN — LEVOTHYROXINE SODIUM 75 MCG: 75 TABLET ORAL at 06:27

## 2019-12-16 NOTE — CONSULTS
Consultation - Cardiology   Elizabeth Allen 81 y o  female MRN: [id number]  Unit/Bed#:  Encounter: [id number]    Consults      Physician Requesting Consult: Mike Diaz MD  Reason for Consult / Principal Problem: atrial flutter with rapid ventricular response    Assessment/Plan:  1  Paroxysmal atrial flutter/atrial fibrillation   - remains in atrial flutter with slightly elevated heart rates,  bpm   - PO cardizem was initiated in addition to PO metoprolol this AM   - patient remains asymptomatic   - discussed options of continued medical management vs  Cardioversion with patient    - risks and benefits of the procedure were explained   - patient is agreeable to a cardioversion tomorrow morning    - will make NPO after midnight   - she has been compliant with Xarelto   - repeat echo results pending    - TSH pending    2  Sepsis    - likely secondary to viral infection   - management per primary team    3  History of DVT/PE   - continue anticoagulation with Xarelto     History of Present Illness   HPI: Elizabeth Allen is a 81y o  year old female who has paroxysmal atrial flutter/atrial fibrillation on anticoagulation, prior history of DVT/PE, prior pericardial effusion s/p pericardiocentesis, and rheumatoid arthritis who follows with [facility]  Patient presented to the emergency department over the weekend for the evaluation of flu like symptoms  One week prior patient was seen for similar symptoms at a local urgent care and was Tamiflu and a Z-Wes  She states by last Wednesday she felt improved, but this weekend she woke up with a sore throat, fever, and body aches which caused her to present back to the ER for further evaluation and management  She has chronic dyspnea on exertion for which she sees a pulmonologist  She denies any chest pain/pressure/discomfort  There has been no palpitations  Since admission she has had a flu swab which was negative   WBC was mildly elevated, procalcitonin wnl  She was placed on IV antibiotics  Upon admission she was found to be in atrial flutter with RVR and was placed on a cardizem drip  Her heart rate became better controlled last night and the drip was discontinued  This morning she received IV cardizem and PO cardizem and is maintaining a heart rate in the 80's-120's  Cardiology was consulted for further evaluation and management  Currently she is resting comfortably in bed  She feels symptomatically improved  She has no cardiopulmonary symptoms at present  Review of Systems   Constitution: Negative for chills and fever  HENT: Negative  Cardiovascular: Positive for leg swelling (chronic)  Negative for chest pain, dyspnea on exertion, orthopnea, palpitations, paroxysmal nocturnal dyspnea and syncope  Respiratory: Negative for cough and shortness of breath  Gastrointestinal: Negative for diarrhea, nausea and vomiting  Genitourinary: Negative  Neurological: Negative for dizziness and light-headedness  All other systems reviewed and are negative      Historical Information   Past Medical History:   Diagnosis Date    Arthritis     Atrial fibrillation ([facility] )     Disease of thyroid gland     DVT (deep venous thrombosis) (HCC)     Lyme disease     Migraine     Polymyalgia rheumatica (HCC)     Pulmonary emboli (HCC)     Renal disorder     right sided kidney stones    Vitamin D deficiency      Past Surgical History:   Procedure Laterality Date    CARDIAC CATHETERIZATION       SECTION      x3 - last impression 16    FRACTURE SURGERY Left     wrist    HERNIA REPAIR  2018    KNEE CARTILAGE SURGERY Left     TONSILECTOMY AND ADNOIDECTOMY      VEIN SURGERY      venous ligation with stripping    WRIST SURGERY Left     ORIF wrist - last impression 16     Social History     Substance and Sexual Activity   Alcohol Use Yes    Frequency: Monthly or less    Comment: occasionally; social Social History     Substance and Sexual Activity   Drug Use No     Social History     Tobacco Use   Smoking Status Never Smoker   Smokeless Tobacco Never Used     Family History: non-contributory    Meds/Allergies   all current active meds have been reviewed       Allergies   Allergen Reactions    Amiodarone      Other reaction(s): Other (See Comments)  Reports caused elevated TSH    Sulfa Antibiotics Itching and Rash    Sulfamethoxazole-Trimethoprim Itching and Rash       Objective   Vitals: Blood pressure 98/67, pulse (!) 125, temperature 97 7 °F (36 5 °C), temperature source Tympanic, resp  rate 20, height 5' 2" (1 575 m), weight 68 7 kg (151 lb 7 3 oz), SpO2 94 %  , Body mass index is 27 7 kg/m² ,   Orthostatic Blood Pressures      Most Recent Value   Blood Pressure  98/67 filed at 12/16/2019 0702   Patient Position - Orthostatic VS  Lying filed at 12/16/2019 0380        Systolic (13VAH), SXN:61 , Min:85 , CWS:113     Diastolic (24CVO), XAW:58, Min:50, Max:84      Intake/Output Summary (Last 24 hours) at 12/16/2019 1027  Last data filed at 12/16/2019 0859  Gross per 24 hour   Intake 2000 ml   Output 1850 ml   Net 150 ml       Weight (last 2 days)     Date/Time   Weight    12/15/19 0632   68 7 (151 46)    12/14/19 1859   70 (154 32)              Invasive Devices     Peripheral Intravenous Line            Peripheral IV 12/14/19 Left Wrist 1 day    Peripheral IV 12/14/19 Right Antecubital 1 day                  Physical Exam   Constitutional: She is oriented to person, place, and time  No distress  HENT:   Head: Normocephalic and atraumatic  Eyes: Pupils are equal, round, and reactive to light  Neck: Normal range of motion  JVD present  Carotid bruit is not present  Cardiovascular: Normal rate, regular rhythm, S1 normal and S2 normal    No murmur heard  Pulses:       Radial pulses are 2+ on the right side, and 2+ on the left side          Dorsalis pedis pulses are 2+ on the right side, and 2+ on the left side  Pulmonary/Chest: Effort normal  No respiratory distress  She has no wheezes  She has rales  Abdominal: Soft  She exhibits no distension  Musculoskeletal: Normal range of motion  She exhibits edema (trace edema noted in bilateral lower extremities)  She exhibits no deformity  Neurological: She is alert and oriented to person, place, and time  Skin: Skin is warm and dry  She is not diaphoretic  No erythema  Psychiatric: She has a normal mood and affect  Her behavior is normal    Vitals reviewed  Laboratory Results:  Results from last 7 days   Lab Units 12/14/19 2011   CK TOTAL U/L 13*   TROPONIN I ng/mL <0 02       CBC with diff:   Results from last 7 days   Lab Units 12/15/19  0746 12/14/19 2011   WBC Thousand/uL 11 94* 13 29*   HEMOGLOBIN g/dL 10 3* 12 4   HEMATOCRIT % 33 4* 39 4   MCV fL 91 89   PLATELETS Thousands/uL 319 377   MCH pg 28 0 28 0   MCHC g/dL 30 8* 31 5   RDW % 13 8 13 6   MPV fL 8 5* 8 4*   NRBC AUTO /100 WBCs 0 0         CMP:  Results from last 7 days   Lab Units 12/15/19  0746 12/14/19 2011   POTASSIUM mmol/L 3 5 3 4*   CHLORIDE mmol/L 108 107   CO2 mmol/L 27 26   BUN mg/dL 16 15   CREATININE mg/dL 0 49* 0 55*   CALCIUM mg/dL 8 6 9 0   AST U/L 15 13   ALT U/L 13 15   ALK PHOS U/L 60 65   EGFR ml/min/1 73sq m 101 97         BMP:  Results from last 7 days   Lab Units 12/15/19  0746 12/14/19 2011   POTASSIUM mmol/L 3 5 3 4*   CHLORIDE mmol/L 108 107   CO2 mmol/L 27 26   BUN mg/dL 16 15   CREATININE mg/dL 0 49* 0 55*   CALCIUM mg/dL 8 6 9 0       BNP:  No results for input(s): BNP in the last 72 hours      Magnesium:   Results from last 7 days   Lab Units 12/15/19  0746 12/14/19 2011   MAGNESIUM mg/dL 1 8 1 7       Coags:   Results from last 7 days   Lab Units 12/14/19 2011   PTT seconds 35   INR  1 83*       TSH:       Hemoglobin A1C       Lipid Profile:         Cardiac testing:   Results for orders placed during the hospital encounter of 01/10/17   Echo complete with contrast if indicated    Narrative [address] [address]  [address], [address] [address]  [phone]    Transthoracic Echocardiogram  2D, M-mode, Doppler, and Color Doppler    Study date:  2017    Patient: Alyx Verma  MR number: [id number]  Account number: [de-identified]  : 1950  Age: 61 years  Gender: Female  Status: Inpatient  Location: Bedside  Height: 63 in  Weight: 164 lb  BP: 116/ 60 mmHg    Indications: pulmonary embolism    Diagnoses: 415 19 - PULM EMBOL/INFARCT NEC    Sonographer:  TAMMY Sheth  Primary Physician:  Antonio Chapa DO  Referring Physician:  Susana Sherman MD  Group:  [id number]  Interpreting Physician:  Jigar Vidal MD    SUMMARY    LEFT VENTRICLE:  Size was normal   Systolic function was normal  Ejection fraction was estimated to be 55 %  There were no regional wall motion abnormalities  Wall thickness was normal     TRICUSPID VALVE:  There was mild regurgitation  HISTORY: PRIOR HISTORY: Patient has no history of cardiovascular disease  PROCEDURE: The procedure was performed at the bedside  This was a routine  study  The transthoracic approach was used  The study included complete 2D  imaging, M-mode, complete spectral Doppler, and color Doppler  The heart rate  was 80 bpm, at the start of the study  Image quality was adequate  LEFT VENTRICLE: Size was normal  Systolic function was normal  Ejection  fraction was estimated to be 55 %  There were no regional wall motion  abnormalities  Wall thickness was normal     RIGHT VENTRICLE: The size was normal  Systolic function was normal  Wall  thickness was normal     LEFT ATRIUM: Size was normal     RIGHT ATRIUM: Size was normal     MITRAL VALVE: Valve structure was normal  There was normal leaflet separation  DOPPLER: The transmitral velocity was within the normal range  There was no  evidence for stenosis   There was no significant regurgitation  AORTIC VALVE: The valve was trileaflet  Leaflets exhibited normal thickness and  normal cuspal separation  DOPPLER: Transaortic velocity was within the normal  range  There was no evidence for stenosis  There was no regurgitation  TRICUSPID VALVE: The valve structure was normal  There was normal leaflet  separation  DOPPLER: The transtricuspid velocity was within the normal range  There was no evidence for stenosis  There was mild regurgitation  PULMONIC VALVE: Leaflets exhibited normal thickness, no calcification, and  normal cuspal separation  DOPPLER: The transpulmonic velocity was within the  normal range  There was no regurgitation  PERICARDIUM: There was no pericardial effusion  The pericardium was normal in  appearance  AORTA: The root exhibited normal size  SYSTEM MEASUREMENT TABLES    2D  %FS: 29 65 %  AV Diam: 3 18 cm  EDV(Teich): 102 22 ml  EF(Teich): 56 69 %  ESV(Teich): 44 27 ml  IVSd: 0 97 cm  LA Area: 17 6 cm2  LA Diam: 3 47 cm  LVEDV MOD A4C: 45 17 ml  LVEF MOD A4C: 59 29 %  LVESV MOD A4C: 18 39 ml  LVIDd: 4 7 cm  LVIDs: 3 3 cm  LVLd A4C: 6 77 cm  LVLs A4C: 5 24 cm  LVPWd: 1 cm  RA Area: 14 01 cm2  RV DIAM: 3 22 cm  SV MOD A4C: 26 78 ml  SV(Teich): 57 95 ml    CW  AV Vmax: 1 73 m/s  AV maxP 03 mmHg  TR Vmax: 2 39 m/s  TR maxP 76 mmHg    MM  TAPSE: 3 13 cm    PW  E': 0 07 m/s  E/E': 14 22  LVOT Vmax: 1 15 m/s  LVOT maxP 29 mmHg  MV A Lenny: 1 2 m/s  MV Dec Florence: 5 54 m/s2  MV DecT: 178 35 ms  MV E Lenny: 0 99 m/s  MV E/A Ratio: 0 82    [employer] Accredited Echocardiography Laboratory    Prepared and electronically signed by    Maddy Herzog MD  Signed 2017 10:48:01       No results found for this or any previous visit  No results found for this or any previous visit  No results found for this or any previous visit  Imaging: I have personally reviewed pertinent reports      X-ray Chest 2 Views    Result Date: 12/15/2019  Narrative: CHEST INDICATION:   chest pain  COMPARISON:  December 6, 2019 EXAM PERFORMED/VIEWS:  XR CHEST PA & LATERAL Images: 2 FINDINGS: Cardiomegaly seen  Small bilateral effusion, unchanged from the previous study Mild increased lung markings No acute consolidation Osseous structures appear within normal limits for patient age  Impression: No acute consolidation Small bilateral effusion, unchanged from the previous study Mild increased lung markings may be due to mild congestion The lungs appear mildly hazy as compared to previous study this may be due to technique or due to mild congestion Workstation performed: [id number]     Xr Chest Pa & Lateral    Result Date: 12/6/2019  Narrative: CHEST INDICATION:   R05: Cough  COMPARISON:  May 26, 2017 EXAM PERFORMED/VIEWS:  XR CHEST PA & LATERAL FINDINGS: Cardiomediastinal silhouette appears unremarkable  Blunting costophrenic angle bilaterally likely pleural thickening  Osseous structures appear within normal limits for patient age  Impression: No acute cardiopulmonary disease  Pleural thickening at the costophrenic angle bilaterally  Workstation performed: [id number]     Us Thyroid    Result Date: 12/4/2019  Narrative: THYROID ULTRASOUND INDICATION:    E03 9: Hypothyroidism, unspecified E04 1: Nontoxic single thyroid nodule  COMPARISON:  11/6/2018, 11/29/2017  TECHNIQUE:   Ultrasound of the thyroid was performed with a high frequency linear transducer in transverse and sagittal planes including volumetric imaging sweeps as well as traditional still imaging technique  FINDINGS:  Thyroid parenchyma is diffusely heterogeneous in echotexture  Right lobe:  2 8 x 1 2 x 1 0 cm  Left lobe:  2 3 x 0 8 x 1 1 cm  Isthmus:  0 1 cm  Nodule #1  LEFT midgland nodule measuring 0 8 x 0 5 x 0 7 cm  Unchanged from prior  COMPOSITION:  2 points, solid or almost completely solid   ECHOGENICITY:  1 point, hyperechoic or isoechoic  SHAPE:  0 points, wider-than-tall  MARGIN: 0 points, ill-defined  ECHOGENIC FOCI:  0 points, none or large comet-tail artifacts  TI-RADS Classification: TR 3 (3 points), Mildly suspicious  FNA if >2 5 cm  Follow if >1 5 cm  Impression: Diminutive, heterogeneous thyroid gland with stable left lobe nodule  No nodule meets current ACR criteria for requiring biopsy but followup ultrasound is recommended in 1 year  Reference: ACR Thyroid Imaging, Reporting and Data System (TI-RADS): White Paper of the Datical  J AM Cherelle Radiol 4293;70:128-399  (additional recommendations based on American Thyroid Association 2015 guidelines ) Workstation performed: [id number]     Ct Pe Study W Abdomen Pelvis W Contrast    Result Date: 12/14/2019  Narrative: CT PULMONARY ANGIOGRAM OF THE CHEST AND CT ABDOMEN AND PELVIS WITH INTRAVENOUS CONTRAST INDICATION:   PE suspected, high pretest prob  Weakness  COMPARISON:  CT of abdomen pelvis on January 13, 2018  TECHNIQUE:  CT examination of the chest, abdomen and pelvis was performed  Thin section CT angiographic technique was used in the chest in order to evaluate for pulmonary embolus and coronal 3D MIP postprocessing was performed on the acquisition scanner  Axial, sagittal, and coronal 2D reformatted images were created from the source data and submitted for interpretation  Radiation dose length product (DLP) for this visit:  881 65 mGy-cm   This examination, like all CT scans performed in the [facility], was performed utilizing techniques to minimize radiation dose exposure, including the use of iterative  reconstruction and automated exposure control  IV Contrast:  100 mL of iohexol (OMNIPAQUE)  was administered intravenously without immediate adverse reaction  Enteric Contrast:  Enteric contrast was not administered  FINDINGS: CHEST PULMONARY ARTERIAL TREE:  No pulmonary embolus is seen    Enlargement pulmonary artery measuring up to 3 9 cm in diameter which may be seen in setting of pulmonary hypertension  LUNGS:  Scattered atelectatic changes  No focal consolidation  Central airways are patent  PLEURA:  Right greater than left small pleural effusions with adjacent compressive atelectasis  HEART/AORTA: Mild cardiac enlargement  Coronary artery calcifications  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  MEDIASTINUM AND CHARLIE:  Mediastinal lymph nodes measure up to 7 mm in short axis  CHEST WALL AND LOWER NECK:   Unremarkable  ABDOMEN LIVER/BILIARY TREE:  Unremarkable  GALLBLADDER:  No calcified gallstones  No pericholecystic inflammatory change  SPLEEN:  Unremarkable  PANCREAS:  Unremarkable  ADRENAL GLANDS:  Unremarkable  KIDNEYS/URETERS:  One or more sharply circumscribed subcentimeter renal hypodensities are noted  These lesions are too small to accurately characterize, but are statistically most likely to represent benign cortical renal cyst(s)  According to the guidelines published in the Dl Quiroz Paper of the ACR Incidental Findings Committee (Radiology 2010), no further workup of these lesions is recommended  No hydronephrosis  STOMACH AND BOWEL:  The esophagus is patulous and thickened which may be due to gastroesophageal reflux or esophageal dysmotility  Thickening gastroesophageal junction  No bowel obstruction  APPENDIX:  A normal appendix was visualized  ABDOMINOPELVIC CAVITY:  No ascites or free intraperitoneal air  No lymphadenopathy  VESSELS:  Moderate atherosclerotic calcifications  PELVIS REPRODUCTIVE ORGANS:  Unremarkable for patient's age  URINARY BLADDER:  Unremarkable  ABDOMINAL WALL/INGUINAL REGIONS:  Postsurgical changes of ventral abdominal wall hernia repair  There is a 3 8 x 1 1 x 3 2 cm fluid collection in the area of a previously seen ventral abdominal wall hernia which may represent a seroma  OSSEOUS STRUCTURES:  No acute fracture or destructive osseous lesion  Degenerative changes of the osseous structures    Grade 1 anterolisthesis of L5 on S1  Impression: 1  No evidence of pulmonary embolism  2   Enlargement of the main pulmonary artery which may be seen in the setting of pulmonary hypertension  3   Right greater than left small pleural effusions with adjacent compressive atelectasis  4   Duplicated left superior vena cava  5   The esophagus and gastroesophageal junction is patulous and thickened which may be due to gastroesophageal reflux or esophageal dysmotility  Correlate with endoscopy  6   3 8 x 1 1 x 3 2 cm fluid collection in the area of a ventral wall hernia repair which may represent a seroma  Workstation performed: [id number]     Mammo Screening Bilateral W 3d & Cad    Result Date: 12/12/2019  Narrative: DIAGNOSIS: Encounter for screening mammogram for breast cancer TECHNIQUE: Digital screening mammography was performed  Computer Aided Detection (CAD) analyzed all applicable images  COMPARISONS: Prior breast imaging dated: 11/01/2018, 10/27/2017, 11/10/2016, 10/24/2016, and 10/16/2014 RELEVANT HISTORY: Family Breast Cancer History: History of breast cancer in Mother  Family Medical History: Family medical history includes breast cancer in mother  Personal History: No known relevant hormone history  No known relevant surgical history  No known relevant medical history  The patient is scheduled in a reminder system for screening mammography  8-10% of cancers will be missed on mammography  Management of a palpable abnormality must be based on clinical grounds  Patients will be notified of their results via letter from our facility  Accredited by [employer] of Radiology and FDA  RISK ASSESSMENT: 5 Year Tyrer-Cuzick: 2 64 % 10 Year Tyrer-Cuzick: 5 16 % Lifetime Tyrer-Cuzick: 9 73 % TISSUE DENSITY: There are scattered areas of fibroglandular density  INDICATION: Paddy Araujo is a 81 y o  female presenting for screening mammography   FINDINGS: There are no suspicious masses, grouped microcalcifications or areas of architectural distortion  The skin and nipple areolar complex are unremarkable  Impression: No mammographic evidence of malignancy  ASSESSMENT/BI-RADS CATEGORY: Left: 1 - Negative Right: 1 - Negative Overall: 1 - Negative RECOMMENDATION:      - Routine screening mammogram in 1 year for both breasts   Workstation ID: [id number]       EKG reviewed personally: atrial flutter with RVR  Telemetry reviewed personally: currently atrial flutter, heart rates between     Assessment:  Principal Problem:    Sepsis, unspecified organism ([facility] )  Active Problems:    Polymyalgia rheumatica ([facility] )    Seronegative rheumatoid arthritis ([facility] )    Atrial flutter with rapid ventricular response ([facility] )        Code Status: Level 1 - Full Code

## 2019-12-16 NOTE — SOCIAL WORK
Chart reviewed by Cm, assessment was completed at the [facility] in ICU with the pt and  present, pt to be transferred to the 4th floor today, pt is independent and drives, she lives with her  in a 3 story home, 12-14 steps to the basement and 2nd floor, 3 steps in the front outside & 5 steps in the back outside, pt does use the back to enter he home, pt has no dme equipment, no hhc, denies smoking and states she drinks alcohol on rare occasions, pt has a rx plan at [facility], pt denies any d/c needs, pt's  will transport the pt home when stable for d/c , pt is for a cardio version today, no d/c today as discussed at care coordination rounds today, cm will continue to follow and assess for any additional d/c needs  Patient/caregiver received discharge checklist   Content reviewed  Patient/caregiver encouraged to participate in discharge plan of care prior to discharge home   CM reviewed d/c planning process including the following: identifying help at home, patient preference for d/c planning needs, availability of treatment team to discuss questions or concerns patient and/or family may have regarding understanding medications and recognizing signs and symptoms once discharged  CM also encouraged patient to follow up with all recommended appointments after discharge  Patient advised of importance for patient and family to participate in managing patients medical well being

## 2019-12-16 NOTE — PHYSICAL THERAPY NOTE
PHYSICAL THERAPY NOTE          Patient Name: Nyla John  [id number] Date: 12/16/2019 12/16/19 1057   Restrictions/Precautions   Other Precautions Multiple lines;Telemetry   Subjective   Subjective States she is feeling better   Transfers   Sit to Stand 5  Supervision   Additional items Armrests   Stand to Sit 5  Supervision   Ambulation/Elevation   Gait pattern WNL   Gait Assistance 5  Supervision   Assistive Device None   Distance 400'   Balance   Ambulatory Fair +   Endurance Deficit   Endurance Deficit Yes   Activity Tolerance   Activity Tolerance Patient limited by fatigue   Assessment   Prognosis Good   Problem List Decreased endurance; Impaired balance   Assessment Pt  ambulating with supervision 400' without device  c/o knee discomfort which decreased as she ambulated  's with exertion  From PT/mobility standpoint, recommendation at time of d/c would be home with family support in order to promote return to PLOF and independence  Goals   LTG Expiration Date 12/29/19   PT Treatment Day 1   Plan   Treatment/Interventions Functional transfer training;LE strengthening/ROM; Therapeutic exercise; Endurance training;Elevations; Bed mobility;Gait training   Progress Progressing toward goals   Recommendation   Recommendation Home with family support   Pt  OOB in chair  with call bell within reach  at end of PT session  Discussed with Mariah Brice, PT today's treatment and patient's current level of function for care coordination

## 2019-12-16 NOTE — PLAN OF CARE
Problem: DISCHARGE PLANNING - CARE MANAGEMENT  Goal: Discharge to post-acute care or home with appropriate resources  Description  INTERVENTIONS:  - Conduct assessment to determine patient/family and health care team treatment goals, and need for post-acute services based on payer coverage, community resources, and patient preferences, and barriers to discharge  - Address psychosocial, clinical, and financial barriers to discharge as identified in assessment in conjunction with the patient/family and health care team  - Arrange appropriate level of post-acute services according to patient's   needs and preference and payer coverage in collaboration with the physician and health care team  - Communicate with and update the patient/family, physician, and health care team regarding progress on the discharge plan  - Arrange appropriate transportation to post-acute venues  Pt's goal is to return home   Outcome: Progressing

## 2019-12-16 NOTE — NURSING NOTE
Frequent periods of apnea, 20 - 30 sec, desats to 73%  sats return to high 90s without intervention  O2 2L NC applied  No further desats

## 2019-12-16 NOTE — PROGRESS NOTES
Progress Note - Estrella Benitez 1950, 81 y o  female MRN: [id number]    Unit/Bed#:  Encounter: [id number]    Primary Care Provider: Erasmo Lamb DO   Date and time admitted to hospital: 12/14/2019  6:57 PM        Atrial flutter with rapid ventricular response [facility])  Assessment & Plan  Patient has history of paroxysmal atrial fibrillation  Patient currently on Xarelto for anticoagulation  Patient placed on Cardizem drip in ED, this was discontinued on 12/15/19  PO Cardizem 30 mg q6h started by cardiology today  Metoprolol 50 mg q24 hr changed to Metoprolol 25 mg TID  Continue outpatient Xarelto  Cardiology consult appreciated, patient for a planned cardioversion tomorrow 12/17/19  The patient no longer requires ICU monitoring, will transfer the patient to med/surg floor today 12/16/19  * Sepsis, unspecified organism [facility])  Assessment & Plan  Patient presented to ER for flu-like symptoms including fever, chills, diffuse body aches, slightly productive cough, sore throat, and mild nausea  4 of 4 SIRS criteria positive  ER initiated cefepime; will continue same  Patient received 1L IV fluid bolus in ER; will continue IV fluid rehydration  Procalcitonin negative x 1, 2nd set pending  Blood cultures: no growth at 24 hours  Likely viral etiology, patient's symptoms completely resolved  Polymyalgia rheumatica ([facility] [address] )  Assessment & Plan  Patient has a complex medical history including polymyalgia rheumatica and RA, which she reports have been exacerbated by her acute illness  She was previously on chronic systemic steroids, but has been off steroids for months  Continue outpatient follow-up      Seronegative rheumatoid arthritis ([facility] [address] )  Assessment & Plan  Chronic condition  Continue outpatient medication regimen        VTE Pharmacologic Prophylaxis:   Pharmacologic: Rivaroxaban (Xarelto)  Mechanical VTE Prophylaxis in Place: Yes    Patient Centered Rounds: I have performed bedside rounds with nursing staff today  Discussions with Specialists or Other Care Team Provider:  nursing, CM, and attending      Education and Discussions with Family / Patient:  updated at bedside    Time Spent for Care: 20 minutes  More than 50% of total time spent on counseling and coordination of care as described above  Current Length of Stay: 1 day(s)    Current Patient Status: Inpatient   Certification Statement: The patient will continue to require additional inpatient hospital stay due to planned cardioversion tomorrow by cardiology    Discharge Plan: TBD    Code Status: Level 1 - Full Code      Subjective: The patient was seen and examined  The patient states she feels well and denies any complaints  Objective:     Vitals:   Temp (24hrs), Av 8 °F (36 6 °C), Min:96 1 °F (35 6 °C), Max:98 6 °F (37 °C)    Temp:  [96 1 °F (35 6 °C)-98 6 °F (37 °C)] 98 3 °F (36 8 °C)  HR:  [] 114  Resp:  [12-49] 18  BP: ()/(54-84) 114/67  SpO2:  [73 %-98 %] 96 %  Body mass index is 27 7 kg/m²  Input and Output Summary (last 24 hours): Intake/Output Summary (Last 24 hours) at 2019 1512  Last data filed at 2019 1300  Gross per 24 hour   Intake 1060 ml   Output 1850 ml   Net -790 ml       Physical Exam:     Physical Exam   Constitutional: She is oriented to person, place, and time  Vital signs are normal  She appears well-developed and well-nourished  She is active and cooperative  Cardiovascular: An irregularly irregular rhythm present  Tachycardia present  Pulmonary/Chest: Effort normal and breath sounds normal  She has no wheezes  She has no rhonchi  She has no rales  Abdominal: Soft  Normal appearance and bowel sounds are normal  There is no tenderness  Neurological: She is alert and oriented to person, place, and time  No cranial nerve deficit  Skin: Skin is warm, dry and intact  Nursing note and vitals reviewed        Additional Data: Labs:    Results from last 7 days   Lab Units 12/16/19  1111  12/14/19 2011   WBC Thousand/uL 16 18*   < > 13 29*   HEMOGLOBIN g/dL 11 5   < > 12 4   HEMATOCRIT % 36 6   < > 39 4   PLATELETS Thousands/uL 393*   < > 377   BANDS PCT %  --   --  5   NEUTROS PCT % 88*   < >  --    LYMPHS PCT % 7*   < >  --    LYMPHO PCT %  --   --  11*   MONOS PCT % 4   < >  --    MONO PCT %  --   --  1*   EOS PCT % 0   < > 6    < > = values in this interval not displayed  Results from last 7 days   Lab Units 12/16/19  1111   SODIUM mmol/L 141   POTASSIUM mmol/L 4 2   CHLORIDE mmol/L 106   CO2 mmol/L 28   BUN mg/dL 17   CREATININE mg/dL 0 57*   ANION GAP mmol/L 7   CALCIUM mg/dL 10 1   ALBUMIN g/dL 2 6*   TOTAL BILIRUBIN mg/dL 0 20   ALK PHOS U/L 73   ALT U/L 18   AST U/L 16   GLUCOSE RANDOM mg/dL 122     Results from last 7 days   Lab Units 12/14/19 2011   INR  1 83*             Results from last 7 days   Lab Units 12/15/19  0746 12/14/19 2011   LACTIC ACID mmol/L  --  1 5   PROCALCITONIN ng/ml 0 17  --            * I Have Reviewed All Lab Data Listed Above  * Additional Pertinent Lab Tests Reviewed: All Labs Within Last 24 Hours Reviewed    Imaging:    Imaging Reports Reviewed Today Include: none  Imaging Personally Reviewed by Myself Includes:  none    Recent Cultures (last 7 days):     Results from last 7 days   Lab Units 12/14/19  2118 12/14/19 2011   BLOOD CULTURE  No Growth at 24 hrs  No Growth at 24 hrs         Last 24 Hours Medication List:     Current Facility-Administered Medications:  acetaminophen 650 mg Oral Q6H PRN Ruth Will PA-C    aluminum-magnesium hydroxide-simethicone 30 mL Oral Q6H PRN Ruth Will PA-C    ascorbic acid 500 mg Oral Daily Ruth Will PA-C    calcium carbonate-vitamin D 1 tablet Oral Daily Han Little PA-C    cefepime 2,000 mg Intravenous Q12H Ruth Will PA-C Last Rate: 2,000 mg (12/16/19 6770)   cholecalciferol 1,000 Units Oral Daily Ruth Will PA-C cyanocobalamin 500 mcg Oral Daily Sudheer Mosher PA-C    diltiazem 30 mg Oral Q6H [address] Han Little PA-C    docusate sodium 100 mg Oral BID PRN Sudheer Mosher PA-C    folic acid 1 mg Oral Daily Sudheer Mosher PA-C    ketorolac 15 mg Intravenous Q6H PRN Sudheer Mosher PA-C    levothyroxine 75 mcg Oral Early Morning Sudheer Mosher PA-C    metoprolol tartrate 25 mg Oral TID Sudheer Mosher PA-C    multivitamin-minerals 1 tablet Oral Daily Han Little PA-C    ondansetron 4 mg Intravenous Q6H PRN Sudheer Mosher PA-C    rivaroxaban 20 mg Oral Daily Sudheer Mosher PA-C         Today, Patient Was Seen By: Sudheer Mosher PA-C    ** Please Note: Dictation voice to text software may have been used in the creation of this document   **

## 2019-12-16 NOTE — PLAN OF CARE
Problem: PHYSICAL THERAPY ADULT  Goal: Performs mobility at highest level of function for planned discharge setting  See evaluation for individualized goals  Description  Treatment/Interventions: Functional transfer training, LE strengthening/ROM, Elevations, Therapeutic exercise, Bed mobility, Gait training          See flowsheet documentation for full assessment, interventions and recommendations  Outcome: Progressing  Note:   Prognosis: Good  Problem List: Decreased endurance, Impaired balance  Assessment: Pt  ambulating with supervision 400' without device  c/o knee discomfort which decreased with as she ambulated  's with exertion  Recommendation: Home with family support     PT - OK to Discharge: Yes    See flowsheet documentation for full assessment

## 2019-12-16 NOTE — UTILIZATION REVIEW
Notification of Inpatient Admission/Inpatient Authorization Request   This is a Notification of Inpatient Admission for [address]  Be advised that this patient was admitted to our facility under Inpatient Status  Contact Christoph Montez at [phone] for additional admission information  [facility] DEPT  DEDICATED -[phone]  Patient Name:   Reji Pugh   YOB: 1950       [address]   [address]:   [address]  Tax ID: [id number]  NPI: [id number] Attending Provider/NPI: Azra Park Md [[id number]]   Place of Service Code: 24     Place of Service Name:  [address] [place] [address]   Start Date: 12/15/19 9521     Discharge Date & Time: No discharge date for patient encounter  Type of Admission: Inpatient Status Discharge Disposition   (if discharged): Non [address]   Patient Diagnoses: Weakness [R53 1]  Atrial flutter with rapid ventricular response ([address] ) [I48 92]  Flu-like symptoms [R68 89]     Orders: Admission Orders (From admission, onward)     Ordered        12/15/19 0524  Inpatient Admission  Once                    Assigned Utilization Review Contact: Christoph Montez  Utilization   Network Utilization Review Department  Phone: [phone]; Fax [phone]  Email: [email or web address]   ATTENTION PAYERS: Please call the assigned Utilization  directly with any questions or concerns ALL voicemails in the department are confidential  Send all requests for admission clinical reviews, approved or denied determinations and any other requests to dedicated fax number belonging to the campus where the patient is receiving treatment

## 2019-12-17 ENCOUNTER — ANESTHESIA (INPATIENT)
Dept: PERIOP | Facility: HOSPITAL | Age: 69
DRG: 308 | End: 2019-12-17
Payer: COMMERCIAL

## 2019-12-17 ENCOUNTER — APPOINTMENT (INPATIENT)
Dept: NON INVASIVE DIAGNOSTICS | Facility: HOSPITAL | Age: 69
DRG: 308 | End: 2019-12-17
Payer: COMMERCIAL

## 2019-12-17 VITALS
SYSTOLIC BLOOD PRESSURE: 108 MMHG | TEMPERATURE: 99 F | BODY MASS INDEX: 27.87 KG/M2 | RESPIRATION RATE: 20 BRPM | HEART RATE: 74 BPM | WEIGHT: 151.46 LBS | OXYGEN SATURATION: 93 % | HEIGHT: 62 IN | DIASTOLIC BLOOD PRESSURE: 63 MMHG

## 2019-12-17 LAB
ANION GAP SERPL CALCULATED.3IONS-SCNC: 6 MMOL/L (ref 4–13)
ATRIAL RATE: 74 BPM
BASOPHILS # BLD AUTO: 0.01 THOUSANDS/ΜL (ref 0–0.1)
BASOPHILS NFR BLD AUTO: 0 % (ref 0–1)
BUN SERPL-MCNC: 19 MG/DL (ref 5–25)
CALCIUM SERPL-MCNC: 9.3 MG/DL (ref 8.3–10.1)
CHLORIDE SERPL-SCNC: 110 MMOL/L (ref 100–108)
CO2 SERPL-SCNC: 27 MMOL/L (ref 21–32)
CREAT SERPL-MCNC: 0.45 MG/DL (ref 0.6–1.3)
EOSINOPHIL # BLD AUTO: 0.03 THOUSAND/ΜL (ref 0–0.61)
EOSINOPHIL NFR BLD AUTO: 0 % (ref 0–6)
ERYTHROCYTE [DISTWIDTH] IN BLOOD BY AUTOMATED COUNT: 13.8 % (ref 11.6–15.1)
GFR SERPL CREATININE-BSD FRML MDRD: 104 ML/MIN/1.73SQ M
GLUCOSE SERPL-MCNC: 93 MG/DL (ref 65–140)
HCT VFR BLD AUTO: 33 % (ref 34.8–46.1)
HGB BLD-MCNC: 10.3 G/DL (ref 11.5–15.4)
IMM GRANULOCYTES # BLD AUTO: 0.09 THOUSAND/UL (ref 0–0.2)
IMM GRANULOCYTES NFR BLD AUTO: 1 % (ref 0–2)
LYMPHOCYTES # BLD AUTO: 2.01 THOUSANDS/ΜL (ref 0.6–4.47)
LYMPHOCYTES NFR BLD AUTO: 16 % (ref 14–44)
MCH RBC QN AUTO: 28.1 PG (ref 26.8–34.3)
MCHC RBC AUTO-ENTMCNC: 31.2 G/DL (ref 31.4–37.4)
MCV RBC AUTO: 90 FL (ref 82–98)
MONOCYTES # BLD AUTO: 0.71 THOUSAND/ΜL (ref 0.17–1.22)
MONOCYTES NFR BLD AUTO: 6 % (ref 4–12)
NEUTROPHILS # BLD AUTO: 10.14 THOUSANDS/ΜL (ref 1.85–7.62)
NEUTS SEG NFR BLD AUTO: 77 % (ref 43–75)
NRBC BLD AUTO-RTO: 0 /100 WBCS
P AXIS: 19 DEGREES
PLATELET # BLD AUTO: 362 THOUSANDS/UL (ref 149–390)
PMV BLD AUTO: 9.1 FL (ref 8.9–12.7)
POTASSIUM SERPL-SCNC: 4 MMOL/L (ref 3.5–5.3)
PR INTERVAL: 178 MS
QRS AXIS: -25 DEGREES
QRSD INTERVAL: 92 MS
QT INTERVAL: 344 MS
QTC INTERVAL: 381 MS
RBC # BLD AUTO: 3.66 MILLION/UL (ref 3.81–5.12)
SODIUM SERPL-SCNC: 143 MMOL/L (ref 136–145)
T WAVE AXIS: 3 DEGREES
VENTRICULAR RATE: 74 BPM
WBC # BLD AUTO: 12.99 THOUSAND/UL (ref 4.31–10.16)

## 2019-12-17 PROCEDURE — 92960 CARDIOVERSION ELECTRIC EXT: CPT | Performed by: INTERNAL MEDICINE

## 2019-12-17 PROCEDURE — 93010 ELECTROCARDIOGRAM REPORT: CPT | Performed by: INTERNAL MEDICINE

## 2019-12-17 PROCEDURE — 5A2204Z RESTORATION OF CARDIAC RHYTHM, SINGLE: ICD-10-PCS | Performed by: INTERNAL MEDICINE

## 2019-12-17 PROCEDURE — 99238 HOSP IP/OBS DSCHRG MGMT 30/<: CPT | Performed by: PHYSICIAN ASSISTANT

## 2019-12-17 PROCEDURE — 93005 ELECTROCARDIOGRAM TRACING: CPT

## 2019-12-17 PROCEDURE — 85025 COMPLETE CBC W/AUTO DIFF WBC: CPT | Performed by: PHYSICIAN ASSISTANT

## 2019-12-17 PROCEDURE — 80048 BASIC METABOLIC PNL TOTAL CA: CPT | Performed by: PHYSICIAN ASSISTANT

## 2019-12-17 RX ORDER — LIDOCAINE HYDROCHLORIDE 10 MG/ML
INJECTION, SOLUTION EPIDURAL; INFILTRATION; INTRACAUDAL; PERINEURAL AS NEEDED
Status: DISCONTINUED | OUTPATIENT
Start: 2019-12-17 | End: 2019-12-17 | Stop reason: SURG

## 2019-12-17 RX ORDER — METOPROLOL SUCCINATE 50 MG/1
50 TABLET, EXTENDED RELEASE ORAL 2 TIMES DAILY
Qty: 60 TABLET | Refills: 0 | Status: SHIPPED | OUTPATIENT
Start: 2019-12-17

## 2019-12-17 RX ORDER — SODIUM CHLORIDE, SODIUM LACTATE, POTASSIUM CHLORIDE, CALCIUM CHLORIDE 600; 310; 30; 20 MG/100ML; MG/100ML; MG/100ML; MG/100ML
INJECTION, SOLUTION INTRAVENOUS CONTINUOUS PRN
Status: DISCONTINUED | OUTPATIENT
Start: 2019-12-17 | End: 2019-12-17 | Stop reason: SURG

## 2019-12-17 RX ORDER — PROPOFOL 10 MG/ML
INJECTION, EMULSION INTRAVENOUS AS NEEDED
Status: DISCONTINUED | OUTPATIENT
Start: 2019-12-17 | End: 2019-12-17 | Stop reason: SURG

## 2019-12-17 RX ADMIN — MULTIPLE VITAMINS W/ MINERALS TAB 1 TABLET: TAB at 10:54

## 2019-12-17 RX ADMIN — Medication 1 TABLET: at 10:55

## 2019-12-17 RX ADMIN — LEVOTHYROXINE SODIUM 75 MCG: 75 TABLET ORAL at 05:57

## 2019-12-17 RX ADMIN — LIDOCAINE HYDROCHLORIDE 50 MG: 10 INJECTION, SOLUTION EPIDURAL; INFILTRATION; INTRACAUDAL; PERINEURAL at 08:43

## 2019-12-17 RX ADMIN — CYANOCOBALAMIN TAB 500 MCG 500 MCG: 500 TAB at 10:55

## 2019-12-17 RX ADMIN — CEFEPIME HYDROCHLORIDE 2000 MG: 2 INJECTION, SOLUTION INTRAVENOUS at 06:00

## 2019-12-17 RX ADMIN — PHENYLEPHRINE HYDROCHLORIDE 150 MCG: 10 INJECTION INTRAVENOUS at 08:43

## 2019-12-17 RX ADMIN — VITAMIN D, TAB 1000IU (100/BT) 1000 UNITS: 25 TAB at 10:54

## 2019-12-17 RX ADMIN — METOPROLOL TARTRATE 25 MG: 25 TABLET, FILM COATED ORAL at 15:49

## 2019-12-17 RX ADMIN — FOLIC ACID 1 MG: 1 TABLET ORAL at 10:55

## 2019-12-17 RX ADMIN — RIVAROXABAN 20 MG: 10 TABLET, FILM COATED ORAL at 10:55

## 2019-12-17 RX ADMIN — PROPOFOL 80 MG: 10 INJECTION, EMULSION INTRAVENOUS at 08:43

## 2019-12-17 RX ADMIN — OXYCODONE HYDROCHLORIDE AND ACETAMINOPHEN 500 MG: 500 TABLET ORAL at 10:54

## 2019-12-17 RX ADMIN — METOPROLOL TARTRATE 25 MG: 25 TABLET, FILM COATED ORAL at 10:54

## 2019-12-17 RX ADMIN — SODIUM CHLORIDE, SODIUM LACTATE, POTASSIUM CHLORIDE, AND CALCIUM CHLORIDE: .6; .31; .03; .02 INJECTION, SOLUTION INTRAVENOUS at 08:41

## 2019-12-17 NOTE — PROCEDURES
Patient remains in Atrial Flutter with HR this AM in the 160s  She has a history of Atrial Flutter dated back to 2017  She follows with cardiology at [facility]   She has been on Xarelto for at least 2 years now  TTE done yesterday showed a normal EF  The procedure was explained to the patient including the risks and written consent was obtained  The patient received conscious sedation from anesthesia  She received a 200 J biphasic shock and converted to SR  She then reverted back to rapid atrial flutter after about 2 minutes  She received a second 200 J biphasic shock and achieved SR  She tolerated the procedure well  Plan :   1  OK for discharge today  2  D/C meds - Toprol XL 50 mg BID, Xarelto 20 mg daily  3  D/C Cardiazem  4  She needs f/up with her regular cardiologist in 2-4 weeks

## 2019-12-17 NOTE — ANESTHESIA POSTPROCEDURE EVALUATION
Post-Op Assessment Note    CV Status:  Stable    Pain management: adequate     Mental Status:  Alert and awake   Hydration Status:  Euvolemic   PONV Controlled:  Controlled   Airway Patency:  Patent   Post Op Vitals Reviewed: Yes      Staff: CRNA           BP      Temp      Pulse    Resp      SpO2

## 2019-12-17 NOTE — DISCHARGE INSTRUCTIONS
Atrial Flutter   WHAT YOU NEED TO KNOW:   Atrial flutter is an irregular heartbeat  It reduces your heart's ability to pump blood, which means you do not get enough oxygen  An irregular heartbeat could lead to a life-threatening blood clot or stroke  DISCHARGE INSTRUCTIONS:   Call 911 for any of the following:   · You have any of the following signs of a stroke:      ¨ Numbness or drooping on one side of your face     ¨ Weakness in an arm or leg    ¨ Confusion or difficulty speaking    ¨ Dizziness, a severe headache, or vision loss    · You have any of the following signs of a heart attack:      ¨ Squeezing, pressure, or pain in your chest that lasts longer than 5 minutes or returns    ¨ Discomfort or pain in your back, neck, jaw, stomach, or arm     ¨ Trouble breathing    ¨ Nausea or vomiting    ¨ Lightheadedness or a sudden cold sweat, especially with chest pain or trouble breathing  Seek care immediately if:  You have any of the following signs of a blood clot:  · You feel lightheaded, are short of breath, and have chest pain  · You cough up blood  · You have swelling, redness, pain, or warmth in your arm or leg  Contact your cardiologist or healthcare provider if:   · Your heart rate is higher or lower than your cardiologist says it should be  · You are bruising and bleeding more easily  · You have questions or concerns about your condition or care  Medicines: You may need any of the following:  · Heart medicines  help control your heart rate and rhythm  · Blood thinners    help prevent blood clots  Examples of blood thinners include heparin and warfarin  Clots can cause strokes, heart attacks, and death  The following are general safety guidelines to follow while you are taking a blood thinner:    ¨ Watch for bleeding and bruising while you take blood thinners  Watch for bleeding from your gums or nose  Watch for blood in your urine and bowel movements   Use a soft washcloth on your skin, and a soft toothbrush to brush your teeth  This can keep your skin and gums from bleeding  If you shave, use an electric shaver  Do not play contact sports  ¨ Tell your dentist and other healthcare providers that you take anticoagulants  Wear a bracelet or necklace that says you take this medicine  ¨ Do not start or stop any medicines unless your healthcare provider tells you to  Many medicines cannot be used with blood thinners  ¨ Tell your healthcare provider right away if you forget to take the medicine, or if you take too much  ¨ Warfarin  is a blood thinner that you may need to take  The following are things you should be aware of if you take warfarin  § Foods and medicines can affect the amount of warfarin in your blood  Do not make major changes to your diet while you take warfarin  Warfarin works best when you eat about the same amount of vitamin K every day  Vitamin K is found in green leafy vegetables and certain other foods  Ask for more information about what to eat when you are taking warfarin  § You will need to see your healthcare provider for follow-up visits when you are on warfarin  You will need regular blood tests  These tests are used to decide how much medicine you need  · Take your medicine as directed  Contact your healthcare provider if you think your medicine is not helping or if you have side effects  Tell him or her if you are allergic to any medicine  Keep a list of the medicines, vitamins, and herbs you take  Include the amounts, and when and why you take them  Bring the list or the pill bottles to follow-up visits  Carry your medicine list with you in case of an emergency  Follow up with your cardiologist as directed: You may need ongoing tests or treatment  Write down your questions so you remember to ask them during your visits  Manage atrial flutter:   · Know your target heart rate    Learn how to take your pulse and monitor your heart rate     · Control your blood pressure  Take your blood pressure medicine as directed  · Do not smoke  Nicotine and other chemicals in cigarettes and cigars can cause heart and lung damage  Ask your healthcare provider for information if you currently smoke and need help to quit  E-cigarettes or smokeless tobacco still contain nicotine  Talk to your healthcare provider before you use these products  · Limit alcohol  Women should limit alcohol to 1 drink a day  Men should limit alcohol to 2 drinks a day  A drink of alcohol is 12 ounces of beer, 5 ounces of wine, or 1½ ounces of liquor  · Eat heart-healthy foods  Heart-healthy foods include fruits, vegetables, whole-grain breads, low-fat dairy products, beans, lean meats, and fish  Replace butter and margarine with heart-healthy oils such as olive oil and canola oil  · Maintain a healthy weight  Ask your healthcare provider how much you should weigh  Ask him to help you create a weight loss plan if you are overweight  © 2017 [employer] Information is for End User's use only and may not be sold, redistributed or otherwise used for commercial purposes  All illustrations and images included in CareNotes® are the copyrighted property of [employer]  or [employer]  The above information is an  only  It is not intended as medical advice for individual conditions or treatments  Talk to your doctor, nurse or pharmacist before following any medical regimen to see if it is safe and effective for you  Atrial Flutter   WHAT YOU NEED TO KNOW:   What is atrial flutter? Atrial flutter is an irregular heartbeat  It reduces your heart's ability to pump blood, which means you do not get enough oxygen  An irregular heartbeat could lead to a life-threatening blood clot or stroke  What increases my risk for atrial flutter?    · High blood pressure    · Heart failure, heart surgery, or other heart conditions    · Age 72 years or older    · A blood clot in your lungs    · Thyroid disease, obesity, or diabetes    · Heavy alcohol use  What are the signs and symptoms of atrial flutter? · Pounding or racing heartbeat    · Chest pain     · Shortness of breath    · Weakness or tiredness    · Lightheadedness, dizziness, or fainting  How is atrial flutter diagnosed? Your healthcare provider will ask about your symptoms and if they come and go  He will measure your heart rate  Tell him what health conditions you have and what medicines you take  He will also ask if you smoke, drink alcohol, or use any illegal drugs  You may need any of the following tests:  · An EKG  records your heart rate and rhythm  You may also need to wear a Holter monitor while you do your normal activities  The Holter monitor is a portable EKG  · Blood and urine tests  check for infection, thyroid function, and potassium and calcium levels  · A chest x-ray  checks for infection or others problems in your heart and lungs  · An echocardiogram  is a type of ultrasound  Sound waves are used to show the structure and function of your heart  How is atrial flutter treated? · Heart medicines  help control your heart rate and rhythm  · Blood thinners    help prevent blood clots  Examples of blood thinners include heparin and warfarin  Clots can cause strokes, heart attacks, and death  The following are general safety guidelines to follow while you are taking a blood thinner:    ¨ Watch for bleeding and bruising while you take blood thinners  Watch for bleeding from your gums or nose  Watch for blood in your urine and bowel movements  Use a soft washcloth on your skin, and a soft toothbrush to brush your teeth  This can keep your skin and gums from bleeding  If you shave, use an electric shaver  Do not play contact sports  ¨ Tell your dentist and other healthcare providers that you take anticoagulants   Wear a bracelet or necklace that says you take this medicine  ¨ Do not start or stop any medicines unless your healthcare provider tells you to  Many medicines cannot be used with blood thinners  ¨ Tell your healthcare provider right away if you forget to take the medicine, or if you take too much  ¨ Warfarin  is a blood thinner that you may need to take  The following are things you should be aware of if you take warfarin  § Foods and medicines can affect the amount of warfarin in your blood  Do not make major changes to your diet while you take warfarin  Warfarin works best when you eat about the same amount of vitamin K every day  Vitamin K is found in green leafy vegetables and certain other foods  Ask for more information about what to eat when you are taking warfarin  § You will need to see your healthcare provider for follow-up visits when you are on warfarin  You will need regular blood tests  These tests are used to decide how much medicine you need  · Cardioversion  is a procedure to return your heart rate and rhythm to normal  This is done with medicine or an electrical shock  · Cardiac ablation  is a procedure that uses heat energy to correct your irregular heartbeat  Ask for more information on cardiac ablation  · Surgery  may be needed to put in a pacemaker or an implanted cardioverter defibrillator (ICD)  These will help control your heart rate and rhythm  How can I manage atrial flutter? · Know your target heart rate  Learn how to take your pulse and monitor your heart rate  · Control your blood pressure  Take your blood pressure medicine as directed  · Do not smoke  Nicotine and other chemicals in cigarettes and cigars can cause heart and lung damage  Ask your healthcare provider for information if you currently smoke and need help to quit  E-cigarettes or smokeless tobacco still contain nicotine  Talk to your healthcare provider before you use these products  · Limit alcohol    Women should limit alcohol to 1 drink a day  Men should limit alcohol to 2 drinks a day  A drink of alcohol is 12 ounces of beer, 5 ounces of wine, or 1½ ounces of liquor  · Eat heart-healthy foods  These include fruits, vegetables, whole-grain breads, low-fat dairy products, beans, lean meats, and fish  Replace butter and margarine with heart-healthy oils such as olive oil and canola oil  · Maintain a healthy weight  Ask your healthcare provider how much you should weigh  Ask him to help you create a weight loss plan if you are overweight  Call 911 for any of the following:   · You have any of the following signs of a heart attack:      ¨ Squeezing, pressure, or pain in your chest that lasts longer than 5 minutes or returns    ¨ Discomfort or pain in your back, neck, jaw, stomach, or arm     ¨ Trouble breathing    ¨ Nausea or vomiting    ¨ Lightheadedness or a sudden cold sweat, especially with chest pain or trouble breathing    · You have any of the following signs of a stroke:      ¨ Numbness or drooping on one side of your face     ¨ Weakness in an arm or leg    ¨ Confusion or difficulty speaking    ¨ Dizziness, a severe headache, or vision loss  When should I seek immediate care? You have any of the following signs of a blood clot:  · You feel lightheaded, are short of breath, and have chest pain  · You cough up blood  · You have swelling, redness, pain, or warmth in your arm or leg  When should I contact my healthcare provider? · Your heart rate is higher or lower than your healthcare provider says it should be  · You are bruising and bleeding more easily  · You have questions or concerns about your condition or care  CARE AGREEMENT:   You have the right to help plan your care  Learn about your health condition and how it may be treated  Discuss treatment options with your caregivers to decide what care you want to receive  You always have the right to refuse treatment   The above information is an  only  It is not intended as medical advice for individual conditions or treatments  Talk to your doctor, nurse or pharmacist before following any medical regimen to see if it is safe and effective for you  © 2017 [employer] Information is for End User's use only and may not be sold, redistributed or otherwise used for commercial purposes  All illustrations and images included in CareNotes® are the copyrighted property of [employer]  or [employer]  Atrial Fluttshahriar   WHAT YOU NEED TO KNOW:   Atrial flutter is an irregular heartbeat  It reduces your heart's ability to pump blood, which means you do not get enough oxygen  An irregular heartbeat could lead to a life-threatening blood clot or stroke  DISCHARGE INSTRUCTIONS:   Call 911 for any of the following:   · You have any of the following signs of a stroke:      ¨ Numbness or drooping on one side of your face     ¨ Weakness in an arm or leg    ¨ Confusion or difficulty speaking    ¨ Dizziness, a severe headache, or vision loss    · You have any of the following signs of a heart attack:      ¨ Squeezing, pressure, or pain in your chest that lasts longer than 5 minutes or returns    ¨ Discomfort or pain in your back, neck, jaw, stomach, or arm     ¨ Trouble breathing    ¨ Nausea or vomiting    ¨ Lightheadedness or a sudden cold sweat, especially with chest pain or trouble breathing  Seek care immediately if:  You have any of the following signs of a blood clot:  · You feel lightheaded, are short of breath, and have chest pain  · You cough up blood  · You have swelling, redness, pain, or warmth in your arm or leg  Contact your cardiologist or healthcare provider if:   · Your heart rate is higher or lower than your cardiologist says it should be  · You are bruising and bleeding more easily  · You have questions or concerns about your condition or care  Medicines:   You may need any of the following:  · Heart medicines  help control your heart rate and rhythm  · Blood thinners    help prevent blood clots  Examples of blood thinners include heparin and warfarin  Clots can cause strokes, heart attacks, and death  The following are general safety guidelines to follow while you are taking a blood thinner:    ¨ Watch for bleeding and bruising while you take blood thinners  Watch for bleeding from your gums or nose  Watch for blood in your urine and bowel movements  Use a soft washcloth on your skin, and a soft toothbrush to brush your teeth  This can keep your skin and gums from bleeding  If you shave, use an electric shaver  Do not play contact sports  ¨ Tell your dentist and other healthcare providers that you take anticoagulants  Wear a bracelet or necklace that says you take this medicine  ¨ Do not start or stop any medicines unless your healthcare provider tells you to  Many medicines cannot be used with blood thinners  ¨ Tell your healthcare provider right away if you forget to take the medicine, or if you take too much  ¨ Warfarin  is a blood thinner that you may need to take  The following are things you should be aware of if you take warfarin  § Foods and medicines can affect the amount of warfarin in your blood  Do not make major changes to your diet while you take warfarin  Warfarin works best when you eat about the same amount of vitamin K every day  Vitamin K is found in green leafy vegetables and certain other foods  Ask for more information about what to eat when you are taking warfarin  § You will need to see your healthcare provider for follow-up visits when you are on warfarin  You will need regular blood tests  These tests are used to decide how much medicine you need  · Take your medicine as directed  Contact your healthcare provider if you think your medicine is not helping or if you have side effects   Tell him or her if you are allergic to any medicine  Keep a list of the medicines, vitamins, and herbs you take  Include the amounts, and when and why you take them  Bring the list or the pill bottles to follow-up visits  Carry your medicine list with you in case of an emergency  Follow up with your cardiologist as directed: You may need ongoing tests or treatment  Write down your questions so you remember to ask them during your visits  Manage atrial flutter:   · Know your target heart rate  Learn how to take your pulse and monitor your heart rate  · Control your blood pressure  Take your blood pressure medicine as directed  · Do not smoke  Nicotine and other chemicals in cigarettes and cigars can cause heart and lung damage  Ask your healthcare provider for information if you currently smoke and need help to quit  E-cigarettes or smokeless tobacco still contain nicotine  Talk to your healthcare provider before you use these products  · Limit alcohol  Women should limit alcohol to 1 drink a day  Men should limit alcohol to 2 drinks a day  A drink of alcohol is 12 ounces of beer, 5 ounces of wine, or 1½ ounces of liquor  · Eat heart-healthy foods  Heart-healthy foods include fruits, vegetables, whole-grain breads, low-fat dairy products, beans, lean meats, and fish  Replace butter and margarine with heart-healthy oils such as olive oil and canola oil  · Maintain a healthy weight  Ask your healthcare provider how much you should weigh  Ask him to help you create a weight loss plan if you are overweight  © 2017 [address] [employer] Information is for End User's use only and may not be sold, redistributed or otherwise used for commercial purposes  All illustrations and images included in CareNotes® are the copyrighted property of [employer]  or [employer]  The above information is an  only  It is not intended as medical advice for individual conditions or treatments   Talk to your doctor, nurse or pharmacist before following any medical regimen to see if it is safe and effective for you  Atrial Flutter   WHAT YOU NEED TO KNOW:   Atrial flutter is an irregular heartbeat  It reduces your heart's ability to pump blood, which means you do not get enough oxygen  An irregular heartbeat could lead to a life-threatening blood clot or stroke  DISCHARGE INSTRUCTIONS:   Call 911 for any of the following:   · You have any of the following signs of a stroke:      ¨ Numbness or drooping on one side of your face     ¨ Weakness in an arm or leg    ¨ Confusion or difficulty speaking    ¨ Dizziness, a severe headache, or vision loss    · You have any of the following signs of a heart attack:      ¨ Squeezing, pressure, or pain in your chest that lasts longer than 5 minutes or returns    ¨ Discomfort or pain in your back, neck, jaw, stomach, or arm     ¨ Trouble breathing    ¨ Nausea or vomiting    ¨ Lightheadedness or a sudden cold sweat, especially with chest pain or trouble breathing  Return to the emergency department if:  You have any of the following signs of a blood clot:  · You feel lightheaded, are short of breath, and have chest pain  · You cough up blood  · You have swelling, redness, pain, or warmth in your arm or leg  Contact your cardiologist or healthcare provider if:   · Your heart rate is higher or lower than your cardiologist says it should be  · You are bruising and bleeding more easily  · You have questions or concerns about your condition or care  Medicines: You may need any of the following:  · Heart medicines  help control your heart rate and rhythm  · Blood thinners    help prevent blood clots  Examples of blood thinners include heparin and warfarin  Clots can cause strokes, heart attacks, and death  The following are general safety guidelines to follow while you are taking a blood thinner:    ¨ Watch for bleeding and bruising while you take blood thinners   Watch for bleeding from your gums or nose  Watch for blood in your urine and bowel movements  Use a soft washcloth on your skin, and a soft toothbrush to brush your teeth  This can keep your skin and gums from bleeding  If you shave, use an electric shaver  Do not play contact sports  ¨ Tell your dentist and other healthcare providers that you take anticoagulants  Wear a bracelet or necklace that says you take this medicine  ¨ Do not start or stop any medicines unless your healthcare provider tells you to  Many medicines cannot be used with blood thinners  ¨ Tell your healthcare provider right away if you forget to take the medicine, or if you take too much  ¨ Warfarin  is a blood thinner that you may need to take  The following are things you should be aware of if you take warfarin  § Foods and medicines can affect the amount of warfarin in your blood  Do not make major changes to your diet while you take warfarin  Warfarin works best when you eat about the same amount of vitamin K every day  Vitamin K is found in green leafy vegetables and certain other foods  Ask for more information about what to eat when you are taking warfarin  § You will need to see your healthcare provider for follow-up visits when you are on warfarin  You will need regular blood tests  These tests are used to decide how much medicine you need  · Take your medicine as directed  Contact your healthcare provider if you think your medicine is not helping or if you have side effects  Tell him of her if you are allergic to any medicine  Keep a list of the medicines, vitamins, and herbs you take  Include the amounts, and when and why you take them  Bring the list or the pill bottles to follow-up visits  Carry your medicine list with you in case of an emergency  Follow up with your cardiologist as directed: You may need ongoing tests or treatment  Write down your questions so you remember to ask them during your visits    Manage atrial flutter:   · Know your target heart rate  Learn how to take your pulse and monitor your heart rate  · Control your blood pressure  Take your blood pressure medicine as directed  · Do not smoke  Nicotine and other chemicals in cigarettes and cigars can cause heart and lung damage  Ask your healthcare provider for information if you currently smoke and need help to quit  E-cigarettes or smokeless tobacco still contain nicotine  Talk to your healthcare provider before you use these products  · Limit alcohol  Women should limit alcohol to 1 drink a day  Men should limit alcohol to 2 drinks a day  A drink of alcohol is 12 ounces of beer, 5 ounces of wine, or 1½ ounces of liquor  · Eat heart-healthy foods  Heart-healthy foods include fruits, vegetables, whole-grain breads, low-fat dairy products, beans, lean meats, and fish  Replace butter and margarine with heart-healthy oils such as olive oil and canola oil  · Maintain a healthy weight  Ask your healthcare provider how much you should weigh  Ask him to help you create a weight loss plan if you are overweight  © 2017 [address] [facility] Information is for End User's use only and may not be sold, redistributed or otherwise used for commercial purposes  All illustrations and images included in CareNotes® are the copyrighted property of [employer]  or [employer] Blank  The above information is an  only  It is not intended as medical advice for individual conditions or treatments  Talk to your doctor, nurse or pharmacist before following any medical regimen to see if it is safe and effective for you  Cardioversion   GENERAL INFORMATION:   What is cardioversion? Cardioversion is a procedure to correct arrhythmias, which is when your heart beats too fast or irregularly  Arrhythmias may prevent your body from getting the blood and oxygen it needs   Cardioversion delivers a shock of electricity to your heart to help it return to its normal rhythm  Your heart has 4 chambers called the atria and ventricles  The atria are at the top of your heart, and the ventricles are at the bottom of your heart  Most arrhythmias that need cardioversion start in the atria of your heart  What may I need before cardioversion? · Medicines:      ¨ Blood thinners: This medicine helps prevent clots from forming in the blood  Clots can cause strokes, heart attacks, and be life-threatening  Blood thinners make it more likely for you to bleed or bruise  Use an electric razor and soft toothbrush to help prevent bleeding  ¨ Heart medicine: This helps make your heart more sensitive to the electrical charge so it responds to cardioversion  · Blood tests:  A sample of your blood may be sent to the lab for tests to find the cause of your symptoms  Blood tests may also be used to make sure organs, such as your liver and kidneys, are working correctly  · Transesophageal echocardiogram:  This is a type of ultrasound that shows pictures of how your heart moves when it beats  It may also show blood clots in your heart  You will be given medicine to help you relax  Caregivers put a tube in your mouth that is moved down into your esophagus  The tube has a small sensor on the end that lets caregivers check your heart  What happens during cardioversion? You will be given medicine that makes you sleepy and relaxed before your procedure  You may also get anesthesia medicine to help you stay asleep during the procedure  During cardioversion, your caregiver sends an electrical shock to your heart muscle  The shock is given at a certain time during your heartbeat that will best help it return to normal  Your heart may need to be shocked more than once to help it return to its normal rhythm  You may need one of the following:  · External cardioversion: This procedure uses paddles or gel pads to shock your heart   Two pads will be placed on your chest, or one will be on your chest and one on your back  Your caregiver will watch your heart beat on a monitor for the right time to deliver the shock  After the shock is given, your heartbeat will be checked  If your heart continues to beat abnormally, another shock will be given  · Internal cardioversion: This procedure uses catheters (thin, flexible tubes) that are put into your heart  Your caregiver inserts the catheters through a vein (blood vessel) and into your heart  An x-ray may be used to check the location of the catheter  The electric shock is then given through the catheters  What happens after cardioversion? You will be taken to a room where you can rest until you are fully awake  Do not get out of bed until your caregiver says it is okay  Caregivers will monitor your heart rhythm and watch you closely for any problems  When caregivers see that you are okay, you may be able to go home  If you are staying in the hospital, you will be taken back to your room  What are the risks of cardioversion? · The electric shock used may cause burns on your skin  If you have internal cardioversion, you may bleed or get an infection  Treatment may cause a blood clot to travel to your heart or brain and cause life-threatening problems, such as a heart attack or stroke  Even with cardioversion, your heart may not return to or stay in a normal heart rhythm  You may develop other arrhythmias that need treatment  · Without treatment, your arrhythmia and symptoms, such as chest pain and tightness, may get worse  You may have worsening weakness, dizziness, and trouble breathing  Arrhythmias that are not treated can increase your risk of heart failure or a heart attack  Arrhythmias also increase your risk for blood clots and a stroke  These conditions may be life-threatening  When should I contact my caregiver? Contact your caregiver if:  · You have a fever       · You have questions or concerns about your condition or care  When should I seek immediate care? Seek care immediately or call 911 if:  · You feel like your heart is fluttering or jumping in your chest     · You feel lightheaded or you have fainted  · You have chest pain when you take a deep breath or cough  You may cough up blood  · You have discomfort in your chest that feels like squeezing, pressure, fullness, or pain  · You have pain or discomfort in your back, neck, jaw, stomach, or arm  · You have weakness or numbness in part of your body  · You have sudden trouble breathing  · You become confused or have difficulty speaking  · You have dizziness, a severe headache, or vision loss  CARE AGREEMENT:   You have the right to help plan your care  Learn about your health condition and how it may be treated  Discuss treatment options with your caregivers to decide what care you want to receive  You always have the right to refuse treatment  The above information is an  only  It is not intended as medical advice for individual conditions or treatments  Talk to your doctor, nurse or pharmacist before following any medical regimen to see if it is safe and effective for you  © 2014 [address] is for End User's use only and may not be sold, redistributed or otherwise used for commercial purposes  All illustrations and images included in CareNotes® are the copyrighted property of A D A M [employer]  or Rashaun Blank

## 2019-12-17 NOTE — NURSING NOTE
The patients HR was averaging 150 bpm to 160 bpm  The patient was sitting up in chair asymptomatic  Cardiology contacted  I spoke to the day time  who talked verbally with the physician  The provider insisted on no cardiac medication to be given at this time in preporation for a scheduled cardioversion  Will continue to monitor at this time  This information was passed on to the patients primary nurse Matty Smiley RN  Will continue to monitor the patient at this time

## 2019-12-17 NOTE — SOCIAL WORK
Discussed with patient preferences on discharge;understanding how to manage health at home; purpose of taking medications; importance of follow up care/appointments; and symptoms to watch out for once discharged home  Pt is being dc'd home on this date and will be following up with her PCP Dr Sunny Peng as well as her Endocrinologist and Cardiovascular perfusion

## 2019-12-17 NOTE — DISCHARGE SUMMARY
Discharge- Shona Baldwin 1950, 81 y o  female MRN: [id number]    Unit/Bed#: 400-01 Encounter: [id number]    Primary Care Provider: Deborah Shea DO   Date and time admitted to hospital: 12/14/2019  6:57 PM        Atrial flutter with rapid ventricular response [facility])  Assessment & Plan  Patient has history of paroxysmal atrial fibrillation  Patient currently on Xarelto for anticoagulation  Patient placed on Cardizem drip in ED, this was discontinued on 12/15/19  PO Cardizem 30 mg q6h started by cardiology on 12/16/19  Metoprolol 50 mg q24 hr changed to Metoprolol 25 mg TID on 12/16/19  Continue outpatient Xarelto  Cardiology consult appreciated  The patient was cardioverted on 12/17/19  The patient remained in NSR  The patient was discharged home on PO Toprol-XL 50 mg BID and Xarelto per Cardiology recommendations  Outpatient follow-up with Cardiology  * Sepsis, unspecified organism [facility])  Assessment & Plan  Patient presented to ER for flu-like symptoms including fever, chills, diffuse body aches, slightly productive cough, sore throat, and mild nausea  4 of 4 SIRS criteria positive  ER initiated cefepime which was continued on admission  Patient received 1L IV fluid bolus in ER; will continue IV fluid rehydration  Procalcitonin negative x 2  Blood cultures: no growth at 48 hours  Likely viral etiology, patient's symptoms completely resolved  No additional antibiotics needed on discharge  Polymyalgia rheumatica ([address] )  Assessment & Plan  Patient has a complex medical history including polymyalgia rheumatica and RA, which she reports have been exacerbated by her acute illness  She was previously on chronic systemic steroids, but has been off steroids for months  Continue outpatient follow-up      Seronegative rheumatoid arthritis ([address] )  Assessment & Plan  Chronic condition  Continue outpatient medication regimen          Discharging Physician / Practitioner: Chase Dial KAY  PCP: Cinthia Potter DO  Admission Date:   Admission Orders (From admission, onward)     Ordered        12/15/19 0524  Inpatient Admission  Once                   Discharge Date: 12/17/19    Resolved Problems  Date Reviewed: 12/17/2019    None          Consultations During Hospital Stay:  · Cardiology    Procedures Performed:   · Cardioversion by Dr Vinayak Sloan Findings / Test Results:   X-ray Chest 2 Views    Result Date: 12/15/2019  Impression: No acute consolidation Small bilateral effusion, unchanged from the previous study Mild increased lung markings may be due to mild congestion The lungs appear mildly hazy as compared to previous study this may be due to technique or due to mild congestion Workstation performed: [id number]     Ct Pe Study W Abdomen Pelvis W Contrast    Result Date: 12/14/2019  · Impression: 1  No evidence of pulmonary embolism  2   Enlargement of the main pulmonary artery which may be seen in the setting of pulmonary hypertension  3   Right greater than left small pleural effusions with adjacent compressive atelectasis  4   Duplicated left superior vena cava  5   The esophagus and gastroesophageal junction is patulous and thickened which may be due to gastroesophageal reflux or esophageal dysmotility  Correlate with endoscopy  6   3 8 x 1 1 x 3 2 cm fluid collection in the area of a ventral wall hernia repair which may represent a seroma  Workstation performed: [id number]   ·     Incidental Findings:   · none    Test Results Pending at Discharge (will require follow up):   · none     Outpatient Tests Requested:  · none    Complications:  none    Reason for Admission: sirs criteria    Hospital Course:     Sivan Cordoba is a 81 y o  female patient who originally presented to the hospital on 12/14/2019 due to flu-like symptoms including fever, chills, diffuse body aches, slightly productive cough, sore throat, and mild nausea    She reports developing symptoms initially over 1 week ago  She presented to primary care and was empirically treated with Tamiflu and azithromycin  She completed a 5-day course of each and reports gradual improvement over those 5 days  Flu testing at that visit was negative  Patient was asymptomatic for 2 days, completing routine daily activities  However, she woke this morning with same symptoms as the first episode, and symptoms worsened throughout the day  She measured temperature at home (101 8 oral), at which point she took tylenol and came to ER  Please see above list of diagnoses and related plan for additional information  Condition at Discharge: good     Discharge Day Visit / Exam:     Subjective: The patient states she is feeling well and denies any complaints  Vitals: Blood Pressure: 108/63 (12/17/19 0930)  Pulse: 74 (12/17/19 0930)  Temperature: 99 °F (37 2 °C) (12/17/19 0924)  Temp Source: Oral (12/16/19 2224)  Respirations: 20 (12/17/19 0930)  Height: 5' 2" (157 5 cm) (12/15/19 2127)  Weight - Scale: 68 7 kg (151 lb 7 3 oz) (12/15/19 0632)  SpO2: 93 % (12/17/19 0930)  Exam:   Physical Exam   Constitutional: She is oriented to person, place, and time  She appears well-developed and well-nourished  HENT:   Head: Normocephalic and atraumatic  Cardiovascular: Normal rate and regular rhythm  Pulmonary/Chest: Effort normal and breath sounds normal  She has no wheezes  She has no rales  Abdominal: Soft  Bowel sounds are normal  There is no tenderness  Neurological: She is alert and oriented to person, place, and time  No cranial nerve deficit  Skin: Skin is dry  Nursing note and vitals reviewed  Discharge instructions/Information to patient and family:   See after visit summary for information provided to patient and family  Provisions for Follow-Up Care:  See after visit summary for information related to follow-up care and any pertinent home health orders        Disposition:     Home    For Discharges to [facility]:   · Not Applicable to this Patient - Not Applicable to this Patient    Planned Readmission: no     Discharge Statement:  I spent 25 minutes discharging the patient  This time was spent on the day of discharge  I had direct contact with the patient on the day of discharge  Greater than 50% of the total time was spent examining patient, answering all patient questions, arranging and discussing plan of care with patient as well as directly providing post-discharge instructions  Additional time then spent on discharge activities  Discharge Medications:  See after visit summary for reconciled discharge medications provided to patient and family        ** Please Note: This note has been constructed using a voice recognition system **

## 2019-12-17 NOTE — ASSESSMENT & PLAN NOTE
Patient has history of paroxysmal atrial fibrillation  Patient currently on Xarelto for anticoagulation  Patient placed on Cardizem drip in ED, this was discontinued on 12/15/19  PO Cardizem 30 mg q6h started by cardiology on 12/16/19  Metoprolol 50 mg q24 hr changed to Metoprolol 25 mg TID on 12/16/19  Continue outpatient Xarelto  Cardiology consult appreciated  The patient was cardioverted on 12/17/19  The patient remained in NSR  The patient was discharged home on PO Toprol-XL 50 mg BID and Xarelto per Cardiology recommendations  Outpatient follow-up with Cardiology

## 2019-12-17 NOTE — ASSESSMENT & PLAN NOTE
Patient presented to ER for flu-like symptoms including fever, chills, diffuse body aches, slightly productive cough, sore throat, and mild nausea  4 of 4 SIRS criteria positive  ER initiated cefepime which was continued on admission  Patient received 1L IV fluid bolus in ER; will continue IV fluid rehydration  Procalcitonin negative x 2  Blood cultures: no growth at 48 hours  Likely viral etiology, patient's symptoms completely resolved  No additional antibiotics needed on discharge

## 2019-12-17 NOTE — ANESTHESIA PREPROCEDURE EVALUATION
Review of Systems/Medical History  Patient summary reviewed  Chart reviewed  No history of anesthetic complications     Cardiovascular  EKG reviewed, Hyperlipidemia, Dysrhythmias , atrial flutter and atrial fibrillation, DVT  PE,  Pulmonary       GI/Hepatic       Kidney stones,        Endo/Other  History of thyroid disease , hypothyroidism,      GYN       Hematology   Musculoskeletal    Comment: Polymyalgia Rheumatica Arthritis     Neurology    Headaches,    Psychology           Physical Exam    Airway    Mallampati score: II  TM Distance: >3 FB  Neck ROM: full     Dental       Cardiovascular      Pulmonary      Other Findings  Decreased oral opening      Anesthesia Plan  ASA Score- 4     Anesthesia Type- IV sedation with anesthesia with ASA Monitors  Additional Monitors:   Airway Plan:         Plan Factors-  Patient did not smoke on day of surgery  Induction-     Postoperative Plan-     Informed Consent- Anesthetic plan and risks discussed with patient  I personally reviewed this patient with the CRNA  Discussed and agreed on the Anesthesia Plan with the CRNA  Radhames Donaldson

## 2019-12-18 ENCOUNTER — TRANSITIONAL CARE MANAGEMENT (OUTPATIENT)
Dept: FAMILY MEDICINE CLINIC | Facility: CLINIC | Age: 69
End: 2019-12-18

## 2019-12-18 ENCOUNTER — HOSPITAL ENCOUNTER (OUTPATIENT)
Facility: HOSPITAL | Age: 69
Setting detail: OBSERVATION
Discharge: HOME | End: 2019-12-19
Attending: EMERGENCY MEDICINE | Admitting: FAMILY MEDICINE
Payer: COMMERCIAL

## 2019-12-18 ENCOUNTER — APPOINTMENT (EMERGENCY)
Dept: RADIOLOGY | Facility: HOSPITAL | Age: 69
End: 2019-12-18
Payer: COMMERCIAL

## 2019-12-18 DIAGNOSIS — R06.00 DYSPNEA, UNSPECIFIED TYPE: Primary | ICD-10-CM

## 2019-12-18 DIAGNOSIS — E87.70 VOLUME OVERLOAD: ICD-10-CM

## 2019-12-18 LAB
ANION GAP SERPL CALCULATED.3IONS-SCNC: 6 MMOL/L (ref 4–13)
APTT PPP: 29 SECONDS (ref 23–37)
ATRIAL RATE: 79 BPM
BASOPHILS # BLD AUTO: 0.04 THOUSANDS/ΜL (ref 0–0.1)
BASOPHILS NFR BLD AUTO: 0 % (ref 0–1)
BUN SERPL-MCNC: 20 MG/DL (ref 5–25)
CALCIUM SERPL-MCNC: 9.5 MG/DL (ref 8.3–10.1)
CHLORIDE SERPL-SCNC: 102 MMOL/L (ref 100–108)
CO2 SERPL-SCNC: 30 MMOL/L (ref 21–32)
CREAT SERPL-MCNC: 0.55 MG/DL (ref 0.6–1.3)
EOSINOPHIL # BLD AUTO: 0.78 THOUSAND/ΜL (ref 0–0.61)
EOSINOPHIL NFR BLD AUTO: 7 % (ref 0–6)
ERYTHROCYTE [DISTWIDTH] IN BLOOD BY AUTOMATED COUNT: 14.1 % (ref 11.6–15.1)
GFR SERPL CREATININE-BSD FRML MDRD: 97 ML/MIN/1.73SQ M
GLUCOSE SERPL-MCNC: 85 MG/DL (ref 65–140)
HCT VFR BLD AUTO: 40.2 % (ref 34.8–46.1)
HGB BLD-MCNC: 12.4 G/DL (ref 11.5–15.4)
IMM GRANULOCYTES # BLD AUTO: 0.08 THOUSAND/UL (ref 0–0.2)
IMM GRANULOCYTES NFR BLD AUTO: 1 % (ref 0–2)
INR PPP: 1.83 (ref 0.84–1.19)
LYMPHOCYTES # BLD AUTO: 2.5 THOUSANDS/ΜL (ref 0.6–4.47)
LYMPHOCYTES NFR BLD AUTO: 22 % (ref 14–44)
MCH RBC QN AUTO: 28.3 PG (ref 26.8–34.3)
MCHC RBC AUTO-ENTMCNC: 30.8 G/DL (ref 31.4–37.4)
MCV RBC AUTO: 92 FL (ref 82–98)
MONOCYTES # BLD AUTO: 0.88 THOUSAND/ΜL (ref 0.17–1.22)
MONOCYTES NFR BLD AUTO: 8 % (ref 4–12)
NEUTROPHILS # BLD AUTO: 7.21 THOUSANDS/ΜL (ref 1.85–7.62)
NEUTS SEG NFR BLD AUTO: 62 % (ref 43–75)
NRBC BLD AUTO-RTO: 0 /100 WBCS
NT-PROBNP SERPL-MCNC: 2246 PG/ML
P AXIS: 11 DEGREES
PLATELET # BLD AUTO: 439 THOUSANDS/UL (ref 149–390)
PMV BLD AUTO: 8.5 FL (ref 8.9–12.7)
POTASSIUM SERPL-SCNC: 3.8 MMOL/L (ref 3.5–5.3)
PR INTERVAL: 182 MS
PROTHROMBIN TIME: 21.3 SECONDS (ref 11.6–14.5)
QRS AXIS: -41 DEGREES
QRSD INTERVAL: 92 MS
QT INTERVAL: 386 MS
QTC INTERVAL: 442 MS
RBC # BLD AUTO: 4.38 MILLION/UL (ref 3.81–5.12)
SODIUM SERPL-SCNC: 138 MMOL/L (ref 136–145)
T WAVE AXIS: 3 DEGREES
TROPONIN I SERPL-MCNC: <0.02 NG/ML
VENTRICULAR RATE: 79 BPM
WBC # BLD AUTO: 11.49 THOUSAND/UL (ref 4.31–10.16)

## 2019-12-18 PROCEDURE — 83880 ASSAY OF NATRIURETIC PEPTIDE: CPT | Performed by: EMERGENCY MEDICINE

## 2019-12-18 PROCEDURE — 85730 THROMBOPLASTIN TIME PARTIAL: CPT | Performed by: EMERGENCY MEDICINE

## 2019-12-18 PROCEDURE — 94761 N-INVAS EAR/PLS OXIMETRY MLT: CPT

## 2019-12-18 PROCEDURE — 99220 PR INITIAL OBSERVATION CARE/DAY 70 MINUTES: CPT | Performed by: FAMILY MEDICINE

## 2019-12-18 PROCEDURE — 99285 EMERGENCY DEPT VISIT HI MDM: CPT

## 2019-12-18 PROCEDURE — 93010 ELECTROCARDIOGRAM REPORT: CPT | Performed by: INTERNAL MEDICINE

## 2019-12-18 PROCEDURE — 84484 ASSAY OF TROPONIN QUANT: CPT | Performed by: EMERGENCY MEDICINE

## 2019-12-18 PROCEDURE — 93005 ELECTROCARDIOGRAM TRACING: CPT

## 2019-12-18 PROCEDURE — 96374 THER/PROPH/DIAG INJ IV PUSH: CPT

## 2019-12-18 PROCEDURE — 85610 PROTHROMBIN TIME: CPT | Performed by: EMERGENCY MEDICINE

## 2019-12-18 PROCEDURE — 36415 COLL VENOUS BLD VENIPUNCTURE: CPT | Performed by: EMERGENCY MEDICINE

## 2019-12-18 PROCEDURE — 71045 X-RAY EXAM CHEST 1 VIEW: CPT

## 2019-12-18 PROCEDURE — 99285 EMERGENCY DEPT VISIT HI MDM: CPT | Performed by: EMERGENCY MEDICINE

## 2019-12-18 PROCEDURE — 80048 BASIC METABOLIC PNL TOTAL CA: CPT | Performed by: EMERGENCY MEDICINE

## 2019-12-18 PROCEDURE — 94760 N-INVAS EAR/PLS OXIMETRY 1: CPT

## 2019-12-18 PROCEDURE — 85025 COMPLETE CBC W/AUTO DIFF WBC: CPT | Performed by: EMERGENCY MEDICINE

## 2019-12-18 PROCEDURE — 94664 DEMO&/EVAL PT USE INHALER: CPT

## 2019-12-18 RX ORDER — CALCIUM CARBONATE 200(500)MG
500 TABLET,CHEWABLE ORAL DAILY PRN
Status: DISCONTINUED | OUTPATIENT
Start: 2019-12-18 | End: 2019-12-19 | Stop reason: HOSPADM

## 2019-12-18 RX ORDER — METOPROLOL SUCCINATE 50 MG/1
50 TABLET, EXTENDED RELEASE ORAL 2 TIMES DAILY
Status: DISCONTINUED | OUTPATIENT
Start: 2019-12-18 | End: 2019-12-19 | Stop reason: HOSPADM

## 2019-12-18 RX ORDER — FUROSEMIDE 10 MG/ML
20 INJECTION INTRAMUSCULAR; INTRAVENOUS ONCE
Status: COMPLETED | OUTPATIENT
Start: 2019-12-18 | End: 2019-12-18

## 2019-12-18 RX ORDER — LEVOTHYROXINE SODIUM 0.07 MG/1
75 TABLET ORAL
Status: DISCONTINUED | OUTPATIENT
Start: 2019-12-18 | End: 2019-12-19 | Stop reason: HOSPADM

## 2019-12-18 RX ADMIN — METOPROLOL SUCCINATE 50 MG: 50 TABLET, EXTENDED RELEASE ORAL at 08:48

## 2019-12-18 RX ADMIN — RIVAROXABAN 20 MG: 10 TABLET, FILM COATED ORAL at 08:48

## 2019-12-18 RX ADMIN — FUROSEMIDE 20 MG: 10 INJECTION, SOLUTION INTRAMUSCULAR; INTRAVENOUS at 04:55

## 2019-12-18 RX ADMIN — ANTACID TABLETS 500 MG: 500 TABLET, CHEWABLE ORAL at 17:46

## 2019-12-18 RX ADMIN — LEVOTHYROXINE SODIUM 75 MCG: 75 TABLET ORAL at 09:25

## 2019-12-18 RX ADMIN — FUROSEMIDE 20 MG: 10 INJECTION, SOLUTION INTRAMUSCULAR; INTRAVENOUS at 14:35

## 2019-12-18 NOTE — PLAN OF CARE
Problem: Potential for Falls  Goal: Patient will remain free of falls  Description  INTERVENTIONS:  - Assess patient frequently for physical needs  -  Identify cognitive and physical deficits and behaviors that affect risk of falls  -  [place] fall precautions as indicated by assessment   - Educate patient/family on patient safety including physical limitations  - Instruct patient to call for assistance with activity based on assessment  - Modify environment to reduce risk of injury  - Consider OT/PT consult to assist with strengthening/mobility  Outcome: Progressing     Problem: PAIN - ADULT  Goal: Verbalizes/displays adequate comfort level or baseline comfort level  Description  Interventions:  - Encourage patient to monitor pain and request assistance  - Assess pain using appropriate pain scale  - Administer analgesics based on type and severity of pain and evaluate response  - Implement non-pharmacological measures as appropriate and evaluate response  - Consider cultural and social influences on pain and pain management  - Notify physician/advanced practitioner if interventions unsuccessful or patient reports new pain  Outcome: Progressing     Problem: INFECTION - ADULT  Goal: Absence or prevention of progression during hospitalization  Description  INTERVENTIONS:  - Assess and monitor for signs and symptoms of infection  - Monitor lab/diagnostic results  - Monitor all insertion sites, i e  indwelling lines, tubes, and drains  - Administer medications as ordered  - Instruct and encourage patient and family to use good hand hygiene technique  - Identify and instruct in appropriate isolation precautions for identified infection/condition   Outcome: Progressing     Problem: SAFETY ADULT  Goal: Patient will remain free of falls  Description  INTERVENTIONS:  - Assess patient frequently for physical needs  -  Identify cognitive and physical deficits and behaviors that affect risk of falls    -  [place] fall precautions as indicated by assessment   - Educate patient/family on patient safety including physical limitations  - Instruct patient to call for assistance with activity based on assessment  - Modify environment to reduce risk of injury  - Consider OT/PT consult to assist with strengthening/mobility  Outcome: Progressing  Goal: Maintain or return to baseline ADL function  Description  INTERVENTIONS:  -  Assess patient's ability to carry out ADLs; assess patient's baseline for ADL function and identify physical deficits which impact ability to perform ADLs (bathing, care of mouth/teeth, toileting, grooming, dressing, etc )  - Assess/evaluate cause of self-care deficits   - Assess range of motion  - Assess patient's mobility; develop plan if impaired  - Assess patient's need for assistive devices and provide as appropriate  - Encourage maximum independence but intervene and supervise when necessary  - Involve family in performance of ADLs  - Assess for home care needs following discharge   - Consider OT consult to assist with ADL evaluation and planning for discharge  - Provide patient education as appropriate  Outcome: Progressing  Goal: Maintain or return mobility status to optimal level  Description  INTERVENTIONS:  - Assess patient's baseline mobility status (ambulation, transfers, stairs, etc )    - Identify cognitive and physical deficits and behaviors that affect mobility  - Identify mobility aids required to assist with transfers and/or ambulation (gait belt, sit-to-stand, lift, walker, cane, etc )  - [place] fall precautions as indicated by assessment  - Record patient progress and toleration of activity level on Mobility SBAR; progress patient to next Phase/Stage  - Instruct patient to call for assistance with activity based on assessment  - Consider rehabilitation consult to assist with strengthening/weightbearing, etc   Outcome: Progressing     Problem: DISCHARGE PLANNING  Goal: Discharge to home or other facility with appropriate resources  Description  INTERVENTIONS:  - Identify barriers to discharge w/patient and caregiver  - Arrange for needed discharge resources and transportation as appropriate  - Identify discharge learning needs (meds, wound care, etc )  - Refer to Case Management Department for coordinating discharge planning if the patient needs post-hospital services based on physician/advanced practitioner order or complex needs related to functional status, cognitive ability, or social support system   Outcome: Progressing     Problem: Knowledge Deficit  Goal: Patient/family/caregiver demonstrates understanding of disease process, treatment plan, medications, and discharge instructions  Description  Complete learning assessment and assess knowledge base    Interventions:  - Provide teaching at level of understanding  - Provide teaching via preferred learning methods  Outcome: Progressing     Problem: METABOLIC, FLUID AND ELECTROLYTES - ADULT  Goal: Electrolytes maintained within normal limits  Description  INTERVENTIONS:  - Monitor labs and assess patient for signs and symptoms of electrolyte imbalances  - Administer electrolyte replacement as ordered  - Monitor response to electrolyte replacements, including repeat lab results as appropriate  - Instruct patient on fluid and nutrition as appropriate  Outcome: Progressing  Goal: Fluid balance maintained  Description  INTERVENTIONS:  - Monitor labs   - Monitor I/O and WT  - Instruct patient on fluid and nutrition as appropriate  - Assess for signs & symptoms of volume excess or deficit  Outcome: Progressing     Problem: MUSCULOSKELETAL - ADULT  Goal: Maintain or return mobility to safest level of function  Description  INTERVENTIONS:  - Assess patient's ability to carry out ADLs; assess patient's baseline for ADL function and identify physical deficits which impact ability to perform ADLs (bathing, care of mouth/teeth, toileting, grooming, dressing, etc )  - Assess/evaluate cause of self-care deficits   - Assess range of motion  - Assess patient's mobility  - Assess patient's need for assistive devices and provide as appropriate  - Encourage maximum independence but intervene and supervise when necessary  - Involve family in performance of ADLs  - Assess for home care needs following discharge   - Consider OT consult to assist with ADL evaluation and planning for discharge  - Provide patient education as appropriate  Outcome: Progressing

## 2019-12-18 NOTE — ASSESSMENT & PLAN NOTE
Recent echocardiogram does not demonstrate systolic or diastolic dysfunction  She did receive IV fluids through most recent hospitalization  She received 20 mg of IV Lasix in the ED with diuresis 2500 cc  Will administer an additional 20 mg of IV Lasix this afternoon  Monitor intake and output and daily weights

## 2019-12-18 NOTE — UTILIZATION REVIEW
Initial Clinical Review    Admission: Date/Time/Statement:    Admission Orders (From admission, onward)     Ordered        12/18/19 0724  Place in Observation (expected length of stay for this patient is less than two midnights)  Once                   Orders Placed This Encounter   Procedures    Place in Observation (expected length of stay for this patient is less than two midnights)     Standing Status:   Standing     Number of Occurrences:   1     Order Specific Question:   Admitting Physician     Answer:   Bonifacio Woods     Order Specific Question:   Level of Care     Answer:   Med Surg [16]     ED Arrival Information     Expected Arrival Acuity Means of Arrival Escorted By Service Admission Type    - 12/18/2019 04:32 Emergent Walk-In Spouse General Medicine Emergency    Arrival Complaint    sob        Chief Complaint   Patient presents with    Shortness of Breath     trouble breathing tonight   cant lay flat  d/c from  hospital yesterday  Assessment/Plan:   81 y o  female with PMH of atrial fibrillation/atrial flutter on Xarelto, history of pulmonary emboli in the past, polymyalgia rheumatica, rheumatoid arthritis, presenting with shortness of breath  Patient was admitted 12/14 through 12/17 with flu-like symptoms including fevers, chills, body aches, productive cough, sore throat, and nausea with 4 for SIRS criteria positive, also in atrial flutter with rapid ventricular response which has ultimately been treated with Toprol XL 50 mg b i d  and cardioversion in 12/17  Patient reports feeling well when she was discharged to home  She did note that she gained 10 lb when she did return home and noticed that her bilateral lower extremities have been swollen  She went to bed, but woke up at 2:30 a m  This morning with significant shortness of breath  There is no chest pain  There are no palpitations  No lightheadedness  No fever in the past day  She reports a mild nonproductive cough    Volume overload  Assessment & Plan  Recent echocardiogram does not demonstrate systolic or diastolic dysfunction  She did receive IV fluids through most recent hospitalization  She received 20 mg of IV Lasix in the ED with diuresis 2500 cc  Will administer an additional 20 mg of IV Lasix this afternoon  Monitor intake and output and daily weights     Paroxysmal atrial fibrillation ([address] )  Assessment & Plan  Status post OBINNA cardioversion 12/17/2019  Patient is currently in sinus rhythm  AV devyn blocking regimen in the form of Toprol-XL 50 mg p o  B i d   AC with Xarelto    ED Triage Vitals [12/18/19 0438]   Temperature Pulse Respirations Blood Pressure SpO2   98 2 °F (36 8 °C) 84 20 141/83 97 %      Temp Source Heart Rate Source Patient Position - Orthostatic VS BP Location FiO2 (%)   Temporal Monitor Sitting Left arm --      Pain Score       No Pain        Wt Readings from Last 1 Encounters:   12/18/19 73 4 kg (161 lb 13 1 oz)     Additional Vital Signs:   12/18/19 0530    79  22  130/64    98 %  None (Room air)  Sitting   12/18/19 0500    76  22  134/75    98 %  None (Room air)  Sitting   12/18/19 0445    79  22  141/83    95 %  None (Room air)  Sitting   12/18/19 0441              None (Room air)     12/18/19 0438  98 2 °F (36 8 °C)  84  20  141/83    97 %  None (Room air)  Sitting   Pertinent Labs/Diagnostic Test Results:   Results from last 7 days   Lab Units 12/18/19  0446 12/17/19  0439 12/16/19  1111 12/15/19  0746  12/14/19 2011   WBC Thousand/uL 11 49* 12 99* 16 18* 11 94*  --  13 29*   HEMOGLOBIN g/dL 12 4 10 3* 11 5 10 3*  --  12 4   HEMATOCRIT % 40 2 33 0* 36 6 33 4*  --  39 4   PLATELETS Thousands/uL 439* 362 393* 319  --  377   NEUTROS ABS Thousands/µL 7 21 10 14* 14 22* 9 34*   < >  --    BANDS PCT %  --   --   --   --   --  5    < > = values in this interval not displayed       Results from last 7 days   Lab Units 12/18/19  0446 12/17/19  0439 12/16/19  1111 12/15/19  0746 12/14/19 2011 SODIUM mmol/L 138 143 141 141 140   POTASSIUM mmol/L 3 8 4 0 4 2 3 5 3 4*   CHLORIDE mmol/L 102 110* 106 108 107   CO2 mmol/L 30 27 28 27 26   ANION GAP mmol/L 6 6 7 6 7   BUN mg/dL 20 19 17 16 15   CREATININE mg/dL 0 55* 0 45* 0 57* 0 49* 0 55*   EGFR ml/min/1 73sq m 97 104 96 101 97   CALCIUM mg/dL 9 5 9 3 10 1 8 6 9 0   MAGNESIUM mg/dL  --   --   --  1 8 1 7   PHOSPHORUS mg/dL  --   --   --  2 9  --      Results from last 7 days   Lab Units 12/16/19  1111 12/15/19  0746 12/14/19 2011   AST U/L 16 15 13   ALT U/L 18 13 15   ALK PHOS U/L 73 60 65   TOTAL PROTEIN g/dL 6 5 5 3* 5 9*   ALBUMIN g/dL 2 6* 2 2* 2 6*   TOTAL BILIRUBIN mg/dL 0 20 0 60 0 90     Results from last 7 days   Lab Units 12/18/19  0446 12/17/19  0439 12/16/19  1111 12/15/19  0746 12/14/19 2011   GLUCOSE RANDOM mg/dL 85 93 122 91 126     Results from last 7 days   Lab Units 12/14/19 2011   CK TOTAL U/L 13*     Results from last 7 days   Lab Units 12/18/19  0446 12/14/19 2011   TROPONIN I ng/mL <0 02 <0 02     Results from last 7 days   Lab Units 12/18/19  0446 12/14/19 2011   PROTIME seconds 21 3* 21 3*   INR  1 83* 1 83*   PTT seconds 29 35     Results from last 7 days   Lab Units 12/15/19  0746   TSH 3RD GENERATON uIU/mL 1 818     Results from last 7 days   Lab Units 12/16/19  1111 12/15/19  0746   PROCALCITONIN ng/ml 0 15 0 17     Results from last 7 days   Lab Units 12/14/19 2011   LACTIC ACID mmol/L 1 5     Results from last 7 days   Lab Units 12/18/19  0446 12/14/19 2011   NT-PRO BNP pg/mL 2,246* 1,961*     Results from last 7 days   Lab Units 12/14/19 2011   LIPASE u/L 70*     Results from last 7 days   Lab Units 12/15/19  0112   CLARITY UA  Clear   COLOR UA  Lilly   SPEC GRAV UA  1 010   PH UA  5 0   GLUCOSE UA mg/dl Negative   KETONES UA mg/dl Negative   BLOOD UA  Negative   PROTEIN UA mg/dl Trace*   NITRITE UA  Negative   BILIRUBIN UA  Negative   UROBILINOGEN UA E U /dl 0 2   LEUKOCYTES UA  Negative   WBC UA /hpf None Seen   RBC UA /hpf 0-1*   BACTERIA UA /hpf Occasional   EPITHELIAL CELLS WET PREP /hpf Occasional     Results from last 7 days   Lab Units 12/14/19 2014   INFLUENZA A PCR  None Detected   RSV PCR  None Detected     Results from last 7 days   Lab Units 12/14/19 2118 12/14/19 2011   BLOOD CULTURE  No Growth at 48 hrs  No Growth at 48 hrs  Results from last 7 days   Lab Units 12/14/19 2011   TOTAL COUNTED  100     12/17  ekg=Normal sinus rhythm  Cannot rule out Anterior infarct (cited on or before 28-MAY-2017)  ED Treatment:   Medication Administration from 12/18/2019 0431 to 12/18/2019 0755       Date/Time Order Dose Route     12/18/2019 0455 furosemide (LASIX) injection 20 mg 20 mg Intravenous        Past Medical History:   Diagnosis Date    Arthritis     Atrial fibrillation (HCC)     Disease of thyroid gland     DVT (deep venous thrombosis) (HCC)     Lyme disease     Migraine     Polymyalgia rheumatica (HCC)     [address]oli [address] [address]     Renal disorder     right sided kidney stones    Vitamin D deficiency      Present on Admission:   Paroxysmal atrial fibrillation ([facility] [address] )   Hypothyroidism  Admitting Diagnosis: Shortness of breath [R06 02]  Volume overload [E87 70]  Dyspnea, unspecified type [R06 00]  Age/Sex: 81 y o  female  Admission Orders:  Med surg  Incentive spirometry  venodynes  Scheduled Medications:  furosemide 20 mg Intravenous Once   metoprolol succinate 50 mg Oral BID   rivaroxaban 20 mg Oral Daily With Breakfast     Network Utilization Review Department  [email or web address]  ATTENTION: Please call with any questions or concerns to [phone] and carefully listen to the prompts so that you are directed to the right person  All voicemails are confidential   iVvian Watson all requests for admission clinical reviews, approved or denied determinations and any other requests to dedicated fax number below belonging to the campus where the patient is receiving treatment   List of dedicated fax numbers for the Facilities:  [address] [facility] [address] DENIALS (Administrative/Medical Necessity) [phone]   [address] (Maternity/NICU/Pediatrics) [phone]   Helder Courser [phone]   Fermin Dey [phone]   Mike Grit [phone]   [address] [facility] [address]  [phone]   Julian Walters [phone]   [address] [facility] [address]   [address] [place] [address] [phone]

## 2019-12-18 NOTE — UTILIZATION REVIEW
Notification of Discharge  This is a Notification of Discharge from our facility [address]  Please be advised that this patient has been discharge from our facility  Below you will find the admission and discharge date and time including the patients disposition  PRESENTATION DATE: 12/14/2019  6:57 PM  OBS ADMISSION DATE:   IP ADMISSION DATE: 12/15/19 0517   DISCHARGE DATE: 12/17/2019  4:11 PM  DISPOSITION: Home/Self Care Home/Self Care   Admission Orders listed below:  Admission Orders (From admission, onward)     Ordered        12/15/19 0524  Inpatient Admission  Once                   Please contact the UR Department if additional information is required to close this patient's authorization/case  [address] [facility] Utilization Review Department  Main: [phone] x carefully listen to the prompts  All voicemails are confidential   [email or web address]  Send all requests for admission clinical reviews, approved or denied determinations and any other requests to dedicated fax number below belonging to the campus where the patient is receiving treatment   List of dedicated fax numbers:  [address] DENIALS (Administrative/Medical Necessity) [phone]   [address] (Maternity/NICU/Pediatrics) [phone]   Arnol Yarbrough [phone]   Carlee [facility] [phone]   Thiago Coppola [phone]   145 [facility] [address] [place] [address] [phone]   [facility]  [phone]   [address] [facility] [address]  [address] [facility] [address] [facility] [phone]

## 2019-12-18 NOTE — H&P
H&P Exam - [place] 81 y o  female MRN: [id number]    Unit/Bed#: RM02 Encounter: [id number]    Volume overload  Assessment & Plan  Recent echocardiogram does not demonstrate systolic or diastolic dysfunction  She did receive IV fluids through most recent hospitalization  She received 20 mg of IV Lasix in the ED with diuresis 2500 cc  Will administer an additional 20 mg of IV Lasix this afternoon  Monitor intake and output and daily weights    Paroxysmal atrial fibrillation (HCC)  Assessment & Plan  Status post OBINNA cardioversion 12/17/2019  Patient is currently in sinus rhythm  AV devyn blocking regimen in the form of Toprol-XL 50 mg p o  B i d   AC with Xarelto      Hypothyroidism  Assessment & Plan  Continue Synthroid      History of Present Illness      Patient is a 44-year-old female who presents to the ED with shortness of breath began approximately 3:00 a m  Tanya Booker Patient was recently hospitalized for atrial fibrillation is status post OBINNA cardioversion  She does have lower extremity edema  She denies any chest pain  Blood pressure and heart rate were normal at home the patient developed 2 pillow orthopnea  In the ED she received 20 mg of Lasix with diuresis 2500 cc and improvement of orthopnea and shortness of breath  Review of Systems   Respiratory: Positive for shortness of breath  Cardiovascular: Positive for leg swelling  Negative for chest pain  All other systems reviewed and are negative        Historical Information   Past Medical History:   Diagnosis Date    Arthritis     Atrial fibrillation ([address] )     Disease of thyroid gland     DVT (deep venous thrombosis) (HCC)     Lyme disease     Migraine     Polymyalgia rheumatica (HCC)     Pulmonary emboli (HCC)     Renal disorder     right sided kidney stones    Vitamin D deficiency      Past Surgical History:   Procedure Laterality Date    CARDIAC CATHETERIZATION      CARDIOVERSION N/A 12/17/2019    Procedure: CARDIOVERSION; Surgeon: Abhay Hannon MD;  Location: [facility];  Service: Cardiology     SECTION      x3 - last impression 16    FRACTURE SURGERY Left     wrist    HERNIA REPAIR  2018    KNEE CARTILAGE SURGERY Left     TONSILECTOMY AND ADNOIDECTOMY      VEIN SURGERY      venous ligation with stripping    WRIST SURGERY Left     ORIF wrist - last impression 16     Social History   Social History     Substance and Sexual Activity   Alcohol Use Yes    Frequency: Monthly or less    Comment: occasionally; social     Social History     Substance and Sexual Activity   Drug Use No     Social History     Tobacco Use   Smoking Status Never Smoker   Smokeless Tobacco Never Used     Family History: non-contributory    Meds/Allergies   all medications and allergies reviewed  Allergies   Allergen Reactions    Amiodarone      Other reaction(s):  Other (See Comments)  Reports caused elevated TSH    Sulfa Antibiotics Itching and Rash    Sulfamethoxazole-Trimethoprim Itching and Rash       Objective   First Vitals:   Blood Pressure: 141/83 (19 043)  Pulse: 84 (19)  Temperature: 98 2 °F (36 8 °C) (19)  Temp Source: Temporal (19)  Respirations: 20 (19)  Weight - Scale: 68 2 kg (150 lb 5 7 oz) (19)  SpO2: 97 % (19)    Current Vitals:   Blood Pressure: 116/65 (19 0700)  Pulse: 75 (19 07)  Temperature: 98 2 °F (36 8 °C) (19)  Temp Source: Temporal (19)  Respirations: 18 (19)  Weight - Scale: 73 4 kg (161 lb 13 1 oz) (19 0444)  SpO2: 94 % (19 07)      Intake/Output Summary (Last 24 hours) at 2019 0734  Last data filed at 2019 0654  Gross per 24 hour   Intake    Output 2375 ml   Net -2375 ml       Invasive Devices     Peripheral Intravenous Line            Peripheral IV 19 Right Antecubital less than 1 day                Physical Exam   Constitutional: She appears well-developed  HENT:   Mouth/Throat: No oropharyngeal exudate  Eyes: No scleral icterus  Neck: Normal range of motion  JVD present  Cardiovascular: Normal rate and regular rhythm  Exam reveals no friction rub  No murmur heard  Pulmonary/Chest: Effort normal and breath sounds normal  No stridor  No respiratory distress  She has no wheezes  Abdominal: Soft  Bowel sounds are normal  She exhibits no distension  There is no tenderness  There is no guarding  Musculoskeletal: Normal range of motion  She exhibits edema  Neurological: She is alert  She displays normal reflexes  No cranial nerve deficit  Coordination normal    Skin: Skin is warm  She is not diaphoretic  No erythema         Lab Results:   Lab Results   Component Value Date    WBC 11 49 (H) 12/18/2019    HGB 12 4 12/18/2019    HCT 40 2 12/18/2019    MCV 92 12/18/2019     (H) 12/18/2019     Lab Results   Component Value Date    SODIUM 138 12/18/2019    K 3 8 12/18/2019     12/18/2019    CO2 30 12/18/2019    BUN 20 12/18/2019    CREATININE 0 55 (L) 12/18/2019    GLUC 85 12/18/2019    CALCIUM 9 5 12/18/2019       Imaging:   XR chest 1 view portable   ED Interpretation   Cardiomegaly, cephalization suggestive of pulm edema          EKG, Pathology, and Other Studies: Sinus    Code Status: Prior  Advance Directive and Living Will:      Power of :    POLST:      Counseling / Coordination of Care:

## 2019-12-18 NOTE — RESPIRATORY THERAPY NOTE
RT Protocol Note  Hermilo Cronin 81 y o  female MRN: [id number]  Unit/Bed#: 424-01 Encounter: [id number]    Assessment    Active Problems:    Hypothyroidism    Paroxysmal atrial fibrillation (HCC)    Volume overload      Home Pulmonary Medications:  none       Past Medical History:   Diagnosis Date    Arthritis     Atrial fibrillation (HCC)     Disease of thyroid gland     DVT (deep venous thrombosis) (HCC)     Lyme disease     Migraine     Polymyalgia rheumatica (HCC)     Pulmonary emboli (HCC)     Renal disorder     right sided kidney stones    Vitamin D deficiency      Social History     Socioeconomic History    Marital status: /Civil Union     Spouse name: None    Number of children: 3    Years of education: None    Highest education level: None   Occupational History    Occupation: Manager     Comment: [employer]   Social Needs    Financial resource strain: None    Food insecurity:     Worry: None     Inability: None    Transportation needs:     Medical: None     Non-medical: None   Tobacco Use    Smoking status: Never Smoker    Smokeless tobacco: Never Used   Substance and Sexual Activity    Alcohol use: Yes     Frequency: Monthly or less     Comment: occasionally; social    Drug use: No    Sexual activity: None   Lifestyle    Physical activity:     Days per week: None     Minutes per session: None    Stress: None   Relationships    Social connections:     Talks on phone: None     Gets together: None     Attends Spiritism service: None     Active member of club or organization: None     Attends meetings of clubs or organizations: None     Relationship status: None    Intimate partner violence:     Fear of current or ex partner: None     Emotionally abused: None     Physically abused: None     Forced sexual activity: None   Other Topics Concern    None   Social History Narrative    None       Subjective         Objective    Physical Exam:   Assessment Type: Assess only  General Appearance: Awake, Alert  Respiratory Pattern: Normal  Chest Assessment: Chest expansion symmetrical  Bilateral Breath Sounds: Diminished    Vitals:  Blood pressure 118/72, pulse 84, temperature 98 3 °F (36 8 °C), temperature source Oral, resp  rate 16, height 5' 2" (1 575 m), weight 69 kg (152 lb 1 9 oz), SpO2 94 %  Imaging and other studies: I have personally reviewed pertinent reports  Plan    Respiratory Plan: No distress/Pulmonary history          No respiratory medications ordered at this time

## 2019-12-18 NOTE — ED PROVIDER NOTES
EMERGENCY DEPARTMENT ENCOUNTER NOTE  ? CHIEF COMPLAINT  Chief Complaint   Patient presents with    Shortness of Breath     trouble breathing tonight   cant lay flat  d/c [facility] yesterday  HPI  Shona Baldwin is a 81 y o  female with PMH of atrial fibrillation/atrial flutter on Xarelto, history of pulmonary emboli in the past, polymyalgia rheumatica, rheumatoid arthritis, presenting with shortness of breath  Patient was admitted 12/14 through 12/17 with flu-like symptoms including fevers, chills, body aches, productive cough, sore throat, and nausea with 4 for SIRS criteria positive, also in atrial flutter with rapid ventricular response which has ultimately been treated with Toprol XL 50 mg b i d  and cardioversion in 12/17  Patient reports feeling well when she was discharged to home  She did note that she gained 10 lb when she did return home and noticed that her bilateral lower extremities have been swollen  She went to bed, but woke up at 2:30 a m  This morning with significant shortness of breath  There is no chest pain  There are no palpitations  No lightheadedness  No fever in the past day  She reports a mild nonproductive cough      REVIEW OF SYSTEMS  Constitutional:  Fevers and chills recently but none in the past day  Eyes: ?Denies changes in vision  ENT:  Mild sore throat  CV: Denies chest pain, palpitations  Resp:  Reports shortness of breath, mild dry cough, significant leg swelling  GI: ?Denies abdominal pain, vomiting, or diarrhea   Skin: No new rashes  Neuro: No headaches  Ten systems were reviewed otherwise were unremarkable    PAST MEDICAL HISTORY  Past Medical History:   Diagnosis Date    Arthritis     Atrial fibrillation ([address] )     Disease of thyroid gland     DVT (deep venous thrombosis) (HCC)     Lyme disease     Migraine     Polymyalgia rheumatica (HCC)     Pulmonary emboli (HCC)     Renal disorder     right sided kidney stones    Vitamin D deficiency SURGICAL HISTORY  Past Surgical History:   Procedure Laterality Date    CARDIAC CATHETERIZATION      CARDIOVERSION N/A 2019    Procedure: CARDIOVERSION;  Surgeon: Adam Hemphill MD;  Location: [facility];  Service: Cardiology     SECTION      x3 - last impression 16    FRACTURE SURGERY Left     wrist    HERNIA REPAIR  2018    KNEE CARTILAGE SURGERY Left     TONSILECTOMY AND ADNOIDECTOMY      VEIN SURGERY      venous ligation with stripping    WRIST SURGERY Left     ORIF wrist - last impression 16       FAMILY HISTORY  Family History   Problem Relation Age of Onset    Breast cancer Mother 46    Aneurysm Father         aortic    No Known Problems Sister     No Known Problems Maternal Grandmother     No Known Problems Maternal Grandfather     No Known Problems Paternal Grandmother     No Known Problems Paternal Grandfather     Rheum arthritis Maternal Aunt     Early death Maternal Aunt     No Known Problems Paternal Aunt     No Known Problems Paternal Aunt     No Known Problems Paternal Aunt         CURRENT MEDICATIONS  Current Facility-Administered Medications on File Prior to Encounter   Medication    [] ketorolac (TORADOL) injection 15 mg    [DISCONTINUED] acetaminophen (TYLENOL) tablet 650 mg    [DISCONTINUED] aluminum-magnesium hydroxide-simethicone (MYLANTA) 200-200-20 mg/5 mL oral suspension 30 mL    [DISCONTINUED] ascorbic acid (VITAMIN C) tablet 500 mg    [DISCONTINUED] calcium carbonate-vitamin D (OSCAL-D) 500 mg-200 units per tablet 1 tablet    [DISCONTINUED] cefepime (MAXIPIME) IVPB (premix) 2,000 mg    [DISCONTINUED] cholecalciferol (VITAMIN D3) tablet 1,000 Units    [DISCONTINUED] cyanocobalamin (VITAMIN B-12) tablet 500 mcg    [DISCONTINUED] diltiazem (CARDIZEM) tablet 30 mg    [DISCONTINUED] docusate sodium (COLACE) capsule 100 mg    [DISCONTINUED] folic acid (FOLVITE) tablet 1 mg    [DISCONTINUED] lactated ringers infusion    [DISCONTINUED] levothyroxine tablet 75 mcg    [DISCONTINUED] lidocaine (PF) (XYLOCAINE-MPF) 1 % injection    [DISCONTINUED] metoprolol tartrate (LOPRESSOR) tablet 25 mg    [DISCONTINUED] multivitamin-minerals (CENTRUM) tablet 1 tablet    [DISCONTINUED] ondansetron (ZOFRAN) injection 4 mg    [DISCONTINUED] phenylephrine bolus from bag    [DISCONTINUED] propofol (DIPRIVAN) 200 MG/20ML bolus injection    [DISCONTINUED] rivaroxaban (XARELTO) tablet 20 mg     Current Outpatient Medications on File Prior to Encounter   Medication Sig    Calcium Ascorbate 500 MG TABS Take 500 mg by mouth    CALCIUM-VITAMIN D PO Take 1 tablet by mouth daily    cholecalciferol (VITAMIN D3) 1,000 units tablet Take 1,000 Units by mouth daily    Cinnamon 500 MG capsule Take 500 mg by mouth daily    Cyanocobalamin (VITAMIN B 12 PO) Take 500 mcg by mouth    folic acid (FOLVITE) 1 mg tablet Take 1 mg by mouth daily     levothyroxine 75 mcg tablet Take 1 tablet daily    metoprolol succinate (TOPROL-XL) 50 mg 24 hr tablet Take 1 tablet (50 mg total) by mouth 2 (two) times a day    minocycline (MINOCIN) 50 mg capsule Take 50 mg by mouth daily    Misc Natural Products (OSTEO BI-FLEX JOINT SHIELD PO) Take by mouth    Multiple Vitamins-Minerals (OCUVITE ADULT 50+) CAPS Take 1 capsule by mouth daily    Red Yeast Rice 600 MG TABS Take 2 tablets by mouth daily    XARELTO 20 MG tablet Take 1 tablet by mouth daily       ALLERGIES  Allergies   Allergen Reactions    Amiodarone      Other reaction(s):  Other (See Comments)  Reports caused elevated TSH    Sulfa Antibiotics Itching and Rash    Sulfamethoxazole-Trimethoprim Itching and Rash       SOCIAL HISTORY  Social History     Socioeconomic History    Marital status: /Civil Union     Spouse name: None    Number of children: 3    Years of education: None    Highest education level: None   Occupational History    Occupation: Manager     Comment: [employer] Financial resource strain: None    Food insecurity:     Worry: None     Inability: None    Transportation needs:     Medical: None     Non-medical: None   Tobacco Use    Smoking status: Never Smoker    Smokeless tobacco: Never Used   Substance and Sexual Activity    Alcohol use: Yes     Frequency: Monthly or less     Comment: occasionally; social    Drug use: No    Sexual activity: None   Lifestyle    Physical activity:     Days per week: None     Minutes per session: None    Stress: None   Relationships    Social connections:     Talks on phone: None     Gets together: None     Attends [employer] service: None     Active member of club or organization: None     Attends meetings of clubs or organizations: None     Relationship status: None    Intimate partner violence:     Fear of current or ex partner: None     Emotionally abused: None     Physically abused: None     Forced sexual activity: None   Other Topics Concern    None   Social History Narrative    None       PHYSICAL EXAM    /75 (BP Location: Left arm)   Pulse 76   Temp 98 2 °F (36 8 °C) (Temporal)   Resp 22   Wt 73 4 kg (161 lb 13 1 oz)   SpO2 98%   BMI 29 60 kg/m²   Vital signs and nursing notes reviewed  CONSTITUTIONAL: female appearing stated age resting in bed, tachypneic with respiratory rate in the 30s  HEENT: atraumatic, normocephalic  Sclera anicteric, conjunctiva are not injected  Moist oral mucosa  CARDIOVASCULAR/CHEST: RRR, no M/R/G  2+ radial pulses  PULMONARY:  Tachypneic with respiratory rate in upper 20s lower 30s, speaking in 4-5 word sentences, lungs are with crackles in distal 2/3 of lungs bilaterally  ABDOMEN: non-distended  BS present, normoactive  Non-tender  MSK:  2+ pitting edema up to proximal leg bilaterally, no calf asymmetry  NEURO: Awake, alert, and oriented x 3  Face symmetric  Moves all extremities spontaneously  No focal neurologic deficits  SKIN: Warm, appears well-perfused    There is a defibrillator pad sized patch of erythema to anterior chest and then medically placed patch of erythema to upper back consistent with defibrillation earlier today  MENTAL STATUS: Normal affect  ? LABS AND TESTS    Results Reviewed     Procedure Component Value Units Date/Time    Protime-INR [[id number]]  (Abnormal) Collected:  12/18/19 0446    Lab Status:  Final result Specimen:  Blood from Arm, Right Updated:  12/18/19 0504     Protime 21 3 seconds      INR 1 83    APTT [[id number]]  (Normal) Collected:  12/18/19 0446    Lab Status:  Final result Specimen:  Blood from Arm, Right Updated:  12/18/19 0504     PTT 29 seconds     Basic metabolic panel [[id number]]  (Abnormal) Collected:  12/18/19 0446    Lab Status:  Final result Specimen:  Blood from Arm, Right Updated:  12/18/19 0503     Sodium 138 mmol/L      Potassium 3 8 mmol/L      Chloride 102 mmol/L      CO2 30 mmol/L      ANION GAP 6 mmol/L      BUN 20 mg/dL      Creatinine 0 55 mg/dL      Glucose 85 mg/dL      Calcium 9 5 mg/dL      eGFR 97 ml/min/1 73sq m     Narrative:       [place] guidelines for Chronic Kidney Disease (CKD):     Stage 1 with normal or high GFR (GFR > 90 mL/min/1 73 square meters)    Stage 2 Mild CKD (GFR = 60-89 mL/min/1 73 square meters)    Stage 3A Moderate CKD (GFR = 45-59 mL/min/1 73 square meters)    Stage 3B Moderate CKD (GFR = 30-44 mL/min/1 73 square meters)    Stage 4 Severe CKD (GFR = 15-29 mL/min/1 73 square meters)    Stage 5 End Stage CKD (GFR <15 mL/min/1 73 square meters)  Note: GFR calculation is accurate only with a steady state creatinine    NT-BNP PRO [[id number]] Collected:  12/18/19 0446    Lab Status:   In process Specimen:  Blood Updated:  12/18/19 0453    CBC and differential [[id number]]  (Abnormal) Collected:  12/18/19 0446    Lab Status:  Final result Specimen:  Blood from Arm, Right Updated:  12/18/19 0453     WBC 11 49 Thousand/uL      RBC 4 38 Million/uL      Hemoglobin 12 4 g/dL Hematocrit 40 2 %      MCV 92 fL      MCH 28 3 pg      MCHC 30 8 g/dL      RDW 14 1 %      MPV 8 5 fL      Platelets 266 Thousands/uL      nRBC 0 /100 WBCs      Neutrophils Relative 62 %      Immat GRANS % 1 %      Lymphocytes Relative 22 %      Monocytes Relative 8 %      Eosinophils Relative 7 %      Basophils Relative 0 %      Neutrophils Absolute 7 21 Thousands/µL      Immature Grans Absolute 0 08 Thousand/uL      Lymphocytes Absolute 2 50 Thousands/µL      Monocytes Absolute 0 88 Thousand/µL      Eosinophils Absolute 0 78 Thousand/µL      Basophils Absolute 0 04 Thousands/µL     Troponin I [[id number]] Collected:  12/18/19 0446    Lab Status: In process Specimen:  Blood from Arm, Right Updated:  12/18/19 0450          XR chest 1 view portable    (Results Pending)       ED COURSE & MEDICAL DECISION MAKING  ECG 12 Lead Documentation Only  Date/Time: 12/18/2019 4:46 AM  Performed by: Gaurav Flood MD  Authorized by: Gaurav Flood MD     Comments:      Normal sinus rhythm, ventricular rate 79, NH interval 182, QRS 92, , left axis deviation, T-wave inversions in anterior lead V2, T-wave abnormality in anterior lead V3, no ST/T-wave changes to suggest ischemia, no STEMI  Normal sinus rhythm replaces atrial flutter seen on prior EKG        Bonnie Demelany' Criteria for PE      Most Recent Value   Wells' Criteria for PE   Clinical signs and symptoms of DVT  0 Filed at: 12/18/2019 0450   PE is primary diagnosis or equally likely  0 Filed at: 12/18/2019 0450   HR >100  0 Filed at: 12/18/2019 0450   Immobilization at least 3 days or Surgery in the previous 4 weeks  0 Filed at: 12/18/2019 0450   Previous, objectively diagnosed PE or DVT  1 5 Filed at: 12/18/2019 0450   Hemoptysis  0 Filed at: 12/18/2019 0450   Malignancy with treatment within 6 months or palliative  0 Filed at: 12/18/2019 0450   Wells' Criteria Total  1 5 Filed at: 12/18/2019 0450          Medications   furosemide (LASIX) injection 20 mg (20 mg Intravenous Given 12/18/19 365)     73-year-old female presenting with increased shortness of breath, bilateral lower extremity edema, and 10 lb weight gain after discharge to home earlier today  Vital signs reviewed, afebrile, within normal limits, saturating 98% on room air  Given tachypnea with resp rate in the 20s and 30s, patient placed on 2 L O2 via nasal cannula for tachypnea only  Differential diagnosis includes volume overload, acute coronary syndrome, pneumonia, pulmonary embolism, versus another etiology of symptoms  Patient is in normal sinus rhythm  Labs reveal unremarkable BMP, troponin x1 negative, CBC with mild leukocytosis of 11 49 which is improved from labs on 12/17  BNP is elevated at 22 46  Chest x-ray to my review reveals cardiomegaly and cephalization consistent with pulmonary edema  Following Lasix 20 mg IV, patient had 2400 mL urine out, does feel improved, although her lung exam continues to reveal crackles  Discussed admission for further observation at the hospital with the patient versus discharge to home, patient would like to be observed further      ED Course as of Dec 18 0632   Wed Dec 18, 2019   0550 Reached out to [facility] for admission, awaiting decision          MDM  Number of Diagnoses or Management Options  Dyspnea, unspecified type: new and requires workup  Volume overload: new and does not require workup     Amount and/or Complexity of Data Reviewed  Clinical lab tests: ordered and reviewed  Tests in the radiology section of CPT®: ordered and reviewed  Tests in the medicine section of CPT®: ordered and reviewed  Review and summarize past medical records: yes  Discuss the patient with other providers: yes  Independent visualization of images, tracings, or specimens: yes    Risk of Complications, Morbidity, and/or Mortality  Presenting problems: high  Diagnostic procedures: high  Management options: high    Patient Progress  Patient progress: improved      CLINICAL IMPRESSION  Final diagnoses:   Volume overload   Dyspnea, unspecified type       DISPOSITION  Time reflects when diagnosis was documented in both MDM as applicable and the Disposition within this note     Time User Action Codes Description Comment    12/18/2019  6:33 AM Aleta Rife Add [E87 70] Volume overload     12/18/2019  6:34 AM Aleta Rife Add [R06 00] Dyspnea, unspecified type     12/18/2019  6:34 AM Aleta Rife Modify [E87 70] Volume overload     12/18/2019  6:34 AM Aleta Rife Modify [R06 00] Dyspnea, unspecified type       ED Disposition     ED Disposition Condition Date/Time Comment    Admit Stable Wed Dec 18, 2019  7:23 AM Case was discussed with Dr Isela Martines and the patient's admission status was agreed to be Admission Status: observation status to the service of Dr Isela Martines  Follow-up Information    None         This note has been generated using a voice recognition software  There may be typographic, grammatic, or word substitution errors that have escaped editorial review       Sasha Nugent MD  12/18/19 7989

## 2019-12-18 NOTE — ED NOTES
Pt breathing easier  Voiding frequently on bsc  Total urine output as of now 1325ml  Dr Mason Priest made aware       Rossy Ramirez RN  12/18/19 8663

## 2019-12-18 NOTE — ASSESSMENT & PLAN NOTE
Status post OBINNA cardioversion 12/17/2019  Patient is currently in sinus rhythm  AV devyn blocking regimen in the form of Toprol-XL 50 mg p o  B i d   AC with Xarelto

## 2019-12-18 NOTE — RESPIRATORY THERAPY NOTE
Home Oxygen Qualifying Test       Patient name: He Doss        : 1950   Date of Test:  2019  Diagnosis:      Home Oxygen Test:    **Medicare Guidelines require item(s) 1-5 on all ambulatory patients or 1 and 2 on non-ambulatory patients  1   Baseline SPO2 on Room Air at rest 94 %  2   SPO2 during exercise on Room Air 90 %  During exercise monitor SpO2  If SPO2 increases >=89% with ambulation do not add supplemental             oxygen  If <= 88% on room air add O2 via NC and titrate patient  Patient must be ambulated with O2 and titrated to > 88% with exertion  3   SPO2 on Oxygen at rest na % na lpm     4   SPO2 during exercise on Oxygen  na% a liter flow of na lpm     5   Exercise performed:          walking, distance 450 (feet)          []  Supplemental Home Oxygen is indicated  [x]  Client does not qualify for home oxygen        Respiratory Additional Notes- kavya Julian, RT

## 2019-12-19 VITALS
RESPIRATION RATE: 18 BRPM | BODY MASS INDEX: 26.65 KG/M2 | SYSTOLIC BLOOD PRESSURE: 102 MMHG | TEMPERATURE: 97.4 F | OXYGEN SATURATION: 96 % | DIASTOLIC BLOOD PRESSURE: 72 MMHG | HEIGHT: 62 IN | HEART RATE: 75 BPM | WEIGHT: 144.84 LBS

## 2019-12-19 LAB
ANION GAP SERPL CALCULATED.3IONS-SCNC: 6 MMOL/L (ref 4–13)
BUN SERPL-MCNC: 10 MG/DL (ref 5–25)
CALCIUM SERPL-MCNC: 9.2 MG/DL (ref 8.3–10.1)
CHLORIDE SERPL-SCNC: 106 MMOL/L (ref 100–108)
CO2 SERPL-SCNC: 30 MMOL/L (ref 21–32)
CREAT SERPL-MCNC: 0.43 MG/DL (ref 0.6–1.3)
GFR SERPL CREATININE-BSD FRML MDRD: 106 ML/MIN/1.73SQ M
GLUCOSE SERPL-MCNC: 88 MG/DL (ref 65–140)
MAGNESIUM SERPL-MCNC: 2.5 MG/DL (ref 1.6–2.6)
POTASSIUM SERPL-SCNC: 4.1 MMOL/L (ref 3.5–5.3)
SODIUM SERPL-SCNC: 142 MMOL/L (ref 136–145)

## 2019-12-19 PROCEDURE — 83735 ASSAY OF MAGNESIUM: CPT | Performed by: FAMILY MEDICINE

## 2019-12-19 PROCEDURE — 99217 PR OBSERVATION CARE DISCHARGE MANAGEMENT: CPT | Performed by: FAMILY MEDICINE

## 2019-12-19 PROCEDURE — 80048 BASIC METABOLIC PNL TOTAL CA: CPT | Performed by: FAMILY MEDICINE

## 2019-12-19 RX ADMIN — RIVAROXABAN 20 MG: 10 TABLET, FILM COATED ORAL at 08:04

## 2019-12-19 RX ADMIN — LEVOTHYROXINE SODIUM 75 MCG: 75 TABLET ORAL at 05:14

## 2019-12-19 NOTE — SOCIAL WORK
Pt is being discharged today  Follow up appointments reviewed with a good understanding   Case Management reviewed discharge planning process including the following: identifying help that is needed at home, pt's preference for discharge needs and Meds at [facility]  Reviewed with Pt that any member of the healthcare team can answer questions regarding : medications, jmportance of recognizing  Signs and symptoms of any  medical problems  Case Management also encouraged pt to follow up with all recommended appointments after discharge

## 2019-12-19 NOTE — DISCHARGE SUMMARY
Discharge Summary - Jodie Miller 81 y o  female MRN: [id number]    Unit/Bed#: 424-01 Encounter: [id number]    Admission Date:   Admission Orders (From admission, onward)     Ordered        12/18/19 0724  Place in Observation (expected length of stay for this patient is less than two midnights)  Once                     Admitting Diagnosis: Shortness of breath [R06 02]  Volume overload [E87 70]  Dyspnea, unspecified type [R06 00]    HPI: Patient is a 61-year-old female who presents to the ED with shortness of breath began approximately 3:00 a m  Nicho Torres Patient was recently hospitalized for atrial fibrillation is status post OBINNA cardioversion  She does have lower extremity edema  She denies any chest pain  Blood pressure and heart rate were normal at home the patient developed 2 pillow orthopnea  In the ED she received 20 mg of Lasix with diuresis 2500 cc and improvement of orthopnea and shortness of breath  Procedures Performed:   Orders Placed This Encounter   Procedures    ED ECG Documentation Only       Summary of Hospital Course:     1) Non cardiogenic volume overload    Patient is admitted to the medical floor after receiving 20 mg of IV Lasix in the ED with diuresis 2500 cc  She had immediate relief but was recommended for observation in order to monitor renal function  She received an additional 20 mg of Lasix with an additional 2 L diuresis  Symptoms resolved, agree and patient felt significantly better  She will be discharged home with follow-up as previously scheduled  2) paroxysmal atrial fibrillation  The patient is status post OBINNA cardioversion on 12/17/2019, av devyn blocking regimen in the form of Toprol-XL 50 mg p  O  B i d  In [facility] was Xarelto        Significant Findings, Care, Treatment and Services Provided:     Complications: none    Discharge Diagnosis:see above    Resolved Problems  Date Reviewed: 12/17/2019    None          Condition at Discharge: good         Discharge instructions/Information to patient and family:   See after visit summary for information provided to patient and family  Provisions for Follow-Up Care:  See after visit summary for information related to follow-up care and any pertinent home health orders  PCP: Deborah Shea DO    Disposition: Home    Planned Readmission: No      Discharge Statement   I spent  minutes discharging the patient  This time was spent on the day of discharge  I had direct contact with the patient on the day of discharge  Additional documentation is required if more than 30 minutes were spent on discharge  Discharge Medications:  See after visit summary for reconciled discharge medications provided to patient and family

## 2019-12-19 NOTE — PLAN OF CARE
Problem: Potential for Falls  Goal: Patient will remain free of falls  Description  INTERVENTIONS:  - Assess patient frequently for physical needs  -  Identify cognitive and physical deficits and behaviors that affect risk of falls  -  [place] fall precautions as indicated by assessment   - Educate patient/family on patient safety including physical limitations  - Instruct patient to call for assistance with activity based on assessment  - Modify environment to reduce risk of injury  - Consider OT/PT consult to assist with strengthening/mobility  Outcome: Progressing     Problem: PAIN - ADULT  Goal: Verbalizes/displays adequate comfort level or baseline comfort level  Description  Interventions:  - Encourage patient to monitor pain and request assistance  - Assess pain using appropriate pain scale  - Administer analgesics based on type and severity of pain and evaluate response  - Implement non-pharmacological measures as appropriate and evaluate response  - Consider cultural and social influences on pain and pain management  - Notify physician/advanced practitioner if interventions unsuccessful or patient reports new pain  Outcome: Progressing     Problem: INFECTION - ADULT  Goal: Absence or prevention of progression during hospitalization  Description  INTERVENTIONS:  - Assess and monitor for signs and symptoms of infection  - Monitor lab/diagnostic results  - Monitor all insertion sites, i e  indwelling lines, tubes, and drains  - Administer medications as ordered  - Instruct and encourage patient and family to use good hand hygiene technique  - Identify and instruct in appropriate isolation precautions for identified infection/condition   Outcome: Progressing     Problem: SAFETY ADULT  Goal: Patient will remain free of falls  Description  INTERVENTIONS:  - Assess patient frequently for physical needs  -  Identify cognitive and physical deficits and behaviors that affect risk of falls    -  [place] fall precautions as indicated by assessment   - Educate patient/family on patient safety including physical limitations  - Instruct patient to call for assistance with activity based on assessment  - Modify environment to reduce risk of injury  - Consider OT/PT consult to assist with strengthening/mobility  Outcome: Progressing  Goal: Maintain or return to baseline ADL function  Description  INTERVENTIONS:  -  Assess patient's ability to carry out ADLs; assess patient's baseline for ADL function and identify physical deficits which impact ability to perform ADLs (bathing, care of mouth/teeth, toileting, grooming, dressing, etc )  - Assess/evaluate cause of self-care deficits   - Assess range of motion  - Assess patient's mobility; develop plan if impaired  - Assess patient's need for assistive devices and provide as appropriate  - Encourage maximum independence but intervene and supervise when necessary  - Involve family in performance of ADLs  - Assess for home care needs following discharge   - Consider OT consult to assist with ADL evaluation and planning for discharge  - Provide patient education as appropriate  Outcome: Progressing  Goal: Maintain or return mobility status to optimal level  Description  INTERVENTIONS:  - Assess patient's baseline mobility status (ambulation, transfers, stairs, etc )    - Identify cognitive and physical deficits and behaviors that affect mobility  - Identify mobility aids required to assist with transfers and/or ambulation (gait belt, sit-to-stand, lift, walker, cane, etc )  - [place] fall precautions as indicated by assessment  - Record patient progress and toleration of activity level on Mobility SBAR; progress patient to next Phase/Stage  - Instruct patient to call for assistance with activity based on assessment  - Consider rehabilitation consult to assist with strengthening/weightbearing, etc   Outcome: Progressing     Problem: DISCHARGE PLANNING  Goal: Discharge to home or other facility with appropriate resources  Description  INTERVENTIONS:  - Identify barriers to discharge w/patient and caregiver  - Arrange for needed discharge resources and transportation as appropriate  - Identify discharge learning needs (meds, wound care, etc )  - Refer to Case Management Department for coordinating discharge planning if the patient needs post-hospital services based on physician/advanced practitioner order or complex needs related to functional status, cognitive ability, or social support system   Outcome: Progressing     Problem: Knowledge Deficit  Goal: Patient/family/caregiver demonstrates understanding of disease process, treatment plan, medications, and discharge instructions  Description  Complete learning assessment and assess knowledge base    Interventions:  - Provide teaching at level of understanding  - Provide teaching via preferred learning methods  Outcome: Progressing     Problem: METABOLIC, FLUID AND ELECTROLYTES - ADULT  Goal: Electrolytes maintained within normal limits  Description  INTERVENTIONS:  - Monitor labs and assess patient for signs and symptoms of electrolyte imbalances  - Administer electrolyte replacement as ordered  - Monitor response to electrolyte replacements, including repeat lab results as appropriate  - Instruct patient on fluid and nutrition as appropriate  Outcome: Progressing  Goal: Fluid balance maintained  Description  INTERVENTIONS:  - Monitor labs   - Monitor I/O and WT  - Instruct patient on fluid and nutrition as appropriate  - Assess for signs & symptoms of volume excess or deficit  Outcome: Progressing     Problem: MUSCULOSKELETAL - ADULT  Goal: Maintain or return mobility to safest level of function  Description  INTERVENTIONS:  - Assess patient's ability to carry out ADLs; assess patient's baseline for ADL function and identify physical deficits which impact ability to perform ADLs (bathing, care of mouth/teeth, toileting, grooming, dressing, etc )  - Assess/evaluate cause of self-care deficits   - Assess range of motion  - Assess patient's mobility  - Assess patient's need for assistive devices and provide as appropriate  - Encourage maximum independence but intervene and supervise when necessary  - Involve family in performance of ADLs  - Assess for home care needs following discharge   - Consider OT consult to assist with ADL evaluation and planning for discharge  - Provide patient education as appropriate  Outcome: Progressing     Problem: DISCHARGE PLANNING - CARE MANAGEMENT  Goal: Discharge to post-acute care or home with appropriate resources  Description  INTERVENTIONS:  - Conduct assessment to determine patient/family and health care team treatment goals, and need for post-acute services based on payer coverage, community resources, and patient preferences, and barriers to discharge  - Address psychosocial, clinical, and financial barriers to discharge as identified in assessment in conjunction with the patient/family and health care team  - Arrange appropriate level of post-acute services according to patient's   needs and preference and payer coverage in collaboration with the physician and health care team  - Communicate with and update the patient/family, physician, and health care team regarding progress on the discharge plan  - Arrange appropriate transportation to post-acute venues  Pt is a readmission  Pt might benefit from Home care      Outcome: Progressing

## 2019-12-20 LAB
BACTERIA BLD CULT: NORMAL
BACTERIA BLD CULT: NORMAL

## 2019-12-22 NOTE — UTILIZATION REVIEW
Notification of Observation Admission/Observation Authorization Request   This is a Notification of Observation Admission for [address]  Be advised that this patient was admitted to our facility under Observation Status  Contact Caron Caceres at [phone] for additional admission information  Kristy Rizvi [facility] DEPT  DEDICATED -[phone]  Patient Name:   Nyla John   YOB: 1950       [address]   [address]:   [address]  Tax ID: [id number]  NPI: [id number] Attending Provider/NPI: Darshan Alcantar Md [[id number]]   Place of Service Code: 25     Place of Service Name:  CPT Code for Observation:  On [facility] / CPT 59798   Start Date: 12/18/2019  07:24 AM     Discharge Date & Time: 12/19/2019 10:28 AM    Type of Admission: Observation Status Discharge Disposition   (if discharged): Home/Self Care   Patient Diagnoses: Shortness of breath [R06 02]  Volume overload [E87 70]  Dyspnea, unspecified type [R06 00]     Orders: Admission Orders (From admission, onward)     Ordered        12/18/19 0724  Place in Observation (expected length of stay for this patient is less than two midnights)  Once                    Assigned Utilization Review Contact: Caron Caceres  Utilization   Network Utilization Review Department  Phone: [phone]; Fax [phone]  Email: [email or web address]   ATTENTION PAYERS: Please call the assigned Utilization  directly with any questions or concerns ALL voicemails in the department are confidential  Send all requests for admission clinical reviews, approved or denied determinations and any other requests to dedicated fax number belonging to the campus where the patient is receiving treatment    Initial Clinical Review    Admission: Date/Time/Statement:    Admission Orders (From admission, onward)     Ordered 12/18/19 0724  Place in Observation (expected length of stay for this patient is less than two midnights)  Once                   Orders Placed This Encounter   Procedures    Place in Observation (expected length of stay for this patient is less than two midnights)     Standing Status:   Standing     Number of Occurrences:   1     Order Specific Question:   Admitting Physician     Answer:   Bonifacio Woods     Order Specific Question:   Level of Care     Answer:   Med Surg [16]     ED Arrival Information     Expected Arrival Acuity Means of Arrival Escorted By Service Admission Type    - 12/18/2019 04:32 Emergent Walk-In Spouse General Medicine Emergency    Arrival Complaint    sob        Chief Complaint   Patient presents with    Shortness of Breath     trouble breathing tonight   cant lay flat  d/c from  hospital yesterday  Assessment/Plan:   81 y o  female with PMH of atrial fibrillation/atrial flutter on Xarelto, history of pulmonary emboli in the past, polymyalgia rheumatica, rheumatoid arthritis, presenting with shortness of breath  Patient was admitted 12/14 through 12/17 with flu-like symptoms including fevers, chills, body aches, productive cough, sore throat, and nausea with 4 for SIRS criteria positive, also in atrial flutter with rapid ventricular response which has ultimately been treated with Toprol XL 50 mg b i d  and cardioversion in 12/17  Patient reports feeling well when she was discharged to home  She did note that she gained 10 lb when she did return home and noticed that her bilateral lower extremities have been swollen  She went to bed, but woke up at 2:30 a m  This morning with significant shortness of breath  There is no chest pain  There are no palpitations  No lightheadedness  No fever in the past day  She reports a mild nonproductive cough    Volume overload  Assessment & Plan  Recent echocardiogram does not demonstrate systolic or diastolic dysfunction  She did receive IV fluids through most recent hospitalization  She received 20 mg of IV Lasix in the ED with diuresis 2500 cc  Will administer an additional 20 mg of IV Lasix this afternoon  Monitor intake and output and daily weights     Paroxysmal atrial fibrillation ([address] )  Assessment & Plan  Status post OBINNA cardioversion 12/17/2019  Patient is currently in sinus rhythm  AV devyn blocking regimen in the form of Toprol-XL 50 mg p o  B i d   AC with Xarelto    ED Triage Vitals [12/18/19 0438]   Temperature Pulse Respirations Blood Pressure SpO2   98 2 °F (36 8 °C) 84 20 141/83 97 %      Temp Source Heart Rate Source Patient Position - Orthostatic VS BP Location FiO2 (%)   Temporal Monitor Sitting Left arm --      Pain Score       No Pain          Wt Readings from Last 1 Encounters:   12/19/19 65 7 kg (144 lb 13 5 oz)     Additional Vital Signs:   12/18/19 0530    79  22  130/64    98 %  None (Room air)  Sitting   12/18/19 0500    76  22  134/75    98 %  None (Room air)  Sitting   12/18/19 0445    79  22  141/83    95 %  None (Room air)  Sitting   12/18/19 0441              None (Room air)     12/18/19 0438  98 2 °F (36 8 °C)  84  20  141/83    97 %  None (Room air)  Sitting   Pertinent Labs/Diagnostic Test Results:   Results from last 7 days   Lab Units 12/18/19  0446 12/17/19  0439 12/16/19  1111   WBC Thousand/uL 11 49* 12 99* 16 18*   HEMOGLOBIN g/dL 12 4 10 3* 11 5   HEMATOCRIT % 40 2 33 0* 36 6   PLATELETS Thousands/uL 439* 362 393*   NEUTROS ABS Thousands/µL 7 21 10 14* 14 22*     Results from last 7 days   Lab Units 12/19/19  0442 12/18/19  0446 12/17/19  0439 12/16/19  1111   SODIUM mmol/L 142 138 143 141   POTASSIUM mmol/L 4 1 3 8 4 0 4 2   CHLORIDE mmol/L 106 102 110* 106   CO2 mmol/L 30 30 27 28   ANION GAP mmol/L 6 6 6 7   BUN mg/dL 10 20 19 17   CREATININE mg/dL 0 43* 0 55* 0 45* 0 57*   EGFR ml/min/1 73sq m 106 97 104 96   CALCIUM mg/dL 9 2 9 5 9 3 10 1   MAGNESIUM mg/dL 2 5  --   --   -- Results from last 7 days   Lab Units 12/16/19  1111   AST U/L 16   ALT U/L 18   ALK PHOS U/L 73   TOTAL PROTEIN g/dL 6 5   ALBUMIN g/dL 2 6*   TOTAL BILIRUBIN mg/dL 0 20     Results from last 7 days   Lab Units 12/19/19  0442 12/18/19  0446 12/17/19  0439 12/16/19  1111   GLUCOSE RANDOM mg/dL 88 85 93 122         Results from last 7 days   Lab Units 12/18/19  0446   TROPONIN I ng/mL <0 02     Results from last 7 days   Lab Units 12/18/19  0446   PROTIME seconds 21 3*   INR  1 83*   PTT seconds 29         Results from last 7 days   Lab Units 12/16/19  1111   PROCALCITONIN ng/ml 0 15         Results from last 7 days   Lab Units 12/18/19  0446   NT-PRO BNP pg/mL 2,246*                         12/17  ekg=Normal sinus rhythm  Cannot rule out Anterior infarct (cited on or before 28-MAY-2017)  ED Treatment:   Medication Administration from 12/18/2019 0431 to 12/18/2019 0755       Date/Time Order Dose Route     12/18/2019 0455 furosemide (LASIX) injection 20 mg 20 mg Intravenous        Past Medical History:   Diagnosis Date    Arthritis     rheumatoid    Atrial fibrillation (HCC)     CHF (congestive heart failure) (HCC)     Disease of thyroid gland     DVT (deep venous thrombosis) (HCC)     Hypertension     Migraine     hx of    Polymyalgia rheumatica (HCC)     [address] [address]oli [address] [address]     Renal disorder     right sided kidney stones    Vitamin D deficiency      Present on Admission:   Paroxysmal atrial fibrillation ([facility] [address] [facility] )   Hypothyroidism  Admitting Diagnosis: Shortness of breath [R06 02]  Volume overload [E87 70]  Dyspnea, unspecified type [R06 00]  Age/Sex: 81 y o  female  Admission Orders:  Med surg  Incentive spirometry  venodynes  Scheduled Medications:  furosemide 20 mg Intravenous Once   metoprolol succinate 50 mg Oral BID   rivaroxaban 20 mg Oral Daily With Breakfast     Network Utilization Review Department  [email or web address]  ATTENTION: Please call with any questions or concerns to [phone] and carefully listen to the prompts so that you are directed to the right person  All voicemails are confidential   [employer] all requests for admission clinical reviews, approved or denied determinations and any other requests to dedicated fax number below belonging to the campus where the patient is receiving treatment   List of dedicated fax numbers for the Facilities:  [address] DENIALS (Administrative/Medical Necessity) [phone]   [address] (Maternity/NICU/Pediatrics) [phone]   Scott Hogan [phone]   Emani Saleh [phone]   [facility] [phone]   [address] [facility]  [phone]   12[address] [facility] [address] [facility] [phone]   Herminia Edvin [phone]   [address] [facility] [address] [facility]  [address] [facility] [address] [facility] [phone]

## 2019-12-23 PROBLEM — I31.4 TAMPONADE: Status: ACTIVE | Noted: 2018-01-15

## 2019-12-23 PROBLEM — R50.9 FEVER OF UNKNOWN ORIGIN (FUO): Status: ACTIVE | Noted: 2018-01-15

## 2019-12-23 PROBLEM — J90 BILATERAL PLEURAL EFFUSION: Status: ACTIVE | Noted: 2017-05-30

## 2019-12-23 PROBLEM — E27.49 SECONDARY ADRENAL INSUFFICIENCY (HCC): Status: ACTIVE | Noted: 2017-05-30

## 2019-12-23 PROBLEM — R05.9 COUGH: Status: ACTIVE | Noted: 2019-08-01

## 2019-12-23 PROBLEM — R07.9 CHEST PAIN: Status: ACTIVE | Noted: 2019-06-19

## 2019-12-23 PROBLEM — Q24.9 CARDIAC ABNORMALITY: Status: ACTIVE | Noted: 2019-08-09

## 2019-12-23 PROBLEM — R10.11 RIGHT UPPER QUADRANT ABDOMINAL PAIN: Status: ACTIVE | Noted: 2018-01-11

## 2019-12-23 PROBLEM — Z86.718 HISTORY OF DVT (DEEP VEIN THROMBOSIS): Status: ACTIVE | Noted: 2017-10-20

## 2019-12-23 PROBLEM — M89.8X1 PAIN OF RIGHT SCAPULA: Status: ACTIVE | Noted: 2018-01-11

## 2019-12-23 PROBLEM — R52 PAIN OF LEFT SIDE OF BODY: Status: ACTIVE | Noted: 2018-01-19

## 2019-12-23 PROBLEM — K43.9 VENTRAL HERNIA: Status: ACTIVE | Noted: 2019-09-13

## 2019-12-23 PROBLEM — I50.9 CHF (CONGESTIVE HEART FAILURE) (HCC): Status: ACTIVE | Noted: 2018-01-19

## 2019-12-23 PROBLEM — Z86.711 HISTORY OF PULMONARY EMBOLUS (PE): Status: ACTIVE | Noted: 2018-01-15

## 2019-12-23 PROBLEM — R94.2 DIFFUSION CAPACITY OF LUNG (DL), DECREASED: Status: ACTIVE | Noted: 2019-08-01

## 2019-12-23 PROBLEM — R19.7 DIARRHEA: Status: ACTIVE | Noted: 2018-01-11

## 2019-12-23 PROBLEM — J01.00 ACUTE MAXILLARY SINUSITIS: Status: ACTIVE | Noted: 2017-12-04

## 2019-12-23 PROBLEM — R11.2 NAUSEA AND/OR VOMITING: Status: ACTIVE | Noted: 2018-01-11

## 2019-12-23 PROBLEM — Z87.39 HISTORY OF POLYMYALGIA RHEUMATICA: Status: ACTIVE | Noted: 2017-08-30

## 2019-12-23 PROBLEM — Z92.21 HX OF METHOTREXATE THERAPY: Status: ACTIVE | Noted: 2019-08-01

## 2019-12-23 PROBLEM — E04.1 THYROID NODULE: Status: ACTIVE | Noted: 2017-12-20

## 2019-12-23 NOTE — PROGRESS NOTES
Transition of Care  Follow-up After Hospitalization    Fabiola Mcdaniel 81 y o  female   Date:  12/24/2019        TCM Call (since 11/23/2019)     Date and time call was made  12/18/2019  8:52 AM    Hospital care reviewed  Records reviewed    Patient was hospitialized at  [address] [facility]    Date of Admission  12/14/19    Date of discharge  12/17/19    Diagnosis  Sepsis unspecified organism    Disposition  Home    Were the patients medications reviewed and updated  Yes    Current Symptoms  None      TCM Call (since 11/23/2019)     Post hospital issues  None    Should patient be enrolled in anticoag monitoring? No    Scheduled for follow up? Yes    Did you obtain your prescribed medications  Yes    Do you need help managing your prescriptions or medications  No    Is transportation to your appointment needed  No    I have advised the patient to call PCP with any new or worsening symptoms  AS        Hospital records were reviewed  Medications upon discharge reviewed/updated  Discharge Disposition:  stable  Follow up visits with other specialists: GI, cardiology and endocrinology      Assessment and Plan:    Erling Moritz was seen today for transition of care management  Diagnoses and all orders for this visit:    Transition of care performed with sharing of clinical summary  Comments:  Completed today    Other acute pulmonary embolism, unspecified whether acute cor pulmonale present ([facility] [address] )  Comments:  Pt maintained on Xarelto    Acute diastolic CHF (congestive heart failure) (HCC)    Paroxysmal atrial fibrillation ([facility] [address] )  Comments:  NSR today , cont Metoprolol    Post-nasal drip  Comments:  Flonase ordered  Orders:  -     fluticasone (FLONASE) 50 mcg/act nasal spray; 1 spray into each nostril daily    Esophagus disorder  Comments:  Referred to GI  Orders:  -     Ambulatory referral to Gastroenterology; Future            HPI:  tcm    Pt with SOB and fluid overload   Pt reports she initially went to [facility] care for flu like symptoms, was provided with Tamiflu but her condition worsened  She subsequently went to [facility] was admitted with A flutter with RVR, sepsis and polymyalgia rheumatica  Pt was cardioverted and improved , d/c home and then had to return in less than 24 hrs to ED with SOB and fluid overload, was then diuresed and d/c home  Pt reports ongoing excess "feeling of thick mucous in her throat which she has been using Mucinex with some relief  "  We discussed using cool mist humidifier in the bedroom, Flonase nasal spray and I also reviewed her CT scan of abdomen and noted thickened esophagus which needs to be addressed> I did reach out to Dr Rolf Dunne of GI and am trying to facilitate an appt for this pt to have an EGD  Otherwise pt is stable, eating, no fever and offers no other complaints today      ROS: Review of Systems   Constitutional: Negative  HENT: Positive for trouble swallowing  Eyes: Negative  Respiratory: Negative  Cardiovascular: Negative  Gastrointestinal: Negative  Endocrine: Negative  Genitourinary: Negative  Musculoskeletal: Negative  Skin: Negative  Allergic/Immunologic: Negative  Neurological: Negative  Hematological: Negative  Psychiatric/Behavioral: Negative          Past Medical History:   Diagnosis Date    Arthritis     rheumatoid    Atrial fibrillation (HCC)     CHF (congestive heart failure) (HCC)     Disease of thyroid gland     DVT (deep venous thrombosis) (HCC)     Hypertension     Migraine     hx of    Polymyalgia rheumatica (HCC)     Pulmonary emboli (HCC)     Renal disorder     right sided kidney stones    Vitamin D deficiency        Past Surgical History:   Procedure Laterality Date    CARDIAC CATHETERIZATION      CARDIOVERSION N/A 2019    Procedure: CARDIOVERSION;  Surgeon: Maria G Barnett MD;  Location: [facility];  Service: Cardiology     SECTION      x3 - last impression 16    FRACTURE SURGERY Left     wrist    HERNIA REPAIR  06/2018    KNEE CARTILAGE SURGERY Left     TONSILECTOMY AND ADNOIDECTOMY      VEIN SURGERY      venous ligation with stripping    WRIST SURGERY Left     ORIF wrist - last impression 5/27/16       Social History     Socioeconomic History    Marital status: /Civil Union     Spouse name: None    Number of children: 3    Years of education: None    Highest education level: None   Occupational History    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   Social Needs    Financial resource strain: None    Food insecurity:     Worry: None     Inability: None    Transportation needs:     Medical: None     Non-medical: None   Tobacco Use    Smoking status: Never Smoker    Smokeless tobacco: Never Used   Substance and Sexual Activity    Alcohol use:  Yes     Alcohol/week: 0 0 standard drinks     Frequency: Monthly or less     Drinks per session: Patient refused     Binge frequency: Patient refused     Comment: occasionally; social    Drug use: No    Sexual activity: None   Lifestyle    Physical activity:     Days per week: None     Minutes per session: None    Stress: None   Relationships    Social connections:     Talks on phone: None     Gets together: None     Attends [employer] service: None     Active member of club or organization: None     Attends meetings of clubs or organizations: None     Relationship status: None    Intimate partner violence:     Fear of current or ex partner: None     Emotionally abused: None     Physically abused: None     Forced sexual activity: None   Other Topics Concern    None   Social History Narrative    None       Family History   Problem Relation Age of Onset    Breast cancer Mother 46    Aneurysm Father         aortic    No Known Problems Sister     No Known Problems Maternal Grandmother     No Known Problems Maternal Grandfather     No Known Problems Paternal Grandmother     No Known Problems Paternal Grandfather     Rheum arthritis Maternal Aunt     Early death Maternal Aunt     No Known Problems Paternal Aunt     No Known Problems Paternal Aunt     No Known Problems Paternal Aunt        Allergies   Allergen Reactions    Amiodarone      Other reaction(s): Other (See Comments)  Reports caused elevated TSH    Sulfa Antibiotics Itching and Rash    Sulfamethoxazole-Trimethoprim Itching and Rash         Current Outpatient Medications:     Calcium Ascorbate 500 MG TABS, Take 500 mg by mouth daily , Disp: , Rfl:     CALCIUM-VITAMIN D PO, Take 1 tablet by mouth daily, Disp: , Rfl:     cholecalciferol (VITAMIN D3) 1,000 units tablet, Take 1,000 Units by mouth daily, Disp: , Rfl:     Cinnamon 500 MG capsule, Take 500 mg by mouth daily, Disp: , Rfl:     Cyanocobalamin (VITAMIN B 12 PO), Take 500 mcg by mouth daily , Disp: , Rfl:     folic acid (FOLVITE) 1 mg tablet, Take 1 mg by mouth daily , Disp: , Rfl:     levothyroxine 75 mcg tablet, Take 1 tablet daily, Disp: 30 tablet, Rfl: 7    metoprolol succinate (TOPROL-XL) 50 mg 24 hr tablet, Take 1 tablet (50 mg total) by mouth 2 (two) times a day, Disp: 60 tablet, Rfl: 0    minocycline (MINOCIN) 50 mg capsule, Take 50 mg by mouth every other day , Disp: , Rfl: 5    Misc Natural Products (OSTEO BI-FLEX JOINT SHIELD PO), Take by mouth, Disp: , Rfl:     Multiple Vitamins-Minerals (OCUVITE ADULT 50+) CAPS, Take 1 capsule by mouth daily, Disp: , Rfl:     Red Yeast Rice 600 MG TABS, Take 2 tablets by mouth daily, Disp: , Rfl:     XARELTO 20 MG tablet, Take 1 tablet by mouth daily, Disp: , Rfl:     fluticasone (FLONASE) 50 mcg/act nasal spray, 1 spray into each nostril daily, Disp: 1 Bottle, Rfl: 5      Physical Exam:  /78   Pulse 68   Temp 98 °F (36 7 °C) (Tympanic)   Ht 5' 2" (1 575 m)   Wt 67 kg (147 lb 9 6 oz)   SpO2 94%   BMI 27 00 kg/m²     Physical Exam   Constitutional: She is oriented to person, place, and time  She appears well-developed and well-nourished     HENT:   Head: Normocephalic  Eyes: Pupils are equal, round, and reactive to light  Neck: Normal range of motion  Cardiovascular: Normal rate and regular rhythm  Pulmonary/Chest: Effort normal and breath sounds normal    Abdominal: Soft  Bowel sounds are normal    Musculoskeletal: Normal range of motion  Neurological: She is alert and oriented to person, place, and time  Skin: Skin is warm and dry  Psychiatric: She has a normal mood and affect  Her behavior is normal  Judgment and thought content normal    Nursing note and vitals reviewed            Labs:  Lab Results   Component Value Date    WBC 11 49 (H) 12/18/2019    HGB 12 4 12/18/2019    HCT 40 2 12/18/2019    MCV 92 12/18/2019     (H) 12/18/2019     Lab Results   Component Value Date    K 4 1 12/19/2019     12/19/2019    CO2 30 12/19/2019    BUN 10 12/19/2019    CREATININE 0 43 (L) 12/19/2019    GLUF 83 06/27/2019    CALCIUM 9 2 12/19/2019    CORRECTEDCA 10 5 (H) 08/12/2019    AST 16 12/16/2019    ALT 18 12/16/2019    ALKPHOS 73 12/16/2019    EGFR 106 12/19/2019

## 2019-12-24 ENCOUNTER — OFFICE VISIT (OUTPATIENT)
Dept: FAMILY MEDICINE CLINIC | Facility: CLINIC | Age: 69
End: 2019-12-24
Payer: COMMERCIAL

## 2019-12-24 VITALS
TEMPERATURE: 98 F | WEIGHT: 147.6 LBS | DIASTOLIC BLOOD PRESSURE: 78 MMHG | HEART RATE: 68 BPM | OXYGEN SATURATION: 94 % | BODY MASS INDEX: 27.16 KG/M2 | SYSTOLIC BLOOD PRESSURE: 134 MMHG | HEIGHT: 62 IN

## 2019-12-24 DIAGNOSIS — I50.31 ACUTE DIASTOLIC CHF (CONGESTIVE HEART FAILURE) (HCC): ICD-10-CM

## 2019-12-24 DIAGNOSIS — I26.99 OTHER ACUTE PULMONARY EMBOLISM, UNSPECIFIED WHETHER ACUTE COR PULMONALE PRESENT (HCC): ICD-10-CM

## 2019-12-24 DIAGNOSIS — R09.82 POST-NASAL DRIP: ICD-10-CM

## 2019-12-24 DIAGNOSIS — IMO0001 TRANSITION OF CARE PERFORMED WITH SHARING OF CLINICAL SUMMARY: Primary | ICD-10-CM

## 2019-12-24 DIAGNOSIS — I48.0 PAROXYSMAL ATRIAL FIBRILLATION (HCC): ICD-10-CM

## 2019-12-24 DIAGNOSIS — K22.9 ESOPHAGUS DISORDER: ICD-10-CM

## 2019-12-24 PROCEDURE — 99496 TRANSJ CARE MGMT HIGH F2F 7D: CPT | Performed by: NURSE PRACTITIONER

## 2019-12-24 PROCEDURE — 1111F DSCHRG MED/CURRENT MED MERGE: CPT | Performed by: NURSE PRACTITIONER

## 2019-12-24 PROCEDURE — 1160F RVW MEDS BY RX/DR IN RCRD: CPT | Performed by: NURSE PRACTITIONER

## 2019-12-24 RX ORDER — FLUTICASONE PROPIONATE 50 MCG
1 SPRAY, SUSPENSION (ML) NASAL DAILY
Qty: 1 BOTTLE | Refills: 5 | Status: SHIPPED | OUTPATIENT
Start: 2019-12-24 | End: 2021-07-27 | Stop reason: ALTCHOICE

## 2019-12-30 ENCOUNTER — TELEPHONE (OUTPATIENT)
Dept: GASTROENTEROLOGY | Facility: AMBULARY SURGERY CENTER | Age: 69
End: 2019-12-30

## 2019-12-30 NOTE — TELEPHONE ENCOUNTER
NEW PATIENT REFERRAL FROM DR Tiffani Shepard FOR Esophagus disorder---please assist for sooner appt with any doctor      Call back # [phone]

## 2019-12-31 ENCOUNTER — TELEPHONE (OUTPATIENT)
Dept: GASTROENTEROLOGY | Facility: CLINIC | Age: 69
End: 2019-12-31

## 2019-12-31 NOTE — TELEPHONE ENCOUNTER
Patient scheduled  ----- Message from Anshul Redding sent at 12/26/2019  1:17 PM EST -----  Maybe she'll see a physician assistant    Would you like me to reach out to the patient?    ----- Message -----  From: Gregor Williamson MA  Sent: 12/26/2019   9:39 AM EST  To: An[facility]ul C[facility]denas no longer comes to [facility]  Is this patient willing to see a different provider?  ----- Message -----  From: Anshul Redding  Sent: 12/26/2019   9:31 AM EST  To: [facility]    Do you have any office openings with Dr Zuly Cárdenas for this patient? ??    ----- Message -----  From: Marina Bravo DO  Sent: 12/24/2019  11:21 AM EST  To: Graham Collins, #    Will someone please get this patient in with me or another doc soon as I will no longer be at the [facility]      Thanks so much,  Kevin Torres

## 2020-01-02 ENCOUNTER — TELEPHONE (OUTPATIENT)
Dept: GASTROENTEROLOGY | Facility: MEDICAL CENTER | Age: 70
End: 2020-01-02

## 2020-01-02 ENCOUNTER — OFFICE VISIT (OUTPATIENT)
Dept: GASTROENTEROLOGY | Facility: MEDICAL CENTER | Age: 70
End: 2020-01-02
Payer: COMMERCIAL

## 2020-01-02 VITALS
HEART RATE: 68 BPM | WEIGHT: 147.6 LBS | SYSTOLIC BLOOD PRESSURE: 128 MMHG | TEMPERATURE: 98.3 F | HEIGHT: 62 IN | DIASTOLIC BLOOD PRESSURE: 72 MMHG | BODY MASS INDEX: 27.16 KG/M2

## 2020-01-02 DIAGNOSIS — R93.3 ABNORMAL FINDING ON GI TRACT IMAGING: Primary | ICD-10-CM

## 2020-01-02 DIAGNOSIS — R13.10 DYSPHAGIA, UNSPECIFIED TYPE: ICD-10-CM

## 2020-01-02 DIAGNOSIS — M35.3 POLYMYALGIA RHEUMATICA (HCC): Chronic | ICD-10-CM

## 2020-01-02 DIAGNOSIS — I48.0 PAROXYSMAL ATRIAL FIBRILLATION (HCC): ICD-10-CM

## 2020-01-02 DIAGNOSIS — M06.00 SERONEGATIVE RHEUMATOID ARTHRITIS (HCC): ICD-10-CM

## 2020-01-02 DIAGNOSIS — I47.1 SVT (SUPRAVENTRICULAR TACHYCARDIA) (HCC): ICD-10-CM

## 2020-01-02 DIAGNOSIS — I48.92 ATRIAL FLUTTER WITH RAPID VENTRICULAR RESPONSE (HCC): ICD-10-CM

## 2020-01-02 DIAGNOSIS — K22.89 ESOPHAGEAL THICKENING: ICD-10-CM

## 2020-01-02 PROCEDURE — 1160F RVW MEDS BY RX/DR IN RCRD: CPT | Performed by: INTERNAL MEDICINE

## 2020-01-02 PROCEDURE — 99204 OFFICE O/P NEW MOD 45 MIN: CPT | Performed by: INTERNAL MEDICINE

## 2020-01-02 RX ORDER — OMEPRAZOLE 40 MG/1
40 CAPSULE, DELAYED RELEASE ORAL
Qty: 30 CAPSULE | Refills: 3 | Status: SHIPPED | OUTPATIENT
Start: 2020-01-02 | End: 2020-07-09

## 2020-01-02 NOTE — PATIENT INSTRUCTIONS
The patient is scheduled on 1/22/20 at the [facility] with Dr John Paul Morgan for an EGD  Dr Nesha Flor asked this to be done ASAP and he has no availablity  I have sent a Xarelto hold sheet to the patient's Dr Juice Carrington at [facility]  I will forward this onto Batsheva Pierce

## 2020-01-02 NOTE — H&P (VIEW-ONLY)
[facility] - Outpatient Consultation  Mumtaz Lawrence 81 y o  female MRN: [id number]  Encounter: [id number]          ASSESSMENT AND PLAN:    Mumtaz Lawrence is a 81 y o  female who presents with complaint of intermittent dysphagia for the past several months, as well as associated heartburn and weight loss  During this time she has been on minocycline, and notable she was diagnosed with Aflutter for which she underwent cardioversion x3 and is on Xarelto  DDx for dysphagia includes GERD and GERD related dysmotility vs rings vs obstruction vs pill esophagitis vs other  Available labs and imaging reviewed  1  Abnormal finding on GI tract imaging    2  Esophageal thickening    3  Dysphagia, unspecified type    4  Seronegative rheumatoid arthritis ([facility] )    5  Polymyalgia rheumatica (HCC)    6  Paroxysmal atrial fibrillation ([facility] )    7  Atrial flutter with rapid ventricular response ([facility] )    8  SVT (supraventricular tachycardia) ([facility] )        Orders Placed This Encounter   Procedures    EGD       -- Start omeprazole to help with likely GERD    -- EGD to evaluate for esophageal thickening and dysphagia  Will clarify with cardiologist when she can stop her Xarelto for the procedure  If she can not stop it, will at least perform a diagnostic EGD  Alternatively, we can order an esophogram, but she will need an EGD anyway in the near future  -- Colonoscopy in the future as she is due    -- Follow-up with other providers regarding chronic medical conditions    ______________________________________________________________________    HPI:    Mumtaz Lawrence is a 81 y o  female who presents with complaint of dysphagia  Trouble eating, swallowing and mucous in her throat  Would eat bread or yogurt for the whole day  She had similar symptoms in July for 4 weeks and she lost 20 lbs in 1 month  It resolved, but it started again right before thanksgiving  It cleared for a week   Then she had palpitations and felt a heaviness in her chest  Went to  and was given Tamiflu and abx  Felt well for 8 days but went to ED [facility]  No flu  She was not eating much  She felt full right away  In Mid December she was seen at [facility] and found to be in [address]  Underwent cardioversion x3  Anticoagulation increased  After cortney she had trouble eating again  For the past 2 days she has felt well  She has dysphagia and it feels stuck, but it will go down  Imaging from 2019 had esophageal thickening  She has some heartburn, and she takes TUMS which helps somewhat  She has also been taking Mucinex and that helped  Had to bend over to help it go down  No odynophagia  No nausea, vomiting, abdominal, diarrhea  She will occassionally get constipated and she attributes this to meds  No BRBPR, melena  She lost an additional 5 lbs  No prior EGD  Last colonsoocpy 6 years ago and she had a few polyps  She was called last year but declined       REVIEW OF SYSTEMS:  10 point ROS reviewed and negative, except as above      Historical Information   Past Medical History:   Diagnosis Date    Arthritis     rheumatoid    Atrial fibrillation (HCC)     CHF (congestive heart failure) (HCC)     Disease of thyroid gland     DVT (deep venous thrombosis) (HCC)     Hypertension     Migraine     hx of    Polymyalgia rheumatica (HCC)     Pulmonary emboli (HCC)     Renal disorder     right sided kidney stones    Vitamin D deficiency      Past Surgical History:   Procedure Laterality Date    CARDIAC CATHETERIZATION      CARDIOVERSION N/A 2019    Procedure: CARDIOVERSION;  Surgeon: Gabrielle Zabala MD;  Location: [facility];  Service: Cardiology     SECTION      x3 - last impression 16    FRACTURE SURGERY Left     wrist    HERNIA REPAIR  2018    KNEE CARTILAGE SURGERY Left     TONSILECTOMY AND ADNOIDECTOMY      VEIN SURGERY      venous ligation with stripping    WRIST SURGERY Left     ORIF wrist - last impression 5/27/16     Social History   Social History     Substance and Sexual Activity   Alcohol Use Yes    Alcohol/week: 0 0 standard drinks    Frequency: Monthly or less    Drinks per session: Patient refused    Binge frequency: Patient refused    Comment: occasionally; social     Social History     Substance and Sexual Activity   Drug Use No     Social History     Tobacco Use   Smoking Status Never Smoker   Smokeless Tobacco Never Used     Family History   Problem Relation Age of Onset    Breast cancer Mother 46    Aneurysm Father         aortic    No Known Problems Sister     No Known Problems Maternal Grandmother     No Known Problems Maternal Grandfather     No Known Problems Paternal Grandmother     No Known Problems Paternal Grandfather     Rheum arthritis Maternal Aunt     Early death Maternal Aunt     No Known Problems Paternal Aunt     No Known Problems Paternal Aunt     No Known Problems Paternal Aunt        Meds/Allergies       Current Outpatient Medications:     Calcium Ascorbate 500 MG TABS    CALCIUM-VITAMIN D PO    cholecalciferol (VITAMIN D3) 1,000 units tablet    Cinnamon 500 MG capsule    Cyanocobalamin (VITAMIN B 12 PO)    fluticasone (FLONASE) 50 mcg/act nasal spray    folic acid (FOLVITE) 1 mg tablet    levothyroxine 75 mcg tablet    metoprolol succinate (TOPROL-XL) 50 mg 24 hr tablet    minocycline (MINOCIN) 50 mg capsule    Misc Natural Products (OSTEO BI-FLEX JOINT SHIELD PO)    Multiple Vitamins-Minerals (OCUVITE ADULT 50+) CAPS    Red Yeast Rice 600 MG TABS    XARELTO 20 MG tablet    omeprazole (PriLOSEC) 40 MG capsule    Allergies   Allergen Reactions    Amiodarone      Other reaction(s):  Other (See Comments)  Reports caused elevated TSH    Sulfa Antibiotics Itching and Rash    Sulfamethoxazole-Trimethoprim Itching and Rash           Objective     Blood pressure 128/72, pulse 68, temperature 98 3 °F (36 8 °C), height 5' 2" (1 575 m), weight 67 kg (147 lb 9 6 oz)  Body mass index is 27 kg/m²  PHYSICAL EXAM:      General Appearance:   Alert, cooperative, no distress   HEENT:   Normocephalic, atraumatic, anicteric  Neck:  Supple, symmetrical, trachea midline   Lungs:   Clear to auscultation bilaterally; no rales, rhonchi or wheezing; respirations unlabored    Heart[de-identified]   Regular rate and rhythm; no murmur, rub, or gallop  Abdomen:   Soft, non-tender, non-distended; normal bowel sounds; no masses, no organomegaly    Genitalia:   Deferred    Rectal:   Deferred    Extremities:  No cyanosis, clubbing or edema    Pulses:  2+ and symmetric    Skin:  No jaundice, rashes, or lesions    Lymph nodes:  No palpable cervical lymphadenopathy        Lab Results:   No visits with results within 1 Day(s) from this visit     Latest known visit with results is:   Admission on 12/18/2019, Discharged on 12/19/2019   Component Date Value    Sodium 12/18/2019 138     Potassium 12/18/2019 3 8     Chloride 12/18/2019 102     CO2 12/18/2019 30     ANION GAP 12/18/2019 6     BUN 12/18/2019 20     Creatinine 12/18/2019 0 55*    Glucose 12/18/2019 85     Calcium 12/18/2019 9 5     eGFR 12/18/2019 97     WBC 12/18/2019 11 49*    RBC 12/18/2019 4 38     Hemoglobin 12/18/2019 12 4     Hematocrit 12/18/2019 40 2     MCV 12/18/2019 92     MCH 12/18/2019 28 3     MCHC 12/18/2019 30 8*    RDW 12/18/2019 14 1     MPV 12/18/2019 8 5*    Platelets 67/19/9167 439*    nRBC 12/18/2019 0     Neutrophils Relative 12/18/2019 62     Immat GRANS % 12/18/2019 1     Lymphocytes Relative 12/18/2019 22     Monocytes Relative 12/18/2019 8     Eosinophils Relative 12/18/2019 7*    Basophils Relative 12/18/2019 0     Neutrophils Absolute 12/18/2019 7 21     Immature Grans Absolute 12/18/2019 0 08     Lymphocytes Absolute 12/18/2019 2 50     Monocytes Absolute 12/18/2019 0 88     Eosinophils Absolute 12/18/2019 0 78*    Basophils Absolute 12/18/2019 0 04     Protime 12/18/2019 21 3*    INR 12/18/2019 1 83*    PTT 12/18/2019 29     Troponin I 12/18/2019 <0 02     NT-proBNP 12/18/2019 2,246*    Ventricular Rate 12/18/2019 79     Atrial Rate 12/18/2019 79     MD Interval 12/18/2019 182     QRSD Interval 12/18/2019 92     QT Interval 12/18/2019 386     QTC Interval 12/18/2019 442     P Axis 12/18/2019 11     QRS Axis 12/18/2019 -41     T Wave Axis 12/18/2019 3     Magnesium 12/19/2019 2 5     Sodium 12/19/2019 142     Potassium 12/19/2019 4 1     Chloride 12/19/2019 106     CO2 12/19/2019 30     ANION GAP 12/19/2019 6     BUN 12/19/2019 10     Creatinine 12/19/2019 0 43*    Glucose 12/19/2019 88     Calcium 12/19/2019 9 2     eGFR 12/19/2019 106          Radiology Results:   Xr Chest 1 View Portable    Result Date: 12/18/2019  Narrative: CHEST INDICATION:   tachypnea, volume overload  COMPARISON:  12/14/2019 EXAM PERFORMED/VIEWS:  XR CHEST PORTABLE FINDINGS: Heart is enlarged but unchanged  Pulmonary vasculature is increasingly indistinct, though not frankly cephalized  Generalized increase in peripheral reticular lung markings (Kerley B lines)  Small bilateral pleural effusions  Fluid on the right tracks into the minor fissure  Streaky bibasilar opacities  Increasing, potentially subsegmental atelectasis related to effusions  No cloudy consolidations to suggest alveolar edema  No pneumothorax on either side  Bones are unremarkable  Impression: 1  Findings of volume overload with small bilateral pleural effusions  Increasing pulmonary vascular congestion and mild interstitial edema  2   Increasing streaky basilar opacities probably represent atelectasis  Note: I agree with the preliminary interpretation by the ED care provider documented in Epic  Workstation performed: [id number]     X-ray Chest 2 Views    Result Date: 12/15/2019  Narrative: CHEST INDICATION:   chest pain   COMPARISON:  December 6, 2019 EXAM PERFORMED/VIEWS:  XR CHEST PA & LATERAL Images: 2 FINDINGS: Cardiomegaly seen  Small bilateral effusion, unchanged from the previous study Mild increased lung markings No acute consolidation Osseous structures appear within normal limits for patient age  Impression: No acute consolidation Small bilateral effusion, unchanged from the previous study Mild increased lung markings may be due to mild congestion The lungs appear mildly hazy as compared to previous study this may be due to technique or due to mild congestion Workstation performed: [id number]     Xr Chest Pa & Lateral    Result Date: 12/6/2019  Narrative: CHEST INDICATION:   R05: Cough  COMPARISON:  May 26, 2017 EXAM PERFORMED/VIEWS:  XR CHEST PA & LATERAL FINDINGS: Cardiomediastinal silhouette appears unremarkable  Blunting costophrenic angle bilaterally likely pleural thickening  Osseous structures appear within normal limits for patient age  Impression: No acute cardiopulmonary disease  Pleural thickening at the costophrenic angle bilaterally  Workstation performed: [id number]     Ct Pe Study W Abdomen Pelvis W Contrast    Result Date: 12/14/2019  Narrative: CT PULMONARY ANGIOGRAM OF THE CHEST AND CT ABDOMEN AND PELVIS WITH INTRAVENOUS CONTRAST INDICATION:   PE suspected, high pretest prob  Weakness  COMPARISON:  CT of abdomen pelvis on January 13, 2018  TECHNIQUE:  CT examination of the chest, abdomen and pelvis was performed  Thin section CT angiographic technique was used in the chest in order to evaluate for pulmonary embolus and coronal 3D MIP postprocessing was performed on the acquisition scanner  Axial, sagittal, and coronal 2D reformatted images were created from the source data and submitted for interpretation  Radiation dose length product (DLP) for this visit:  881 65 mGy-cm     This examination, like all CT scans performed in the [facility], was performed utilizing techniques to minimize radiation dose exposure, including the use of iterative reconstruction and automated exposure control  IV Contrast:  100 mL of iohexol (OMNIPAQUE)  was administered intravenously without immediate adverse reaction  Enteric Contrast:  Enteric contrast was not administered  FINDINGS: CHEST PULMONARY ARTERIAL TREE:  No pulmonary embolus is seen  Enlargement pulmonary artery measuring up to 3 9 cm in diameter which may be seen in setting of pulmonary hypertension  LUNGS:  Scattered atelectatic changes  No focal consolidation  Central airways are patent  PLEURA:  Right greater than left small pleural effusions with adjacent compressive atelectasis  HEART/AORTA: Mild cardiac enlargement  Coronary artery calcifications  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  MEDIASTINUM AND CHARLIE:  Mediastinal lymph nodes measure up to 7 mm in short axis  CHEST WALL AND LOWER NECK:   Unremarkable  ABDOMEN LIVER/BILIARY TREE:  Unremarkable  GALLBLADDER:  No calcified gallstones  No pericholecystic inflammatory change  SPLEEN:  Unremarkable  PANCREAS:  Unremarkable  ADRENAL GLANDS:  Unremarkable  KIDNEYS/URETERS:  One or more sharply circumscribed subcentimeter renal hypodensities are noted  These lesions are too small to accurately characterize, but are statistically most likely to represent benign cortical renal cyst(s)  According to the guidelines published in the [facility] Paper of the ACR Incidental Findings Committee (Radiology 2010), no further workup of these lesions is recommended  No hydronephrosis  STOMACH AND BOWEL:  The esophagus is patulous and thickened which may be due to gastroesophageal reflux or esophageal dysmotility  Thickening gastroesophageal junction  No bowel obstruction  APPENDIX:  A normal appendix was visualized  ABDOMINOPELVIC CAVITY:  No ascites or free intraperitoneal air  No lymphadenopathy  VESSELS:  Moderate atherosclerotic calcifications  PELVIS REPRODUCTIVE ORGANS:  Unremarkable for patient's age  URINARY BLADDER:  Unremarkable   ABDOMINAL WALL/INGUINAL REGIONS:  Postsurgical changes of ventral abdominal wall hernia repair  There is a 3 8 x 1 1 x 3 2 cm fluid collection in the area of a previously seen ventral abdominal wall hernia which may represent a seroma  OSSEOUS STRUCTURES:  No acute fracture or destructive osseous lesion  Degenerative changes of the osseous structures  Grade 1 anterolisthesis of L5 on S1  Impression: 1  No evidence of pulmonary embolism  2   Enlargement of the main pulmonary artery which may be seen in the setting of pulmonary hypertension  3   Right greater than left small pleural effusions with adjacent compressive atelectasis  4   Duplicated left superior vena cava  5   The esophagus and gastroesophageal junction is patulous and thickened which may be due to gastroesophageal reflux or esophageal dysmotility  Correlate with endoscopy  6   3 8 x 1 1 x 3 2 cm fluid collection in the area of a ventral wall hernia repair which may represent a seroma  Workstation performed: [id number]     Mammo Screening Bilateral W 3d & Cad    Result Date: 12/12/2019  Narrative: DIAGNOSIS: Encounter for screening mammogram for breast cancer TECHNIQUE: Digital screening mammography was performed  Computer Aided Detection (CAD) analyzed all applicable images  COMPARISONS: Prior breast imaging dated: 11/01/2018, 10/27/2017, 11/10/2016, 10/24/2016, and 10/16/2014 RELEVANT HISTORY: Family Breast Cancer History: History of breast cancer in Mother  Family Medical History: Family medical history includes breast cancer in mother  Personal History: No known relevant hormone history  No known relevant surgical history  No known relevant medical history  The patient is scheduled in a reminder system for screening mammography  8-10% of cancers will be missed on mammography  Management of a palpable abnormality must be based on clinical grounds  Patients will be notified of their results via letter from our facility   Accredited by [employer] of Radiology and FDA  RISK ASSESSMENT: 5 Year Tyrer-Cuzick: 2 64 % 10 Year Tyrer-Cuzick: 5 16 % Lifetime Tyrer-Cuzick: 9 73 % TISSUE DENSITY: There are scattered areas of fibroglandular density  INDICATION: Mumtaz Lawrence is a 81 y o  female presenting for screening mammography  FINDINGS: There are no suspicious masses, grouped microcalcifications or areas of architectural distortion  The skin and nipple areolar complex are unremarkable  Impression: No mammographic evidence of malignancy  ASSESSMENT/BI-RADS CATEGORY: Left: 1 - Negative Right: 1 - Negative Overall: 1 - Negative RECOMMENDATION:      - Routine screening mammogram in 1 year for both breasts   Workstation ID: [id number]

## 2020-01-02 NOTE — PROGRESS NOTES
[facility] - Outpatient Consultation  Paddy Araujo 81 y o  female MRN: [id number]  Encounter: [id number]          ASSESSMENT AND PLAN:    Paddy Araujo is a 81 y o  female who presents with complaint of intermittent dysphagia for the past several months, as well as associated heartburn and weight loss  During this time she has been on minocycline, and notable she was diagnosed with Aflutter for which she underwent cardioversion x3 and is on Xarelto  DDx for dysphagia includes GERD and GERD related dysmotility vs rings vs obstruction vs pill esophagitis vs other  Available labs and imaging reviewed  1  Abnormal finding on GI tract imaging    2  Esophageal thickening    3  Dysphagia, unspecified type    4  Seronegative rheumatoid arthritis ([facility] )    5  Polymyalgia rheumatica (HCC)    6  Paroxysmal atrial fibrillation ([facility] )    7  Atrial flutter with rapid ventricular response ([facility] )    8  SVT (supraventricular tachycardia) ([facility] )        Orders Placed This Encounter   Procedures    EGD       -- Start omeprazole to help with likely GERD    -- EGD to evaluate for esophageal thickening and dysphagia  Will clarify with cardiologist when she can stop her Xarelto for the procedure  If she can not stop it, will at least perform a diagnostic EGD  Alternatively, we can order an esophogram, but she will need an EGD anyway in the near future  -- Colonoscopy in the future as she is due    -- Follow-up with other providers regarding chronic medical conditions    ______________________________________________________________________    HPI:    Paddy Araujo is a 81 y o  female who presents with complaint of dysphagia  Trouble eating, swallowing and mucous in her throat  Would eat bread or yogurt for the whole day  She had similar symptoms in July for 4 weeks and she lost 20 lbs in 1 month  It resolved, but it started again right before thanksgiving  It cleared for a week   Then she had palpitations and felt a heaviness in her chest  Went to  and was given Tamiflu and abx  Felt well for 8 days but went to ED at [facility]  No flu  She was not eating much  She felt full right away  In Mid December she was seen at [facility] and found to be in [address]  Underwent cardioversion x3  Anticoagulation increased  After cortney she had trouble eating again  For the past 2 days she has felt well  She has dysphagia and it feels stuck, but it will go down  Imaging from 2019 had esophageal thickening  She has some heartburn, and she takes TUMS which helps somewhat  She has also been taking Mucinex and that helped  Had to bend over to help it go down  No odynophagia  No nausea, vomiting, abdominal, diarrhea  She will occassionally get constipated and she attributes this to meds  No BRBPR, melena  She lost an additional 5 lbs  No prior EGD  Last colonsoocpy 6 years ago and she had a few polyps  She was called last year but declined       REVIEW OF SYSTEMS:  10 point ROS reviewed and negative, except as above      Historical Information   Past Medical History:   Diagnosis Date    Arthritis     rheumatoid    Atrial fibrillation (HCC)     CHF (congestive heart failure) (HCC)     Disease of thyroid gland     DVT (deep venous thrombosis) (HCC)     Hypertension     Migraine     hx of    Polymyalgia rheumatica (HCC)     Pulmonary emboli (HCC)     Renal disorder     right sided kidney stones    Vitamin D deficiency      Past Surgical History:   Procedure Laterality Date    CARDIAC CATHETERIZATION      CARDIOVERSION N/A 2019    Procedure: CARDIOVERSION;  Surgeon: Jigar Vidal MD;  Location: [facility];  Service: Cardiology     SECTION      x3 - last impression 16    FRACTURE SURGERY Left     wrist    HERNIA REPAIR  2018    KNEE CARTILAGE SURGERY Left     TONSILECTOMY AND ADNOIDECTOMY      VEIN SURGERY      venous ligation with stripping    WRIST SURGERY Left     ORIF wrist - last impression 5/27/16     Social History   Social History     Substance and Sexual Activity   Alcohol Use Yes    Alcohol/week: 0 0 standard drinks    Frequency: Monthly or less    Drinks per session: Patient refused    Binge frequency: Patient refused    Comment: occasionally; social     Social History     Substance and Sexual Activity   Drug Use No     Social History     Tobacco Use   Smoking Status Never Smoker   Smokeless Tobacco Never Used     Family History   Problem Relation Age of Onset    Breast cancer Mother 46    Aneurysm Father         aortic    No Known Problems Sister     No Known Problems Maternal Grandmother     No Known Problems Maternal Grandfather     No Known Problems Paternal Grandmother     No Known Problems Paternal Grandfather     Rheum arthritis Maternal Aunt     Early death Maternal Aunt     No Known Problems Paternal Aunt     No Known Problems Paternal Aunt     No Known Problems Paternal Aunt        Meds/Allergies       Current Outpatient Medications:     Calcium Ascorbate 500 MG TABS    CALCIUM-VITAMIN D PO    cholecalciferol (VITAMIN D3) 1,000 units tablet    Cinnamon 500 MG capsule    Cyanocobalamin (VITAMIN B 12 PO)    fluticasone (FLONASE) 50 mcg/act nasal spray    folic acid (FOLVITE) 1 mg tablet    levothyroxine 75 mcg tablet    metoprolol succinate (TOPROL-XL) 50 mg 24 hr tablet    minocycline (MINOCIN) 50 mg capsule    Misc Natural Products (OSTEO BI-FLEX JOINT SHIELD PO)    Multiple Vitamins-Minerals (OCUVITE ADULT 50+) CAPS    Red Yeast Rice 600 MG TABS    XARELTO 20 MG tablet    omeprazole (PriLOSEC) 40 MG capsule    Allergies   Allergen Reactions    Amiodarone      Other reaction(s):  Other (See Comments)  Reports caused elevated TSH    Sulfa Antibiotics Itching and Rash    Sulfamethoxazole-Trimethoprim Itching and Rash           Objective     Blood pressure 128/72, pulse 68, temperature 98 3 °F (36 8 °C), height 5' 2" (1 575 m), weight 67 kg (147 lb 9 6 oz)  Body mass index is 27 kg/m²  PHYSICAL EXAM:      General Appearance:   Alert, cooperative, no distress   HEENT:   Normocephalic, atraumatic, anicteric  Neck:  Supple, symmetrical, trachea midline   Lungs:   Clear to auscultation bilaterally; no rales, rhonchi or wheezing; respirations unlabored    Heart[de-identified]   Regular rate and rhythm; no murmur, rub, or gallop  Abdomen:   Soft, non-tender, non-distended; normal bowel sounds; no masses, no organomegaly    Genitalia:   Deferred    Rectal:   Deferred    Extremities:  No cyanosis, clubbing or edema    Pulses:  2+ and symmetric    Skin:  No jaundice, rashes, or lesions    Lymph nodes:  No palpable cervical lymphadenopathy        Lab Results:   No visits with results within 1 Day(s) from this visit     Latest known visit with results is:   Admission on 12/18/2019, Discharged on 12/19/2019   Component Date Value    Sodium 12/18/2019 138     Potassium 12/18/2019 3 8     Chloride 12/18/2019 102     CO2 12/18/2019 30     ANION GAP 12/18/2019 6     BUN 12/18/2019 20     Creatinine 12/18/2019 0 55*    Glucose 12/18/2019 85     Calcium 12/18/2019 9 5     eGFR 12/18/2019 97     WBC 12/18/2019 11 49*    RBC 12/18/2019 4 38     Hemoglobin 12/18/2019 12 4     Hematocrit 12/18/2019 40 2     MCV 12/18/2019 92     MCH 12/18/2019 28 3     MCHC 12/18/2019 30 8*    RDW 12/18/2019 14 1     MPV 12/18/2019 8 5*    Platelets 04/47/0842 439*    nRBC 12/18/2019 0     Neutrophils Relative 12/18/2019 62     Immat GRANS % 12/18/2019 1     Lymphocytes Relative 12/18/2019 22     Monocytes Relative 12/18/2019 8     Eosinophils Relative 12/18/2019 7*    Basophils Relative 12/18/2019 0     Neutrophils Absolute 12/18/2019 7 21     Immature Grans Absolute 12/18/2019 0 08     Lymphocytes Absolute 12/18/2019 2 50     Monocytes Absolute 12/18/2019 0 88     Eosinophils Absolute 12/18/2019 0 78*    Basophils Absolute 12/18/2019 0 04     Protime 12/18/2019 21 3*    INR 12/18/2019 1 83*    PTT 12/18/2019 29     Troponin I 12/18/2019 <0 02     NT-proBNP 12/18/2019 2,246*    Ventricular Rate 12/18/2019 79     Atrial Rate 12/18/2019 79     NE Interval 12/18/2019 182     QRSD Interval 12/18/2019 92     QT Interval 12/18/2019 386     QTC Interval 12/18/2019 442     P Axis 12/18/2019 11     QRS Axis 12/18/2019 -41     T Wave Axis 12/18/2019 3     Magnesium 12/19/2019 2 5     Sodium 12/19/2019 142     Potassium 12/19/2019 4 1     Chloride 12/19/2019 106     CO2 12/19/2019 30     ANION GAP 12/19/2019 6     BUN 12/19/2019 10     Creatinine 12/19/2019 0 43*    Glucose 12/19/2019 88     Calcium 12/19/2019 9 2     eGFR 12/19/2019 106          Radiology Results:   Xr Chest 1 View Portable    Result Date: 12/18/2019  Narrative: CHEST INDICATION:   tachypnea, volume overload  COMPARISON:  12/14/2019 EXAM PERFORMED/VIEWS:  XR CHEST PORTABLE FINDINGS: Heart is enlarged but unchanged  Pulmonary vasculature is increasingly indistinct, though not frankly cephalized  Generalized increase in peripheral reticular lung markings (Kerley B lines)  Small bilateral pleural effusions  Fluid on the right tracks into the minor fissure  Streaky bibasilar opacities  Increasing, potentially subsegmental atelectasis related to effusions  No cloudy consolidations to suggest alveolar edema  No pneumothorax on either side  Bones are unremarkable  Impression: 1  Findings of volume overload with small bilateral pleural effusions  Increasing pulmonary vascular congestion and mild interstitial edema  2   Increasing streaky basilar opacities probably represent atelectasis  Note: I agree with the preliminary interpretation by the ED care provider documented in Epic  Workstation performed: [id number]     X-ray Chest 2 Views    Result Date: 12/15/2019  Narrative: CHEST INDICATION:   chest pain   COMPARISON:  December 6, 2019 EXAM PERFORMED/VIEWS:  XR CHEST PA & LATERAL Images: 2 FINDINGS: Cardiomegaly seen  Small bilateral effusion, unchanged from the previous study Mild increased lung markings No acute consolidation Osseous structures appear within normal limits for patient age  Impression: No acute consolidation Small bilateral effusion, unchanged from the previous study Mild increased lung markings may be due to mild congestion The lungs appear mildly hazy as compared to previous study this may be due to technique or due to mild congestion Workstation performed: [id number]     Xr Chest Pa & Lateral    Result Date: 12/6/2019  Narrative: CHEST INDICATION:   R05: Cough  COMPARISON:  May 26, 2017 EXAM PERFORMED/VIEWS:  XR CHEST PA & LATERAL FINDINGS: Cardiomediastinal silhouette appears unremarkable  Blunting costophrenic angle bilaterally likely pleural thickening  Osseous structures appear within normal limits for patient age  Impression: No acute cardiopulmonary disease  Pleural thickening at the costophrenic angle bilaterally  Workstation performed: [id number]     Ct Pe Study W Abdomen Pelvis W Contrast    Result Date: 12/14/2019  Narrative: CT PULMONARY ANGIOGRAM OF THE CHEST AND CT ABDOMEN AND PELVIS WITH INTRAVENOUS CONTRAST INDICATION:   PE suspected, high pretest prob  Weakness  COMPARISON:  CT of abdomen pelvis on January 13, 2018  TECHNIQUE:  CT examination of the chest, abdomen and pelvis was performed  Thin section CT angiographic technique was used in the chest in order to evaluate for pulmonary embolus and coronal 3D MIP postprocessing was performed on the acquisition scanner  Axial, sagittal, and coronal 2D reformatted images were created from the source data and submitted for interpretation  Radiation dose length product (DLP) for this visit:  881 65 mGy-cm     This examination, like all CT scans performed in the [facility], was performed utilizing techniques to minimize radiation dose exposure, including the use of iterative reconstruction and automated exposure control  IV Contrast:  100 mL of iohexol (OMNIPAQUE)  was administered intravenously without immediate adverse reaction  Enteric Contrast:  Enteric contrast was not administered  FINDINGS: CHEST PULMONARY ARTERIAL TREE:  No pulmonary embolus is seen  Enlargement pulmonary artery measuring up to 3 9 cm in diameter which may be seen in setting of pulmonary hypertension  LUNGS:  Scattered atelectatic changes  No focal consolidation  Central airways are patent  PLEURA:  Right greater than left small pleural effusions with adjacent compressive atelectasis  HEART/AORTA: Mild cardiac enlargement  Coronary artery calcifications  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  MEDIASTINUM AND CHARLIE:  Mediastinal lymph nodes measure up to 7 mm in short axis  CHEST WALL AND LOWER NECK:   Unremarkable  ABDOMEN LIVER/BILIARY TREE:  Unremarkable  GALLBLADDER:  No calcified gallstones  No pericholecystic inflammatory change  SPLEEN:  Unremarkable  PANCREAS:  Unremarkable  ADRENAL GLANDS:  Unremarkable  KIDNEYS/URETERS:  One or more sharply circumscribed subcentimeter renal hypodensities are noted  These lesions are too small to accurately characterize, but are statistically most likely to represent benign cortical renal cyst(s)  According to the guidelines published in the [place] Paper of the ACR Incidental Findings Committee (Radiology 2010), no further workup of these lesions is recommended  No hydronephrosis  STOMACH AND BOWEL:  The esophagus is patulous and thickened which may be due to gastroesophageal reflux or esophageal dysmotility  Thickening gastroesophageal junction  No bowel obstruction  APPENDIX:  A normal appendix was visualized  ABDOMINOPELVIC CAVITY:  No ascites or free intraperitoneal air  No lymphadenopathy  VESSELS:  Moderate atherosclerotic calcifications  PELVIS REPRODUCTIVE ORGANS:  Unremarkable for patient's age  URINARY BLADDER:  Unremarkable   ABDOMINAL WALL/INGUINAL REGIONS:  Postsurgical changes of ventral abdominal wall hernia repair  There is a 3 8 x 1 1 x 3 2 cm fluid collection in the area of a previously seen ventral abdominal wall hernia which may represent a seroma  OSSEOUS STRUCTURES:  No acute fracture or destructive osseous lesion  Degenerative changes of the osseous structures  Grade 1 anterolisthesis of L5 on S1  Impression: 1  No evidence of pulmonary embolism  2   Enlargement of the main pulmonary artery which may be seen in the setting of pulmonary hypertension  3   Right greater than left small pleural effusions with adjacent compressive atelectasis  4   Duplicated left superior vena cava  5   The esophagus and gastroesophageal junction is patulous and thickened which may be due to gastroesophageal reflux or esophageal dysmotility  Correlate with endoscopy  6   3 8 x 1 1 x 3 2 cm fluid collection in the area of a ventral wall hernia repair which may represent a seroma  Workstation performed: [id number]     Mammo Screening Bilateral W 3d & Cad    Result Date: 12/12/2019  Narrative: DIAGNOSIS: Encounter for screening mammogram for breast cancer TECHNIQUE: Digital screening mammography was performed  Computer Aided Detection (CAD) analyzed all applicable images  COMPARISONS: Prior breast imaging dated: 11/01/2018, 10/27/2017, 11/10/2016, 10/24/2016, and 10/16/2014 RELEVANT HISTORY: Family Breast Cancer History: History of breast cancer in Mother  Family Medical History: Family medical history includes breast cancer in mother  Personal History: No known relevant hormone history  No known relevant surgical history  No known relevant medical history  The patient is scheduled in a reminder system for screening mammography  8-10% of cancers will be missed on mammography  Management of a palpable abnormality must be based on clinical grounds  Patients will be notified of their results via letter from our facility   Accredited by [employer] of Radiology and FDA  RISK ASSESSMENT: 5 Year Tyrer-Cuzick: 2 64 % 10 Year Tyrer-Cuzick: 5 16 % Lifetime Tyrer-Cuzick: 9 73 % TISSUE DENSITY: There are scattered areas of fibroglandular density  INDICATION: Guanakito Rebollar is a 81 y o  female presenting for screening mammography  FINDINGS: There are no suspicious masses, grouped microcalcifications or areas of architectural distortion  The skin and nipple areolar complex are unremarkable  Impression: No mammographic evidence of malignancy  ASSESSMENT/BI-RADS CATEGORY: Left: 1 - Negative Right: 1 - Negative Overall: 1 - Negative RECOMMENDATION:      - Routine screening mammogram in 1 year for both breasts   Workstation ID: [id number]

## 2020-01-03 ENCOUNTER — TELEPHONE (OUTPATIENT)
Dept: GASTROENTEROLOGY | Facility: CLINIC | Age: 70
End: 2020-01-03

## 2020-01-07 ENCOUNTER — LAB (OUTPATIENT)
Dept: LAB | Facility: CLINIC | Age: 70
End: 2020-01-07
Payer: COMMERCIAL

## 2020-01-07 DIAGNOSIS — I10 ESSENTIAL HYPERTENSION: ICD-10-CM

## 2020-01-07 DIAGNOSIS — E03.9 ACQUIRED HYPOTHYROIDISM: ICD-10-CM

## 2020-01-07 LAB
ALBUMIN SERPL BCP-MCNC: 3 G/DL (ref 3.5–5)
ALP SERPL-CCNC: 80 U/L (ref 46–116)
ALT SERPL W P-5'-P-CCNC: 17 U/L (ref 12–78)
ANION GAP SERPL CALCULATED.3IONS-SCNC: 5 MMOL/L (ref 4–13)
AST SERPL W P-5'-P-CCNC: 18 U/L (ref 5–45)
BILIRUB SERPL-MCNC: 0.52 MG/DL (ref 0.2–1)
BUN SERPL-MCNC: 12 MG/DL (ref 5–25)
CALCIUM SERPL-MCNC: 8.9 MG/DL (ref 8.3–10.1)
CHLORIDE SERPL-SCNC: 108 MMOL/L (ref 100–108)
CO2 SERPL-SCNC: 28 MMOL/L (ref 21–32)
CREAT SERPL-MCNC: 0.47 MG/DL (ref 0.6–1.3)
GFR SERPL CREATININE-BSD FRML MDRD: 103 ML/MIN/1.73SQ M
GLUCOSE P FAST SERPL-MCNC: 84 MG/DL (ref 65–99)
POTASSIUM SERPL-SCNC: 3.9 MMOL/L (ref 3.5–5.3)
PROT SERPL-MCNC: 6.8 G/DL (ref 6.4–8.2)
SODIUM SERPL-SCNC: 141 MMOL/L (ref 136–145)
T3 SERPL-MCNC: 0.9 NG/ML (ref 0.6–1.8)
T4 FREE SERPL-MCNC: 1.15 NG/DL (ref 0.76–1.46)
TSH SERPL DL<=0.05 MIU/L-ACNC: 2.34 UIU/ML (ref 0.36–3.74)

## 2020-01-07 PROCEDURE — 84480 ASSAY TRIIODOTHYRONINE (T3): CPT

## 2020-01-07 PROCEDURE — 84443 ASSAY THYROID STIM HORMONE: CPT

## 2020-01-07 PROCEDURE — 36415 COLL VENOUS BLD VENIPUNCTURE: CPT

## 2020-01-07 PROCEDURE — 84439 ASSAY OF FREE THYROXINE: CPT

## 2020-01-07 PROCEDURE — 80053 COMPREHEN METABOLIC PANEL: CPT

## 2020-01-08 NOTE — RESULT ENCOUNTER NOTE
Please call the patient regarding result    TSH, free T4 and total T3 with comprehensive metabolic panel are essentially normal

## 2020-01-16 ENCOUNTER — TELEPHONE (OUTPATIENT)
Dept: GASTROENTEROLOGY | Facility: CLINIC | Age: 70
End: 2020-01-16

## 2020-01-21 ENCOUNTER — TRANSCRIBE ORDERS (OUTPATIENT)
Dept: LAB | Facility: CLINIC | Age: 70
End: 2020-01-21

## 2020-01-21 ENCOUNTER — APPOINTMENT (OUTPATIENT)
Dept: LAB | Facility: CLINIC | Age: 70
End: 2020-01-21
Payer: COMMERCIAL

## 2020-01-21 DIAGNOSIS — M06.00 SERONEGATIVE RHEUMATOID ARTHRITIS (HCC): Primary | ICD-10-CM

## 2020-01-21 DIAGNOSIS — M06.00 SERONEGATIVE RHEUMATOID ARTHRITIS (HCC): ICD-10-CM

## 2020-01-21 LAB
ALBUMIN SERPL BCP-MCNC: 3.4 G/DL (ref 3.5–5)
ALP SERPL-CCNC: 85 U/L (ref 46–116)
ALT SERPL W P-5'-P-CCNC: 28 U/L (ref 12–78)
ANION GAP SERPL CALCULATED.3IONS-SCNC: 5 MMOL/L (ref 4–13)
AST SERPL W P-5'-P-CCNC: 27 U/L (ref 5–45)
BASOPHILS # BLD AUTO: 0.03 THOUSANDS/ΜL (ref 0–0.1)
BASOPHILS NFR BLD AUTO: 1 % (ref 0–1)
BILIRUB SERPL-MCNC: 0.48 MG/DL (ref 0.2–1)
BILIRUB UR QL STRIP: NEGATIVE
BUN SERPL-MCNC: 12 MG/DL (ref 5–25)
CALCIUM SERPL-MCNC: 9.7 MG/DL (ref 8.3–10.1)
CHLORIDE SERPL-SCNC: 108 MMOL/L (ref 100–108)
CLARITY UR: CLEAR
CO2 SERPL-SCNC: 28 MMOL/L (ref 21–32)
COLOR UR: YELLOW
CREAT SERPL-MCNC: 0.51 MG/DL (ref 0.6–1.3)
CRP SERPL QL: 8 MG/L
EOSINOPHIL # BLD AUTO: 1.12 THOUSAND/ΜL (ref 0–0.61)
EOSINOPHIL NFR BLD AUTO: 20 % (ref 0–6)
ERYTHROCYTE [DISTWIDTH] IN BLOOD BY AUTOMATED COUNT: 14.5 % (ref 11.6–15.1)
ERYTHROCYTE [SEDIMENTATION RATE] IN BLOOD: 26 MM/HOUR (ref 0–20)
GFR SERPL CREATININE-BSD FRML MDRD: 100 ML/MIN/1.73SQ M
GLUCOSE SERPL-MCNC: 73 MG/DL (ref 65–140)
GLUCOSE UR STRIP-MCNC: NEGATIVE MG/DL
HCT VFR BLD AUTO: 39.5 % (ref 34.8–46.1)
HGB BLD-MCNC: 12.3 G/DL (ref 11.5–15.4)
HGB UR QL STRIP.AUTO: NEGATIVE
IMM GRANULOCYTES # BLD AUTO: 0.01 THOUSAND/UL (ref 0–0.2)
IMM GRANULOCYTES NFR BLD AUTO: 0 % (ref 0–2)
KETONES UR STRIP-MCNC: NEGATIVE MG/DL
LEUKOCYTE ESTERASE UR QL STRIP: NEGATIVE
LYMPHOCYTES # BLD AUTO: 1 THOUSANDS/ΜL (ref 0.6–4.47)
LYMPHOCYTES NFR BLD AUTO: 18 % (ref 14–44)
MCH RBC QN AUTO: 27.5 PG (ref 26.8–34.3)
MCHC RBC AUTO-ENTMCNC: 31.1 G/DL (ref 31.4–37.4)
MCV RBC AUTO: 88 FL (ref 82–98)
MONOCYTES # BLD AUTO: 0.29 THOUSAND/ΜL (ref 0.17–1.22)
MONOCYTES NFR BLD AUTO: 5 % (ref 4–12)
NEUTROPHILS # BLD AUTO: 3.12 THOUSANDS/ΜL (ref 1.85–7.62)
NEUTS SEG NFR BLD AUTO: 56 % (ref 43–75)
NITRITE UR QL STRIP: NEGATIVE
NRBC BLD AUTO-RTO: 0 /100 WBCS
PH UR STRIP.AUTO: 6 [PH]
PLATELET # BLD AUTO: 226 THOUSANDS/UL (ref 149–390)
PMV BLD AUTO: 9.5 FL (ref 8.9–12.7)
POTASSIUM SERPL-SCNC: 4.2 MMOL/L (ref 3.5–5.3)
PROT SERPL-MCNC: 7 G/DL (ref 6.4–8.2)
PROT UR STRIP-MCNC: NEGATIVE MG/DL
RBC # BLD AUTO: 4.47 MILLION/UL (ref 3.81–5.12)
SODIUM SERPL-SCNC: 141 MMOL/L (ref 136–145)
SP GR UR STRIP.AUTO: 1.02 (ref 1–1.03)
UROBILINOGEN UR QL STRIP.AUTO: 0.2 E.U./DL
WBC # BLD AUTO: 5.57 THOUSAND/UL (ref 4.31–10.16)

## 2020-01-21 PROCEDURE — 81003 URINALYSIS AUTO W/O SCOPE: CPT

## 2020-01-21 PROCEDURE — 36415 COLL VENOUS BLD VENIPUNCTURE: CPT

## 2020-01-21 PROCEDURE — 85652 RBC SED RATE AUTOMATED: CPT

## 2020-01-21 PROCEDURE — 80053 COMPREHEN METABOLIC PANEL: CPT

## 2020-01-21 PROCEDURE — 86140 C-REACTIVE PROTEIN: CPT

## 2020-01-21 PROCEDURE — 85025 COMPLETE CBC W/AUTO DIFF WBC: CPT

## 2020-01-22 ENCOUNTER — HOSPITAL ENCOUNTER (OUTPATIENT)
Dept: PERIOP | Facility: HOSPITAL | Age: 70
Setting detail: OUTPATIENT SURGERY
Discharge: HOME | End: 2020-01-22
Attending: INTERNAL MEDICINE | Admitting: INTERNAL MEDICINE
Payer: COMMERCIAL

## 2020-01-22 ENCOUNTER — ANESTHESIA (OUTPATIENT)
Dept: PERIOP | Facility: HOSPITAL | Age: 70
End: 2020-01-22

## 2020-01-22 ENCOUNTER — ANESTHESIA EVENT (OUTPATIENT)
Dept: PERIOP | Facility: HOSPITAL | Age: 70
End: 2020-01-22

## 2020-01-22 VITALS
SYSTOLIC BLOOD PRESSURE: 128 MMHG | TEMPERATURE: 97.8 F | HEIGHT: 62 IN | HEART RATE: 71 BPM | DIASTOLIC BLOOD PRESSURE: 74 MMHG | OXYGEN SATURATION: 98 % | RESPIRATION RATE: 18 BRPM | BODY MASS INDEX: 26.87 KG/M2 | WEIGHT: 146 LBS

## 2020-01-22 DIAGNOSIS — K22.89 ESOPHAGEAL THICKENING: ICD-10-CM

## 2020-01-22 PROCEDURE — 88305 TISSUE EXAM BY PATHOLOGIST: CPT | Performed by: PATHOLOGY

## 2020-01-22 PROCEDURE — 43239 EGD BIOPSY SINGLE/MULTIPLE: CPT | Performed by: INTERNAL MEDICINE

## 2020-01-22 RX ORDER — PROPOFOL 10 MG/ML
INJECTION, EMULSION INTRAVENOUS AS NEEDED
Status: DISCONTINUED | OUTPATIENT
Start: 2020-01-22 | End: 2020-01-22 | Stop reason: SURG

## 2020-01-22 RX ORDER — ONDANSETRON 2 MG/ML
4 INJECTION INTRAMUSCULAR; INTRAVENOUS ONCE AS NEEDED
Status: DISCONTINUED | OUTPATIENT
Start: 2020-01-22 | End: 2020-01-26 | Stop reason: HOSPADM

## 2020-01-22 RX ORDER — SODIUM CHLORIDE, SODIUM LACTATE, POTASSIUM CHLORIDE, CALCIUM CHLORIDE 600; 310; 30; 20 MG/100ML; MG/100ML; MG/100ML; MG/100ML
125 INJECTION, SOLUTION INTRAVENOUS CONTINUOUS
Status: DISCONTINUED | OUTPATIENT
Start: 2020-01-22 | End: 2020-01-26 | Stop reason: HOSPADM

## 2020-01-22 RX ORDER — LIDOCAINE HYDROCHLORIDE 10 MG/ML
INJECTION, SOLUTION EPIDURAL; INFILTRATION; INTRACAUDAL; PERINEURAL AS NEEDED
Status: DISCONTINUED | OUTPATIENT
Start: 2020-01-22 | End: 2020-01-22 | Stop reason: SURG

## 2020-01-22 RX ADMIN — LIDOCAINE HYDROCHLORIDE 100 MG: 10 INJECTION, SOLUTION EPIDURAL; INFILTRATION; INTRACAUDAL; PERINEURAL at 09:26

## 2020-01-22 RX ADMIN — PROPOFOL 40 MG: 10 INJECTION, EMULSION INTRAVENOUS at 09:29

## 2020-01-22 RX ADMIN — SODIUM CHLORIDE, SODIUM LACTATE, POTASSIUM CHLORIDE, AND CALCIUM CHLORIDE: .6; .31; .03; .02 INJECTION, SOLUTION INTRAVENOUS at 09:15

## 2020-01-22 RX ADMIN — PROPOFOL 80 MG: 10 INJECTION, EMULSION INTRAVENOUS at 09:26

## 2020-01-22 RX ADMIN — PROPOFOL 30 MG: 10 INJECTION, EMULSION INTRAVENOUS at 09:33

## 2020-01-22 NOTE — ANESTHESIA PREPROCEDURE EVALUATION
Review of Systems/Medical History  Patient summary reviewed  Chart reviewed  No history of anesthetic complications     Cardiovascular  Hyperlipidemia, Hypertension , Dysrhythmias , atrial flutter and atrial fibrillation, CHF , DVT  Comment: H/o PE; 2019 - normal biV systolic function, no concerning valve issues, PE,  Pulmonary       GI/Hepatic       Kidney stones,        Endo/Other  History of thyroid disease , hypothyroidism,      GYN       Hematology   Musculoskeletal    Comment: Polymyalgia Rheumatica Arthritis     Neurology    Headaches,    Psychology           Physical Exam    Airway    Mallampati score: II  TM Distance: >3 FB  Neck ROM: full     Dental       Cardiovascular      Pulmonary      Other Findings  Decreased oral opening      Anesthesia Plan  ASA Score- 3     Anesthesia Type- IV sedation with anesthesia with ASA Monitors  Additional Monitors:   Airway Plan:     Comment: IV sedation, GA back up; standard ASA monitors  Risks and benefits discussed with patient; patient consented and agrees to proceed  I saw and evaluated the patient  If seen with CRNA, we have discussed the anesthetic plan and I am in agreement that the plan is appropriate for the patient        Plan Factors-  Patient did not smoke on day of surgery  Induction- intravenous  Postoperative Plan-     Informed Consent- Anesthetic plan and risks discussed with patient  I personally reviewed this patient with the CRNA  Discussed and agreed on the Anesthesia Plan with the CRNA  Tanya Booker

## 2020-02-24 ENCOUNTER — OFFICE VISIT (OUTPATIENT)
Dept: FAMILY MEDICINE CLINIC | Facility: CLINIC | Age: 70
End: 2020-02-24
Payer: COMMERCIAL

## 2020-02-24 VITALS
SYSTOLIC BLOOD PRESSURE: 118 MMHG | DIASTOLIC BLOOD PRESSURE: 68 MMHG | BODY MASS INDEX: 27.05 KG/M2 | WEIGHT: 147 LBS | OXYGEN SATURATION: 98 % | HEART RATE: 62 BPM | HEIGHT: 62 IN | TEMPERATURE: 98.8 F

## 2020-02-24 DIAGNOSIS — E03.9 ACQUIRED HYPOTHYROIDISM: ICD-10-CM

## 2020-02-24 DIAGNOSIS — Z11.4 SCREENING FOR HIV WITHOUT PRESENCE OF RISK FACTORS: ICD-10-CM

## 2020-02-24 DIAGNOSIS — E66.3 OVERWEIGHT (BMI 25.0-29.9): ICD-10-CM

## 2020-02-24 DIAGNOSIS — J98.4 RESTRICTIVE AIRWAY DISEASE: ICD-10-CM

## 2020-02-24 DIAGNOSIS — I10 ESSENTIAL HYPERTENSION: ICD-10-CM

## 2020-02-24 DIAGNOSIS — R06.02 SOB (SHORTNESS OF BREATH): Primary | ICD-10-CM

## 2020-02-24 PROCEDURE — 99214 OFFICE O/P EST MOD 30 MIN: CPT | Performed by: FAMILY MEDICINE

## 2020-02-24 PROCEDURE — 1160F RVW MEDS BY RX/DR IN RCRD: CPT | Performed by: FAMILY MEDICINE

## 2020-02-24 PROCEDURE — 3074F SYST BP LT 130 MM HG: CPT | Performed by: FAMILY MEDICINE

## 2020-02-24 PROCEDURE — 1036F TOBACCO NON-USER: CPT | Performed by: FAMILY MEDICINE

## 2020-02-24 PROCEDURE — 4040F PNEUMOC VAC/ADMIN/RCVD: CPT | Performed by: FAMILY MEDICINE

## 2020-02-24 PROCEDURE — 3078F DIAST BP <80 MM HG: CPT | Performed by: FAMILY MEDICINE

## 2020-02-24 PROCEDURE — 3008F BODY MASS INDEX DOCD: CPT | Performed by: FAMILY MEDICINE

## 2020-02-24 NOTE — PROGRESS NOTES
Assessment/Plan:    No problem-specific Assessment & Plan notes found for this encounter  Diagnoses and all orders for this visit:    SOB (shortness of breath)  Comments:  marked improvement    Restrictive airway disease    Overweight (BMI 25 0-29  9)    Essential hypertension    Acquired hypothyroidism          PHQ-9 Depression Screening    PHQ-9:    Frequency of the following problems over the past two weeks:       Little interest or pleasure in doing things:  0 - not at all  Feeling down, depressed, or hopeless:  0 - not at all  PHQ-2 Score:  0      BMI Counseling: Body mass index is 26 89 kg/m²  The BMI is above normal  Nutrition recommendations include reducing portion sizes and 3-5 servings of fruits/vegetables daily  Exercise recommendations include moderate aerobic physical activity for 150 minutes/week and exercising 3-5 times per week  Subjective:      Patient ID: Guanakito Rebollar is a 81 y o  female  Follow up for SOB, dypnea on exertion, pt has seen the pulmonologist who found restrictive pulmonary disease, pt was given abx and is breathing very well and feels good, pt is back exercising  Hypertension   This is a chronic problem  The current episode started more than 1 year ago  The problem is unchanged  The problem is controlled  Pertinent negatives include no chest pain, palpitations or shortness of breath  Past treatments include beta blockers  The current treatment provides significant improvement  There are no compliance problems  The following portions of the patient's history were reviewed and updated as appropriate: allergies, current medications, past family history, past medical history, past social history, past surgical history and problem list     Review of Systems   Constitutional: Negative for fatigue  Respiratory: Negative for shortness of breath and wheezing  Cardiovascular: Negative for chest pain and palpitations           Objective:    /68   Pulse 62 Temp 98 8 °F (37 1 °C)   Ht 5' 2" (1 575 m)   Wt 66 7 kg (147 lb)   SpO2 98%   BMI 26 89 kg/m²      Physical Exam   Constitutional: She is oriented to person, place, and time  She appears well-developed and well-nourished  No distress  HENT:   Head: Normocephalic and atraumatic  Eyes: Pupils are equal, round, and reactive to light  Conjunctivae and EOM are normal  No scleral icterus  Neck: Normal range of motion  Neck supple  Cardiovascular: Normal rate, regular rhythm and normal heart sounds  No murmur heard  Pulmonary/Chest: Effort normal and breath sounds normal  No respiratory distress  She has no wheezes  She has no rales  Abdominal: Soft  Bowel sounds are normal  She exhibits no distension and no mass  There is no tenderness  There is no rebound and no guarding  Musculoskeletal: She exhibits no edema  Lymphadenopathy:     She has no cervical adenopathy  Neurological: She is alert and oriented to person, place, and time  Skin: Skin is warm and dry  She is not diaphoretic  Psychiatric: She has a normal mood and affect  Her behavior is normal  Judgment and thought content normal    Nursing note and vitals reviewed

## 2020-05-07 ENCOUNTER — TRANSCRIBE ORDERS (OUTPATIENT)
Dept: ADMINISTRATIVE | Facility: HOSPITAL | Age: 70
End: 2020-05-07

## 2020-05-07 ENCOUNTER — LAB (OUTPATIENT)
Dept: LAB | Facility: CLINIC | Age: 70
End: 2020-05-07
Payer: COMMERCIAL

## 2020-05-07 DIAGNOSIS — M06.00 SERONEGATIVE RHEUMATOID ARTHRITIS (HCC): ICD-10-CM

## 2020-05-07 DIAGNOSIS — M06.00 SERONEGATIVE RHEUMATOID ARTHRITIS (HCC): Primary | ICD-10-CM

## 2020-05-07 DIAGNOSIS — E03.9 ACQUIRED HYPOTHYROIDISM: ICD-10-CM

## 2020-05-07 LAB
ALBUMIN SERPL BCP-MCNC: 3.8 G/DL (ref 3.5–5)
ALP SERPL-CCNC: 82 U/L (ref 46–116)
ALT SERPL W P-5'-P-CCNC: 26 U/L (ref 12–78)
ANION GAP SERPL CALCULATED.3IONS-SCNC: 2 MMOL/L (ref 4–13)
AST SERPL W P-5'-P-CCNC: 27 U/L (ref 5–45)
BASOPHILS # BLD AUTO: 0.06 THOUSANDS/ΜL (ref 0–0.1)
BASOPHILS NFR BLD AUTO: 1 % (ref 0–1)
BILIRUB SERPL-MCNC: 0.61 MG/DL (ref 0.2–1)
BILIRUB UR QL STRIP: NEGATIVE
BUN SERPL-MCNC: 17 MG/DL (ref 5–25)
CALCIUM SERPL-MCNC: 9.7 MG/DL (ref 8.3–10.1)
CHLORIDE SERPL-SCNC: 110 MMOL/L (ref 100–108)
CLARITY UR: NORMAL
CO2 SERPL-SCNC: 29 MMOL/L (ref 21–32)
COLOR UR: YELLOW
CREAT SERPL-MCNC: 0.61 MG/DL (ref 0.6–1.3)
CRP SERPL QL: <3 MG/L
EOSINOPHIL # BLD AUTO: 0.43 THOUSAND/ΜL (ref 0–0.61)
EOSINOPHIL NFR BLD AUTO: 7 % (ref 0–6)
ERYTHROCYTE [DISTWIDTH] IN BLOOD BY AUTOMATED COUNT: 14.6 % (ref 11.6–15.1)
ERYTHROCYTE [SEDIMENTATION RATE] IN BLOOD: 15 MM/HOUR (ref 0–20)
GFR SERPL CREATININE-BSD FRML MDRD: 93 ML/MIN/1.73SQ M
GLUCOSE P FAST SERPL-MCNC: 83 MG/DL (ref 65–99)
GLUCOSE UR STRIP-MCNC: NEGATIVE MG/DL
HCT VFR BLD AUTO: 40.7 % (ref 34.8–46.1)
HGB BLD-MCNC: 13.3 G/DL (ref 11.5–15.4)
HGB UR QL STRIP.AUTO: NEGATIVE
IMM GRANULOCYTES # BLD AUTO: 0.01 THOUSAND/UL (ref 0–0.2)
IMM GRANULOCYTES NFR BLD AUTO: 0 % (ref 0–2)
KETONES UR STRIP-MCNC: NEGATIVE MG/DL
LEUKOCYTE ESTERASE UR QL STRIP: NEGATIVE
LYMPHOCYTES # BLD AUTO: 1.89 THOUSANDS/ΜL (ref 0.6–4.47)
LYMPHOCYTES NFR BLD AUTO: 30 % (ref 14–44)
MCH RBC QN AUTO: 29.2 PG (ref 26.8–34.3)
MCHC RBC AUTO-ENTMCNC: 32.7 G/DL (ref 31.4–37.4)
MCV RBC AUTO: 89 FL (ref 82–98)
MONOCYTES # BLD AUTO: 0.63 THOUSAND/ΜL (ref 0.17–1.22)
MONOCYTES NFR BLD AUTO: 10 % (ref 4–12)
NEUTROPHILS # BLD AUTO: 3.24 THOUSANDS/ΜL (ref 1.85–7.62)
NEUTS SEG NFR BLD AUTO: 52 % (ref 43–75)
NITRITE UR QL STRIP: NEGATIVE
NRBC BLD AUTO-RTO: 0 /100 WBCS
PH UR STRIP.AUTO: 7.5 [PH]
PLATELET # BLD AUTO: 231 THOUSANDS/UL (ref 149–390)
PMV BLD AUTO: 9.5 FL (ref 8.9–12.7)
POTASSIUM SERPL-SCNC: 4.3 MMOL/L (ref 3.5–5.3)
PROT SERPL-MCNC: 7.2 G/DL (ref 6.4–8.2)
PROT UR STRIP-MCNC: NEGATIVE MG/DL
RBC # BLD AUTO: 4.56 MILLION/UL (ref 3.81–5.12)
SODIUM SERPL-SCNC: 141 MMOL/L (ref 136–145)
SP GR UR STRIP.AUTO: 1.02 (ref 1–1.03)
T3 SERPL-MCNC: 0.9 NG/ML (ref 0.6–1.8)
T4 FREE SERPL-MCNC: 1.09 NG/DL (ref 0.76–1.46)
TSH SERPL DL<=0.05 MIU/L-ACNC: 1.82 UIU/ML (ref 0.36–3.74)
UROBILINOGEN UR QL STRIP.AUTO: 0.2 E.U./DL
WBC # BLD AUTO: 6.26 THOUSAND/UL (ref 4.31–10.16)

## 2020-05-07 PROCEDURE — 85025 COMPLETE CBC W/AUTO DIFF WBC: CPT

## 2020-05-07 PROCEDURE — 84480 ASSAY TRIIODOTHYRONINE (T3): CPT

## 2020-05-07 PROCEDURE — 85652 RBC SED RATE AUTOMATED: CPT

## 2020-05-07 PROCEDURE — 84443 ASSAY THYROID STIM HORMONE: CPT

## 2020-05-07 PROCEDURE — 86140 C-REACTIVE PROTEIN: CPT

## 2020-05-07 PROCEDURE — 80053 COMPREHEN METABOLIC PANEL: CPT

## 2020-05-07 PROCEDURE — 36415 COLL VENOUS BLD VENIPUNCTURE: CPT

## 2020-05-07 PROCEDURE — 81003 URINALYSIS AUTO W/O SCOPE: CPT | Performed by: INTERNAL MEDICINE

## 2020-05-07 PROCEDURE — 84439 ASSAY OF FREE THYROXINE: CPT

## 2020-05-22 ENCOUNTER — TELEMEDICINE (OUTPATIENT)
Dept: ENDOCRINOLOGY | Facility: HOSPITAL | Age: 70
End: 2020-05-22
Payer: COMMERCIAL

## 2020-05-22 DIAGNOSIS — I10 ESSENTIAL HYPERTENSION: ICD-10-CM

## 2020-05-22 DIAGNOSIS — E55.9 VITAMIN D INSUFFICIENCY: ICD-10-CM

## 2020-05-22 DIAGNOSIS — E03.9 ACQUIRED HYPOTHYROIDISM: Primary | ICD-10-CM

## 2020-05-22 DIAGNOSIS — E04.1 THYROID NODULE: ICD-10-CM

## 2020-05-22 DIAGNOSIS — E78.00 HYPERCHOLESTEROLEMIA: ICD-10-CM

## 2020-05-22 PROCEDURE — G2012 BRIEF CHECK IN BY MD/QHP: HCPCS | Performed by: NURSE PRACTITIONER

## 2020-07-08 ENCOUNTER — APPOINTMENT (OUTPATIENT)
Dept: LAB | Facility: CLINIC | Age: 70
End: 2020-07-08
Payer: COMMERCIAL

## 2020-07-08 DIAGNOSIS — E66.3 OVERWEIGHT (BMI 25.0-29.9): ICD-10-CM

## 2020-07-08 DIAGNOSIS — Z11.4 SCREENING FOR HIV WITHOUT PRESENCE OF RISK FACTORS: ICD-10-CM

## 2020-07-08 DIAGNOSIS — I10 ESSENTIAL HYPERTENSION: ICD-10-CM

## 2020-07-08 DIAGNOSIS — E03.9 ACQUIRED HYPOTHYROIDISM: ICD-10-CM

## 2020-07-08 LAB
ALBUMIN SERPL BCP-MCNC: 3.7 G/DL (ref 3.5–5)
ALP SERPL-CCNC: 82 U/L (ref 46–116)
ALT SERPL W P-5'-P-CCNC: 19 U/L (ref 12–78)
ANION GAP SERPL CALCULATED.3IONS-SCNC: 5 MMOL/L (ref 4–13)
AST SERPL W P-5'-P-CCNC: 28 U/L (ref 5–45)
BASOPHILS # BLD AUTO: 0.06 THOUSANDS/ΜL (ref 0–0.1)
BASOPHILS NFR BLD AUTO: 1 % (ref 0–1)
BILIRUB SERPL-MCNC: 0.59 MG/DL (ref 0.2–1)
BUN SERPL-MCNC: 17 MG/DL (ref 5–25)
CALCIUM SERPL-MCNC: 10 MG/DL (ref 8.3–10.1)
CHLORIDE SERPL-SCNC: 108 MMOL/L (ref 100–108)
CHOLEST SERPL-MCNC: 209 MG/DL (ref 50–200)
CO2 SERPL-SCNC: 27 MMOL/L (ref 21–32)
CREAT SERPL-MCNC: 0.59 MG/DL (ref 0.6–1.3)
EOSINOPHIL # BLD AUTO: 0.3 THOUSAND/ΜL (ref 0–0.61)
EOSINOPHIL NFR BLD AUTO: 6 % (ref 0–6)
ERYTHROCYTE [DISTWIDTH] IN BLOOD BY AUTOMATED COUNT: 13.9 % (ref 11.6–15.1)
GFR SERPL CREATININE-BSD FRML MDRD: 94 ML/MIN/1.73SQ M
GLUCOSE P FAST SERPL-MCNC: 73 MG/DL (ref 65–99)
HCT VFR BLD AUTO: 40.8 % (ref 34.8–46.1)
HDLC SERPL-MCNC: 49 MG/DL
HGB BLD-MCNC: 13.3 G/DL (ref 11.5–15.4)
IMM GRANULOCYTES # BLD AUTO: 0.01 THOUSAND/UL (ref 0–0.2)
IMM GRANULOCYTES NFR BLD AUTO: 0 % (ref 0–2)
LDLC SERPL CALC-MCNC: 140 MG/DL (ref 0–100)
LYMPHOCYTES # BLD AUTO: 1.65 THOUSANDS/ΜL (ref 0.6–4.47)
LYMPHOCYTES NFR BLD AUTO: 35 % (ref 14–44)
MCH RBC QN AUTO: 30 PG (ref 26.8–34.3)
MCHC RBC AUTO-ENTMCNC: 32.6 G/DL (ref 31.4–37.4)
MCV RBC AUTO: 92 FL (ref 82–98)
MONOCYTES # BLD AUTO: 0.49 THOUSAND/ΜL (ref 0.17–1.22)
MONOCYTES NFR BLD AUTO: 10 % (ref 4–12)
NEUTROPHILS # BLD AUTO: 2.23 THOUSANDS/ΜL (ref 1.85–7.62)
NEUTS SEG NFR BLD AUTO: 48 % (ref 43–75)
NONHDLC SERPL-MCNC: 160 MG/DL
NRBC BLD AUTO-RTO: 0 /100 WBCS
PLATELET # BLD AUTO: 176 THOUSANDS/UL (ref 149–390)
PMV BLD AUTO: 10.2 FL (ref 8.9–12.7)
POTASSIUM SERPL-SCNC: 4.4 MMOL/L (ref 3.5–5.3)
PROT SERPL-MCNC: 7.1 G/DL (ref 6.4–8.2)
RBC # BLD AUTO: 4.43 MILLION/UL (ref 3.81–5.12)
SODIUM SERPL-SCNC: 140 MMOL/L (ref 136–145)
TRIGL SERPL-MCNC: 100 MG/DL
TSH SERPL DL<=0.05 MIU/L-ACNC: 0.81 UIU/ML (ref 0.36–3.74)
WBC # BLD AUTO: 4.74 THOUSAND/UL (ref 4.31–10.16)

## 2020-07-08 PROCEDURE — 80061 LIPID PANEL: CPT

## 2020-07-08 PROCEDURE — 36415 COLL VENOUS BLD VENIPUNCTURE: CPT | Performed by: FAMILY MEDICINE

## 2020-07-08 PROCEDURE — 80053 COMPREHEN METABOLIC PANEL: CPT | Performed by: FAMILY MEDICINE

## 2020-07-08 PROCEDURE — 85025 COMPLETE CBC W/AUTO DIFF WBC: CPT | Performed by: FAMILY MEDICINE

## 2020-07-08 PROCEDURE — 84443 ASSAY THYROID STIM HORMONE: CPT

## 2020-07-08 PROCEDURE — 87389 HIV-1 AG W/HIV-1&-2 AB AG IA: CPT

## 2020-07-09 LAB — HIV 1+2 AB+HIV1 P24 AG SERPL QL IA: NORMAL

## 2020-07-15 ENCOUNTER — OFFICE VISIT (OUTPATIENT)
Dept: FAMILY MEDICINE CLINIC | Facility: CLINIC | Age: 70
End: 2020-07-15
Payer: COMMERCIAL

## 2020-07-15 VITALS
TEMPERATURE: 98.8 F | WEIGHT: 160 LBS | BODY MASS INDEX: 29.44 KG/M2 | SYSTOLIC BLOOD PRESSURE: 120 MMHG | OXYGEN SATURATION: 97 % | HEIGHT: 62 IN | HEART RATE: 70 BPM | DIASTOLIC BLOOD PRESSURE: 70 MMHG

## 2020-07-15 DIAGNOSIS — Z00.00 MEDICARE ANNUAL WELLNESS VISIT, SUBSEQUENT: Primary | ICD-10-CM

## 2020-07-15 DIAGNOSIS — E78.00 HYPERCHOLESTEROLEMIA: ICD-10-CM

## 2020-07-15 DIAGNOSIS — E03.9 ACQUIRED HYPOTHYROIDISM: ICD-10-CM

## 2020-07-15 DIAGNOSIS — I10 ESSENTIAL HYPERTENSION: ICD-10-CM

## 2020-07-15 PROCEDURE — 3074F SYST BP LT 130 MM HG: CPT | Performed by: FAMILY MEDICINE

## 2020-07-15 PROCEDURE — 1125F AMNT PAIN NOTED PAIN PRSNT: CPT | Performed by: FAMILY MEDICINE

## 2020-07-15 PROCEDURE — 1160F RVW MEDS BY RX/DR IN RCRD: CPT | Performed by: FAMILY MEDICINE

## 2020-07-15 PROCEDURE — 3008F BODY MASS INDEX DOCD: CPT | Performed by: FAMILY MEDICINE

## 2020-07-15 PROCEDURE — 1036F TOBACCO NON-USER: CPT | Performed by: FAMILY MEDICINE

## 2020-07-15 PROCEDURE — G0439 PPPS, SUBSEQ VISIT: HCPCS | Performed by: FAMILY MEDICINE

## 2020-07-15 PROCEDURE — 3078F DIAST BP <80 MM HG: CPT | Performed by: FAMILY MEDICINE

## 2020-07-15 PROCEDURE — 99214 OFFICE O/P EST MOD 30 MIN: CPT | Performed by: FAMILY MEDICINE

## 2020-07-15 PROCEDURE — 1170F FXNL STATUS ASSESSED: CPT | Performed by: FAMILY MEDICINE

## 2020-07-15 PROCEDURE — 4040F PNEUMOC VAC/ADMIN/RCVD: CPT | Performed by: FAMILY MEDICINE

## 2020-07-15 NOTE — PROGRESS NOTES
Assessment and Plan:     Problem List Items Addressed This Visit     None      Visit Diagnoses     Medicare annual wellness visit, subsequent    -  Primary           Preventive health issues were discussed with patient, and age appropriate screening tests were ordered as noted in patient's After Visit Summary  Personalized health advice and appropriate referrals for health education or preventive services given if needed, as noted in patient's After Visit Summary       History of Present Illness:     Patient presents for Medicare Annual Wellness visit    Patient Care Team:  Mario Hays DO as PCP - General  Rodneyrocky Jacobs DO as PCP - Endocrinology (Endocrinology)  MD Mario Ferraro DO     Problem List:     Patient Active Problem List   Diagnosis    [facility]    DVT, lower extremity ([facility]    Polymyalgia rheumatica ([facility]    Hypothyroidism    Leukocytosis    Vitamin D insufficiency    Left axis deviation    Premature atrial complexes    Essential hypertension    Hypercholesterolemia    Calculus of kidney    SVT (supraventricular tachycardia) (HCC)    SOB (shortness of breath)    Superficial thrombophlebitis of right upper extremity    Seronegative rheumatoid arthritis ([facility]    Acute diastolic CHF (congestive heart failure) ([facility]    Negative depression screening    Disease of lung    Sepsis, unspecified organism ([facility]    Paroxysmal atrial fibrillation ([facility]    Atrial flutter with rapid ventricular response (HCC)    Volume overload    Acute maxillary sinusitis    Bilateral pleural effusion    Cardiac abnormality    CHF (congestive heart failure) (HCC)    Cough    Diarrhea    Diffuse arthralgia    Diffusion capacity of lung (dl), decreased    Fever of unknown origin (FUO)    History of DVT (deep vein thrombosis)    History of polymyalgia rheumatica    History of pulmonary embolus (PE)    Hx of methotrexate therapy    Nausea and/or vomiting    Pain of left side of body    Pain of right scapula    Chest pain    Right upper quadrant abdominal pain    Secondary adrenal insufficiency (HCC)    Tamponade    Thyroid nodule    Ventral hernia      Past Medical and Surgical History:     Past Medical History:   Diagnosis Date    Arthritis     rheumatoid    [facility]     CHF (congestive heart failure) [facility]     Disease of thyroid gland     DVT [facility]deep venous thrombosis) [facility]     Hypertension     Migraine     hx of    Polymyalgia rheumatica ([facility]     Pulmonary emboli ([facility]     Renal disorder     right sided kidney stones    Vitamin D deficiency      Past Surgical History:   Procedure Laterality Date    CARDIAC CATHETERIZATION      CARDIOVERSION N/A 2019    Procedure: CARDIOVERSION;  Surgeon: Malu Trimble MD;  Location: [facility];  Service: Cardiology     SECTION      x3 - last impression 16    FRACTURE SURGERY Left     wrist    HERNIA REPAIR  2018    KNEE CARTILAGE SURGERY Left     TONSILECTOMY AND ADNOIDECTOMY      VEIN SURGERY      venous ligation with stripping    WRIST SURGERY Left     ORIF wrist - last impression 16      Family History:     Family History   Problem Relation Age of Onset    Breast cancer Mother 46    Aneurysm Father         aortic    No Known Problems Sister     No Known Problems Maternal Grandmother     No Known Problems Maternal Grandfather     No Known Problems Paternal Grandmother     No Known Problems Paternal Grandfather     Rheum arthritis Maternal Aunt     Early death Maternal Aunt     No Known Problems Paternal Aunt     No Known Problems Paternal Aunt     No Known Problems Paternal Aunt       Social History:        Social History     Socioeconomic History    Marital status: /Civil Union     Spouse name: None    Number of children: 3    Years of education: None    Highest education level: None   Occupational History    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   Social Needs    Financial resource strain: None    Food insecurity:     Worry: None     Inability: None    Transportation needs:     Medical: None     Non-medical: None   Tobacco Use    Smoking status: Never Smoker    Smokeless tobacco: Never Used   Substance and Sexual Activity    Alcohol use:  Yes     Alcohol/week: 0 0 standard drinks     Frequency: Monthly or less     Drinks per session: Patient refused     Binge frequency: Patient refused     Comment: occasionally; social    Drug use: No    Sexual activity: None   Lifestyle    Physical activity:     Days per week: None     Minutes per session: None    Stress: None   Relationships    Social connections:     Talks on phone: None     Gets together: None     Attends Anglican service: None     Active member of club or organization: None     Attends meetings of clubs or organizations: None     Relationship status: None    Intimate partner violence:     Fear of current or ex partner: None     Emotionally abused: None     Physically abused: None     Forced sexual activity: None   Other Topics Concern    None   Social History Narrative    None      Medications and Allergies:     Current Outpatient Medications   Medication Sig Dispense Refill    Calcium Ascorbate 500 MG TABS Take 500 mg by mouth daily       CALCIUM-VITAMIN D PO Take 1 tablet by mouth daily      cholecalciferol (VITAMIN D3) 1,000 units tablet Take 1,000 Units by mouth daily      Cinnamon 500 MG capsule Take 500 mg by mouth daily      Cyanocobalamin (VITAMIN B 12 PO) Take 500 mcg by mouth daily       fluticasone (FLONASE) 50 mcg/act nasal spray 1 spray into each nostril daily 1 Bottle 5    folic acid (FOLVITE) 1 mg tablet Take 1 mg by mouth daily       levothyroxine 75 mcg tablet Take 1 tablet daily 30 tablet 7    metoprolol succinate (TOPROL-XL) 50 mg 24 hr tablet Take 1 tablet (50 mg total) by mouth 2 (two) times a day 60 tablet 0    minocycline (MINOCIN) 50 mg capsule Take 50 mg by mouth every other day   5    Misc Natural Products (OSTEO BI-FLEX JOINT SHIELD PO) Take by mouth      Multiple Vitamins-Minerals (OCUVITE ADULT 50+) CAPS Take 1 capsule by mouth daily      Red Yeast Rice 600 MG TABS Take 2 tablets by mouth daily      XARELTO 20 MG tablet Take 1 tablet by mouth daily       No current facility-administered medications for this visit  Allergies   Allergen Reactions    Amiodarone      Other reaction(s): Other (See Comments)  Reports caused elevated TSH    Sudafed [Pseudoephedrine]      Rapid heart beat    Sulfa Antibiotics Itching and Rash    Sulfamethoxazole-Trimethoprim Itching and Rash      Immunizations:     Immunization History   Administered Date(s) Administered    Influenza, high dose seasonal 0 5 mL 12/16/2019    Pneumococcal Conjugate 13-Valent 10/09/2019    Tdap 05/20/2020    Zoster 12/20/2013      Health Maintenance:         Topic Date Due    Hepatitis C Screening  1950    MAMMOGRAM  12/11/2021    CRC Screening: Colonoscopy  05/23/2023         Topic Date Due    Influenza Vaccine  07/01/2020      Medicare Health Risk Assessment:     /70   Pulse 70   Temp 98 8 °F (37 1 °C)   Ht 5' 2" (1 575 m)   Wt 72 6 kg (160 lb)   SpO2 97%   BMI 29 26 kg/m²      Benjamin East is here for her Subsequent Wellness visit  Last Medicare Wellness visit information reviewed, patient interviewed and updates made to the record today  Health Risk Assessment:   Patient rates overall health as good  Patient feels that their physical health rating is same  Eyesight was rated as same  Hearing was rated as same  Patient feels that their emotional and mental health rating is much better  Pain experienced in the last 7 days has been none  Patient states that she has experienced no weight loss or gain in last 6 months  Depression Screening:   PHQ-2 Score: 0      Fall Risk Screening:    In the past year, patient has experienced: no history of falling in past year      Urinary Incontinence Screening:   Patient has not leaked urine accidently in the last six months  Home Safety:  Patient has trouble with stairs inside or outside of their home  Patient has working smoke alarms and has working carbon monoxide detector  Home safety hazards include: none  Nutrition:   Current diet is Regular  Medications:   Patient is currently taking over-the-counter supplements  OTC medications include: see medication list  Patient is able to manage medications  Activities of Daily Living (ADLs)/Instrumental Activities of Daily Living (IADLs):   Walk and transfer into and out of bed and chair?: Yes  Dress and groom yourself?: Yes    Bathe or shower yourself?: Yes    Feed yourself?  Yes  Do your laundry/housekeeping?: Yes  Manage your money, pay your bills and track your expenses?: Yes  Make your own meals?: Yes    Do your own shopping?: Yes    Previous Hospitalizations:   Any hospitalizations or ED visits within the last 12 months?: Yes    How many hospitalizations have you had in the last year?: 1-2    Advance Care Planning:   Living will: No    Durable POA for healthcare: No    Advanced directive: No    Advanced directive counseling given: Yes    Five wishes given: Yes    Patient declined ACP directive: No    End of Life Decisions reviewed with patient: No    Provider agrees with end of life decisions: No      Cognitive Screening:   Provider or family/friend/caregiver concerned regarding cognition?: No    PREVENTIVE SCREENINGS      Cardiovascular Screening:    General: Screening Not Indicated and History Lipid Disorder      Diabetes Screening:     General: Screening Current      Colorectal Cancer Screening:     General: Screening Current      Breast Cancer Screening:     General: Screening Current      Cervical Cancer Screening:    General: Screening Not Indicated      Osteoporosis Screening:      Due for: DXA Axial and DXA Appendicular Abdominal Aortic Aneurysm (AAA) Screening:        General: Screening Not Indicated      Lung Cancer Screening:     General: Screening Not Indicated      Hepatitis C Screening:    General: Screening Not Indicated      Twjoanne Self, DO

## 2020-07-15 NOTE — PATIENT INSTRUCTIONS
Medicare Preventive Visit Patient Instructions  Thank you for completing your Welcome to Medicare Visit or Medicare Annual Wellness Visit today  Your next wellness visit will be due in one year (7/15/2021)  The screening/preventive services that you may require over the next 5-10 years are detailed below  Some tests may not apply to you based off risk factors and/or age  Screening tests ordered at today's visit but not completed yet may show as past due  Also, please note that scanned in results may not display below  Preventive Screenings:  Service Recommendations Previous Testing/Comments   Colorectal Cancer Screening  * Colonoscopy    * Fecal Occult Blood Test (FOBT)/Fecal Immunochemical Test (FIT)  * Fecal DNA/Cologuard Test  * Flexible Sigmoidoscopy Age: 54-65 years old   Colonoscopy: every 10 years (may be performed more frequently if at higher risk)  OR  FOBT/FIT: every 1 year  OR  Cologuard: every 3 years  OR  Sigmoidoscopy: every 5 years  Screening may be recommended earlier than age [address] if at higher risk for colorectal cancer  Also, an individualized decision between you and your healthcare provider will decide whether screening between the ages of 74-80 would be appropriate  Colonoscopy: 05/23/2013  FOBT/FIT: Not on file  Cologuard: Not on file  Sigmoidoscopy: Not on file    Screening Current     Breast Cancer Screening Age: 36 years old  Frequency: every 1-2 years  Not required if history of left and right mastectomy Mammogram: 12/11/2019    Screening Current   Cervical Cancer Screening Between the ages of 21-29, pap smear recommended once every 3 years  Between the ages of 33-67, can perform pap smear with HPV co-testing every 5 years     Recommendations may differ for women with a history of total hysterectomy, cervical cancer, or abnormal pap smears in past  Pap Smear: Not on file    Screening Not Indicated   Hepatitis C Screening Once for adults born between 1945 and 1965  More frequently in patients at high risk for Hepatitis C Hep C Antibody: Not on file       Diabetes Screening 1-2 times per year if you're at risk for diabetes or have pre-diabetes Fasting glucose: 73 mg/dL   A1C: 5 8 %    Screening Current   Cholesterol Screening Once every 5 years if you don't have a lipid disorder  May order more often based on risk factors  Lipid panel: 07/08/2020    Screening Not Indicated  History Lipid Disorder     Other Preventive Screenings Covered by Medicare:  1  Abdominal Aortic Aneurysm (AAA) Screening: covered once if your at risk  You're considered to be at risk if you have a family history of AAA  2  Lung Cancer Screening: covers low dose CT scan once per year if you meet all of the following conditions: (1) Age 50-69; (2) No signs or symptoms of lung cancer; (3) Current smoker or have quit smoking within the last 15 years; (4) You have a tobacco smoking history of at least 30 pack years (packs per day multiplied by number of years you smoked); (5) You get a written order from a healthcare provider  3  Glaucoma Screening: covered annually if you're considered high risk: (1) You have diabetes OR (2) Family history of glaucoma OR (3)  aged 48 and older OR (3)  American aged 72 and older  3  Osteoporosis Screening: covered every 2 years if you meet one of the following conditions: (1) You're estrogen deficient and at risk for osteoporosis based off medical history and other findings; (2) Have a vertebral abnormality; (3) On glucocorticoid therapy for more than 3 months; (4) Have primary hyperparathyroidism; (5) On osteoporosis medications and need to assess response to drug therapy  · Last bone density test (DXA Scan): Not on file  5  HIV Screening: covered annually if you're between the age of 12-76  Also covered annually if you are younger than 13 and older than 72 with risk factors for HIV infection   For pregnant patients, it is covered up to 3 times per pregnancy  Immunizations:  Immunization Recommendations   Influenza Vaccine Annual influenza vaccination during flu season is recommended for all persons aged >= 6 months who do not have contraindications   Pneumococcal Vaccine (Prevnar and Pneumovax)  * Prevnar = PCV13  * Pneumovax = PPSV23   Adults 25-60 years old: 1-3 doses may be recommended based on certain risk factors  Adults 72 years old: Prevnar (PCV13) vaccine recommended followed by Pneumovax (PPSV23) vaccine  If already received PPSV23 since turning 65, then PCV13 recommended at least one year after PPSV23 dose  Hepatitis B Vaccine 3 dose series if at intermediate or high risk (ex: diabetes, end stage renal disease, liver disease)   Tetanus (Td) Vaccine - COST NOT COVERED BY MEDICARE PART B Following completion of primary series, a booster dose should be given every 10 years to maintain immunity against tetanus  Td may also be given as tetanus wound prophylaxis  Tdap Vaccine - COST NOT COVERED BY MEDICARE PART B Recommended at least once for all adults  For pregnant patients, recommended with each pregnancy  Shingles Vaccine (Shingrix) - COST NOT COVERED BY MEDICARE PART B  2 shot series recommended in those aged 48 and above     Health Maintenance Due:      Topic Date Due    Hepatitis C Screening  1950    MAMMOGRAM  12/11/2021    CRC Screening: Colonoscopy  05/23/2023     Immunizations Due:      Topic Date Due    Influenza Vaccine  07/01/2020     Advance Directives   What are advance directives? Advance directives are legal documents that state your wishes and plans for medical care  These plans are made ahead of time in case you lose your ability to make decisions for yourself  Advance directives can apply to any medical decision, such as the treatments you want, and if you want to donate organs  What are the types of advance directives? There are many types of advance directives, and each state has rules about how to use them  You may choose a combination of any of the following:  · Living will: This is a written record of the treatment you want  You can also choose which treatments you do not want, which to limit, and which to stop at a certain time  This includes surgery, medicine, IV fluid, and tube feedings  · Durable power of  for healthcare [facility]): This is a written record that states who you want to make healthcare choices for you when you are unable to make them for yourself  This person, called a proxy, is usually a family member or a friend  You may choose more than 1 proxy  · Do not resuscitate (DNR) order:  A DNR order is used in case your heart stops beating or you stop breathing  It is a request not to have certain forms of treatment, such as CPR  A DNR order may be included in other types of advance directives  · Medical directive: This covers the care that you want if you are in a coma, near death, or unable to make decisions for yourself  You can list the treatments you want for each condition  Treatment may include pain medicine, surgery, blood transfusions, dialysis, IV or tube feedings, and a ventilator (breathing machine)  · Values history: This document has questions about your views, beliefs, and how you feel and think about life  This information can help others choose the care that you would choose  Why are advance directives important? An advance directive helps you control your care  Although spoken wishes may be used, it is better to have your wishes written down  Spoken wishes can be misunderstood, or not followed  Treatments may be given even if you do not want them  An advance directive may make it easier for your family to make difficult choices about your care  Weight Management   Why it is important to manage your weight:  Being overweight increases your risk of health conditions such as heart disease, high blood pressure, type 2 diabetes, and certain types of cancer   It can also increase your risk for osteoarthritis, sleep apnea, and other respiratory problems  Aim for a slow, steady weight loss  Even a small amount of weight loss can lower your risk of health problems  How to lose weight safely:  A safe and healthy way to lose weight is to eat fewer calories and get regular exercise  You can lose up about 1 pound a week by decreasing the number of calories you eat by 500 calories each day  Healthy meal plan for weight management:  A healthy meal plan includes a variety of foods, contains fewer calories, and helps you stay healthy  A healthy meal plan includes the following:  · Eat whole-grain foods more often  A healthy meal plan should contain fiber  Fiber is the part of grains, fruits, and vegetables that is not broken down by your body  Whole-grain foods are healthy and provide extra fiber in your diet  Some examples of whole-grain foods are whole-wheat breads and pastas, oatmeal, brown rice, and bulgur  · Eat a variety of vegetables every day  Include dark, leafy greens such as spinach, kale, mauricio greens, and mustard greens  Eat yellow and orange vegetables such as carrots, sweet potatoes, and winter squash  · Eat a variety of fruits every day  Choose fresh or canned fruit (canned in its own juice or light syrup) instead of juice  Fruit juice has very little or no fiber  · Eat low-fat dairy foods  Drink fat-free (skim) milk or 1% milk  Eat fat-free yogurt and low-fat cottage cheese  Try low-fat cheeses such as mozzarella and other reduced-fat cheeses  · Choose meat and other protein foods that are low in fat  Choose beans or other legumes such as split peas or lentils  Choose fish, skinless poultry (chicken or turkey), or lean cuts of red meat (beef or pork)  Before you cook meat or poultry, cut off any visible fat  · Use less fat and oil  Try baking foods instead of frying them   Add less fat, such as margarine, sour cream, regular salad dressing and mayonnaise to foods  Eat fewer high-fat foods  Some examples of high-fat foods include french fries, doughnuts, ice cream, and cakes  · Eat fewer sweets  Limit foods and drinks that are high in sugar  This includes candy, cookies, regular soda, and sweetened drinks  Exercise:  Exercise at least 30 minutes per day on most days of the week  Some examples of exercise include walking, biking, dancing, and swimming  You can also fit in more physical activity by taking the stairs instead of the elevator or parking farther away from stores  Ask your healthcare provider about the best exercise plan for you  © Copyright [employer] 2018 Information is for End User's use only and may not be sold, redistributed or otherwise used for commercial purposes   All illustrations and images included in CareNotes® are the copyrighted property of [employer]  or [address] [place], [place] [address]paBanner [facility]al Health Hospital

## 2020-07-15 NOTE — PROGRESS NOTES
Assessment/Plan:    No problem-specific Assessment & Plan notes found for this encounter  Diagnoses and all orders for this visit:    Medicare annual wellness visit, subsequent    Essential hypertension    Acquired hypothyroidism    Hypercholesterolemia  Comments:  pt counseled on diet and exercise          PHQ-9 Depression Screening    PHQ-9:    Frequency of the following problems over the past two weeks:       Little interest or pleasure in doing things:  0 - not at all  Feeling down, depressed, or hopeless:  0 - not at all  PHQ-2 Score:  0            Subjective:      Patient ID: Edy Rahman is a 78 y o  female  Hypertension   This is a chronic problem  The current episode started more than 1 year ago  The problem is unchanged  The problem is controlled  Pertinent negatives include no chest pain, palpitations or shortness of breath  There are no associated agents to hypertension  Risk factors for coronary artery disease include sedentary lifestyle  Past treatments include beta blockers  The current treatment provides moderate improvement  Compliance problems include diet and exercise  The following portions of the patient's history were reviewed and updated as appropriate: allergies, current medications, past family history, past medical history, past social history, past surgical history and problem list     Review of Systems   Constitutional: Negative for chills, fatigue and fever  HENT: Negative  Eyes: Negative  Respiratory: Negative for shortness of breath and wheezing  Cardiovascular: Negative for chest pain and palpitations  Gastrointestinal: Negative for abdominal pain, blood in stool, constipation, diarrhea, nausea and vomiting  Endocrine: Negative  Genitourinary: Negative for difficulty urinating and dysuria  Musculoskeletal: Positive for arthralgias  Negative for myalgias  Skin: Negative  Allergic/Immunologic: Negative      Neurological: Negative for seizures and syncope  Hematological: Negative for adenopathy  Psychiatric/Behavioral: Negative  Objective:    /70   Pulse 70   Temp 98 8 °F (37 1 °C)   Ht 5' 2" (1 575 m)   Wt 72 6 kg (160 lb)   SpO2 97%   BMI 29 26 kg/m²      Physical Exam   Constitutional: She is oriented to person, place, and time  She appears well-developed and well-nourished  No distress  HENT:   Head: Normocephalic and atraumatic  Right Ear: External ear normal    Left Ear: External ear normal    Nose: Nose normal    Mouth/Throat: Oropharynx is clear and moist    Eyes: Pupils are equal, round, and reactive to light  Conjunctivae and EOM are normal    Neck: Normal range of motion  Neck supple  Cardiovascular: Normal rate, regular rhythm and normal heart sounds  No murmur heard  Pulmonary/Chest: Effort normal and breath sounds normal  No respiratory distress  She has no wheezes  She has no rales  Abdominal: Soft  Bowel sounds are normal  She exhibits no distension and no mass  There is no tenderness  There is no rebound and no guarding  Musculoskeletal: Normal range of motion  She exhibits no edema  Lymphadenopathy:     She has no cervical adenopathy  Neurological: She is alert and oriented to person, place, and time  She has normal reflexes  She exhibits normal muscle tone  Skin: Skin is warm and dry  She is not diaphoretic  Psychiatric: She has a normal mood and affect  Her behavior is normal  Judgment and thought content normal    Nursing note and vitals reviewed

## 2020-07-17 DIAGNOSIS — E03.9 HYPOTHYROIDISM, UNSPECIFIED TYPE: Primary | ICD-10-CM

## 2020-07-17 RX ORDER — LEVOTHYROXINE SODIUM 0.07 MG/1
TABLET ORAL
Qty: 30 TABLET | Refills: 7 | Status: SHIPPED | OUTPATIENT
Start: 2020-07-17 | End: 2021-03-12 | Stop reason: SDUPTHER

## 2020-07-17 NOTE — TELEPHONE ENCOUNTER
Pt called for a refill of her Levothyroxine please Pt see's Gaston Charles and has a follow up in November

## 2020-09-04 ENCOUNTER — APPOINTMENT (OUTPATIENT)
Dept: LAB | Facility: CLINIC | Age: 70
End: 2020-09-04
Payer: COMMERCIAL

## 2020-09-04 ENCOUNTER — TRANSCRIBE ORDERS (OUTPATIENT)
Dept: LAB | Facility: CLINIC | Age: 70
End: 2020-09-04

## 2020-09-04 DIAGNOSIS — M06.00 SERONEGATIVE RHEUMATOID ARTHRITIS (HCC): Primary | ICD-10-CM

## 2020-09-04 DIAGNOSIS — E03.9 ACQUIRED HYPOTHYROIDISM: ICD-10-CM

## 2020-09-04 DIAGNOSIS — M06.00 SERONEGATIVE RHEUMATOID ARTHRITIS (HCC): ICD-10-CM

## 2020-09-04 LAB
ALBUMIN SERPL BCP-MCNC: 3.9 G/DL (ref 3.5–5)
ALP SERPL-CCNC: 78 U/L (ref 46–116)
ALT SERPL W P-5'-P-CCNC: 21 U/L (ref 12–78)
ANION GAP SERPL CALCULATED.3IONS-SCNC: 4 MMOL/L (ref 4–13)
AST SERPL W P-5'-P-CCNC: 25 U/L (ref 5–45)
BASOPHILS # BLD AUTO: 0.05 THOUSANDS/ΜL (ref 0–0.1)
BASOPHILS NFR BLD AUTO: 1 % (ref 0–1)
BILIRUB SERPL-MCNC: 0.67 MG/DL (ref 0.2–1)
BILIRUB UR QL STRIP: NEGATIVE
BUN SERPL-MCNC: 17 MG/DL (ref 5–25)
CALCIUM ALBUM COR SERPL-MCNC: 10.3 MG/DL (ref 8.3–10.1)
CALCIUM SERPL-MCNC: 10.2 MG/DL (ref 8.3–10.1)
CHLORIDE SERPL-SCNC: 108 MMOL/L (ref 100–108)
CLARITY UR: CLEAR
CO2 SERPL-SCNC: 29 MMOL/L (ref 21–32)
COLOR UR: YELLOW
CREAT SERPL-MCNC: 0.58 MG/DL (ref 0.6–1.3)
CRP SERPL QL: <3 MG/L
EOSINOPHIL # BLD AUTO: 0.18 THOUSAND/ΜL (ref 0–0.61)
EOSINOPHIL NFR BLD AUTO: 3 % (ref 0–6)
ERYTHROCYTE [DISTWIDTH] IN BLOOD BY AUTOMATED COUNT: 13.2 % (ref 11.6–15.1)
ERYTHROCYTE [SEDIMENTATION RATE] IN BLOOD: 11 MM/HOUR (ref 0–29)
GFR SERPL CREATININE-BSD FRML MDRD: 95 ML/MIN/1.73SQ M
GLUCOSE P FAST SERPL-MCNC: 102 MG/DL (ref 65–99)
GLUCOSE UR STRIP-MCNC: NEGATIVE MG/DL
HCT VFR BLD AUTO: 41.4 % (ref 34.8–46.1)
HGB BLD-MCNC: 13.7 G/DL (ref 11.5–15.4)
HGB UR QL STRIP.AUTO: NEGATIVE
IMM GRANULOCYTES # BLD AUTO: 0.01 THOUSAND/UL (ref 0–0.2)
IMM GRANULOCYTES NFR BLD AUTO: 0 % (ref 0–2)
KETONES UR STRIP-MCNC: NEGATIVE MG/DL
LEUKOCYTE ESTERASE UR QL STRIP: NEGATIVE
LYMPHOCYTES # BLD AUTO: 1.7 THOUSANDS/ΜL (ref 0.6–4.47)
LYMPHOCYTES NFR BLD AUTO: 30 % (ref 14–44)
MCH RBC QN AUTO: 30 PG (ref 26.8–34.3)
MCHC RBC AUTO-ENTMCNC: 33.1 G/DL (ref 31.4–37.4)
MCV RBC AUTO: 91 FL (ref 82–98)
MONOCYTES # BLD AUTO: 0.56 THOUSAND/ΜL (ref 0.17–1.22)
MONOCYTES NFR BLD AUTO: 10 % (ref 4–12)
NEUTROPHILS # BLD AUTO: 3.13 THOUSANDS/ΜL (ref 1.85–7.62)
NEUTS SEG NFR BLD AUTO: 56 % (ref 43–75)
NITRITE UR QL STRIP: NEGATIVE
NRBC BLD AUTO-RTO: 0 /100 WBCS
PH UR STRIP.AUTO: 7 [PH]
PLATELET # BLD AUTO: 216 THOUSANDS/UL (ref 149–390)
PMV BLD AUTO: 9.2 FL (ref 8.9–12.7)
POTASSIUM SERPL-SCNC: 4.4 MMOL/L (ref 3.5–5.3)
PROT SERPL-MCNC: 7.2 G/DL (ref 6.4–8.2)
PROT UR STRIP-MCNC: NEGATIVE MG/DL
RBC # BLD AUTO: 4.56 MILLION/UL (ref 3.81–5.12)
SODIUM SERPL-SCNC: 141 MMOL/L (ref 136–145)
SP GR UR STRIP.AUTO: 1.02 (ref 1–1.03)
TSH SERPL DL<=0.05 MIU/L-ACNC: 0.86 UIU/ML (ref 0.36–3.74)
UROBILINOGEN UR QL STRIP.AUTO: 0.2 E.U./DL
WBC # BLD AUTO: 5.63 THOUSAND/UL (ref 4.31–10.16)

## 2020-09-04 PROCEDURE — 81003 URINALYSIS AUTO W/O SCOPE: CPT

## 2020-09-04 PROCEDURE — 86140 C-REACTIVE PROTEIN: CPT

## 2020-09-04 PROCEDURE — 84443 ASSAY THYROID STIM HORMONE: CPT

## 2020-09-04 PROCEDURE — 85652 RBC SED RATE AUTOMATED: CPT

## 2020-09-04 PROCEDURE — 85025 COMPLETE CBC W/AUTO DIFF WBC: CPT

## 2020-09-04 PROCEDURE — 80053 COMPREHEN METABOLIC PANEL: CPT

## 2020-09-04 PROCEDURE — 36415 COLL VENOUS BLD VENIPUNCTURE: CPT

## 2020-11-05 ENCOUNTER — LAB (OUTPATIENT)
Dept: LAB | Facility: CLINIC | Age: 70
End: 2020-11-05
Payer: COMMERCIAL

## 2020-11-05 DIAGNOSIS — E55.9 VITAMIN D INSUFFICIENCY: ICD-10-CM

## 2020-11-05 DIAGNOSIS — I10 ESSENTIAL HYPERTENSION: ICD-10-CM

## 2020-11-05 DIAGNOSIS — E78.00 HYPERCHOLESTEROLEMIA: ICD-10-CM

## 2020-11-05 DIAGNOSIS — E03.9 ACQUIRED HYPOTHYROIDISM: ICD-10-CM

## 2020-11-05 LAB
25(OH)D3 SERPL-MCNC: 33.8 NG/ML (ref 30–100)
ALBUMIN SERPL BCP-MCNC: 4.3 G/DL (ref 3.5–5)
ALP SERPL-CCNC: 97 U/L (ref 46–116)
ALT SERPL W P-5'-P-CCNC: 49 U/L (ref 12–78)
ANION GAP SERPL CALCULATED.3IONS-SCNC: 3 MMOL/L (ref 4–13)
AST SERPL W P-5'-P-CCNC: 40 U/L (ref 5–45)
BILIRUB SERPL-MCNC: 0.75 MG/DL (ref 0.2–1)
BUN SERPL-MCNC: 16 MG/DL (ref 5–25)
CALCIUM SERPL-MCNC: 10.7 MG/DL (ref 8.3–10.1)
CHLORIDE SERPL-SCNC: 107 MMOL/L (ref 100–108)
CHOLEST SERPL-MCNC: 220 MG/DL (ref 50–200)
CO2 SERPL-SCNC: 30 MMOL/L (ref 21–32)
CREAT SERPL-MCNC: 0.59 MG/DL (ref 0.6–1.3)
GFR SERPL CREATININE-BSD FRML MDRD: 94 ML/MIN/1.73SQ M
GLUCOSE P FAST SERPL-MCNC: 93 MG/DL (ref 65–99)
HDLC SERPL-MCNC: 50 MG/DL
LDLC SERPL CALC-MCNC: 145 MG/DL (ref 0–100)
NONHDLC SERPL-MCNC: 170 MG/DL
POTASSIUM SERPL-SCNC: 4.2 MMOL/L (ref 3.5–5.3)
PROT SERPL-MCNC: 7.7 G/DL (ref 6.4–8.2)
SODIUM SERPL-SCNC: 140 MMOL/L (ref 136–145)
T4 FREE SERPL-MCNC: 1.24 NG/DL (ref 0.76–1.46)
TRIGL SERPL-MCNC: 126 MG/DL
TSH SERPL DL<=0.05 MIU/L-ACNC: 1.54 UIU/ML (ref 0.36–3.74)

## 2020-11-05 PROCEDURE — 36415 COLL VENOUS BLD VENIPUNCTURE: CPT

## 2020-11-05 PROCEDURE — 82306 VITAMIN D 25 HYDROXY: CPT

## 2020-11-05 PROCEDURE — 84443 ASSAY THYROID STIM HORMONE: CPT

## 2020-11-05 PROCEDURE — 80061 LIPID PANEL: CPT

## 2020-11-05 PROCEDURE — 84439 ASSAY OF FREE THYROXINE: CPT

## 2020-11-05 PROCEDURE — 80053 COMPREHEN METABOLIC PANEL: CPT

## 2020-11-06 ENCOUNTER — HOSPITAL ENCOUNTER (OUTPATIENT)
Dept: ULTRASOUND IMAGING | Facility: HOSPITAL | Age: 70
Discharge: HOME | End: 2020-11-06
Payer: COMMERCIAL

## 2020-11-06 DIAGNOSIS — E04.1 THYROID NODULE: ICD-10-CM

## 2020-11-06 PROCEDURE — 76536 US EXAM OF HEAD AND NECK: CPT

## 2020-11-10 ENCOUNTER — OFFICE VISIT (OUTPATIENT)
Dept: OBGYN CLINIC | Facility: CLINIC | Age: 70
End: 2020-11-10
Payer: COMMERCIAL

## 2020-11-10 VITALS — SYSTOLIC BLOOD PRESSURE: 120 MMHG | DIASTOLIC BLOOD PRESSURE: 78 MMHG

## 2020-11-10 DIAGNOSIS — Z01.419 ENCOUNTER FOR GYNECOLOGICAL EXAMINATION WITH PAPANICOLAOU SMEAR OF CERVIX: Primary | ICD-10-CM

## 2020-11-10 DIAGNOSIS — Z12.31 ENCOUNTER FOR SCREENING MAMMOGRAM FOR MALIGNANT NEOPLASM OF BREAST: ICD-10-CM

## 2020-11-10 PROCEDURE — G0101 CA SCREEN;PELVIC/BREAST EXAM: HCPCS | Performed by: OBSTETRICS & GYNECOLOGY

## 2020-11-10 PROCEDURE — G0145 SCR C/V CYTO,THINLAYER,RESCR: HCPCS | Performed by: OBSTETRICS & GYNECOLOGY

## 2020-11-13 LAB
LAB AP GYN PRIMARY INTERPRETATION: NORMAL
Lab: NORMAL

## 2020-11-19 ENCOUNTER — OFFICE VISIT (OUTPATIENT)
Dept: ENDOCRINOLOGY | Facility: HOSPITAL | Age: 70
End: 2020-11-19
Payer: COMMERCIAL

## 2020-11-19 VITALS
HEIGHT: 62 IN | HEART RATE: 82 BPM | SYSTOLIC BLOOD PRESSURE: 130 MMHG | WEIGHT: 170.8 LBS | TEMPERATURE: 97.8 F | BODY MASS INDEX: 31.43 KG/M2 | DIASTOLIC BLOOD PRESSURE: 82 MMHG

## 2020-11-19 DIAGNOSIS — E55.9 VITAMIN D INSUFFICIENCY: ICD-10-CM

## 2020-11-19 DIAGNOSIS — E03.9 ACQUIRED HYPOTHYROIDISM: Primary | ICD-10-CM

## 2020-11-19 DIAGNOSIS — E04.1 THYROID NODULE: ICD-10-CM

## 2020-11-19 DIAGNOSIS — I10 ESSENTIAL HYPERTENSION: ICD-10-CM

## 2020-11-19 DIAGNOSIS — E78.00 HYPERCHOLESTEROLEMIA: ICD-10-CM

## 2020-11-19 PROCEDURE — 3008F BODY MASS INDEX DOCD: CPT | Performed by: NURSE PRACTITIONER

## 2020-11-19 PROCEDURE — 99214 OFFICE O/P EST MOD 30 MIN: CPT | Performed by: NURSE PRACTITIONER

## 2020-11-19 PROCEDURE — 1160F RVW MEDS BY RX/DR IN RCRD: CPT | Performed by: NURSE PRACTITIONER

## 2020-11-19 PROCEDURE — 1036F TOBACCO NON-USER: CPT | Performed by: NURSE PRACTITIONER

## 2020-12-15 ENCOUNTER — HOSPITAL ENCOUNTER (OUTPATIENT)
Dept: MAMMOGRAPHY | Facility: HOSPITAL | Age: 70
Discharge: HOME | End: 2020-12-15
Attending: OBSTETRICS & GYNECOLOGY
Payer: COMMERCIAL

## 2020-12-15 VITALS — HEIGHT: 62 IN | WEIGHT: 170 LBS | BODY MASS INDEX: 31.28 KG/M2

## 2020-12-15 DIAGNOSIS — Z12.31 ENCOUNTER FOR SCREENING MAMMOGRAM FOR MALIGNANT NEOPLASM OF BREAST: ICD-10-CM

## 2020-12-15 PROCEDURE — 77067 SCR MAMMO BI INCL CAD: CPT

## 2020-12-15 PROCEDURE — 77063 BREAST TOMOSYNTHESIS BI: CPT

## 2021-01-07 ENCOUNTER — LAB (OUTPATIENT)
Dept: LAB | Facility: CLINIC | Age: 71
End: 2021-01-07
Payer: COMMERCIAL

## 2021-01-07 ENCOUNTER — TRANSCRIBE ORDERS (OUTPATIENT)
Dept: LAB | Facility: CLINIC | Age: 71
End: 2021-01-07

## 2021-01-07 DIAGNOSIS — E03.9 ACQUIRED HYPOTHYROIDISM: Primary | ICD-10-CM

## 2021-01-07 DIAGNOSIS — M06.09 RHEUMATOID ARTHRITIS OF MULTIPLE SITES WITH NEGATIVE RHEUMATOID FACTOR (HCC): Primary | ICD-10-CM

## 2021-01-07 LAB
ALBUMIN SERPL BCP-MCNC: 3.7 G/DL (ref 3.5–5)
ALP SERPL-CCNC: 86 U/L (ref 46–116)
ALT SERPL W P-5'-P-CCNC: 29 U/L (ref 12–78)
ANION GAP SERPL CALCULATED.3IONS-SCNC: 2 MMOL/L (ref 4–13)
AST SERPL W P-5'-P-CCNC: 28 U/L (ref 5–45)
BACTERIA UR QL AUTO: NORMAL /HPF
BASOPHILS # BLD AUTO: 0.04 THOUSANDS/ΜL (ref 0–0.1)
BASOPHILS NFR BLD AUTO: 1 % (ref 0–1)
BILIRUB SERPL-MCNC: 0.62 MG/DL (ref 0.2–1)
BUN SERPL-MCNC: 13 MG/DL (ref 5–25)
CALCIUM SERPL-MCNC: 9.9 MG/DL (ref 8.3–10.1)
CHLORIDE SERPL-SCNC: 111 MMOL/L (ref 100–108)
CO2 SERPL-SCNC: 28 MMOL/L (ref 21–32)
CREAT SERPL-MCNC: 0.56 MG/DL (ref 0.6–1.3)
CRP SERPL QL: <3 MG/L
EOSINOPHIL # BLD AUTO: 0.09 THOUSAND/ΜL (ref 0–0.61)
EOSINOPHIL NFR BLD AUTO: 2 % (ref 0–6)
ERYTHROCYTE [DISTWIDTH] IN BLOOD BY AUTOMATED COUNT: 13.5 % (ref 11.6–15.1)
ERYTHROCYTE [SEDIMENTATION RATE] IN BLOOD: 7 MM/HOUR (ref 0–29)
GFR SERPL CREATININE-BSD FRML MDRD: 96 ML/MIN/1.73SQ M
GLUCOSE P FAST SERPL-MCNC: 78 MG/DL (ref 65–99)
HCT VFR BLD AUTO: 40.1 % (ref 34.8–46.1)
HGB BLD-MCNC: 13.1 G/DL (ref 11.5–15.4)
HYALINE CASTS #/AREA URNS LPF: NORMAL /LPF
IMM GRANULOCYTES # BLD AUTO: 0 THOUSAND/UL (ref 0–0.2)
IMM GRANULOCYTES NFR BLD AUTO: 0 % (ref 0–2)
LYMPHOCYTES # BLD AUTO: 1.86 THOUSANDS/ΜL (ref 0.6–4.47)
LYMPHOCYTES NFR BLD AUTO: 40 % (ref 14–44)
MCH RBC QN AUTO: 29.5 PG (ref 26.8–34.3)
MCHC RBC AUTO-ENTMCNC: 32.7 G/DL (ref 31.4–37.4)
MCV RBC AUTO: 90 FL (ref 82–98)
MONOCYTES # BLD AUTO: 0.56 THOUSAND/ΜL (ref 0.17–1.22)
MONOCYTES NFR BLD AUTO: 12 % (ref 4–12)
NEUTROPHILS # BLD AUTO: 2.15 THOUSANDS/ΜL (ref 1.85–7.62)
NEUTS SEG NFR BLD AUTO: 45 % (ref 43–75)
NON-SQ EPI CELLS URNS QL MICRO: NORMAL /HPF
NRBC BLD AUTO-RTO: 0 /100 WBCS
PLATELET # BLD AUTO: 192 THOUSANDS/UL (ref 149–390)
PMV BLD AUTO: 9 FL (ref 8.9–12.7)
POTASSIUM SERPL-SCNC: 4.2 MMOL/L (ref 3.5–5.3)
PROT SERPL-MCNC: 7 G/DL (ref 6.4–8.2)
RBC # BLD AUTO: 4.44 MILLION/UL (ref 3.81–5.12)
RBC #/AREA URNS AUTO: NORMAL /HPF
SODIUM SERPL-SCNC: 141 MMOL/L (ref 136–145)
TSH SERPL DL<=0.05 MIU/L-ACNC: 0.96 UIU/ML (ref 0.36–3.74)
WBC # BLD AUTO: 4.7 THOUSAND/UL (ref 4.31–10.16)
WBC #/AREA URNS AUTO: NORMAL /HPF

## 2021-01-07 PROCEDURE — 84443 ASSAY THYROID STIM HORMONE: CPT

## 2021-01-07 PROCEDURE — 81001 URINALYSIS AUTO W/SCOPE: CPT

## 2021-01-07 PROCEDURE — 80053 COMPREHEN METABOLIC PANEL: CPT

## 2021-01-07 PROCEDURE — 36415 COLL VENOUS BLD VENIPUNCTURE: CPT

## 2021-01-07 PROCEDURE — 86140 C-REACTIVE PROTEIN: CPT

## 2021-01-07 PROCEDURE — 85025 COMPLETE CBC W/AUTO DIFF WBC: CPT

## 2021-01-07 PROCEDURE — 85652 RBC SED RATE AUTOMATED: CPT

## 2021-01-15 ENCOUNTER — OFFICE VISIT (OUTPATIENT)
Dept: FAMILY MEDICINE CLINIC | Facility: CLINIC | Age: 71
End: 2021-01-15
Payer: COMMERCIAL

## 2021-01-15 VITALS
SYSTOLIC BLOOD PRESSURE: 126 MMHG | BODY MASS INDEX: 31.87 KG/M2 | HEIGHT: 62 IN | TEMPERATURE: 97.8 F | WEIGHT: 173.2 LBS | DIASTOLIC BLOOD PRESSURE: 72 MMHG | HEART RATE: 104 BPM

## 2021-01-15 DIAGNOSIS — Z23 ENCOUNTER FOR IMMUNIZATION: ICD-10-CM

## 2021-01-15 DIAGNOSIS — Z11.59 ENCOUNTER FOR HEPATITIS C SCREENING TEST FOR LOW RISK PATIENT: ICD-10-CM

## 2021-01-15 DIAGNOSIS — E66.09 CLASS 1 OBESITY DUE TO EXCESS CALORIES WITH SERIOUS COMORBIDITY AND BODY MASS INDEX (BMI) OF 31.0 TO 31.9 IN ADULT: ICD-10-CM

## 2021-01-15 DIAGNOSIS — Z13.31 NEGATIVE DEPRESSION SCREENING: ICD-10-CM

## 2021-01-15 DIAGNOSIS — I10 ESSENTIAL HYPERTENSION: Primary | ICD-10-CM

## 2021-01-15 DIAGNOSIS — E03.9 ACQUIRED HYPOTHYROIDISM: ICD-10-CM

## 2021-01-15 DIAGNOSIS — E78.00 HYPERCHOLESTEROLEMIA: ICD-10-CM

## 2021-01-15 PROBLEM — E66.811 CLASS 1 OBESITY DUE TO EXCESS CALORIES WITH SERIOUS COMORBIDITY AND BODY MASS INDEX (BMI) OF 31.0 TO 31.9 IN ADULT: Status: ACTIVE | Noted: 2021-01-15

## 2021-01-15 PROCEDURE — 3008F BODY MASS INDEX DOCD: CPT | Performed by: FAMILY MEDICINE

## 2021-01-15 PROCEDURE — 3078F DIAST BP <80 MM HG: CPT | Performed by: FAMILY MEDICINE

## 2021-01-15 PROCEDURE — 3725F SCREEN DEPRESSION PERFORMED: CPT | Performed by: FAMILY MEDICINE

## 2021-01-15 PROCEDURE — 3074F SYST BP LT 130 MM HG: CPT | Performed by: FAMILY MEDICINE

## 2021-01-15 PROCEDURE — 4040F PNEUMOC VAC/ADMIN/RCVD: CPT | Performed by: FAMILY MEDICINE

## 2021-01-15 PROCEDURE — G0009 ADMIN PNEUMOCOCCAL VACCINE: HCPCS | Performed by: FAMILY MEDICINE

## 2021-01-15 PROCEDURE — 99214 OFFICE O/P EST MOD 30 MIN: CPT | Performed by: FAMILY MEDICINE

## 2021-01-15 PROCEDURE — 1036F TOBACCO NON-USER: CPT | Performed by: FAMILY MEDICINE

## 2021-01-15 PROCEDURE — 90732 PPSV23 VACC 2 YRS+ SUBQ/IM: CPT | Performed by: FAMILY MEDICINE

## 2021-01-15 PROCEDURE — 1160F RVW MEDS BY RX/DR IN RCRD: CPT | Performed by: FAMILY MEDICINE

## 2021-01-15 NOTE — PROGRESS NOTES
Assessment/Plan:    No problem-specific Assessment & Plan notes found for this encounter  Diagnoses and all orders for this visit:    Essential hypertension  -     CBC and differential; Future    Acquired hypothyroidism  -     TSH, 3rd generation with Free T4 reflex; Future    Encounter for immunization  -     PNEUMOCOCCAL POLYSACCHARIDE VACCINE 23-VALENT =>3YO SQ IM    Encounter for hepatitis C screening test for low risk patient  -     Hepatitis C antibody; Future    Hypercholesterolemia  -     Comprehensive metabolic panel; Future  -     Lipid panel; Future    Class 1 obesity due to excess calories with serious comorbidity and body mass index (BMI) of 31 0 to 31 9 in adult    Negative depression screening    Other orders  -     Cancel: PNEUMOCOCCAL CONJUGATE VACCINE 13-VALENT GREATER THAN 6 MONTHS          PHQ-9 Depression Screening    PHQ-9:   Frequency of the following problems over the past two weeks:      Little interest or pleasure in doing things: 0 - not at all  Feeling down, depressed, or hopeless: 0 - not at all  PHQ-2 Score: 0        BMI Counseling: Body mass index is 31 68 kg/m²  The BMI is above normal  Nutrition recommendations include reducing portion sizes and 3-5 servings of fruits/vegetables daily  Exercise recommendations include moderate aerobic physical activity for 150 minutes/week and exercising 3-5 times per week  Subjective:      Patient ID: Elbert Magana is a 78 y o  female  Hypertension  This is a chronic problem  The current episode started more than 1 year ago  The problem is unchanged  The problem is controlled  Pertinent negatives include no chest pain, headaches or palpitations  There are no associated agents to hypertension  Past treatments include beta blockers  Compliance problems include diet and exercise          The following portions of the patient's history were reviewed and updated as appropriate: allergies, current medications, past family history, past medical history, past social history, past surgical history and problem list     Review of Systems   Eyes: Negative for visual disturbance  Cardiovascular: Negative for chest pain and palpitations  Neurological: Negative for headaches  Objective:    /72   Pulse 104   Temp 97 8 °F (36 6 °C) (Tympanic)   Ht 5' 2" (1 575 m)   Wt 78 6 kg (173 lb 3 2 oz)   BMI 31 68 kg/m²      Physical Exam  Vitals signs and nursing note reviewed  Constitutional:       General: She is not in acute distress  Appearance: She is well-developed  She is not ill-appearing, toxic-appearing or diaphoretic  HENT:      Head: Normocephalic and atraumatic  Eyes:      General: No scleral icterus  Conjunctiva/sclera: Conjunctivae normal       Pupils: Pupils are equal, round, and reactive to light  Neck:      Musculoskeletal: Normal range of motion and neck supple  Cardiovascular:      Rate and Rhythm: Normal rate and regular rhythm  Heart sounds: Normal heart sounds  No murmur  Pulmonary:      Effort: Pulmonary effort is normal  No respiratory distress  Breath sounds: Normal breath sounds  No wheezing, rhonchi or rales  Abdominal:      General: Bowel sounds are normal  There is no distension  Palpations: Abdomen is soft  There is no mass  Tenderness: There is no abdominal tenderness  There is no guarding or rebound  Musculoskeletal:      Right lower leg: No edema  Left lower leg: No edema  Lymphadenopathy:      Cervical: No cervical adenopathy  Skin:     General: Skin is warm and dry  Neurological:      Mental Status: She is alert and oriented to person, place, and time  Psychiatric:         Behavior: Behavior normal          Thought Content:  Thought content normal          Judgment: Judgment normal

## 2021-01-15 NOTE — PATIENT INSTRUCTIONS
January 15, 2021     Isaías Baldwin    Dear Ms Almonte: Thank you for contacting us! Our commitment to you is to keep you informed  [place] to let you know when its your turn for the COVID-19 vaccine  Please go under your questionnaires tab in [facility] (health tab on the web, questionnaire button on the ángel ) and complete the [employer] questionnaire  This will help us communicate with you when vaccine spots are available for your phase  Please let us know if you have any further questions or concerns  Sincerely,    [facility] Patient Technical Services Desk     Additional Information:  If you have questions, call [phone] ([phone]) choose option 5 to talk to our customer support staff  Remember, [facility] is NOT to be used for urgent needs  For medical emergencies, dial [phone]

## 2021-03-10 DIAGNOSIS — Z23 ENCOUNTER FOR IMMUNIZATION: ICD-10-CM

## 2021-03-12 DIAGNOSIS — E03.9 HYPOTHYROIDISM, UNSPECIFIED TYPE: ICD-10-CM

## 2021-03-12 RX ORDER — LEVOTHYROXINE SODIUM 0.07 MG/1
TABLET ORAL
Qty: 30 TABLET | Refills: 5 | Status: SHIPPED | OUTPATIENT
Start: 2021-03-12 | End: 2021-09-13

## 2021-04-20 ENCOUNTER — TRANSCRIBE ORDERS (OUTPATIENT)
Dept: LAB | Facility: CLINIC | Age: 71
End: 2021-04-20

## 2021-04-20 ENCOUNTER — APPOINTMENT (OUTPATIENT)
Dept: LAB | Facility: CLINIC | Age: 71
End: 2021-04-20
Payer: COMMERCIAL

## 2021-04-20 DIAGNOSIS — M35.3 POLYMYALGIA RHEUMATICA (HCC): Primary | ICD-10-CM

## 2021-04-20 LAB
ALBUMIN SERPL BCP-MCNC: 3.8 G/DL (ref 3.5–5)
ALP SERPL-CCNC: 86 U/L (ref 46–116)
ALT SERPL W P-5'-P-CCNC: 32 U/L (ref 12–78)
ANION GAP SERPL CALCULATED.3IONS-SCNC: 2 MMOL/L (ref 4–13)
AST SERPL W P-5'-P-CCNC: 27 U/L (ref 5–45)
BACTERIA UR QL AUTO: NORMAL /HPF
BASOPHILS # BLD AUTO: 0.04 THOUSANDS/ΜL (ref 0–0.1)
BASOPHILS NFR BLD AUTO: 1 % (ref 0–1)
BILIRUB SERPL-MCNC: 0.72 MG/DL (ref 0.2–1)
BILIRUB UR QL STRIP: NEGATIVE
BUN SERPL-MCNC: 16 MG/DL (ref 5–25)
CALCIUM SERPL-MCNC: 9.7 MG/DL (ref 8.3–10.1)
CHLORIDE SERPL-SCNC: 111 MMOL/L (ref 100–108)
CLARITY UR: CLEAR
CO2 SERPL-SCNC: 28 MMOL/L (ref 21–32)
COLOR UR: YELLOW
CREAT SERPL-MCNC: 0.63 MG/DL (ref 0.6–1.3)
CRP SERPL QL: 3.1 MG/L
EOSINOPHIL # BLD AUTO: 0.09 THOUSAND/ΜL (ref 0–0.61)
EOSINOPHIL NFR BLD AUTO: 2 % (ref 0–6)
ERYTHROCYTE [DISTWIDTH] IN BLOOD BY AUTOMATED COUNT: 14 % (ref 11.6–15.1)
ERYTHROCYTE [SEDIMENTATION RATE] IN BLOOD: 11 MM/HOUR (ref 0–29)
GFR SERPL CREATININE-BSD FRML MDRD: 92 ML/MIN/1.73SQ M
GLUCOSE SERPL-MCNC: 111 MG/DL (ref 65–140)
GLUCOSE UR STRIP-MCNC: NEGATIVE MG/DL
HCT VFR BLD AUTO: 40 % (ref 34.8–46.1)
HGB BLD-MCNC: 13.1 G/DL (ref 11.5–15.4)
HGB UR QL STRIP.AUTO: NEGATIVE
HYALINE CASTS #/AREA URNS LPF: NORMAL /LPF
IMM GRANULOCYTES # BLD AUTO: 0.02 THOUSAND/UL (ref 0–0.2)
IMM GRANULOCYTES NFR BLD AUTO: 0 % (ref 0–2)
KETONES UR STRIP-MCNC: NEGATIVE MG/DL
LEUKOCYTE ESTERASE UR QL STRIP: NEGATIVE
LYMPHOCYTES # BLD AUTO: 2.19 THOUSANDS/ΜL (ref 0.6–4.47)
LYMPHOCYTES NFR BLD AUTO: 42 % (ref 14–44)
MCH RBC QN AUTO: 29.9 PG (ref 26.8–34.3)
MCHC RBC AUTO-ENTMCNC: 32.8 G/DL (ref 31.4–37.4)
MCV RBC AUTO: 91 FL (ref 82–98)
MONOCYTES # BLD AUTO: 0.4 THOUSAND/ΜL (ref 0.17–1.22)
MONOCYTES NFR BLD AUTO: 8 % (ref 4–12)
NEUTROPHILS # BLD AUTO: 2.49 THOUSANDS/ΜL (ref 1.85–7.62)
NEUTS SEG NFR BLD AUTO: 47 % (ref 43–75)
NITRITE UR QL STRIP: NEGATIVE
NON-SQ EPI CELLS URNS QL MICRO: NORMAL /HPF
NRBC BLD AUTO-RTO: 0 /100 WBCS
PH UR STRIP.AUTO: 7 [PH]
PLATELET # BLD AUTO: 237 THOUSANDS/UL (ref 149–390)
PMV BLD AUTO: 9.6 FL (ref 8.9–12.7)
POTASSIUM SERPL-SCNC: 4 MMOL/L (ref 3.5–5.3)
PROT SERPL-MCNC: 7.4 G/DL (ref 6.4–8.2)
PROT UR STRIP-MCNC: NEGATIVE MG/DL
RBC # BLD AUTO: 4.38 MILLION/UL (ref 3.81–5.12)
RBC #/AREA URNS AUTO: NORMAL /HPF
SODIUM SERPL-SCNC: 141 MMOL/L (ref 136–145)
SP GR UR STRIP.AUTO: 1.02 (ref 1–1.03)
UROBILINOGEN UR QL STRIP.AUTO: 1 E.U./DL
WBC # BLD AUTO: 5.23 THOUSAND/UL (ref 4.31–10.16)
WBC #/AREA URNS AUTO: NORMAL /HPF

## 2021-04-20 PROCEDURE — 80053 COMPREHEN METABOLIC PANEL: CPT

## 2021-04-20 PROCEDURE — 85652 RBC SED RATE AUTOMATED: CPT

## 2021-04-20 PROCEDURE — 86140 C-REACTIVE PROTEIN: CPT

## 2021-04-20 PROCEDURE — 85025 COMPLETE CBC W/AUTO DIFF WBC: CPT

## 2021-04-20 PROCEDURE — 81001 URINALYSIS AUTO W/SCOPE: CPT

## 2021-04-20 PROCEDURE — 36415 COLL VENOUS BLD VENIPUNCTURE: CPT

## 2021-05-17 ENCOUNTER — LAB (OUTPATIENT)
Dept: LAB | Facility: CLINIC | Age: 71
End: 2021-05-17
Payer: COMMERCIAL

## 2021-05-17 DIAGNOSIS — E55.9 VITAMIN D INSUFFICIENCY: ICD-10-CM

## 2021-05-17 DIAGNOSIS — E04.1 THYROID NODULE: ICD-10-CM

## 2021-05-17 DIAGNOSIS — E03.9 ACQUIRED HYPOTHYROIDISM: ICD-10-CM

## 2021-05-17 DIAGNOSIS — E78.00 HYPERCHOLESTEROLEMIA: ICD-10-CM

## 2021-05-17 DIAGNOSIS — I10 ESSENTIAL HYPERTENSION: ICD-10-CM

## 2021-05-17 LAB
25(OH)D3 SERPL-MCNC: 30 NG/ML (ref 30–100)
ALBUMIN SERPL BCP-MCNC: 4.1 G/DL (ref 3.5–5)
ALP SERPL-CCNC: 86 U/L (ref 46–116)
ALT SERPL W P-5'-P-CCNC: 39 U/L (ref 12–78)
ANION GAP SERPL CALCULATED.3IONS-SCNC: 2 MMOL/L (ref 4–13)
AST SERPL W P-5'-P-CCNC: 28 U/L (ref 5–45)
BILIRUB SERPL-MCNC: 0.66 MG/DL (ref 0.2–1)
BUN SERPL-MCNC: 13 MG/DL (ref 5–25)
CALCIUM SERPL-MCNC: 10.4 MG/DL (ref 8.3–10.1)
CHLORIDE SERPL-SCNC: 109 MMOL/L (ref 100–108)
CHOLEST SERPL-MCNC: 192 MG/DL (ref 50–200)
CO2 SERPL-SCNC: 29 MMOL/L (ref 21–32)
CREAT SERPL-MCNC: 0.52 MG/DL (ref 0.6–1.3)
GFR SERPL CREATININE-BSD FRML MDRD: 98 ML/MIN/1.73SQ M
GLUCOSE P FAST SERPL-MCNC: 92 MG/DL (ref 65–99)
HDLC SERPL-MCNC: 42 MG/DL
LDLC SERPL CALC-MCNC: 126 MG/DL (ref 0–100)
NONHDLC SERPL-MCNC: 150 MG/DL
POTASSIUM SERPL-SCNC: 4 MMOL/L (ref 3.5–5.3)
PROT SERPL-MCNC: 7.2 G/DL (ref 6.4–8.2)
SODIUM SERPL-SCNC: 140 MMOL/L (ref 136–145)
T4 FREE SERPL-MCNC: 1.23 NG/DL (ref 0.76–1.46)
TRIGL SERPL-MCNC: 120 MG/DL
TSH SERPL DL<=0.05 MIU/L-ACNC: 1.3 UIU/ML (ref 0.36–3.74)

## 2021-05-17 PROCEDURE — 80053 COMPREHEN METABOLIC PANEL: CPT

## 2021-05-17 PROCEDURE — 84443 ASSAY THYROID STIM HORMONE: CPT

## 2021-05-17 PROCEDURE — 80061 LIPID PANEL: CPT

## 2021-05-17 PROCEDURE — 36415 COLL VENOUS BLD VENIPUNCTURE: CPT

## 2021-05-17 PROCEDURE — 84439 ASSAY OF FREE THYROXINE: CPT

## 2021-05-17 PROCEDURE — 82306 VITAMIN D 25 HYDROXY: CPT

## 2021-05-21 ENCOUNTER — OFFICE VISIT (OUTPATIENT)
Dept: OBGYN CLINIC | Facility: CLINIC | Age: 71
End: 2021-05-21
Payer: COMMERCIAL

## 2021-05-21 VITALS — HEIGHT: 62 IN | BODY MASS INDEX: 31.83 KG/M2 | WEIGHT: 173 LBS

## 2021-05-21 DIAGNOSIS — M17.0 PRIMARY OSTEOARTHRITIS OF BOTH KNEES: Primary | ICD-10-CM

## 2021-05-21 PROCEDURE — 99203 OFFICE O/P NEW LOW 30 MIN: CPT | Performed by: ORTHOPAEDIC SURGERY

## 2021-05-21 PROCEDURE — 20610 DRAIN/INJ JOINT/BURSA W/O US: CPT | Performed by: ORTHOPAEDIC SURGERY

## 2021-05-21 RX ORDER — METHYLPREDNISOLONE ACETATE 40 MG/ML
2 INJECTION, SUSPENSION INTRA-ARTICULAR; INTRALESIONAL; INTRAMUSCULAR; SOFT TISSUE
Status: COMPLETED | OUTPATIENT
Start: 2021-05-21 | End: 2021-05-21

## 2021-05-21 RX ORDER — BUPIVACAINE HYDROCHLORIDE 2.5 MG/ML
2 INJECTION, SOLUTION INFILTRATION; PERINEURAL
Status: COMPLETED | OUTPATIENT
Start: 2021-05-21 | End: 2021-05-21

## 2021-05-21 RX ADMIN — BUPIVACAINE HYDROCHLORIDE 2 ML: 2.5 INJECTION, SOLUTION INFILTRATION; PERINEURAL at 11:56

## 2021-05-21 RX ADMIN — METHYLPREDNISOLONE ACETATE 2 ML: 40 INJECTION, SUSPENSION INTRA-ARTICULAR; INTRALESIONAL; INTRAMUSCULAR; SOFT TISSUE at 11:56

## 2021-05-21 NOTE — PROGRESS NOTES
Assessment/Plan:    No problem-specific Assessment & Plan notes found for this encounter  Diagnoses and all orders for this visit:    Primary osteoarthritis of both knees           both knees were injected with Depo-Medrol and Marcaine  She tolerated procedure quite well  She wants to participate in physical therapy  She returned back in 2 months for re-evaluation, if there is any further pain at that point new x-rays may need to be obtained of her knees -three views each weight-bearing  Subjective:      Patient ID: Rosey Burkett is a 73 y o  female  HPI      Patient has a history of degenerative joint disease of her bilateral knees  She states the pain is worsening along both  She denies any numbness or tingling  She denies any fever chills  She does walk with an antalgic gait  The following portions of the patient's history were reviewed and updated as appropriate: allergies, current medications, past family history, past medical history, past social history, past surgical history and problem list     Review of Systems   Constitutional: Negative for chills, fever and unexpected weight change  HENT: Negative for hearing loss, nosebleeds and sore throat  Eyes: Negative for pain, redness and visual disturbance  Respiratory: Negative for cough, shortness of breath and wheezing  Cardiovascular: Negative for chest pain, palpitations and leg swelling  Gastrointestinal: Negative for abdominal pain, nausea and vomiting  Endocrine: Negative for polydipsia and polyuria  Genitourinary: Negative for dysuria and hematuria  Musculoskeletal: Positive for arthralgias, gait problem and myalgias  Negative for back pain, joint swelling, neck pain and neck stiffness  As noted in HPI   Skin: Negative for rash and wound  Neurological: Negative for dizziness, numbness and headaches  Psychiatric/Behavioral: Negative for decreased concentration and suicidal ideas   The patient is not nervous/anxious  Objective:      Ht 5' 2" (1 575 m)   Wt 78 5 kg (173 lb)   BMI 31 64 kg/m²          Physical Exam          Bilateral lower extremities are neurovascular intact  Toes are pink and mobile  Compartments are soft  The patient walks with a bowlegged gait pattern  No effusion is present  There is medial and lateral joint tenderness  There is patellofemoral crepitation  There is a painful arc of motion throughout both her knees  Large joint arthrocentesis: bilateral knee  Universal Protocol:  Consent: Verbal consent obtained  Risks and benefits: risks, benefits and alternatives were discussed  Consent given by: patient  Time out: Immediately prior to procedure a "time out" was called to verify the correct patient, procedure, equipment, support staff and site/side marked as required  Patient understanding: patient states understanding of the procedure being performed  Test results: test results available and properly labeled  Site marked: the operative site was marked  Radiology Images displayed and confirmed   If images not available, report reviewed: imaging studies available  Patient identity confirmed: verbally with patient    Supporting Documentation  Indications: pain   Procedure Details  Location: knee - bilateral knee  Needle size: 22 G  Ultrasound guidance: no  Approach: lateral    Medications (Right): 2 mL methylPREDNISolone acetate 40 mg/mL; 2 mL bupivacaine 0 25 %Medications (Left): 2 mL methylPREDNISolone acetate 40 mg/mL; 2 mL bupivacaine 0 25 %   Patient tolerance: patient tolerated the procedure well with no immediate complications  Dressing:  Sterile dressing applied

## 2021-05-25 ENCOUNTER — OFFICE VISIT (OUTPATIENT)
Dept: ENDOCRINOLOGY | Facility: HOSPITAL | Age: 71
End: 2021-05-25
Payer: COMMERCIAL

## 2021-05-25 VITALS
HEART RATE: 76 BPM | DIASTOLIC BLOOD PRESSURE: 70 MMHG | BODY MASS INDEX: 31.87 KG/M2 | HEIGHT: 62 IN | SYSTOLIC BLOOD PRESSURE: 124 MMHG | WEIGHT: 173.2 LBS

## 2021-05-25 DIAGNOSIS — E04.1 THYROID NODULE: ICD-10-CM

## 2021-05-25 DIAGNOSIS — E78.00 HYPERCHOLESTEROLEMIA: ICD-10-CM

## 2021-05-25 DIAGNOSIS — I50.31 ACUTE DIASTOLIC CHF (CONGESTIVE HEART FAILURE) (HCC): ICD-10-CM

## 2021-05-25 DIAGNOSIS — I10 ESSENTIAL HYPERTENSION: ICD-10-CM

## 2021-05-25 DIAGNOSIS — E55.9 VITAMIN D INSUFFICIENCY: ICD-10-CM

## 2021-05-25 DIAGNOSIS — E03.9 ACQUIRED HYPOTHYROIDISM: Primary | ICD-10-CM

## 2021-05-25 PROCEDURE — 99214 OFFICE O/P EST MOD 30 MIN: CPT | Performed by: NURSE PRACTITIONER

## 2021-05-25 PROCEDURE — 3008F BODY MASS INDEX DOCD: CPT | Performed by: NURSE PRACTITIONER

## 2021-05-25 PROCEDURE — 1036F TOBACCO NON-USER: CPT | Performed by: NURSE PRACTITIONER

## 2021-05-25 PROCEDURE — 3074F SYST BP LT 130 MM HG: CPT | Performed by: NURSE PRACTITIONER

## 2021-05-25 PROCEDURE — 1160F RVW MEDS BY RX/DR IN RCRD: CPT | Performed by: NURSE PRACTITIONER

## 2021-05-25 PROCEDURE — 3078F DIAST BP <80 MM HG: CPT | Performed by: NURSE PRACTITIONER

## 2021-05-25 NOTE — PROGRESS NOTES
Kelly Callahan 73 y o  female MRN: [id number]    Encounter: [id number]      Assessment/Plan     Assessment: This is a 73y o -year-old female with hypothyroidism, hypertension, thyroid nodule and vitamin-D deficiency  Plan:  1   Hypothyroidism:  Her most recent TSH and free T4 are normal   She will continue levothyroxine at current dose    Goal for TSH is between 1 0 in 2 0   Check TSH and free T4 prior to next office visit      2   Hypertension:  She is normotensive in the office today  Continue current medication   Check comprehensive metabolic panel prior to next visit      3   Vitamin-D deficiency:  Continue supplementation with 1000 units of vitamin D3 daily      4   Thyroid nodule:  Continue surveillance with follow-up ultrasound in December 2021  CC: Hypothyroidism follow up    History of Present Illness     HPI:  78 y  o  female with history of polymyalgia rheumatica, hypothyroidism, DVT, atrial fibrillation, hyperlipidemia presents for follow up of hypothyroidism  Has had hypothyroidism for many years treated on thyroid hormone replacement   She was hospitalized in summer of 2017 for heart arrhythmia   At that time she received amiodarone and was  discharged home on amiodarone  She was on amiodarone until about September 2017  While on this medication her thyroid hormone requirements went up   Since then she reports cold intolerance and fatigue   She is currently taking levothyroxine 75 mcg daily   She is taking her levothyroxine, consistently, and in the proper manner, by her report   She takes her calcium and other vitamins later in the day  Dedra Ramires most recent TSH from May 17, 2021 was 1 300 with a  free T4 of 1 23   She also has history of polymyalgia rheumatica and follows closely with her rheumatologist at [place] [id number]      Her hypertension is treated with Lasix 20 mg daily and metoprolol 50 mg twice daily      For her vitamin-D deficiency she supplements with 1000 units of vitamin D3 daily   Her most recent 25 hydroxy vitamin-D level from May 17, 2021 is 30 0      Her most recent thyroid ultrasound from 2020 revealed a diminutive, heterogeneous thyroid gland with stable left lobe nodule   No nodule meets current ACR criteria for requiring biopsy  Review of Systems   Constitutional: Negative  Negative for chills, fatigue and fever  HENT: Negative  Negative for trouble swallowing and voice change  Eyes: Negative  Negative for visual disturbance  Respiratory: Negative  Negative for chest tightness and shortness of breath  Cardiovascular: Negative  Negative for chest pain  Gastrointestinal: Negative  Negative for abdominal pain, constipation, diarrhea and vomiting  Endocrine: Negative for cold intolerance, heat intolerance, polydipsia, polyphagia and polyuria  Genitourinary: Negative  Musculoskeletal: Positive for arthralgias (  Bilateral knees)  Skin: Negative  Allergic/Immunologic: Negative  Neurological: Negative  Negative for dizziness, syncope, light-headedness and headaches  Hematological: Negative  Psychiatric/Behavioral: Negative  All other systems reviewed and are negative        Historical Information   Past Medical History:   Diagnosis Date    Arthritis     rheumatoid    Atrial fibrillation (HCC)     CHF (congestive heart failure) (HCC)     Disease of thyroid gland     DVT (deep venous thrombosis) (HCC)     Hypertension     Migraine     hx of    Polymyalgia rheumatica (HCC)     Pulmonary emboli (HCC)     Renal disorder     right sided kidney stones    Vitamin D deficiency      Past Surgical History:   Procedure Laterality Date    CARDIAC CATHETERIZATION      CARDIOVERSION N/A 2019    Procedure: CARDIOVERSION;  Surgeon: Maximiliano Robledo MD;  Location: [facility];  Service: Cardiology     SECTION      x3 - last impression 16    FRACTURE SURGERY Left     wrist    HERNIA REPAIR  2018    KNEE CARTILAGE SURGERY Left     TONSILECTOMY AND ADNOIDECTOMY      TONSILLECTOMY  child; age 18    VEIN SURGERY      venous ligation with stripping    WRIST SURGERY Left     ORIF wrist - last impression 5/27/16     Social History   Social History     Substance and Sexual Activity   Alcohol Use Yes    Alcohol/week: 0 0 standard drinks    Frequency: Monthly or less    Drinks per session: Patient refused    Binge frequency: Patient refused    Comment: occasionally; social     Social History     Substance and Sexual Activity   Drug Use No     Social History     Tobacco Use   Smoking Status Never Smoker   Smokeless Tobacco Never Used     Family History:   Family History   Problem Relation Age of Onset    Breast cancer Mother 46    Aneurysm Father         aortic    No Known Problems Sister     No Known Problems Maternal Grandmother     No Known Problems Maternal Grandfather     No Known Problems Paternal Grandmother     No Known Problems Paternal Grandfather     Rheum arthritis Maternal Aunt     Early death Maternal Aunt     No Known Problems Paternal Aunt     No Known Problems Paternal Aunt     No Known Problems Paternal Aunt        Meds/Allergies   Current Outpatient Medications   Medication Sig Dispense Refill    azelastine (ASTELIN) 0 1 % nasal spray 2 sprays into each nostril 2 (two) times a day Use in each nostril as directed 1 Bottle 11    cholecalciferol (VITAMIN D3) 1,000 units tablet Take 1,000 Units by mouth daily      Cinnamon 500 MG capsule Take 500 mg by mouth daily      Cyanocobalamin (VITAMIN B 12 PO) Take 500 mcg by mouth daily       folic acid (FOLVITE) 1 mg tablet Take 1 mg by mouth daily       levothyroxine 75 mcg tablet Take 1 tablet daily 30 tablet 5    metoprolol succinate (TOPROL-XL) 50 mg 24 hr tablet Take 1 tablet (50 mg total) by mouth 2 (two) times a day (Patient taking differently: Take 75 mg by mouth daily ) 60 tablet 0    minocycline (MINOCIN) 50 mg capsule Take 50 mg by mouth every other day   5    Misc Natural Products (OSTEO BI-FLEX JOINT SHIELD PO) Take by mouth      Multiple Vitamins-Minerals (OCUVITE ADULT 50+) CAPS Take 1 capsule by mouth daily      Red Yeast Rice 600 MG TABS Take 1 tablet by mouth daily       XARELTO 20 MG tablet Take 1 tablet by mouth daily      Calcium Ascorbate 500 MG TABS Take 500 mg by mouth daily       CALCIUM-VITAMIN D PO Take 1 tablet by mouth daily      fluticasone (FLONASE) 50 mcg/act nasal spray 1 spray into each nostril daily (Patient not taking: Reported on 1/15/2021) 1 Bottle 5     No current facility-administered medications for this visit  Allergies   Allergen Reactions    Amiodarone      Other reaction(s): Other (See Comments)  Reports caused elevated TSH    Sudafed [Pseudoephedrine]      Rapid heart beat    Sulfa Antibiotics Itching and Rash    Sulfamethoxazole-Trimethoprim Itching and Rash       Objective   Vitals: Blood pressure 124/70, pulse 76, height 5' 2" (1 575 m), weight 78 6 kg (173 lb 3 2 oz)  Physical Exam  Vitals signs reviewed  Constitutional:       Appearance: She is well-developed  She is obese  HENT:      Head: Normocephalic and atraumatic  Eyes:      Conjunctiva/sclera: Conjunctivae normal       Pupils: Pupils are equal, round, and reactive to light  Neck:      Musculoskeletal: Normal range of motion and neck supple  Cardiovascular:      Rate and Rhythm: Normal rate and regular rhythm  Heart sounds: Normal heart sounds  Pulmonary:      Effort: Pulmonary effort is normal       Breath sounds: Normal breath sounds  Abdominal:      General: Bowel sounds are normal       Palpations: Abdomen is soft  Musculoskeletal: Normal range of motion  Skin:     General: Skin is warm and dry  Neurological:      Mental Status: She is alert and oriented to person, place, and time  Psychiatric:         Behavior: Behavior normal          Thought Content:  Thought content normal          Judgment: Judgment normal        Lab Results:   Lab Results   Component Value Date/Time    TSH 3RD GENERATON 1 300 05/17/2021 09:37 AM    TSH 3RD GENERATON 0 965 01/07/2021 09:50 AM    TSH 3RD GENERATON 1 540 11/05/2020 09:57 AM    Free T4 1 23 05/17/2021 09:37 AM    Free T4 1 24 11/05/2020 09:57 AM       Imaging Studies:   Results for orders placed during the hospital encounter of 11/06/20   US thyroid    Impression 1  Diminutive heterogeneous gland suggesting chronic thyroiditis  No nodules warranting further dedicated imaging surveillance or intervention  2   Unchanged 4 mm hypoechoic nodule inferior to left thyroid could represent a prominent inferior left parathyroid gland  In the absence of clinical or laboratory evidence for hyperparathyroidism, clinical significance is questionable, though  Reference: ACR Thyroid Imaging, Reporting and Data System (TI-RADS): White Paper of the [employer]  J AM Cherelle Radiol 4199;36:747-265  (additional recommendations based on American Thyroid Association 2015 guidelines )      Workstation performed: [id number]       Portions of the record may have been created with voice recognition software  Occasional wrong word or "sound a like" substitutions may have occurred due to the inherent limitations of voice recognition software  Read the chart carefully and recognize, using context, where substitutions have occurred

## 2021-05-25 NOTE — PATIENT INSTRUCTIONS
Continue Levothyroxine at 75 mcg daily      Continue with Vitamin D supplementation daily  Will continue to monitor calcium levels      You will obtain a repeat thyroid ultrasound in December 2021      Obtain lab work as prescribed

## 2021-05-27 NOTE — SOCIAL WORK
Met with pt to discuss role as  in helping pt to develop discharge plan and to help pt carry out their plan  Pt lives in a house with her   Pt has 3 JUANIS   Pt has [employer] on the inside  Pt has no DME, Pt is independent with ADL's   Pt has never had home care or any services in the home   Pt  Drives  Pt's PCP is Roxine Schlatter  Pt's Pulmonologist is Dr Tiffanie Mueller  Pt uses [facility]   Pt is a readmission  Pt was discharged yesterday after being treated for A flutter and Sepsis   Pt felt good when she was discharged  Pt did notice on her scale at home that she had gained 10 lbs and her legs were very swollen  Pt might benefit from Home care on discharge  I will call for follow up appointment with PCP   She can try allegra and also to see allergist as well   Please give her number for dr Breezy Moulton

## 2021-07-21 ENCOUNTER — RA CDI HCC (OUTPATIENT)
Dept: OTHER | Facility: HOSPITAL | Age: 71
End: 2021-07-21

## 2021-07-21 NOTE — PROGRESS NOTES
[facility]  coding opportunities             Chart reviewed, (number of) suggestions sent to provider: 4     DX: M06 09 Rheumatoid arthritis without rheumatoid factor, multiple sites  DX: I48 0 Paroxysmal atrial fibrillation  DX: I47 1 Supraventricular tachycardia  DX: I11 0 Hypertensive heart disease with heart failure            Patients insurance company: [employer] (Medicare Advantage only)             Provider never responded to [place] [id number]  coding request, and none of the above DX were used

## 2021-07-26 ENCOUNTER — APPOINTMENT (OUTPATIENT)
Dept: LAB | Facility: CLINIC | Age: 71
End: 2021-07-26
Payer: COMMERCIAL

## 2021-07-26 DIAGNOSIS — I10 ESSENTIAL HYPERTENSION: ICD-10-CM

## 2021-07-26 DIAGNOSIS — Z11.59 ENCOUNTER FOR HEPATITIS C SCREENING TEST FOR LOW RISK PATIENT: ICD-10-CM

## 2021-07-26 DIAGNOSIS — E78.00 HYPERCHOLESTEROLEMIA: ICD-10-CM

## 2021-07-26 DIAGNOSIS — E03.9 ACQUIRED HYPOTHYROIDISM: ICD-10-CM

## 2021-07-26 LAB
ALBUMIN SERPL BCP-MCNC: 3.6 G/DL (ref 3.5–5)
ALP SERPL-CCNC: 80 U/L (ref 46–116)
ALT SERPL W P-5'-P-CCNC: 38 U/L (ref 12–78)
ANION GAP SERPL CALCULATED.3IONS-SCNC: 1 MMOL/L (ref 4–13)
AST SERPL W P-5'-P-CCNC: 26 U/L (ref 5–45)
BASOPHILS # BLD AUTO: 0.04 THOUSANDS/ΜL (ref 0–0.1)
BASOPHILS NFR BLD AUTO: 1 % (ref 0–1)
BILIRUB SERPL-MCNC: 0.62 MG/DL (ref 0.2–1)
BUN SERPL-MCNC: 13 MG/DL (ref 5–25)
CALCIUM SERPL-MCNC: 10 MG/DL (ref 8.3–10.1)
CHLORIDE SERPL-SCNC: 112 MMOL/L (ref 100–108)
CHOLEST SERPL-MCNC: 203 MG/DL (ref 50–200)
CO2 SERPL-SCNC: 30 MMOL/L (ref 21–32)
CREAT SERPL-MCNC: 0.58 MG/DL (ref 0.6–1.3)
EOSINOPHIL # BLD AUTO: 0.04 THOUSAND/ΜL (ref 0–0.61)
EOSINOPHIL NFR BLD AUTO: 1 % (ref 0–6)
ERYTHROCYTE [DISTWIDTH] IN BLOOD BY AUTOMATED COUNT: 13.8 % (ref 11.6–15.1)
GFR SERPL CREATININE-BSD FRML MDRD: 94 ML/MIN/1.73SQ M
GLUCOSE P FAST SERPL-MCNC: 92 MG/DL (ref 65–99)
HCT VFR BLD AUTO: 39.5 % (ref 34.8–46.1)
HCV AB SER QL: NORMAL
HDLC SERPL-MCNC: 42 MG/DL
HGB BLD-MCNC: 12.9 G/DL (ref 11.5–15.4)
IMM GRANULOCYTES # BLD AUTO: 0.01 THOUSAND/UL (ref 0–0.2)
IMM GRANULOCYTES NFR BLD AUTO: 0 % (ref 0–2)
LDLC SERPL CALC-MCNC: 142 MG/DL (ref 0–100)
LYMPHOCYTES # BLD AUTO: 1.64 THOUSANDS/ΜL (ref 0.6–4.47)
LYMPHOCYTES NFR BLD AUTO: 37 % (ref 14–44)
MCH RBC QN AUTO: 30.6 PG (ref 26.8–34.3)
MCHC RBC AUTO-ENTMCNC: 32.7 G/DL (ref 31.4–37.4)
MCV RBC AUTO: 94 FL (ref 82–98)
MONOCYTES # BLD AUTO: 0.55 THOUSAND/ΜL (ref 0.17–1.22)
MONOCYTES NFR BLD AUTO: 12 % (ref 4–12)
NEUTROPHILS # BLD AUTO: 2.19 THOUSANDS/ΜL (ref 1.85–7.62)
NEUTS SEG NFR BLD AUTO: 49 % (ref 43–75)
NONHDLC SERPL-MCNC: 161 MG/DL
NRBC BLD AUTO-RTO: 0 /100 WBCS
PLATELET # BLD AUTO: 211 THOUSANDS/UL (ref 149–390)
PMV BLD AUTO: 9.6 FL (ref 8.9–12.7)
POTASSIUM SERPL-SCNC: 4.8 MMOL/L (ref 3.5–5.3)
PROT SERPL-MCNC: 7 G/DL (ref 6.4–8.2)
RBC # BLD AUTO: 4.22 MILLION/UL (ref 3.81–5.12)
SODIUM SERPL-SCNC: 143 MMOL/L (ref 136–145)
TRIGL SERPL-MCNC: 94 MG/DL
TSH SERPL DL<=0.05 MIU/L-ACNC: 1.51 UIU/ML (ref 0.36–3.74)
WBC # BLD AUTO: 4.47 THOUSAND/UL (ref 4.31–10.16)

## 2021-07-26 PROCEDURE — 84443 ASSAY THYROID STIM HORMONE: CPT

## 2021-07-26 PROCEDURE — 80061 LIPID PANEL: CPT

## 2021-07-26 PROCEDURE — 85025 COMPLETE CBC W/AUTO DIFF WBC: CPT

## 2021-07-26 PROCEDURE — 86803 HEPATITIS C AB TEST: CPT

## 2021-07-26 PROCEDURE — 80053 COMPREHEN METABOLIC PANEL: CPT

## 2021-07-26 PROCEDURE — 36415 COLL VENOUS BLD VENIPUNCTURE: CPT

## 2021-07-29 ENCOUNTER — OFFICE VISIT (OUTPATIENT)
Dept: FAMILY MEDICINE CLINIC | Facility: CLINIC | Age: 71
End: 2021-07-29
Payer: COMMERCIAL

## 2021-07-29 VITALS
HEIGHT: 62 IN | WEIGHT: 179.2 LBS | TEMPERATURE: 97.2 F | OXYGEN SATURATION: 100 % | HEART RATE: 60 BPM | SYSTOLIC BLOOD PRESSURE: 120 MMHG | DIASTOLIC BLOOD PRESSURE: 62 MMHG | BODY MASS INDEX: 32.97 KG/M2

## 2021-07-29 DIAGNOSIS — I10 ESSENTIAL HYPERTENSION: ICD-10-CM

## 2021-07-29 DIAGNOSIS — Z00.00 MEDICARE ANNUAL WELLNESS VISIT, SUBSEQUENT: Primary | ICD-10-CM

## 2021-07-29 DIAGNOSIS — E03.9 ACQUIRED HYPOTHYROIDISM: ICD-10-CM

## 2021-07-29 DIAGNOSIS — E78.00 HYPERCHOLESTEROLEMIA: ICD-10-CM

## 2021-07-29 PROCEDURE — G0439 PPPS, SUBSEQ VISIT: HCPCS | Performed by: FAMILY MEDICINE

## 2021-07-29 PROCEDURE — 1160F RVW MEDS BY RX/DR IN RCRD: CPT | Performed by: FAMILY MEDICINE

## 2021-07-29 PROCEDURE — 3008F BODY MASS INDEX DOCD: CPT | Performed by: FAMILY MEDICINE

## 2021-07-29 PROCEDURE — 1125F AMNT PAIN NOTED PAIN PRSNT: CPT | Performed by: FAMILY MEDICINE

## 2021-07-29 PROCEDURE — 3288F FALL RISK ASSESSMENT DOCD: CPT | Performed by: FAMILY MEDICINE

## 2021-07-29 PROCEDURE — 3074F SYST BP LT 130 MM HG: CPT | Performed by: FAMILY MEDICINE

## 2021-07-29 PROCEDURE — 1036F TOBACCO NON-USER: CPT | Performed by: FAMILY MEDICINE

## 2021-07-29 PROCEDURE — 3078F DIAST BP <80 MM HG: CPT | Performed by: FAMILY MEDICINE

## 2021-07-29 PROCEDURE — 99214 OFFICE O/P EST MOD 30 MIN: CPT | Performed by: FAMILY MEDICINE

## 2021-07-29 PROCEDURE — 3725F SCREEN DEPRESSION PERFORMED: CPT | Performed by: FAMILY MEDICINE

## 2021-07-29 PROCEDURE — 1170F FXNL STATUS ASSESSED: CPT | Performed by: FAMILY MEDICINE

## 2021-07-29 NOTE — PROGRESS NOTES
Assessment and Plan:     Problem List Items Addressed This Visit        Endocrine    Hypothyroidism (Chronic)       Cardiovascular and Mediastinum    Essential hypertension       Other    Hypercholesterolemia      Other Visit Diagnoses     Medicare annual wellness visit, subsequent    -  Primary           Preventive health issues were discussed with patient, and age appropriate screening tests were ordered as noted in patient's After Visit Summary  Personalized health advice and appropriate referrals for health education or preventive services given if needed, as noted in patient's After Visit Summary       History of Present Illness:     Patient presents for Medicare Annual Wellness visit    Patient Care Team:  Sunshine Rao DO as PCP - General  Josee Mccauley DO as PCP - Endocrinology (Endocrinology)  MD Sunshine Bajwa DO     Problem List:     Patient Active Problem List   Diagnosis    [facility] pulmonary embolism [facility]    DVT, lower extremity ([facility]    Polymyalgia rheumatica ([facility]    Hypothyroidism    Leukocytosis    Vitamin D insufficiency    Left axis deviation    Premature atrial complexes    Essential hypertension    Hypercholesterolemia    Calculus of kidney    SVT (supraventricular tachycardia) (HCC)    SOB (shortness of breath)    Superficial thrombophlebitis of right upper extremity    Seronegative rheumatoid arthritis ([facility]    Acute diastolic CHF (congestive heart failure) ([facility]    Negative depression screening    Disease of lung    Sepsis, unspecified organism ([facility] [facility]    Paroxysmal atrial fibrillation ([facility]    Atrial flutter with rapid ventricular response (HCC)    Volume overload    Acute maxillary sinusitis    Bilateral pleural effusion    Cardiac abnormality    CHF (congestive heart failure) (HCC)    Cough    Diarrhea    Diffuse arthralgia    Diffusion capacity of lung (dl), decreased    Fever of unknown origin (FUO)    History of DVT (deep vein thrombosis)    History of polymyalgia rheumatica    History of pulmonary embolus (PE)    Hx of methotrexate therapy    Nausea and/or vomiting    Pain of left side of body    Pain of right scapula    Chest pain    Right upper quadrant abdominal pain    Secondary adrenal insufficiency (HCC)    Tamponade    Thyroid nodule    Ventral hernia    Class 1 obesity due to excess calories with serious comorbidity and body mass index (BMI) of 31 0 to 31 9 in adult      Past Medical and Surgical History:     Past Medical History:   Diagnosis Date    Arthritis     rheumatoid    [facility]rial fibrillation [facility] [facility]     CHF (congestive heart failure) ([facility] [facility]     Disease of thyroid gland     DVT (deep venous thrombosis) ([facility] [address] [facility]     Hypertension     Migraine     hx of    Polymyalgia rheumatica ([facility] [address] [facility]     Pulmonary emboli ([facility] [facility]     Renal disorder     right sided kidney stones    Vitamin D deficiency      Past Surgical History:   Procedure Laterality Date    CARDIAC CATHETERIZATION      CARDIOVERSION N/A 2019    Procedure: CARDIOVERSION;  Surgeon: Jose Cleaning MD;  Location: [facility];  Service: Cardiology     SECTION      x3 - last impression 16    FRACTURE SURGERY Left     wrist    HERNIA REPAIR  2018    KNEE CARTILAGE SURGERY Left     TONSILECTOMY AND ADNOIDECTOMY      TONSILLECTOMY  child; age 17   [facility] VEIN SURGERY      venous ligation with stripping    WRIST SURGERY Left     ORIF wrist - last impression 16      Family History:     Family History   Problem Relation Age of Onset    Breast cancer Mother 46    Aneurysm Father         aortic    No Known Problems Sister     No Known Problems Maternal Grandmother     No Known Problems Maternal Grandfather     No Known Problems Paternal Grandmother     No Known Problems Paternal Grandfather     Rheum arthritis Maternal Aunt     Early death Maternal Aunt     No Known Problems Paternal Aunt     No Known Problems Paternal Aunt  No Known Problems Paternal Aunt       Social History:     Social History     Socioeconomic History    Marital status: /Civil Union     Spouse name: None    Number of children: 3    Years of education: None    Highest education level: None   Occupational History    Occupation: Manager     Comment: [employer]   Tobacco Use    Smoking status: Never Smoker    Smokeless tobacco: Never Used   Vaping Use    Vaping Use: Never used   Substance and Sexual Activity    Alcohol use: Yes     Alcohol/week: 0 0 standard drinks     Comment: occasionally; social    Drug use: No    Sexual activity: Yes     Partners: Male     Birth control/protection: Post-menopausal   Other Topics Concern    None   Social History Narrative    None     Social Determinants of Health     Financial Resource Strain:     Difficulty of Paying Living Expenses:    Food Insecurity:     Worried About Running Out of Food in the Last Year:     Ran Out of Food in the Last Year:    Transportation Needs:     Lack of Transportation (Medical):      Lack of Transportation (Non-Medical):    Physical Activity:     Days of Exercise per Week:     Minutes of Exercise per Session:    Stress:     Feeling of Stress :    Social Connections:     Frequency of Communication with Friends and Family:     Frequency of Social Gatherings with Friends and Family:     Attends Confucianist Services:     Active Member of Clubs or Organizations:     Attends Club or Organization Meetings:     Marital Status:    Intimate Partner Violence:     Fear of Current or Ex-Partner:     Emotionally Abused:     Physically Abused:     Sexually Abused:       Medications and Allergies:     Current Outpatient Medications   Medication Sig Dispense Refill    azelastine (ASTELIN) 0 1 % nasal spray 2 sprays into each nostril 2 (two) times a day Use in each nostril as directed 1 Bottle 11    Cinnamon 500 MG capsule Take 500 mg by mouth daily      Cyanocobalamin (VITAMIN B 12 PO) Take 500 mcg by mouth daily       folic acid (FOLVITE) 1 mg tablet Take 1 mg by mouth daily       levothyroxine 75 mcg tablet Take 1 tablet daily 30 tablet 5    metoprolol succinate (TOPROL-XL) 50 mg 24 hr tablet Take 1 tablet (50 mg total) by mouth 2 (two) times a day (Patient taking differently: Take 75 mg by mouth daily ) 60 tablet 0    minocycline (MINOCIN) 50 mg capsule Take 50 mg by mouth every other day   5    Misc Natural Products (OSTEO BI-FLEX JOINT SHIELD PO) Take by mouth      Multiple Vitamins-Minerals (OCUVITE ADULT 50+) CAPS Take 1 capsule by mouth daily      Red Yeast Rice 600 MG TABS Take 1 tablet by mouth daily       XARELTO 20 MG tablet Take 1 tablet by mouth daily      Calcium Ascorbate 500 MG TABS Take 500 mg by mouth daily  (Patient not taking: Reported on 7/29/2021)      CALCIUM-VITAMIN D PO Take 1 tablet by mouth daily (Patient not taking: Reported on 7/29/2021)      cholecalciferol (VITAMIN D3) 1,000 units tablet Take 1,000 Units by mouth daily (Patient not taking: Reported on 7/29/2021)       No current facility-administered medications for this visit  Allergies   Allergen Reactions    Amiodarone      Other reaction(s):  Other (See Comments)  Reports caused elevated TSH    Sudafed [Pseudoephedrine]      Rapid heart beat    Sulfa Antibiotics Itching and Rash    Sulfamethoxazole-Trimethoprim Itching and Rash      Immunizations:     Immunization History   Administered Date(s) Administered    INFLUENZA 10/24/2020    Influenza, high dose seasonal 0 7 mL 12/16/2019    Pneumococcal Conjugate 13-Valent 10/09/2019    Pneumococcal Polysaccharide PPV23 01/15/2021    SARS-CoV-2 / COVID-19 mRNA IM (Moderna) 01/29/2021, 02/26/2021    TD (adult) Preservative Free 01/01/2009    Tdap 05/20/2020    Zoster 12/20/2013      Health Maintenance:         Topic Date Due    Breast Cancer Screening: Mammogram  12/15/2021    Colorectal Cancer Screening  05/23/2023    Cervical Cancer Screening  11/10/2025    Hepatitis C Screening  Completed         Topic Date Due    Influenza Vaccine (1) 09/01/2021      Medicare Health Risk Assessment:     /62 (BP Location: Left arm, Patient Position: Sitting)   Pulse 60   Temp (!) 97 2 °F (36 2 °C) (Tympanic)   Ht 5' 2" (1 575 m)   Wt 81 3 kg (179 lb 3 2 oz)   SpO2 100%   BMI 32 78 kg/m²      Jessie Miner is here for her Subsequent Wellness visit  Last Medicare Wellness visit information reviewed, patient interviewed and updates made to the record today  Health Risk Assessment:   Patient rates overall health as good  Patient feels that their physical health rating is same  Patient is very satisfied with their life  Eyesight was rated as same  Hearing was rated as same  Patient feels that their emotional and mental health rating is slightly better  Patients states they are never, rarely angry  Patient states they are sometimes unusually tired/fatigued  Pain experienced in the last 7 days has been some  Patient's pain rating has been 5/10  Patient states that she has experienced weight loss or gain in last 6 months  Depression Screening:   PHQ-2 Score: 0      Fall Risk Screening: In the past year, patient has experienced: no history of falling in past year      Urinary Incontinence Screening:   Patient has leaked urine accidently in the last six months  Home Safety:  Patient has trouble with stairs inside or outside of their home  Patient has working smoke alarms and has working carbon monoxide detector  Home safety hazards include: none  Nutrition:   Current diet is Regular, Low Cholesterol and No Added Salt  Medications:   Patient is currently taking over-the-counter supplements  OTC medications include: Vit  C, B-12, one a day,  Patient is able to manage medications       Activities of Daily Living (ADLs)/Instrumental Activities of Daily Living (IADLs):   Walk and transfer into and out of bed and chair?: Yes  Dress and groom yourself?: Yes    Bathe or shower yourself?: Yes    Feed yourself? Yes  Do your laundry/housekeeping?: Yes  Manage your money, pay your bills and track your expenses?: Yes  Make your own meals?: Yes    Do your own shopping?: Yes    Previous Hospitalizations:   Any hospitalizations or ED visits within the last 12 months?: No      Advance Care Planning:   Living will: No    Durable POA for healthcare: No    Advanced directive: No    Advanced directive counseling given: Yes    Five wishes given: Yes    Patient declined ACP directive: No    End of Life Decisions reviewed with patient: No    Provider agrees with end of life decisions: No      Cognitive Screening:   Provider or family/friend/caregiver concerned regarding cognition?: No    PREVENTIVE SCREENINGS      Cardiovascular Screening:    General: Screening Not Indicated and History Lipid Disorder      Diabetes Screening:     General: Screening Current      Colorectal Cancer Screening:     General: Screening Current      Breast Cancer Screening:     General: Screening Current      Cervical Cancer Screening:    General: Screening Not Indicated      Osteoporosis Screening:    General: Screening Not Indicated      Abdominal Aortic Aneurysm (AAA) Screening:        General: Screening Not Indicated      Lung Cancer Screening:     General: Screening Not Indicated      Hepatitis C Screening:    General: Screening Current    Screening, Brief Intervention, and Referral to Treatment (SBIRT)    Screening  Typical number of drinks in a day: 0  Typical number of drinks in a week: 1  Interpretation: Low risk drinking behavior  AUDIT-C Screenin) How often did you have a drink containing alcohol in the past year? monthly or less  2) How many drinks did you have on a typical day when you were drinking in the past year?  1 to 2  3) How often did you have 6 or more drinks on one occasion in the past year? never    AUDIT-C Score: 1  Interpretation: Score 0-2 (female): Negative screen for alcohol misuse    Single Item Drug Screening:  How often have you used an illegal drug (including marijuana) or a prescription medication for non-medical reasons in the past year? never    Single Item Drug Screen Score: 0  Interpretation: Negative screen for possible drug use disorder      Heron Mcardle, DO

## 2021-07-29 NOTE — PATIENT INSTRUCTIONS
Medicare Preventive Visit Patient Instructions  Thank you for completing your Welcome to Medicare Visit or Medicare Annual Wellness Visit today  Your next wellness visit will be due in one year (7/30/2022)  The screening/preventive services that you may require over the next 5-10 years are detailed below  Some tests may not apply to you based off risk factors and/or age  Screening tests ordered at today's visit but not completed yet may show as past due  Also, please note that scanned in results may not display below  Preventive Screenings:  Service Recommendations Previous Testing/Comments   Colorectal Cancer Screening  * Colonoscopy    * Fecal Occult Blood Test (FOBT)/Fecal Immunochemical Test (FIT)  * Fecal DNA/Cologuard Test  * Flexible Sigmoidoscopy Age: 54-65 years old   Colonoscopy: every 10 years (may be performed more frequently if at higher risk)  OR  FOBT/FIT: every 1 year  OR  Cologuard: every 3 years  OR  Sigmoidoscopy: every 5 years  Screening may be recommended earlier than age 48 if at higher risk for colorectal cancer  Also, an individualized decision between you and your healthcare provider will decide whether screening between the ages of 74-80 would be appropriate  Colonoscopy: 05/23/2013  FOBT/FIT: Not on file  Cologuard: Not on file  Sigmoidoscopy: Not on file    Screening Current     Breast Cancer Screening Age: 36 years old  Frequency: every 1-2 years  Not required if history of left and right mastectomy Mammogram: 12/15/2020    Screening Current   Cervical Cancer Screening Between the ages of 21-29, pap smear recommended once every 3 years  Between the ages of 33-67, can perform pap smear with HPV co-testing every 5 years     Recommendations may differ for women with a history of total hysterectomy, cervical cancer, or abnormal pap smears in past  Pap Smear: 11/10/2020    Screening Not Indicated   Hepatitis C Screening Once for adults born between 1945 and 1965  More frequently in patients at high risk for Hepatitis C Hep C Antibody: 07/26/2021    Screening Current   Diabetes Screening 1-2 times per year if you're at risk for diabetes or have pre-diabetes Fasting glucose: 92 mg/dL   A1C: 5 8 %    Screening Current   Cholesterol Screening Once every 5 years if you don't have a lipid disorder  May order more often based on risk factors  Lipid panel: 07/26/2021    Screening Not Indicated  History Lipid Disorder     Other Preventive Screenings Covered by Medicare:  1  Abdominal Aortic Aneurysm (AAA) Screening: covered once if your at risk  You're considered to be at risk if you have a family history of AAA  2  Lung Cancer Screening: covers low dose CT scan once per year if you meet all of the following conditions: (1) Age 50-69; (2) No signs or symptoms of lung cancer; (3) Current smoker or have quit smoking within the last 15 years; (4) You have a tobacco smoking history of at least 30 pack years (packs per day multiplied by number of years you smoked); (5) You get a written order from a healthcare provider  3  Glaucoma Screening: covered annually if you're considered high risk: (1) You have diabetes OR (2) Family history of glaucoma OR (3)  aged 48 and older OR (3)  American aged 72 and older  3  Osteoporosis Screening: covered every 2 years if you meet one of the following conditions: (1) You're estrogen deficient and at risk for osteoporosis based off medical history and other findings; (2) Have a vertebral abnormality; (3) On glucocorticoid therapy for more than 3 months; (4) Have primary hyperparathyroidism; (5) On osteoporosis medications and need to assess response to drug therapy  · Last bone density test (DXA Scan): Not on file  5  HIV Screening: covered annually if you're between the age of 12-76  Also covered annually if you are younger than 13 and older than 72 with risk factors for HIV infection   For pregnant patients, it is covered up to 3 times per pregnancy  Immunizations:  Immunization Recommendations   Influenza Vaccine Annual influenza vaccination during flu season is recommended for all persons aged >= 6 months who do not have contraindications   Pneumococcal Vaccine (Prevnar and Pneumovax)  * Prevnar = PCV13  * Pneumovax = PPSV23   Adults 25-60 years old: 1-3 doses may be recommended based on certain risk factors  Adults 72 years old: Prevnar (PCV13) vaccine recommended followed by Pneumovax (PPSV23) vaccine  If already received PPSV23 since turning 65, then PCV13 recommended at least one year after PPSV23 dose  Hepatitis B Vaccine 3 dose series if at intermediate or high risk (ex: diabetes, end stage renal disease, liver disease)   Tetanus (Td) Vaccine - COST NOT COVERED BY MEDICARE PART B Following completion of primary series, a booster dose should be given every 10 years to maintain immunity against tetanus  Td may also be given as tetanus wound prophylaxis  Tdap Vaccine - COST NOT COVERED BY MEDICARE PART B Recommended at least once for all adults  For pregnant patients, recommended with each pregnancy  Shingles Vaccine (Shingrix) - COST NOT COVERED BY MEDICARE PART B  2 shot series recommended in those aged 48 and above     Health Maintenance Due:      Topic Date Due    Breast Cancer Screening: Mammogram  12/15/2021    Colorectal Cancer Screening  05/23/2023    Cervical Cancer Screening  11/10/2025    Hepatitis C Screening  Completed     Immunizations Due:      Topic Date Due    Influenza Vaccine (1) 09/01/2021     Advance Directives   What are advance directives? Advance directives are legal documents that state your wishes and plans for medical care  These plans are made ahead of time in case you lose your ability to make decisions for yourself  Advance directives can apply to any medical decision, such as the treatments you want, and if you want to donate organs  What are the types of advance directives?   There are many types of advance directives, and each state has rules about how to use them  You may choose a combination of any of the following:  · Living will: This is a written record of the treatment you want  You can also choose which treatments you do not want, which to limit, and which to stop at a certain time  This includes surgery, medicine, IV fluid, and tube feedings  · Durable power of  for healthcare [facility]): This is a written record that states who you want to make healthcare choices for you when you are unable to make them for yourself  This person, called a proxy, is usually a family member or a friend  You may choose more than 1 proxy  · Do not resuscitate (DNR) order:  A DNR order is used in case your heart stops beating or you stop breathing  It is a request not to have certain forms of treatment, such as CPR  A DNR order may be included in other types of advance directives  · Medical directive: This covers the care that you want if you are in a coma, near death, or unable to make decisions for yourself  You can list the treatments you want for each condition  Treatment may include pain medicine, surgery, blood transfusions, dialysis, IV or tube feedings, and a ventilator (breathing machine)  · Values history: This document has questions about your views, beliefs, and how you feel and think about life  This information can help others choose the care that you would choose  Why are advance directives important? An advance directive helps you control your care  Although spoken wishes may be used, it is better to have your wishes written down  Spoken wishes can be misunderstood, or not followed  Treatments may be given even if you do not want them  An advance directive may make it easier for your family to make difficult choices about your care  Urinary Incontinence   Urinary incontinence (UI)  is when you lose control of your bladder   UI develops because your bladder cannot store or empty urine properly  The 3 most common types of UI are stress incontinence, urge incontinence, or both  Medicines:   · May be given to help strengthen your bladder control  Report any side effects of medication to your healthcare provider  Do pelvic muscle exercises often:  Your pelvic muscles help you stop urinating  Squeeze these muscles tight for 5 seconds, then relax for 5 seconds  Gradually work up to squeezing for 10 seconds  Do 3 sets of 15 repetitions a day, or as directed  This will help strengthen your pelvic muscles and improve bladder control  Train your bladder:  Go to the bathroom at set times, such as every 2 hours, even if you do not feel the urge to go  You can also try to hold your urine when you feel the urge to go  For example, hold your urine for 5 minutes when you feel the urge to go  As that becomes easier, hold your urine for 10 minutes  Self-care:   · Keep a UI record  Write down how often you leak urine and how much you leak  Make a note of what you were doing when you leaked urine  · Drink liquids as directed  You may need to limit the amount of liquid you drink to help control your urine leakage  Do not drink any liquid right before you go to bed  Limit or do not have drinks that contain caffeine or alcohol  · Prevent constipation  Eat a variety of high-fiber foods  Good examples are high-fiber cereals, beans, vegetables, and whole-grain breads  Walking is the best way to trigger your intestines to have a bowel movement  · Exercise regularly and maintain a healthy weight  Weight loss and exercise will decrease pressure on your bladder and help you control your leakage  · Use a catheter as directed  to help empty your bladder  A catheter is a tiny, plastic tube that is put into your bladder to drain your urine  · Go to behavior therapy as directed  Behavior therapy may be used to help you learn to control your urge to urinate      Weight Management   Why it is important to manage your weight:  Being overweight increases your risk of health conditions such as heart disease, high blood pressure, type 2 diabetes, and certain types of cancer  It can also increase your risk for osteoarthritis, sleep apnea, and other respiratory problems  Aim for a slow, steady weight loss  Even a small amount of weight loss can lower your risk of health problems  How to lose weight safely:  A safe and healthy way to lose weight is to eat fewer calories and get regular exercise  You can lose up about 1 pound a week by decreasing the number of calories you eat by 500 calories each day  Healthy meal plan for weight management:  A healthy meal plan includes a variety of foods, contains fewer calories, and helps you stay healthy  A healthy meal plan includes the following:  · Eat whole-grain foods more often  A healthy meal plan should contain fiber  Fiber is the part of grains, fruits, and vegetables that is not broken down by your body  Whole-grain foods are healthy and provide extra fiber in your diet  Some examples of whole-grain foods are whole-wheat breads and pastas, oatmeal, brown rice, and bulgur  · Eat a variety of vegetables every day  Include dark, leafy greens such as spinach, kale, mauricio greens, and mustard greens  Eat yellow and orange vegetables such as carrots, sweet potatoes, and winter squash  · Eat a variety of fruits every day  Choose fresh or canned fruit (canned in its own juice or light syrup) instead of juice  Fruit juice has very little or no fiber  · Eat low-fat dairy foods  Drink fat-free (skim) milk or 1% milk  Eat fat-free yogurt and low-fat cottage cheese  Try low-fat cheeses such as mozzarella and other reduced-fat cheeses  · Choose meat and other protein foods that are low in fat  Choose beans or other legumes such as split peas or lentils  Choose fish, skinless poultry (chicken or turkey), or lean cuts of red meat (beef or pork)   Before you cook meat or poultry, cut off any visible fat  · Use less fat and oil  Try baking foods instead of frying them  Add less fat, such as margarine, sour cream, regular salad dressing and mayonnaise to foods  Eat fewer high-fat foods  Some examples of high-fat foods include french fries, doughnuts, ice cream, and cakes  · Eat fewer sweets  Limit foods and drinks that are high in sugar  This includes candy, cookies, regular soda, and sweetened drinks  Exercise:  Exercise at least 30 minutes per day on most days of the week  Some examples of exercise include walking, biking, dancing, and swimming  You can also fit in more physical activity by taking the stairs instead of the elevator or parking farther away from stores  Ask your healthcare provider about the best exercise plan for you  © Copyright [employer] 2018 Information is for End User's use only and may not be sold, redistributed or otherwise used for commercial purposes   All illustrations and images included in CareNotes® are the copyrighted property of [employer]  or [address] [place] [address]

## 2021-07-29 NOTE — PROGRESS NOTES
Assessment/Plan:    No problem-specific Assessment & Plan notes found for this encounter  Diagnoses and all orders for this visit:    Medicare annual wellness visit, subsequent    Acquired hypothyroidism    Essential hypertension    Hypercholesterolemia  Comments:  pt counseled on diet and exercise          PHQ-9 Depression Screening    PHQ-9:   Frequency of the following problems over the past two weeks:      Little interest or pleasure in doing things: 0 - not at all  Feeling down, depressed, or hopeless: 0 - not at all  PHQ-2 Score: 0            Subjective:      Patient ID: Royer Claros is a 73 y o  female  Hypertension  This is a chronic problem  The current episode started more than 1 year ago  The problem is unchanged  The problem is controlled  Pertinent negatives include no chest pain, palpitations or shortness of breath  There are no associated agents to hypertension  Risk factors for coronary artery disease include obesity, sedentary lifestyle and post-menopausal state  Past treatments include beta blockers  The current treatment provides moderate improvement  Compliance problems include diet and exercise  The following portions of the patient's history were reviewed and updated as appropriate: allergies, current medications, past family history, past medical history, past social history, past surgical history and problem list     Review of Systems   Constitutional: Positive for fatigue  Negative for chills and fever  HENT: Positive for hearing loss  Eyes: Negative  Negative for visual disturbance  Respiratory: Negative for shortness of breath and wheezing  Cardiovascular: Negative for chest pain and palpitations  Gastrointestinal: Negative for abdominal pain, blood in stool, constipation, diarrhea, nausea and vomiting  Endocrine: Negative  Genitourinary: Negative for difficulty urinating and dysuria  Musculoskeletal: Positive for arthralgias  Negative for myalgias  Skin: Negative  Allergic/Immunologic: Negative  Neurological: Negative for seizures and syncope  Hematological: Negative for adenopathy  Psychiatric/Behavioral: Negative  Objective:    /62 (BP Location: Left arm, Patient Position: Sitting)   Pulse 60   Temp (!) 97 2 °F (36 2 °C) (Tympanic)   Ht 5' 2" (1 575 m)   Wt 81 3 kg (179 lb 3 2 oz)   SpO2 100%   BMI 32 78 kg/m²      Physical Exam  Vitals and nursing note reviewed  Constitutional:       General: She is not in acute distress  Appearance: Normal appearance  She is well-developed  She is not ill-appearing, toxic-appearing or diaphoretic  HENT:      Head: Normocephalic and atraumatic  Right Ear: Tympanic membrane, ear canal and external ear normal  There is no impacted cerumen  Left Ear: Tympanic membrane, ear canal and external ear normal  There is no impacted cerumen  Nose: Nose normal  No congestion or rhinorrhea  Mouth/Throat:      Mouth: Mucous membranes are moist       Pharynx: Oropharynx is clear  No oropharyngeal exudate or posterior oropharyngeal erythema  Eyes:      General: No scleral icterus  Right eye: No discharge  Left eye: No discharge  Conjunctiva/sclera: Conjunctivae normal       Pupils: Pupils are equal, round, and reactive to light  Cardiovascular:      Rate and Rhythm: Normal rate and regular rhythm  Pulses: Normal pulses  Heart sounds: Normal heart sounds  No murmur heard  Pulmonary:      Effort: Pulmonary effort is normal  No respiratory distress  Breath sounds: Normal breath sounds  No wheezing, rhonchi or rales  Abdominal:      General: Bowel sounds are normal  There is no distension  Palpations: Abdomen is soft  There is no mass  Tenderness: There is no abdominal tenderness  There is no guarding or rebound  Musculoskeletal:         General: Normal range of motion  Cervical back: Normal range of motion and neck supple  No rigidity  Right lower leg: No edema  Left lower leg: No edema  Lymphadenopathy:      Cervical: No cervical adenopathy  Skin:     General: Skin is warm and dry  Findings: No rash  Neurological:      General: No focal deficit present  Mental Status: She is alert and oriented to person, place, and time  Sensory: No sensory deficit  Motor: No weakness or abnormal muscle tone  Coordination: Coordination normal       Gait: Gait normal       Deep Tendon Reflexes: Reflexes are normal and symmetric  Psychiatric:         Mood and Affect: Mood normal          Behavior: Behavior normal          Thought Content: Thought content normal          Judgment: Judgment normal          Answers for HPI/ROS submitted by the patient on 7/25/2021  How would you rate your overall health?: good  Compared to last year, how is your physical health?: same  In general, how satisfied are you with your life?: very satisfied  Compared to last year, how is your eyesight?: same  Compared to last year, how is your hearing?: same  Compared to last year, how is your emotional/mental health?: slightly better  How often is anger a problem for you?: never, rarely  How often do you feel unusually tired/fatigued?: sometimes  In the past 7 days, how much pain have you experienced?: some  If you answered "some" or "a lot", please rate the severity of your pain on a scale of 1 to 10 (1 being the least severe pain and 10 being the most intense pain)  : 5/10  In the past 6 months, have you lost or gained 10 pounds without trying?: Yes  One or more falls in the last year: No  In the past 6 months, have you accidentally leaked urine?: Yes  Do you have trouble with the stairs inside or outside your home?: Yes  Does your home have working smoke alarms?: Yes  Does your home have a carbon monoxide monitor?: Yes  Which safety hazards (if any) have you experienced in your home?  Please select all that apply : none  How would you describe your current diet?  Please select all that apply : Regular, Low Cholesterol, No Added Salt  In addition to prescription medications, are you taking any over-the-counter supplements?: Yes  If yes, what supplements are you taking?: Vit  C, B-12, one a day,  Can you manage your medications?: Yes  Are you currently taking any opioid medications?: No  Can you walk and transfer into and out of your bed and chair?: Yes  Can you dress and groom yourself?: Yes  Can you bathe or shower yourself?: Yes  Can you feed yourself?: Yes  Can you do your laundry/ housekeeping?: Yes  Can you manage your money, pay your bills, and track your expenses?: Yes  Can you make your own meals?: Yes  Can you do your own shopping?: Yes  Within the last 12 months, have you had any hospitalizations or Emergency Department visits?: No  Do you have a living will?: No  Do you have a Durable POA (Power of ) for healthcare decisions?: No  Do you have an Advanced Directive for end of life decisions?: No  How often have you used an illegal drug (including marijuana) or a prescription medication for non-medical reasons in the past year?: never  What is the typical number of drinks you consume in a day?: 0  What is the typical number of drinks you consume in a week?: 1  How often did you have a drink containing alcohol in the past year?: monthly or less  How many drinks did you have on a typical day  when you were drinking in the past year?: 1 to 2  How often did you have 6 or more drinks on one occasion in the past year?: never

## 2021-08-13 ENCOUNTER — OFFICE VISIT (OUTPATIENT)
Dept: OBGYN CLINIC | Facility: CLINIC | Age: 71
End: 2021-08-13
Payer: COMMERCIAL

## 2021-08-13 VITALS
HEART RATE: 70 BPM | BODY MASS INDEX: 32.94 KG/M2 | WEIGHT: 179 LBS | HEIGHT: 62 IN | SYSTOLIC BLOOD PRESSURE: 140 MMHG | DIASTOLIC BLOOD PRESSURE: 76 MMHG

## 2021-08-13 DIAGNOSIS — M25.562 LEFT KNEE PAIN, UNSPECIFIED CHRONICITY: ICD-10-CM

## 2021-08-13 DIAGNOSIS — M25.561 RIGHT KNEE PAIN, UNSPECIFIED CHRONICITY: Primary | ICD-10-CM

## 2021-08-13 DIAGNOSIS — M17.0 PRIMARY OSTEOARTHRITIS OF BOTH KNEES: ICD-10-CM

## 2021-08-13 PROCEDURE — 1036F TOBACCO NON-USER: CPT | Performed by: ORTHOPAEDIC SURGERY

## 2021-08-13 PROCEDURE — 3008F BODY MASS INDEX DOCD: CPT | Performed by: ORTHOPAEDIC SURGERY

## 2021-08-13 PROCEDURE — 1160F RVW MEDS BY RX/DR IN RCRD: CPT | Performed by: ORTHOPAEDIC SURGERY

## 2021-08-13 PROCEDURE — 99213 OFFICE O/P EST LOW 20 MIN: CPT | Performed by: ORTHOPAEDIC SURGERY

## 2021-08-13 PROCEDURE — 3078F DIAST BP <80 MM HG: CPT | Performed by: ORTHOPAEDIC SURGERY

## 2021-08-13 PROCEDURE — 3077F SYST BP >= 140 MM HG: CPT | Performed by: ORTHOPAEDIC SURGERY

## 2021-08-13 NOTE — PROGRESS NOTES
Assessment/Plan:    No problem-specific Assessment & Plan notes found for this encounter  Diagnoses and all orders for this visit:    Right knee pain, unspecified chronicity  -     XR knee 3 vw right non injury; Future    Left knee pain, unspecified chronicity  -     XR knee 3 vw left non injury; Future    Primary osteoarthritis of both knees  -     Injection procedure prior authorization; Future           injection therapy was discussed but declined  We will get her approved for viscosupplementation  Return back once this is complete  If there are any problems sooner, she will not hesitate to let us know    Subjective:      Patient ID: Jadyn Lazar is a 73 y o  female  HPI      the patient has a history of degenerative joint disease of her bilateral knees  She did participate in physical therapy, but still has pain present  She has good days and bad days with her knees  She does not want corticosteroid injections but desires to be approved for viscosupplementation  The following portions of the patient's history were reviewed and updated as appropriate: allergies, current medications, past family history, past medical history, past social history, past surgical history and problem list     Review of Systems   Constitutional: Negative for chills, fever and unexpected weight change  HENT: Negative for hearing loss, nosebleeds and sore throat  Eyes: Negative for pain, redness and visual disturbance  Respiratory: Negative for cough, shortness of breath and wheezing  Cardiovascular: Negative for chest pain, palpitations and leg swelling  Gastrointestinal: Negative for abdominal pain, nausea and vomiting  Endocrine: Negative for polydipsia and polyuria  Genitourinary: Negative for dysuria and hematuria  Musculoskeletal: Positive for arthralgias, gait problem, joint swelling and myalgias  Negative for back pain, neck pain and neck stiffness          As noted in HPI   Skin: Negative for rash and wound  Neurological: Negative for dizziness, numbness and headaches  Psychiatric/Behavioral: Negative for decreased concentration and suicidal ideas  The patient is not nervous/anxious  Objective:      /76   Pulse 70   Ht 5' 2" (1 575 m)   Wt 81 2 kg (179 lb)   BMI 32 74 kg/m²          Physical Exam        Bilateral lower extremities are neurovascular intact  Toes are pink and mobile  Compartments are soft  Range of motion both her knees from 5-115 degrees  A varus deformities present  There is medial joint tenderness, lateral joint tenderness, and patellofemoral crepitation  There is a painful arc of motion of both her knees  Negative Homans  Neurologically intact distally          X-rays show advanced arthritic changes with no medial joint space remaining

## 2021-09-08 ENCOUNTER — TELEPHONE (OUTPATIENT)
Dept: OBGYN CLINIC | Facility: OTHER | Age: 71
End: 2021-09-08

## 2021-09-08 NOTE — TELEPHONE ENCOUNTER
Lizy Sandoval @ [facility] is requesting a call from Dr Mcclain Reason office  Patient has [employer], and is in need of a new referral     But for some reason it has to be dated back in July due to her insurance  July 9th forward  If / when completed can we fax      Fax # [phone]  Attn Faviola Ch     C/b # [phone]

## 2021-09-08 NOTE — TELEPHONE ENCOUNTER
An order will be placed tomorrow  I am not quite sure whether a script can be back dated that long  This might be a conversation between the patient, the therapy center, and her insurance company

## 2021-09-11 DIAGNOSIS — E03.9 HYPOTHYROIDISM, UNSPECIFIED TYPE: ICD-10-CM

## 2021-09-13 RX ORDER — LEVOTHYROXINE SODIUM 0.07 MG/1
TABLET ORAL
Qty: 30 TABLET | Refills: 5 | Status: SHIPPED | OUTPATIENT
Start: 2021-09-13 | End: 2022-03-04

## 2021-09-17 ENCOUNTER — PROCEDURE VISIT (OUTPATIENT)
Dept: OBGYN CLINIC | Facility: CLINIC | Age: 71
End: 2021-09-17
Payer: COMMERCIAL

## 2021-09-17 VITALS
HEIGHT: 62 IN | WEIGHT: 179 LBS | DIASTOLIC BLOOD PRESSURE: 78 MMHG | SYSTOLIC BLOOD PRESSURE: 132 MMHG | HEART RATE: 60 BPM | BODY MASS INDEX: 32.94 KG/M2

## 2021-09-17 DIAGNOSIS — M17.0 PRIMARY OSTEOARTHRITIS OF BOTH KNEES: Primary | ICD-10-CM

## 2021-09-17 PROCEDURE — 1160F RVW MEDS BY RX/DR IN RCRD: CPT | Performed by: ORTHOPAEDIC SURGERY

## 2021-09-17 PROCEDURE — 3075F SYST BP GE 130 - 139MM HG: CPT | Performed by: ORTHOPAEDIC SURGERY

## 2021-09-17 PROCEDURE — 3078F DIAST BP <80 MM HG: CPT | Performed by: ORTHOPAEDIC SURGERY

## 2021-09-17 PROCEDURE — 20610 DRAIN/INJ JOINT/BURSA W/O US: CPT | Performed by: ORTHOPAEDIC SURGERY

## 2021-09-17 PROCEDURE — 1036F TOBACCO NON-USER: CPT | Performed by: ORTHOPAEDIC SURGERY

## 2021-09-17 PROCEDURE — 99213 OFFICE O/P EST LOW 20 MIN: CPT | Performed by: ORTHOPAEDIC SURGERY

## 2021-09-17 PROCEDURE — 3008F BODY MASS INDEX DOCD: CPT | Performed by: ORTHOPAEDIC SURGERY

## 2021-09-17 NOTE — PROGRESS NOTES
Assessment/Plan:  Assessment/Plan   Diagnoses and all orders for this visit:    Primary osteoarthritis of both knees  -     Large joint arthrocentesis  -     Large joint arthrocentesis     Patient was provided with 1st of 3 shot Orthovisc viscosupplementation injection series for treatment of primary osteoarthritis of the bilateral knees  Patient tolerated treatment well  She will be seen for follow-up in 2 weeks for continuation of Visco series  Patient is in agreement with this treatment plan  Aseptic technique, both knees were injected with her 1st set of Orthovisc  She tolerated procedures quite well  Return back next week for 2nd set of injections  If her condition changes, she will not hesitate to let us know  Physical exam shows diffuse medial and lateral joint tenderness and patellofemoral crepitation  Negative Homans there is a painful arc of motion throughout both knees    Subjective:   Patient ID: Usman Bennett is a 73 y o  female  HPI      Patient presents for initiation of viscosupplementation injection series for treatment of primary osteoarthritis of the bilateral knees  She was last seen in regards to this issue on 08/13/21  At that time she was offered a corticosteroid injection, but declined  On today's presentation she reports continued medial and anterior knee pain with weight-bearing activity  She denies any recent onset bruising, numbness, tingling, feelings of instability  She reports occasional swelling and grinding sensations      The following portions of the patient's history were reviewed and updated as appropriate: allergies, current medications, past family history, past medical history, past social history, past surgical history and problem list     Past Medical History:   Diagnosis Date    Arthritis     rheumatoid    Atrial fibrillation ([facility] )     [facility]     Disease of thyroid gland     DVT [facility]deep venous thrombosis) [facility]     Hypertension     Migraine     hx of    Polymyalgia rheumatica (HCC)     Pulmonary emboli (HCC)     Renal disorder     right sided kidney stones    Vitamin D deficiency      Past Surgical History:   Procedure Laterality Date    CARDIAC CATHETERIZATION      CARDIOVERSION N/A 2019    Procedure: CARDIOVERSION;  Surgeon: Sherwin Manzano MD;  Location: [facility];  Service: Cardiology     SECTION      x3 - last impression 16    FRACTURE SURGERY Left     wrist    HERNIA REPAIR  2018    KNEE CARTILAGE SURGERY Left     TONSILECTOMY AND ADNOIDECTOMY      TONSILLECTOMY  child; age 17   [place]      venous ligation with stripping    WRIST SURGERY Left     ORIF wrist - last impression 16     Family History   Problem Relation Age of Onset    Breast cancer Mother 46    Aneurysm Father         aortic    No Known Problems Sister     No Known Problems Maternal Grandmother     No Known Problems Maternal Grandfather     No Known Problems Paternal Grandmother     No Known Problems Paternal Grandfather     Rheum arthritis Maternal Aunt     Early death Maternal Aunt     No Known Problems Paternal Aunt     No Known Problems Paternal Aunt     No Known Problems Paternal Aunt      Social History     Socioeconomic History    Marital status: /Civil Union     Spouse name: None    Number of children: 3    Years of education: None    Highest education level: None   Occupational History    Occupation: Manager     Comment: [employer]   Tobacco Use    Smoking status: Never Smoker    Smokeless tobacco: Never Used   Vaping Use    Vaping Use: Never used   Substance and Sexual Activity    Alcohol use:  Yes     Alcohol/week: 0 0 standard drinks     Comment: occasionally; social    Drug use: No    Sexual activity: Yes     Partners: Male     Birth control/protection: Post-menopausal   Other Topics Concern    None   Social History Narrative    None     Social Determinants of Health     Financial Resource Strain:     Difficulty of Paying Living Expenses:    Food Insecurity:     Worried About Running Out of Food in the Last Year:     [address] in the Last Year:    Transportation Needs:     Lack of Transportation (Medical):      Lack of Transportation (Non-Medical):    Physical Activity:     Days of Exercise per Week:     Minutes of Exercise per Session:    Stress:     Feeling of Stress :    Social Connections:     Frequency of Communication with Friends and Family:     Frequency of Social Gatherings with Friends and Family:     Attends Jainism Services:     Active Member of Clubs or Organizations:     Attends Club or Organization Meetings:     Marital Status:    Intimate Partner Violence:     Fear of Current or Ex-Partner:     Emotionally Abused:     Physically Abused:     Sexually Abused:        Current Outpatient Medications:     azelastine (ASTELIN) 0 1 % nasal spray, 2 sprays into each nostril 2 (two) times a day Use in each nostril as directed, Disp: 1 Bottle, Rfl: 11    Cinnamon 500 MG capsule, Take 500 mg by mouth daily, Disp: , Rfl:     Cyanocobalamin (VITAMIN B 12 PO), Take 500 mcg by mouth daily , Disp: , Rfl:     folic acid (FOLVITE) 1 mg tablet, Take 1 mg by mouth daily , Disp: , Rfl:     levothyroxine 75 mcg tablet, TAKE 1 TABLET BY MOUTH EVERY DAY, Disp: 30 tablet, Rfl: 5    minocycline (MINOCIN) 50 mg capsule, Take 50 mg by mouth every other day , Disp: , Rfl: 5    Misc Natural Products (OSTEO BI-FLEX JOINT SHIELD PO), Take by mouth, Disp: , Rfl:     Multiple Vitamins-Minerals (OCUVITE ADULT 50+) CAPS, Take 1 capsule by mouth daily, Disp: , Rfl:     Red Yeast Rice 600 MG TABS, Take 1 tablet by mouth daily , Disp: , Rfl:     XARELTO 20 MG tablet, Take 1 tablet by mouth daily, Disp: , Rfl:     Calcium Ascorbate 500 MG TABS, Take 500 mg by mouth daily  (Patient not taking: Reported on 7/29/2021), Disp: , Rfl:     CALCIUM-VITAMIN D PO, Take 1 tablet by mouth daily (Patient not taking: Reported on 7/29/2021), Disp: , Rfl:     cholecalciferol (VITAMIN D3) 1,000 units tablet, Take 1,000 Units by mouth daily (Patient not taking: Reported on 7/29/2021), Disp: , Rfl:     metoprolol succinate (TOPROL-XL) 50 mg 24 hr tablet, Take 1 tablet (50 mg total) by mouth 2 (two) times a day (Patient not taking: Reported on 8/13/2021), Disp: 60 tablet, Rfl: 0    Allergies   Allergen Reactions    Amiodarone      Other reaction(s): Other (See Comments)  Reports caused elevated TSH    Sudafed [Pseudoephedrine]      Rapid heart beat    Sulfa Antibiotics Itching and Rash    Sulfamethoxazole-Trimethoprim Itching and Rash         Review of Systems   Constitutional: Negative for fatigue  Gastrointestinal: Negative for nausea  Musculoskeletal:        As noted in HPI   Neurological: Negative for dizziness, weakness, numbness and headaches  All other systems reviewed and are negative  Objective:  /78   Pulse 60   Ht 5' 2" (1 575 m)   Wt 81 2 kg (179 lb)   BMI 32 74 kg/m²     Ortho Exam   Bilateral knees -     Patient ambulates with antalgic gait pattern  Uses no assistive device  Bilateral genu varus deformity  Skin is warm dry to touch with no signs of erythema, ecchymosis, infection   No soft tissue swelling or effusion noted  TTP over medial joint line, mildly TTP over lateral joint line, no popliteal fullness appreciated on exam   ROM 5°- 115°  Knees are stable to varus, and valgus stress  Knees are stable to anterior and posterior stress  Negative Lachman's, negative anterior drawer, negative posterior drawer  Negative medial and lateral Sundar's   Negative Homans sign   2+ TP pulse  Sural, saphenous, tibial, deep and superficial peroneal motor and sensory distributions intact  Sensation light touch intact distally    Physical Exam  Constitutional:       Appearance: She is well-developed     HENT:      Right Ear: External ear normal       Left Ear: External ear normal       Nose: Nose normal    Eyes:      Conjunctiva/sclera: Conjunctivae normal       Pupils: Pupils are equal, round, and reactive to light  Pulmonary:      Effort: Pulmonary effort is normal    Musculoskeletal:         General: Normal range of motion  Cervical back: Normal range of motion  Skin:     General: Skin is warm and dry  Neurological:      Mental Status: She is alert and oriented to person, place, and time  Psychiatric:         Behavior: Behavior normal          Thought Content: Thought content normal          Judgment: Judgment normal        Imaging:  No new imaging reviewed this visit    Large joint arthrocentesis: R knee  Universal Protocol:  Consent: Verbal consent obtained  Risks and benefits: risks, benefits and alternatives were discussed  Consent given by: patient  Time out: Immediately prior to procedure a "time out" was called to verify the correct patient, procedure, equipment, support staff and site/side marked as required  Timeout called at: 9/17/2021 10:02 AM   Patient understanding: patient states understanding of the procedure being performed  Site marked: the operative site was marked  Patient identity confirmed: verbally with patient    Supporting Documentation  Indications: pain and joint swelling   Procedure Details  Location: knee - R knee  Preparation: Patient was prepped and draped in the usual sterile fashion  Needle size: 22 G  Ultrasound guidance: no  Approach: anterolateral  Medications administered: 30 mg sodium hyaluronate 30 mg/2 mL  Specialty Pharmacy Supplied: received medications from pharmacy  Patient tolerance: patient tolerated the procedure well with no immediate complications  Dressing:  Sterile dressing applied    Large joint arthrocentesis: L knee  Universal Protocol:  Consent: Verbal consent obtained    Risks and benefits: risks, benefits and alternatives were discussed  Consent given by: patient  Time out: Immediately prior to procedure a "time out" was called to verify the correct patient, procedure, equipment, support staff and site/side marked as required    Timeout called at: 9/17/2021 10:02 AM   Patient understanding: patient states understanding of the procedure being performed  Site marked: the operative site was marked  Patient identity confirmed: verbally with patient    Supporting Documentation  Indications: pain and joint swelling   Procedure Details  Location: knee - L knee  Preparation: Patient was prepped and draped in the usual sterile fashion  Needle size: 22 G  Ultrasound guidance: no  Approach: anterolateral  Medications administered: 30 mg sodium hyaluronate 30 mg/2 mL  Specialty Pharmacy Supplied: received medications from pharmacy  Patient tolerance: patient tolerated the procedure well with no immediate complications  Dressing:  Sterile dressing applied        Scribe Attestation    I,:  Juanita Roberto am acting as a scribe while in the presence of the attending physician :       I,:  Lili Berger DO personally performed the services described in this documentation    as scribed in my presence :

## 2021-10-01 ENCOUNTER — PROCEDURE VISIT (OUTPATIENT)
Dept: OBGYN CLINIC | Facility: CLINIC | Age: 71
End: 2021-10-01
Payer: COMMERCIAL

## 2021-10-01 VITALS
HEART RATE: 71 BPM | WEIGHT: 179 LBS | DIASTOLIC BLOOD PRESSURE: 85 MMHG | BODY MASS INDEX: 32.94 KG/M2 | SYSTOLIC BLOOD PRESSURE: 140 MMHG | HEIGHT: 62 IN

## 2021-10-01 DIAGNOSIS — M17.0 PRIMARY OSTEOARTHRITIS OF BOTH KNEES: Primary | ICD-10-CM

## 2021-10-01 PROCEDURE — 20610 DRAIN/INJ JOINT/BURSA W/O US: CPT | Performed by: PHYSICIAN ASSISTANT

## 2021-10-08 ENCOUNTER — PROCEDURE VISIT (OUTPATIENT)
Dept: OBGYN CLINIC | Facility: CLINIC | Age: 71
End: 2021-10-08
Payer: COMMERCIAL

## 2021-10-08 VITALS
BODY MASS INDEX: 32.94 KG/M2 | WEIGHT: 179 LBS | SYSTOLIC BLOOD PRESSURE: 129 MMHG | HEIGHT: 62 IN | HEART RATE: 70 BPM | DIASTOLIC BLOOD PRESSURE: 76 MMHG

## 2021-10-08 DIAGNOSIS — M17.0 PRIMARY OSTEOARTHRITIS OF BOTH KNEES: Primary | ICD-10-CM

## 2021-10-08 PROCEDURE — 3008F BODY MASS INDEX DOCD: CPT | Performed by: ORTHOPAEDIC SURGERY

## 2021-10-08 PROCEDURE — 20610 DRAIN/INJ JOINT/BURSA W/O US: CPT | Performed by: ORTHOPAEDIC SURGERY

## 2021-10-18 ENCOUNTER — APPOINTMENT (OUTPATIENT)
Dept: LAB | Facility: CLINIC | Age: 71
End: 2021-10-18
Payer: COMMERCIAL

## 2021-11-10 ENCOUNTER — HOSPITAL ENCOUNTER (OUTPATIENT)
Dept: ULTRASOUND IMAGING | Facility: HOSPITAL | Age: 71
Discharge: HOME | End: 2021-11-10
Payer: COMMERCIAL

## 2021-11-10 DIAGNOSIS — E04.1 THYROID NODULE: ICD-10-CM

## 2021-11-10 DIAGNOSIS — E03.9 ACQUIRED HYPOTHYROIDISM: ICD-10-CM

## 2021-11-10 PROCEDURE — 76536 US EXAM OF HEAD AND NECK: CPT

## 2021-11-19 ENCOUNTER — OFFICE VISIT (OUTPATIENT)
Dept: OBGYN CLINIC | Facility: CLINIC | Age: 71
End: 2021-11-19
Payer: COMMERCIAL

## 2021-11-19 VITALS
HEART RATE: 67 BPM | SYSTOLIC BLOOD PRESSURE: 132 MMHG | DIASTOLIC BLOOD PRESSURE: 77 MMHG | BODY MASS INDEX: 33.31 KG/M2 | HEIGHT: 62 IN | WEIGHT: 181 LBS

## 2021-11-19 DIAGNOSIS — Z11.59 SPECIAL SCREENING EXAMINATION FOR VIRAL DISEASE: ICD-10-CM

## 2021-11-19 DIAGNOSIS — Z01.812 PRE-OPERATIVE LABORATORY EXAMINATION: ICD-10-CM

## 2021-11-19 DIAGNOSIS — M17.0 PRIMARY OSTEOARTHRITIS OF BOTH KNEES: Primary | ICD-10-CM

## 2021-11-19 PROCEDURE — 3075F SYST BP GE 130 - 139MM HG: CPT | Performed by: ORTHOPAEDIC SURGERY

## 2021-11-19 PROCEDURE — 1036F TOBACCO NON-USER: CPT | Performed by: ORTHOPAEDIC SURGERY

## 2021-11-19 PROCEDURE — 99213 OFFICE O/P EST LOW 20 MIN: CPT | Performed by: ORTHOPAEDIC SURGERY

## 2021-11-19 PROCEDURE — 1160F RVW MEDS BY RX/DR IN RCRD: CPT | Performed by: ORTHOPAEDIC SURGERY

## 2021-11-19 PROCEDURE — 3078F DIAST BP <80 MM HG: CPT | Performed by: ORTHOPAEDIC SURGERY

## 2021-11-22 ENCOUNTER — LAB (OUTPATIENT)
Dept: LAB | Facility: CLINIC | Age: 71
End: 2021-11-22
Payer: COMMERCIAL

## 2021-11-22 DIAGNOSIS — E03.9 ACQUIRED HYPOTHYROIDISM: ICD-10-CM

## 2021-11-22 DIAGNOSIS — I50.31 ACUTE DIASTOLIC CHF (CONGESTIVE HEART FAILURE) (HCC): ICD-10-CM

## 2021-11-22 DIAGNOSIS — E55.9 VITAMIN D INSUFFICIENCY: ICD-10-CM

## 2021-11-22 DIAGNOSIS — E04.1 THYROID NODULE: ICD-10-CM

## 2021-11-22 DIAGNOSIS — E78.00 HYPERCHOLESTEROLEMIA: ICD-10-CM

## 2021-11-22 DIAGNOSIS — I10 ESSENTIAL HYPERTENSION: ICD-10-CM

## 2021-11-22 LAB
25(OH)D3 SERPL-MCNC: 37.1 NG/ML (ref 30–100)
ALBUMIN SERPL BCP-MCNC: 3.7 G/DL (ref 3.5–5)
ALP SERPL-CCNC: 92 U/L (ref 46–116)
ALT SERPL W P-5'-P-CCNC: 24 U/L (ref 12–78)
ANION GAP SERPL CALCULATED.3IONS-SCNC: 4 MMOL/L (ref 4–13)
AST SERPL W P-5'-P-CCNC: 21 U/L (ref 5–45)
BILIRUB SERPL-MCNC: 1.16 MG/DL (ref 0.2–1)
BUN SERPL-MCNC: 13 MG/DL (ref 5–25)
CALCIUM SERPL-MCNC: 9.7 MG/DL (ref 8.3–10.1)
CHLORIDE SERPL-SCNC: 108 MMOL/L (ref 100–108)
CHOLEST SERPL-MCNC: 183 MG/DL
CO2 SERPL-SCNC: 28 MMOL/L (ref 21–32)
CREAT SERPL-MCNC: 0.63 MG/DL (ref 0.6–1.3)
GFR SERPL CREATININE-BSD FRML MDRD: 92 ML/MIN/1.73SQ M
GLUCOSE P FAST SERPL-MCNC: 82 MG/DL (ref 65–99)
HDLC SERPL-MCNC: 39 MG/DL
LDLC SERPL CALC-MCNC: 119 MG/DL (ref 0–100)
NONHDLC SERPL-MCNC: 144 MG/DL
PHOSPHATE SERPL-MCNC: 2.7 MG/DL (ref 2.3–4.1)
POTASSIUM SERPL-SCNC: 4.1 MMOL/L (ref 3.5–5.3)
PROT SERPL-MCNC: 7.2 G/DL (ref 6.4–8.2)
PTH-INTACT SERPL-MCNC: 89.4 PG/ML (ref 18.4–80.1)
SODIUM SERPL-SCNC: 140 MMOL/L (ref 136–145)
T4 FREE SERPL-MCNC: 1.17 NG/DL (ref 0.76–1.46)
TRIGL SERPL-MCNC: 124 MG/DL
TSH SERPL DL<=0.05 MIU/L-ACNC: 1.34 UIU/ML (ref 0.36–3.74)

## 2021-11-22 PROCEDURE — 84443 ASSAY THYROID STIM HORMONE: CPT

## 2021-11-22 PROCEDURE — 80053 COMPREHEN METABOLIC PANEL: CPT

## 2021-11-22 PROCEDURE — 84439 ASSAY OF FREE THYROXINE: CPT

## 2021-11-22 PROCEDURE — 83970 ASSAY OF PARATHORMONE: CPT

## 2021-11-22 PROCEDURE — 84100 ASSAY OF PHOSPHORUS: CPT

## 2021-11-22 PROCEDURE — 82306 VITAMIN D 25 HYDROXY: CPT

## 2021-11-22 PROCEDURE — 36415 COLL VENOUS BLD VENIPUNCTURE: CPT

## 2021-11-22 PROCEDURE — 80061 LIPID PANEL: CPT

## 2021-11-30 ENCOUNTER — OFFICE VISIT (OUTPATIENT)
Dept: ENDOCRINOLOGY | Facility: HOSPITAL | Age: 71
End: 2021-11-30
Payer: COMMERCIAL

## 2021-11-30 VITALS
HEIGHT: 62 IN | DIASTOLIC BLOOD PRESSURE: 70 MMHG | WEIGHT: 180 LBS | BODY MASS INDEX: 33.13 KG/M2 | SYSTOLIC BLOOD PRESSURE: 116 MMHG | HEART RATE: 68 BPM

## 2021-11-30 DIAGNOSIS — E55.9 VITAMIN D INSUFFICIENCY: ICD-10-CM

## 2021-11-30 DIAGNOSIS — E78.00 HYPERCHOLESTEROLEMIA: ICD-10-CM

## 2021-11-30 DIAGNOSIS — I10 ESSENTIAL HYPERTENSION: ICD-10-CM

## 2021-11-30 DIAGNOSIS — E04.1 THYROID NODULE: ICD-10-CM

## 2021-11-30 DIAGNOSIS — E03.9 ACQUIRED HYPOTHYROIDISM: Primary | ICD-10-CM

## 2021-11-30 PROCEDURE — 99214 OFFICE O/P EST MOD 30 MIN: CPT | Performed by: NURSE PRACTITIONER

## 2021-11-30 PROCEDURE — 3008F BODY MASS INDEX DOCD: CPT | Performed by: ORTHOPAEDIC SURGERY

## 2021-12-14 ENCOUNTER — TELEPHONE (OUTPATIENT)
Dept: OBGYN CLINIC | Facility: CLINIC | Age: 71
End: 2021-12-14

## 2021-12-14 ENCOUNTER — TELEPHONE (OUTPATIENT)
Dept: OBGYN CLINIC | Facility: HOSPITAL | Age: 71
End: 2021-12-14

## 2021-12-20 RX ORDER — FOLIC ACID 1 MG/1
1 TABLET ORAL DAILY
Qty: 30 TABLET | Refills: 1 | Status: SHIPPED | OUTPATIENT
Start: 2022-02-16 | End: 2022-05-31

## 2021-12-20 RX ORDER — ASCORBIC ACID 500 MG
500 TABLET ORAL 2 TIMES DAILY
Qty: 60 TABLET | Refills: 1 | Status: SHIPPED | OUTPATIENT
Start: 2022-02-16 | End: 2022-08-01

## 2021-12-20 RX ORDER — MULTIVIT-MIN/IRON FUM/FOLIC AC 7.5 MG-4
1 TABLET ORAL DAILY
Qty: 30 TABLET | Refills: 1 | Status: SHIPPED | OUTPATIENT
Start: 2022-02-16 | End: 2022-05-05

## 2021-12-20 RX ORDER — CEFAZOLIN SODIUM 2 G/50ML
2000 SOLUTION INTRAVENOUS ONCE
Status: CANCELLED | OUTPATIENT
Start: 2022-03-16 | End: 2021-12-20

## 2021-12-20 RX ORDER — CHLORHEXIDINE GLUCONATE 0.12 MG/ML
15 RINSE ORAL ONCE
Status: CANCELLED | OUTPATIENT
Start: 2022-03-16 | End: 2021-12-20

## 2021-12-20 RX ORDER — CHLORHEXIDINE GLUCONATE 4 G/100ML
SOLUTION TOPICAL DAILY PRN
Status: CANCELLED | OUTPATIENT
Start: 2022-03-16

## 2021-12-20 RX ORDER — FERROUS SULFATE TAB EC 324 MG (65 MG FE EQUIVALENT) 324 (65 FE) MG
324 TABLET DELAYED RESPONSE ORAL
Qty: 60 TABLET | Refills: 1 | Status: SHIPPED | OUTPATIENT
Start: 2022-02-16 | End: 2022-04-05

## 2022-01-03 ENCOUNTER — TELEPHONE (OUTPATIENT)
Dept: OBGYN CLINIC | Facility: HOSPITAL | Age: 72
End: 2022-01-03

## 2022-01-03 NOTE — TELEPHONE ENCOUNTER
Pt sees Dr Felipe Frazier    Pt called stating that she needs her P/T script sent to     [facility] in [address] [place] [address] has no fax# to give  CB# [phone]

## 2022-01-19 ENCOUNTER — ANNUAL EXAM (OUTPATIENT)
Dept: OBGYN CLINIC | Facility: MEDICAL CENTER | Age: 72
End: 2022-01-19
Payer: COMMERCIAL

## 2022-01-19 VITALS
SYSTOLIC BLOOD PRESSURE: 100 MMHG | WEIGHT: 181 LBS | HEIGHT: 62 IN | BODY MASS INDEX: 33.31 KG/M2 | DIASTOLIC BLOOD PRESSURE: 70 MMHG

## 2022-01-19 DIAGNOSIS — Z01.419 ENCOUNTER FOR GYNECOLOGICAL EXAMINATION WITH PAPANICOLAOU SMEAR OF CERVIX: ICD-10-CM

## 2022-01-19 DIAGNOSIS — Z01.419 ENCOUNTER FOR WELL WOMAN EXAM WITH ROUTINE GYNECOLOGICAL EXAM: Primary | ICD-10-CM

## 2022-01-19 PROCEDURE — G0145 SCR C/V CYTO,THINLAYER,RESCR: HCPCS | Performed by: OBSTETRICS & GYNECOLOGY

## 2022-01-19 PROCEDURE — G0476 HPV COMBO ASSAY CA SCREEN: HCPCS | Performed by: OBSTETRICS & GYNECOLOGY

## 2022-01-19 PROCEDURE — G0101 CA SCREEN;PELVIC/BREAST EXAM: HCPCS | Performed by: OBSTETRICS & GYNECOLOGY

## 2022-01-19 NOTE — PROGRESS NOTES
[facility] [address], [address], [facility], [address]    ASSESSMENT/PLAN: Ulisses Wiley is a 73 y o  [id number] who presents for annual gynecologic exam   1  Routine well woman exam completed today  2  Cervical Cancer Screening: Current ASCCP Guidelines reviewed  Last Pap: 2020  Pap done today per patient's request    3  Beast cancer screening: due 12/2020  4  Colon cancer screening, done 2012    5  Bone density screening: declines; patient tried medications in the past and did not tolerate    CC:  Annual Gynecologic Examination    HPI: Ulisses Wiely is a 73 y o  [id number] who presents for annual gynecologic examination  No GYN complaints, doing well    Gynecologic Exam        The following portions of the patient's history were reviewed and updated as appropriate: allergies, current medications, past family history, past medical history, obstetric history, gynecologic history, past social history, past surgical history and problem list     Review of Systems   Constitutional: Negative  HENT: Negative  Eyes: Negative  Respiratory: Negative  Cardiovascular: Negative  Gastrointestinal: Negative  Genitourinary: Negative  Musculoskeletal: Negative  All other systems reviewed and are negative  Objective:  /70 (BP Location: Left arm, Patient Position: Sitting, Cuff Size: Adult)   Ht 5' 2" (1 575 m)   Wt 82 1 kg (181 lb)   BMI 33 11 kg/m²    Physical Exam  Vitals reviewed  Constitutional:       General: She is not in acute distress  Appearance: She is well-developed  HENT:      Head: Normocephalic and atraumatic  Nose: Nose normal    Cardiovascular:      Rate and Rhythm: Normal rate  Pulmonary:      Effort: Pulmonary effort is normal  No respiratory distress  Chest:   Breasts: Breasts are symmetrical       Right: Normal  No mass, nipple discharge, skin change, tenderness, axillary adenopathy or supraclavicular adenopathy        Left: Normal  No mass, nipple discharge, skin change, tenderness, axillary adenopathy or supraclavicular adenopathy  Abdominal:      General: There is no distension  Palpations: Abdomen is soft  There is no mass  Tenderness: There is no abdominal tenderness  There is no guarding or rebound  Genitourinary:     General: Normal vulva  Exam position: Lithotomy position  Labia:         Right: No lesion  Left: No lesion  Urethra: No prolapse (urethral meatus normal)  Vagina: Normal  No vaginal discharge, erythema or bleeding  Cervix: Normal       Uterus: Normal        Adnexa: Right adnexa normal and left adnexa normal       Comments: Pap done   Musculoskeletal:         General: Normal range of motion  Cervical back: Normal range of motion  Lymphadenopathy:      Upper Body:      Right upper body: No supraclavicular, axillary or pectoral adenopathy  Left upper body: No supraclavicular, axillary or pectoral adenopathy  Lower Body: No right inguinal adenopathy  No left inguinal adenopathy  Skin:     General: Skin is warm and dry  Neurological:      Mental Status: She is alert and oriented to person, place, and time  Psychiatric:         Behavior: Behavior normal          Thought Content:  Thought content normal          Judgment: Judgment normal

## 2022-01-20 LAB
HPV HR 12 DNA CVX QL NAA+PROBE: NEGATIVE
HPV16 DNA CVX QL NAA+PROBE: NEGATIVE
HPV18 DNA CVX QL NAA+PROBE: NEGATIVE

## 2022-01-21 ENCOUNTER — HOSPITAL ENCOUNTER (OUTPATIENT)
Dept: MAMMOGRAPHY | Facility: HOSPITAL | Age: 72
Discharge: HOME | End: 2022-01-21
Attending: OBSTETRICS & GYNECOLOGY
Payer: COMMERCIAL

## 2022-01-21 VITALS — BODY MASS INDEX: 33.31 KG/M2 | WEIGHT: 181 LBS | HEIGHT: 62 IN

## 2022-01-21 DIAGNOSIS — Z12.31 ENCOUNTER FOR SCREENING MAMMOGRAM FOR MALIGNANT NEOPLASM OF BREAST: ICD-10-CM

## 2022-01-21 PROCEDURE — 77063 BREAST TOMOSYNTHESIS BI: CPT

## 2022-01-21 PROCEDURE — 77067 SCR MAMMO BI INCL CAD: CPT

## 2022-01-25 LAB
LAB AP GYN PRIMARY INTERPRETATION: NORMAL
Lab: NORMAL

## 2022-01-27 ENCOUNTER — TELEPHONE (OUTPATIENT)
Dept: OBGYN CLINIC | Facility: HOSPITAL | Age: 72
End: 2022-01-27

## 2022-01-27 NOTE — TELEPHONE ENCOUNTER
Fracisco Pearson @ [facility] is calling to ask if we sent the PT order to [facility]  The plan requires that we send the script to insurance  She does not have a fax #

## 2022-01-31 ENCOUNTER — RA CDI HCC (OUTPATIENT)
Dept: OTHER | Facility: HOSPITAL | Age: 72
End: 2022-01-31

## 2022-01-31 NOTE — PROGRESS NOTES
[facility]  coding opportunities             Chart Reviewed * (Number of) Inbasket suggestions sent to Provider: 1     DX: I11 0            Patients insurance company: [employer] only)

## 2022-02-04 ENCOUNTER — PREP FOR PROCEDURE (OUTPATIENT)
Dept: OBGYN CLINIC | Facility: CLINIC | Age: 72
End: 2022-02-04

## 2022-02-04 ENCOUNTER — OFFICE VISIT (OUTPATIENT)
Dept: OBGYN CLINIC | Facility: CLINIC | Age: 72
End: 2022-02-04
Payer: COMMERCIAL

## 2022-02-04 VITALS
BODY MASS INDEX: 33.31 KG/M2 | SYSTOLIC BLOOD PRESSURE: 125 MMHG | DIASTOLIC BLOOD PRESSURE: 76 MMHG | HEIGHT: 62 IN | HEART RATE: 73 BPM | WEIGHT: 181 LBS

## 2022-02-04 DIAGNOSIS — M17.12 PRIMARY OSTEOARTHRITIS OF LEFT KNEE: Primary | ICD-10-CM

## 2022-02-04 DIAGNOSIS — M17.0 PRIMARY OSTEOARTHRITIS OF BOTH KNEES: ICD-10-CM

## 2022-02-04 PROCEDURE — 99213 OFFICE O/P EST LOW 20 MIN: CPT | Performed by: ORTHOPAEDIC SURGERY

## 2022-02-04 NOTE — PROGRESS NOTES
Assessment/Plan:    No problem-specific Assessment & Plan notes found for this encounter  Diagnoses and all orders for this visit:    Primary osteoarthritis of left knee    Primary osteoarthritis of both knees          The patient was given treatment options  She has opted for elective left total knee replacement  All risks, complications, benefits were discussed with the patient in great detail including bleeding, infection, blood clots, pain, stiffness, neurovascular damage, fractures, dislocations, the possibility of loss of life from surgery, heart attack, stroke, etcetera  Her surgery is already scheduled for March 16, 2022  All of her questions were answered     Subjective:      Patient ID: Zach Amezquita is a 73 y o  female  HPI    The patient has a history of degenerative joint disease of her left knee  She did call in the office to schedule surgery  She was tentatively scheduled for March 16, 2022 until she signs her paperwork which she presents today for  She states the pain does get worse  The pain does affect her lifestyle  She has talked over the surgery with her family  The following portions of the patient's history were reviewed and updated as appropriate: allergies, current medications, past family history, past medical history, past social history, past surgical history and problem list     Review of Systems   Constitutional: Negative for chills, fever and unexpected weight change  HENT: Negative for hearing loss, nosebleeds and sore throat  Eyes: Negative for pain, redness and visual disturbance  Respiratory: Negative for cough, shortness of breath and wheezing  Cardiovascular: Negative for chest pain, palpitations and leg swelling  Gastrointestinal: Negative for abdominal pain, nausea and vomiting  Endocrine: Negative for polydipsia and polyuria  Genitourinary: Negative for dysuria and hematuria     Musculoskeletal: Positive for arthralgias, gait problem, joint swelling and myalgias  Negative for back pain, neck pain and neck stiffness  As noted in HPI   Skin: Negative for rash and wound  Neurological: Negative for dizziness, numbness and headaches  Psychiatric/Behavioral: Negative for decreased concentration and suicidal ideas  The patient is not nervous/anxious  Objective:      /76   Pulse 73   Ht 5' 2" (1 575 m)   Wt 82 1 kg (181 lb)   BMI 33 11 kg/m²          Physical Exam      Left lower extremity is neurovascular intact  Toes are pink and mobile  Compartments are soft  A varus deformity is present  Range of motion of her knee is from 5-110 degrees  There is a negative Lachman's, drawer, pivot shift test   There is medial joint tenderness, lateral joint tenderness, and patellofemoral crepitation  There is a painful arc of motion throughout her left knee      X-rays reviewed show advanced arthritic changes no medial joint space remaining

## 2022-02-07 ENCOUNTER — OFFICE VISIT (OUTPATIENT)
Dept: FAMILY MEDICINE CLINIC | Facility: CLINIC | Age: 72
End: 2022-02-07
Payer: COMMERCIAL

## 2022-02-07 VITALS
OXYGEN SATURATION: 99 % | HEART RATE: 67 BPM | SYSTOLIC BLOOD PRESSURE: 122 MMHG | DIASTOLIC BLOOD PRESSURE: 60 MMHG | WEIGHT: 182 LBS | HEIGHT: 62 IN | BODY MASS INDEX: 33.49 KG/M2 | TEMPERATURE: 97.6 F

## 2022-02-07 DIAGNOSIS — E78.00 HYPERCHOLESTEROLEMIA: ICD-10-CM

## 2022-02-07 DIAGNOSIS — I10 ESSENTIAL HYPERTENSION: Primary | ICD-10-CM

## 2022-02-07 DIAGNOSIS — Z78.0 ASYMPTOMATIC POSTMENOPAUSAL STATE: ICD-10-CM

## 2022-02-07 DIAGNOSIS — E03.9 ACQUIRED HYPOTHYROIDISM: ICD-10-CM

## 2022-02-07 DIAGNOSIS — E66.09 CLASS 1 OBESITY DUE TO EXCESS CALORIES WITH SERIOUS COMORBIDITY AND BODY MASS INDEX (BMI) OF 33.0 TO 33.9 IN ADULT: ICD-10-CM

## 2022-02-07 PROCEDURE — 99214 OFFICE O/P EST MOD 30 MIN: CPT | Performed by: FAMILY MEDICINE

## 2022-02-07 NOTE — PROGRESS NOTES
Assessment/Plan:    No problem-specific Assessment & Plan notes found for this encounter  Diagnoses and all orders for this visit:    Essential hypertension  Comments:  no change in the medication  Orders:  -     CBC and differential; Future    Acquired hypothyroidism  Comments:  no change in the medication  Orders:  -     TSH, 3rd generation with Free T4 reflex; Future    Class 1 obesity due to excess calories with serious comorbidity and body mass index (BMI) of 33 0 to 33 9 in adult    Asymptomatic postmenopausal state  -     DXA bone density spine hip and pelvis; Future    Hypercholesterolemia  Comments:  pt counseled on diet and exercise  Orders:  -     Comprehensive metabolic panel; Future  -     Lipid panel; Future          PHQ-2/9 Depression Screening    Little interest or pleasure in doing things: 0 - not at all  Feeling down, depressed, or hopeless: 0 - not at all  PHQ-2 Score: 0  PHQ-2 Interpretation: Negative depression screen            Subjective:      Patient ID: Arvind Munoz is a 73 y o  female  Hypertension  This is a chronic problem  The current episode started more than 1 year ago  The problem is unchanged  The problem is controlled  Pertinent negatives include no chest pain, headaches or palpitations  There are no associated agents to hypertension  Risk factors for coronary artery disease include obesity, sedentary lifestyle and post-menopausal state  Past treatments include beta blockers  The current treatment provides moderate improvement  Compliance problems include exercise and diet  Identifiable causes of hypertension include a thyroid problem  Thyroid Problem  Presents for follow-up visit  Patient reports no anxiety, cold intolerance, fatigue, heat intolerance or palpitations         The following portions of the patient's history were reviewed and updated as appropriate: allergies, current medications, past family history, past medical history, past social history, past surgical history and problem list     Review of Systems   Constitutional: Negative for fatigue  Eyes: Negative for visual disturbance  Cardiovascular: Negative for chest pain and palpitations  Endocrine: Negative for cold intolerance and heat intolerance  Neurological: Negative for headaches  Psychiatric/Behavioral: The patient is not nervous/anxious  Objective:    /60 (BP Location: Left arm, Patient Position: Sitting, Cuff Size: Large)   Pulse 67   Temp 97 6 °F (36 4 °C) (Tympanic)   Ht 5' 2" (1 575 m)   Wt 82 6 kg (182 lb)   SpO2 99%   BMI 33 29 kg/m²      Physical Exam  Vitals and nursing note reviewed  Constitutional:       General: She is not in acute distress  Appearance: Normal appearance  She is not ill-appearing or diaphoretic  HENT:      Head: Normocephalic and atraumatic  Eyes:      Conjunctiva/sclera: Conjunctivae normal    Cardiovascular:      Rate and Rhythm: Normal rate and regular rhythm  Heart sounds: Normal heart sounds  No murmur heard  Pulmonary:      Effort: Pulmonary effort is normal  No respiratory distress  Breath sounds: Normal breath sounds  No wheezing, rhonchi or rales  Abdominal:      General: There is no distension  Palpations: Abdomen is soft  There is no mass  Tenderness: There is no abdominal tenderness  There is no guarding or rebound  Musculoskeletal:      Cervical back: Normal range of motion and neck supple  No rigidity  Right lower leg: Edema present  Left lower leg: Edema present  Lymphadenopathy:      Cervical: No cervical adenopathy  Neurological:      Mental Status: She is alert and oriented to person, place, and time  Psychiatric:         Mood and Affect: Mood normal          Behavior: Behavior normal          Thought Content: Thought content normal          Judgment: Judgment normal        BMI Counseling: Body mass index is 33 29 kg/m²   The BMI is above normal  Nutrition recommendations include reducing portion sizes and 3-5 servings of fruits/vegetables daily  Exercise recommendations include moderate aerobic physical activity for 150 minutes/week and exercising 3-5 times per week

## 2022-02-10 ENCOUNTER — PREP FOR PROCEDURE (OUTPATIENT)
Dept: OBGYN CLINIC | Facility: CLINIC | Age: 72
End: 2022-02-10

## 2022-02-14 ENCOUNTER — TELEPHONE (OUTPATIENT)
Dept: OBGYN CLINIC | Facility: HOSPITAL | Age: 72
End: 2022-02-14

## 2022-02-14 NOTE — TELEPHONE ENCOUNTER
Preoperative Elective Admission Assessment    Living Situation:    Who does pt live with: spouse, Jessenia Sebastian  What kind of home: single level  How do they enter the home: front  How many levels in home: 1   # of steps to enter home: 3 or 5 depending on entrance  # of steps to second floor: n/a  Are there handrails: Yes  Are there landings: No  Sleeping arrangement: first/entry floor  Where is Bathroom: 1st floor  Where is the tub or shower: 1st floor, step in bath tub without grab bars or shower seat  Dogs or ther pets: n/a     First Floor Setup:   Is there a bathroom: Yes  Where would pt sleep: bed     DME: n/a     Patient's Current Level of Function: Ambulates: Independently and ADLs: Independent    Post-op Caregiver: spouse  Caregiver Name and phone number for Inpatient discharge needs: Miriam Julio [phone]  Currently receive any HHC/aides/community supports: No     Post-op Transport: spouse  To/from hospital: spouse  To/from PT 2-3x/week: spouse  Uses community transport now: No     Outpatient Physical Therapy Site:  Site: Physical in [facility], Out of Network   pre and post-op appts scheduled? Yes     Medication Management: self and pillbox  Preferred Pharmacy for Post-op Medications[facility] [place]  Blood Management Vitamin Regimen: Pt confirms taking as prescribed  Post-op anticoagulant: to be determined by surgical team postoperatively     DC Plan: Pt plans to be discharged home      Barriers to DC identified preoperatively: none identified    BMI: 33 29    [employer]: declined enrollment    Patient Education:  Pt educated on post-op pain, early mobilization (POD0), Average inpt LOS, OP PT goal   Patient educated that our goal is to appropriately discharge patient based off their post-op function while striving to maintain maximal independence  The goal is to discharge patient to home and for them to attend outpatient physical therapy      Assigned to care team? Yes

## 2022-02-15 ENCOUNTER — RA CDI HCC (OUTPATIENT)
Dept: OTHER | Facility: HOSPITAL | Age: 72
End: 2022-02-15

## 2022-02-15 NOTE — PROGRESS NOTES
[facility]  coding opportunities             Chart Reviewed * (Number of) Inbasket suggestions sent to Provider: 2     DX: I11 0  DX: I48 [phone]            Patients insurance company: [employer] only)

## 2022-02-21 ENCOUNTER — LAB (OUTPATIENT)
Dept: LAB | Facility: HOSPITAL | Age: 72
End: 2022-02-21
Attending: PHYSICIAN ASSISTANT
Payer: COMMERCIAL

## 2022-02-21 DIAGNOSIS — I10 ESSENTIAL HYPERTENSION: ICD-10-CM

## 2022-02-21 DIAGNOSIS — Z01.812 PRE-OPERATIVE LABORATORY EXAMINATION: ICD-10-CM

## 2022-02-21 DIAGNOSIS — E04.1 THYROID NODULE: ICD-10-CM

## 2022-02-21 DIAGNOSIS — E55.9 VITAMIN D INSUFFICIENCY: ICD-10-CM

## 2022-02-21 DIAGNOSIS — E03.9 ACQUIRED HYPOTHYROIDISM: ICD-10-CM

## 2022-02-21 DIAGNOSIS — E78.00 HYPERCHOLESTEROLEMIA: ICD-10-CM

## 2022-02-21 LAB
25(OH)D3 SERPL-MCNC: 30.8 NG/ML (ref 30–100)
ABO GROUP BLD: NORMAL
ALBUMIN SERPL BCP-MCNC: 4.3 G/DL (ref 3.5–5)
ALP SERPL-CCNC: 82 U/L (ref 34–104)
ALT SERPL W P-5'-P-CCNC: 18 U/L (ref 7–52)
ANION GAP SERPL CALCULATED.3IONS-SCNC: 6 MMOL/L (ref 4–13)
APTT PPP: 28 SECONDS (ref 23–37)
AST SERPL W P-5'-P-CCNC: 23 U/L (ref 13–39)
BASOPHILS # BLD AUTO: 0.04 THOUSANDS/ΜL (ref 0–0.1)
BASOPHILS NFR BLD AUTO: 1 % (ref 0–1)
BILIRUB SERPL-MCNC: 0.56 MG/DL (ref 0.2–1)
BLD GP AB SCN SERPL QL: NEGATIVE
BUN SERPL-MCNC: 14 MG/DL (ref 5–25)
CALCIUM SERPL-MCNC: 10.2 MG/DL (ref 8.4–10.2)
CHLORIDE SERPL-SCNC: 105 MMOL/L (ref 96–108)
CO2 SERPL-SCNC: 28 MMOL/L (ref 21–32)
CREAT SERPL-MCNC: 0.6 MG/DL (ref 0.6–1.3)
CRP SERPL QL: 3.9 MG/L
EOSINOPHIL # BLD AUTO: 0.1 THOUSAND/ΜL (ref 0–0.61)
EOSINOPHIL NFR BLD AUTO: 2 % (ref 0–6)
ERYTHROCYTE [DISTWIDTH] IN BLOOD BY AUTOMATED COUNT: 14 % (ref 11.6–15.1)
EST. AVERAGE GLUCOSE BLD GHB EST-MCNC: 105 MG/DL
GFR SERPL CREATININE-BSD FRML MDRD: 93 ML/MIN/1.73SQ M
GLUCOSE P FAST SERPL-MCNC: 56 MG/DL (ref 65–99)
HBA1C MFR BLD: 5.3 %
HCT VFR BLD AUTO: 41.6 % (ref 34.8–46.1)
HGB BLD-MCNC: 13.4 G/DL (ref 11.5–15.4)
IMM GRANULOCYTES # BLD AUTO: 0.01 THOUSAND/UL (ref 0–0.2)
IMM GRANULOCYTES NFR BLD AUTO: 0 % (ref 0–2)
INR PPP: 1.26 (ref 0.84–1.19)
LYMPHOCYTES # BLD AUTO: 2.17 THOUSANDS/ΜL (ref 0.6–4.47)
LYMPHOCYTES NFR BLD AUTO: 37 % (ref 14–44)
MCH RBC QN AUTO: 29.3 PG (ref 26.8–34.3)
MCHC RBC AUTO-ENTMCNC: 32.2 G/DL (ref 31.4–37.4)
MCV RBC AUTO: 91 FL (ref 82–98)
MONOCYTES # BLD AUTO: 0.53 THOUSAND/ΜL (ref 0.17–1.22)
MONOCYTES NFR BLD AUTO: 9 % (ref 4–12)
NEUTROPHILS # BLD AUTO: 3.08 THOUSANDS/ΜL (ref 1.85–7.62)
NEUTS SEG NFR BLD AUTO: 51 % (ref 43–75)
NRBC BLD AUTO-RTO: 0 /100 WBCS
PLATELET # BLD AUTO: 241 THOUSANDS/UL (ref 149–390)
PMV BLD AUTO: 9.3 FL (ref 8.9–12.7)
POTASSIUM SERPL-SCNC: 3.9 MMOL/L (ref 3.5–5.3)
PROT SERPL-MCNC: 7.1 G/DL (ref 6.4–8.4)
PROTHROMBIN TIME: 15.6 SECONDS (ref 11.6–14.5)
RBC # BLD AUTO: 4.58 MILLION/UL (ref 3.81–5.12)
RH BLD: POSITIVE
SODIUM SERPL-SCNC: 139 MMOL/L (ref 135–147)
SPECIMEN EXPIRATION DATE: NORMAL
WBC # BLD AUTO: 5.93 THOUSAND/UL (ref 4.31–10.16)

## 2022-02-21 PROCEDURE — 36415 COLL VENOUS BLD VENIPUNCTURE: CPT

## 2022-02-21 PROCEDURE — 86901 BLOOD TYPING SEROLOGIC RH(D): CPT

## 2022-02-21 PROCEDURE — 85730 THROMBOPLASTIN TIME PARTIAL: CPT

## 2022-02-21 PROCEDURE — 82306 VITAMIN D 25 HYDROXY: CPT

## 2022-02-21 PROCEDURE — 86900 BLOOD TYPING SEROLOGIC ABO: CPT

## 2022-02-21 PROCEDURE — 83036 HEMOGLOBIN GLYCOSYLATED A1C: CPT

## 2022-02-21 PROCEDURE — 86850 RBC ANTIBODY SCREEN: CPT

## 2022-02-21 PROCEDURE — 85025 COMPLETE CBC W/AUTO DIFF WBC: CPT

## 2022-02-21 PROCEDURE — 85610 PROTHROMBIN TIME: CPT

## 2022-02-21 PROCEDURE — 80053 COMPREHEN METABOLIC PANEL: CPT

## 2022-02-21 PROCEDURE — 86140 C-REACTIVE PROTEIN: CPT

## 2022-02-22 ENCOUNTER — PREP FOR PROCEDURE (OUTPATIENT)
Dept: OBGYN CLINIC | Facility: CLINIC | Age: 72
End: 2022-02-22

## 2022-02-22 ENCOUNTER — ANESTHESIA EVENT (OUTPATIENT)
Dept: PERIOP | Facility: HOSPITAL | Age: 72
DRG: 470 | End: 2022-02-22
Payer: COMMERCIAL

## 2022-02-23 NOTE — RESULT ENCOUNTER NOTE
Please call the patient regarding abnormal result  25 hydroxy vitamin-D level is in the low-normal range at 30 8  Continue supplementation consistently

## 2022-02-24 ENCOUNTER — CONSULT (OUTPATIENT)
Dept: FAMILY MEDICINE CLINIC | Facility: CLINIC | Age: 72
End: 2022-02-24
Payer: COMMERCIAL

## 2022-02-24 VITALS
SYSTOLIC BLOOD PRESSURE: 118 MMHG | WEIGHT: 180.8 LBS | DIASTOLIC BLOOD PRESSURE: 64 MMHG | TEMPERATURE: 97.7 F | OXYGEN SATURATION: 95 % | BODY MASS INDEX: 33.27 KG/M2 | HEIGHT: 62 IN | HEART RATE: 82 BPM

## 2022-02-24 DIAGNOSIS — M25.562 CHRONIC PAIN OF LEFT KNEE: Primary | ICD-10-CM

## 2022-02-24 DIAGNOSIS — I10 ESSENTIAL HYPERTENSION: ICD-10-CM

## 2022-02-24 DIAGNOSIS — G89.29 CHRONIC PAIN OF LEFT KNEE: Primary | ICD-10-CM

## 2022-02-24 PROCEDURE — 99213 OFFICE O/P EST LOW 20 MIN: CPT | Performed by: FAMILY MEDICINE

## 2022-02-24 PROCEDURE — 1160F RVW MEDS BY RX/DR IN RCRD: CPT | Performed by: FAMILY MEDICINE

## 2022-02-24 PROCEDURE — 1036F TOBACCO NON-USER: CPT | Performed by: FAMILY MEDICINE

## 2022-02-24 PROCEDURE — 3725F SCREEN DEPRESSION PERFORMED: CPT | Performed by: FAMILY MEDICINE

## 2022-02-24 PROCEDURE — 3074F SYST BP LT 130 MM HG: CPT | Performed by: FAMILY MEDICINE

## 2022-02-24 PROCEDURE — 3008F BODY MASS INDEX DOCD: CPT | Performed by: FAMILY MEDICINE

## 2022-02-24 PROCEDURE — 3078F DIAST BP <80 MM HG: CPT | Performed by: FAMILY MEDICINE

## 2022-02-24 NOTE — PROGRESS NOTES
Assessment/Plan:    No problem-specific Assessment & Plan notes found for this encounter  Diagnoses and all orders for this visit:    Chronic pain of left knee  Comments:  pt is a low cardiovascular risk for proposed procedure    Essential hypertension  Comments:  no change in the medication          PHQ-2/9 Depression Screening    Little interest or pleasure in doing things: 0 - not at all  Feeling down, depressed, or hopeless: 0 - not at all  PHQ-2 Score: 0  PHQ-2 Interpretation: Negative depression screen          Subjective:      Patient ID: Luis Lindsay is a 73 y o  female  Post-op left knee  Knee Pain   The incident occurred more than 1 week ago  There was no injury mechanism  The pain is present in the left knee  The quality of the pain is described as aching  The pain is severe  The following portions of the patient's history were reviewed and updated as appropriate: allergies, current medications, past family history, past medical history, past social history, past surgical history and problem list     Review of Systems   Constitutional: Negative for chills, fatigue and fever  HENT: Negative  Negative for hearing loss  Eyes: Negative  Negative for visual disturbance  Respiratory: Negative for shortness of breath and wheezing  Cardiovascular: Negative for chest pain and palpitations  Gastrointestinal: Negative for abdominal pain, blood in stool, constipation, diarrhea, nausea and vomiting  Endocrine: Negative  Genitourinary: Negative for difficulty urinating and dysuria  Musculoskeletal: Positive for arthralgias  Negative for myalgias  Skin: Negative  Allergic/Immunologic: Negative  Neurological: Negative for seizures and syncope  Hematological: Negative for adenopathy  Psychiatric/Behavioral: Negative            Objective:    /64 (BP Location: Left arm, Patient Position: Sitting)   Pulse 82   Temp 97 7 °F (36 5 °C) (Tympanic)   Ht 5' 2" (1 575 m) Wt 82 kg (180 lb 12 8 oz)   SpO2 95%   BMI 33 07 kg/m²      Physical Exam  Vitals and nursing note reviewed  Constitutional:       General: She is not in acute distress  Appearance: Normal appearance  She is well-developed  She is not ill-appearing, toxic-appearing or diaphoretic  HENT:      Head: Normocephalic and atraumatic  Right Ear: Tympanic membrane, ear canal and external ear normal  There is no impacted cerumen  Left Ear: Tympanic membrane, ear canal and external ear normal  There is no impacted cerumen  Nose: Nose normal  No congestion or rhinorrhea  Mouth/Throat:      Mouth: Mucous membranes are moist       Pharynx: Oropharynx is clear  No oropharyngeal exudate or posterior oropharyngeal erythema  Eyes:      General: No scleral icterus  Right eye: No discharge  Left eye: No discharge  Conjunctiva/sclera: Conjunctivae normal       Pupils: Pupils are equal, round, and reactive to light  Cardiovascular:      Rate and Rhythm: Normal rate and regular rhythm  Pulses: Normal pulses  Heart sounds: Normal heart sounds  No murmur heard  Pulmonary:      Effort: Pulmonary effort is normal  No respiratory distress  Breath sounds: Normal breath sounds  No wheezing, rhonchi or rales  Abdominal:      General: Bowel sounds are normal  There is no distension  Palpations: Abdomen is soft  There is no mass  Tenderness: There is no abdominal tenderness  There is no guarding or rebound  Musculoskeletal:         General: Normal range of motion  Cervical back: Normal range of motion and neck supple  No rigidity  Right lower leg: No edema  Left lower leg: No edema  Lymphadenopathy:      Cervical: No cervical adenopathy  Skin:     General: Skin is warm and dry  Findings: No rash  Neurological:      General: No focal deficit present  Mental Status: She is alert and oriented to person, place, and time  Sensory: No sensory deficit  Motor: No weakness or abnormal muscle tone  Coordination: Coordination normal       Gait: Gait normal       Deep Tendon Reflexes: Reflexes are normal and symmetric  Psychiatric:         Mood and Affect: Mood normal          Behavior: Behavior normal          Thought Content:  Thought content normal          Judgment: Judgment normal

## 2022-03-02 NOTE — PRE-PROCEDURE INSTRUCTIONS
Pre-Surgery Instructions:   Medication Instructions    ascorbic acid (VITAMIN C) 500 MG tablet Patient was instructed by Physician and understands  Started 2/16/22 and will continue till 3/15/22    azelastine (ASTELIN) 0 1 % nasal spray Instructed to take per normal schedule including DOS    cholecalciferol (VITAMIN D3) 1,000 units tablet  If your surgeon has not specifically prescribed this vitamin or instructed you to continue then stop taking 7 days prior to surgery per anesthesia guidelines   Cinnamon 500 MG capsule  If your surgeon has not specifically prescribed this vitamin or instructed you to continue then stop taking 7 days prior to surgery per anesthesia guidelines   Cyanocobalamin (VITAMIN B 12 PO)  If your surgeon has not specifically prescribed this vitamin or instructed you to continue then stop taking 7 days prior to surgery per anesthesia guidelines   ferrous sulfate 324 (65 Fe) mg Patient was instructed by Physician and understands  Started 2/16/22 and will continue till 7/03/96    folic acid (FOLVITE) 1 mg tablet Patient was instructed by Physician and understands  Started 2/16/22 and will continue till 3/15/22    levothyroxine 75 mcg tablet Instructed to take per normal schedule including DOS with sips water    metoprolol succinate (TOPROL-XL) 50 mg 24 hr tablet Instructed to take per normal schedule including DOS with sips water    Multiple Vitamins-Minerals (multivitamin with minerals) tablet Patient was instructed by Physician and understands  Started 2/16/22 and will continue till 3/15/22    Red Yeast Rice 600 MG TABS  If your surgeon has not specifically prescribed this vitamin or instructed you to continue then stop taking 7 days prior to surgery per anesthesia guidelines   XARELTO 20 MG tablet Instructed by Cardiologist to stop 2 days prior to procedure    [facility] Patient Education reviewed with all questions answered    Pre op instructions per Domonique Tovar protocol,medications per surgeon/anesthesia guidelines and showering instructions per [facility] TM protocol reviewed-Patient has hibiclens soap and wipes  As of 2/16/22   patient taking Folic Acid,Vitamin C,Iron and a Multivitamin and will continue until 3/15/22  Pt  Verbalized understanding of current visitor restrictions due to Covid and will clarify with nurse DOS  Instructed to avoid all ASA and OTC Vit/Supp 1 week prior to surgery and to avoid NSAIDs 3 days prior to surgery per anesthesia guidelines  Tylenol ok to take prn  Instructed to call surgeon with any Illness or Covid Exposure 1 week prior to procedure till DOS  No Alcohol 24 hours prior to surgery  Patient aware Lovenox or other Blood Thinner prescribed is for POST OP ONLY  Pt  Verbalized an understanding of all instructions reviewed and offers no concerns at this time

## 2022-03-04 ENCOUNTER — HOSPITAL ENCOUNTER (OUTPATIENT)
Dept: BONE DENSITY | Facility: HOSPITAL | Age: 72
Discharge: HOME | End: 2022-03-04
Payer: COMMERCIAL

## 2022-03-04 DIAGNOSIS — Z78.0 ASYMPTOMATIC POSTMENOPAUSAL STATE: ICD-10-CM

## 2022-03-04 DIAGNOSIS — E03.9 HYPOTHYROIDISM, UNSPECIFIED TYPE: ICD-10-CM

## 2022-03-04 PROCEDURE — 77080 DXA BONE DENSITY AXIAL: CPT

## 2022-03-04 RX ORDER — LEVOTHYROXINE SODIUM 0.07 MG/1
TABLET ORAL
Qty: 30 TABLET | Refills: 5 | Status: SHIPPED | OUTPATIENT
Start: 2022-03-04

## 2022-03-15 PROCEDURE — NC001 PR NO CHARGE: Performed by: ORTHOPAEDIC SURGERY

## 2022-03-15 NOTE — H&P
H&P Exam - Orthopedics   Leandra Ybarra 73 y o  female MRN: [id number]  Unit/Bed#:  Encounter: [id number]    Assessment/Plan     Assessment:  Primary osteoarthritis of left knee  Plan:  Elective left total knee replacement    History of Present Illness   HPI:  Leandra Ybarra is a 73 y o  female who presented to our office complaining of left knee pain  The patient stated that her symptoms were continuing to worsen  X-rays were performed which were consistent with advanced arthritic changes  After exhausting conservative treatment, the risks and benefits surgery discussed with the patient  She decided on an elective left total knee replacement  Review of Systems   Constitutional: Negative for chills, fever and unexpected weight change  HENT: Negative for nosebleeds and sore throat  Eyes: Negative for pain, redness and visual disturbance  Respiratory: Negative for cough, shortness of breath and wheezing  Cardiovascular: Negative for chest pain, palpitations and leg swelling  Gastrointestinal: Negative for abdominal pain, nausea and vomiting  Endocrine: Negative for polydipsia and polyuria  Genitourinary: Negative for dysuria and hematuria  Musculoskeletal: Positive for arthralgias, gait problem and myalgias  As noted in HPI   Skin: Negative for rash and wound  Neurological: Negative for dizziness, numbness and headaches  Psychiatric/Behavioral: Negative for decreased concentration and suicidal ideas  The patient is not nervous/anxious          Historical Information   Past Medical History:   Diagnosis Date    Allergic     Arthritis     rheumatoid    Atrial fibrillation (HCC)     [facility]     Disease of thyroid gland     DVT (deep venous thrombosis) ([facility] [address] ) 2015    HL (hearing loss)     Left Ear-Wear Hearing Aid    Hypertension     Irregular heart beat     Kidney stone     Migraine     hx of    Pleural effusion 2016    Polymyalgia rheumatica ([facility] [address] )     "Remission"    Sleep apnea     Mild-Does not require CPAP    Vitamin D deficiency      Past Surgical History:   Procedure Laterality Date    CARDIAC CATHETERIZATION      CARDIOVERSION N/A 2019    Procedure: Ez Hall;  Surgeon: Rolando Heaton MD;  Location: [facility];  Service: Cardiology     SECTION      x3 - last impression 16    FRACTURE SURGERY Left     wrist    HERNIA REPAIR  2018    KNEE ARTHROSCOPY Left     Meniscus Tear Repair    KNEE CARTILAGE SURGERY Left     TONSILLECTOMY AND ADENOIDECTOMY  child; age 17   Juanito Pert TUBAL LIGATION      VEIN SURGERY Right     venous ligation with stripping     Social History   Social History     Substance and Sexual Activity   Alcohol Use Yes    Alcohol/week: 0 0 standard drinks    Comment: Socially     Social History     Substance and Sexual Activity   Drug Use No     Social History     Tobacco Use   Smoking Status Never Smoker   Smokeless Tobacco Never Used     Family History: non-contributory    Meds/Allergies   all medications and allergies reviewed  Allergies   Allergen Reactions    Amiodarone Other (See Comments)     Other reaction(s): Other (See Comments)  Reports caused elevated TSH    Sudafed [Pseudoephedrine] Tachycardia     Rapid heart beat    Sulfa Antibiotics Itching and Rash    Sulfamethoxazole-Trimethoprim Itching and Rash       Objective   Vitals: Height 5' 2" (1 575 m), weight 81 6 kg (180 lb)  ,Body mass index is 32 92 kg/m²  No intake or output data in the 24 hours ending 03/15/22 1103    No intake/output data recorded      Invasive Devices  Report    None                 Physical Exam  Ortho Exam  Left lower extremity is neurovascularly intact  Toes are pink and mobile   Compartments are soft  No warmth, erythema or ecchymosis  ROM of knee is from 5-115 degrees  Negative Lachman, drawer or pivot shift  No medial instability  Medial joint line tenderness, slight lateral joint line tenderness  Patellofemoral crepitation  Lungs CTA  Heart RRR  Lab Results: I have personally reviewed pertinent lab results  Imaging: I have personally reviewed pertinent reports  EKG, Pathology, and Other Studies: I have personally reviewed pertinent reports  Code Status: Prior  Advance Directive and Living Will:      Power of :    POLST:      Counseling / Coordination of Care  Total floor / unit time spent today 30 minutes  Greater than 50% of total time was spent with the patient and / or family counseling and / or coordination of care

## 2022-03-16 ENCOUNTER — HOSPITAL ENCOUNTER (INPATIENT)
Facility: HOSPITAL | Age: 72
LOS: 1 days | Discharge: HOME | DRG: 470 | End: 2022-03-18
Attending: ORTHOPAEDIC SURGERY | Admitting: ORTHOPAEDIC SURGERY
Payer: COMMERCIAL

## 2022-03-16 ENCOUNTER — APPOINTMENT (OUTPATIENT)
Dept: RADIOLOGY | Facility: HOSPITAL | Age: 72
DRG: 470 | End: 2022-03-16
Payer: COMMERCIAL

## 2022-03-16 ENCOUNTER — ANESTHESIA (OUTPATIENT)
Dept: PERIOP | Facility: HOSPITAL | Age: 72
DRG: 470 | End: 2022-03-16
Payer: COMMERCIAL

## 2022-03-16 DIAGNOSIS — M17.0 PRIMARY OSTEOARTHRITIS OF BOTH KNEES: ICD-10-CM

## 2022-03-16 DIAGNOSIS — M17.12 PRIMARY OSTEOARTHRITIS OF LEFT KNEE: Primary | ICD-10-CM

## 2022-03-16 PROBLEM — I48.20 CHRONIC ATRIAL FIBRILLATION (HCC): Status: ACTIVE | Noted: 2019-12-15

## 2022-03-16 PROBLEM — I50.32 CHRONIC DIASTOLIC CONGESTIVE HEART FAILURE (HCC): Status: ACTIVE | Noted: 2019-08-07

## 2022-03-16 PROCEDURE — C1776 JOINT DEVICE (IMPLANTABLE): HCPCS | Performed by: ORTHOPAEDIC SURGERY

## 2022-03-16 PROCEDURE — 73560 X-RAY EXAM OF KNEE 1 OR 2: CPT

## 2022-03-16 PROCEDURE — C1713 ANCHOR/SCREW BN/BN,TIS/BN: HCPCS | Performed by: ORTHOPAEDIC SURGERY

## 2022-03-16 PROCEDURE — C9290 INJ, BUPIVACAINE LIPOSOME: HCPCS | Performed by: ANESTHESIOLOGY

## 2022-03-16 PROCEDURE — 88311 DECALCIFY TISSUE: CPT | Performed by: PATHOLOGY

## 2022-03-16 PROCEDURE — 27447 TOTAL KNEE ARTHROPLASTY: CPT | Performed by: ORTHOPAEDIC SURGERY

## 2022-03-16 PROCEDURE — 97163 PT EVAL HIGH COMPLEX 45 MIN: CPT

## 2022-03-16 PROCEDURE — 88305 TISSUE EXAM BY PATHOLOGIST: CPT | Performed by: PATHOLOGY

## 2022-03-16 PROCEDURE — 99203 OFFICE O/P NEW LOW 30 MIN: CPT | Performed by: NURSE PRACTITIONER

## 2022-03-16 PROCEDURE — 0SRD0J9 REPLACEMENT OF LEFT KNEE JOINT WITH SYNTHETIC SUBSTITUTE, CEMENTED, OPEN APPROACH: ICD-10-PCS | Performed by: ORTHOPAEDIC SURGERY

## 2022-03-16 PROCEDURE — 97110 THERAPEUTIC EXERCISES: CPT

## 2022-03-16 DEVICE — SCREW EXTENSION NEXGEN REPLACEMENT: Type: IMPLANTABLE DEVICE | Site: KNEE | Status: FUNCTIONAL

## 2022-03-16 DEVICE — COMPONENT PATELLAR 8.5MM SZ 32 NEXGEN: Type: IMPLANTABLE DEVICE | Site: KNEE | Status: FUNCTIONAL

## 2022-03-16 DEVICE — PALACOS® R+G IS A FAST SETTING POLYMER CONTAINING GENTAMICIN, FOR USE IN BONE SURGERY. MIXING OF THE TWO COMPONENT SYSTEM, CONSISTING OF A POWDER AND A LIQUID, INITIALLY PRODUCES A LIQUID AND THEN A PASTE, WHICH IS USED TO ANCHOR THE PROSTHESIS TO THE BONE. THE HARDENED BONE CEMENT ALLOWS STABLE FIXATION OF THE PROSTHESIS AND TRANSFERS ALL STRESSES PRODUCED IN A MOVEMENT TO THE BONE VIA THE LARGE INTERFACE. INSOLUBLE ZIRCONIUM DIOXIDE IS INCLUDED IN THE CEMENT POWDER AS AN X RAY CONTRAST MEDIUM. THE CHLOROPHYLL ADDITIVE SERVES AS OPTICAL MARKING OF THE BONE CEMENT AT THE SITE OF THE OPERATION.
Type: IMPLANTABLE DEVICE | Site: KNEE | Status: FUNCTIONAL
Brand: PALACOS®

## 2022-03-16 DEVICE — INSERT ARTCSURF SZ E-F 3-4 12MM FIXED NEXGEN: Type: IMPLANTABLE DEVICE | Site: KNEE | Status: FUNCTIONAL

## 2022-03-16 DEVICE — IMPLANTABLE DEVICE
Type: IMPLANTABLE DEVICE | Site: KNEE | Status: FUNCTIONAL
Brand: NEXGEN® LEGACY® GENDER SOLUTIONS™

## 2022-03-16 DEVICE — PLUG STEM NEXGEN KNEE TAPER: Type: IMPLANTABLE DEVICE | Site: KNEE | Status: FUNCTIONAL

## 2022-03-16 DEVICE — PALACOS® R IS A FAST-CURING, RADIOPAQUE, POLY(METHYL METHACRYLATE)-BASED BONE CEMENT.PALACOS ® R CONTAINS THE X-RAY CONTRAST MEDIUM ZIRCONIUM DIOXIDE. TO IMPROVE VISIBILITY IN THE SURGICAL FIELD PALACOS ® R HAS BEEN COLOURED WITH CHLOROPHYLL (E141). THE BONE CEMENT IS PREPARED DIRECTLY BEFORE USE BY MIXING A POLYMER POWDER COMPONENT WITH A LIQUID MONOMER COMPONENT. A DUCTILE DOUGH FORMS WHICH CURES WITHIN A FEW MINUTES.
Type: IMPLANTABLE DEVICE | Site: KNEE | Status: FUNCTIONAL
Brand: PALACOS®

## 2022-03-16 DEVICE — COMPONENT TIBIAL PRECOAT STEMMED SZ 3 TIVANIUM NEXGEN: Type: IMPLANTABLE DEVICE | Site: KNEE | Status: FUNCTIONAL

## 2022-03-16 RX ORDER — FENTANYL CITRATE 50 UG/ML
INJECTION, SOLUTION INTRAMUSCULAR; INTRAVENOUS AS NEEDED
Status: DISCONTINUED | OUTPATIENT
Start: 2022-03-16 | End: 2022-03-16

## 2022-03-16 RX ORDER — AZELASTINE 1 MG/ML
2 SPRAY, METERED NASAL 2 TIMES DAILY
Status: DISCONTINUED | OUTPATIENT
Start: 2022-03-16 | End: 2022-03-18 | Stop reason: HOSPADM

## 2022-03-16 RX ORDER — ACETAMINOPHEN 325 MG/1
650 TABLET ORAL EVERY 4 HOURS PRN
Status: DISCONTINUED | OUTPATIENT
Start: 2022-03-16 | End: 2022-03-18 | Stop reason: HOSPADM

## 2022-03-16 RX ORDER — ASCORBIC ACID 500 MG
500 TABLET ORAL 2 TIMES DAILY
Status: DISCONTINUED | OUTPATIENT
Start: 2022-03-16 | End: 2022-03-18 | Stop reason: HOSPADM

## 2022-03-16 RX ORDER — ROPIVACAINE HYDROCHLORIDE 2 MG/ML
INJECTION, SOLUTION EPIDURAL; INFILTRATION; PERINEURAL
Status: COMPLETED | OUTPATIENT
Start: 2022-03-16 | End: 2022-03-16

## 2022-03-16 RX ORDER — ONDANSETRON 2 MG/ML
4 INJECTION INTRAMUSCULAR; INTRAVENOUS EVERY 4 HOURS PRN
Status: DISCONTINUED | OUTPATIENT
Start: 2022-03-16 | End: 2022-03-16 | Stop reason: HOSPADM

## 2022-03-16 RX ORDER — BUPIVACAINE HYDROCHLORIDE 5 MG/ML
INJECTION, SOLUTION PERINEURAL
Status: COMPLETED | OUTPATIENT
Start: 2022-03-16 | End: 2022-03-16

## 2022-03-16 RX ORDER — METOCLOPRAMIDE HYDROCHLORIDE 5 MG/ML
10 INJECTION INTRAMUSCULAR; INTRAVENOUS EVERY 4 HOURS PRN
Status: DISCONTINUED | OUTPATIENT
Start: 2022-03-16 | End: 2022-03-16 | Stop reason: HOSPADM

## 2022-03-16 RX ORDER — BUPIVACAINE HYDROCHLORIDE 7.5 MG/ML
INJECTION, SOLUTION INTRASPINAL AS NEEDED
Status: DISCONTINUED | OUTPATIENT
Start: 2022-03-16 | End: 2022-03-16

## 2022-03-16 RX ORDER — FENTANYL CITRATE/PF 50 MCG/ML
50 SYRINGE (ML) INJECTION
Status: DISCONTINUED | OUTPATIENT
Start: 2022-03-16 | End: 2022-03-16 | Stop reason: HOSPADM

## 2022-03-16 RX ORDER — OXYCODONE HYDROCHLORIDE AND ACETAMINOPHEN 5; 325 MG/1; MG/1
2 TABLET ORAL EVERY 6 HOURS PRN
Status: DISCONTINUED | OUTPATIENT
Start: 2022-03-16 | End: 2022-03-18 | Stop reason: HOSPADM

## 2022-03-16 RX ORDER — PROPOFOL 10 MG/ML
INJECTION, EMULSION INTRAVENOUS AS NEEDED
Status: DISCONTINUED | OUTPATIENT
Start: 2022-03-16 | End: 2022-03-16

## 2022-03-16 RX ORDER — HYDROMORPHONE HCL/PF 1 MG/ML
0.5 SYRINGE (ML) INJECTION
Status: DISCONTINUED | OUTPATIENT
Start: 2022-03-16 | End: 2022-03-18 | Stop reason: HOSPADM

## 2022-03-16 RX ORDER — OXYCODONE HYDROCHLORIDE AND ACETAMINOPHEN 5; 325 MG/1; MG/1
1 TABLET ORAL EVERY 4 HOURS PRN
Status: DISCONTINUED | OUTPATIENT
Start: 2022-03-16 | End: 2022-03-18 | Stop reason: HOSPADM

## 2022-03-16 RX ORDER — ONDANSETRON 2 MG/ML
4 INJECTION INTRAMUSCULAR; INTRAVENOUS EVERY 6 HOURS PRN
Status: DISCONTINUED | OUTPATIENT
Start: 2022-03-16 | End: 2022-03-18 | Stop reason: HOSPADM

## 2022-03-16 RX ORDER — METOPROLOL SUCCINATE 50 MG/1
50 TABLET, EXTENDED RELEASE ORAL 2 TIMES DAILY
Status: DISCONTINUED | OUTPATIENT
Start: 2022-03-16 | End: 2022-03-18 | Stop reason: HOSPADM

## 2022-03-16 RX ORDER — FOLIC ACID 1 MG/1
1 TABLET ORAL DAILY
Status: DISCONTINUED | OUTPATIENT
Start: 2022-03-16 | End: 2022-03-18 | Stop reason: HOSPADM

## 2022-03-16 RX ORDER — MELATONIN
1000 DAILY
Status: DISCONTINUED | OUTPATIENT
Start: 2022-03-16 | End: 2022-03-18 | Stop reason: HOSPADM

## 2022-03-16 RX ORDER — DOCUSATE SODIUM 100 MG/1
100 CAPSULE, LIQUID FILLED ORAL 2 TIMES DAILY
Status: DISCONTINUED | OUTPATIENT
Start: 2022-03-16 | End: 2022-03-18 | Stop reason: HOSPADM

## 2022-03-16 RX ORDER — SODIUM CHLORIDE, SODIUM LACTATE, POTASSIUM CHLORIDE, CALCIUM CHLORIDE 600; 310; 30; 20 MG/100ML; MG/100ML; MG/100ML; MG/100ML
125 INJECTION, SOLUTION INTRAVENOUS CONTINUOUS
Status: DISCONTINUED | OUTPATIENT
Start: 2022-03-16 | End: 2022-03-17

## 2022-03-16 RX ORDER — FONDAPARINUX SODIUM 2.5 MG/.5ML
2.5 INJECTION SUBCUTANEOUS DAILY
Status: DISCONTINUED | OUTPATIENT
Start: 2022-03-17 | End: 2022-03-16

## 2022-03-16 RX ORDER — CEFAZOLIN SODIUM 1 G/50ML
1000 SOLUTION INTRAVENOUS EVERY 8 HOURS
Status: COMPLETED | OUTPATIENT
Start: 2022-03-16 | End: 2022-03-17

## 2022-03-16 RX ORDER — HYDROMORPHONE HCL/PF 1 MG/ML
0.4 SYRINGE (ML) INJECTION
Status: DISCONTINUED | OUTPATIENT
Start: 2022-03-16 | End: 2022-03-16 | Stop reason: HOSPADM

## 2022-03-16 RX ORDER — MIDAZOLAM HYDROCHLORIDE 2 MG/2ML
INJECTION, SOLUTION INTRAMUSCULAR; INTRAVENOUS AS NEEDED
Status: DISCONTINUED | OUTPATIENT
Start: 2022-03-16 | End: 2022-03-16

## 2022-03-16 RX ORDER — SODIUM CHLORIDE, SODIUM LACTATE, POTASSIUM CHLORIDE, CALCIUM CHLORIDE 600; 310; 30; 20 MG/100ML; MG/100ML; MG/100ML; MG/100ML
125 INJECTION, SOLUTION INTRAVENOUS CONTINUOUS
Status: CANCELLED | OUTPATIENT
Start: 2022-03-16

## 2022-03-16 RX ORDER — CHLORHEXIDINE GLUCONATE 0.12 MG/ML
15 RINSE ORAL ONCE
Status: COMPLETED | OUTPATIENT
Start: 2022-03-16 | End: 2022-03-16

## 2022-03-16 RX ORDER — FERROUS SULFATE 325(65) MG
325 TABLET ORAL 2 TIMES DAILY WITH MEALS
Status: DISCONTINUED | OUTPATIENT
Start: 2022-03-16 | End: 2022-03-18 | Stop reason: HOSPADM

## 2022-03-16 RX ORDER — FENTANYL CITRATE/PF 50 MCG/ML
25 SYRINGE (ML) INJECTION
Status: DISCONTINUED | OUTPATIENT
Start: 2022-03-16 | End: 2022-03-16 | Stop reason: HOSPADM

## 2022-03-16 RX ORDER — PROPOFOL 10 MG/ML
INJECTION, EMULSION INTRAVENOUS CONTINUOUS PRN
Status: DISCONTINUED | OUTPATIENT
Start: 2022-03-16 | End: 2022-03-16

## 2022-03-16 RX ORDER — CEFAZOLIN SODIUM 2 G/50ML
2000 SOLUTION INTRAVENOUS ONCE
Status: COMPLETED | OUTPATIENT
Start: 2022-03-16 | End: 2022-03-16

## 2022-03-16 RX ORDER — LEVOTHYROXINE SODIUM 0.07 MG/1
75 TABLET ORAL
Status: DISCONTINUED | OUTPATIENT
Start: 2022-03-17 | End: 2022-03-18 | Stop reason: HOSPADM

## 2022-03-16 RX ORDER — MAGNESIUM HYDROXIDE/ALUMINUM HYDROXICE/SIMETHICONE 120; 1200; 1200 MG/30ML; MG/30ML; MG/30ML
30 SUSPENSION ORAL EVERY 6 HOURS PRN
Status: DISCONTINUED | OUTPATIENT
Start: 2022-03-16 | End: 2022-03-18 | Stop reason: HOSPADM

## 2022-03-16 RX ORDER — CHLORHEXIDINE GLUCONATE 4 G/100ML
SOLUTION TOPICAL DAILY PRN
Status: DISCONTINUED | OUTPATIENT
Start: 2022-03-16 | End: 2022-03-16

## 2022-03-16 RX ORDER — POVIDONE-IODINE 10 MG/G
OINTMENT TOPICAL AS NEEDED
Status: DISCONTINUED | OUTPATIENT
Start: 2022-03-16 | End: 2022-03-16 | Stop reason: HOSPADM

## 2022-03-16 RX ORDER — TRANEXAMIC ACID 100 MG/ML
INJECTION, SOLUTION INTRAVENOUS AS NEEDED
Status: DISCONTINUED | OUTPATIENT
Start: 2022-03-16 | End: 2022-03-16

## 2022-03-16 RX ADMIN — PROPOFOL 30 MG: 10 INJECTION, EMULSION INTRAVENOUS at 09:41

## 2022-03-16 RX ADMIN — Medication 1 TABLET: at 13:02

## 2022-03-16 RX ADMIN — METOPROLOL SUCCINATE 50 MG: 50 TABLET, EXTENDED RELEASE ORAL at 17:27

## 2022-03-16 RX ADMIN — SODIUM CHLORIDE, SODIUM LACTATE, POTASSIUM CHLORIDE, AND CALCIUM CHLORIDE: .6; .31; .03; .02 INJECTION, SOLUTION INTRAVENOUS at 11:20

## 2022-03-16 RX ADMIN — Medication 1000 UNITS: at 13:02

## 2022-03-16 RX ADMIN — MIDAZOLAM HYDROCHLORIDE 1 MG: 1 INJECTION, SOLUTION INTRAMUSCULAR; INTRAVENOUS at 08:55

## 2022-03-16 RX ADMIN — ONDANSETRON 4 MG: 2 INJECTION INTRAMUSCULAR; INTRAVENOUS at 14:50

## 2022-03-16 RX ADMIN — HYDROMORPHONE HYDROCHLORIDE 0.5 MG: 1 INJECTION, SOLUTION INTRAMUSCULAR; INTRAVENOUS; SUBCUTANEOUS at 19:25

## 2022-03-16 RX ADMIN — OXYCODONE HYDROCHLORIDE AND ACETAMINOPHEN 500 MG: 500 TABLET ORAL at 13:02

## 2022-03-16 RX ADMIN — ROPIVACAINE HYDROCHLORIDE 20 ML: 2 INJECTION, SOLUTION EPIDURAL; INFILTRATION at 09:01

## 2022-03-16 RX ADMIN — DOCUSATE SODIUM 100 MG: 100 CAPSULE, LIQUID FILLED ORAL at 17:26

## 2022-03-16 RX ADMIN — OXYCODONE AND ACETAMINOPHEN 2 TABLET: 5; 325 TABLET ORAL at 22:32

## 2022-03-16 RX ADMIN — TRANEXAMIC ACID 1 G: 1 INJECTION, SOLUTION INTRAVENOUS at 09:47

## 2022-03-16 RX ADMIN — SODIUM CHLORIDE, SODIUM LACTATE, POTASSIUM CHLORIDE, AND CALCIUM CHLORIDE 125 ML/HR: .6; .31; .03; .02 INJECTION, SOLUTION INTRAVENOUS at 08:21

## 2022-03-16 RX ADMIN — OXYCODONE AND ACETAMINOPHEN 1 TABLET: 5; 325 TABLET ORAL at 14:22

## 2022-03-16 RX ADMIN — CHLORHEXIDINE GLUCONATE 0.12% ORAL RINSE 15 ML: 1.2 LIQUID ORAL at 08:20

## 2022-03-16 RX ADMIN — BUPIVACAINE HYDROCHLORIDE IN DEXTROSE 1.4 ML: 7.5 INJECTION, SOLUTION SUBARACHNOID at 09:39

## 2022-03-16 RX ADMIN — DOCUSATE SODIUM 100 MG: 100 CAPSULE, LIQUID FILLED ORAL at 13:02

## 2022-03-16 RX ADMIN — PHENYLEPHRINE HYDROCHLORIDE 30 MCG/MIN: 10 INJECTION INTRAVENOUS at 09:42

## 2022-03-16 RX ADMIN — FOLIC ACID 1 MG: 1 TABLET ORAL at 13:02

## 2022-03-16 RX ADMIN — OXYCODONE AND ACETAMINOPHEN 1 TABLET: 5; 325 TABLET ORAL at 18:28

## 2022-03-16 RX ADMIN — FERROUS SULFATE TAB 325 MG (65 MG ELEMENTAL FE) 325 MG: 325 (65 FE) TAB at 17:27

## 2022-03-16 RX ADMIN — CEFAZOLIN SODIUM 1000 MG: 1 SOLUTION INTRAVENOUS at 17:27

## 2022-03-16 RX ADMIN — BUPIVACAINE HYDROCHLORIDE 7 ML: 5 INJECTION, SOLUTION PERINEURAL at 08:57

## 2022-03-16 RX ADMIN — CEFAZOLIN SODIUM 2000 MG: 2 SOLUTION INTRAVENOUS at 09:31

## 2022-03-16 RX ADMIN — FENTANYL CITRATE 50 MCG: 50 INJECTION, SOLUTION INTRAMUSCULAR; INTRAVENOUS at 08:55

## 2022-03-16 RX ADMIN — PROPOFOL 90 MCG/KG/MIN: 10 INJECTION, EMULSION INTRAVENOUS at 09:41

## 2022-03-16 RX ADMIN — HYDROMORPHONE HYDROCHLORIDE 0.5 MG: 1 INJECTION, SOLUTION INTRAMUSCULAR; INTRAVENOUS; SUBCUTANEOUS at 14:49

## 2022-03-16 RX ADMIN — OXYCODONE HYDROCHLORIDE AND ACETAMINOPHEN 500 MG: 500 TABLET ORAL at 17:26

## 2022-03-16 RX ADMIN — RIVAROXABAN 20 MG: 10 TABLET, FILM COATED ORAL at 17:26

## 2022-03-16 NOTE — ANESTHESIA POSTPROCEDURE EVALUATION
Post-Op Assessment Note    CV Status:  Stable  Pain Score: 0    Pain management: adequate     Mental Status:  Alert and awake   Hydration Status:  Euvolemic   PONV Controlled:  Controlled   Airway Patency:  Patent      Post Op Vitals Reviewed: Yes      Staff: CRNA, Anesthesiologist         No complications documented      BP   95/60   Temp   97 3   Pulse  55   Resp   16   SpO2   99

## 2022-03-16 NOTE — ASSESSMENT & PLAN NOTE
· Rate is controlled  · Continue with Toprol-- noted her heart rate has been between 50-60s   · Monitor closely with holding parameters  · Anticoagulation-rivaroxaban

## 2022-03-16 NOTE — PHYSICAL THERAPY NOTE
Physical Therapy Evaluation   Time in: 1412  Time out: 1430  Total evaluation time: 18 minutes             03/16/22 1327   PT Last Visit   PT Visit Date 03/16/22   Note Type   Note type Evaluation   Pain Assessment   Pain Assessment Tool 0-10   Pain Score 6   Pain Location/Orientation Orientation: Left; Location: Knee   Pain Onset/Description Descriptor: Sharp;Frequency: Constant/Continuous   Patient's Stated Pain Goal No pain   Hospital Pain Intervention(s) Repositioned; Ambulation/increased activity;Cold applied   Multiple Pain Sites No   Restrictions/Precautions   Weight Bearing Precautions Per Order Yes   LLE Weight Bearing Per Order FWB   Other Precautions Fall Risk;Multiple lines;Pain;WBS; Bed Alarm  (catheter, lindsay drain )   Home Living   Type of [address] Two level;Performs ADLs on one level; Able to live on main level with bedroom/bathroom;Stairs to enter with rails  (5 JUANIS to enter house)   Bathroom Shower/Tub Tub only   Bathroom Toilet Raised   Bathroom Equipment Other (Comment)  (no bathroom equipment )   [address] Walker;Cane  (owns walker and cane, does not use AD at baseline )   Prior Function   Level of [place] Independent with ADLs and functional mobility   Lives With [facility] Help From Family   ADL Assistance Independent   IADLs Independent   Falls in the last 6 months 0   Vocational Retired   Comments (+) driving    General   Family/Caregiver Present Yes   Cognition   Overall Cognitive Status WFL   Arousal/Participation Alert   Orientation Level Oriented X4   Memory Within functional limits   Following Commands Follows all commands and directions without difficulty   Comments pt agreeable to therapy    Subjective   Subjective "I can try to stand up"   Strength RLE   R Hip Flexion 3+/5  (observed 3+/5)   R Knee Extension 3+/5  (observed 3+/5)   R Ankle Dorsiflexion 3+/5  (observed 3+/5)   Strength LLE   L Hip Flexion 3+/5  (observed 3+/5)   L Knee Extension 3+/5  (observed 3+/5)   L Ankle Dorsiflexion 3+/5  (observed 3+/5)   Vision-Basic Assessment   Current Vision Wears glasses only for reading  (Wears contacts )   Coordination   Sensation WFL  (pt reports no nubmness/tingling at baseline )   Bed Mobility   Supine to Sit 5  Supervision   Additional items Assist x 1;HOB elevated; Bedrails; Increased time required   Sit to Supine 5  Supervision   Additional items Assist x 1; Increased time required; Bedrails   Additional Comments BP before mobility in supine =142/82  Pt reported dizziness/lightheaded sitting at EOB  Pt's BP= 144/77  Symptoms resolved after 2 minutes sitting at EOB and prior to standing  Transfers   Sit to Stand 4  Minimal assistance   Additional items Assist x 1;Bedrails; Increased time required;Verbal cues  (VC's for hand placement )   Stand to Sit 4  Minimal assistance   Additional items Assist x 1;Bedrails; Increased time required;Verbal cues  (VC's for hand placement )   Ambulation/Elevation   Gait pattern Improper Weight shift;Decreased foot clearance; Excessively slow   Gait Assistance 4  Minimal assist   Additional items Assist x 1;Verbal cues  (VC's for walker management )   Assistive Device Rolling walker   Distance 3 JUANIS toward [facility]    Stair Management Assistance Not tested   Ambulation/Elevation Additional Comments After ambulation, pt became nauseous  Nausea symptoms decreased after 5 minutes resting in supine in bed      Balance   Static Sitting Good   Dynamic Sitting Fair +   Static Standing Fair   Dynamic Standing Fair -   Ambulatory Fair -   Endurance Deficit   Endurance Deficit Yes   Endurance Deficit Description Pt fatigued after ambulating 3 JUANIS toward [facility]   Activity Tolerance   Activity Tolerance Patient limited by fatigue;Patient limited by pain   Nurse Made Aware Yes, RN made aware    Assessment   Prognosis Good   Problem List Decreased strength;Decreased range of motion;Decreased endurance; Impaired balance;Decreased mobility; Decreased skin integrity;Orthopedic restrictions; Obesity;Pain   Assessment Pt is a 72 y/o female admitted to [place] [address] on 3/16/2022 for primary OA of L knee  PT was ordered, "PT eval and tx" for a high-complexity eval  Pt's PMH includes chronic diastolic CHF, paroxysmal atrial fibrillation, and hx of PE  Personal factors for pt include increased age, pain of L knee, and obesity  Upon PT arrival, the pt presented with decreased strength, decreased endurance, impaired balance, decreased mobility, decreased ROM, pain, decreased skin integrity, orthopedic restrictions, and obesity  Pt lives in a two level house with 5 JUANIS to enter, able to live on main floor, uses no AD at baseline, lives with spouse, and is (I) with ADL's and IADL's  During today's PT eval, the pt performed bed mobility SUP x1, transfers MIN Ax1, and ambulation MIN Ax1  Recommend pt to continue receiving PT services during LOS at hospital to address the deficits mentioned above  Upon pt d/c, PT recommends OPPT  Barriers to Discharge Inaccessible home environment  (5 JUANIS to enter house )   Goals   Patient Goals to have less pain   STG Expiration Date 03/26/22   Short Term Goal #1 STG's that are appropriate for pt: 1 ) Increase BLE strength by 1/2 grade for functional mobility  2 ) Ambulate 100 ft with LRAD for household distances  3 ) Ambulate MOD I to decrease caregiver burden  4 ) Perform bed mobility MOD I for daily functional activities  5 ) Perform transfers MOD I for safe functional mobility  6 ) Improve all balance by one grade for all mobility  7 ) Ascend/descend 3-5 stairs for safe ambulation on stairs at home and in community  PT Treatment Day 0   Plan   Treatment/Interventions LE strengthening/ROM; Functional transfer training;Elevations; Therapeutic exercise; Endurance training;Patient/family training;Bed mobility; Equipment eval/education;Gait training;Spoke to nursing   PT Frequency Twice a day   Recommendation   PT Discharge Recommendation Home with outpatient rehabilitation   Equipment Recommended [facility] Recommended Wheeled walker   Change/add to TM? No   Additional Comments Pt's raw score on AM-PAC Basic Mobility Inpatient Short Form is 18, standardized score is 41 05   Pt's at this level are likely to benefit from d/c to home with services, however, please refer to therapist recommendation for safe d/c planning  AM-PAC Basic Mobility Inpatient   Turning in Bed Without Bedrails 3   Lying on Back to Sitting on Edge of Flat Bed 3   Moving Bed to Chair 3   Standing Up From Chair 3   Walk in Room 3   Climb 3-5 Stairs 3   Basic Mobility Inpatient Raw Score 18   Basic Mobility Standardized Score 41 05   Highest Level Of Mobility   JH-HLM Goal 6: Walk 10 steps or more   JH-HLM Highest Level of Mobility 3: Sit at edge of bed   JH-HLM Goal Achieved No   Additional Treatment Session   Start Time 1430   End Time 1440   Treatment Assessment Pt was seen for a PT tx session on 3/16/2022, including TE's  Upon therapy arrival, the pt was supine in bed and when therapy left, pt was supine in bed  Pt's prognosis is good pending pt medical status with current hospital stay  Recommend pt to continue receiving PT services during LOS at hospital to address the deficits mentioned above  Upon pt d/c, PT recommends OPPT  Additional Treatment Day 1   Exercises   Quad Sets Supine;20 reps;AROM; Bilateral   Glute Sets Supine;20 reps;AROM; Bilateral   Ankle Pumps Supine;20 reps;AROM; Bilateral   End of Consult   Patient Position at End of Consult Supine;Bed/Chair alarm activated; All needs within reach       Patient's Name: Eloy Valadez    Admitting Diagnosis  Primary osteoarthritis of both knees [M17 0]    Problem List  Patient Active Problem List   Diagnosis    [facility] pulmonary [facility]ism ([facility] [address] )    DVT, lower extremity ([facility] [address] )    Polymyalgia rheumatica ([facility] [address] )    Hypothyroidism  Leukocytosis    Vitamin D insufficiency    Left axis deviation    Premature atrial complexes    Essential hypertension    Hypercholesterolemia    Calculus of kidney    SVT (supraventricular tachycardia) (HCC)    SOB (shortness of breath)    Superficial thrombophlebitis of right upper extremity    Seronegative rheumatoid arthritis (HCC)    Chronic diastolic congestive heart failure (HCC)    Negative depression screening    Disease of lung    Sepsis, unspecified organism (HCC)    Paroxysmal atrial fibrillation (HCC)    Chronic atrial fibrillation (HCC)    Volume overload    Acute maxillary sinusitis    Bilateral pleural effusion    Cardiac abnormality    CHF (congestive heart failure) (HCC)    Cough    Diarrhea    Diffuse arthralgia    Diffusion capacity of lung (dl), decreased    Fever of unknown origin (FUO)    History of DVT (deep vein thrombosis)    History of polymyalgia rheumatica    History of pulmonary embolus (PE)    Hx of methotrexate therapy    Nausea and/or vomiting    Pain of left side of body    Pain of right scapula    Chest pain    Right upper quadrant abdominal pain    Secondary adrenal insufficiency (HCC)    Tamponade    Thyroid nodule    Ventral hernia    Class 1 obesity due to excess calories with serious comorbidity and body mass index (BMI) of 31 0 to 31 9 in adult    Medicare annual wellness visit, subsequent    Primary osteoarthritis of left knee       Past Medical History  Past Medical History:   Diagnosis Date    Allergic     Arthritis     rheumatoid    Atrial fibrillation ([address] )     [facility]     Disease of thyroid gland     DVT (deep venous thrombosis) ([address] ) 2015    HL (hearing loss)     Left Ear-Wear Hearing Aid    Hypertension     Irregular heart beat     Kidney stone     Migraine     hx of    Pleural effusion 2016    Polymyalgia rheumatica ([address] )     "Remission"    Sleep apnea     Mild-Does not require CPAP    Vitamin D deficiency        Past Surgical History  Past Surgical History:   Procedure Laterality Date    CARDIAC CATHETERIZATION      CARDIOVERSION N/A 2019    Procedure: CARDIOVERSION;  Surgeon: Buffy Dias MD;  Location: [facility];  Service: Cardiology     SECTION      x3 - last impression 16    FRACTURE SURGERY Left     wrist    HERNIA REPAIR  2018    KNEE ARTHROSCOPY Left     Meniscus Tear Repair    KNEE CARTILAGE SURGERY Left     TONSILLECTOMY AND ADENOIDECTOMY  child; age 17   Reg Standish TUBAL LIGATION      VEIN SURGERY Right     venous ligation with stripping       PT performed at least 2 patient identifiers during session: Name and wristband          Karyn Allison, SPT

## 2022-03-16 NOTE — OP NOTE
OPERATIVE REPORT  PATIENT NAME: Esther Jacinto    :  1950  MRN: [id number]  Pt Location: [facility] OR ROOM 01    SURGERY DATE: 3/16/2022    Surgeon(s) and Role:     * Elana Hunt DO - Primary    Assistant: Afshin Walker PA-C    Preop Diagnosis:  Primary osteoarthritis of both knees [M17 0]    Advanced degenerative joint disease left knee    Post-Op Diagnosis Codes:     * Primary osteoarthritis of both knees [M17 0]    Advanced degenerative joint disease left knee    Procedure(s) (LRB):  KNEE TOTAL ARTHROPLASTY (Left) using the Pamela NextGen system was following sizes:  E LPS GSF femoral component, #3  Tibial tray, 12 mm polyarticular surface, and a 32 mm patella button    Specimen(s):  Bone/synovium    Estimated Blood Loss:   50 cc    Drains:  solcotrans    Anesthesia Type:   Spinal with adductor canal and iPACK blocks    Operative Indications:  Primary osteoarthritis of both knees [M17 0]      Advanced degenerative joint disease left knee    Operative Findings:  TT: 79    Complications:   None    Procedure and Technique:    The patient was properly identified and brought to the operative suite  After successful induction of the  Anesthetic, a  tourniquet was placed on the patient's left proximal thigh  The left lower extremity was prepped and draped in the usual usual sterile fashion  It was medically necessary that the physician's assistant be in the room to aid in positioning placing the appropriate amount of retraction the patient  A qualified resident was not available  The physician assistant's role was very integral to the success of this case  He assisted in positioning, draping, retraction soft tissue, retraction neurovascular bundles, assisted with implantation of prosthesis, assisted with reduction of prosthesis, and assisted with closure of a complex wound      She was given a dose of intravenous antibiotics  Surgical landmarks were outlined  With  a skin marker    An Esmarch was used to examine the left lower extremity and the tourniquet was then inflated  Using a #10  Scalpel  Blade, an anterior incision was made on patient's left knee  Hemostasis was achieved with the use of electrocautery  Through a  a significant amount of dissection through adipose tissue the capsule and  quadricep tendon  Was  then located  Using a 2nd #10  Scalpel blade a medial parapatellar arthrotomy did occur with a rush of clear synovial fluid  A posteriormedial sleeve was developed to the level of the semi membranous  tendon  The patella then everted and  knee was flexed  The retro and  superior fat pads were excised  The cruciate ligaments were  divided  At this point the proximal tibia could  be anteriorly subluxed  An intramedullary drill hole was placed in the proximal tibia, followed by the external cutting jig  The  smallest portion of deficient medial side bone was to  be removed, with its corresponding lateral side  Varus and  valgus angulation was also checked  The collateral ligaments were protected  The neurovascular bundle was then protected  The proximal tibia was then osteotomized  Attention then paid to the preparation of distal femur  The intramedullary johann was placed with a 6 degree valgus cut placed approximately 3° of external rotation  The external  cutting jig was placed on top of this  An additional 2 mm cut the because of a flexion contracture  The collateral ligaments were protected  The  distal femur was then osteotomized  The femur was then sized  A size E did a best fit  Both gender and  standard cutting blocks were checked and a gender specific  cutting block did  Have the best fit  Without any excessive notching of the anterior condyle  The collaterals werfe then protected, the distal femur was an osteotomized in the  following sequence 1 anterior condyle 2  posterior condyle 3 posterior chamfer and 4 anterior chamfer    At this point all the bone fragments and then removed  A lamina  was placed both the medial and lateral sides and the redundant menisci and posterior osteophytes were then removed  Flexion-extension blocks were placed next and a 12 mm block gave good symmetric balancing  The femur was then finished with the trochlear recess and notch block placed in a slightly lateral position to facilitate tracking patella  The tibial was sized next  A size #3 did have  best fit  The size E LPS GSF femoral component placed, followed by a #3  Tibial tray, followed by several polyarticular services and a 12 mm tray did have the best fit  The rotation of the  tibia was then marked  The patella was inspected next  There was a tremendous amount of arthrosis  The peripheral osteophytes were removed  It was confirmed with a caliper that there is adequate bone stock  And then using the SureWaves reaming system approximately 9 mm of undersurface patella then reamed  The patella sized to a 32 mm patella button  This guide was drilled in a slightly superior medial position facilitate tracking patella  The tibia was then finished with a drill hole drill hole and keel punch  At this point there is there is good stability and range of motion in the knee  All the trial components removed  The wound was copiously irrigated sterile antibiotic solution  Cement  was being mixed on the back table was used 3rd generation cementing techniques  The proximal tibia cemented in placed followed by distal femur  A trial poly articular surface was placed till the cement fully cured  The patella was  cemented in place and held with a patellar clamp till the cement fully cured as well  Once the cement fully cured, it was irrigated  The polyarticular surfaces were  tried once again and a 12 mm tray to the best fit  The final 12 mm tray was placed reduced 1 final time and found to be quite stable    After it was irrigated,  a quarter-inch solcotrans  Was  placed near the superior aspect  Of the incision  The quadriceps  and capsule were closed with #1  Vicryl  Deep layer fascia closed multiple layers of 0 Vicryl  Superficial was closed with 2-0 Vicryl  The skin was approximated  With staples  The wounds were dressed with ointment Xeroform 4x4s ABDs sterile Webril and Ace bandage  There was no complication during the procedure  She was successfully awoken,  transferred  To the hospital bed,  And went to the recovery room stable  In condition          I was present for the entire procedure, A qualified resident physician was not available and A physician assistant was required during the procedure for retraction tissue handling,dissection and suturing    Patient Disposition:  PACU       SIGNATURE: Saul Dick DO  DATE: March 16, 2022  TIME: 9:36 AM

## 2022-03-16 NOTE — ASSESSMENT & PLAN NOTE
· Underwent and left total knee arthroplasty on 03/16/2022  · Treatment per primary team  · PT/OT/pain management

## 2022-03-16 NOTE — PLAN OF CARE
Problem: PHYSICAL THERAPY ADULT  Goal: Performs mobility at highest level of function for planned discharge setting  See evaluation for individualized goals  Description: Treatment/Interventions: LE strengthening/ROM,Functional transfer training,Elevations,Therapeutic exercise,Endurance training,Patient/family training,Bed mobility,Equipment eval/education,Gait training,Spoke to nursing  Equipment Recommended: Obie Castleman       See flowsheet documentation for full assessment, interventions and recommendations  Note: Prognosis: Good  Problem List: Decreased strength,Decreased range of motion,Decreased endurance,Impaired balance,Decreased mobility,Decreased skin integrity,Orthopedic restrictions,Obesity,Pain  Assessment: Pt is a 74 y/o female admitted to [place] [address] on 3/16/2022 for primary OA of L knee  PT was ordered, "PT eval and tx" for a high-complexity eval  Pt's PMH includes chronic diastolic CHF, paroxysmal atrial fibrillation, and hx of PE  Personal factors for pt include increased age, pain of L knee, and obesity  Upon PT arrival, the pt presented with decreased strength, decreased endurance, impaired balance, decreased mobility, decreased ROM, pain, decreased skin integrity, orthopedic restrictions, and obesity  Pt lives in a two level house with 5 JUANIS to enter, able to live on main floor, uses no AD at baseline, lives with spouse, and is (I) with ADL's and IADL's  During today's PT eval, the pt performed bed mobility SUP x1, transfers MIN Ax1, and ambulation MIN Ax1  Recommend pt to continue receiving PT services during LOS at hospital to address the deficits mentioned above  Upon pt d/c, PT recommends OPPT  Barriers to Discharge: Inaccessible home environment (5 JUANIS to enter house )        PT Discharge Recommendation: Home with outpatient rehabilitation          See flowsheet documentation for full assessment

## 2022-03-16 NOTE — CONSULTS
[address] 1950, 73 y o  female MRN: [id number]  Unit/Bed#: -01 Encounter: [id number]  Primary Care Provider: Avinash Pozo DO   Date and time admitted to hospital: 3/16/2022  7:41 AM    Inpatient consult to Internal Medicine  Consult performed by: SEVERIANO Garcia  Consult ordered by: Sally Bonilla PA-C          * Primary osteoarthritis of left knee  Assessment & Plan  · Underwent and left total knee arthroplasty on 03/16/2022  · Treatment per primary team  · PT/OT/pain management    History of pulmonary embolus (PE)  Assessment & Plan  · Continue with rivaroxaban    Paroxysmal atrial fibrillation (HCC)  Assessment & Plan  · Rate is controlled  · Continue with Toprol-- noted her heart rate has been between 50-60s   · Monitor closely with holding parameters  · Anticoagulation-rivaroxaban    Chronic diastolic congestive heart failure ([address] )  Assessment & Plan  Wt Readings from Last 3 Encounters:   03/16/22 81 6 kg (180 lb)   02/24/22 82 kg (180 lb 12 8 oz)   02/07/22 82 6 kg (182 lb)       · In no acute exacerbation  · Continue with current medical management  · The patient is not on any diuretic at home  · Daily weight        Thank you for letting us participate in the patient's care  Please feel free to contact us with any questions or concerns  Internal Medicine will signed off  Recommendations for Discharge:  · Per Primary Service    History of Present Illness:  Fili Camargo is a 73 y o  female who is originally admitted to the orthopedic surgery service due to primary osteoarthritis of the left knee  We are consulted for medical management  The patient underwent an elective left total knee arthroplasty on 03/16/2022  She was seen examined in her room  Currently she is sleepy as she just received some pain medication  She reports some nausea    She denies any chest pain, palpitation, lightheadedness, dizziness, dyspnea, nausea, vomiting  Review of Systems:  Review of Systems   Constitutional: Negative for activity change, appetite change, chills, diaphoresis and fever  HENT: Negative for congestion, ear pain, hearing loss, tinnitus and trouble swallowing  Eyes: Negative for photophobia, pain, discharge, itching and visual disturbance  Respiratory: Negative for cough, shortness of breath, wheezing and stridor  Cardiovascular: Negative for chest pain, palpitations and leg swelling  Gastrointestinal: Negative for abdominal pain, blood in stool, constipation, diarrhea, nausea and vomiting  Endocrine: Negative for cold intolerance, heat intolerance, polydipsia, polyphagia and polyuria  Genitourinary: Negative for difficulty urinating, dysuria, frequency, hematuria and urgency  Musculoskeletal: Positive for gait problem  Negative for back pain and neck stiffness  Pain on left knee     Skin: Negative for pallor, rash and wound  Allergic/Immunologic: Negative for environmental allergies, food allergies and immunocompromised state  Neurological: Negative for dizziness, tremors, speech difficulty, weakness, light-headedness, numbness and headaches  Hematological: Negative for adenopathy  Does not bruise/bleed easily  Psychiatric/Behavioral: Negative for confusion, hallucinations and sleep disturbance         Past Medical and Surgical History:   Past Medical History:   Diagnosis Date    Allergic     Arthritis     rheumatoid    Atrial fibrillation (HCC)     CHF (congestive heart failure) (HCC)     Disease of thyroid gland     [facility] 2015    HL (hearing loss)     Left Ear-Wear Hearing Aid    Hypertension     Irregular heart beat     Kidney stone     Migraine     hx of    Pleural effusion 2016    [facility]ymyalgia rheumatica ([facility] )     "Remission"    Sleep apnea     Mild-Does not require CPAP    Vitamin D deficiency        Past Surgical History:   Procedure Laterality Date  CARDIAC CATHETERIZATION      CARDIOVERSION N/A 2019    Procedure: CARDIOVERSION;  Surgeon: Dash Poole MD;  Location: [facility];  Service: Cardiology     SECTION      x3 - last impression 16    FRACTURE SURGERY Left     wrist    HERNIA REPAIR  2018    KNEE ARTHROSCOPY Left     Meniscus Tear Repair    KNEE CARTILAGE SURGERY Left     TONSILLECTOMY AND ADENOIDECTOMY  child; age 17   [facility] TUBAL LIGATION      VEIN SURGERY Right     venous ligation with stripping       Meds/Allergies:  all medications and allergies reviewed    Allergies: Allergies   Allergen Reactions    Amiodarone Other (See Comments)     Other reaction(s): Other (See Comments)  Reports caused elevated TSH    Sudafed [Pseudoephedrine] Tachycardia     Rapid heart beat    Sulfa Antibiotics Itching and Rash    Sulfamethoxazole-Trimethoprim Itching and Rash       Social History:     Marital Status: /Civil Union    Substance Use History:   Social History     Substance and Sexual Activity   Alcohol Use Yes    Alcohol/week: 0 0 standard drinks    Comment: Socially     Social History     Tobacco Use   Smoking Status Never Smoker   Smokeless Tobacco Never Used     Social History     Substance and Sexual Activity   Drug Use No       Family History:  I have reviewed the patients family history    Physical Exam:   Vitals:   Blood Pressure: 110/51 (22 1442)  Pulse: (!) 53 (22 144)  Temperature: 97 9 °F (36 6 °C) (22)  Temp Source: Temporal (22)  Respirations: 18 (22 144)  Height: 5' 2" (157 5 cm) (22 0807)  Weight - Scale: 81 6 kg (180 lb) (22 0807)  SpO2: 99 % (22)    Physical Exam  Vitals and nursing note reviewed  Constitutional:       General: She is sleeping  She is not in acute distress  Appearance: Normal appearance  HENT:      Head: Normocephalic and atraumatic        Right Ear: External ear normal       Left Ear: External ear normal  Nose: Nose normal  No rhinorrhea  Mouth/Throat:      Mouth: Mucous membranes are moist       Pharynx: Oropharynx is clear  Eyes:      General:         Right eye: No discharge  Left eye: No discharge  Pupils: Pupils are equal, round, and reactive to light  Cardiovascular:      Rate and Rhythm: Normal rate and regular rhythm  Pulses: Normal pulses  Heart sounds: Normal heart sounds  No murmur heard  Pulmonary:      Effort: Pulmonary effort is normal  No respiratory distress  Breath sounds: Normal breath sounds  Abdominal:      General: Bowel sounds are normal  There is no distension  Palpations: Abdomen is soft  There is no mass  Tenderness: There is no abdominal tenderness  Musculoskeletal:         General: No swelling or tenderness  Cervical back: Normal range of motion and neck supple  No muscular tenderness  Comments: Left knee with dressing on     Skin:     General: Skin is warm and dry  Capillary Refill: Capillary refill takes less than 2 seconds  Findings: No erythema or rash  Neurological:      General: No focal deficit present  Mental Status: She is oriented to person, place, and time and easily aroused  Mental status is at baseline  Psychiatric:         Mood and Affect: Mood normal          Behavior: Behavior normal          Thought Content: Thought content normal          Judgment: Judgment normal          Additional Data:   Lab Results: I have personally reviewed pertinent reports  Invalid input(s): LABALBU          Lab Results   Component Value Date/Time    HGBA1C 5 3 02/21/2022 11:19 AM    HGBA1C 5 8 01/12/2017 04:40 AM           Imaging: I have personally reviewed pertinent reports  XR knee left 1 or 2 views    (Results Pending)       EKG, Pathology, and Other Studies Reviewed on Admission:   · Echocardiogram 12/16/2019 LVEF 55%      ** Please Note: This note has been constructed using a voice recognition system   **

## 2022-03-16 NOTE — ASSESSMENT & PLAN NOTE
Wt Readings from Last 3 Encounters:   03/16/22 81 6 kg (180 lb)   02/24/22 82 kg (180 lb 12 8 oz)   02/07/22 82 6 kg (182 lb)       · In no acute exacerbation  · Continue with current medical management  · The patient is not on any diuretic at home  · Daily weight

## 2022-03-16 NOTE — ANESTHESIA PREPROCEDURE EVALUATION
Procedure:  KNEE TOTAL ARTHROPLASTY (Left Knee)    Relevant Problems   CARDIO   (+) Atrial flutter with rapid ventricular response (HCC)   (+) CHF (congestive heart failure) (HCC)   (+) Chest pain   (+) DVT, lower extremity (HCC)   (+) Essential hypertension   (+) Hypercholesterolemia   (+) Pain of right scapula   (+) Paroxysmal atrial fibrillation (HCC)   (+) Premature atrial complexes   (+) SVT (supraventricular tachycardia) (HCC)      ENDO   (+) Hypothyroidism      /RENAL   (+) Calculus of kidney      MUSCULOSKELETAL   (+) Seronegative rheumatoid arthritis (HCC)      NEURO/PSYCH   (+) History of DVT (deep vein thrombosis)   (+) History of polymyalgia rheumatica   (+) History of pulmonary embolus (PE)      PULMONARY   (+) Bilateral pleural effusion   (+) SOB (shortness of breath)      Other   (+) Secondary adrenal insufficiency (HCC)      TTE 3/16/21  SUMMARY     LEFT VENTRICLE:  Size was normal    Systolic function was normal  Ejection fraction was estimated to be 55 %  There were no regional wall motion abnormalities  Wall thickness was normal   There was no evidence of concentric hypertrophy      TRICUSPID VALVE:  There was mild regurgitation  Estimated peak PA pressure was 35 mmHg  Physical Exam    Airway    Mallampati score: II  TM Distance: >3 FB  Neck ROM: full     Dental   Comment: Small mouth opening, No notable dental hx     Cardiovascular  Rhythm: regular, Rate: normal,     Pulmonary      Other Findings        Anesthesia Plan  ASA Score- 3     Anesthesia Type- spinal with ASA Monitors  Additional Monitors:   Airway Plan:           Plan Factors-Exercise tolerance (METS): >4 METS  Chart reviewed  Existing labs reviewed  Patient summary reviewed  Induction- intravenous  Postoperative Plan- Plan for postoperative opioid use  Planned trial extubation    Informed Consent- Anesthetic plan and risks discussed with patient  I personally reviewed this patient with the CRNA  Discussed and agreed on the Anesthesia Plan with the CLEMENTINA [place]

## 2022-03-16 NOTE — ANESTHESIA PROCEDURE NOTES
Peripheral Block    Patient location during procedure: holding area  Start time: 3/16/2022 8:57 AM  Reason for block: at surgeon's request and post-op pain management  Staffing  Performed: Anesthesiologist   Anesthesiologist: Viri Harden MD  Preanesthetic Checklist  Completed: patient identified, IV checked, site marked, risks and benefits discussed, surgical consent, monitors and equipment checked, pre-op evaluation and timeout performed  Peripheral Block  Patient position: supine  Prep: ChloraPrep  Patient monitoring: continuous pulse ox and frequent blood pressure checks  Block type: adductor canal block  Laterality: left  Procedures: ultrasound guided, Ultrasound guidance required for the procedure to increase accuracy and safety of medication placement and decrease risk of complications  Ultrasound permanent image savedbupivacaine (MARCAINE) 0 5 % perineural infiltration, 7 mL  Needle  Needle type: [employer]   Needle gauge: 20g  Needle length: 4in    Needle localization: ultrasound guidance  Test dose: negative  Assessment  Injection assessment: incremental injection, local visualized surrounding nerve on ultrasound, no paresthesia on injection and negative aspiration for heme  Paresthesia pain: none  Heart rate change: no  Slow fractionated injection: yes  Post-procedure:  site cleaned  patient tolerated the procedure well with no immediate complications  Additional Notes  With Exparel 20 mL

## 2022-03-16 NOTE — ANESTHESIA PROCEDURE NOTES
Spinal Block    Patient location during procedure: OR  Start time: 3/16/2022 9:39 AM  Reason for block: procedure for pain and at surgeon's request  Staffing  Performed: CRNA   Anesthesiologist: Bryce Meier MD  Resident/CRNA: Anatoliy Holman CRNA  Preanesthetic Checklist  Completed: patient identified, IV checked, risks and benefits discussed, surgical consent, monitors and equipment checked, pre-op evaluation and timeout performed  Spinal Block  Patient position: sitting  Prep: ChloraPrep  Patient monitoring: cardiac monitor and frequent blood pressure checks  Approach: midline  Location: L3-4  Injection technique: single-shot  Needle  Needle type: pencil-tip   Needle gauge: 25 G  Needle length: 10 cm  Assessment  Sensory level: T4  Injection Assessment:  negative aspiration for heme, no paresthesia on injection and positive aspiration for clear CSF    Post-procedure:  adhesive bandage applied, pressure dressing applied, secured with tape, site cleaned and sterile dressing applied

## 2022-03-16 NOTE — INTERVAL H&P NOTE
H&P reviewed  After examining the patient I find no changes in the patients condition since the H&P had been written      Vitals:    03/16/22 0807   BP: 163/74   Pulse: 61   Resp: 18   Temp: 97 8 °F (36 6 °C)   SpO2: 99%

## 2022-03-16 NOTE — NURSING NOTE
Pt has a open sore at her right lower leg that is approximately the size of a dime-I took a picture of it and showed Fidelina Minaya said it is okay to proceed with pt's surgery

## 2022-03-16 NOTE — ANESTHESIA PROCEDURE NOTES
Peripheral Block    Patient location during procedure: holding area  Start time: 3/16/2022 9:01 AM  Reason for block: at surgeon's request and post-op pain management  Staffing  Performed: Anesthesiologist   Anesthesiologist: Shayan Anthony MD  Preanesthetic Checklist  Completed: patient identified, IV checked, site marked, risks and benefits discussed, surgical consent, monitors and equipment checked, pre-op evaluation and timeout performed  Peripheral Block  Patient position: supine  Prep: ChloraPrep  Patient monitoring: continuous pulse ox and frequent blood pressure checks  Block type: ipack block  Laterality: left  Injection technique: single-shot  Procedures: ultrasound guided, Ultrasound guidance required for the procedure to increase accuracy and safety of medication placement and decrease risk of complications  Ultrasound permanent image savedropivacaine (NAROPIN) 0 2% perineural infiltration, 20 mL  Needle  Needle type: Stimuplex   Needle gauge: 20G  Needle length: 4in    Needle localization: ultrasound guidance  Test dose: negative  Assessment  Injection assessment: incremental injection, no paresthesia on injection and negative aspiration for heme  Paresthesia pain: none  Heart rate change: no  Slow fractionated injection: yes  Post-procedure:  site cleaned  patient tolerated the procedure well with no immediate complications

## 2022-03-16 NOTE — DISCHARGE INSTRUCTIONS
TOTAL KNEE/TOTAL HIP REPLACEMENT  POST OPERATIVE INFORMATION    1  You should use a walker or crutches, but you may put as much weight on the leg as is comfortable unless instructed otherwise  2  You may use ice packs as needed for pain and swelling  20 minutes is required to allow the cold to penetrate deep enough  Cover skin with a layer of cloth before applying the ice pack  3  Pump your ankle up and down frequently throughout the day to facilitate circulation, maintain muscle tone, and to aid in reduction of swelling  4  You may shower after 5 days, but remember to keep the dressings dry  5  You should call our office if you experience pain not controlled by your medication, develop a fever, and experience increased drainage or redness around the incision  6  Do not drive a vehicle until you see your physician at the follow-up appointment  7  Refer to your total joint card regarding the need for antibiotics for the following procedures:  Any dental procedures, any infection, especially skin infections, vaginal or GYN exams or surgery, and colonoscopy  8  If you have had a total hip replacement following instructions below to prevent the hip from sliding out a position:   -DO NOT bend your hip more than 90°  This means no squatting, no bending over to tie your shoes, and no bending from the waist to  things from the floor, even if seated  -DO NOT cross your operated leg/foot inward ([place]-toed)   -ALWAYS sleep with pillow or cushion between your legs, as instructed by your doctor   -After surgery these precautions will be again covered by your therapists on a daily basis  9  Call our office for an appointment to see Dr Hollis Underwood in about 14 days  10  You should start outpatient therapy as soon as possible after discharge

## 2022-03-17 LAB
ANION GAP SERPL CALCULATED.3IONS-SCNC: 5 MMOL/L (ref 4–13)
BUN SERPL-MCNC: 13 MG/DL (ref 5–25)
CALCIUM SERPL-MCNC: 8.8 MG/DL (ref 8.4–10.2)
CHLORIDE SERPL-SCNC: 104 MMOL/L (ref 96–108)
CO2 SERPL-SCNC: 28 MMOL/L (ref 21–32)
CREAT SERPL-MCNC: 0.55 MG/DL (ref 0.6–1.3)
ERYTHROCYTE [DISTWIDTH] IN BLOOD BY AUTOMATED COUNT: 14.1 % (ref 11.6–15.1)
GFR SERPL CREATININE-BSD FRML MDRD: 96 ML/MIN/1.73SQ M
GLUCOSE SERPL-MCNC: 102 MG/DL (ref 65–140)
HCT VFR BLD AUTO: 31.7 % (ref 34.8–46.1)
HGB BLD-MCNC: 10 G/DL (ref 11.5–15.4)
MCH RBC QN AUTO: 29.8 PG (ref 26.8–34.3)
MCHC RBC AUTO-ENTMCNC: 31.5 G/DL (ref 31.4–37.4)
MCV RBC AUTO: 94 FL (ref 82–98)
PLATELET # BLD AUTO: 183 THOUSANDS/UL (ref 149–390)
PMV BLD AUTO: 9.3 FL (ref 8.9–12.7)
POTASSIUM SERPL-SCNC: 4.1 MMOL/L (ref 3.5–5.3)
RBC # BLD AUTO: 3.36 MILLION/UL (ref 3.81–5.12)
SODIUM SERPL-SCNC: 137 MMOL/L (ref 135–147)
WBC # BLD AUTO: 7.81 THOUSAND/UL (ref 4.31–10.16)

## 2022-03-17 PROCEDURE — 97110 THERAPEUTIC EXERCISES: CPT

## 2022-03-17 PROCEDURE — 99024 POSTOP FOLLOW-UP VISIT: CPT | Performed by: PHYSICIAN ASSISTANT

## 2022-03-17 PROCEDURE — 97530 THERAPEUTIC ACTIVITIES: CPT

## 2022-03-17 PROCEDURE — 97116 GAIT TRAINING THERAPY: CPT

## 2022-03-17 PROCEDURE — 97166 OT EVAL MOD COMPLEX 45 MIN: CPT

## 2022-03-17 PROCEDURE — 80048 BASIC METABOLIC PNL TOTAL CA: CPT | Performed by: PHYSICIAN ASSISTANT

## 2022-03-17 PROCEDURE — 85027 COMPLETE CBC AUTOMATED: CPT | Performed by: PHYSICIAN ASSISTANT

## 2022-03-17 RX ADMIN — ACETAMINOPHEN 650 MG: 325 TABLET ORAL at 13:04

## 2022-03-17 RX ADMIN — OXYCODONE AND ACETAMINOPHEN 1 TABLET: 5; 325 TABLET ORAL at 11:50

## 2022-03-17 RX ADMIN — CEFAZOLIN SODIUM 1000 MG: 1 SOLUTION INTRAVENOUS at 00:56

## 2022-03-17 RX ADMIN — FERROUS SULFATE TAB 325 MG (65 MG ELEMENTAL FE) 325 MG: 325 (65 FE) TAB at 07:36

## 2022-03-17 RX ADMIN — Medication 1000 UNITS: at 08:09

## 2022-03-17 RX ADMIN — LEVOTHYROXINE SODIUM 75 MCG: 75 TABLET ORAL at 05:23

## 2022-03-17 RX ADMIN — OXYCODONE AND ACETAMINOPHEN 1 TABLET: 5; 325 TABLET ORAL at 17:13

## 2022-03-17 RX ADMIN — FOLIC ACID 1 MG: 1 TABLET ORAL at 08:09

## 2022-03-17 RX ADMIN — RIVAROXABAN 20 MG: 10 TABLET, FILM COATED ORAL at 17:10

## 2022-03-17 RX ADMIN — SODIUM CHLORIDE, SODIUM LACTATE, POTASSIUM CHLORIDE, AND CALCIUM CHLORIDE 125 ML/HR: .6; .31; .03; .02 INJECTION, SOLUTION INTRAVENOUS at 00:59

## 2022-03-17 RX ADMIN — DOCUSATE SODIUM 100 MG: 100 CAPSULE, LIQUID FILLED ORAL at 08:09

## 2022-03-17 RX ADMIN — OXYCODONE HYDROCHLORIDE AND ACETAMINOPHEN 500 MG: 500 TABLET ORAL at 08:09

## 2022-03-17 RX ADMIN — ACETAMINOPHEN 650 MG: 325 TABLET ORAL at 22:41

## 2022-03-17 RX ADMIN — CEFAZOLIN SODIUM 1000 MG: 1 SOLUTION INTRAVENOUS at 08:08

## 2022-03-17 RX ADMIN — HYDROMORPHONE HYDROCHLORIDE 0.5 MG: 1 INJECTION, SOLUTION INTRAMUSCULAR; INTRAVENOUS; SUBCUTANEOUS at 01:46

## 2022-03-17 RX ADMIN — FERROUS SULFATE TAB 325 MG (65 MG ELEMENTAL FE) 325 MG: 325 (65 FE) TAB at 17:10

## 2022-03-17 RX ADMIN — DOCUSATE SODIUM 100 MG: 100 CAPSULE, LIQUID FILLED ORAL at 17:10

## 2022-03-17 RX ADMIN — Medication 1 TABLET: at 08:09

## 2022-03-17 RX ADMIN — OXYCODONE HYDROCHLORIDE AND ACETAMINOPHEN 500 MG: 500 TABLET ORAL at 17:10

## 2022-03-17 RX ADMIN — METOPROLOL SUCCINATE 50 MG: 50 TABLET, EXTENDED RELEASE ORAL at 17:10

## 2022-03-17 RX ADMIN — OXYCODONE AND ACETAMINOPHEN 2 TABLET: 5; 325 TABLET ORAL at 05:23

## 2022-03-17 NOTE — QUICK NOTE
This patient underwent a procedure not on the inpatient only list and therefore is subject to the 2 midnight benchmark  Accordingly, in my judgement, the patient will require at least 2 midnights in the hospital receiving acute medical care  The patient is noted to have comorbid conditions including hypertension hypothyroidism which will require management in the leatha-operative period prior to safe discharge  As such the patient will require acute care beyond the usual and routine recovery period for the procedure alone and is therefore appropriate for inpatient admission

## 2022-03-17 NOTE — PLAN OF CARE
Problem: MOBILITY - ADULT  Goal: Maintain or return to baseline ADL function  Description: INTERVENTIONS:  -  Assess patient's ability to carry out ADLs; assess patient's baseline for ADL function and identify physical deficits which impact ability to perform ADLs (bathing, care of mouth/teeth, toileting, grooming, dressing, etc )  - Assess/evaluate cause of self-care deficits   - Assess range of motion  - Assess patient's mobility; develop plan if impaired  - Assess patient's need for assistive devices and provide as appropriate  - Encourage maximum independence but intervene and supervise when necessary  - Involve family in performance of ADLs  - Assess for home care needs following discharge   - Consider OT consult to assist with ADL evaluation and planning for discharge  - Provide patient education as appropriate  Outcome: Progressing     Problem: PAIN - ADULT  Goal: Verbalizes/displays adequate comfort level or baseline comfort level  Description: Interventions:  - Encourage patient to monitor pain and request assistance  - Assess pain using appropriate pain scale  - Administer analgesics based on type and severity of pain and evaluate response  - Implement non-pharmacological measures as appropriate and evaluate response  - Consider cultural and social influences on pain and pain management  - Notify physician/advanced practitioner if interventions unsuccessful or patient reports new pain  Outcome: Progressing     Problem: INFECTION - ADULT  Goal: Absence or prevention of progression during hospitalization  Description: INTERVENTIONS:  - Assess and monitor for signs and symptoms of infection  - Monitor lab/diagnostic results  - Monitor all insertion sites, i e  indwelling lines, tubes, and drains  - Monitor endotracheal if appropriate and nasal secretions for changes in amount and color  - [place] appropriate cooling/warming therapies per order  - Administer medications as ordered  - Instruct and encourage patient and family to use good hand hygiene technique  - Identify and instruct in appropriate isolation precautions for identified infection/condition  Outcome: Progressing     Problem: Knowledge Deficit  Goal: Patient/family/caregiver demonstrates understanding of disease process, treatment plan, medications, and discharge instructions  Description: Complete learning assessment and assess knowledge base    Interventions:  - Provide teaching at level of understanding  - Provide teaching via preferred learning methods  Outcome: Progressing     Problem: DISCHARGE PLANNING  Goal: Discharge to home or other facility with appropriate resources  Description: INTERVENTIONS:  - Identify barriers to discharge w/patient and caregiver  - Arrange for needed discharge resources and transportation as appropriate  - Identify discharge learning needs (meds, wound care, etc )  - Arrange for interpretive services to assist at discharge as needed  - Refer to Case Management Department for coordinating discharge planning if the patient needs post-hospital services based on physician/advanced practitioner order or complex needs related to functional status, cognitive ability, or social support system  Outcome: Progressing

## 2022-03-17 NOTE — PLAN OF CARE
Problem: PHYSICAL THERAPY ADULT  Goal: Performs mobility at highest level of function for planned discharge setting  See evaluation for individualized goals  Description: Treatment/Interventions: LE strengthening/ROM,Functional transfer training,Elevations,Therapeutic exercise,Endurance training,Patient/family training,Bed mobility,Equipment eval/education,Gait training,Spoke to nursing  Equipment Recommended: Jorge Keith       See flowsheet documentation for full assessment, interventions and recommendations  Outcome: Progressing  Note: Prognosis: Good  Problem List: Decreased strength,Decreased endurance,Impaired balance,Decreased range of motion,Decreased mobility,Orthopedic restrictions,Pain  Assessment: Pt seen for PT treatment session this date with interventions consisting of gait training w/ emphasis on improving pt's ability to ambulate level surfaces x 40 ftx1 with min A provided by therapist with RW, Therapeutic exercise consisting of: AROM 20 reps B LE in sitting position and therapeutic activity consisting of training: sit<>stand transfers  Pt agreeable to PT treatment session upon arrival, pt found seated OOB in recliner, in no apparent distress and responsive  In comparison to previous session, pt with improvements in distance ambulated  Post session: pt returned back to recliner, all needs in reach and RN notified of session findings/recommendations  Continue to recommend home with outpatient rehabilitation at time of d/c in order to maximize pt's functional independence and safety w/ mobility  Pt continues to be functioning below baseline level, and remains limited 2* factors listed above and including decreased strength, decreased ROM, decreased functional mobility  PT will continue to see pt during current hospitalization in order to address the deficits listed above and provide interventions consistent w/ POC in effort to achieve STGs    Barriers to Discharge: Inaccessible home environment PT Discharge Recommendation: Home with outpatient rehabilitation          See flowsheet documentation for full assessment

## 2022-03-17 NOTE — PLAN OF CARE
Problem: OCCUPATIONAL THERAPY ADULT  Goal: Performs self-care activities at highest level of function for planned discharge setting  See evaluation for individualized goals  Description: Treatment Interventions: ADL retraining,Functional transfer training,UE strengthening/ROM,Endurance training,Patient/family training,Equipment evaluation/education,Compensatory technique education          See flowsheet documentation for full assessment, interventions and recommendations  3/17/2022 1122 by Fareed Espinoza OT  Note: Limitation: Decreased ADL status,Decreased endurance,Decreased self-care trans,Decreased high-level ADLs  Prognosis: Good  Assessment: Patient is a 73 y o  female seen for OT evaluation s/p admit to [facility] on 3/16/2022 w/Primary osteoarthritis of left knee  Commorbidities affecting patient's functional performance at time of assessment include: CHF, hx of PE, and A-fib  Orders placed for OT evaluation and treatment and activity-beginning POD #0  Performed at least two patient identifiers during session including name and wristband  Prior to admission, Patient reporting being independent with ADLs/IADLs, ambulatory with no AD, and lives with  in a two story house with 1st floor set-up  Personal factors affecting patient at time of initial evaluation include: steps to enter, difficulty performing ADLs and difficulty performing IADLs  Upon evaluation, patient requires supervision assist for UB ADLs, minimal  assist for LB ADLs, transfers and functional ambulation in room and bathroom with minimal  assist, with the use of Rolling Walker  Patient is oriented x 4 and presents with ability to recognize a problem, define a problem, identify alternative plan, select a plan, organize steps in a plan, implement plan and evaluate outcome (problem solving)    Occupational performance is affected by the following deficits: decreased muscle strength, dynamic sit/ stand balance deficit with poor standing tolerance time for self care and functional mobility, decreased activity tolerance, increased pain and delayed righting and equilibrium reactions  Based on the mentioned OT evaluation outcomes, functional performance deficits, and assessment findings, pt has been identified as a moderate complexity evaluation  Patient to benefit from continued Occupational Therapy treatment while in the hospital to address deficits as defined above and maximize level of functional independence with ADLs and functional mobility  Occupational Performance areas to address include: grooming , bathing/ shower, dressing, toilet hygiene, transfer to all surfaces, functional ambulation, medication routine/ management, IADLS: Household maintenance, IADLs: safety procedures and IADLs: meal prep/ clean up  From OT standpoint, recommendation at time of d/c would be Home with outpatient rehabilitation  OT Discharge Recommendation: Home with outpatient rehabilitation  OT - OK to Discharge: Yes (Once medically cleared )    3/17/2022 1121 by Rayne Villaseñor OT  Note: Limitation: Decreased ADL status,Decreased endurance,Decreased self-care trans,Decreased high-level ADLs  Prognosis: Good  Assessment: Patient is a 73 y o  female seen for OT evaluation s/p admit [facility]/16/2022 w/Primary osteoarthritis of left knee  Commorbidities affecting patient's functional performance at time of assessment include: CHF, hx of PE, and A-fib  Orders placed for OT evaluation and treatment and activity-beginning POD #0  Performed at least two patient identifiers during session including name and wristband  Prior to admission, Patient reporting being independent with ADLs/IADLs, ambulatory with no AD, and lives with  in a two story house with 1st floor set-up  Personal factors affecting patient at time of initial evaluation include: steps to enter, difficulty performing ADLs and difficulty performing IADLs   Upon evaluation, patient requires supervision assist for UB ADLs, minimal  assist for LB ADLs, transfers and functional ambulation in room and bathroom with minimal  assist, with the use of Rolling Walker  Patient is oriented x 4 and presents with ability to recognize a problem, define a problem, identify alternative plan, select a plan, organize steps in a plan, implement plan and evaluate outcome (problem solving)  Occupational performance is affected by the following deficits: decreased muscle strength, dynamic sit/ stand balance deficit with poor standing tolerance time for self care and functional mobility, decreased activity tolerance, increased pain and delayed righting and equilibrium reactions  Based on the mentioned OT evaluation outcomes, functional performance deficits, and assessment findings, pt has been identified as a moderate complexity evaluation  Patient to benefit from continued Occupational Therapy treatment while in the hospital to address deficits as defined above and maximize level of functional independence with ADLs and functional mobility  Occupational Performance areas to address include: grooming , bathing/ shower, dressing, toilet hygiene, transfer to all surfaces, functional ambulation, medication routine/ management, IADLS: Household maintenance, IADLs: safety procedures and IADLs: meal prep/ clean up  From OT standpoint, recommendation at time of d/c would be Home with outpatient rehabilitation         OT Discharge Recommendation: Home with outpatient rehabilitation  OT - OK to Discharge: Yes (Once medically cleared )     Randa Hamilton OT

## 2022-03-17 NOTE — CASE MANAGEMENT
Case Management Assessment & Discharge Planning Note    Patient name Leandra Ybarra  Location [address] [id number] MRN [id number]  : 1950 Date 3/17/2022       Current Admission Date: 3/16/2022  Current Admission Diagnosis:Primary osteoarthritis of left knee   Patient Active Problem List    Diagnosis Date Noted    Primary osteoarthritis of left knee 2022    Medicare annual wellness visit, subsequent 2021    Class 1 obesity due to excess calories with serious comorbidity and body mass index (BMI) of 31 0 to 31 9 in adult 01/15/2021    Volume overload 2019    Sepsis, unspecified organism ([facility] [address] ) 12/15/2019    Paroxysmal atrial fibrillation ([facility] [address] ) 12/15/2019    Chronic atrial fibrillation ([facility] [address] ) 12/15/2019    Disease of lung     Negative depression screening 10/09/2019    Ventral hernia 2019    Cardiac abnormality 2019    Chronic diastolic congestive heart failure ([facility] [address] ) 2019    Cough 2019    Diffusion capacity of lung (dl), decreased 2019    Hx of methotrexate therapy 2019    Seronegative rheumatoid arthritis ([facility] [address] )     Chest pain 2019    CHF (congestive heart failure) ([facility] [address] ) 2018    Pain of left side of body 2018    Fever of unknown origin (FUO) 01/15/2018    History of pulmonary embolus (PE) 01/15/2018    Tamponade 01/15/2018    Diarrhea 2018    Nausea and/or vomiting 2018    Pain of right scapula 2018    Right upper quadrant abdominal pain 2018    Thyroid nodule 2017    Acute maxillary sinusitis 2017    History of DVT (deep vein thrombosis) 10/20/2017    History of polymyalgia rheumatica 2017    Bilateral pleural effusion 2017    Secondary adrenal insufficiency ([address] ) 2017    Superficial thrombophlebitis of right upper extremity 2017    SVT (supraventricular tachycardia) (HCC) 2017    SOB (shortness of breath) 2017    Essential hypertension 05/23/2017    Hypercholesterolemia 05/23/2017    Calculus of kidney 05/23/2017    Vitamin D insufficiency 01/12/2017    Left axis deviation 01/12/2017    Premature atrial complexes 01/12/2017    Leukocytosis 01/11/2017    Acute pulmonary embolism ([facility] [address] ) 01/10/2017    DVT, lower extremity ([facility] [address] ) 01/10/2017    Polymyalgia rheumatica ([facility] [address] ) 01/10/2017    Hypothyroidism 01/10/2017    Diffuse arthralgia 10/27/2016      LOS (days): 0  Geometric Mean LOS (GMLOS) (days): 1 80  Days to GMLOS:1 5     OBJECTIVE:    Risk of Unplanned Readmission Score: 12     Current admission status: Inpatient  Preferred Pharmacy:   [facility] [address], [facility]  [address] [place] [address]  [address] [facility] [address]  Phone: [phone] Fax: [phone]    [id number] [address], [facility]  [address] [address]  Phone: [phone] Fax: [phone]    Primary Care Provider: Dirk Vogt DO    Primary Insurance: [facility] [employer]  Secondary Insurance:     ASSESSMENT:  [id number] [facility] Representative - Spouse   Primary Phone: [phone] (Mobile)  Home Phone: [phone]               Advance Directives  Does patient have a [address]?: No  Was patient offered paperwork?: Yes (Declined)  Does patient currently have a Health Care decision maker?: Yes, please see Health Care Proxy section  Does patient have Advance Directives?: No  Was patient offered paperwork?: Yes (Declined)  Primary Contact: Phylicia Choudhary spouse    Readmission Root Cause  30 Day Readmission: No    Patient Information  Admitted from[de-identified] Home  Mental Status: Alert  During Assessment patient was accompanied by: Not accompanied during assessment  Assessment information provided by[de-identified] Patient  Primary Caregiver: Self  Support Systems: Spouse/significant other  [place] of Residence: [address] do you live in?: [address] entry access options   Select all that apply : Stairs  Number of steps to enter home : 5  Do the steps have railings?: Yes  Type of Current Residence: 2 story home  Upon entering residence, is there a bedroom on the main floor (no further steps)?: Yes  Upon entering residence, is there a bathroom on the main floor (no further steps)?: Yes  In the last 12 months, was there a time when you were not able to pay the mortgage or rent on time?: No  In the last 12 months, how many places have you lived?: 1  In the last 12 months, was there a time when you did not have a steady place to sleep or slept in a shelter (including now)?: No  Homeless/housing insecurity resource given?: N/A  Living Arrangements: Lives w/ Spouse/significant other  Is patient a ?: No    Activities of Daily Living Prior to Admission  Functional Status: Independent  Completes ADLs independently?: Yes  Ambulates independently?: Yes  Does patient use assisted devices?: No  Does patient currently own DME?: Yes  What DME does the patient currently own?: Hodan Casas  Does patient have a history of Outpatient Therapy (PT/OT)?: Yes ( [facility])  Does the patient have a history of Short-Term Rehab?: No  Does patient have a history of HHC?: No  Does patient currently have [facility]?: No  Patient Information Continued  Income Source: Pension/[employer]  Does patient have prescription coverage?: Yes  Within the past 12 months, you worried that your food would run out before you got the money to buy more : Never true  Within the past 12 months, the food you bought just didnt last and you didnt have money to get more : Never true  Food insecurity resource given?: N/A  Does patient receive dialysis treatments?: No  Does patient have a history of substance abuse?: No  Does patient have a history of Mental Health Diagnosis?: No    PHQ 2/9 Screening   Reviewed PHQ 2/9 Depression Screening Score?: No    Means of Transportation  Means of Transport to Appts[de-identified] Drives Self  In the past 12 months, has lack of transportation kept you from medical appointments or from getting medications?: No  In the past 12 months, has lack of transportation kept you from meetings, work, or from getting things needed for daily living?: No  Was application for public transport provided?: N/A  DISCHARGE DETAILS:    Discharge planning discussed with[de-identified] Pt  Freedom of Choice: Yes  Comments - Freedom of Choice: Pt has her OtPT PT set up    [address] [place] [address] Road         Is the patient interested in [facility] at discharge?: No    DME Referral Provided  Referral made for DME?: No  Treatment Team Recommendation: Home  Discharge Destination Plan[de-identified] Home  Transport at Discharge : Family

## 2022-03-17 NOTE — OCCUPATIONAL THERAPY NOTE
Chief complaint:   Chief Complaint   Patient presents with   • Establish Care     New patient    • Physical     CPE        Vitals:  Visit Vitals  /88   Pulse 110   Resp 16   Ht 5' 11\" (1.803 m)   Wt 114.3 kg   SpO2 98%   BMI 35.15 kg/m²       HISTORY OF PRESENT ILLNESS     HPI  Patient comes in to establish care. Previously saw Dr. Washington. Lives and works in [place]. Works at [employer] as a manager.    Comes in for physical but has multiple complaints. Did advise him that the complaints are outside the scope of physical and co-pays may apply.    Patient does have high blood pressure today. Previously was on blood pressure medications but ran out.    Denies any chest pain chest discomfort or shortness of breath. States his father  of CHF in his 50s. Offered EKG and stress test she declines at this time.    Does complain of chronic fatigue states he's had this for a long time. Previously diagnosed with insomnia. Did offer him a home sleep test he declines. Also recommend vitamin D and thyroid tests which she is willing to consider.    Did advise him he has any questions about his insurance he really should call his insurance.  Also complains of left shoulder pain. Lifts very heavy weights. Occasional left shoulder hurts when he does shoulder presses. Offered x-ray and MRI declines at this time.      Other significant problems:  Patient Active Problem List    Diagnosis Date Noted   • Annual physical exam 2019     Priority: Low   • Fatigue 2019     Priority: Low   • Arthralgia 2019     Priority: Low   • Chronic insomnia 10/05/2015     Priority: Low   • Backache, unspecified 2014     Priority: Low   • Allergic rhinitis      Priority: Low   • Hypertension      Priority: Low       PAST MEDICAL, FAMILY AND SOCIAL HISTORY     Medications:  Current Outpatient Medications   Medication   • amLODIPine (NORVASC) 5 MG tablet   • fluticasone (FLONASE) 50 MCG/ACT nasal spray   •  Occupational Therapy Evaluation      [facility]    3/17/2022    Patient Active Problem List   Diagnosis    Acute pulmonary embolism ([facility] )    DVT, lower extremity (HCC)    Polymyalgia rheumatica (HCC)    Hypothyroidism    Leukocytosis    Vitamin D insufficiency    Left axis deviation    Premature atrial complexes    Essential hypertension    Hypercholesterolemia    Calculus of kidney    SVT (supraventricular tachycardia) (HCC)    SOB (shortness of breath)    Superficial thrombophlebitis of right upper extremity    Seronegative rheumatoid arthritis (HCC)    Chronic diastolic congestive heart failure (HCC)    Negative depression screening    Disease of lung    Sepsis, unspecified organism (HCC)    Paroxysmal atrial fibrillation (HCC)    Chronic atrial fibrillation (HCC)    Volume overload    Acute maxillary sinusitis    Bilateral pleural effusion    Cardiac abnormality    CHF (congestive heart failure) (HCC)    Cough    Diarrhea    Diffuse arthralgia    Diffusion capacity of lung (dl), decreased    Fever of unknown origin (FUO)    History of DVT (deep vein thrombosis)    History of polymyalgia rheumatica    History of pulmonary embolus (PE)    Hx of methotrexate therapy    Nausea and/or vomiting    Pain of left side of body    Pain of right scapula    Chest pain    Right upper quadrant abdominal pain    Secondary adrenal insufficiency (HCC)    Tamponade    Thyroid nodule    Ventral hernia    Class 1 obesity due to excess calories with serious comorbidity and body mass index (BMI) of 31 0 to 31 9 in adult    Medicare annual wellness visit, subsequent    Primary osteoarthritis of left knee       Past Medical History:   Diagnosis Date    Allergic     Arthritis     rheumatoid    Atrial fibrillation ([facility] )     CHF (congestive heart failure) (HCC)     Disease of thyroid gland     DVT (deep venous thrombosis) ([facility] ) 2015    HL (hearing loss)     Left Ear-Wear Hearing Aid    Hypertension     Irregular heart beat     Kidney stone     Migraine     hx of    Pleural effusion 2016    Polymyalgia rheumatica (HCC)     "Remission"    Sleep apnea     Mild-Does not require CPAP    Vitamin D deficiency        Past Surgical History:   Procedure Laterality Date    CARDIAC CATHETERIZATION      CARDIOVERSION N/A 2019    Procedure: Elaine Garcia;  Surgeon: Alvino Talley MD;  Location: [facility];  Service: Cardiology     SECTION      x3 - last impression 16    FRACTURE SURGERY Left     wrist    HERNIA REPAIR  2018    KNEE ARTHROSCOPY Left     Meniscus Tear Repair    KNEE CARTILAGE SURGERY Left     TONSILLECTOMY AND ADENOIDECTOMY  child; age 17   Syliva Ace TUBAL LIGATION      VEIN SURGERY Right     venous ligation with stripping        22 1052   OT Last Visit   OT Visit Date 22   Note Type   Note type Evaluation   Additional Comments Pt agreeable to OT eval  Upon arrival pt seated OOB in recliner    Restrictions/Precautions   Weight Bearing Precautions Per Order Yes   LLE Weight Bearing Per Order FWB   Other Precautions Fall Risk;Pain;Multiple lines;WBS   Pain Assessment   Pain Assessment Tool 0-10   Pain Score 6   Pain Location/Orientation Orientation: Left; Location: Knee   Pain Onset/Description Onset: Ongoing;Frequency: Constant/Continuous; Descriptor: Aching;Descriptor: Sore   Hospital Pain Intervention(s) Ambulation/increased activity;Repositioned;Medication (See MAR)   Home Living   Type of [address] Two level;Performs ADLs on one level; Able to live on main level with bedroom/bathroom;Stairs to enter with rails  ([address])   Bathroom Shower/Tub Tub only   Bathroom Toilet Raised   Bathroom Equipment   (no DME reported )   [address] Walker;Cane  (no AD used at baseline )   Prior Function   Level of [place] Independent with ADLs and functional mobility   Lives With Nathan Help From loratadine (CLARITIN) 10 MG tablet     No current facility-administered medications for this visit.        Allergies:  ALLERGIES:   Allergen Reactions   • Advil [Ibuprofen]        Past Medical  History/Surgeries:  Past Medical History:   Diagnosis Date   • Allergic rhinitis    • Hypertension    • Insomnia    • Obesity     262 pounds 2012       No past surgical history on file.    Family History:  Family History   Problem Relation Age of Onset   • High blood pressure Father    • Diabetes Maternal Grandmother    • Cancer Maternal Grandmother         ovarian cancer   • Cancer Maternal Grandfather         prostate cancer       Social History:  Social History     Tobacco Use   • Smoking status: Former Smoker     Packs/day: 0.30     Years: 18.00     Pack years: 5.40     Types: Cigarettes     Last attempt to quit: 3/17/2014     Years since quittin.4   • Smokeless tobacco: Never Used   Substance Use Topics   • Alcohol use: Yes     Alcohol/week: 0.0 standard drinks     Comment: very rare       REVIEW OF SYSTEMS     Review of Systems   Constitutional: Positive for fatigue. Negative for chills and fever.   HENT: Negative for congestion.    Eyes: Negative for visual disturbance.   Respiratory: Negative for chest tightness, shortness of breath and wheezing.    Cardiovascular: Negative for chest pain and palpitations.   Gastrointestinal: Negative for abdominal distention, abdominal pain, diarrhea, nausea and vomiting.   Endocrine: Positive for polyuria. Negative for polyphagia.   Genitourinary: Negative for frequency and urgency.   Musculoskeletal: Positive for arthralgias. Negative for myalgias.   Skin: Negative for rash.   Neurological: Negative for dizziness, weakness and headaches.   Psychiatric/Behavioral: Negative for confusion.       PHYSICAL EXAM     Physical Exam   Constitutional: He is oriented to person, place, and time. He appears well-developed and well-nourished. No distress.   HENT:   Head: Normocephalic  Family  ( will assist pt upon d/c )   ADL Assistance Independent   IADLs Independent   Falls in the last 6 months 0   Vocational Retired   Comments (+) driving    Lifestyle   Autonomy Patient reporting being independent with ADLs/IADLs, ambulatory with no AD, and lives with  in a two story house with 1st floor set-up    Reciprocal Relationships Supportive     Service to Others Retired    ADL   Eating Assistance 6  Modified independent   [address] 6  Modified Independent   [address]  [address] [place] [address] Dr Isma GONZALEZ/ [address]  [address]  [facility] [address]  [address]  [address] [facility]   Additional Comments DNT bed mobility: pt seated in recliner upon arrival and at end of eval    Transfers   Sit to Stand 4  Minimal assistance   Additional items Assist x 1; Armrests; Increased time required;Verbal cues   Stand to Sit 4  Minimal assistance   Additional items Assist x 1; Armrests; Increased time required;Verbal cues   Additional Comments Pt reported (+) dizziness upon arrival  BP seated at rest: 107/52  Pt reported (+) lightheadedness while ambulating in room  Pt returned to recliner for safety  BP post-mobility: 103/49  Functional Mobility   Functional Mobility 4  Minimal assistance   Additional Comments Pt ambulated in room with no overt LOB or SOB  Pt limited by pain and lightheadedness  Verbal cues provided for RW management    Additional items Rolling walker   Balance   Static Sitting Good   Dynamic Sitting Fair +   Static Standing Fair   Dynamic Standing Fair -   Activity Tolerance   Activity Tolerance Patient limited by fatigue;Patient limited by pain   Medical Staff Made Aware Pt seen with PTA due to the patient's co-morbidities, clinically unstable presentation, and present impairments which are a regression from the patient's baseline      Nurse Made Aware and atraumatic.   Left Ear: External ear normal.   Nose: Nose normal.   Mouth/Throat: Oropharynx is clear and moist. No oropharyngeal exudate.   Eyes: Pupils are equal, round, and reactive to light. Conjunctivae and EOM are normal. Right eye exhibits no discharge. Left eye exhibits no discharge. No scleral icterus.   Neck: Neck supple. No JVD present. No tracheal deviation present. No thyromegaly present.   Cardiovascular: Normal rate, regular rhythm and normal heart sounds. Exam reveals no gallop and no friction rub.   No murmur heard.  Pulmonary/Chest: Effort normal and breath sounds normal. No stridor. No respiratory distress. He has no wheezes. He has no rales. He exhibits no tenderness.   Abdominal: Soft. Bowel sounds are normal. He exhibits no distension and no mass. There is no splenomegaly or hepatomegaly. There is no tenderness. There is no rebound, no guarding and no CVA tenderness. No hernia.   Genitourinary:    Penis normal.   No penile tenderness. Musculoskeletal:         General: No tenderness, deformity or edema.      Left shoulder: He exhibits decreased range of motion. He exhibits no tenderness, no bony tenderness, no swelling, no effusion and no crepitus.        Arms:      Lymphadenopathy:     He has no cervical adenopathy.   Neurological: He is alert and oriented to person, place, and time. No cranial nerve deficit or sensory deficit. He exhibits normal muscle tone. Coordination and gait normal.   Light touch examined and symmetrical   Skin: Skin is warm and dry. No rash noted. He is not diaphoretic. No cyanosis. No pallor. Nails show no clubbing.   Psychiatric: He has a normal mood and affect. His behavior is normal.       ASSESSMENT/PLAN     Please note that all pertinent pmh/psh/allergies/meds/and family history were reviewed as pertinent to this visit.  Also, reviewed patient's last labs and prior notes.    Health maintenance was also updated and reviewed, with the following items listed as  DUE and offered to the patient:    There are no preventive care reminders to display for this patient.    Pertinent diagnosis addressed today includes:    1. Annual physical exam  Routine screenings up-to-date. Recommend checking basic labs and cholesterol. Check PSA. Prostate check typically done starting at the age of 50 was high risk. Patient denies any symptoms. No family history.  - CBC WITH DIFFERENTIAL; Future  - COMPREHENSIVE METABOLIC PANEL; Future  - LIPID PANEL WITH REFLEX; Future  - PSA; Future    2. Essential hypertension  Restart medication rec recheck blood pressure.    3. Arthralgia of shoulder, unspecified laterality  We'll check for inflammatory conditions x-ray offered and declined. Exam fairly unremarkable does have some trapezius discomfort. Does state he occasionally gets diffuse arthralgias again we'll check sedimentation rate  - SEDIMENTATION RATE WESTERGREN; Future    4. Chronic insomnia  Strongly recommend home sleep test she declines    5. Other fatigue  Check basic labs follow-up after  - THYROID STIMULATING HORMONE; Future  - VITAMIN B12; Future  - FOLATE; Future  - VITAMIN D -25 HYDROXY; Future           RN made aware of outcomes    RUE Assessment   RUE Assessment WFL  (grossly 3+/5 MMT)   LUE Assessment   LUE Assessment WFL  (grossly 3+/5 MMT)   Hand Function   Gross Motor Coordination Functional   Fine Motor Coordination Functional   Sensation   Light Touch No apparent deficits  (per pt report )   Vision-Basic Assessment   Current Vision Wears contacts   Cognition   Overall Cognitive Status WFL   Arousal/Participation Alert; Responsive; Cooperative   Attention Within functional limits   Orientation Level Oriented X4   Memory Within functional limits   Following Commands Follows all commands and directions without difficulty   Assessment   Limitation Decreased ADL status; Decreased endurance;Decreased self-care trans;Decreased high-level ADLs   Prognosis Good   Assessment Patient is a 73 y o  female seen for OT evaluation s/p admit to [facility] on 3/16/2022 w/Primary osteoarthritis of left knee  Commorbidities affecting patient's functional performance at time of assessment include: CHF, hx of PE, and A-fib  Orders placed for OT evaluation and treatment and activity-beginning POD #0  Performed at least two patient identifiers during session including name and wristband  Prior to admission, Patient reporting being independent with ADLs/IADLs, ambulatory with no AD, and lives with  in a two story house with 1st floor set-up  Personal factors affecting patient at time of initial evaluation include: steps to enter, difficulty performing ADLs and difficulty performing IADLs  Upon evaluation, patient requires supervision assist for UB ADLs, minimal  assist for LB ADLs, transfers and functional ambulation in room and bathroom with minimal  assist, with the use of Rolling Walker  Patient is oriented x 4 and presents with ability to recognize a problem, define a problem, identify alternative plan, select a plan, organize steps in a plan, implement plan and evaluate outcome (problem solving)  Occupational performance is affected by the following deficits: decreased muscle strength, dynamic sit/ stand balance deficit with poor standing tolerance time for self care and functional mobility, decreased activity tolerance, increased pain and delayed righting and equilibrium reactions  Based on the mentioned OT evaluation outcomes, functional performance deficits, and assessment findings, pt has been identified as a moderate complexity evaluation  Patient to benefit from continued Occupational Therapy treatment while in the hospital to address deficits as defined above and maximize level of functional independence with ADLs and functional mobility  Occupational Performance areas to address include: grooming , bathing/ shower, dressing, toilet hygiene, transfer to all surfaces, functional ambulation, medication routine/ management, IADLS: Household maintenance, IADLs: safety procedures and IADLs: meal prep/ clean up  From OT standpoint, recommendation at time of d/c would be Home with outpatient rehabilitation  Goals   Patient Goals to have less pain    Plan   Treatment Interventions ADL retraining;Functional transfer training;UE strengthening/ROM; Endurance training;Patient/family training;Equipment evaluation/education; Compensatory technique education   Goal Expiration Date 03/27/22   OT Treatment Day 0   OT Frequency 3-5x/wk   Recommendation   OT Discharge Recommendation Home with outpatient rehabilitation   OT - OK to Discharge Yes  (Once medically cleared )   Additional Comments  At end of eval, pt seated OOB in recliner with PTA Sallie present    Additional Comments 2 The patient's raw score on the AM-PAC Daily Activity inpatient short form is 20, standardized score is 42 03, greater than 39 4  Patients at this level are likely to benefit from discharge to home  Please refer to the recommendation of the Occupational Therapist for safe discharge planning     AM-PAC Daily Activity Inpatient   Lower Body Dressing 3   Bathing 3   Toileting 3   Upper Body Dressing 3   Grooming 4   Eating 4   Daily Activity Raw Score 20   Daily Activity Standardized Score (Calc for Raw Score >=11) 42 03   AM-PAC Applied Cognition Inpatient   Following a Speech/Presentation 4   Understanding Ordinary Conversation 4   Taking Medications 4   Remembering Where Things Are Placed or Put Away 4   Remembering List of 4-5 Errands 4   Taking Care of Complicated Tasks 4   Applied Cognition Raw Score 24   Applied Cognition Standardized Score 62 21     GOALS:    *ADL transfers with (I) for inc'd independence with ADLs/purposeful tasks    *UB ADL with (I) for inc'd independence with self cares    *LB ADL with (I) using AE prn for inc'd independence with self cares    *Toileting with (I) for clothing management and hygiene for return to PLOF with personal care    *Increase stand tolerance x5 m for inc'd tolerance with standing purposeful tasks    *Participate in 10m UE therex to increase overall stamina/activity tolerance for purposeful tasks    *Bed mobility- (I) for inc'd independence to manage own comfort and initiate EOB & OOB purposeful tasks    *Patient will verbalize 3 safety awareness/ principles to prevent falls in the home setting  *Patient will verbalize and demonstrate use of energy conservation/deep breathing techniques and work simplification skills during functional activities with no verbal cues  *Patient will increase OOB/sitting tolerance to 2-4 hours per day to increase participation in self-care and leisure tasks with no s/s of exertion  *Pt will verbalize and demonstrate understanding of post-op movement precautions 100% in tx sessions for increased safety and functional mobility        *Pt will demonstrate use of long handled AE during 100% of tx sessions for increased ADL safety and independence following D/C     Irinaerene Prows, OTD, OTR/L

## 2022-03-17 NOTE — PHYSICAL THERAPY NOTE
PHYSICAL THERAPY TREATMENT NOTE  NAME:  Edy Rahman  DATE: 03/17/22    Length Of Stay: 0  Performed at least 2 patient identifiers during session: Name and ID bracelet    TREATMENT NOTE:     03/17/22 1407   PT Last Visit   PT Visit Date 03/17/22   Note Type   Note Type BID visit/treatment   Pain Assessment   Pain Assessment Tool 0-10   Pain Score 3   Pain Location/Orientation Orientation: Left; Location: Knee   Pain Onset/Description Onset: Ongoing;Frequency: Intermittent; Descriptor: Aching;Descriptor: Sore   Patient's Stated Pain Goal No pain   Hospital Pain Intervention(s) Ambulation/increased activity;Repositioned; Emotional support   Restrictions/Precautions   Weight Bearing Precautions Per Order Yes   LLE Weight Bearing Per Order FWB   Other Precautions Multiple lines; Fall Risk;Pain;WBS   General   Chart Reviewed Yes   Response to Previous Treatment Patient with no complaints from previous session  Family/Caregiver Present No   Cognition   Overall Cognitive Status WFL   Arousal/Participation Alert; Responsive; Cooperative   Attention Within functional limits   Orientation Level Oriented X4   Memory Within functional limits   Following Commands Follows all commands and directions without difficulty   Comments pt agree to PT treatment   Bed Mobility   Additional Comments pt OOB in recliner to begin and end session   Transfers   Sit to Stand 5  Supervision   Additional items Assist x 1; Armrests; Increased time required;Verbal cues   Stand to Sit 5  Supervision   Additional items Assist x 1; Armrests; Increased time required;Verbal cues   Stand pivot 4  Minimal assistance   Additional items Assist x 1; Increased time required;Verbal cues   Toilet transfer 5  Supervision   Additional items Assist x 1; Increased time required;Verbal cues; Commode   Additional Comments use of RW for functional transfer   Ambulation/Elevation   Gait pattern Improper Weight shift;Decreased foot clearance;Decreased L stance; Short stride Gait Assistance   (CGA)   Additional items Assist x 1;Verbal cues; Tactile cues   Assistive Device Rolling walker   Distance 40 ftx1, 50 ftx1   Ambulation/Elevation Additional Comments verbal cues for proper step length and RW management   Balance   Static Sitting Good   Dynamic Sitting Good   Static Standing Fair +   Dynamic Standing Fair   Ambulatory Fair   Endurance Deficit   Endurance Deficit Yes   Activity Tolerance   Activity Tolerance Patient limited by fatigue;Patient limited by pain   Nurse Made Aware yes   Exercises   Quad Sets Sitting;20 reps;AROM; Bilateral   Heelslides Sitting;20 reps;AROM; Bilateral   Glute Sets Sitting;20 reps;AROM; Bilateral   Hip Abduction Sitting;20 reps;AROM; Bilateral   Hip Adduction Sitting;20 reps;AROM; Bilateral   Knee AROM Long Arc Quad Sitting;20 reps;AROM; Bilateral   Ankle Pumps Sitting;20 reps;AROM; Bilateral   Marching Sitting;20 reps;AROM; Bilateral   Assessment   Prognosis Excellent   Problem List Decreased strength;Decreased range of motion;Decreased endurance; Impaired balance;Decreased mobility;Orthopedic restrictions;Pain   Assessment Pt seen for PT treatment session this date with interventions consisting of gait training w/ emphasis on improving pt's ability to ambulate level surfaces x 40 ftx1, 50 ftx1 with CGA provided by therapist with RW, Therapeutic exercise consisting of: AROM 20 reps B LE in sitting position and therapeutic activity consisting of training: sit<>stand transfers, stand pivot transfers towards both direction and commode transfer  Pt agreeable to PT treatment session upon arrival, pt found seated OOB in recliner, in no apparent distress and responsive  In comparison to previous session, pt with improvements in distance ambulated  Post session: pt returned back to recliner, all needs in reach and RN notified of session findings/recommendations   Continue to recommend home with outpatient rehabilitation at time of d/c in order to maximize pt's functional independence and safety w/ mobility  Pt continues to be functioning below baseline level, and remains limited 2* factors listed above and including ability to stair climb  PT will continue to see pt during current hospitalization in order to address the deficits listed above and provide interventions consistent w/ POC in effort to achieve STGs  Barriers to Discharge Inaccessible home environment   Plan   Treatment/Interventions Functional transfer training;LE strengthening/ROM; Therapeutic exercise; Endurance training;Bed mobility;Gait training;Elevations   Progress Progressing toward goals   PT Frequency Twice a day   Recommendation   PT Discharge Recommendation Home with outpatient rehabilitation   Equipment Recommended [facility] Recommended Wheeled walker   [address] Inpatient   Turning in Bed Without Bedrails 3   Lying on Back to Sitting on Edge of Flat Bed 3   Moving Bed to Chair 3   Standing Up From Chair 3   Walk in Room 3   Climb 3-5 Stairs 3   Basic Mobility Inpatient Raw Score 18   Basic Mobility Standardized Score 41 05   Highest Level Of Mobility   [facility] Goal 6: Walk 10 steps or more   Education   Education Provided Mobility training;Assistive device   Patient Demonstrates acceptance/verbal understanding   End of Consult   Patient Position at End of Consult All needs within reach; Bedside chair       The patient's AM-PAC Basic Mobility Inpatient Short Form Raw Score is 18  A Raw score of greater than 16 suggests the patient may benefit from discharge to home  Please also refer to the recommendation of the Physical Therapist for safe discharge planning        Rose Zapata PTA,PTA

## 2022-03-17 NOTE — PLAN OF CARE
Problem: PHYSICAL THERAPY ADULT  Goal: Performs mobility at highest level of function for planned discharge setting  See evaluation for individualized goals  Description: Treatment/Interventions: LE strengthening/ROM,Functional transfer training,Elevations,Therapeutic exercise,Endurance training,Patient/family training,Bed mobility,Equipment eval/education,Gait training,Spoke to nursing  Equipment Recommended: Karyn Sun       See flowsheet documentation for full assessment, interventions and recommendations  3/17/2022 1529 by Anson Jackson PTA  Outcome: Progressing  Note: Prognosis: Excellent  Problem List: Decreased strength,Decreased range of motion,Decreased endurance,Impaired balance,Decreased mobility,Orthopedic restrictions,Pain  Assessment: Pt seen for PT treatment session this date with interventions consisting of gait training w/ emphasis on improving pt's ability to ambulate level surfaces x 40 ftx1, 50 ftx1 with CGA provided by therapist with RW, Therapeutic exercise consisting of: AROM 20 reps B LE in sitting position and therapeutic activity consisting of training: sit<>stand transfers, stand pivot transfers towards both direction and commode transfer  Pt agreeable to PT treatment session upon arrival, pt found seated OOB in recliner, in no apparent distress and responsive  In comparison to previous session, pt with improvements in distance ambulated  Post session: pt returned back to recliner, all needs in reach and RN notified of session findings/recommendations  Continue to recommend home with outpatient rehabilitation at time of d/c in order to maximize pt's functional independence and safety w/ mobility  Pt continues to be functioning below baseline level, and remains limited 2* factors listed above and including ability to stair climb   PT will continue to see pt during current hospitalization in order to address the deficits listed above and provide interventions consistent w/ POC in effort to achieve STGs  Barriers to Discharge: Inaccessible home environment        PT Discharge Recommendation: Home with outpatient rehabilitation          See flowsheet documentation for full assessment  3/17/2022 1325 by Karl Sullivan PTA  Outcome: Progressing  Note: Prognosis: Good  Problem List: Decreased strength,Decreased endurance,Impaired balance,Decreased range of motion,Decreased mobility,Orthopedic restrictions,Pain  Assessment: Pt seen for PT treatment session this date with interventions consisting of gait training w/ emphasis on improving pt's ability to ambulate level surfaces x 40 ftx1 with min A provided by therapist with RW, Therapeutic exercise consisting of: AROM 20 reps B LE in sitting position and therapeutic activity consisting of training: sit<>stand transfers  Pt agreeable to PT treatment session upon arrival, pt found seated OOB in recliner, in no apparent distress and responsive  In comparison to previous session, pt with improvements in distance ambulated  Post session: pt returned back to recliner, all needs in reach and RN notified of session findings/recommendations  Continue to recommend home with outpatient rehabilitation at time of d/c in order to maximize pt's functional independence and safety w/ mobility  Pt continues to be functioning below baseline level, and remains limited 2* factors listed above and including decreased strength, decreased ROM, decreased functional mobility  PT will continue to see pt during current hospitalization in order to address the deficits listed above and provide interventions consistent w/ POC in effort to achieve STGs  Barriers to Discharge: Inaccessible home environment        PT Discharge Recommendation: Home with outpatient rehabilitation          See flowsheet documentation for full assessment

## 2022-03-17 NOTE — PHYSICAL THERAPY NOTE
PHYSICAL THERAPY TREATMENT NOTE  NAME:  Viji Flores  DATE: 03/17/22    Length Of Stay: 0  Performed at least 2 patient identifiers during session: Name and ID bracelet    TREATMENT NOTE:     03/17/22 1112   PT Last Visit   PT Visit Date 03/17/22   Note Type   Note Type Treatment   Pain Assessment   Pain Assessment Tool 0-10   Pain Score 6   Pain Location/Orientation Orientation: Left; Location: Knee   Pain Onset/Description Onset: Ongoing;Frequency: Constant/Continuous; Descriptor: Aching;Descriptor: Sore   Patient's Stated Pain Goal No pain   Hospital Pain Intervention(s) Ambulation/increased activity;Repositioned; Emotional support   Multiple Pain Sites No   Restrictions/Precautions   Weight Bearing Precautions Per Order Yes   LLE Weight Bearing Per Order FWB   Other Precautions Multiple lines; Fall Risk;Pain;WBS   General   Chart Reviewed Yes   Response to Previous Treatment Patient with no complaints from previous session  Family/Caregiver Present No   Cognition   Overall Cognitive Status WFL   Arousal/Participation Alert; Responsive; Cooperative   Attention Within functional limits   Orientation Level Oriented X4   Memory Within functional limits   Following Commands Follows all commands and directions without difficulty   Comments pt agreeable to PT treatment   Bed Mobility   Additional Comments DNT, pt seated OOB in recliner upon arrival and at end of session   Transfers   Sit to Stand 4  Minimal assistance   Additional items Assist x 1; Armrests; Increased time required;Verbal cues   Stand to Sit 4  Minimal assistance   Additional items Assist x 1; Armrests; Increased time required;Verbal cues   Additional Comments Pt reported (+) dizziness upon arrival  BP seated at rest: 107/52  Pt reported (+) lightheadedness while ambulating in room  Pt returned to recliner for safety  BP post-mobility: 103/49  Ambulation/Elevation   Gait pattern Improper Weight shift;Decreased foot clearance;Decreased L stance; Short stride; Shuffling   Gait Assistance 4  Minimal assist   Additional items Assist x 1;Verbal cues; Tactile cues   Ambulation/Elevation Additional Comments verbal cues for proper RW navigation   Balance   Static Sitting Good   Dynamic Sitting Fair +   Static Standing Fair   Dynamic Standing Fair   Ambulatory Fair -   Endurance Deficit   Endurance Deficit Yes   Activity Tolerance   Activity Tolerance Patient limited by fatigue;Patient limited by pain   Nurse Made Aware RN made aware of outcomes   Exercises   Quad Sets Sitting;20 reps;AROM; Bilateral   Heelslides Sitting;20 reps;AROM; Bilateral   Glute Sets Sitting;20 reps;AROM; Bilateral   Hip Abduction Sitting;20 reps;AROM; Bilateral   Hip Adduction Sitting;20 reps;AROM; Bilateral   Knee AROM Long Arc Quad Sitting;20 reps;AROM; Bilateral   Ankle Pumps Sitting;20 reps;AROM; Bilateral   Marching Sitting;20 reps;AROM; Bilateral   Assessment   Prognosis Good   Problem List Decreased strength;Decreased endurance; Impaired balance;Decreased range of motion;Decreased mobility;Orthopedic restrictions;Pain   Assessment Pt seen for PT treatment session this date with interventions consisting of gait training w/ emphasis on improving pt's ability to ambulate level surfaces x 40 ftx1 with min A provided by therapist with RW, Therapeutic exercise consisting of: AROM 20 reps B LE in sitting position and therapeutic activity consisting of training: sit<>stand transfers  Pt agreeable to PT treatment session upon arrival, pt found seated OOB in recliner, in no apparent distress and responsive  In comparison to previous session, pt with improvements in distance ambulated  Post session: pt returned back to recliner, all needs in reach and RN notified of session findings/recommendations  Continue to recommend home with outpatient rehabilitation at time of d/c in order to maximize pt's functional independence and safety w/ mobility   Pt continues to be functioning below baseline level, and remains limited 2* factors listed above and including decreased strength, decreased ROM, decreased functional mobility  PT will continue to see pt during current hospitalization in order to address the deficits listed above and provide interventions consistent w/ POC in effort to achieve STGs  Barriers to Discharge Inaccessible home environment   Goals   PT Treatment Day 1   Plan   Treatment/Interventions LE strengthening/ROM; Therapeutic exercise; Endurance training;Elevations; Functional transfer training;Bed mobility;Gait training   Progress Progressing toward goals   PT Frequency Twice a day   Recommendation   PT Discharge Recommendation Home with outpatient rehabilitation   Equipment Recommended [facility] Recommended Wheeled walker   Change/add to [employer]? No   AM-PAC Basic Mobility Inpatient   Turning in Bed Without Bedrails 3   Lying on Back to Sitting on Edge of Flat Bed 3   Moving Bed to Chair 3   Standing Up From Chair 3   Walk in Room 3   Climb 3-5 Stairs 3   Basic Mobility Inpatient Raw Score 18   Basic Mobility Standardized Score 41 05   Highest Level Of Mobility   [facility] Goal 6: Walk 10 steps or more   Education   Education Provided Mobility training;Assistive device   Patient Demonstrates acceptance/verbal understanding   End of Consult   Patient Position at End of Consult Bedside chair; All needs within reach   End of Consult Comments pt in stable condition post PT treatment       The patient's AM-PAC Basic Mobility Inpatient Short Form Raw Score is 18  A Raw score of greater than 16 suggests the patient may benefit from discharge to home  Please also refer to the recommendation of the Physical Therapist for safe discharge planning        Nicole Mcneil PTA,PTA

## 2022-03-17 NOTE — UTILIZATION REVIEW
Initial Clinical Review    Outpatient procedure 3/16/22, converted to inpatient admission 3/17/22 for continued post-op care & tx  Elective outpatient surgical procedure  Age/Sex: 73 y o  female  Surgery Date: 3/16/22  Procedure: KNEE TOTAL ARTHROPLASTY (Left) using the [employer] system was following sizes:  E LPS GSF femoral component, #3  Tibial tray, 12 mm polyarticular surface, and a 32 mm patella button  Anesthesia: Spinal with adductor canal and iPACK blocks  Operative Findings: TT: 79    POD#1 Progress Note:   SOB this morning, hx PE/PAF, continues on rivaroxaban, using O2 @ 2ltr nc  IVF maintained as pt hypotensive today  Using IV Dilaudid & oxycodone for pain control  Post-op course IVABT in progress  Pt/ot ongoing  Physical Exam:  Left lower extremity is neurovascularly intact  Toes are pink and mobile  Compartments are soft  Dressing is clean, dry and intact  Leg drain in place  Good quad tone  Good dorsiflexion and plantar flexion of ankle  Negative Homans  Brisk cap refill  Sensation intact  Admission Orders: Date/Time/Statement:   Admission Orders (From admission, onward)     Ordered        03/17/22 0808  Inpatient Admission  Once                      Orders Placed This Encounter   Procedures    Inpatient Admission     Standing Status:   Standing     Number of Occurrences:   1     Order Specific Question:   Level of Care     Answer:   Med Surg [16]     Order Specific Question:   Estimated length of stay     Answer:   More than 2 Midnights     Order Specific Question:   Certification     Answer:   I certify that inpatient services are medically necessary for this patient for a duration of greater than two midnights  See H&P and MD Progress Notes for additional information about the patient's course of treatment       Vital Signs: BP 93/53   Pulse 60   Temp 97 9 °F (36 6 °C)   Resp 22   Ht 5' 2" (1 575 m)   Wt 91 1 kg (200 lb 13 4 oz)   SpO2 96%   Breastfeeding No   BMI 36 73 kg/m²     Pertinent Labs/Diagnostic Test Results:   XR knee left 1 or 2 views   (03/17 9130)     Images demonstrate total knee arthroplasty which appears in satisfactory position  Anticipated post surgical changes are seen in the adjacent soft tissues  Results from last 7 days   Lab Units 03/17/22  0440   WBC Thousand/uL 7 81   HEMOGLOBIN g/dL 10 0*   HEMATOCRIT % 31 7*   PLATELETS Thousands/uL 183     Results from last 7 days   Lab Units 03/17/22  0440   SODIUM mmol/L 137   POTASSIUM mmol/L 4 1   CHLORIDE mmol/L 104   CO2 mmol/L 28   ANION GAP mmol/L 5   BUN mg/dL 13   CREATININE mg/dL 0 55*   EGFR ml/min/1 73sq m 96   CALCIUM mg/dL 8 8     Results from last 7 days   Lab Units 03/17/22  0440   GLUCOSE RANDOM mg/dL 102     Admission orders:   Malave cath, d/c POD#1  Neurovascular checks q4h  Pt/ot/eval & tx  CPM machine  Consult internal medicine  hemovac to self suction  Diet: regular  Mobility: LLE full wt bearing  DVT Prophylaxis: scd    Medications/Pain Control:   Scheduled Medications:  ascorbic acid, 500 mg, Oral, BID  azelastine, 2 spray, Each Nare, BID  cefazolin, 1,000 mg, Intravenous, Q8H  cholecalciferol, 1,000 Units, Oral, Daily  docusate sodium, 100 mg, Oral, BID  ferrous sulfate, 325 mg, Oral, BID With Meals  folic acid, 1 mg, Oral, Daily  levothyroxine, 75 mcg, Oral, Early Morning  metoprolol succinate, 50 mg, Oral, BID  multivitamin-minerals, 1 tablet, Oral, Daily  rivaroxaban, 20 mg, Oral, Before Dinner    Continuous IV Infusions:  lactated ringers, 125 mL/hr, Intravenous, Continuous    PRN Meds:  acetaminophen, 650 mg, Oral, Q4H PRN  aluminum-magnesium hydroxide-simethicone, 30 mL, Oral, Q6H PRN  HYDROmorphone, 0 5 mg, Intravenous, Q3H PRN-used x3/24hrs  lactated ringers, 1,000 mL, Intravenous, Once PRN   And  lactated ringers, 1,000 mL, Intravenous, Once PRN  ondansetron, 4 mg, Intravenous, Q6H PRN  oxyCODONE-acetaminophen, 1 tablet, Oral, Q4H PRN  oxyCODONE-acetaminophen, 2 tablet, Oral, Q6H PRN-used x2/24hrs  sodium chloride, 1,000 mL, Intravenous, Once PRN   And  sodium chloride, 1,000 mL, Intravenous, Once PRN    Network Utilization Review Department  ATTENTION: Please call with any questions or concerns to [phone] and carefully listen to the prompts so that you are directed to the right person  All voicemails are confidential   Ty Limb all requests for admission clinical reviews, approved or denied determinations and any other requests to dedicated fax number below belonging to the campus where the patient is receiving treatment   List of dedicated fax numbers for the Facilities:  [address] DENIALS (Administrative/Medical Necessity) [phone]   [address] (Maternity/NICU/Pediatrics) [phone] [address]  [address] [place] [address] [address] [place] [address] [address] Cecily [address] [facility] [place] [address] [phone]

## 2022-03-17 NOTE — PROGRESS NOTES
Progress Note - Orthopedics   Sandra Gamble 73 y o  female MRN: [id number]  Unit/Bed#: -01 Encounter: [id number]    Assessment:  POD 1 s/p left total knee replacement    Plan:  Continue physical therapy occupational therapy weight-bearing as tolerated  Change dressing tomorrow  Remove leg drain tomorrow  Continue Xarelto for DVT prophylaxis  DC planning  Acute postop blood-loss anemia continue to monitor  Weight bearing:  Weight-bearing as tolerated    VTE Pharmacologic Prophylaxis:  Xarelto  VTE Mechanical Prophylaxis: sequential compression device    Subjective:  Complains of some shortness of breath this morning  Her pain seems to be relatively well controlled  Vitals: Blood pressure (!) 107/45, pulse 60, temperature 97 9 °F (36 6 °C), resp  rate 22, height 5' 2" (1 575 m), weight 91 1 kg (200 lb 13 4 oz), SpO2 97 %, not currently breastfeeding  ,Body mass index is 36 73 kg/m²        Intake/Output Summary (Last 24 hours) at 3/17/2022 0802  Last data filed at 3/17/2022 0536  Gross per 24 hour   Intake 1410 ml   Output 2600 ml   Net -1190 ml       Invasive Devices  Report    Peripheral Intravenous Line            Peripheral IV 03/16/22 Distal;Right;Dorsal (posterior) Forearm <1 day    Peripheral IV 03/16/22 Right;Ventral (anterior) Forearm <1 day          Drain            [employer]us Reinfusion/Drain Device 1 Left Knee <1 day                Physical Exam:  Left lower extremity is neurovascularly intact  Toes are pink and mobile  Compartments are soft  Dressing is clean, dry and intact  Leg drain in place  Good quad tone  Good dorsiflexion and plantar flexion of ankle  Negative Homans  Brisk cap refill  Sensation intact    Lab, Imaging and other studies:   CBC:   Lab Results   Component Value Date    WBC 7 81 03/17/2022    HGB 10 0 (L) 03/17/2022    HCT 31 7 (L) 03/17/2022    MCV 94 03/17/2022     03/17/2022    MCH 29 8 03/17/2022    MCHC 31 5 03/17/2022    RDW 14 1 03/17/2022    MPV 9 3 03/17/2022 CMP:   Lab Results   Component Value Date    SODIUM 137 03/17/2022     03/17/2022    CO2 28 03/17/2022    BUN 13 03/17/2022    CREATININE 0 55 (L) 03/17/2022    CALCIUM 8 8 03/17/2022    EGFR 96 03/17/2022

## 2022-03-18 ENCOUNTER — TRANSITIONAL CARE MANAGEMENT (OUTPATIENT)
Dept: FAMILY MEDICINE CLINIC | Facility: CLINIC | Age: 72
End: 2022-03-18

## 2022-03-18 VITALS
SYSTOLIC BLOOD PRESSURE: 120 MMHG | DIASTOLIC BLOOD PRESSURE: 55 MMHG | TEMPERATURE: 98.5 F | HEART RATE: 79 BPM | BODY MASS INDEX: 33.96 KG/M2 | OXYGEN SATURATION: 91 % | RESPIRATION RATE: 19 BRPM | WEIGHT: 184.53 LBS | HEIGHT: 62 IN

## 2022-03-18 LAB
ANION GAP SERPL CALCULATED.3IONS-SCNC: 4 MMOL/L (ref 4–13)
BUN SERPL-MCNC: 9 MG/DL (ref 5–25)
CALCIUM SERPL-MCNC: 9.2 MG/DL (ref 8.4–10.2)
CHLORIDE SERPL-SCNC: 106 MMOL/L (ref 96–108)
CO2 SERPL-SCNC: 29 MMOL/L (ref 21–32)
CREAT SERPL-MCNC: 0.5 MG/DL (ref 0.6–1.3)
ERYTHROCYTE [DISTWIDTH] IN BLOOD BY AUTOMATED COUNT: 13.8 % (ref 11.6–15.1)
GFR SERPL CREATININE-BSD FRML MDRD: 99 ML/MIN/1.73SQ M
GLUCOSE SERPL-MCNC: 91 MG/DL (ref 65–140)
HCT VFR BLD AUTO: 29.6 % (ref 34.8–46.1)
HGB BLD-MCNC: 9.3 G/DL (ref 11.5–15.4)
MCH RBC QN AUTO: 29.4 PG (ref 26.8–34.3)
MCHC RBC AUTO-ENTMCNC: 31.4 G/DL (ref 31.4–37.4)
MCV RBC AUTO: 94 FL (ref 82–98)
PLATELET # BLD AUTO: 174 THOUSANDS/UL (ref 149–390)
PMV BLD AUTO: 9.5 FL (ref 8.9–12.7)
POTASSIUM SERPL-SCNC: 3.8 MMOL/L (ref 3.5–5.3)
RBC # BLD AUTO: 3.16 MILLION/UL (ref 3.81–5.12)
SODIUM SERPL-SCNC: 139 MMOL/L (ref 135–147)
WBC # BLD AUTO: 6.97 THOUSAND/UL (ref 4.31–10.16)

## 2022-03-18 PROCEDURE — 97530 THERAPEUTIC ACTIVITIES: CPT

## 2022-03-18 PROCEDURE — 97116 GAIT TRAINING THERAPY: CPT

## 2022-03-18 PROCEDURE — 80048 BASIC METABOLIC PNL TOTAL CA: CPT | Performed by: PHYSICIAN ASSISTANT

## 2022-03-18 PROCEDURE — 99024 POSTOP FOLLOW-UP VISIT: CPT | Performed by: ORTHOPAEDIC SURGERY

## 2022-03-18 PROCEDURE — 85027 COMPLETE CBC AUTOMATED: CPT | Performed by: PHYSICIAN ASSISTANT

## 2022-03-18 PROCEDURE — 97110 THERAPEUTIC EXERCISES: CPT

## 2022-03-18 PROCEDURE — 99024 POSTOP FOLLOW-UP VISIT: CPT | Performed by: PHYSICIAN ASSISTANT

## 2022-03-18 PROCEDURE — 97535 SELF CARE MNGMENT TRAINING: CPT

## 2022-03-18 RX ORDER — DOCUSATE SODIUM 100 MG/1
100 CAPSULE, LIQUID FILLED ORAL 2 TIMES DAILY
Qty: 60 CAPSULE | Refills: 0 | Status: SHIPPED | OUTPATIENT
Start: 2022-03-18 | End: 2022-05-31

## 2022-03-18 RX ORDER — CEPHALEXIN 500 MG/1
500 CAPSULE ORAL EVERY 8 HOURS SCHEDULED
Qty: 15 CAPSULE | Refills: 0 | Status: SHIPPED | OUTPATIENT
Start: 2022-03-18 | End: 2022-03-23

## 2022-03-18 RX ORDER — OXYCODONE HYDROCHLORIDE AND ACETAMINOPHEN 5; 325 MG/1; MG/1
1 TABLET ORAL EVERY 4 HOURS PRN
Qty: 30 TABLET | Refills: 0 | Status: SHIPPED | OUTPATIENT
Start: 2022-03-18 | End: 2022-03-28

## 2022-03-18 RX ADMIN — METOPROLOL SUCCINATE 50 MG: 50 TABLET, EXTENDED RELEASE ORAL at 09:14

## 2022-03-18 RX ADMIN — FERROUS SULFATE TAB 325 MG (65 MG ELEMENTAL FE) 325 MG: 325 (65 FE) TAB at 09:14

## 2022-03-18 RX ADMIN — Medication 1000 UNITS: at 09:14

## 2022-03-18 RX ADMIN — FOLIC ACID 1 MG: 1 TABLET ORAL at 09:14

## 2022-03-18 RX ADMIN — OXYCODONE AND ACETAMINOPHEN 1 TABLET: 5; 325 TABLET ORAL at 02:08

## 2022-03-18 RX ADMIN — OXYCODONE HYDROCHLORIDE AND ACETAMINOPHEN 500 MG: 500 TABLET ORAL at 09:14

## 2022-03-18 RX ADMIN — AZELASTINE HYDROCHLORIDE 2 SPRAY: 137 SPRAY, METERED NASAL at 09:16

## 2022-03-18 RX ADMIN — OXYCODONE AND ACETAMINOPHEN 1 TABLET: 5; 325 TABLET ORAL at 09:14

## 2022-03-18 RX ADMIN — DOCUSATE SODIUM 100 MG: 100 CAPSULE, LIQUID FILLED ORAL at 09:14

## 2022-03-18 RX ADMIN — Medication 1 TABLET: at 09:14

## 2022-03-18 RX ADMIN — LEVOTHYROXINE SODIUM 75 MCG: 75 TABLET ORAL at 06:04

## 2022-03-18 RX ADMIN — OXYCODONE AND ACETAMINOPHEN 1 TABLET: 5; 325 TABLET ORAL at 13:39

## 2022-03-18 NOTE — NURSING NOTE
Patient discharged to home  IV's removed with catheter tips intact, DSD and pressure applied to each  All belongings returned to patient upon discharge  Patient and spouse verbalized understanding of discharge instructions

## 2022-03-18 NOTE — PLAN OF CARE
Problem: Nutrition/Hydration-ADULT  Goal: Nutrient/Hydration intake appropriate for improving, restoring or maintaining nutritional needs  Description: Monitor and assess patient's nutrition/hydration status for malnutrition  Collaborate with interdisciplinary team and initiate plan and interventions as ordered  Monitor patient's weight and dietary intake as ordered or per policy  Utilize nutrition screening tool and intervene as necessary  Determine patient's food preferences and provide high-protein, high-caloric foods as appropriate       INTERVENTIONS:  - Monitor oral intake, urinary output, labs, and treatment plans  - Assess nutrition and hydration status and recommend course of action  - Evaluate amount of meals eaten  - Assist patient with eating if necessary   - Allow adequate time for meals  - Recommend/ encourage appropriate diets, oral nutritional supplements, and vitamin/mineral supplements  - Order, calculate, and assess calorie counts as needed  - Recommend, monitor, and adjust tube feedings and TPN/PPN based on assessed needs  - Assess need for intravenous fluids  - Provide specific nutrition/hydration education as appropriate  - Include patient/family/caregiver in decisions related to nutrition  Outcome: Progressing     Problem: SAFETY ADULT  Goal: Patient will remain free of falls  Description: INTERVENTIONS:  - Educate patient/family on patient safety including physical limitations  - Instruct patient to call for assistance with activity   - Consult OT/PT to assist with strengthening/mobility   - Keep Call bell within reach  - Keep bed low and locked with side rails adjusted as appropriate  - Keep care items and personal belongings within reach  - Initiate and maintain comfort rounds  - Make Fall Risk Sign visible to staff  - Offer Toileting every 2 Hours, in advance of need  - Initiate/Maintain bed alarm  - Obtain necessary fall risk management equipment: yellow socks  - Apply yellow socks and bracelet for high fall risk patients  - Consider moving patient to room near nurses station  Outcome: Progressing  Goal: Maintain or return to baseline ADL function  Description: INTERVENTIONS:  -  Assess patient's ability to carry out ADLs; assess patient's baseline for ADL function and identify physical deficits which impact ability to perform ADLs (bathing, care of mouth/teeth, toileting, grooming, dressing, etc )  - Assess/evaluate cause of self-care deficits   - Assess range of motion  - Assess patient's mobility; develop plan if impaired  - Assess patient's need for assistive devices and provide as appropriate  - Encourage maximum independence but intervene and supervise when necessary  - Involve family in performance of ADLs  - Assess for home care needs following discharge   - Consider OT consult to assist with ADL evaluation and planning for discharge  - Provide patient education as appropriate  Outcome: Progressing  Goal: Maintains/Returns to pre admission functional level  Description: INTERVENTIONS:  - Perform BMAT or MOVE assessment daily    - Set and communicate daily mobility goal to care team and patient/family/caregiver  - Collaborate with rehabilitation services on mobility goals if consulted  - Perform Range of Motion 3 times a day  - Reposition patient every 2 hours    - Dangle patient 2 times a day  - Stand patient 2 times a day  - Ambulate patient 2 times a day  - Out of bed to chair 2 times a day   - Out of bed for meals 2 times a day  - Out of bed for toileting  - Record patient progress and toleration of activity level   Outcome: Progressing     Problem: MOBILITY - ADULT  Goal: Maintain or return to baseline ADL function  Description: INTERVENTIONS:  -  Assess patient's ability to carry out ADLs; assess patient's baseline for ADL function and identify physical deficits which impact ability to perform ADLs (bathing, care of mouth/teeth, toileting, grooming, dressing, etc )  - Assess/evaluate cause of self-care deficits   - Assess range of motion  - Assess patient's mobility; develop plan if impaired  - Assess patient's need for assistive devices and provide as appropriate  - Encourage maximum independence but intervene and supervise when necessary  - Involve family in performance of ADLs  - Assess for home care needs following discharge   - Consider OT consult to assist with ADL evaluation and planning for discharge  - Provide patient education as appropriate  Outcome: Progressing     Problem: PAIN - ADULT  Goal: Verbalizes/displays adequate comfort level or baseline comfort level  Description: Interventions:  - Encourage patient to monitor pain and request assistance  - Assess pain using appropriate pain scale  - Administer analgesics based on type and severity of pain and evaluate response  - Implement non-pharmacological measures as appropriate and evaluate response  - Consider cultural and social influences on pain and pain management  - Notify physician/advanced practitioner if interventions unsuccessful or patient reports new pain  Outcome: Progressing     Problem: INFECTION - ADULT  Goal: Absence or prevention of progression during hospitalization  Description: INTERVENTIONS:  - Assess and monitor for signs and symptoms of infection  - Monitor lab/diagnostic results  - Monitor all insertion sites, i e  indwelling lines, tubes, and drains  - Monitor endotracheal if appropriate and nasal secretions for changes in amount and color  - [place] appropriate cooling/warming therapies per order  - Administer medications as ordered  - Instruct and encourage patient and family to use good hand hygiene technique  - Identify and instruct in appropriate isolation precautions for identified infection/condition  Outcome: Progressing     Problem: DISCHARGE PLANNING  Goal: Discharge to home or other facility with appropriate resources  Description: INTERVENTIONS:  - Identify barriers to discharge w/patient and caregiver  - Arrange for needed discharge resources and transportation as appropriate  - Identify discharge learning needs (meds, wound care, etc )  - Arrange for interpretive services to assist at discharge as needed  - Refer to Case Management Department for coordinating discharge planning if the patient needs post-hospital services based on physician/advanced practitioner order or complex needs related to functional status, cognitive ability, or social support system  Outcome: Progressing     Problem: Knowledge Deficit  Goal: Patient/family/caregiver demonstrates understanding of disease process, treatment plan, medications, and discharge instructions  Description: Complete learning assessment and assess knowledge base    Interventions:  - Provide teaching at level of understanding  - Provide teaching via preferred learning methods  Outcome: Progressing

## 2022-03-18 NOTE — PLAN OF CARE
Problem: PHYSICAL THERAPY ADULT  Goal: Performs mobility at highest level of function for planned discharge setting  See evaluation for individualized goals  Description: Treatment/Interventions: LE strengthening/ROM,Functional transfer training,Elevations,Therapeutic exercise,Endurance training,Patient/family training,Bed mobility,Equipment eval/education,Gait training,Spoke to nursing  Equipment Recommended: Jeanette Socks       See flowsheet documentation for full assessment, interventions and recommendations  3/18/2022 1517 by Zoanne Bernheim, PTA  Outcome: Progressing  Note: Prognosis: Excellent  Problem List: Decreased strength,Decreased endurance,Impaired balance,Decreased mobility,Orthopedic restrictions,Pain  Assessment: Pt seen for PT treatment session this date with interventions consisting of gait training w/ emphasis on improving pt's ability to ambulate level surfaces x 150 ft with close S provided by therapist with RW, Therapeutic exercise consisting of: AROM 20 reps B LE in sitting position and therapeutic activity consisting of training: bed mobility, supine<>sit transfers and sit<>stand transfers and educated with TKR handout  Pt agreeable to PT treatment session upon arrival, pt found seated OOB in recliner, in no apparent distress and responsive  In comparison to previous session, pt with improvements in distance ambulated  Post session: pt returned back to recliner, all needs in reach and RN notified of session findings/recommendations  Continue to recommend home with outpatient rehabilitation at time of d/c in order to maximize pt's functional independence and safety w/ mobility    PT will continue to see pt during current hospitalization in order to address the deficits listed above and provide interventions consistent w/ POC in effort to achieve STGs    Barriers to Discharge: Inaccessible home environment        PT Discharge Recommendation: Home with outpatient rehabilitation          See flowsheet documentation for full assessment  3/18/2022 1428 by Anson Jackson PTA  Outcome: Progressing  Note: Prognosis: Excellent  Problem List: Decreased strength,Decreased range of motion,Decreased endurance,Impaired balance,Decreased mobility,Decreased safety awareness,Orthopedic restrictions,Pain  Assessment: Pt seen for PT treatment session this date with interventions consisting of gait training w/ emphasis on improving pt's ability to ambulate level surfaces x 150 ftx1 with close S provided by therapist with RW, Therapeutic exercise consisting of: AROM 20 reps B LE in sitting position, therapeutic activity consisting of training: sit<>stand transfers and navigating 5 stairs w/ B handrail with step to pattern with close S  Pt agreeable to PT treatment session upon arrival, pt found seated OOB in recliner, in no apparent distress and responsive  In comparison to previous session, pt with improvements in ability to navigate stairs  Post session: pt returned back to recliner, all needs in reach and RN notified of session findings/recommendations  Continue to recommend home with outpatient rehabilitation at time of d/c in order to maximize pt's functional independence and safety w/ mobility  Pt continues to be functioning below baseline level, and remains limited 2* factors listed above and including decreased strength and ROM  PT will continue to see pt during current hospitalization in order to address the deficits listed above and provide interventions consistent w/ POC in effort to achieve STGs  Barriers to Discharge: Inaccessible home environment        PT Discharge Recommendation: Home with outpatient rehabilitation          See flowsheet documentation for full assessment

## 2022-03-18 NOTE — OCCUPATIONAL THERAPY NOTE
03/18/22 0955   OT Last Visit   OT Visit Date 03/18/22   Note Type   Note Type Treatment   Restrictions/Precautions   Weight Bearing Precautions Per Order Yes   RUE Weight Bearing Per Order FWB   LLE Weight Bearing Per Order FWB   Other Precautions Multiple lines;WBS;Fall Risk;Pain   Pain Assessment   Pain Assessment Tool 0-10   Pain Score 6   Pain Location/Orientation Orientation: Left; Location: Knee   Pain Onset/Description Onset: Ongoing;Frequency: Intermittent; Descriptor: Aching;Descriptor: Pressure   Patient's Stated Pain Goal No pain   Hospital Pain Intervention(s) Medication (See MAR); Repositioned; Ambulation/increased activity; Emotional support   ADL   Where Assessed Chair   Grooming Assistance 5  Supervision/Setup   Grooming Deficit Setup;Verbal cueing;Supervision/safety; Increased time to complete;Wash/dry hands; Wash/dry face; Teeth care   UB Bathing Assistance 5  Supervision/Setup   UB Bathing Deficit Verbal cueing;Setup;Supervision/safety; Increased time to complete   LB Bathing Assistance 5  Supervision/Setup   LB Bathing Deficit Setup;Verbal cueing;Supervision/safety; Increased time to complete   UB Dressing Assistance 5  Supervision/Setup   UB Dressing Deficit Setup;Verbal cueing;Supervision/safety; Increased time to complete   LB Dressing Assistance 5  Supervision/Setup   LB Dressing Deficit Setup;Verbal cueing;Supervision/safety; Increased time to complete   Transfers   Sit to Stand 5  Supervision   Additional items Assist x 1; Armrests; Increased time required;Verbal cues   Stand to Sit 5  Supervision   Additional items Assist x 1; Armrests; Increased time required;Verbal cues   Stand pivot 5  Supervision   Additional items Assist x 1; Increased time required;Verbal cues   Functional Mobility   Functional Mobility 5  Supervision   Additional Comments Pt ambulated 125 feet with supervision x 1 and no LOB noted     Additional items Rolling walker   Therapeutic Excerise-Strength   UE Strength Yes   Right Upper Extremity- Strength   R Shoulder Flexion; Extension;Horizontal ABduction  (horiz  add, scapular pro/ret)   R Elbow Elbow flexion;Elbow extension   R Wrist   (wrist rotation)   R Weight/Reps/Sets 1# weight x 20 reps   Left Upper Extremity-Strength   L Weights/Reps/Sets TE same as R UE above   Cognition   Overall Cognitive Status WFL   Arousal/Participation Alert; Cooperative   Attention Within functional limits   Orientation Level Oriented X4   Memory Within functional limits   Following Commands Follows all commands and directions without difficulty   Additional Activities   Additional Activities Comments Also completed steps (see PT note for details)   Activity Tolerance   Activity Tolerance Patient tolerated treatment well   Assessment   Assessment Patient participated in Skilled OT session this date with interventions consisting of functional transfer training,  ADL re training with the use of correct body mechnaics, Energy Conservation techniques, therapeutic exercise to: increase functional use of BUEs, increase BUE muscle strength , increase dynamic sit/ stand balance during functional activity  and increase OOB/ sitting tolerance   Patient agreeable to OT treatment session, upon arrival patient was found seated OOB to Recliner  Patient set up for ADL seated in recliner and completes UB and LB ADL with supervision and set up  Following ADL, patient transfers sit to stand with supervision x 1 and completes functional mobility in hallway x 125 feet with no LOB or dizziness noted  Pt also completes steps (see PT note for details)  Patient then took a brief rest period in recliner before completing UB TE using 1# weight as detailed in flow sheet  Pt reports pain 6/10 in L knee  Session ended with patient seated OOB to recliner, SCDs reapplied and all need met  In comparison to previous session, patient with improvements in UB strength, endurance, functional transfers, and self care ADLs    Patient requiring verbal cues for correct technique, verbal cues for pacing thru activity steps and ocassional safety reminders  Patient performance  demonstrated good carryover of learned techniques and strategies to facilitate safety during functional tasks  Patient continues to be functioning below baseline level, occupational performance remains limited secondary to factors listed above and increased risk for falls and injury  From OT standpoint, recommendation at time of d/c would be Outpatient OT  Patient to benefit from continued Occupational Therapy treatment while in the hospital to address deficits as defined above and maximize level of functional independence with ADLs and functional mobility in order to return to [facility]  Plan   Treatment Interventions ADL retraining;Functional transfer training;UE strengthening/ROM; Endurance training;Energy conservation   Goal Expiration Date 03/27/22   OT Treatment Day 1   OT Frequency 3-5x/wk   Recommendation   OT Discharge Recommendation Home with outpatient rehabilitation   OT - OK to Discharge Yes  (when medically cleared)   AM-PAC Daily Activity Inpatient   Lower Body Dressing 4   Bathing 4   Toileting 4   Upper Body Dressing 4   Grooming 4   Eating 4   Daily Activity Raw Score 24   Daily Activity Standardized Score (Calc for Raw Score >=11) 57 54   AM-PAC Applied Cognition Inpatient   Following a Speech/Presentation 4   Understanding Ordinary Conversation 4   Taking Medications 4   Remembering Where Things Are Placed or Put Away 4   Remembering List of 4-5 Errands 4   Taking Care of Complicated Tasks 4   Applied Cognition Raw Score 24   Applied Cognition Standardized Score 62 21   YOVANI [place]

## 2022-03-18 NOTE — PHYSICAL THERAPY NOTE
PHYSICAL THERAPY TREATMENT NOTE  NAME:  Dustin Orozco  DATE: 03/18/22    Length Of Stay: 1  Performed at least 2 patient identifiers during session: Name and ID bracelet    TREATMENT NOTE:     03/18/22 1047   PT Last Visit   PT Visit Date 03/18/22   Note Type   Note Type Treatment   Pain Assessment   Pain Assessment Tool 0-10   Pain Score 6   Pain Location/Orientation Orientation: Left; Location: Knee   Pain Onset/Description Onset: Ongoing; Descriptor: Aching;Descriptor: Sore   Hospital Pain Intervention(s) Repositioned; Ambulation/increased activity; Emotional support   Restrictions/Precautions   Weight Bearing Precautions Per Order Yes   LLE Weight Bearing Per Order FWB   Other Precautions WBS; Fall Risk;Pain   General   Chart Reviewed Yes   Response to Previous Treatment Patient with no complaints from previous session  Family/Caregiver Present No   Cognition   Overall Cognitive Status WFL   Arousal/Participation Alert; Cooperative   Attention Within functional limits   Orientation Level Oriented X4   Memory Within functional limits   Following Commands Follows all commands and directions without difficulty   Comments pt agreeable to PT session   Bed Mobility   Additional Comments pt OOB in recliner at start and end of session   Transfers   Sit to Stand 5  Supervision   Additional items Assist x 1; Armrests; Increased time required;Verbal cues   Stand to Sit 5  Supervision   Additional items Assist x 1; Armrests; Increased time required;Verbal cues   Stand pivot 5  Supervision   Additional items Assist x 1; Increased time required;Verbal cues   Additional Comments no reports of dizziness/lightheadedness with transitional movements   Ambulation/Elevation   Gait pattern Improper Weight shift;Decreased foot clearance; Short stride; Excessively slow   Gait Assistance 5  Supervision   Additional items Assist x 1;Verbal cues   Assistive Device Rolling walker   Distance 125 ftx1   Stair Management Assistance 5  Supervision Additional items Assist x 1;Verbal cues   Stair Management Technique Two rails; Step to pattern   Number of Stairs 5   Ambulation/Elevation Additional Comments verbal cues required for proper sequencing on stairs   Balance   Static Sitting Normal   Dynamic Sitting Good   Static Standing Fair +   Dynamic Standing Fair   Ambulatory Fair   Endurance Deficit   Endurance Deficit Yes   Activity Tolerance   Activity Tolerance Patient limited by fatigue;Patient tolerated treatment well   Nurse Made Aware yes   Exercises   Quad Sets Sitting;20 reps;AROM; Bilateral   Heelslides Sitting;20 reps;AROM; Bilateral   Glute Sets Sitting;20 reps;AROM; Bilateral   Hip Abduction Sitting;20 reps;AROM; Bilateral   Hip Adduction Sitting;20 reps;AROM; Bilateral   Knee AROM Long Arc Quad Sitting;20 reps;AROM; Bilateral   Ankle Pumps Sitting;20 reps;AROM; Bilateral   Marching Sitting;20 reps;AROM; Bilateral   Assessment   Prognosis Excellent   Problem List Decreased strength;Decreased range of motion;Decreased endurance; Impaired balance;Decreased mobility; Decreased safety awareness;Orthopedic restrictions;Pain   Assessment Pt seen for PT treatment session this date with interventions consisting of gait training w/ emphasis on improving pt's ability to ambulate level surfaces x 150 ftx1 with close S provided by therapist with RW, Therapeutic exercise consisting of: AROM 20 reps B LE in sitting position, therapeutic activity consisting of training: sit<>stand transfers and navigating 5 stairs w/ B handrail with step to pattern with close S  Pt agreeable to PT treatment session upon arrival, pt found seated OOB in recliner, in no apparent distress and responsive  In comparison to previous session, pt with improvements in ability to navigate stairs  Post session: pt returned back to recliner, all needs in reach and RN notified of session findings/recommendations   Continue to recommend home with outpatient rehabilitation at time of d/c in order to maximize pt's functional independence and safety w/ mobility  Pt continues to be functioning below baseline level, and remains limited 2* factors listed above and including decreased strength and ROM  PT will continue to see pt during current hospitalization in order to address the deficits listed above and provide interventions consistent w/ POC in effort to achieve STGs  Barriers to Discharge Inaccessible home environment   Goals   PT Treatment Day 2   Plan   Treatment/Interventions Functional transfer training;LE strengthening/ROM; Therapeutic exercise;Elevations; Endurance training;Bed mobility;Gait training   Progress Progressing toward goals   PT Frequency Twice a day   Recommendation   PT Discharge Recommendation Home with outpatient rehabilitation   Equipment Recommended [facility] Recommended Wheeled walker   Change/add to [employer]? No   AM-PAC Basic Mobility Inpatient   Turning in Bed Without Bedrails 3   Lying on Back to Sitting on Edge of Flat Bed 3   Moving Bed to Chair 3   Standing Up From Chair 3   Walk in Room 3   Climb 3-5 Stairs 3   Basic Mobility Inpatient Raw Score 18   Basic Mobility Standardized Score 41 05   Highest Level Of Mobility   -[facility] Goal 6: Walk 10 steps or more   Education   Education Provided Mobility training;Assistive device; Other  (recieved TKR education handout)   Patient Demonstrates acceptance/verbal understanding   End of Consult   Patient Position at End of Consult Bedside chair; All needs within reach       The patient's AM-PAC Basic Mobility Inpatient Short Form Raw Score is 18  A Raw score of greater than 16 suggests the patient may benefit from discharge to home  Please also refer to the recommendation of the Physical Therapist for safe discharge planning        Diego Purvis PTA,PTA

## 2022-03-18 NOTE — PROGRESS NOTES
Progress Note - Orthopedics   Ollie Plummer 73 y o  female MRN: [id number]  Unit/Bed#: -01 Encounter: [id number]    Assessment:  Postop day #1 , status post left total knee replacement        Plan:  Out of bed  Weightbearing as tolerated left lower extremity  Physical/occupational therapy  Xarelto for DVT prophylaxis  Will discharge home today  Follow-up office 2 weeks      Weight bearing:  As tolerated    VTE Pharmacologic Prophylaxis:  Xarelto    VTE Mechanical Prophylaxis: sequential compression device    Subjective: The patient states her left knee pain has significantly decreased  She denies any numbness or tingling  She desires to go home today after physical therapy    Vitals: Blood pressure 120/55, pulse 79, temperature 98 5 °F (36 9 °C), resp  rate 19, height 5' 2" (1 575 m), weight 83 7 kg (184 lb 8 4 oz), SpO2 91 %, not currently breastfeeding  ,Body mass index is 33 75 kg/m²  Intake/Output Summary (Last 24 hours) at 3/18/2022 0948  Last data filed at 3/18/2022 0250  Gross per 24 hour   Intake 480 ml   Output 1450 ml   Net -970 ml       Invasive Devices  Report    Peripheral Intravenous Line            Peripheral IV 03/16/22 Distal;Right;Dorsal (posterior) Forearm 2 days    Peripheral IV 03/16/22 Right;Ventral (anterior) Forearm 2 days          Drain            [employer] Reinfusion/Drain Device 1 Left Knee 1 day                Physical Exam:   Ortho Exam:     Left lower extremity is neurovascular intact  Toes are pink and mobile  Compartments are soft  Incision is clean, dry, intact  Negative Homans  Drain was removed  Good dorsiflexion of the foot  Improved quad tone    Pulses intact    Lab, Imaging and other studies:   CBC:   Lab Results   Component Value Date    WBC 6 97 03/18/2022    HGB 9 3 (L) 03/18/2022    HCT 29 6 (L) 03/18/2022    MCV 94 03/18/2022     03/18/2022    MCH 29 4 03/18/2022    MCHC 31 4 03/18/2022    RDW 13 8 03/18/2022    MPV 9 5 03/18/2022

## 2022-03-18 NOTE — PLAN OF CARE
Problem: Nutrition/Hydration-ADULT  Goal: Nutrient/Hydration intake appropriate for improving, restoring or maintaining nutritional needs  Description: Monitor and assess patient's nutrition/hydration status for malnutrition  Collaborate with interdisciplinary team and initiate plan and interventions as ordered  Monitor patient's weight and dietary intake as ordered or per policy  Utilize nutrition screening tool and intervene as necessary  Determine patient's food preferences and provide high-protein, high-caloric foods as appropriate       INTERVENTIONS:  - Monitor oral intake, urinary output, labs, and treatment plans  - Assess nutrition and hydration status and recommend course of action  - Evaluate amount of meals eaten  - Assist patient with eating if necessary   - Allow adequate time for meals  - Recommend/ encourage appropriate diets, oral nutritional supplements, and vitamin/mineral supplements  - Order, calculate, and assess calorie counts as needed  - Recommend, monitor, and adjust tube feedings and TPN/PPN based on assessed needs  - Assess need for intravenous fluids  - Provide specific nutrition/hydration education as appropriate  - Include patient/family/caregiver in decisions related to nutrition  3/18/2022 1511 by Nestor Russo RN  Outcome: Adequate for Discharge  3/18/2022 0922 by Nestor Russo RN  Outcome: Progressing     Problem: SAFETY ADULT  Goal: Patient will remain free of falls  Description: INTERVENTIONS:  - Educate patient/family on patient safety including physical limitations  - Instruct patient to call for assistance with activity   - Consult OT/PT to assist with strengthening/mobility   - Keep Call bell within reach  - Keep bed low and locked with side rails adjusted as appropriate  - Keep care items and personal belongings within reach  - Initiate and maintain comfort rounds  - Make Fall Risk Sign visible to staff  - Offer Toileting every  Hours, in advance of need  - Initiate/Maintain alarm  - Obtain necessary fall risk management equipment:   - Apply yellow socks and bracelet for high fall risk patients  - Consider moving patient to room near nurses station  3/18/2022 1511 by Sukumar Shepard RN  Outcome: Adequate for Discharge  3/18/2022 5921 by Sukumar Shepard RN  Outcome: Progressing  Goal: Maintain or return to baseline ADL function  Description: INTERVENTIONS:  -  Assess patient's ability to carry out ADLs; assess patient's baseline for ADL function and identify physical deficits which impact ability to perform ADLs (bathing, care of mouth/teeth, toileting, grooming, dressing, etc )  - Assess/evaluate cause of self-care deficits   - Assess range of motion  - Assess patient's mobility; develop plan if impaired  - Assess patient's need for assistive devices and provide as appropriate  - Encourage maximum independence but intervene and supervise when necessary  - Involve family in performance of ADLs  - Assess for home care needs following discharge   - Consider OT consult to assist with ADL evaluation and planning for discharge  - Provide patient education as appropriate  3/18/2022 1511 by Sukumar Shepard RN  Outcome: Adequate for Discharge  3/18/2022 [id number] by Sukumar Shepard RN  Outcome: Progressing  Goal: Maintains/Returns to pre admission functional level  Description: INTERVENTIONS:  - Perform BMAT or MOVE assessment daily    - Set and communicate daily mobility goal to care team and patient/family/caregiver  - Collaborate with rehabilitation services on mobility goals if consulted  - Perform Range of Motion  times a day  - Reposition patient every  hours    - Dangle patient  times a day  - Stand patient  times a day  - Ambulate patient  times a day  - Out of bed to chair  times a day   - Out of bed for meals  times a day  - Out of bed for toileting  - Record patient progress and toleration of activity level   3/18/2022 1511 by Sukumar Shepadr RN  Outcome: Adequate for Discharge  3/18/2022 6733 by Edna Ferrer RN  Outcome: Progressing     Problem: PAIN - ADULT  Goal: Verbalizes/displays adequate comfort level or baseline comfort level  Description: Interventions:  - Encourage patient to monitor pain and request assistance  - Assess pain using appropriate pain scale  - Administer analgesics based on type and severity of pain and evaluate response  - Implement non-pharmacological measures as appropriate and evaluate response  - Consider cultural and social influences on pain and pain management  - Notify physician/advanced practitioner if interventions unsuccessful or patient reports new pain  3/18/2022 1511 by Edna Ferrer RN  Outcome: Adequate for Discharge  3/18/2022 0922 by Edna Ferrer RN  Outcome: Progressing     Problem: INFECTION - ADULT  Goal: Absence or prevention of progression during hospitalization  Description: INTERVENTIONS:  - Assess and monitor for signs and symptoms of infection  - Monitor lab/diagnostic results  - Monitor all insertion sites, i e  indwelling lines, tubes, and drains  - Monitor endotracheal if appropriate and nasal secretions for changes in amount and color  - [place] appropriate cooling/warming therapies per order  - Administer medications as ordered  - Instruct and encourage patient and family to use good hand hygiene technique  - Identify and instruct in appropriate isolation precautions for identified infection/condition  3/18/2022 1511 by Edna Ferrer RN  Outcome: Adequate for Discharge  3/18/2022 0922 by Edna Ferrer RN  Outcome: Progressing     Problem: DISCHARGE PLANNING  Goal: Discharge to home or other facility with appropriate resources  Description: INTERVENTIONS:  - Identify barriers to discharge w/patient and caregiver  - Arrange for needed discharge resources and transportation as appropriate  - Identify discharge learning needs (meds, wound care, etc )  - Arrange for interpretive services to assist at discharge as needed  - Refer to Case Management Department for coordinating discharge planning if the patient needs post-hospital services based on physician/advanced practitioner order or complex needs related to functional status, cognitive ability, or social support system  3/18/2022 1511 by Mike Mendoza RN  Outcome: Adequate for Discharge  3/18/2022 0922 by Mike Mendoza RN  Outcome: Progressing     Problem: Knowledge Deficit  Goal: Patient/family/caregiver demonstrates understanding of disease process, treatment plan, medications, and discharge instructions  Description: Complete learning assessment and assess knowledge base    Interventions:  - Provide teaching at level of understanding  - Provide teaching via preferred learning methods  3/18/2022 1511 by Mike Mendoza RN  Outcome: Adequate for Discharge  3/18/2022 0280 by Mike Mendoza RN  Outcome: Progressing     Problem: MOBILITY - ADULT  Goal: Maintain or return to baseline ADL function  Description: INTERVENTIONS:  -  Assess patient's ability to carry out ADLs; assess patient's baseline for ADL function and identify physical deficits which impact ability to perform ADLs (bathing, care of mouth/teeth, toileting, grooming, dressing, etc )  - Assess/evaluate cause of self-care deficits   - Assess range of motion  - Assess patient's mobility; develop plan if impaired  - Assess patient's need for assistive devices and provide as appropriate  - Encourage maximum independence but intervene and supervise when necessary  - Involve family in performance of ADLs  - Assess for home care needs following discharge   - Consider OT consult to assist with ADL evaluation and planning for discharge  - Provide patient education as appropriate  3/18/2022 1511 by Mike Mendoza RN  Outcome: Adequate for Discharge  3/18/2022 5748 by Mike Mendoza RN  Outcome: Progressing  Goal: Maintains/Returns to pre admission functional level  Description: INTERVENTIONS:  - Perform BMAT or MOVE assessment daily    - Set and communicate daily mobility goal to care team and patient/family/caregiver  - Collaborate with rehabilitation services on mobility goals if consulted  - Perform Range of Motion  times a day  - Reposition patient every  hours    - Dangle patient  times a day  - Stand patient times a day  - Ambulate patient  times a day  - Out of bed to chair  times a day   - Out of bed for meals  times a day  - Out of bed for toileting  - Record patient progress and toleration of activity level   3/18/2022 1511 by Edna Ferrer RN  Outcome: Adequate for Discharge  3/18/2022 6060 by Edna Ferrer RN  Outcome: Progressing     Problem: Potential for Falls  Goal: Patient will remain free of falls  Description: INTERVENTIONS:  - Educate patient/family on patient safety including physical limitations  - Instruct patient to call for assistance with activity   - Consult OT/PT to assist with strengthening/mobility   - Keep Call bell within reach  - Keep bed low and locked with side rails adjusted as appropriate  - Keep care items and personal belongings within reach  - Initiate and maintain comfort rounds  - Make Fall Risk Sign visible to staff  - Offer Toileting every Hours, in advance of need  - Initiate/Maintain alarm  - Obtain necessary fall risk management equipment:   - Apply yellow socks and bracelet for high fall risk patients  - Consider moving patient to room near nurses station  3/18/2022 1511 by Edna Ferrer RN  Outcome: Adequate for Discharge  3/18/2022 4110 by Edna Ferrer RN  Outcome: Progressing

## 2022-03-18 NOTE — PHYSICAL THERAPY NOTE
PHYSICAL THERAPY TREATMENT NOTE  NAME:  Alexis Dewitt  DATE: 03/18/22    Length Of Stay: 1  Performed at least 2 patient identifiers during session: Name and ID bracelet    TREATMENT nOTE     03/18/22 1327   PT Last Visit   PT Visit Date 03/18/22   Note Type   Note Type Treatment   Pain Assessment   Pain Assessment Tool 0-10   Pain Score 4   Pain Location/Orientation Orientation: Left; Location: Knee   Pain Onset/Description Onset: Ongoing   Patient's Stated Pain Goal No pain   Hospital Pain Intervention(s) Ambulation/increased activity;Repositioned; Emotional support   Restrictions/Precautions   Weight Bearing Precautions Per Order Yes   LLE Weight Bearing Per Order FWB   Other Precautions WBS; Fall Risk;Pain   General   Chart Reviewed Yes   Response to Previous Treatment Patient with no complaints from previous session  Family/Caregiver Present No   Cognition   Overall Cognitive Status WFL   Arousal/Participation Alert; Cooperative   Attention Within functional limits   Orientation Level Oriented X4   Memory Within functional limits   Following Commands Follows all commands and directions without difficulty   Comments pt agreeable to pT session   Bed Mobility   Additional Comments pt OOB in chair upon arrival and end of session   Transfers   Sit to Stand 6  Modified independent   Additional items Armrests; Increased time required   Stand to Sit 6  Modified independent   Additional items Armrests; Increased time required   Additional Comments no reports of lightheadedness or dizziness   Ambulation/Elevation   Gait pattern Improper Weight shift;Decreased foot clearance; Short stride   Gait Assistance 5  Supervision   Additional items Assist x 1;Verbal cues   Assistive Device Rolling walker   Distance 150 ft   Balance   Static Sitting Normal   Dynamic Sitting Good   Static Standing Fair +   Dynamic Standing Fair   Ambulatory Fair   Endurance Deficit   Endurance Deficit No   Activity Tolerance   Activity Tolerance Patient tolerated treatment well   Nurse Made Aware yes   Exercises   Quad Sets Sitting;20 reps;AROM; Bilateral   Heelslides Sitting;20 reps;AROM; Bilateral   Glute Sets Sitting;20 reps;AROM; Bilateral   Hip Abduction Sitting;20 reps;AROM; Bilateral   Hip Adduction Sitting;20 reps;AROM; Bilateral   Knee AROM Long Arc Quad Sitting;20 reps;AROM; Bilateral   Ankle Pumps Sitting;20 reps;AROM; Bilateral   Marching Sitting;20 reps;AROM; Bilateral   Assessment   Prognosis Excellent   Problem List Decreased strength;Decreased endurance; Impaired balance;Decreased mobility;Orthopedic restrictions;Pain   Assessment Pt seen for PT treatment session this date with interventions consisting of gait training w/ emphasis on improving pt's ability to ambulate level surfaces x 150 ft with close S provided by therapist with RW, Therapeutic exercise consisting of: AROM 20 reps B LE in sitting position and therapeutic activity consisting of training: bed mobility, supine<>sit transfers and sit<>stand transfers and educated with TKR handout  Pt agreeable to PT treatment session upon arrival, pt found seated OOB in recliner, in no apparent distress and responsive  In comparison to previous session, pt with improvements in distance ambulated  Post session: pt returned back to recliner, all needs in reach and RN notified of session findings/recommendations  Continue to recommend home with outpatient rehabilitation at time of d/c in order to maximize pt's functional independence and safety w/ mobility    PT will continue to see pt during current hospitalization in order to address the deficits listed above and provide interventions consistent w/ POC in effort to achieve STGs  Goals   PT Treatment Day 3   Plan   Treatment/Interventions Functional transfer training;LE strengthening/ROM; Therapeutic exercise; Endurance training;Bed mobility;Gait training   Progress Improving as expected   PT Frequency Twice a day   Recommendation   PT Discharge Recommendation Home with outpatient rehabilitation   Equipment Recommended [facility] Recommended Wheeled walker   Change/add to [employer]? No   AM-PAC Basic Mobility Inpatient   Turning in Bed Without Bedrails 3   Lying on Back to Sitting on Edge of Flat Bed 3   Moving Bed to Chair 3   Standing Up From Chair 3   Walk in Room 3   Climb 3-5 Stairs 3   Basic Mobility Inpatient Raw Score 18   Basic Mobility Standardized Score 41 05   Highest Level Of Mobility   -[facility] Goal 6: Walk 10 steps or more   Education   Education Provided Mobility training; Other  (handout given and reviewed on TKR)   Patient Demonstrates acceptance/verbal understanding   End of Consult   Patient Position at End of Consult Bedside chair; All needs within reach       The patient's AM-PAC Basic Mobility Inpatient Short Form Raw Score is 18  A Raw score of greater than 16 suggests the patient may benefit from discharge to home  Please also refer to the recommendation of the Physical Therapist for safe discharge planning        Cecy Warren PTA,PTA

## 2022-03-18 NOTE — PLAN OF CARE
Problem: OCCUPATIONAL THERAPY ADULT  Goal: Performs self-care activities at highest level of function for planned discharge setting  See evaluation for individualized goals  Description: Treatment Interventions: ADL retraining,Functional transfer training,UE strengthening/ROM,Endurance training,Patient/family training,Equipment evaluation/education,Compensatory technique education          See flowsheet documentation for full assessment, interventions and recommendations  Outcome: Progressing  Note: Limitation: Decreased ADL status,Decreased endurance,Decreased self-care trans,Decreased high-level ADLs  Prognosis: Good  Assessment: Patient participated in Skilled OT session this date with interventions consisting of functional transfer training,  ADL re training with the use of correct body mechnaics, Energy Conservation techniques, therapeutic exercise to: increase functional use of BUEs, increase BUE muscle strength , increase dynamic sit/ stand balance during functional activity  and increase OOB/ sitting tolerance   Patient agreeable to OT treatment session, upon arrival patient was found seated OOB to Recliner  Patient set up for ADL seated in recliner and completes UB and LB ADL with supervision and set up  Following ADL, patient transfers sit to stand with supervision x 1 and completes functional mobility in hallway x 125 feet with no LOB or dizziness noted  Pt also completes steps (see PT note for details)  Patient then took a brief rest period in recliner before completing UB TE using 1# weight as detailed in flow sheet  Pt reports pain 6/10 in L knee  Session ended with patient seated OOB to recliner, SCDs reapplied and all need met  In comparison to previous session, patient with improvements in UB strength, endurance, functional transfers, and self care ADLs   Patient requiring verbal cues for correct technique, verbal cues for pacing thru activity steps and ocassional safety reminders   Patient performance  demonstrated good carryover of learned techniques and strategies to facilitate safety during functional tasks  Patient continues to be functioning below baseline level, occupational performance remains limited secondary to factors listed above and increased risk for falls and injury  From OT standpoint, recommendation at time of d/c would be Outpatient OT  Patient to benefit from continued Occupational Therapy treatment while in the hospital to address deficits as defined above and maximize level of functional independence with ADLs and functional mobility in order to return to PLOF        OT Discharge Recommendation: Home with outpatient rehabilitation  OT - OK to Discharge: Yes (when medically cleared)

## 2022-03-18 NOTE — PLAN OF CARE
Problem: PHYSICAL THERAPY ADULT  Goal: Performs mobility at highest level of function for planned discharge setting  See evaluation for individualized goals  Description: Treatment/Interventions: LE strengthening/ROM,Functional transfer training,Elevations,Therapeutic exercise,Endurance training,Patient/family training,Bed mobility,Equipment eval/education,Gait training,Spoke to nursing  Equipment Recommended: Daisy Zacarias       See flowsheet documentation for full assessment, interventions and recommendations  Outcome: Progressing  Note: Prognosis: Excellent  Problem List: Decreased strength,Decreased range of motion,Decreased endurance,Impaired balance,Decreased mobility,Decreased safety awareness,Orthopedic restrictions,Pain  Assessment: Pt seen for PT treatment session this date with interventions consisting of gait training w/ emphasis on improving pt's ability to ambulate level surfaces x 150 ftx1 with close S provided by therapist with RW, Therapeutic exercise consisting of: AROM 20 reps B LE in sitting position, therapeutic activity consisting of training: sit<>stand transfers and navigating 5 stairs w/ B handrail with step to pattern with close S  Pt agreeable to PT treatment session upon arrival, pt found seated OOB in recliner, in no apparent distress and responsive  In comparison to previous session, pt with improvements in ability to navigate stairs  Post session: pt returned back to recliner, all needs in reach and RN notified of session findings/recommendations  Continue to recommend home with outpatient rehabilitation at time of d/c in order to maximize pt's functional independence and safety w/ mobility  Pt continues to be functioning below baseline level, and remains limited 2* factors listed above and including decreased strength and ROM   PT will continue to see pt during current hospitalization in order to address the deficits listed above and provide interventions consistent w/ POC in effort to achieve STGs  Barriers to Discharge: Inaccessible home environment        PT Discharge Recommendation: Home with outpatient rehabilitation          See flowsheet documentation for full assessment

## 2022-03-18 NOTE — PLAN OF CARE
Problem: Nutrition/Hydration-ADULT  Goal: Nutrient/Hydration intake appropriate for improving, restoring or maintaining nutritional needs  Description: Monitor and assess patient's nutrition/hydration status for malnutrition  Collaborate with interdisciplinary team and initiate plan and interventions as ordered  Monitor patient's weight and dietary intake as ordered or per policy  Utilize nutrition screening tool and intervene as necessary  Determine patient's food preferences and provide high-protein, high-caloric foods as appropriate       INTERVENTIONS:  - Monitor oral intake, urinary output, labs, and treatment plans  - Assess nutrition and hydration status and recommend course of action  - Evaluate amount of meals eaten  - Assist patient with eating if necessary   - Allow adequate time for meals  - Recommend/ encourage appropriate diets, oral nutritional supplements, and vitamin/mineral supplements  - Order, calculate, and assess calorie counts as needed  - Recommend, monitor, and adjust tube feedings and TPN/PPN based on assessed needs  - Assess need for intravenous fluids  - Provide specific nutrition/hydration education as appropriate  - Include patient/family/caregiver in decisions related to nutrition  Outcome: Progressing     Problem: SAFETY ADULT  Goal: Patient will remain free of falls  Description: INTERVENTIONS:  - Educate patient/family on patient safety including physical limitations  - Instruct patient to call for assistance with activity   - Consult OT/PT to assist with strengthening/mobility   - Keep Call bell within reach  - Keep bed low and locked with side rails adjusted as appropriate  - Keep care items and personal belongings within reach  - Initiate and maintain comfort rounds  - Make Fall Risk Sign visible to staff  - Offer Toileting every 2 Hours, in advance of need  - Initiate/Maintain bedalarm  - Obtain necessary fall risk management equipment:   - Apply yellow socks and bracelet for high fall risk patients  - Consider moving patient to room near nurses station  Outcome: Progressing  Goal: Maintain or return to baseline ADL function  Description: INTERVENTIONS:  -  Assess patient's ability to carry out ADLs; assess patient's baseline for ADL function and identify physical deficits which impact ability to perform ADLs (bathing, care of mouth/teeth, toileting, grooming, dressing, etc )  - Assess/evaluate cause of self-care deficits   - Assess range of motion  - Assess patient's mobility; develop plan if impaired  - Assess patient's need for assistive devices and provide as appropriate  - Encourage maximum independence but intervene and supervise when necessary  - Involve family in performance of ADLs  - Assess for home care needs following discharge   - Consider OT consult to assist with ADL evaluation and planning for discharge  - Provide patient education as appropriate  Outcome: Progressing  Goal: Maintains/Returns to pre admission functional level  Description: INTERVENTIONS:  - Perform BMAT or MOVE assessment daily    - Set and communicate daily mobility goal to care team and patient/family/caregiver  - Collaborate with rehabilitation services on mobility goals if consulted  - Perform Range of Motion 3 times a day  - Reposition patient every 2 hours    - Dangle patient 3 times a day  - Stand patient 3 times a day  - Ambulate patient 3 times a day  - Out of bed to chair 3 times a day   - Out of bed for meals 3 times a day  - Out of bed for toileting  - Record patient progress and toleration of activity level   Outcome: Progressing     Problem: PAIN - ADULT  Goal: Verbalizes/displays adequate comfort level or baseline comfort level  Description: Interventions:  - Encourage patient to monitor pain and request assistance  - Assess pain using appropriate pain scale  - Administer analgesics based on type and severity of pain and evaluate response  - Implement non-pharmacological measures as appropriate and evaluate response  - Consider cultural and social influences on pain and pain management  - Notify physician/advanced practitioner if interventions unsuccessful or patient reports new pain  Outcome: Progressing     Problem: INFECTION - ADULT  Goal: Absence or prevention of progression during hospitalization  Description: INTERVENTIONS:  - Assess and monitor for signs and symptoms of infection  - Monitor lab/diagnostic results  - Monitor all insertion sites, i e  indwelling lines, tubes, and drains  - Monitor endotracheal if appropriate and nasal secretions for changes in amount and color  - [place] appropriate cooling/warming therapies per order  - Administer medications as ordered  - Instruct and encourage patient and family to use good hand hygiene technique  - Identify and instruct in appropriate isolation precautions for identified infection/condition  Outcome: Progressing

## 2022-03-18 NOTE — DISCHARGE SUMMARY
Discharge Summary - Alexis Dewitt 73 y o  female MRN: [id number]    Unit/Bed#: -01 Encounter: [id number]    Admission Date:   Admission Orders (From admission, onward)     Ordered        03/17/22 0808  Inpatient Admission  Once                        Admitting Diagnosis:  Primary osteoarthritis of left knee    Procedures Performed:  Elective left total knee replacement    Summary of Hospital Course:     72-year-old female presents to the hospital for an elective left total knee replacement  The patient tolerated the procedure quite well  On postop day 1, she was placed on Xarelto for DVT prophylaxis  She began working with Physical therapy and Occupational therapy weight-bearing as tolerated  On postop day 2, her leg drain was removed and her dressing was changed  Her incision was clean, dry and intact  The patient continued to progress it was then deemed suitable for discharge to home with outpatient physical therapy  The patient was discharged with pain medication, vitamins, DVT prophylaxis; and also an elevated commode and walker if necessary  The patient was instructed to paint incision with betadine two times per day  Discharge Diagnosis:  Primary osteoarthritis of left knee, status post elective left total knee replacement      Condition at Discharge: stable         Discharge instructions/Information to patient and family:   See after visit summary for information provided to patient and family  Provisions for Follow-Up Care:  See after visit summary for information related to follow-up care and any pertinent home health orders  PCP: Lalito Brandon DO    Disposition: Home    Planned Readmission: No      Discharge Statement   I spent 30 minutes discharging the patient  This time was spent on the day of discharge  I had direct contact with the patient on the day of discharge  Additional documentation is required if more than 30 minutes were spent on discharge       Discharge Medications:  See after visit summary for reconciled discharge medications provided to patient and family

## 2022-03-18 NOTE — UTILIZATION REVIEW
Elective Surgical Continued Stay Review    Date:3/18  POD#: 2  Current Patient Class: INpatient  Current Level of Care: Med/surg    Assessment/Plan: 73 y o  female, initial surgery date 3/16  Pain controlled, working with PT/OT  DVT prophylaxis continues      Pertinent Labs/Diagnostic Results:         Results from last 7 days   Lab Units 03/18/22 0439 03/17/22 0440   WBC Thousand/uL 6 97 7 81   HEMOGLOBIN g/dL 9 3* 10 0*   HEMATOCRIT % 29 6* 31 7*   PLATELETS Thousands/uL 174 183         Results from last 7 days   Lab Units 03/18/22  0439 03/17/22  0440   SODIUM mmol/L 139 137   POTASSIUM mmol/L 3 8 4 1   CHLORIDE mmol/L 106 104   CO2 mmol/L 29 28   ANION GAP mmol/L 4 5   BUN mg/dL 9 13   CREATININE mg/dL 0 50* 0 55*   EGFR ml/min/1 73sq m 99 96   CALCIUM mg/dL 9 2 8 8             Results from last 7 days   Lab Units 03/18/22  0439 03/17/22  0440   GLUCOSE RANDOM mg/dL 91 102       Vital Signs:   Date/Time Temp Pulse Resp BP MAP (mmHg) SpO2 Calculated FIO2 (%) - Nasal Cannula Nasal Cannula O2 Flow Rate (L/min) O2 Device Cardiac (WDL) Patient Position - Orthostatic VS   03/18/22 0923 -- -- -- -- -- -- -- -- None (Room air) -- --   03/18/22 08:56:36 98 5 °F (36 9 °C) 79 -- 120/55 77 91 % -- -- -- -- --   03/18/22 03:20:22 98 6 °F (37 °C) 63 19 99/56 70 89 % Abnormal  -- -- -- -- --   03/17/22 22:44:31 98 1 °F (36 7 °C) 88 18 104/57 73 91 % -- -- -- -- --   03/17/22 1846 97 9 °F (36 6 °C) 64 18 114/52 -- 94 % -- -- None (Room air) -- Lying   03/17/22 14:09:48 -- 71 20 112/58 76 93 % -- -- None (           Medications:   Scheduled Medications:  ascorbic acid, 500 mg, Oral, BID  azelastine, 2 spray, Each Nare, BID  cholecalciferol, 1,000 Units, Oral, Daily  docusate sodium, 100 mg, Oral, BID  ferrous sulfate, 325 mg, Oral, BID With Meals  folic acid, 1 mg, Oral, Daily  levothyroxine, 75 mcg, Oral, Early Morning  metoprolol succinate, 50 mg, Oral, BID  multivitamin-minerals, 1 tablet, Oral, Daily  rivaroxaban, 20 mg, Oral, Before Dinner      Continuous IV Infusions:     PRN Meds:  acetaminophen, 650 mg, Oral, Q4H PRN  aluminum-magnesium hydroxide-simethicone, 30 mL, Oral, Q6H PRN  HYDROmorphone, 0 5 mg, Intravenous, Q3H PRN  ondansetron, 4 mg, Intravenous, Q6H PRN  oxyCODONE-acetaminophen, 1 tablet, Oral, Q4H PRN X4  oxyCODONE-acetaminophen, 2 tablet, Oral, Q6H PRN          Discharge Plan: D  Network Utilization Review Department  ATTENTION: Please call with any questions or concerns to [phone] and carefully listen to the prompts so that you are directed to the right person  All voicemails are confidential   Juliane Grand all requests for admission clinical reviews, approved or denied determinations and any other requests to dedicated fax number below belonging to the campus where the patient is receiving treatment   List of dedicated fax numbers for the Facilities:  [address] DENIALS (Administrative/Medical Necessity) [phone]   [address] (Maternity/NICU/Pediatrics) [phone]   [address]  [address] Medical [place] [address] [address] [place] [address] Annie Hernandez 14[address] [facility] [place] [address] [phone]

## 2022-03-18 NOTE — NURSING NOTE
Patient assisted OOB to commode, then to chair  Contact guard  Patient tolerated well  Offers no complaints at this time  Call bell and belongings within reach

## 2022-03-21 ENCOUNTER — TELEPHONE (OUTPATIENT)
Dept: OBGYN CLINIC | Facility: HOSPITAL | Age: 72
End: 2022-03-21

## 2022-03-21 NOTE — UTILIZATION REVIEW
Notification of Discharge   This is a Notification of Discharge from our facility [address]  Please be advised that this patient has been discharge from our facility  Below you will find the admission and discharge date and time including the patients disposition  UTILIZATION REVIEW CONTACT:  Caro Cummings  Utilization   Network Utilization Review Department  Phone: [phone] carefully listen to the prompts  All voicemails are confidential   Email: [email or web address]     PHYSICIAN ADVISORY SERVICES:  FOR TOSF-CZ-TKGE REVIEW - MEDICAL NECESSITY DENIAL  Phone: [phone]  Fax: [phone]  Email: [email or web address]     PRESENTATION DATE: 3/16/2022  7:41 AM  OBERVATION ADMISSION DATE:   INPATIENT ADMISSION DATE: 3/17/22  8:08 AM   DISCHARGE DATE: 3/18/2022  3:12 PM  DISPOSITION: Home/Self Care Home/Self Care      IMPORTANT INFORMATION:  Send all requests for admission clinical reviews, approved or denied determinations and any other requests to dedicated fax number below belonging to the campus where the patient is receiving treatment   List of dedicated fax numbers:  [address] DENIALS (Administrative/Medical Necessity) [phone]   [address] (Maternity/NICU/Pediatrics) [phone]   [facility] [phone]   130 [facility] [phone]   [address] [place] [address] [phone]   2000 [facility] [address] [place] [address],[address] [place] [address] [phone]   [facility]  [phone]   [address] [facility] [address]  2401 [facility] 1000 [facility] [phone]

## 2022-03-22 NOTE — TELEPHONE ENCOUNTER
Pt contacted to complete a postoperative follow up call assessment  She reports doing "well" and had outpatient PT yesterday  She reports at home ambulating with the cane  She states she has noticed swelling to the ankle/foot of the operative leg, which we discussed should worsen with activity and improve with rest and icing  She reports icing, but has not to the foot/ankle and will start to all areas of swelling  She reports her dressing is dry and using the Betadine as instructed  We reviewed her AVS medication list   She reports taking tylenol for pain, and has not taken the Percocet recently  We discussed max dose of tylenol being 3000mg/24 hours  She reports taking the Keflex as prescribed, and colace 100mg BID (reports having bowel movements)  She also is taking her daily Xarelto  She denies any chest pain, SOB, dizziness, fever or calf pain  She denies questions or issues at this time  pt encouraged to call me with questions, concerns or issues

## 2022-03-24 ENCOUNTER — TELEPHONE (OUTPATIENT)
Dept: OBGYN CLINIC | Facility: HOSPITAL | Age: 72
End: 2022-03-24

## 2022-03-24 NOTE — TELEPHONE ENCOUNTER
SOCORRO 3/16  I contacted patient  We discussed preoperative vitamins, and not needing to continue them postoperatively  We reviewed AVS bathing instructions  She had a question regarding going to the chiropractor, and when she can resume  She is aware I will update the surgeon and be in touch with recommendations

## 2022-03-24 NOTE — TELEPHONE ENCOUNTER
Patient is looking for a call back to address some questions she has regarding post-op  Please advise patient       cb # [phone]

## 2022-03-24 NOTE — TELEPHONE ENCOUNTER
She can go back to the chiropractor any time  I would not recommend the chiropractor NOT work on her operative leg

## 2022-03-31 ENCOUNTER — OFFICE VISIT (OUTPATIENT)
Dept: FAMILY MEDICINE CLINIC | Facility: CLINIC | Age: 72
End: 2022-03-31
Payer: COMMERCIAL

## 2022-03-31 ENCOUNTER — APPOINTMENT (OUTPATIENT)
Dept: RADIOLOGY | Facility: MEDICAL CENTER | Age: 72
End: 2022-03-31
Payer: COMMERCIAL

## 2022-03-31 ENCOUNTER — OFFICE VISIT (OUTPATIENT)
Dept: OBGYN CLINIC | Facility: CLINIC | Age: 72
End: 2022-03-31

## 2022-03-31 VITALS
HEIGHT: 62 IN | SYSTOLIC BLOOD PRESSURE: 144 MMHG | BODY MASS INDEX: 33.75 KG/M2 | HEART RATE: 79 BPM | DIASTOLIC BLOOD PRESSURE: 85 MMHG

## 2022-03-31 VITALS
DIASTOLIC BLOOD PRESSURE: 78 MMHG | TEMPERATURE: 98.2 F | HEART RATE: 70 BPM | HEIGHT: 62 IN | SYSTOLIC BLOOD PRESSURE: 136 MMHG | WEIGHT: 179.4 LBS | BODY MASS INDEX: 33.01 KG/M2 | OXYGEN SATURATION: 98 %

## 2022-03-31 DIAGNOSIS — G89.29 CHRONIC PAIN OF LEFT KNEE: Primary | ICD-10-CM

## 2022-03-31 DIAGNOSIS — S90.551A: ICD-10-CM

## 2022-03-31 DIAGNOSIS — I95.0 IDIOPATHIC HYPOTENSION: ICD-10-CM

## 2022-03-31 DIAGNOSIS — D22.9 ENLARGED SKIN MOLE: Primary | ICD-10-CM

## 2022-03-31 DIAGNOSIS — S91.331A PENETRATING WOUND OF RIGHT FOOT, INITIAL ENCOUNTER: ICD-10-CM

## 2022-03-31 DIAGNOSIS — M25.562 CHRONIC PAIN OF LEFT KNEE: Primary | ICD-10-CM

## 2022-03-31 DIAGNOSIS — Z96.652 S/P TOTAL KNEE REPLACEMENT, LEFT: Primary | ICD-10-CM

## 2022-03-31 DIAGNOSIS — Z96.652 S/P TKR (TOTAL KNEE REPLACEMENT), LEFT: ICD-10-CM

## 2022-03-31 DIAGNOSIS — Z96.652 S/P TOTAL KNEE REPLACEMENT, LEFT: ICD-10-CM

## 2022-03-31 PROCEDURE — 99213 OFFICE O/P EST LOW 20 MIN: CPT | Performed by: FAMILY MEDICINE

## 2022-03-31 PROCEDURE — 99024 POSTOP FOLLOW-UP VISIT: CPT | Performed by: ORTHOPAEDIC SURGERY

## 2022-03-31 PROCEDURE — 73562 X-RAY EXAM OF KNEE 3: CPT

## 2022-03-31 NOTE — PROGRESS NOTES
Assessment/Plan:    No problem-specific Assessment & Plan notes found for this encounter  Diagnoses and all orders for this visit:    S/P total knee replacement, left  -     XR knee 3 vw left non injury; Future          The patient is doing quite well from her left total knee replacement  Continue home exercise program   Continue stretching  Continue therapy  The patient has been on Xarelto preoperatively  The importance of a home exercise program was discussed  Follow-up in 1 month for re-evaluation  If her condition changes, she will not hesitate to let us know    Subjective:      Patient ID: Danna Bell is a 73 y o  female  HPI    The patient is 15 days status post left total knee replacement  Her pain is quite minimal at this point  Most the pain is incisional and not arthritic  She has taken Xarelto like she did preoperatively  She is actively participating in outpatient therapy  She is pleased with her results  She is ambulating with a single-point cane    The following portions of the patient's history were reviewed and updated as appropriate: allergies, current medications, past family history, past medical history, past social history, past surgical history and problem list     Review of Systems   Constitutional: Negative for chills, fever and unexpected weight change  HENT: Negative for hearing loss, nosebleeds and sore throat  Eyes: Negative for pain, redness and visual disturbance  Respiratory: Negative for cough, shortness of breath and wheezing  Cardiovascular: Negative for chest pain, palpitations and leg swelling  Gastrointestinal: Negative for abdominal pain, nausea and vomiting  Endocrine: Negative for polydipsia and polyuria  Genitourinary: Negative for dysuria and hematuria  Musculoskeletal: Positive for arthralgias, gait problem and myalgias  Negative for back pain, joint swelling, neck pain and neck stiffness          As noted in HPI   Skin: Negative for rash and wound  Neurological: Negative for dizziness, numbness and headaches  Psychiatric/Behavioral: Negative for decreased concentration and suicidal ideas  The patient is not nervous/anxious  Objective:      /85 (BP Location: Left arm, Patient Position: Sitting, Cuff Size: Standard)   Pulse 79   Ht 5' 2" (1 575 m)   BMI 33 75 kg/m²          Physical Exam      Left lower extremity is neurovascular intact  Toes pink and mobile  Compartments are soft  Incision is clean, dry, intact  Range of motion of her knee is from 0 to almost 95°  Good quad tone  No collateral ligament dysfunction  Negative Homans      X-rays show a well-seated left total knee replacement without any loosening

## 2022-03-31 NOTE — PROGRESS NOTES
Assessment/Plan:    No problem-specific Assessment & Plan notes found for this encounter  Diagnoses and all orders for this visit:    Chronic pain of left knee  Comments:  recovering well    S/P TKR (total knee replacement), left  Comments:  recovering well    Idiopathic hypotension  Comments:  resolved          PHQ-2/9 Depression Screening    Little interest or pleasure in doing things: 0 - not at all  Feeling down, depressed, or hopeless: 0 - not at all  PHQ-2 Score: 0  PHQ-2 Interpretation: Negative depression screen        BMI Counseling: Body mass index is 32 81 kg/m²  The BMI is above normal  Nutrition recommendations include reducing portion sizes and 3-5 servings of fruits/vegetables daily  Exercise recommendations include moderate aerobic physical activity for 150 minutes/week and exercising 3-5 times per week  TCM Call (since 2/28/2022)     Date and time call was made  3/18/2022  3:28 PM    Hospital care reviewed  Records reviewed        Patient was hospitialized at  [address]        Date of Admission  03/16/22    Date of discharge  03/18/22    Diagnosis  primary osteoarthrits of left knee    Disposition  Home      TCM Call (since 2/28/2022)     Scheduled for follow up? Yes    Did you obtain your prescribed medications  Yes    Do you need help managing your prescriptions or medications  No    Is transportation to your appointment needed  No    I have advised the patient to call PCP with any new or worsening symptoms  nolvia valladares        Subjective:      Patient ID: Davide Prior is a 73 y o  female      Pt here post knee replacement for TCM, pt is undergoing PT, pt is having 2-3/10 pain currently, pt was in the hospital for 2 days due to low BP which is now better      The following portions of the patient's history were reviewed and updated as appropriate: allergies, current medications, past family history, past medical history, past social history, past surgical history and problem list     Review of Systems   Constitutional: Positive for fatigue  Negative for chills and fever  HENT: Negative  Eyes: Negative  Respiratory: Negative for shortness of breath and wheezing  Cardiovascular: Negative for chest pain and palpitations  Gastrointestinal: Negative for abdominal pain, blood in stool, constipation, diarrhea, nausea and vomiting  Endocrine: Negative  Genitourinary: Negative for difficulty urinating and dysuria  Musculoskeletal: Positive for arthralgias  Negative for myalgias  Skin: Negative  Allergic/Immunologic: Negative  Hematological: Negative for adenopathy  Psychiatric/Behavioral: Negative  Objective:    /78 (BP Location: Left arm, Patient Position: Sitting)   Pulse 70   Temp 98 2 °F (36 8 °C) (Tympanic)   Ht 5' 2" (1 575 m)   Wt 81 4 kg (179 lb 6 4 oz)   SpO2 98%   BMI 32 81 kg/m²      Physical Exam  Vitals and nursing note reviewed  Constitutional:       General: She is not in acute distress  Appearance: Normal appearance  She is well-developed  She is not ill-appearing, toxic-appearing or diaphoretic  HENT:      Head: Normocephalic and atraumatic  Right Ear: Tympanic membrane, ear canal and external ear normal  There is no impacted cerumen  Left Ear: Tympanic membrane, ear canal and external ear normal  There is no impacted cerumen  Nose: Nose normal  No congestion or rhinorrhea  Mouth/Throat:      Mouth: Mucous membranes are moist       Pharynx: Oropharynx is clear  No oropharyngeal exudate or posterior oropharyngeal erythema  Eyes:      General: No scleral icterus  Right eye: No discharge  Left eye: No discharge  Conjunctiva/sclera: Conjunctivae normal       Pupils: Pupils are equal, round, and reactive to light  Cardiovascular:      Rate and Rhythm: Normal rate and regular rhythm  Pulses: Normal pulses  Heart sounds: Normal heart sounds  No murmur heard        Pulmonary: Effort: Pulmonary effort is normal  No respiratory distress  Breath sounds: Normal breath sounds  No wheezing, rhonchi or rales  Abdominal:      General: Bowel sounds are normal  There is no distension  Palpations: Abdomen is soft  There is no mass  Tenderness: There is no abdominal tenderness  There is no guarding or rebound  Musculoskeletal:         General: Normal range of motion  Cervical back: Normal range of motion and neck supple  No rigidity  Right lower leg: No edema  Left lower leg: No edema  Lymphadenopathy:      Cervical: No cervical adenopathy  Skin:     General: Skin is warm and dry  Findings: No rash  Neurological:      General: No focal deficit present  Mental Status: She is alert and oriented to person, place, and time  Sensory: No sensory deficit  Motor: No weakness or abnormal muscle tone  Coordination: Coordination normal       Gait: Gait normal       Deep Tendon Reflexes: Reflexes are normal and symmetric  Psychiatric:         Mood and Affect: Mood normal          Behavior: Behavior normal          Thought Content:  Thought content normal          Judgment: Judgment normal

## 2022-04-05 ENCOUNTER — OFFICE VISIT (OUTPATIENT)
Dept: WOUND CARE | Facility: CLINIC | Age: 72
End: 2022-04-05
Payer: COMMERCIAL

## 2022-04-05 VITALS
WEIGHT: 177 LBS | HEART RATE: 71 BPM | HEIGHT: 62 IN | SYSTOLIC BLOOD PRESSURE: 121 MMHG | BODY MASS INDEX: 32.57 KG/M2 | RESPIRATION RATE: 20 BRPM | DIASTOLIC BLOOD PRESSURE: 75 MMHG | TEMPERATURE: 97.9 F

## 2022-04-05 DIAGNOSIS — I87.2 VENOUS INSUFFICIENCY OF BOTH LOWER EXTREMITIES: ICD-10-CM

## 2022-04-05 DIAGNOSIS — I83.013 VENOUS STASIS ULCER OF RIGHT ANKLE WITH FAT LAYER EXPOSED WITH VARICOSE VEINS (HCC): Primary | ICD-10-CM

## 2022-04-05 DIAGNOSIS — L97.312 VENOUS STASIS ULCER OF RIGHT ANKLE WITH FAT LAYER EXPOSED WITH VARICOSE VEINS (HCC): Primary | ICD-10-CM

## 2022-04-05 PROCEDURE — 99213 OFFICE O/P EST LOW 20 MIN: CPT | Performed by: STUDENT IN AN ORGANIZED HEALTH CARE EDUCATION/TRAINING PROGRAM

## 2022-04-05 PROCEDURE — 11042 DBRDMT SUBQ TIS 1ST 20SQCM/<: CPT | Performed by: STUDENT IN AN ORGANIZED HEALTH CARE EDUCATION/TRAINING PROGRAM

## 2022-04-05 RX ORDER — LIDOCAINE 40 MG/G
CREAM TOPICAL ONCE
Status: DISCONTINUED | OUTPATIENT
Start: 2022-04-05 | End: 2022-04-05

## 2022-04-05 RX ADMIN — LIDOCAINE: 40 CREAM TOPICAL at 15:38

## 2022-04-05 NOTE — PATIENT INSTRUCTIONS
Orders Placed This Encounter   Procedures    Wound cleansing and dressings     Right Ankle Wound    Wash your hands with soap and water  Remove old dressing, discard into plastic bag and place in trash  Cleanse the wound with unscented soap (Dove) and water prior to applying a clean dressing  Do not use tissue or cotton balls  Do not scrub the wound  Pat dry using gauze  Shower yes keep dressing dry, may use cast cover, if dressing gets wet change immediately  Moisturize right lower leg  Apply Endoform AM to the right ankle wound  Cover with Maxorb, Cover with gauze,   Secure with zoie and tape  spandagrip F to right lower leg  Compression Stocking  Remove compression stockings every night HS and re-apply first thing qAM  Follow daily skin care as instructed  Avoid prolonged standing in one place  Elevate leg(s) above the level of the heart when sitting or as much as possible  Change dressing every other day     Follow up at [address] [facility] in    2  Week       Standing Status:   Future     Standing Expiration Date:   4/5/2023

## 2022-04-05 NOTE — PROGRESS NOTES
Patient ID: Olive Yusuf is a 73 y o  female Date of Birth 1950     Diagnosis:  1  Venous stasis ulcer of right ankle with fat layer exposed with varicose veins (HCC)  -     Wound cleansing and dressings; Future  -     Debridement    2  Venous insufficiency of both lower extremities        Diagnosis ICD-10-CM Associated Orders   1  Venous stasis ulcer of right ankle with fat layer exposed with varicose veins (HCC)  I83 013 Wound cleansing and dressings    L97 [id number] Debridement   2  Venous insufficiency of both lower extremities  I87 2           Assessment & Plan:  1  Right ankle venous ulcer   2  B/l lower leg venous insuffiencey     Plan:    - Discussed with patient etiologies and treatments of non-healing ulcer complicated by venous insuffiencey to lower legs  Wound was debrided as below  Recommended to initiate local wound care with endoform Am, Maxorb and DSD and tubigrip vs  Compression stockings  I discussed importance of edema control with leg elevation and compression  She expresses understanding and will continue with above suggestions  - Return in 1-2 weeks for follow   - continue to monitor for signs of infection, if any present call me or visit near by ED      Chief Complaint   Patient presents with    New Patient Visit     right heel wound, arrived with bandaid covering wound, started in Jan and has increased in size  PCP referred her to [address] [facility],[address]  Sometimes it has drainage  Subjective:   Chepe Areas a 73year old female with PMH as listed below presents with right medial ankle wound complicated by venous insuffiencey  She states her ankle wound opened up past January and it has been increasing in size  She also recently has TKR by Dr Benton Jiménez  She has been elevation the lower legs  Does not complaint of pain  No other complaints          The following portions of the patient's history were reviewed and updated as appropriate:   Patient Active Problem List   Diagnosis    Acute pulmonary embolism (HCC)    DVT, lower extremity (HCC)    Polymyalgia rheumatica (HCC)    Hypothyroidism    Leukocytosis    Vitamin D insufficiency    Left axis deviation    Premature atrial complexes    Essential hypertension    Hypercholesterolemia    Calculus of kidney    SVT (supraventricular tachycardia) (HCC)    SOB (shortness of breath)    Superficial thrombophlebitis of right upper extremity    Seronegative rheumatoid arthritis (HCC)    Chronic diastolic congestive heart failure (HCC)    Negative depression screening    Disease of lung    Sepsis, unspecified organism (HCC)    Paroxysmal atrial fibrillation (HCC)    Chronic atrial fibrillation (HCC)    Volume overload    Acute maxillary sinusitis    Bilateral pleural effusion    Cardiac abnormality    CHF (congestive heart failure) (HCC)    Cough    Diarrhea    Diffuse arthralgia    Diffusion capacity of lung (dl), decreased    Fever of unknown origin (FUO)    History of DVT (deep vein thrombosis)    History of polymyalgia rheumatica    History of pulmonary embolus (PE)    Hx of methotrexate therapy    Nausea and/or vomiting    Pain of left side of body    Pain of right scapula    Chest pain    Right upper quadrant abdominal pain    Secondary adrenal insufficiency (HCC)    Tamponade    Thyroid nodule    Ventral hernia    Class 1 obesity due to excess calories with serious comorbidity and body mass index (BMI) of 31 0 to 31 9 in adult    Medicare annual wellness visit, subsequent    Primary osteoarthritis of left knee     Past Medical History:   Diagnosis Date    Allergic     Arthritis     rheumatoid    Atrial fibrillation ([address] )     CHF (congestive heart failure) ([address] )     Disease of thyroid gland     DVT (deep venous thrombosis) ([address] ) 2015    HL (hearing loss)     Left Ear-Wear Hearing Aid    Hypertension     Irregular heart beat     Kidney stone     Migraine     hx of    Pleural effusion 2016    Polymyalgia rheumatica (HCC)     "Remission"    Sleep apnea     Mild-Does not require CPAP    Vitamin D deficiency      Past Surgical History:   Procedure Laterality Date    CARDIAC CATHETERIZATION      CARDIOVERSION N/A 2019    Procedure: Mccray Began;  Surgeon: Ana Norman MD;  Location: [facility];  Service: Cardiology     SECTION      x3 - last impression 16    FRACTURE SURGERY Left     wrist    HERNIA REPAIR  2018    KNEE ARTHROSCOPY Left     Meniscus Tear Repair    KNEE CARTILAGE SURGERY Left     RI TOTAL KNEE ARTHROPLASTY Left 3/16/2022    Procedure: KNEE TOTAL ARTHROPLASTY;  Surgeon: Mendoza Banks DO;  Location: [facility];  Service: Orthopedics    TONSILLECTOMY AND ADENOIDECTOMY  child; age 17   Jose E Drop TUBAL LIGATION      VEIN SURGERY Right     venous ligation with stripping     Social History     Socioeconomic History    Marital status: /Civil Union     Spouse name: None    Number of children: 3    Years of education: None    Highest education level: None   Occupational History    Occupation: Manager     Comment: [employer]   Tobacco Use    Smoking status: Never Smoker    Smokeless tobacco: Never Used   Vaping Use    Vaping Use: Never used   Substance and Sexual Activity    Alcohol use: Yes     Alcohol/week: 0 0 standard drinks     Comment: Socially    Drug use: No    Sexual activity: Yes     Partners: Male     Birth control/protection: Post-menopausal, Female Sterilization   Other Topics Concern    None   Social History Narrative    None     Social Determinants of Health     Financial Resource Strain: Not on file   Food Insecurity: No Food Insecurity    Worried About Running Out of Food in the Last Year: Never true    [place] of Food in the Last Year: Never true   Transportation Needs: No Transportation Needs    Lack of Transportation (Medical): No    Lack of Transportation (Non-Medical):  No   Physical Activity: Not on file   Stress: Not on file   Social Connections: Not on file   Intimate Partner Violence: Not on file   Housing Stability: [address] Unable to Pay for Housing in the Last Year: No    Number of [place] in the Last Year: 1    Unstable Housing in the Last Year: No        Current Outpatient Medications:     ascorbic acid (VITAMIN C) 500 MG tablet, Take 1 tablet (500 mg total) by mouth 2 (two) times a day, Disp: 60 tablet, Rfl: 1    Calcium Ascorbate 500 MG TABS, Take 500 mg by mouth daily  , Disp: , Rfl:     cholecalciferol (VITAMIN D3) 1,000 units tablet, Take 1,000 Units by mouth daily  , Disp: , Rfl:     Cinnamon 500 MG capsule, Take 500 mg by mouth daily, Disp: , Rfl:     Cyanocobalamin (VITAMIN B 12 PO), Take 500 mcg by mouth daily , Disp: , Rfl:     docusate sodium (COLACE) 100 mg capsule, Take 1 capsule (100 mg total) by mouth 2 (two) times a day, Disp: 60 capsule, Rfl: 0    folic acid (FOLVITE) 1 mg tablet, Take 1 tablet (1 mg total) by mouth daily, Disp: 30 tablet, Rfl: 1    levothyroxine 75 mcg tablet, TAKE 1 TABLET BY MOUTH EVERY DAY, Disp: 30 tablet, Rfl: 5    metoprolol succinate (TOPROL-XL) 50 mg 24 hr tablet, Take 1 tablet (50 mg total) by mouth 2 (two) times a day (Patient taking differently: Take 75 mg by mouth daily in the early morning Can take an extra 25 mg if goes into Afib ), Disp: 60 tablet, Rfl: 0    Misc Natural Products (OSTEO BI-FLEX JOINT SHIELD PO), Take by mouth  , Disp: , Rfl:     Multiple Vitamins-Minerals (multivitamin with minerals) tablet, Take 1 tablet by mouth daily, Disp: 30 tablet, Rfl: 1    Red Yeast Rice 600 MG TABS, Take 1 tablet by mouth daily , Disp: , Rfl:     XARELTO 20 MG tablet, Take 1 tablet by mouth daily before dinner  , Disp: , Rfl:     azelastine (ASTELIN) 0 1 % nasal spray, 2 sprays into each nostril 2 (two) times a day Use in each nostril as directed (Patient not taking: Reported on 3/31/2022 ), Disp: 30 mL, Rfl: 5    Multiple Vitamins-Minerals (OCUVITE ADULT 50+) CAPS, Take 1 capsule by mouth daily (Patient not taking: Reported on 3/31/2022 ), Disp: , Rfl:   No current facility-administered medications for this visit  Family History   Problem Relation Age of Onset    Breast cancer Mother 46    Arthritis Mother     Cancer Mother     Hearing loss Mother     Vision loss Mother     Aneurysm Father         aortic    No Known Problems Sister     No Known Problems Maternal Grandmother     No Known Problems Maternal Grandfather     No Known Problems Paternal Grandmother     No Known Problems Paternal Grandfather     Rheum arthritis Maternal Aunt     Early death Maternal Aunt     No Known Problems Paternal Aunt     No Known Problems Paternal Aunt     No Known Problems Paternal Aunt       Review of Systems   Constitutional: Negative for chills and fever  Respiratory: Negative for apnea  Cardiovascular: Negative for chest pain  Gastrointestinal: Negative for diarrhea, nausea and vomiting  Musculoskeletal: Negative for arthralgias  Skin: Positive for color change and wound  Neurological: Negative for dizziness  Psychiatric/Behavioral: Negative for agitation  Allergies  Amiodarone, Sudafed [pseudoephedrine], Sulfa antibiotics, and Sulfamethoxazole-trimethoprim    Objective:  /75   Pulse 71   Temp 97 9 °F (36 6 °C)   Resp 20   Ht 5' 2" (1 575 m)   Wt 80 3 kg (177 lb)   BMI 32 37 kg/m²     Physical Exam  Vitals reviewed  Constitutional:       Appearance: She is obese  Cardiovascular:      Rate and Rhythm: Normal rate  Pulses: Normal pulses  Pulmonary:      Effort: Pulmonary effort is normal    Musculoskeletal:         General: No tenderness  Right lower le+ Edema present  Left lower le+ Edema present  Comments: Right medial ankle wound with fibrotic and necrotic wound bed, post debridement wound was 80% granular and 20% fibrotic, healthy bleeding wound bed  Irregular periwound edges   Wound probes to subcutaneous tissue  Varicose veins to the lower extremity and foot  +1 pitting edema noted  No clinical signs of infection present  Post debridement pictures not taken  Skin:     General: Skin is warm and dry  Capillary Refill: Capillary refill takes less than 2 seconds  Neurological:      General: No focal deficit present  Mental Status: She is alert  Psychiatric:         Mood and Affect: Mood normal              Wound 04/05/22 Venous Ulcer Ankle Right;Medial (Active)   Wound Image   04/05/22 1349   Wound Description Granulation tissue;Brown; Other (Comment) 04/05/22 1402   Leigha-wound Assessment Dry;Scaly 04/05/22 1402   Wound Length (cm) 0 7 cm 04/05/22 1402   Wound Width (cm) 1 5 cm 04/05/22 1402   Wound Depth (cm) 0 1 cm 04/05/22 1402   Wound Surface Area (cm^2) 1 05 cm^2 04/05/22 1402   Wound Volume (cm^3) 0 105 cm^3 04/05/22 1402   Calculated Wound Volume (cm^3) 0 11 cm^3 04/05/22 1402   Drainage Amount None 04/05/22 1402   Non-staged Wound Description Full thickness 04/05/22 1402   Treatments Irrigation with NSS 04/05/22 1402   Patient Tolerance Tolerated well 04/05/22 1402                             Debridement   Wound 04/05/22 Venous Ulcer Ankle Right;Medial    Universal Protocol:  Consent: Verbal consent obtained    Risks and benefits: risks, benefits and alternatives were discussed  Consent given by: patient  Patient understanding: patient states understanding of the procedure being performed  Patient identity confirmed: verbally with patient      Performed by: physician  Debridement type: surgical  Level of debridement: subcutaneous tissue  Pain control: lidocaine 1%  Post-debridement measurements  Length (cm): 0 8  Width (cm): 1 5  Depth (cm): 0 3  Percent debrided: 100%  Surface Area (cm^2): 1 2  Area debrided (cm^2): 1 2  Volume (cm^3): 0 36  Tissue and other material debrided: subcutaneous tissue  Devitalized tissue debrided: biofilm, exudate, fibrin, necrotic debris, slough and eschar  Instrument(s) utilized: curette  Bleeding: medium  Hemostasis obtained with: pressure  Response to treatment: procedure was tolerated well                 Wound Instructions:  Orders Placed This Encounter   Procedures    Wound cleansing and dressings     Right Ankle Wound    Wash your hands with soap and water  Remove old dressing, discard into plastic bag and place in trash  Cleanse the wound with unscented soap (Dove) and water prior to applying a clean dressing  Do not use tissue or cotton balls  Do not scrub the wound  Pat dry using gauze  Shower yes keep dressing dry, may use cast cover, if dressing gets wet change immediately  Moisturize right lower leg  Apply Endoform AM to the right ankle wound  Cover with Maxorb, Cover with gauze,   Secure with zoie and tape  spandagrip F to right lower leg  Compression Stocking  Remove compression stockings every night HS and re-apply first thing qAM  Follow daily skin care as instructed  Avoid prolonged standing in one place  Elevate leg(s) above the level of the heart when sitting or as much as possible  Change dressing every other day     [facility] [address] [facility]    [facility]  [facility]  Standing Status:   Future     Standing Expiration Date:   4/5/2023    Debridement     This order was created via procedure documentation         Nitza Robert DPM    Portions of the record may have been created with voice recognition software  Occasional wrong word or "sound a like" substitutions may have occurred due to the inherent limitations of voice recognition software  Read the chart carefully and recognize, using context, where substitutions have occurred

## 2022-04-19 ENCOUNTER — OFFICE VISIT (OUTPATIENT)
Dept: WOUND CARE | Facility: CLINIC | Age: 72
End: 2022-04-19
Payer: COMMERCIAL

## 2022-04-19 VITALS
DIASTOLIC BLOOD PRESSURE: 74 MMHG | TEMPERATURE: 98.1 F | RESPIRATION RATE: 18 BRPM | SYSTOLIC BLOOD PRESSURE: 138 MMHG | HEART RATE: 74 BPM

## 2022-04-19 DIAGNOSIS — L97.312 VENOUS STASIS ULCER OF RIGHT ANKLE WITH FAT LAYER EXPOSED WITH VARICOSE VEINS (HCC): Primary | ICD-10-CM

## 2022-04-19 DIAGNOSIS — I83.013 VENOUS STASIS ULCER OF RIGHT ANKLE WITH FAT LAYER EXPOSED WITH VARICOSE VEINS (HCC): Primary | ICD-10-CM

## 2022-04-19 DIAGNOSIS — I87.2 VENOUS INSUFFICIENCY OF BOTH LOWER EXTREMITIES: ICD-10-CM

## 2022-04-19 PROCEDURE — 97597 DBRDMT OPN WND 1ST 20 CM/<: CPT | Performed by: STUDENT IN AN ORGANIZED HEALTH CARE EDUCATION/TRAINING PROGRAM

## 2022-04-19 RX ORDER — LIDOCAINE 40 MG/G
CREAM TOPICAL ONCE
Status: COMPLETED | OUTPATIENT
Start: 2022-04-19 | End: 2022-04-19

## 2022-04-19 RX ADMIN — LIDOCAINE: 40 CREAM TOPICAL at 14:03

## 2022-04-19 NOTE — PATIENT INSTRUCTIONS
Orders Placed This Encounter   Procedures    Wound cleansing and dressings     Wound cleansing and dressings       Right Ankle Wound     Wash your hands with soap and water  Remove old dressing, discard into plastic bag and place in trash  Cleanse the wound with unscented soap (Dove) and water prior to applying a clean dressing  Do not use tissue or cotton balls  Do not scrub the wound  Pat dry using gauze      Shower yes keep dressing dry, may use cast cover, if dressing gets wet change immediately       Moisturize right lower leg  Apply dermagran Cover with gauze,   Secure with zoei and tape  Change dressing every other day    spandagrip F to right lower leg       Compression Stocking  Remove compression stockings every night HS and re-apply first thing qAM  Follow daily skin care as instructed  Avoid prolonged standing in one place  Elevate leg(s) above the level of the heart when sitting or as much as possible          Follow up at [address] [facility] in    1  Week       Standing Status:   Future     Standing Expiration Date:   4/19/2023

## 2022-04-19 NOTE — PROGRESS NOTES
Patient ID: Pipo Licea is a 73 y o  female Date of Birth 1950     Diagnosis:  1  Venous stasis ulcer of right ankle with fat layer exposed with varicose veins ([facility])  -     lidocaine (LMX) 4 % cream  -     Wound cleansing and dressings; Future    2  Venous insufficiency of both lower extremities  -     lidocaine (LMX) 4 % cream  -     Wound cleansing and dressings; Future       Diagnosis ICD-10-CM Associated Orders   1  Venous stasis ulcer of right ankle with fat layer exposed with varicose veins ([facility])  I83 013 lidocaine (LMX) 4 % cream    L97 312 Wound cleansing and dressings   2  Venous insufficiency of both lower extremities  I87 2 lidocaine (LMX) 4 % cream     Wound cleansing and dressings        Assessment & Plan:  1  Right ankle venous ulcer   2  B/l lower leg venous insuffiencey      Plan:     - Right leg wound is decreasing in size once the scab was debrided  Continue local wound care with dermagran and DSD  Tubigrip vs  Compression stockings  I discussed importance of edema control with leg elevation and compression  She expresses understanding and will continue with above suggestions  - Return in 1-2 weeks for follow   - continue to monitor for signs of infection, if any present call me or visit near by ED      Chief Complaint   Patient presents with    Follow Up Wound Care Visit     Right leg wound follow up           Subjective:   Patient reports she has been doing well, wound has scabbed over no other complaints         The following portions of the patient's history were reviewed and updated as appropriate:   Patient Active Problem List   Diagnosis    Acute pulmonary embolism ([facility] [address] [facility]    DVT, lower extremity ([facility] [address] )    Polymyalgia rheumatica ([facility] [address] )    Hypothyroidism    Leukocytosis    Vitamin D insufficiency    Left axis deviation    Premature atrial complexes    Essential hypertension    Hypercholesterolemia    Calculus of kidney    SVT (supraventricular tachycardia) ([facility])    SOB (shortness of breath)    Superficial thrombophlebitis of right upper extremity    Seronegative rheumatoid arthritis (HCC)    Chronic diastolic congestive heart failure (HCC)    Negative depression screening    Disease of lung    Sepsis, unspecified organism (HCC)    Paroxysmal atrial fibrillation (HCC)    Chronic atrial fibrillation (HCC)    Volume overload    Acute maxillary sinusitis    Bilateral pleural effusion    Cardiac abnormality    CHF (congestive heart failure) (HCC)    Cough    Diarrhea    Diffuse arthralgia    Diffusion capacity of lung (dl), decreased    Fever of unknown origin (FUO)    History of DVT (deep vein thrombosis)    History of polymyalgia rheumatica    History of pulmonary embolus (PE)    Hx of methotrexate therapy    Nausea and/or vomiting    Pain of left side of body    Pain of right scapula    Chest pain    Right upper quadrant abdominal pain    Secondary adrenal insufficiency (HCC)    Tamponade    Thyroid nodule    Ventral hernia    Class 1 obesity due to excess calories with serious comorbidity and body mass index (BMI) of 31 0 to 31 9 in adult    Medicare annual wellness visit, subsequent    Primary osteoarthritis of left knee     Past Medical History:   Diagnosis Date    Allergic     Arthritis     rheumatoid    Atrial fibrillation ([facility] [address] )     CHF (congestive heart failure) ([address] )     Disease of thyroid gland     DVT (deep venous thrombosis) ([address] ) 2015    HL (hearing loss)     Left Ear-Wear Hearing Aid    Hypertension     Irregular heart beat     Kidney stone     Migraine     hx of    Pleural effusion 2016    Polymyalgia rheumatica ([facility] [address] )     "Remission"    Sleep apnea     Mild-Does not require CPAP    Vitamin D deficiency      Past Surgical History:   Procedure Laterality Date    CARDIAC CATHETERIZATION      CARDIOVERSION N/A 12/17/2019    Procedure: CARDIOVERSION;  Surgeon: Victorino Allen MD;  Location: [facility];  Service: Cardiology   SECTION      x3 - last impression 16    FRACTURE SURGERY Left     wrist    HERNIA REPAIR  2018    KNEE ARTHROSCOPY Left     Meniscus Tear Repair    KNEE CARTILAGE SURGERY Left     SD TOTAL KNEE ARTHROPLASTY Left 3/16/2022    Procedure: KNEE TOTAL ARTHROPLASTY;  Surgeon: Maria De Jesus Rascon DO;  Location: [facility];  Service: Orthopedics    TONSILLECTOMY AND ADENOIDECTOMY  child; age 17   Kim Meraz TUBAL LIGATION      VEIN SURGERY Right     venous ligation with stripping     Social History     Socioeconomic History    Marital status: /Civil Union     Spouse name: None    Number of children: 3    Years of education: None    Highest education level: None   Occupational History    Occupation: Manager     Comment: [employer]   Tobacco Use    Smoking status: Never Smoker    Smokeless tobacco: Never Used   Vaping Use    Vaping Use: Never used   Substance and Sexual Activity    Alcohol use: Yes     Alcohol/week: 0 0 standard drinks     Comment: Socially    Drug use: No    Sexual activity: Yes     Partners: Male     Birth control/protection: Post-menopausal, Female Sterilization   Other Topics Concern    None   Social History Narrative    None     Social Determinants of Health     Financial Resource Strain: Not on file   Food Insecurity: No Food Insecurity    Worried About Running Out of Food in the Last Year: Never true    [place] of Food in the Last Year: Never true   Transportation Needs: No Transportation Needs    Lack of Transportation (Medical): No    Lack of Transportation (Non-Medical):  No   Physical Activity: Not on file   Stress: Not on file   Social Connections: Not on file   Intimate Partner Violence: Not on file   Housing Stability: Low Risk     Unable to Pay for Housing in the Last Year: No    Number of Places Lived in the Last Year: 1    Unstable Housing in the Last Year: No        Current Outpatient Medications:     ascorbic acid (VITAMIN C) 500 MG tablet, Take 1 tablet (500 mg total) by mouth 2 (two) times a day, Disp: 60 tablet, Rfl: 1    azelastine (ASTELIN) 0 1 % nasal spray, 2 sprays into each nostril 2 (two) times a day Use in each nostril as directed (Patient not taking: Reported on 3/31/2022 ), Disp: 30 mL, Rfl: 5    Calcium Ascorbate 500 MG TABS, Take 500 mg by mouth daily  , Disp: , Rfl:     cholecalciferol (VITAMIN D3) 1,000 units tablet, Take 1,000 Units by mouth daily  , Disp: , Rfl:     Cinnamon 500 MG capsule, Take 500 mg by mouth daily, Disp: , Rfl:     Cyanocobalamin (VITAMIN B 12 PO), Take 500 mcg by mouth daily , Disp: , Rfl:     docusate sodium (COLACE) 100 mg capsule, Take 1 capsule (100 mg total) by mouth 2 (two) times a day, Disp: 60 capsule, Rfl: 0    folic acid (FOLVITE) 1 mg tablet, Take 1 tablet (1 mg total) by mouth daily, Disp: 30 tablet, Rfl: 1    levothyroxine 75 mcg tablet, TAKE 1 TABLET BY MOUTH EVERY DAY, Disp: 30 tablet, Rfl: 5    metoprolol succinate (TOPROL-XL) 50 mg 24 hr tablet, Take 1 tablet (50 mg total) by mouth 2 (two) times a day (Patient taking differently: Take 75 mg by mouth daily in the early morning Can take an extra 25 mg if goes into Afib ), Disp: 60 tablet, Rfl: 0    Misc Natural Products (OSTEO BI-FLEX JOINT SHIELD PO), Take by mouth  , Disp: , Rfl:     Multiple Vitamins-Minerals (multivitamin with minerals) tablet, Take 1 tablet by mouth daily, Disp: 30 tablet, Rfl: 1    Multiple Vitamins-Minerals (OCUVITE ADULT 50+) CAPS, Take 1 capsule by mouth daily (Patient not taking: Reported on 3/31/2022 ), Disp: , Rfl:     Red Yeast Rice 600 MG TABS, Take 1 tablet by mouth daily , Disp: , Rfl:     XARELTO 20 MG tablet, Take 1 tablet by mouth daily before dinner  , Disp: , Rfl:   No current facility-administered medications for this visit    Family History   Problem Relation Age of Onset   Taz Moose Breast cancer Mother 46    Arthritis Mother     Cancer Mother     Hearing loss Mother     Vision loss Mother     Aneurysm Father         aortic    No Known Problems Sister     No Known Problems Maternal Grandmother     No Known Problems Maternal Grandfather     No Known Problems Paternal Grandmother     No Known Problems Paternal Grandfather     Rheum arthritis Maternal Aunt     Early death Maternal Aunt     No Known Problems Paternal Aunt     No Known Problems Paternal Aunt     No Known Problems Paternal Aunt       Review of Systems   Constitutional: Negative for chills and fever  Respiratory: Negative for apnea  Gastrointestinal: Negative for diarrhea, nausea and vomiting  Musculoskeletal: Negative for arthralgias  Skin: Positive for color change and wound  Neurological: Negative for dizziness  Psychiatric/Behavioral: Negative for agitation  Allergies:  Amiodarone, Sudafed [pseudoephedrine], Sulfa antibiotics, and Sulfamethoxazole-trimethoprim      Objective:  /74   Pulse 74   Temp 98 1 °F (36 7 °C)   Resp 18     Physical Exam  Vitals reviewed  Constitutional:       Appearance: She is obese  Cardiovascular:      Rate and Rhythm: Normal rate  Pulses: Normal pulses  Pulmonary:      Effort: Pulmonary effort is normal    Musculoskeletal:         General: Swelling present  Right lower le+ Edema present  Comments: Right medial ankle superficial wound underneath the scab, 100% granular  Wound probes to subcutaneous tissue  Varicose veins to the lower extremity and foot  +1 pitting edema noted  No clinical signs of infection present  Skin:     General: Skin is warm and dry  Neurological:      General: No focal deficit present  Mental Status: She is alert     Psychiatric:         Mood and Affect: Mood normal              Wound 22 Venous Ulcer Ankle Right;Medial (Active)   Wound Image   22 1356   Wound Description Dry 22 1400   Leigha-wound Assessment Dry;Scaly 22 1400   Wound Length (cm) 0 7 cm 22 1400   Wound Width (cm) 1 2 cm 22 1400   Wound Depth (cm) 0 1 cm 04/19/22 1400   Wound Surface Area (cm^2) 0 84 cm^2 04/19/22 1400   Wound Volume (cm^3) 0 084 cm^3 04/19/22 1400   Calculated Wound Volume (cm^3) 0 08 cm^3 04/19/22 1400   Change in Wound Size % 27 27 04/19/22 1400   Drainage Amount None 04/19/22 1400   Drainage Description Serous 04/05/22 1402   Non-staged Wound Description Not applicable 28/13/95 4207   Treatments Cleansed 04/19/22 1400   Patient Tolerance Tolerated well 04/05/22 1402                         Debridement   Wound 04/05/22 Venous Ulcer Ankle Right;Medial    Universal Protocol:  Consent: Verbal consent obtained  Risks and benefits: risks, benefits and alternatives were discussed  Consent given by: patient  Patient understanding: patient states understanding of the procedure being performed  Patient identity confirmed: verbally with patient      Performed by: physician  Debridement type: selective  Pain control: lidocaine 1%  Post-debridement measurements  Length (cm): 0 7  Width (cm): 1 2  Depth (cm): 0 2  Percent debrided: 80%  Surface Area (cm^2): 0 84  Area debrided (cm^2): 0 67  Volume (cm^3): 0 17  Devitalized tissue debrided: biofilm, necrotic debris, slough and eschar  Instrument(s) utilized: blade and forceps  Bleeding: small  Hemostasis obtained with: pressure  Response to treatment: procedure was tolerated well                   Wound Instructions:  Orders Placed This Encounter   Procedures    Wound cleansing and dressings     Wound cleansing and dressings       Right Ankle Wound     Wash your hands with soap and water  Remove old dressing, discard into plastic bag and place in trash  Cleanse the wound with unscented soap (Dove) and water prior to applying a clean dressing  Do not use tissue or cotton balls  Do not scrub the wound   Pat dry using gauze      Shower yes keep dressing dry, may use cast cover, if dressing gets wet change immediately       Moisturize right lower leg  Apply dermagran Cover with gauze,   Secure with zoie and tape  Change dressing every other day    spandagrip F to right lower leg       Compression Stocking  Remove compression stockings every night HS and re-apply first thing qAM  Follow daily skin care as instructed  Avoid prolonged standing in one place  Elevate leg(s) above the level of the heart when sitting or as much as possible          [facility] [address] [facility]    [facility]  [facility]  Standing Status:   Future     Standing Expiration Date:   4/19/2023         Carlos Romero DPM      Portions of the record may have been created with voice recognition software  Occasional wrong word or "sound a like" substitutions may have occurred due to the inherent limitations of voice recognition software  Read the chart carefully and recognize, using context, where substitutions have occurred

## 2022-04-28 ENCOUNTER — OFFICE VISIT (OUTPATIENT)
Dept: WOUND CARE | Facility: CLINIC | Age: 72
End: 2022-04-28
Payer: COMMERCIAL

## 2022-04-28 VITALS
TEMPERATURE: 97 F | DIASTOLIC BLOOD PRESSURE: 88 MMHG | SYSTOLIC BLOOD PRESSURE: 123 MMHG | RESPIRATION RATE: 18 BRPM | HEART RATE: 77 BPM

## 2022-04-28 DIAGNOSIS — L97.312 VENOUS STASIS ULCER OF RIGHT ANKLE WITH FAT LAYER EXPOSED WITH VARICOSE VEINS (HCC): Primary | ICD-10-CM

## 2022-04-28 DIAGNOSIS — I83.013 VENOUS STASIS ULCER OF RIGHT ANKLE WITH FAT LAYER EXPOSED WITH VARICOSE VEINS (HCC): Primary | ICD-10-CM

## 2022-04-28 PROCEDURE — 97597 DBRDMT OPN WND 1ST 20 CM/<: CPT | Performed by: STUDENT IN AN ORGANIZED HEALTH CARE EDUCATION/TRAINING PROGRAM

## 2022-04-28 RX ORDER — LIDOCAINE 40 MG/G
CREAM TOPICAL ONCE
Status: COMPLETED | OUTPATIENT
Start: 2022-04-28 | End: 2022-04-28

## 2022-04-28 RX ADMIN — LIDOCAINE: 40 CREAM TOPICAL at 09:26

## 2022-04-28 NOTE — PATIENT INSTRUCTIONS
Orders Placed This Encounter   Procedures    Wound cleansing and dressings     Wound cleansing and dressings       Right Ankle Wound       Wash your hands with soap and water  Siria Rash old dressing, discard into plastic bag and place in trash   Cleanse the wound with unscented soap (Dove) and water prior to applying a clean dressing  Do not use tissue or cotton balls  Do not scrub the wound  Pat dry using gauze        Shower yes keep dressing dry, may use cast cover, if dressing gets wet change immediately        Moisturize right lower leg   Apply dermagran Cover with gauze,   Secure with zoie and tape  Change dressing every other day     spandagrip F to right lower leg        Compression Stocking   Remove compression stockings every night HS and re-apply first thing qAM  Follow daily skin care as instructed  Avoid prolonged standing in one place  Elevate leg(s) above the level of the heart when sitting or as much as possible            Follow up at [address] [facility] in [address] [facility]       Standing Status:   Future     Standing Expiration Date:   4/28/2023

## 2022-04-28 NOTE — PROGRESS NOTES
Patient ID: Jodie Miller is a 81 y o  female Date of Birth 1950     Diagnosis:  1  Venous stasis ulcer of right ankle with fat layer exposed with varicose veins (HCC)  -     Wound cleansing and dressings; Future  -     lidocaine (LMX) 4 % cream       Diagnosis ICD-10-CM Associated Orders   1  Venous stasis ulcer of right ankle with fat layer exposed with varicose veins (HCC)  I83 [id number] Wound cleansing and dressings    L97 [id number] lidocaine (LMX) 4 % cream        Assessment & Plan:  1  Right ankle venous ulcer   2  B/l lower leg venous insuffiencey      Plan:     - Right leg wound is decreasing in size once the scab was debrided  Continue local wound care with dermagran and DSD  Tubigrip vs  Compression stockings  I discussed importance of edema control with leg elevation and compression  She expresses understanding and will continue with above suggestions  - Return in 1-2 weeks for follow   - continue to monitor for signs of infection, if any present call me or visit near by ED      Chief Complaint   Patient presents with    Follow Up Wound Care Visit           Subjective:   Kaveh Cain a 81year old presents for continued care of right lateral ulcer  States she has not worn compression stockings this week as much  No other complaints         The following portions of the patient's history were reviewed and updated as appropriate:   Patient Active Problem List   Diagnosis    Acute pulmonary embolism ([address]    DVT, lower extremity ([address] )    Polymyalgia rheumatica ([address] )    Hypothyroidism    Leukocytosis    Vitamin D insufficiency    Left axis deviation    Premature atrial complexes    Essential hypertension    Hypercholesterolemia    Calculus of kidney    [facility]    SOB (shortness of breath)    Superficial thrombophlebitis of right upper extremity    Seronegative rheumatoid arthritis ([address] )    Chronic diastolic congestive heart failure (HCC)    Negative depression screening    Disease of lung    Sepsis, unspecified organism (HCC)    Paroxysmal atrial fibrillation (HCC)    Chronic atrial fibrillation (HCC)    Volume overload    Acute maxillary sinusitis    Bilateral pleural effusion    Cardiac abnormality    CHF (congestive heart failure) (HCC)    Cough    Diarrhea    Diffuse arthralgia    Diffusion capacity of lung (dl), decreased    Fever of unknown origin (FUO)    History of DVT (deep vein thrombosis)    History of polymyalgia rheumatica    History of pulmonary embolus (PE)    Hx of methotrexate therapy    Nausea and/or vomiting    Pain of left side of body    Pain of right scapula    Chest pain    Right upper quadrant abdominal pain    Secondary adrenal insufficiency (HCC)    Tamponade    Thyroid nodule    Ventral hernia    Class 1 obesity due to excess calories with serious comorbidity and body mass index (BMI) of 31 0 to 31 9 in adult    Medicare annual wellness visit, subsequent    Primary osteoarthritis of left knee     Past Medical History:   Diagnosis Date    Allergic     Arthritis     rheumatoid    [facility]     CHF (congestive heart failure) ([facility]     Disease of thyroid gland     DVT (deep venous thrombosis) ([facility]     HL (hearing loss)     Left Ear-Wear Hearing Aid    Hypertension     Irregular heart beat     Kidney stone     Migraine     hx of    Pleural effusion 2016    Polymyalgia rheumatica ([facility] )     "Remission"    Sleep apnea     Mild-Does not require CPAP    Vitamin D deficiency      Past Surgical History:   Procedure Laterality Date    CARDIAC CATHETERIZATION      CARDIOVERSION N/A 2019    Procedure: CARDIOVERSION;  Surgeon: Kimberley Cordero MD;  Location: [facility];  Service: Cardiology     SECTION      x3 - last impression 16    FRACTURE SURGERY Left     wrist    HERNIA REPAIR  2018    KNEE ARTHROSCOPY Left     Meniscus Tear Repair    KNEE CARTILAGE SURGERY Left     [place] TOTAL KNEE ARTHROPLASTY Left 3/16/2022    Procedure: KNEE TOTAL ARTHROPLASTY;  Surgeon: Cecy Rush DO;  [facility]: [facility];  Service: Orthopedics    TONSILLECTOMY AND ADENOIDECTOMY  child; age 17   [place] TUBAL LIGATION      VEIN SURGERY Right     venous ligation with stripping     Social History     Socioeconomic History    Marital status: /Civil Union     Spouse name: Not on file    Number of children: 3    Years of education: Not on file    Highest education level: Not on file   Occupational History    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   Tobacco Use    Smoking status: Never Smoker    Smokeless tobacco: Never Used   Vaping Use    Vaping Use: Never used   Substance and Sexual Activity    Alcohol use: Yes     Alcohol/week: 0 0 standard drinks     Comment: Socially    Drug use: No    Sexual activity: Yes     Partners: Male     Birth control/protection: Post-menopausal, Female Sterilization   Other Topics Concern    Not on file   Social History Narrative    Not on file     Social Determinants of Health     Financial Resource Strain: Not on file   Food Insecurity: No Food Insecurity    Worried About Running Out of Food in the Last Year: Never true    [place] of Food in the Last Year: Never true   Transportation Needs: No Transportation Needs    Lack of Transportation (Medical): No    Lack of Transportation (Non-Medical):  No   Physical Activity: Not on file   Stress: Not on file   Social Connections: Not on file   Intimate Partner Violence: Not on file   Housing Stability: Low Risk     Unable to Pay for Housing in the Last Year: No    Number of Places Lived in the Last Year: 1    Unstable Housing in the Last Year: No        Current Outpatient Medications:     ascorbic acid (VITAMIN C) 500 MG tablet, Take 1 tablet (500 mg total) by mouth 2 (two) times a day, Disp: 60 tablet, Rfl: 1    azelastine (ASTELIN) 0 1 % nasal spray, 2 sprays into each nostril 2 (two) times a day Use in each nostril as directed (Patient not taking: Reported on 3/31/2022 ), Disp: 30 mL, Rfl: 5    Calcium Ascorbate 500 MG TABS, Take 500 mg by mouth daily  , Disp: , Rfl:     cholecalciferol (VITAMIN D3) 1,000 units tablet, Take 1,000 Units by mouth daily  , Disp: , Rfl:     Cinnamon 500 MG capsule, Take 500 mg by mouth daily, Disp: , Rfl:     Cyanocobalamin (VITAMIN B 12 PO), Take 500 mcg by mouth daily , Disp: , Rfl:     docusate sodium (COLACE) 100 mg capsule, Take 1 capsule (100 mg total) by mouth 2 (two) times a day, Disp: 60 capsule, Rfl: 0    folic acid (FOLVITE) 1 mg tablet, Take 1 tablet (1 mg total) by mouth daily, Disp: 30 tablet, Rfl: 1    levothyroxine 75 mcg tablet, TAKE 1 TABLET BY MOUTH EVERY DAY, Disp: 30 tablet, Rfl: 5    metoprolol succinate (TOPROL-XL) 50 mg 24 hr tablet, Take 1 tablet (50 mg total) by mouth 2 (two) times a day (Patient taking differently: Take 75 mg by mouth daily in the early morning Can take an extra 25 mg if goes into Afib ), Disp: 60 tablet, Rfl: 0    Misc Natural Products (OSTEO BI-FLEX JOINT SHIELD PO), Take by mouth  , Disp: , Rfl:     Multiple Vitamins-Minerals (multivitamin with minerals) tablet, Take 1 tablet by mouth daily, Disp: 30 tablet, Rfl: 1    Multiple Vitamins-Minerals (OCUVITE ADULT 50+) CAPS, Take 1 capsule by mouth daily (Patient not taking: Reported on 3/31/2022 ), Disp: , Rfl:     Red Yeast Rice 600 MG TABS, Take 1 tablet by mouth daily , Disp: , Rfl:     XARELTO 20 MG tablet, Take 1 tablet by mouth daily before dinner  , Disp: , Rfl:   No current facility-administered medications for this visit    Family History   Problem Relation Age of Onset   Joey Dang Breast cancer Mother 46    Arthritis Mother     Cancer Mother     Hearing loss Mother     Vision loss Mother     Aneurysm Father         aortic    No Known Problems Sister     No Known Problems Maternal Grandmother     No Known Problems Maternal Grandfather     No Known Problems Paternal Grandmother     No Known Problems Paternal Grandfather     Rheum arthritis Maternal Aunt     Early death Maternal Aunt     No Known Problems Paternal Aunt     No Known Problems Paternal Aunt     No Known Problems Paternal Aunt       Review of Systems   Constitutional: Negative for chills and fever  Respiratory: Negative for apnea  Gastrointestinal: Negative for diarrhea, nausea and vomiting  Musculoskeletal: Positive for arthralgias  Skin: Positive for color change and wound  Neurological: Negative for dizziness  Psychiatric/Behavioral: Negative for agitation  Allergies:  Amiodarone, Sudafed [pseudoephedrine], Sulfa antibiotics, and Sulfamethoxazole-trimethoprim      Objective:  /88   Pulse 77   Temp (!) 97 °F (36 1 °C)   Resp 18     Physical Exam  Vitals reviewed  Constitutional:       Appearance: She is obese  Cardiovascular:      Rate and Rhythm: Normal rate  Pulses: Normal pulses  Pulmonary:      Effort: Pulmonary effort is normal    Musculoskeletal:         General: Swelling and tenderness present  Right lower leg: Swelling present  Comments: Right medial ankle superficial wound 100% granular  Wound probes to subcutaneous tissue  Varicose veins to the lower extremity and foot  +1 pitting edema noted  No clinical signs of infection present  Skin:     General: Skin is warm and dry  Neurological:      General: No focal deficit present  Mental Status: She is alert     Psychiatric:         Mood and Affect: Mood normal              Wound 04/05/22 Venous Ulcer Ankle Right;Medial (Active)   Wound Image   04/28/22 0921   Wound Description Yellow;Pink 04/28/22 0921   Leigha-wound Assessment Dry;Scaly 04/28/22 0921   Wound Length (cm) 0 3 cm 04/28/22 0921   Wound Width (cm) 1 2 cm 04/28/22 0921   Wound Depth (cm) 0 1 cm 04/28/22 0921   Wound Surface Area (cm^2) 0 36 cm^2 04/28/22 0921   Wound Volume (cm^3) 0 036 cm^3 04/28/22 5807 Calculated Wound Volume (cm^3) 0 04 cm^3 04/28/22 0921   Change in Wound Size % 63 64 04/28/22 0921   Drainage Amount Scant 04/28/22 0921   Drainage Description Serosanguineous 04/28/22 0921   Non-staged Wound Description Full thickness 04/28/22 0921   Treatments Irrigation with NSS 04/28/22 0921   Patient Tolerance Tolerated well 04/05/22 1402                         Debridement   Wound 04/05/22 Venous Ulcer Ankle Right;Medial    Universal Protocol:  Consent: Verbal consent obtained  Risks and benefits: risks, benefits and alternatives were discussed  Consent given by: patient  Patient understanding: patient states understanding of the procedure being performed  Patient identity confirmed: verbally with patient      Performed by: physician  Debridement type: selective  Pain control: lidocaine 1%  Post-debridement measurements  Length (cm): 0 3  Width (cm): 1 2  Depth (cm): 0 1  Percent debrided: 50%  Surface Area (cm^2): 0 36  Area debrided (cm^2): 0 18  Volume (cm^3): 0 04  Devitalized tissue debrided: biofilm, fibrin and slough  Instrument(s) utilized: curette  Response to treatment: procedure was tolerated well                   Wound Instructions:  Orders Placed This Encounter   Procedures    Wound cleansing and dressings     Wound cleansing and dressings       Right Ankle Wound       Wash your hands with soap and water   Remove old dressing, discard into plastic bag and place in trash   Cleanse the wound with unscented soap (Dove) and water prior to applying a clean dressing  Do not use tissue or cotton balls  Do not scrub the wound  Pat dry using gauze        Shower yes keep dressing dry, may use cast cover, if dressing gets wet change immediately        Moisturize right lower leg   Apply dermagran Cover with gauze,   Secure with zoie and tape     Change dressing every other day     spandagrip F to right lower leg        Compression Stocking   Remove compression stockings every night HS and re-apply first thing qAM  Follow daily skin care as instructed  Avoid prolonged standing in one place  Elevate leg(s) above the level of the heart when sitting or as much as possible            Follow up at [address] [facility] in [facility]  Standing Status:   Future     Standing Expiration Date:   4/28/2023         Brittany Erp, DPM      Portions of the record may have been created with voice recognition software  Occasional wrong word or "sound a like" substitutions may have occurred due to the inherent limitations of voice recognition software  Read the chart carefully and recognize, using context, where substitutions have occurred

## 2022-05-05 ENCOUNTER — OFFICE VISIT (OUTPATIENT)
Dept: OBGYN CLINIC | Facility: CLINIC | Age: 72
End: 2022-05-05

## 2022-05-05 VITALS
HEART RATE: 72 BPM | WEIGHT: 178 LBS | DIASTOLIC BLOOD PRESSURE: 85 MMHG | BODY MASS INDEX: 32.76 KG/M2 | HEIGHT: 62 IN | SYSTOLIC BLOOD PRESSURE: 131 MMHG

## 2022-05-05 DIAGNOSIS — Z96.652 S/P TOTAL KNEE REPLACEMENT, LEFT: Primary | ICD-10-CM

## 2022-05-05 PROCEDURE — 99024 POSTOP FOLLOW-UP VISIT: CPT | Performed by: ORTHOPAEDIC SURGERY

## 2022-05-05 NOTE — PROGRESS NOTES
Assessment:  1  S/P total knee replacement, left         Plan:  7 weeks s/p left TKA, 3/16/2022  She is doing well with well exam  She should continue physical therapy  She should follow up in 6 weeks  The patient is doing quite well  She has full strength full motion  Incision is clean, dry, intact  Will continue physical therapy till plateaus per the importance of a home exercise program was also discussed  Follow-up in 6 weeks re-evaluation with new x-rays of left knee-three views  If her condition changes, she will not hesitate to let us know    To do next visit:  Return in about 6 weeks (around 6/16/2022)  The above stated was discussed in layman's terms and the patient expressed understanding  All questions were answered to the patient's satisfaction  Scribe Attestation    I,:  Benito Pierce am acting as a scribe while in the presence of the attending physician :       I,:  Travis Platt, DO personally performed the services described in this documentation    as scribed in my presence :             Subjective:   Juan Ramon Hernández is a 81 y o  female who presents 7 weeks s/p left TKA, 3/16/2022  She is doing well  Today she complains of occasional medial and lateral left knee pain that can extend up lateral thigh  She continues physical therapy  She does use Tylenol for pain  He denies fever, chills or shortness of breath          Review of systems negative unless otherwise specified in HPI    Past Medical History:   Diagnosis Date    Allergic     Arthritis     rheumatoid    [facility]     [facility]     Disease of thyroid gland     DVT (deep venous thrombosis) ([facility] ) 2015    HL (hearing loss)     Left Ear-Wear Hearing Aid    Hypertension     Irregular heart beat     Kidney stone     Migraine     hx of    Pleural effusion 2016    Polymyalgia rheumatica ([facility] )     "Remission"    Sleep apnea     Mild-Does not require CPAP    Vitamin D deficiency        Past Surgical History:   Procedure Laterality Date    CARDIAC CATHETERIZATION      CARDIOVERSION N/A 2019    Procedure: CARDIOVERSION;  Surgeon: Juvenal Murray MD;  [facility]: [facility];  Service: Cardiology     SECTION      x3 - last impression 16    FRACTURE SURGERY Left     wrist    HERNIA REPAIR  2018    KNEE ARTHROSCOPY Left     Meniscus Tear Repair    KNEE CARTILAGE SURGERY Left     [place] TOTAL KNEE ARTHROPLASTY Left 3/16/2022    Procedure: KNEE TOTAL ARTHROPLASTY;  Surgeon: Maria De Jesus Rascon DO;  [facility]: [facility];  Service: Orthopedics    TONSILLECTOMY AND ADENOIDECTOMY  child; age 17   [place] TUBAL LIGATION      VEIN SURGERY Right     venous ligation with stripping       Family History   Problem Relation Age of Onset    Breast cancer Mother 46   Charlyne Jaksch Arthritis Mother     Cancer Mother     Hearing loss Mother     Vision loss Mother     Aneurysm Father         aortic    No Known Problems Sister     No Known Problems Maternal Grandmother     No Known Problems Maternal Grandfather     No Known Problems Paternal Grandmother     No Known Problems Paternal Grandfather     Rheum arthritis Maternal Aunt     Early death Maternal Aunt     No Known Problems Paternal Aunt     No Known Problems Paternal Aunt     No Known Problems Paternal [de-identified]        Social History     Occupational History    Occupation: Manager     Comment: [employer]   Tobacco Use    Smoking status: Never Smoker    Smokeless tobacco: Never Used   Vaping Use    Vaping Use: Never used   Substance and Sexual Activity    Alcohol use:  Yes     Alcohol/week: 0 0 standard drinks     Comment: Socially    Drug use: No    Sexual activity: Yes     Partners: Male     Birth control/protection: Post-menopausal, Female Sterilization         Current Outpatient Medications:     ascorbic acid (VITAMIN C) 500 MG tablet, Take 1 tablet (500 mg total) by mouth 2 (two) times a day, Disp: 60 tablet, Rfl: 1    Calcium Ascorbate 500 MG TABS, Take 500 mg by mouth daily  , Disp: , Rfl:     cholecalciferol (VITAMIN D3) 1,000 units tablet, Take 1,000 Units by mouth daily  , Disp: , Rfl:     Cinnamon 500 MG capsule, Take 500 mg by mouth daily, Disp: , Rfl:     Cyanocobalamin (VITAMIN B 12 PO), Take 500 mcg by mouth daily , Disp: , Rfl:     levothyroxine 75 mcg tablet, TAKE 1 TABLET BY MOUTH EVERY DAY, Disp: 30 tablet, Rfl: 5    metoprolol succinate (TOPROL-XL) 50 mg 24 hr tablet, Take 1 tablet (50 mg total) by mouth 2 (two) times a day (Patient taking differently: Take 75 mg by mouth daily in the early morning Can take an extra 25 mg if goes into Afib ), Disp: 60 tablet, Rfl: 0    Misc Natural Products (OSTEO BI-FLEX JOINT SHIELD PO), Take by mouth  , Disp: , Rfl:     Multiple Vitamins-Minerals (multivitamin with minerals) tablet, Take 1 tablet by mouth daily, Disp: 30 tablet, Rfl: 1    Red Yeast Rice 600 MG TABS, Take 1 tablet by mouth daily , Disp: , Rfl:     XARELTO 20 MG tablet, Take 1 tablet by mouth daily before dinner  , Disp: , Rfl:     azelastine (ASTELIN) 0 1 % nasal spray, 2 sprays into each nostril 2 (two) times a day Use in each nostril as directed (Patient not taking: Reported on 3/31/2022 ), Disp: 30 mL, Rfl: 5    docusate sodium (COLACE) 100 mg capsule, Take 1 capsule (100 mg total) by mouth 2 (two) times a day, Disp: 60 capsule, Rfl: 0    folic acid (FOLVITE) 1 mg tablet, Take 1 tablet (1 mg total) by mouth daily, Disp: 30 tablet, Rfl: 1    Multiple Vitamins-Minerals (OCUVITE ADULT 50+) CAPS, Take 1 capsule by mouth daily (Patient not taking: Reported on 3/31/2022 ), Disp: , Rfl:     Allergies   Allergen Reactions    Amiodarone Other (See Comments)     Other reaction(s):  Other (See Comments)  Reports caused elevated TSH    Sudafed [Pseudoephedrine] Tachycardia     Rapid heart beat    Sulfa Antibiotics Itching and Rash    Sulfamethoxazole-Trimethoprim Itching and Rash Vitals:    05/05/22 0953   BP: 131/85   Pulse: 72       Objective:  Physical exam  · General: Awake, Alert, Oriented  · Eyes: Pupils equal, round and reactive to light  · Heart: regular rate and rhythm  · Lungs: No audible wheezing  · Abdomen: soft                    Ortho Exam  Left knee:  Well healed anterior incision  No erythema and no ecchymosis  Appropriate swelling of lower limb  Appropriate warmth of knee  Extensor mechanism intact  Extension full  Flexion 120  Calf compartments soft and supple  Sensation intact  Toes are warm sensate and mobile      Diagnostics, reviewed and taken today if performed as documented:    None performed     Procedures, if performed today:    Procedures    None performed      Portions of the record may have been created with voice recognition software  Occasional wrong word or "sound a like" substitutions may have occurred due to the inherent limitations of voice recognition software  Read the chart carefully and recognize, using context, where substitutions have occurred

## 2022-05-10 ENCOUNTER — APPOINTMENT (OUTPATIENT)
Dept: LAB | Facility: CLINIC | Age: 72
End: 2022-05-10
Payer: COMMERCIAL

## 2022-05-17 ENCOUNTER — OFFICE VISIT (OUTPATIENT)
Dept: WOUND CARE | Facility: CLINIC | Age: 72
End: 2022-05-17
Payer: COMMERCIAL

## 2022-05-17 VITALS
RESPIRATION RATE: 20 BRPM | HEART RATE: 85 BPM | SYSTOLIC BLOOD PRESSURE: 146 MMHG | TEMPERATURE: 97.6 F | DIASTOLIC BLOOD PRESSURE: 97 MMHG

## 2022-05-17 DIAGNOSIS — I83.013 VENOUS STASIS ULCER OF RIGHT ANKLE WITH FAT LAYER EXPOSED WITH VARICOSE VEINS (HCC): Primary | ICD-10-CM

## 2022-05-17 DIAGNOSIS — L97.312 VENOUS STASIS ULCER OF RIGHT ANKLE WITH FAT LAYER EXPOSED WITH VARICOSE VEINS (HCC): Primary | ICD-10-CM

## 2022-05-17 PROCEDURE — 97597 DBRDMT OPN WND 1ST 20 CM/<: CPT | Performed by: STUDENT IN AN ORGANIZED HEALTH CARE EDUCATION/TRAINING PROGRAM

## 2022-05-17 RX ORDER — LIDOCAINE 40 MG/G
CREAM TOPICAL ONCE
Status: COMPLETED | OUTPATIENT
Start: 2022-05-17 | End: 2022-05-17

## 2022-05-17 RX ADMIN — LIDOCAINE: 40 CREAM TOPICAL at 14:47

## 2022-05-17 NOTE — PATIENT INSTRUCTIONS
Orders Placed This Encounter   Procedures    Wound cleansing and dressings     Wound cleansing and dressings       Right Ankle Wound       Wash your hands with soap and water  Remove old dressing, discard into plastic bag and place in trash  Cleanse the wound with unscented soap (Dove) and water prior to applying a clean dressing  Do not use tissue or cotton balls  Do not scrub the wound  Pat dry using gauze  Shower yes keep dressing dry, may use cast cover, if dressing gets wet change immediately  Moisturize right lower leg   Apply dermagran to wound,  Cover with 2x2 gauze,   Secure with allevyn gentle border  Change dressing every other day     spandagrip F to right lower leg  Compression Stocking   Remove compression stockings every night HS and re-apply first thing qAM  Follow daily skin care as instructed  Avoid prolonged standing in one place  Elevate leg(s) above the level of the heart when sitting or as much as possible  Follow up at [address] [facility] in  2  Week       Standing Status:   Future     Standing Expiration Date:   5/17/2023

## 2022-05-17 NOTE — PROGRESS NOTES
Patient ID: Barbie Fontenot is a 81 y o  female Date of Birth 1950     Diagnosis:  1  Venous stasis ulcer of right ankle with fat layer exposed with varicose veins (HCC)  -     Wound cleansing and dressings; Future  -     lidocaine (LMX) 4 % cream  -     Debridement       Diagnosis ICD-10-CM Associated Orders   1  Venous stasis ulcer of right ankle with fat layer exposed with varicose veins (HCC)  I83 013 Wound cleansing and dressings    L97 312 lidocaine (LMX) 4 % cream     Debridement        Assessment & Plan:  1  Right ankle venous ulcer   2  B/l lower leg venous insuffiencey      Plan:     - Right leg wound is decreasing in size once the scab was debrided  Continue local wound care with dermagran and DSD  Tubigrip vs  Compression stockings  I discussed importance of edema control with leg elevation and compression  She expresses understanding and will continue with above suggestions  - Return in 2 weeks for follow   - continue to monitor for signs of infection, if any present call me or visit near by ED      Chief Complaint   Patient presents with    Follow Up Wound Care Visit     Right ankle ulcer              Subjective:   Patient presents for continued treatment of right lower leg ulcer  Patient has missed few appointments due to  having COVID 23  Denies any other complaints         The following portions of the patient's history were reviewed and updated as appropriate:   Patient Active Problem List   Diagnosis    Acute pulmonary embolism ([address] )    DVT, lower extremity ([address] )    Polymyalgia rheumatica ([address] )    Hypothyroidism    Leukocytosis    Vitamin D insufficiency    Left axis deviation    Premature atrial complexes    Essential hypertension    Hypercholesterolemia    Calculus of kidney    [facility]    SOB (shortness of breath)    Superficial thrombophlebitis of right upper extremity    Seronegative rheumatoid arthritis ([address] )    Chronic diastolic congestive heart failure (HCC)    Negative depression screening    Disease of lung    Sepsis, unspecified organism (HCC)    Paroxysmal atrial fibrillation (HCC)    Chronic atrial fibrillation (HCC)    Volume overload    Acute maxillary sinusitis    Bilateral pleural effusion    Cardiac abnormality    CHF (congestive heart failure) (HCC)    Cough    Diarrhea    Diffuse arthralgia    Diffusion capacity of lung (dl), decreased    Fever of unknown origin (FUO)    History of DVT (deep vein thrombosis)    History of polymyalgia rheumatica    History of pulmonary embolus (PE)    Hx of methotrexate therapy    Nausea and/or vomiting    Pain of left side of body    Pain of right scapula    Chest pain    Right upper quadrant abdominal pain    Secondary adrenal insufficiency (HCC)    Tamponade    Thyroid nodule    Ventral hernia    Class 1 obesity due to excess calories with serious comorbidity and body mass index (BMI) of 31 0 to 31 9 in adult    Medicare annual wellness visit, subsequent    Primary osteoarthritis of left knee     Past Medical History:   Diagnosis Date    Allergic     Arthritis     rheumatoid    Atrial fibrillation ([facility]     CHF (congestive heart failure) ([facility] )     Disease of thyroid gland     DVT (deep venous thrombosis) ([facility] )     HL (hearing loss)     Left Ear-Wear Hearing Aid    Hypertension     Irregular heart beat     Kidney stone     Migraine     hx of    Pleural effusion 2016    Polymyalgia rheumatica ([facility] )     "Remission"    Sleep apnea     Mild-Does not require CPAP    Vitamin D deficiency      Past Surgical History:   Procedure Laterality Date    CARDIAC CATHETERIZATION      CARDIOVERSION N/A 2019    Procedure: CARDIOVERSION;  Surgeon: Keysha Dahl MD;  Location: [facility];  Service: Cardiology     SECTION      x3 - last impression 16    FRACTURE SURGERY Left     wrist    HERNIA REPAIR  2018    KNEE ARTHROSCOPY Left     Meniscus Tear Repair    KNEE CARTILAGE SURGERY Left     HI TOTAL KNEE ARTHROPLASTY Left 3/16/2022    Procedure: KNEE TOTAL ARTHROPLASTY;  Surgeon: Mich Brumfield DO;  Location: [facility];  Service: Orthopedics    TONSILLECTOMY AND ADENOIDECTOMY  child; age 17   [facility] TUBAL LIGATION      VEIN SURGERY Right     venous ligation with stripping     Social History     Socioeconomic History    Marital status: /Civil Union     Spouse name: None    Number of children: 3    Years of education: None    Highest education level: None   Occupational History    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   Tobacco Use    Smoking status: Never Smoker    Smokeless tobacco: Never Used   Vaping Use    Vaping Use: Never used   Substance and Sexual Activity    Alcohol use: Yes     Alcohol/week: 0 0 standard drinks     Comment: Socially    Drug use: No    Sexual activity: Yes     Partners: Male     Birth control/protection: Post-menopausal, Female Sterilization   Other Topics Concern    None   Social History Narrative    None     Social Determinants of Health     Financial Resource Strain: Not on file   Food Insecurity: No Food Insecurity    Worried About Running Out of Food in the Last Year: Never true    [place] of Food in the Last Year: Never true   Transportation Needs: No Transportation Needs    Lack of Transportation (Medical): No    Lack of Transportation (Non-Medical):  No   Physical Activity: Not on file   Stress: Not on file   Social Connections: Not on file   Intimate Partner Violence: Not on file   Housing Stability: Low Risk     Unable to Pay for Housing in the Last Year: No    Number of Places Lived in the Last Year: 1    Unstable Housing in the Last Year: No        Current Outpatient Medications:     ascorbic acid (VITAMIN C) 500 MG tablet, Take 1 tablet (500 mg total) by mouth 2 (two) times a day, Disp: 60 tablet, Rfl: 1    azelastine (ASTELIN) 0 1 % nasal spray, 2 sprays into each nostril 2 (two) times a day Use in each nostril as directed (Patient not taking: Reported on 3/31/2022 ), Disp: 30 mL, Rfl: 5    Calcium Ascorbate 500 MG TABS, Take 500 mg by mouth daily  , Disp: , Rfl:     cholecalciferol (VITAMIN D3) 1,000 units tablet, Take 1,000 Units by mouth daily  , Disp: , Rfl:     Cinnamon 500 MG capsule, Take 500 mg by mouth daily, Disp: , Rfl:     Cyanocobalamin (VITAMIN B 12 PO), Take 500 mcg by mouth daily , Disp: , Rfl:     docusate sodium (COLACE) 100 mg capsule, Take 1 capsule (100 mg total) by mouth 2 (two) times a day, Disp: 60 capsule, Rfl: 0    folic acid (FOLVITE) 1 mg tablet, Take 1 tablet (1 mg total) by mouth daily, Disp: 30 tablet, Rfl: 1    levothyroxine 75 mcg tablet, TAKE 1 TABLET BY MOUTH EVERY DAY, Disp: 30 tablet, Rfl: 5    metoprolol succinate (TOPROL-XL) 50 mg 24 hr tablet, Take 1 tablet (50 mg total) by mouth 2 (two) times a day (Patient taking differently: Take 75 mg by mouth daily in the early morning Can take an extra 25 mg if goes into Afib ), Disp: 60 tablet, Rfl: 0    Misc Natural Products (OSTEO BI-FLEX JOINT SHIELD PO), Take by mouth  , Disp: , Rfl:     Multiple Vitamins-Minerals (multivitamin with minerals) tablet, Take 1 tablet by mouth daily, Disp: 30 tablet, Rfl: 1    Multiple Vitamins-Minerals (OCUVITE ADULT 50+) CAPS, Take 1 capsule by mouth daily (Patient not taking: Reported on 3/31/2022 ), Disp: , Rfl:     Red Yeast Rice 600 MG TABS, Take 1 tablet by mouth daily , Disp: , Rfl:     XARELTO 20 MG tablet, Take 1 tablet by mouth daily before dinner  , Disp: , Rfl:   No current facility-administered medications for this visit    Family History   Problem Relation Age of Onset   Ishan Reyes Breast cancer Mother 46    Arthritis Mother     Cancer Mother     Hearing loss Mother     Vision loss Mother     Aneurysm Father         aortic    No Known Problems Sister     No Known Problems Maternal Grandmother     No Known Problems Maternal Grandfather     No Known Problems Paternal Grandmother     No Known Problems Paternal Grandfather     Rheum arthritis Maternal Aunt     Early death Maternal Aunt     No Known Problems Paternal Aunt     No Known Problems Paternal Aunt     No Known Problems Paternal Aunt       Review of Systems   Constitutional: Negative for chills, fatigue and fever  Respiratory: Negative for apnea  Gastrointestinal: Negative for diarrhea, nausea and vomiting  Musculoskeletal: Positive for arthralgias  Skin: Positive for color change and wound  Neurological: Negative for dizziness  Psychiatric/Behavioral: Negative for agitation  Allergies:  Amiodarone, Sudafed [pseudoephedrine], Sulfa antibiotics, and Sulfamethoxazole-trimethoprim      Objective:  /97   Pulse 85   Temp 97 6 °F (36 4 °C)   Resp 20     Physical Exam  Vitals reviewed  Constitutional:       Appearance: She is obese  Cardiovascular:      Rate and Rhythm: Normal rate  Pulses: Normal pulses  Pulmonary:      Effort: Pulmonary effort is normal    Musculoskeletal:         General: Swelling present  Right lower leg: Swelling present  Comments: Right medial ankle superficial wound 100% granular  Wound probes to subcutaneous tissue  Varicose veins to the lower extremity and foot  +1 pitting edema noted  No clinical signs of infection present  Skin:     General: Skin is warm and dry  Neurological:      General: No focal deficit present  Mental Status: She is alert  Psychiatric:         Mood and Affect: Mood normal              Wound 04/05/22 Venous Ulcer Ankle Right;Medial (Active)   Wound Image   05/17/22 1435   Wound Description Yellow;Pink 05/17/22 1435   Leigha-wound Assessment Dry; Intact 05/17/22 1435   Wound Length (cm) 0 3 cm 05/17/22 1435   Wound Width (cm) 0 3 cm 05/17/22 1435   Wound Depth (cm) 0 1 cm 05/17/22 1435   Wound Surface Area (cm^2) 0 09 cm^2 05/17/22 1435   Wound Volume (cm^3) 0 009 cm^3 05/17/22 1435   Calculated Wound Volume (cm^3) 0 01 cm^3 05/17/22 1435   Change in Wound Size % 90 91 05/17/22 1435   Drainage Amount Scant 05/17/22 1435   Drainage Description Serosanguineous; Brown 05/17/22 1435   Non-staged Wound Description Full thickness 05/17/22 1435   Treatments Irrigation with NSS 05/17/22 1435   Patient Tolerance Tolerated well 05/17/22 1435                         Debridement   Wound 04/05/22 Venous Ulcer Ankle Right;Medial    Universal Protocol:  Consent: Verbal consent obtained  Risks and benefits: risks, benefits and alternatives were discussed  Consent given by: patient  Patient understanding: patient states understanding of the procedure being performed  Patient identity confirmed: verbally with patient      Performed by: physician  Debridement type: selective  Pain control: lidocaine 4%  Post-debridement measurements  Length (cm): 0 3  Width (cm): 0 3  Depth (cm): 0 2  Percent debrided: 100%  Surface Area (cm^2): 0 09  Area debrided (cm^2): 0 09  Volume (cm^3): 0 02  Devitalized tissue debrided: biofilm, fibrin and slough  Instrument(s) utilized: curette  Bleeding: small  Response to treatment: procedure was tolerated well                   Wound Instructions:  Orders Placed This Encounter   Procedures    Wound cleansing and dressings     Wound cleansing and dressings       Right Ankle Wound       Wash your hands with soap and water   Remove old dressing, discard into plastic bag and place in trash   Cleanse the wound with unscented soap (Dove) and water prior to applying a clean dressing  Do not use tissue or cotton balls  Do not scrub the wound  Pat dry using gauze        Shower yes keep dressing dry, may use cast cover, if dressing gets wet change immediately        Moisturize right lower leg   Apply dermagran to wound,  Cover with 2x2 gauze,   Secure with allevyn gentle border     Change dressing every other day     spandagrip F to right lower leg        Compression Stocking Remove compression stockings every night HS and re-apply first thing qAM  Follow daily skin care as instructed  Avoid prolonged standing in one place  Elevate leg(s) above the level of the heart when sitting or as much as possible            Follow up at [address] [facility],[address] in [facility] [place]  Standing Status:   Future     Standing Expiration Date:   5/17/2023    Debridement     This order was created via procedure documentation         Brittany Leslie DPM      Portions of the record may have been created with voice recognition software  Occasional wrong word or "sound a like" substitutions may have occurred due to the inherent limitations of voice recognition software  Read the chart carefully and recognize, using context, where substitutions have occurred

## 2022-05-23 ENCOUNTER — LAB (OUTPATIENT)
Dept: LAB | Facility: CLINIC | Age: 72
End: 2022-05-23
Payer: COMMERCIAL

## 2022-05-23 DIAGNOSIS — E04.1 THYROID NODULE: ICD-10-CM

## 2022-05-23 DIAGNOSIS — I10 ESSENTIAL HYPERTENSION: ICD-10-CM

## 2022-05-23 DIAGNOSIS — E03.9 ACQUIRED HYPOTHYROIDISM: ICD-10-CM

## 2022-05-23 DIAGNOSIS — E78.00 HYPERCHOLESTEROLEMIA: ICD-10-CM

## 2022-05-23 DIAGNOSIS — E55.9 VITAMIN D INSUFFICIENCY: Primary | ICD-10-CM

## 2022-05-23 LAB
25(OH)D3 SERPL-MCNC: 46.8 NG/ML (ref 30–100)
ALBUMIN SERPL BCP-MCNC: 3.7 G/DL (ref 3.5–5)
ALP SERPL-CCNC: 96 U/L (ref 46–116)
ALT SERPL W P-5'-P-CCNC: 25 U/L (ref 12–78)
ANION GAP SERPL CALCULATED.3IONS-SCNC: 5 MMOL/L (ref 4–13)
AST SERPL W P-5'-P-CCNC: 21 U/L (ref 5–45)
BILIRUB SERPL-MCNC: 0.5 MG/DL (ref 0.2–1)
BUN SERPL-MCNC: 17 MG/DL (ref 5–25)
CALCIUM SERPL-MCNC: 9.8 MG/DL (ref 8.3–10.1)
CHLORIDE SERPL-SCNC: 108 MMOL/L (ref 100–108)
CHOLEST SERPL-MCNC: 175 MG/DL
CO2 SERPL-SCNC: 28 MMOL/L (ref 21–32)
CREAT SERPL-MCNC: 0.55 MG/DL (ref 0.6–1.3)
EST. AVERAGE GLUCOSE BLD GHB EST-MCNC: 105 MG/DL
GFR SERPL CREATININE-BSD FRML MDRD: 95 ML/MIN/1.73SQ M
GLUCOSE P FAST SERPL-MCNC: 91 MG/DL (ref 65–99)
HBA1C MFR BLD: 5.3 %
HDLC SERPL-MCNC: 40 MG/DL
LDLC SERPL CALC-MCNC: 112 MG/DL (ref 0–100)
NONHDLC SERPL-MCNC: 135 MG/DL
PHOSPHATE SERPL-MCNC: 2.9 MG/DL (ref 2.3–4.1)
POTASSIUM SERPL-SCNC: 3.9 MMOL/L (ref 3.5–5.3)
PROT SERPL-MCNC: 7.6 G/DL (ref 6.4–8.2)
PTH-INTACT SERPL-MCNC: 92.1 PG/ML (ref 18.4–80.1)
SODIUM SERPL-SCNC: 141 MMOL/L (ref 136–145)
T4 FREE SERPL-MCNC: 1.15 NG/DL (ref 0.76–1.46)
TRIGL SERPL-MCNC: 115 MG/DL
TSH SERPL DL<=0.05 MIU/L-ACNC: 1.06 UIU/ML (ref 0.45–4.5)

## 2022-05-23 PROCEDURE — 84100 ASSAY OF PHOSPHORUS: CPT

## 2022-05-23 PROCEDURE — 80061 LIPID PANEL: CPT

## 2022-05-23 PROCEDURE — 80053 COMPREHEN METABOLIC PANEL: CPT

## 2022-05-23 PROCEDURE — 84443 ASSAY THYROID STIM HORMONE: CPT

## 2022-05-23 PROCEDURE — 36415 COLL VENOUS BLD VENIPUNCTURE: CPT

## 2022-05-23 PROCEDURE — 82306 VITAMIN D 25 HYDROXY: CPT

## 2022-05-23 PROCEDURE — 84439 ASSAY OF FREE THYROXINE: CPT

## 2022-05-23 PROCEDURE — 83036 HEMOGLOBIN GLYCOSYLATED A1C: CPT

## 2022-05-23 PROCEDURE — 83970 ASSAY OF PARATHORMONE: CPT

## 2022-05-31 ENCOUNTER — OFFICE VISIT (OUTPATIENT)
Dept: ENDOCRINOLOGY | Facility: HOSPITAL | Age: 72
End: 2022-05-31
Payer: COMMERCIAL

## 2022-05-31 VITALS
HEIGHT: 62 IN | HEART RATE: 64 BPM | DIASTOLIC BLOOD PRESSURE: 90 MMHG | BODY MASS INDEX: 32.54 KG/M2 | SYSTOLIC BLOOD PRESSURE: 138 MMHG | WEIGHT: 176.8 LBS

## 2022-05-31 DIAGNOSIS — E78.00 HYPERCHOLESTEROLEMIA: ICD-10-CM

## 2022-05-31 DIAGNOSIS — E55.9 VITAMIN D INSUFFICIENCY: ICD-10-CM

## 2022-05-31 DIAGNOSIS — I10 ESSENTIAL HYPERTENSION: ICD-10-CM

## 2022-05-31 DIAGNOSIS — E03.9 ACQUIRED HYPOTHYROIDISM: Primary | ICD-10-CM

## 2022-05-31 DIAGNOSIS — E04.1 THYROID NODULE: ICD-10-CM

## 2022-05-31 PROCEDURE — 99214 OFFICE O/P EST MOD 30 MIN: CPT | Performed by: NURSE PRACTITIONER

## 2022-05-31 NOTE — PROGRESS NOTES
Estrella Benitez 81 y o  female MRN: [id number]    Encounter: [id number]      Assessment/Plan     Assessment: This is a 81y o -year-old female with hypothyroidism, hypertension, thyroid nodule and vitamin-D deficiency  Plan:  1   Hypothyroidism:  Her most recent TSH and free T4 are normal   She will continue levothyroxine at current dose   Goal for TSH is between 1 0 in 2 0   Check TSH and free T4 prior to next office visit      2   Hypertension:  She is normotensive in the office today  Continue current medication   Check comprehensive metabolic panel prior to next visit      3   Vitamin-D deficiency: Recent level has improved to 46 8   Continue supplementation with 1000 units of vitamin D3 daily      4   Thyroid nodule:  She denies any anterior neck discomfort, dysphagia or dysphonia   Continue surveillance with follow-up ultrasound in November 2022     5   Osteoporosis:  Recent DEXA scan shows osteoporosis in left femoral neck and right wrist   Reviewed treatment options  Discussed the benefits, risks and side effects of Prolia  I did supplemental educational materials at the time of the visit and let us know she is interested in pursuing Prolia as therapy  Reviewed fall risk and safety  CC: Hypothyroidism follow up    History of Present Illness     HPI:  81 y o  female with history of polymyalgia rheumatica, hypothyroidism, DVT, atrial fibrillation, hyperlipidemia presents for follow up of hypothyroidism  Has had hypothyroidism for many years treated on thyroid hormone replacement   She was hospitalized in summer of 2017 for heart arrhythmia   At that time she received amiodarone and was  discharged home on amiodarone  She was on amiodarone until about September 2017  While on this medication her thyroid hormone requirements went up   Since then she reports cold intolerance and fatigue   She is currently taking levothyroxine 75 mcg daily   She is taking her levothyroxine, consistently, and in the proper manner, by her report   She takes her calcium and other vitamins later in the day  [facility] most recent TSH from May 23, 2022 was 1 060 with a free T4 of 1  15   She also has history of polymyalgia rheumatica and follows closely with her rheumatologist at [place] [address]      Her hypertension is treated with Lasix 20 mg daily and metoprolol 50 mg twice daily      For her vitamin-D deficiency she supplements with 1000 units of vitamin D3 daily   Her most recent 25 hydroxy vitamin-D level from May 23, 2022 is 46 8      Her most recent thyroid ultrasound from November 10, 2021 revealed a diminutive, heterogeneous thyroid gland with a stable thyroid nodule in the lower left lobe   No nodule meets current ACR criteria for requiring biopsy  Review of Systems   Constitutional: Negative  Negative for chills, fatigue and fever  HENT: Negative  Negative for trouble swallowing and voice change  Eyes: Negative  Negative for photophobia, pain, discharge, redness, itching and visual disturbance  Respiratory: Negative  Negative for chest tightness and shortness of breath  Cardiovascular: Negative  Negative for chest pain  Gastrointestinal: Negative  Negative for abdominal pain, constipation, diarrhea and vomiting  Endocrine: Negative for cold intolerance, heat intolerance, polydipsia, polyphagia and polyuria  Genitourinary: Negative  Musculoskeletal: Positive for arthralgias (Bilateral knees)  Skin: Negative  Allergic/Immunologic: Negative  Neurological: Negative  Negative for dizziness, syncope, light-headedness and headaches  Hematological: Negative  Psychiatric/Behavioral: Negative  All other systems reviewed and are negative        Historical Information   Past Medical History:   Diagnosis Date    Allergic     Arthritis     rheumatoid    Atrial fibrillation (HCC)     [facility]     Disease of thyroid gland     DVT (deep venous thrombosis) ([facility] )     HL (hearing loss)     Left Ear-Wear Hearing Aid    Hypertension     Irregular heart beat     Kidney stone     Migraine     hx of    Pleural effusion 2016    Polymyalgia rheumatica (HCC)     "Remission"    Sleep apnea     Mild-Does not require CPAP    Vitamin D deficiency      Past Surgical History:   Procedure Laterality Date    CARDIAC CATHETERIZATION      CARDIOVERSION N/A 2019    Procedure: CARDIOVERSION;  Surgeon: Maddy Herzog MD;  [facility];  Service: Cardiology     SECTION      x3 - last impression 16    FRACTURE SURGERY Left     wrist    HERNIA REPAIR  2018    KNEE ARTHROSCOPY Left     Meniscus Tear Repair    KNEE CARTILAGE SURGERY Left     OR TOTAL KNEE ARTHROPLASTY Left 3/16/2022    Procedure: KNEE TOTAL ARTHROPLASTY;  Surgeon: Mich Brumfield DO;  [facility]: [facility];  Service: Orthopedics    TONSILLECTOMY AND ADENOIDECTOMY  child; age 17   [facility] TUBAL LIGATION      VEIN SURGERY Right     venous ligation with stripping     Social History   Social History     Substance and Sexual Activity   Alcohol Use Yes    Alcohol/week: 0 0 standard drinks    Comment: Socially     Social History     Substance and Sexual Activity   Drug Use No     Social History     Tobacco Use   Smoking Status Never Smoker   Smokeless Tobacco Never Used     Family History:   Family History   Problem Relation Age of Onset    Breast cancer Mother 46    Arthritis Mother     Cancer Mother     Hearing loss Mother     Vision loss Mother     Aneurysm Father         aortic    No Known Problems Sister     No Known Problems Maternal Grandmother     No Known Problems Maternal Grandfather     No Known Problems Paternal Grandmother     No Known Problems Paternal Grandfather     Rheum arthritis Maternal Aunt     Early death Maternal Aunt     No Known Problems Paternal Aunt     No Known Problems Paternal Aunt     No Known Problems Paternal Aunt        Meds/Allergies Current Outpatient Medications   Medication Sig Dispense Refill    azelastine (ASTELIN) 0 1 % nasal spray 2 sprays into each nostril 2 (two) times a day Use in each nostril as directed 30 mL 5    Calcium Ascorbate 500 MG TABS Take 500 mg by mouth daily        cholecalciferol (VITAMIN D3) 1,000 units tablet Take 1,000 Units by mouth daily        Cinnamon 500 MG capsule Take 500 mg by mouth daily      Cyanocobalamin (VITAMIN B 12 PO) Take 500 mcg by mouth daily       levothyroxine 75 mcg tablet TAKE 1 TABLET BY MOUTH EVERY DAY 30 tablet 5    metoprolol succinate (TOPROL-XL) 50 mg 24 hr tablet Take 1 tablet (50 mg total) by mouth 2 (two) times a day (Patient taking differently: Take 75 mg by mouth daily in the early morning Can take an extra 25 mg if goes into Afib) 60 tablet 0    Misc Natural Products (OSTEO BI-FLEX JOINT SHIELD PO) Take by mouth        Red Yeast Rice 600 MG TABS Take 1 tablet by mouth daily       XARELTO 20 MG tablet Take 1 tablet by mouth daily before dinner        ascorbic acid (VITAMIN C) 500 MG tablet Take 1 tablet (500 mg total) by mouth 2 (two) times a day 60 tablet 1    Multiple Vitamins-Minerals (multivitamin with minerals) tablet Take 1 tablet by mouth daily 30 tablet 1    Multiple Vitamins-Minerals (OCUVITE ADULT 50+) CAPS Take 1 capsule by mouth daily (Patient not taking: No sig reported)       No current facility-administered medications for this visit  Allergies   Allergen Reactions    Amiodarone Other (See Comments)     Other reaction(s): Other (See Comments)  Reports caused elevated TSH    Sudafed [Pseudoephedrine] Tachycardia     Rapid heart beat    Sulfa Antibiotics Itching and Rash    Sulfamethoxazole-Trimethoprim Itching and Rash       Objective   Vitals: Blood pressure 138/90, pulse 64, height 5' 2" (1 575 m), weight 80 2 kg (176 lb 12 8 oz), not currently breastfeeding  Physical Exam  Vitals reviewed     Constitutional:       Appearance: She is well-developed  She is obese  HENT:      Head: Normocephalic and atraumatic  Eyes:      Conjunctiva/sclera: Conjunctivae normal       Pupils: Pupils are equal, round, and reactive to light  Cardiovascular:      Rate and Rhythm: Normal rate and regular rhythm  Heart sounds: Normal heart sounds  Pulmonary:      Effort: Pulmonary effort is normal       Breath sounds: Normal breath sounds  Abdominal:      General: Bowel sounds are normal       Palpations: Abdomen is soft  Musculoskeletal:         General: Normal range of motion  Cervical back: Normal range of motion and neck supple  Skin:     General: Skin is warm and dry  Neurological:      Mental Status: She is alert and oriented to person, place, and time  Psychiatric:         Behavior: Behavior normal          Thought Content: Thought content normal          Judgment: Judgment normal        Lab Results:   Lab Results   Component Value Date/Time    TSH 3RD GENERATON 1 060 05/23/2022 09:38 AM    TSH 3RD GENERATON 1 340 11/22/2021 10:06 AM    TSH 3RD GENERATON 1 510 07/26/2021 09:07 AM    Free T4 1 15 05/23/2022 09:38 AM    Free T4 1 17 11/22/2021 10:06 AM       Imaging Studies:   Results for orders placed during the hospital encounter of 11/10/21    US thyroid    Impression  Heterogeneous atrophic thyroid gland with stable left lower pole nodule which does not meet current ACR criteria for requiring biopsy or followup ultrasounds  Reference: ACR Thyroid Imaging, Reporting and Data System (TI-RADS): White Paper of the [facility]  J AM Cherelle Radiol 5180;[id number]  (additional recommendations based on American Thyroid Association 2015 guidelines )      Workstation performed: [id number]      Portions of the record may have been created with voice recognition software  Occasional wrong word or "sound a like" substitutions may have occurred due to the inherent limitations of voice recognition software   Read the chart carefully and recognize, using context, where substitutions have occurred

## 2022-05-31 NOTE — PATIENT INSTRUCTIONS
Continue Levothyroxine at 75 mcg daily  Continue with Calcium and Vitamin D supplementation daily  Will continue to monitor calcium levels  You will obtain a repeat thyroid ultrasound in November 2022  Obtain lab work as prescribed  Consider Prolia

## 2022-06-02 ENCOUNTER — OFFICE VISIT (OUTPATIENT)
Dept: WOUND CARE | Facility: CLINIC | Age: 72
End: 2022-06-02
Payer: COMMERCIAL

## 2022-06-02 VITALS
TEMPERATURE: 96.5 F | HEART RATE: 65 BPM | RESPIRATION RATE: 20 BRPM | SYSTOLIC BLOOD PRESSURE: 134 MMHG | DIASTOLIC BLOOD PRESSURE: 74 MMHG

## 2022-06-02 DIAGNOSIS — L97.312 VENOUS STASIS ULCER OF RIGHT ANKLE WITH FAT LAYER EXPOSED WITH VARICOSE VEINS (HCC): Primary | ICD-10-CM

## 2022-06-02 DIAGNOSIS — I83.013 VENOUS STASIS ULCER OF RIGHT ANKLE WITH FAT LAYER EXPOSED WITH VARICOSE VEINS (HCC): Primary | ICD-10-CM

## 2022-06-02 PROCEDURE — 11042 DBRDMT SUBQ TIS 1ST 20SQCM/<: CPT | Performed by: STUDENT IN AN ORGANIZED HEALTH CARE EDUCATION/TRAINING PROGRAM

## 2022-06-02 RX ORDER — LIDOCAINE 40 MG/G
CREAM TOPICAL ONCE
Status: COMPLETED | OUTPATIENT
Start: 2022-06-02 | End: 2022-06-02

## 2022-06-02 RX ADMIN — LIDOCAINE: 40 CREAM TOPICAL at 09:41

## 2022-06-02 NOTE — PATIENT INSTRUCTIONS
Orders Placed This Encounter   Procedures    Wound cleansing and dressings     Right Ankle Wound       Wash your hands with soap and water  Remove old dressing, discard into plastic bag and place in trash  Cleanse the wound with unscented soap (Dove) and water prior to applying a clean dressing  Do not use tissue or cotton balls  Do not scrub the wound  Pat dry using gauze  Shower yes keep dressing dry, may use cast cover, if dressing gets wet change immediately  Moisturize right lower leg   Apply maxorb ag  to wound,  Cover with gauze, Secure with Kerlex and tape  This was done today   Change dressing daily  spandagrip F to right lower leg  Compression Stocking   Remove compression stockings every night HS and re-apply first thing qAM  Follow daily skin care as instructed  Avoid prolonged standing in one place  Elevate leg(s) above the level of the heart when sitting or as much as possible  Monitor for any signs of infection, redness, fever, increased pain  Go to emergency room to be evaluated  Follow up at [address] [facility] in  2  Week       Standing Status:   Future     Standing Expiration Date:   6/2/2023

## 2022-06-02 NOTE — PROGRESS NOTES
Patient ID: Barbie Fontenot is a 81 y o  female Date of Birth 1950     Diagnosis:  1  Venous stasis ulcer of right ankle with fat layer exposed with varicose veins (HCC)  -     Wound cleansing and dressings; Future  -     lidocaine (LMX) 4 % cream  -     Debridement       Diagnosis ICD-10-CM Associated Orders   1  Venous stasis ulcer of right ankle with fat layer exposed with varicose veins (HCC)  I83 [id number] Wound cleansing and dressings    [id number] lidocaine (LMX) 4 % cream     Debridement        Assessment & Plan:  1  Right ankle venous ulcer   2  B/l lower leg venous insuffiencey      Plan:     - Continue local wound care with maxorb and DSD  Tubigrip vs  Compression stockings  I discussed importance of edema control with leg elevation and compression  She expresses understanding and will continue with above suggestions  - Return in 2 weeks for follow   - continue to monitor for signs of infection, if any present call me or visit near by ED      Chief Complaint   Patient presents with    Follow Up Wound Care Visit     Right ankle ulcer             Subjective:   Patient presents for continued care of right lower leg medial venous ulcer  Patient presents without compression stocking  Does dressing changes at home as instructed  Denies any other omcplaints         The following portions of the patient's history were reviewed and updated as appropriate:   Patient Active Problem List   Diagnosis    Acute pulmonary embolism ([address]    DVT, lower extremity ([address] )    Polymyalgia rheumatica ([address] )    Hypothyroidism    Leukocytosis    Vitamin D insufficiency    Left axis deviation    Premature atrial complexes    Essential hypertension    Hypercholesterolemia    Calculus of kidney    SV[facility]    SOB (shortness of breath)    Superficial thrombophlebitis of right upper extremity    Seronegative rheumatoid arthritis ([address] )    Chronic diastolic congestive heart failure ([address] )    Negative depression screening    Disease of lung    Sepsis, unspecified organism (HCC)    Paroxysmal atrial fibrillation (HCC)    Chronic atrial fibrillation (HCC)    Volume overload    Acute maxillary sinusitis    Bilateral pleural effusion    Cardiac abnormality    CHF (congestive heart failure) (HCC)    Cough    Diarrhea    Diffuse arthralgia    Diffusion capacity of lung (dl), decreased    Fever of unknown origin (FUO)    History of DVT (deep vein thrombosis)    History of polymyalgia rheumatica    History of pulmonary embolus (PE)    Hx of methotrexate therapy    Nausea and/or vomiting    Pain of left side of body    Pain of right scapula    Chest pain    Right upper quadrant abdominal pain    Secondary adrenal insufficiency (HCC)    Tamponade    Thyroid nodule    Ventral hernia    Class 1 obesity due to excess calories with serious comorbidity and body mass index (BMI) of 31 0 to 31 9 in adult    Medicare annual wellness visit, subsequent    Primary osteoarthritis of left knee     Past Medical History:   Diagnosis Date    Allergic     Arthritis     rheumatoid    [facility]rial [facility]     CHF (congestive heart failure) ([facility]     Disease of thyroid gland     DVT (deep venous thrombosis) ([facility]     HL (hearing loss)     Left Ear-Wear Hearing Aid    Hypertension     Irregular heart beat     Kidney stone     Migraine     hx of    Pleural effusion 2016    Polymyalgia rheumatica ([facility]     "Remission"    Sleep apnea     Mild-Does not require CPAP    Vitamin D deficiency      Past Surgical History:   Procedure Laterality Date    CARDIAC CATHETERIZATION      CARDIOVERSION N/A 2019    Procedure: CARDIOVERSION;  Surgeon: Juvenal Murray MD;  Location: [facility];  Service: Cardiology     SECTION      x3 - last impression 16    FRACTURE SURGERY Left     wrist    HERNIA REPAIR  2018    KNEE ARTHROSCOPY Left     Meniscus Tear Repair    KNEE CARTILAGE SURGERY Left     ME TOTAL KNEE ARTHROPLASTY Left 3/16/2022    Procedure: KNEE TOTAL ARTHROPLASTY;  Surgeon: Radha Pickett DO;  Location: [facility];  Service: Orthopedics    TONSILLECTOMY AND ADENOIDECTOMY  child; age 17   [place] TUBAL LIGATION      VEIN SURGERY Right     venous ligation with stripping     Social History     Socioeconomic History    Marital status: /Civil Union     Spouse name: None    Number of children: 3    Years of education: None    Highest education level: None   Occupational History    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   Tobacco Use    Smoking status: Never Smoker    Smokeless tobacco: Never Used   Vaping Use    Vaping Use: Never used   Substance and Sexual Activity    Alcohol use: Yes     Alcohol/week: 0 0 standard drinks     Comment: Socially    Drug use: No    Sexual activity: Yes     Partners: Male     Birth control/protection: Post-menopausal, Female Sterilization   Other Topics Concern    None   Social History Narrative    None     Social Determinants of Health     Financial Resource Strain: Not on file   Food Insecurity: No Food Insecurity    Worried About Running Out of Food in the Last Year: Never true    [place] of Food in the Last Year: Never true   Transportation Needs: No Transportation Needs    Lack of Transportation (Medical): No    Lack of Transportation (Non-Medical):  No   Physical Activity: Not on file   Stress: Not on file   Social Connections: Not on file   Intimate Partner Violence: Not on file   Housing Stability: Low Risk     Unable to Pay for Housing in the Last Year: No    Number of Places Lived in the Last Year: 1    Unstable Housing in the Last Year: No        Current Outpatient Medications:     ascorbic acid (VITAMIN C) 500 MG tablet, Take 1 tablet (500 mg total) by mouth 2 (two) times a day, Disp: 60 tablet, Rfl: 1    azelastine (ASTELIN) 0 1 % nasal spray, 2 sprays into each nostril 2 (two) times a day Use in each nostril as directed, Disp: 30 mL, Rfl: 5    Calcium Ascorbate 500 MG TABS, Take 500 mg by mouth daily  , Disp: , Rfl:     cholecalciferol (VITAMIN D3) 1,000 units tablet, Take 1,000 Units by mouth daily  , Disp: , Rfl:     Cinnamon 500 MG capsule, Take 500 mg by mouth daily, Disp: , Rfl:     Cyanocobalamin (VITAMIN B 12 PO), Take 500 mcg by mouth daily , Disp: , Rfl:     levothyroxine 75 mcg tablet, TAKE 1 TABLET BY MOUTH EVERY DAY, Disp: 30 tablet, Rfl: 5    metoprolol succinate (TOPROL-XL) 50 mg 24 hr tablet, Take 1 tablet (50 mg total) by mouth 2 (two) times a day (Patient taking differently: Take 75 mg by mouth daily in the early morning Can take an extra 25 mg if goes into Afib), Disp: 60 tablet, Rfl: 0    Misc Natural Products (OSTEO BI-FLEX JOINT SHIELD PO), Take by mouth  , Disp: , Rfl:     Multiple Vitamins-Minerals (multivitamin with minerals) tablet, Take 1 tablet by mouth daily, Disp: 30 tablet, Rfl: 1    Multiple Vitamins-Minerals (OCUVITE ADULT 50+) CAPS, Take 1 capsule by mouth daily (Patient not taking: No sig reported), Disp: , Rfl:     Red Yeast Rice 600 MG TABS, Take 1 tablet by mouth daily , Disp: , Rfl:     XARELTO 20 MG tablet, Take 1 tablet by mouth daily before dinner  , Disp: , Rfl:   No current facility-administered medications for this visit    Family History   Problem Relation Age of Onset    Breast cancer Mother 46    Arthritis Mother     Cancer Mother     Hearing loss Mother     Vision loss Mother     Aneurysm Father         aortic    No Known Problems Sister     No Known Problems Maternal Grandmother     No Known Problems Maternal Grandfather     No Known Problems Paternal Grandmother     No Known Problems Paternal Grandfather     Rheum arthritis Maternal Aunt     Early death Maternal Aunt     No Known Problems Paternal Aunt     No Known Problems Paternal Aunt     No Known Problems Paternal Aunt       Review of Systems   Constitutional: Negative for chills and fever  Respiratory: Negative for apnea  Gastrointestinal: Negative for diarrhea, nausea and vomiting  Musculoskeletal: Negative for arthralgias  Skin: Positive for color change and wound  Neurological: Negative for dizziness  Psychiatric/Behavioral: Negative for agitation  Allergies:  Amiodarone, Sudafed [pseudoephedrine], Sulfa antibiotics, and Sulfamethoxazole-trimethoprim      Objective:  /74   Pulse 65   Temp (!) 96 5 °F (35 8 °C)   Resp 20     Physical Exam  Vitals reviewed  Constitutional:       Appearance: She is obese  Cardiovascular:      Rate and Rhythm: Normal rate  Pulses: Normal pulses  Pulmonary:      Effort: Pulmonary effort is normal    Musculoskeletal:         General: Swelling present  Right lower le+ Edema present  Left lower le+ Edema present  Comments: Right medial ankle superficial wound with fibrotic tissue  Wound probes to subcutaneous tissue  Varicose veins to the lower extremity and foot  +1 pitting edema noted  No clinical signs of infection present  Skin:     General: Skin is warm  Neurological:      General: No focal deficit present  Mental Status: She is alert  Psychiatric:         Mood and Affect: Mood normal              Wound 22 Venous Ulcer Ankle Right;Medial (Active)   Wound Image   22   Wound Description Yellow;Pink 22   Leigha-wound Assessment Dry; Intact 22   Wound Length (cm) 2 cm 22   Wound Width (cm) 1 cm 22   Wound Depth (cm) 0 1 cm 22   Wound Surface Area (cm^2) 2 cm^2 2233   Wound Volume (cm^3) 0 2 cm^3 22   Calculated Wound Volume (cm^3) 0 2 cm^3 22   Change in Wound Size % -81 82 22   Drainage Amount Moderate 22   Drainage Description Serous 22   Non-staged Wound Description Full thickness 22   Treatments Irrigation with NSS 22 Patient Tolerance Tolerated well 06/02/22 0933                         Debridement   Wound 04/05/22 Venous Ulcer Ankle Right;Medial    Universal Protocol:  Consent: Verbal consent obtained  Risks and benefits: risks, benefits and alternatives were discussed  Consent given by: patient  Patient understanding: patient states understanding of the procedure being performed  Required items: required blood products, implants, devices, and special equipment available      Performed by: physician  Debridement type: surgical  Level of debridement: subcutaneous tissue  Pain control: lidocaine 2%  Post-debridement measurements  Length (cm): 2  Width (cm): 1  Depth (cm): 0 2  Percent debrided: 60%  Surface Area (cm^2): 2  Area debrided (cm^2): 1 2  Volume (cm^3): 0 4  Tissue and other material debrided: subcutaneous tissue  Devitalized tissue debrided: biofilm, fibrin and slough  Instrument(s) utilized: blade  Bleeding: medium  Hemostasis obtained with: pressure  Response to treatment: procedure was tolerated well                   Wound Instructions:  Orders Placed This Encounter   Procedures    Wound cleansing and dressings     Right Ankle Wound       Wash your hands with soap and water  Cherry [place] old dressing, discard into plastic bag and place in trash   Cleanse the wound with unscented soap (Dove) and water prior to applying a clean dressing  Do not use tissue or cotton balls  Do not scrub the wound  Pat dry using gauze        Shower yes keep dressing dry, may use cast cover, if dressing gets wet change immediately        Moisturize right lower leg   Apply maxorb ag  to wound,  Cover with gauze, Secure with Kerlex and tape  This was done today   Change dressing daily  spandagrip F to right lower leg        Compression Stocking   Remove compression stockings every night HS and re-apply first thing qAM  Follow daily skin care as instructed  Avoid prolonged standing in one place     Elevate leg(s) above the level of the heart when sitting or as much as possible  Monitor for any signs of infection, redness, fever, increased pain  Go to emergency room to be evaluated            Follow up at [address] [facility] [address] in [facility]  Standing Status:   Future     Standing Expiration Date:   6/2/2023    Debridement     This order was created via procedure documentation         Richi Weldon DPM      Portions of the record may have been created with voice recognition software  Occasional wrong word or "sound a like" substitutions may have occurred due to the inherent limitations of voice recognition software  Read the chart carefully and recognize, using context, where substitutions have occurred

## 2022-06-14 ENCOUNTER — OFFICE VISIT (OUTPATIENT)
Dept: WOUND CARE | Facility: CLINIC | Age: 72
End: 2022-06-14
Payer: COMMERCIAL

## 2022-06-14 ENCOUNTER — TELEPHONE (OUTPATIENT)
Dept: OBGYN CLINIC | Facility: CLINIC | Age: 72
End: 2022-06-14

## 2022-06-14 VITALS
DIASTOLIC BLOOD PRESSURE: 72 MMHG | SYSTOLIC BLOOD PRESSURE: 123 MMHG | TEMPERATURE: 98.1 F | HEART RATE: 73 BPM | RESPIRATION RATE: 18 BRPM

## 2022-06-14 DIAGNOSIS — I83.013 VENOUS STASIS ULCER OF RIGHT ANKLE WITH FAT LAYER EXPOSED WITH VARICOSE VEINS (HCC): Primary | ICD-10-CM

## 2022-06-14 DIAGNOSIS — L97.312 VENOUS STASIS ULCER OF RIGHT ANKLE WITH FAT LAYER EXPOSED WITH VARICOSE VEINS (HCC): Primary | ICD-10-CM

## 2022-06-14 PROCEDURE — 11042 DBRDMT SUBQ TIS 1ST 20SQCM/<: CPT | Performed by: STUDENT IN AN ORGANIZED HEALTH CARE EDUCATION/TRAINING PROGRAM

## 2022-06-14 RX ORDER — LIDOCAINE 40 MG/G
CREAM TOPICAL ONCE
Status: COMPLETED | OUTPATIENT
Start: 2022-06-14 | End: 2022-06-14

## 2022-06-14 RX ADMIN — LIDOCAINE: 40 CREAM TOPICAL at 14:43

## 2022-06-14 NOTE — PROGRESS NOTES
Patient ID: Sumeet Parra is a 81 y o  female Date of Birth 1950     Diagnosis:  1  Venous stasis ulcer of right ankle with fat layer exposed with varicose veins (HCC)  -     lidocaine (LMX) 4 % cream  -     Wound cleansing and dressings; Future  -     Debridement       Diagnosis ICD-10-CM Associated Orders   1  Venous stasis ulcer of right ankle with fat layer exposed with varicose veins (HCC)  I83 [id number] lidocaine (LMX) 4 % cream    L97 [id number] Wound cleansing and dressings     Debridement        Assessment & Plan:  1  Right ankle venous ulcer   2  B/l lower leg venous insuffiencey      Plan:     - Continue local wound care with maxorb and DSD  Tubigrip vs  Compression stockings  I discussed importance of edema control with leg elevation and compression  She expresses understanding and will continue with above suggestions  - Return in 2 weeks for follow   - continue to monitor for signs of infection, if any present call me or visit near by ED      Chief Complaint   Patient presents with    Follow Up Wound Care Visit     Right ankle ulcer            Subjective:   Patient presents for continued care of right medial lower leg ulcers  Patient reports she was applying Maxorb however she noted it was dried she is started applying Dermagran  She denies any other complaints        The following portions of the patient's history were reviewed and updated as appropriate:   Patient Active Problem List   Diagnosis    Acute pulmonary embolism ([address]    DVT, lower extremity ([address] )    Polymyalgia rheumatica ([facility]    Hypothyroidism    Leukocytosis    Vitamin D insufficiency    Left axis deviation    Premature atrial complexes    Essential hypertension    Hypercholesterolemia    Calculus of kidney    SV[facility]cardia) [facility]    SOB (shortness of breath)    Superficial thrombophlebitis of right upper extremity    Seronegative rheumatoid arthritis ([facility])    Chronic diastolic congestive heart failure ([facility]    Negative depression screening    Disease of lung    Sepsis, unspecified organism ([facility]    Paroxysmal atrial fibrillation (HCC)    Chronic atrial fibrillation (HCC)    Volume overload    Acute maxillary sinusitis    Bilateral pleural effusion    Cardiac abnormality    CHF (congestive heart failure) (HCC)    Cough    Diarrhea    Diffuse arthralgia    Diffusion capacity of lung (dl), decreased    Fever of unknown origin (FUO)    History of DVT (deep vein thrombosis)    History of polymyalgia rheumatica    History of pulmonary embolus (PE)    Hx of methotrexate therapy    Nausea and/or vomiting    Pain of left side of body    Pain of right scapula    Chest pain    Right upper quadrant abdominal pain    Secondary adrenal insufficiency (HCC)    Tamponade    Thyroid nodule    Ventral hernia    Class 1 obesity due to excess calories with serious comorbidity and body mass index (BMI) of 31 0 to 31 9 in adult    Medicare annual wellness visit, subsequent    Primary osteoarthritis of left knee     Past Medical History:   Diagnosis Date    Allergic     Arthritis     rheumatoid    Atrial fibrillation ([facility]     CHF (congestive heart failure) ([facility]     Disease of thyroid gland     DVT (deep venous thrombosis) ([facility]     HL (hearing loss)     Left Ear-Wear Hearing Aid    Hypertension     Irregular heart beat     Kidney stone     Migraine     hx of    Pleural effusion 2016    Polymyalgia rheumatica ([facility] )     "Remission"    Sleep apnea     Mild-Does not require CPAP    Vitamin D deficiency      Past Surgical History:   Procedure Laterality Date    CARDIAC CATHETERIZATION      CARDIOVERSION N/A 2019    Procedure: CARDIOVERSION;  Surgeon: Abhay Hannon MD;  Location: [facility];  Service: Cardiology     SECTION      x3 - last impression 16    FRACTURE SURGERY Left     wrist    HERNIA REPAIR  2018    KNEE ARTHROSCOPY Left     Meniscus Tear Repair    KNEE CARTILAGE SURGERY Left     GA TOTAL KNEE ARTHROPLASTY Left 3/16/2022    Procedure: KNEE TOTAL ARTHROPLASTY;  Surgeon: Sara Carrion DO;  [facility]: [facility];  Service: Orthopedics    TONSILLECTOMY AND ADENOIDECTOMY  child; age 17   [place] TUBAL LIGATION      VEIN SURGERY Right     venous ligation with stripping     Social History     Socioeconomic History    Marital status: /Civil Union     Spouse name: None    Number of children: 3    Years of education: None    Highest education level: None   Occupational History    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   Tobacco Use    Smoking status: Never Smoker    Smokeless tobacco: Never Used   Vaping Use    Vaping Use: Never used   Substance and Sexual Activity    Alcohol use: Yes     Alcohol/week: 0 0 standard drinks     Comment: Socially    Drug use: No    Sexual activity: Yes     Partners: Male     Birth control/protection: Post-menopausal, Female Sterilization   Other Topics Concern    None   Social History Narrative    None     Social Determinants of Health     Financial Resource Strain: Not on file   Food Insecurity: No Food Insecurity    Worried About Running Out of Food in the Last Year: Never true    [place] of Food in the Last Year: Never true   Transportation Needs: No Transportation Needs    Lack of Transportation (Medical): No    Lack of Transportation (Non-Medical):  No   Physical Activity: Not on file   Stress: Not on file   Social Connections: Not on file   Intimate Partner Violence: Not on file   Housing Stability: Low Risk     Unable to Pay for Housing in the Last Year: No    Number of Places Lived in the Last Year: 1    Unstable Housing in the Last Year: No        Current Outpatient Medications:     ascorbic acid (VITAMIN C) 500 MG tablet, Take 1 tablet (500 mg total) by mouth 2 (two) times a day, Disp: 60 tablet, Rfl: 1    azelastine (ASTELIN) 0 1 % nasal spray, 2 sprays into each nostril 2 (two) times a day Use in each nostril as directed, Disp: 30 mL, Rfl: 5    Calcium Ascorbate 500 MG TABS, Take 500 mg by mouth daily  , Disp: , Rfl:     cholecalciferol (VITAMIN D3) 1,000 units tablet, Take 1,000 Units by mouth daily  , Disp: , Rfl:     Cinnamon 500 MG capsule, Take 500 mg by mouth daily, Disp: , Rfl:     Cyanocobalamin (VITAMIN B 12 PO), Take 500 mcg by mouth daily , Disp: , Rfl:     levothyroxine 75 mcg tablet, TAKE 1 TABLET BY MOUTH EVERY DAY, Disp: 30 tablet, Rfl: 5    metoprolol succinate (TOPROL-XL) 50 mg 24 hr tablet, Take 1 tablet (50 mg total) by mouth 2 (two) times a day (Patient taking differently: Take 75 mg by mouth daily in the early morning Can take an extra 25 mg if goes into Afib), Disp: 60 tablet, Rfl: 0    Misc Natural Products (OSTEO BI-FLEX JOINT SHIELD PO), Take by mouth  , Disp: , Rfl:     Multiple Vitamins-Minerals (multivitamin with minerals) tablet, Take 1 tablet by mouth daily, Disp: 30 tablet, Rfl: 1    Multiple Vitamins-Minerals (OCUVITE ADULT 50+) CAPS, Take 1 capsule by mouth daily (Patient not taking: No sig reported), Disp: , Rfl:     Red Yeast Rice 600 MG TABS, Take 1 tablet by mouth daily , Disp: , Rfl:     XARELTO 20 MG tablet, Take 1 tablet by mouth daily before dinner  , Disp: , Rfl:   No current facility-administered medications for this visit    Family History   Problem Relation Age of Onset    Breast cancer Mother 46    Arthritis Mother     Cancer Mother     Hearing loss Mother     Vision loss Mother     Aneurysm Father         aortic    No Known Problems Sister     No Known Problems Maternal Grandmother     No Known Problems Maternal Grandfather     No Known Problems Paternal Grandmother     No Known Problems Paternal Grandfather     Rheum arthritis Maternal Aunt     Early death Maternal Aunt     No Known Problems Paternal Aunt     No Known Problems Paternal Aunt     No Known Problems Paternal Aunt       Review of Systems   Constitutional: Negative  Respiratory: Negative  Gastrointestinal: Negative  Musculoskeletal: Negative  Skin: Positive for color change and wound  Neurological: Negative  Psychiatric/Behavioral: Negative  Allergies:  Amiodarone, Sudafed [pseudoephedrine], Sulfa antibiotics, and Sulfamethoxazole-trimethoprim      Objective:  /72   Pulse 73   Temp 98 1 °F (36 7 °C)   Resp 18     Physical Exam  Vitals reviewed  Constitutional:       Appearance: She is obese  Cardiovascular:      Rate and Rhythm: Normal rate  Pulses: Normal pulses  Pulmonary:      Effort: Pulmonary effort is normal    Musculoskeletal:         General: Swelling present  Right lower leg: Edema present  Left lower leg: Edema present  Comments: Right medial ankle superficial wound with fibrotic tissue  Wound probes to subcutaneous tissue  Varicose veins to the lower extremity and foot  +1 pitting edema noted  No clinical signs of infection present  Skin:     General: Skin is warm and dry  Neurological:      General: No focal deficit present  Mental Status: She is alert  Psychiatric:         Mood and Affect: Mood normal              Wound 04/05/22 Venous Ulcer Ankle Right;Medial (Active)   Wound Image   06/14/22 1441   Wound Description Yellow;Pink 06/14/22 1424   Leigha-wound Assessment Dry; Intact; Purple 06/14/22 1424   Wound Length (cm) 0 3 cm 06/14/22 1424   Wound Width (cm) 0 3 cm 06/14/22 1424   Wound Depth (cm) 0 1 cm 06/14/22 1424   Wound Surface Area (cm^2) 0 09 cm^2 06/14/22 1424   Wound Volume (cm^3) 0 009 cm^3 06/14/22 1424   Calculated Wound Volume (cm^3) 0 01 cm^3 06/14/22 1424   Change in Wound Size % 90 91 06/14/22 1424   Drainage Amount Scant 06/14/22 1424   Drainage Description Serosanguineous 06/14/22 1424   Non-staged Wound Description Full thickness 06/14/22 1424   Treatments Irrigation with NSS 06/14/22 1424   Patient Tolerance Tolerated well 06/14/22 1424 Debridement   Wound 04/05/22 Venous Ulcer Ankle Right;Medial    Universal Protocol:  Consent: Verbal consent obtained  Risks and benefits: risks, benefits and alternatives were discussed  Consent given by: patient  Patient understanding: patient states understanding of the procedure being performed  Patient identity confirmed: verbally with patient      Performed by: physician  Debridement type: surgical  Level of debridement: subcutaneous tissue  Pain control: lidocaine 4%  Post-debridement measurements  Length (cm): 0 4  Width (cm): 0 4  Depth (cm): 0 2  Percent debrided: 100%  Surface Area (cm^2): 0 16  Area debrided (cm^2): 0 16  Volume (cm^3): 0 03  Tissue and other material debrided: subcutaneous tissue  Devitalized tissue debrided: biofilm, fibrin and slough  Instrument(s) utilized: curette  Bleeding: small  Response to treatment: procedure was tolerated well                   Wound Instructions:  Orders Placed This Encounter   Procedures    Wound cleansing and dressings     Right Ankle Wound       Wash your hands with soap and water   Remove old dressing, discard into plastic bag and place in trash   Cleanse the wound with unscented soap (Dove) and water prior to applying a clean dressing  Do not use tissue or cotton balls  Do not scrub the wound  Pat dry using gauze        Shower yes keep dressing dry, may use cast cover, if dressing gets wet change immediately        Moisturize right lower leg   Apply maxorb ag  to wound,  Cover with gauze, Secure with Kerlex and tape  This was done today   (If wound becomes dry before you return to your visit at the wound center you may apply betadine to the wound bed and cover with gauze and secure with tape)  Change dressing daily  spandagrip F to right lower leg        Compression Stocking   Remove compression stockings every night HS and re-apply first thing qAM  Follow daily skin care as instructed     Avoid prolonged standing in one place    Elevate leg(s) above the level of the heart when sitting or as much as possible  Monitor for any signs of infection, redness, fever, increased pain  Go to emergency room to be evaluated            Follow up at [address] [facility],[address] in [facility]  Standing Status:   Future     Standing Expiration Date:   6/14/2023    Debridement     This order was created via procedure documentation         Rony Officer, BRANDEN      Portions of the record may have been created with voice recognition software  Occasional wrong word or "sound a like" substitutions may have occurred due to the inherent limitations of voice recognition software  Read the chart carefully and recognize, using context, where substitutions have occurred

## 2022-06-14 NOTE — TELEPHONE ENCOUNTER
No documentation is necessary  Just let the  be aware that there is a knee replacement    Wear loose clothing so they can roll the pant leg up and inspect the incision if necessary

## 2022-06-14 NOTE — TELEPHONE ENCOUNTER
Dr Vega  RE: Flying  CB#: [phone]      Patient had a left knee replacement done on 3/16/22  She is flying on Thursday 6/16/22 and wanted to see if she needed any sort of documentation or letter?        Please advise and call her back

## 2022-06-14 NOTE — PATIENT INSTRUCTIONS
Orders Placed This Encounter   Procedures    Wound cleansing and dressings     Right Ankle Wound       Wash your hands with soap and water  Remove old dressing, discard into plastic bag and place in trash  Cleanse the wound with unscented soap (Dove) and water prior to applying a clean dressing  Do not use tissue or cotton balls  Do not scrub the wound  Pat dry using gauze  Shower yes keep dressing dry, may use cast cover, if dressing gets wet change immediately  Moisturize right lower leg   Apply maxorb ag  to wound,  Cover with gauze, Secure with Kerlex and tape  This was done today   (If wound becomes dry before you return to your visit at the wound center you may apply betadine to the wound bed and cover with gauze and secure with tape)  Change dressing daily  spandagrip F to right lower leg  Compression Stocking   Remove compression stockings every night HS and re-apply first thing qAM  Follow daily skin care as instructed  Avoid prolonged standing in one place  Elevate leg(s) above the level of the heart when sitting or as much as possible  Monitor for any signs of infection, redness, fever, increased pain  Go to emergency room to be evaluated  Follow up at [address] [facility],[address] in  2  Week       Standing Status:   Future     Standing Expiration Date:   6/14/2023

## 2022-06-23 ENCOUNTER — OFFICE VISIT (OUTPATIENT)
Dept: OBGYN CLINIC | Facility: CLINIC | Age: 72
End: 2022-06-23
Payer: COMMERCIAL

## 2022-06-23 ENCOUNTER — APPOINTMENT (OUTPATIENT)
Dept: RADIOLOGY | Facility: MEDICAL CENTER | Age: 72
End: 2022-06-23
Payer: COMMERCIAL

## 2022-06-23 VITALS
HEART RATE: 64 BPM | BODY MASS INDEX: 32.39 KG/M2 | HEIGHT: 62 IN | WEIGHT: 176 LBS | DIASTOLIC BLOOD PRESSURE: 79 MMHG | SYSTOLIC BLOOD PRESSURE: 146 MMHG

## 2022-06-23 DIAGNOSIS — Z96.652 S/P TOTAL KNEE REPLACEMENT, LEFT: Primary | ICD-10-CM

## 2022-06-23 DIAGNOSIS — Z96.652 S/P TOTAL KNEE REPLACEMENT, LEFT: ICD-10-CM

## 2022-06-23 PROCEDURE — 3008F BODY MASS INDEX DOCD: CPT | Performed by: ORTHOPAEDIC SURGERY

## 2022-06-23 PROCEDURE — 99213 OFFICE O/P EST LOW 20 MIN: CPT | Performed by: ORTHOPAEDIC SURGERY

## 2022-06-23 PROCEDURE — 73562 X-RAY EXAM OF KNEE 3: CPT

## 2022-06-23 PROCEDURE — 1160F RVW MEDS BY RX/DR IN RCRD: CPT | Performed by: ORTHOPAEDIC SURGERY

## 2022-06-23 PROCEDURE — 1036F TOBACCO NON-USER: CPT | Performed by: ORTHOPAEDIC SURGERY

## 2022-06-23 NOTE — PROGRESS NOTES
Assessment:   Diagnosis ICD-10-CM Associated Orders   1  S/P total knee replacement, left  Z96 652 XR knee 3 vw left non injury       Plan:  · 3 months s/p Left TKA performed on 3/16/2022, doing well  · Continue with PT/HEP as tolerated  May transition into HEP as tolerated  · OTC meds and ice prn for pain relief  · Antibiotics prior to dental procedures    To do next visit:  Return in about 3 months (around 9/23/2022) for Recheck of left knee  The above stated was discussed in layman's terms and the patient expressed understanding  All questions were answered to the patient's satisfaction  The patient is doing quite well from her left total knee replacement  She has full strength full motion along her knee  Incision clean and dry  Continue home exercise program   Follow-up 3 months evaluation with new x-rays of left knee-three views  If her condition changes, she will not hesitate to let us know      Scribe Attestation    I,:  Remigio Frost am acting as a scribe while in the presence of the attending physician :       I,:  Katy Wei, DO personally performed the services described in this documentation    as scribed in my presence :             Subjective:   Sumeet Parra is a 81 y o  female who presents today 3 months s/p left TKA performed on 3/16/2022  Patient states that he continues to do well post operatively  She denies pain about the knee on a daily basis  She continues with PT/HEP with benefit  Overall, she states that she is doing well post operatively  No numbness or tingling  No fevers or chills  Review of systems negative unless otherwise specified in HPI  Review of Systems   Constitutional: Negative for chills, fever and unexpected weight change  HENT: Negative for hearing loss, nosebleeds and sore throat  Eyes: Negative for pain, redness and visual disturbance  Respiratory: Negative for cough, shortness of breath and wheezing      Cardiovascular: Negative for chest pain, palpitations and leg swelling  Gastrointestinal: Negative for abdominal distention, nausea and vomiting  Endocrine: Negative for polydipsia and polyuria  Genitourinary: Negative for dysuria and hematuria  Skin: Negative for rash and wound  Neurological: Negative for dizziness, numbness and headaches  Psychiatric/Behavioral: Negative for decreased concentration and suicidal ideas         Past Medical History:   Diagnosis Date    Allergic     Arthritis     rheumatoid    Atrial fibrillation (HCC)     [facility]     Disease of thyroid gland     DVT (deep venous thrombosis) [facility]     HL (hearing loss)     Left Ear-Wear Hearing Aid    Hypertension     Irregular heart beat     Kidney stone     Migraine     hx of    Pleural effusion 2016    Polymyalgia rheumatica ([facility])     "Remission"    Sleep apnea     Mild-Does not require CPAP    Vitamin D deficiency        Past Surgical History:   Procedure Laterality Date    CARDIAC CATHETERIZATION      CARDIOVERSION N/A 2019    Procedure: CARDIOVERSION;  Surgeon: Abhay Hannon MD;  [facility]: [facility];  Service: Cardiology     SECTION      x3 - last impression 16    FRACTURE SURGERY Left     wrist    HERNIA REPAIR  2018    KNEE ARTHROSCOPY Left     Meniscus Tear Repair    KNEE CARTILAGE SURGERY Left     AL TOTAL KNEE ARTHROPLASTY Left 3/16/2022    Procedure: KNEE TOTAL ARTHROPLASTY;  Surgeon: Loy Kc DO;  [facility]: [facility];  Service: Orthopedics    TONSILLECTOMY AND ADENOIDECTOMY  child; age 17   Shasta Raoel TUBAL LIGATION      VEIN SURGERY Right     venous ligation with stripping       Family History   Problem Relation Age of Onset   Shasta Frankel Breast cancer Mother 46   Julieann Frankel Arthritis Mother     Cancer Mother     Hearing loss Mother     Vision loss Mother     Aneurysm Father         aortic    No Known Problems Sister     No Known Problems Maternal Grandmother     No Known Problems Maternal Grandfather     No Known Problems Paternal Grandmother     No Known Problems Paternal Grandfather     Rheum arthritis Maternal Aunt     Early death Maternal Aunt     No Known Problems Paternal Aunt     No Known Problems Paternal Aunt     No Known Problems Paternal Aunt        Social History     Occupational History    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   Tobacco Use    Smoking status: Never Smoker    Smokeless tobacco: Never Used   Vaping Use    Vaping Use: Never used   Substance and Sexual Activity    Alcohol use:  Yes     Alcohol/week: 0 0 standard drinks     Comment: Socially    Drug use: No    Sexual activity: Yes     Partners: Male     Birth control/protection: Post-menopausal, Female Sterilization         Current Outpatient Medications:     azelastine (ASTELIN) 0 1 % nasal spray, 2 sprays into each nostril 2 (two) times a day Use in each nostril as directed, Disp: 30 mL, Rfl: 5    Calcium Ascorbate 500 MG TABS, Take 500 mg by mouth daily  , Disp: , Rfl:     cholecalciferol (VITAMIN D3) 1,000 units tablet, Take 1,000 Units by mouth daily  , Disp: , Rfl:     Cinnamon 500 MG capsule, Take 500 mg by mouth daily, Disp: , Rfl:     Cyanocobalamin (VITAMIN B 12 PO), Take 500 mcg by mouth daily , Disp: , Rfl:     levothyroxine 75 mcg tablet, TAKE 1 TABLET BY MOUTH EVERY DAY, Disp: 30 tablet, Rfl: 5    metoprolol succinate (TOPROL-XL) 50 mg 24 hr tablet, Take 1 tablet (50 mg total) by mouth 2 (two) times a day (Patient taking differently: Take 75 mg by mouth daily in the early morning Can take an extra 25 mg if goes into Afib), Disp: 60 tablet, Rfl: 0    Misc Natural Products (OSTEO BI-FLEX JOINT SHIELD PO), Take by mouth  , Disp: , Rfl:     Red Yeast Rice 600 MG TABS, Take 1 tablet by mouth daily , Disp: , Rfl:     XARELTO 20 MG tablet, Take 1 tablet by mouth daily before dinner  , Disp: , Rfl:     ascorbic acid (VITAMIN C) 500 MG tablet, Take 1 tablet (500 mg total) by mouth 2 (two) times a day, Disp: 60 tablet, Rfl: 1    Multiple Vitamins-Minerals (multivitamin with minerals) tablet, Take 1 tablet by mouth daily, Disp: 30 tablet, Rfl: 1    Multiple Vitamins-Minerals (OCUVITE ADULT 50+) CAPS, Take 1 capsule by mouth daily (Patient not taking: No sig reported), Disp: , Rfl:     Allergies   Allergen Reactions    Amiodarone Other (See Comments)     Other reaction(s): Other (See Comments)  Reports caused elevated TSH    Sudafed [Pseudoephedrine] Tachycardia     Rapid heart beat    Sulfa Antibiotics Itching and Rash    Sulfamethoxazole-Trimethoprim Itching and Rash            Vitals:    06/23/22 0942   BP: 146/79   Pulse: 64       Objective:                    Left Knee Exam     Tenderness   The patient is experiencing no tenderness  Range of Motion   Extension: 0   Flexion: 120     Tests   Varus: negative Valgus: negative    Other   Erythema: absent  Scars: present (Well healed incision without signs of infection)  Sensation: normal  Pulse: present            Diagnostics, reviewed and taken today if performed as documented: The attending physician has personally reviewed the pertinent films in PACS and interpretation is as follows:    X Ray Left Knee 6/23/2022 demonstrates a well seated total knee arthroplasty without complications  Procedures, if performed today:    Procedures    None performed      Portions of the record may have been created with voice recognition software  Occasional wrong word or "sound a like" substitutions may have occurred due to the inherent limitations of voice recognition software  Read the chart carefully and recognize, using context, where substitutions have occurred

## 2022-06-28 ENCOUNTER — OFFICE VISIT (OUTPATIENT)
Dept: WOUND CARE | Facility: CLINIC | Age: 72
End: 2022-06-28
Payer: COMMERCIAL

## 2022-06-28 VITALS
RESPIRATION RATE: 18 BRPM | SYSTOLIC BLOOD PRESSURE: 112 MMHG | TEMPERATURE: 96.9 F | DIASTOLIC BLOOD PRESSURE: 64 MMHG | HEART RATE: 72 BPM

## 2022-06-28 DIAGNOSIS — I87.2 VENOUS INSUFFICIENCY OF BOTH LOWER EXTREMITIES: ICD-10-CM

## 2022-06-28 DIAGNOSIS — I83.013 VENOUS STASIS ULCER OF RIGHT ANKLE WITH FAT LAYER EXPOSED WITH VARICOSE VEINS (HCC): Primary | ICD-10-CM

## 2022-06-28 DIAGNOSIS — L97.312 VENOUS STASIS ULCER OF RIGHT ANKLE WITH FAT LAYER EXPOSED WITH VARICOSE VEINS (HCC): Primary | ICD-10-CM

## 2022-06-28 PROCEDURE — 11042 DBRDMT SUBQ TIS 1ST 20SQCM/<: CPT | Performed by: STUDENT IN AN ORGANIZED HEALTH CARE EDUCATION/TRAINING PROGRAM

## 2022-06-28 NOTE — PATIENT INSTRUCTIONS
Orders Placed This Encounter   Procedures    Wound cleansing and dressings     Right Ankle Wound       Wash your hands with soap and water  Remove old dressing, discard into plastic bag and place in trash  Cleanse the wound with unscented soap (Dove) and water prior to applying a clean dressing  Do not use tissue or cotton balls  Do not scrub the wound  Pat dry using gauze  Shower yes keep dressing dry, may use cast cover, if dressing gets wet change immediately  Moisturize right lower leg   Skin prep to skin around the wound to protect the intact skin  Apply duoderm to wound  Change every three days unless it becomes soiled or dislodged  This was done today        spandagrip F to right lower leg  Compression Stocking   Remove compression stockings every night HS and re-apply first thing qAM  Follow daily skin care as instructed  Avoid prolonged standing in one place  Elevate leg(s) above the level of the heart when sitting or as much as possible  Monitor for any signs of infection, redness, fever, increased pain  Go to emergency room to be evaluated  Follow up at [address] [facility] in  3  Week       Standing Status:   Future     Standing Expiration Date:   6/28/2023

## 2022-06-28 NOTE — PROGRESS NOTES
Patient ID: Sivan Cordoba is a 81 y o  female Date of Birth 1950     Diagnosis:  1  Venous stasis ulcer of right ankle with fat layer exposed with varicose veins (HCC)  -     Wound cleansing and dressings; Future  -     Debridement    2  Venous insufficiency of both lower extremities  -     Wound cleansing and dressings; Future       Diagnosis ICD-10-CM Associated Orders   1  Venous stasis ulcer of right ankle with fat layer exposed with varicose veins (HCC)  I83 013 Wound cleansing and dressings    L97 312 Debridement   2  Venous insufficiency of both lower extremities  I87 2 Wound cleansing and dressings        Assessment & Plan:  1  Right ankle venous ulcer   2  B/l lower leg venous insuffiencey      Plan:     - Minimal improvement in the wound size with greater then 4 weeks of treatment, patient is unable to tolerate wound debridement will transition wound care dressing with duoderm and DSD  Tubigrip vs  Compression stockings for edema control    - I discussed importance of edema control with leg elevation and compression  She expresses understanding and will continue with above suggestions  - Return in 2 weeks for follow   - continue to monitor for signs of infection, if any present call me or visit near by ED      Chief Complaint   Patient presents with    Follow Up Wound Care Visit     Right ankle wound           Subjective:   Patient presents for continued treatment of right lower leg wound  Patient denies any complaints         The following portions of the patient's history were reviewed and updated as appropriate:   Patient Active Problem List   Diagnosis    Acute pulmonary embolism ([address] )    DVT, lower extremity ([address] )    Polymyalgia rheumatica ([facility]    Hypothyroidism    Leukocytosis    Vitamin D insufficiency    Left axis deviation    Premature atrial complexes    Essential hypertension    Hypercholesterolemia    Calculus of kidney    [facility]    SOB (shortness of breath)    Superficial thrombophlebitis of right upper extremity    Seronegative rheumatoid arthritis (HCC)    Chronic diastolic congestive heart failure (HCC)    Negative depression screening    Disease of lung    Sepsis, unspecified organism (HCC)    Paroxysmal atrial fibrillation (HCC)    Chronic atrial fibrillation (HCC)    Volume overload    Acute maxillary sinusitis    Bilateral pleural effusion    Cardiac abnormality    CHF (congestive heart failure) (HCC)    Cough    Diarrhea    Diffuse arthralgia    Diffusion capacity of lung (dl), decreased    Fever of unknown origin (FUO)    History of DVT (deep vein thrombosis)    History of polymyalgia rheumatica    History of pulmonary embolus (PE)    Hx of methotrexate therapy    Nausea and/or vomiting    Pain of left side of body    Pain of right scapula    Chest pain    Right upper quadrant abdominal pain    Secondary adrenal insufficiency (HCC)    Tamponade    Thyroid nodule    Ventral hernia    Class 1 obesity due to excess calories with serious comorbidity and body mass index (BMI) of 31 0 to 31 9 in adult    Medicare annual wellness visit, subsequent    Primary osteoarthritis of left knee     Past Medical History:   Diagnosis Date    Allergic     Arthritis     rheumatoid    Atrial fibrillation ([facility] [address] [facility]     CHF (congestive heart failure) ([address] )     Disease of thyroid gland     DVT (deep venous thrombosis) ([address] ) 2015    HL (hearing loss)     Left Ear-Wear Hearing Aid    Hypertension     Irregular heart beat     Kidney stone     Migraine     hx of    Pleural effusion 2016    Polymyalgia rheumatica ([facility] [facility] )     "Remission"    Sleep apnea     Mild-Does not require CPAP    Vitamin D deficiency      Past Surgical History:   Procedure Laterality Date    CARDIAC CATHETERIZATION      CARDIOVERSION N/A 12/17/2019    Procedure: CARDIOVERSION;  Surgeon: Victorino Allen MD;  Location: [facility];  Service: Cardiology   SECTION      x3 - last impression 16    FRACTURE SURGERY Left     wrist    HERNIA REPAIR  2018    KNEE ARTHROSCOPY Left     Meniscus Tear Repair    KNEE CARTILAGE SURGERY Left     AK TOTAL KNEE ARTHROPLASTY Left 3/16/2022    Procedure: KNEE TOTAL ARTHROPLASTY;  Surgeon: Candy Mosquera DO;  Location: [facility];  Service: Orthopedics    TONSILLECTOMY AND ADENOIDECTOMY  child; age 17   Oliver Foley TUBAL LIGATION      VEIN SURGERY Right     venous ligation with stripping     Social History     Socioeconomic History    Marital status: /Civil Union     Spouse name: None    Number of children: 3    Years of education: None    Highest education level: None   Occupational History    Occupation: Manager     Comment: [employer] [facility]   Tobacco Use    Smoking status: Never Smoker    Smokeless tobacco: Never Used   Vaping Use    Vaping Use: Never used   Substance and Sexual Activity    Alcohol use: Yes     Alcohol/week: 0 0 standard drinks     Comment: Socially    Drug use: No    Sexual activity: Yes     Partners: Male     Birth control/protection: Post-menopausal, Female Sterilization   Other Topics Concern    None   Social History Narrative    None     Social Determinants of Health     Financial Resource Strain: Not on file   Food Insecurity: No Food Insecurity    Worried About Running Out of Food in the Last Year: Never true    [place] of Food in the Last Year: Never true   Transportation Needs: No Transportation Needs    Lack of Transportation (Medical): No    Lack of Transportation (Non-Medical):  No   Physical Activity: Not on file   Stress: Not on file   Social Connections: Not on file   Intimate Partner Violence: Not on file   Housing Stability: Low Risk     Unable to Pay for Housing in the Last Year: No    Number of Places Lived in the Last Year: 1    Unstable Housing in the Last Year: No        Current Outpatient Medications:     ascorbic acid (VITAMIN C) 500 MG tablet, Take 1 tablet (500 mg total) by mouth 2 (two) times a day, Disp: 60 tablet, Rfl: 1    azelastine (ASTELIN) 0 1 % nasal spray, 2 sprays into each nostril 2 (two) times a day Use in each nostril as directed, Disp: 30 mL, Rfl: 5    Calcium Ascorbate 500 MG TABS, Take 500 mg by mouth daily  , Disp: , Rfl:     cholecalciferol (VITAMIN D3) 1,000 units tablet, Take 1,000 Units by mouth daily  , Disp: , Rfl:     Cinnamon 500 MG capsule, Take 500 mg by mouth daily, Disp: , Rfl:     Cyanocobalamin (VITAMIN B 12 PO), Take 500 mcg by mouth daily , Disp: , Rfl:     levothyroxine 75 mcg tablet, TAKE 1 TABLET BY MOUTH EVERY DAY, Disp: 30 tablet, Rfl: 5    metoprolol succinate (TOPROL-XL) 50 mg 24 hr tablet, Take 1 tablet (50 mg total) by mouth 2 (two) times a day (Patient taking differently: Take 75 mg by mouth daily in the early morning Can take an extra 25 mg if goes into Afib), Disp: 60 tablet, Rfl: 0    Misc Natural Products (OSTEO BI-FLEX JOINT SHIELD PO), Take by mouth  , Disp: , Rfl:     Multiple Vitamins-Minerals (multivitamin with minerals) tablet, Take 1 tablet by mouth daily, Disp: 30 tablet, Rfl: 1    Multiple Vitamins-Minerals (OCUVITE ADULT 50+) CAPS, Take 1 capsule by mouth daily (Patient not taking: No sig reported), Disp: , Rfl:     Red Yeast Rice 600 MG TABS, Take 1 tablet by mouth daily , Disp: , Rfl:     XARELTO 20 MG tablet, Take 1 tablet by mouth daily before dinner  , Disp: , Rfl:   Family History   Problem Relation Age of Onset    Breast cancer Mother 46   Joselyn Sang Arthritis Mother     Cancer Mother     Hearing loss Mother     Vision loss Mother     Aneurysm Father         aortic    No Known Problems Sister     No Known Problems Maternal Grandmother     No Known Problems Maternal Grandfather     No Known Problems Paternal Grandmother     No Known Problems Paternal Grandfather     Rheum arthritis Maternal Aunt     Early death Maternal Aunt     No Known Problems Paternal Aunt     No Known Problems Paternal Aunt     No Known Problems Paternal Aunt       Review of Systems   Constitutional: Negative for chills and fever  Respiratory: Negative for shortness of breath  Gastrointestinal: Negative for nausea  Musculoskeletal: Positive for arthralgias  Skin: Positive for color change and wound  Neurological: Negative for dizziness  Psychiatric/Behavioral: Negative for agitation  Allergies:  Amiodarone, Sudafed [pseudoephedrine], Sulfa antibiotics, and Sulfamethoxazole-trimethoprim      Objective:  /64   Pulse 72   Temp (!) 96 9 °F (36 1 °C)   Resp 18     Physical Exam  Vitals reviewed  Constitutional:       Appearance: She is obese  Cardiovascular:      Rate and Rhythm: Normal rate  Pulses: Normal pulses  Musculoskeletal:         General: Swelling and tenderness present  Right lower le+ Edema present  Left lower le+ Edema present  Comments: Right medial ankle superficial wound with fibrotic tissue  Wound probes to subcutaneous tissue  Varicose veins to the lower extremity and foot  +2 pitting edema noted  No clinical signs of infection present  Skin:     General: Skin is warm and dry  Neurological:      General: No focal deficit present  Mental Status: She is alert  Psychiatric:         Mood and Affect: Mood normal              Wound 22 Venous Ulcer Ankle Right;Medial (Active)   Wound Image   22 1347   Wound Description Epithelialization;Granulation tissue;Slough 22 1335   Leigha-wound Assessment Dry; Intact; Purple 22 1335   Wound Length (cm) 0 5 cm 22 1335   Wound Width (cm) 1 cm 22 1335   Wound Depth (cm) 0 1 cm 22 1335   Wound Surface Area (cm^2) 0 5 cm^2 22 1335   Wound Volume (cm^3) 0 05 cm^3 22 1335   Calculated Wound Volume (cm^3) 0 05 cm^3 22 1335   Change in Wound Size % 54 55 22 1335   Drainage Amount Scant 22 1335   Drainage Description Serous 22 1335   Non-staged Wound Description Full thickness 06/28/22 1335   Treatments Cleansed 06/28/22 1335   Patient Tolerance Tolerated well 06/14/22 1424                         Debridement   Wound 04/05/22 Venous Ulcer Ankle Right;Medial    Universal Protocol:  Consent: Verbal consent obtained  Risks and benefits: risks, benefits and alternatives were discussed  Consent given by: patient  Patient understanding: patient states understanding of the procedure being performed  Patient identity confirmed: verbally with patient      Performed by: physician  Debridement type: surgical  Level of debridement: subcutaneous tissue  Pain control: lidocaine 4%  Post-debridement measurements  Length (cm): 0 5  Width (cm): 1  Depth (cm): 0 2  Percent debrided: 100%  Surface Area (cm^2): 0 5  Area debrided (cm^2): 0 5  Volume (cm^3): 0 1  Tissue and other material debrided: subcutaneous tissue  Devitalized tissue debrided: biofilm, fibrin and slough  Instrument(s) utilized: curette  Bleeding: medium  Hemostasis obtained with: pressure  Response to treatment: procedure was tolerated well                   Wound Instructions:  Orders Placed This Encounter   Procedures    Wound cleansing and dressings     Right Ankle Wound       Wash your hands with soap and water   Remove old dressing, discard into plastic bag and place in trash   Cleanse the wound with unscented soap (Dove) and water prior to applying a clean dressing  Do not use tissue or cotton balls  Do not scrub the wound   Pat dry using gauze        Shower yes keep dressing dry, may use cast cover, if dressing gets wet change immediately        Moisturize right lower leg   Skin prep to skin around the wound to protect the intact skin  Apply duoderm to wound  Change every three days unless it becomes soiled or dislodged  This was done today        spandagrip F to right lower leg        Compression Stocking   Remove compression stockings every night HS and re-apply first thing qAM  Follow daily skin care as instructed  Avoid prolonged standing in one place  Elevate leg(s) above the level of the heart when sitting or as much as possible       Monitor for any signs of infection, redness, fever, increased pain  Go to emergency room to be evaluated            Follow up at [address] [facility] in [facility]  Standing Status:   Future     Standing Expiration Date:   6/28/2023    Debridement     This order was created via procedure documentation         Jeffpartha Tran, DPM      Portions of the record may have been created with voice recognition software  Occasional wrong word or "sound a like" substitutions may have occurred due to the inherent limitations of voice recognition software  Read the chart carefully and recognize, using context, where substitutions have occurred

## 2022-07-19 ENCOUNTER — APPOINTMENT (OUTPATIENT)
Dept: LAB | Facility: CLINIC | Age: 72
End: 2022-07-19
Payer: COMMERCIAL

## 2022-07-19 ENCOUNTER — OFFICE VISIT (OUTPATIENT)
Dept: WOUND CARE | Facility: CLINIC | Age: 72
End: 2022-07-19
Payer: COMMERCIAL

## 2022-07-19 VITALS
TEMPERATURE: 97.3 F | HEART RATE: 76 BPM | RESPIRATION RATE: 20 BRPM | SYSTOLIC BLOOD PRESSURE: 101 MMHG | DIASTOLIC BLOOD PRESSURE: 66 MMHG

## 2022-07-19 DIAGNOSIS — I87.2 VENOUS INSUFFICIENCY OF BOTH LOWER EXTREMITIES: ICD-10-CM

## 2022-07-19 DIAGNOSIS — E03.9 ACQUIRED HYPOTHYROIDISM: ICD-10-CM

## 2022-07-19 DIAGNOSIS — L97.312 VENOUS STASIS ULCER OF RIGHT ANKLE WITH FAT LAYER EXPOSED WITH VARICOSE VEINS (HCC): Primary | ICD-10-CM

## 2022-07-19 DIAGNOSIS — I10 ESSENTIAL HYPERTENSION: ICD-10-CM

## 2022-07-19 DIAGNOSIS — E04.1 THYROID NODULE: ICD-10-CM

## 2022-07-19 DIAGNOSIS — E78.00 HYPERCHOLESTEROLEMIA: ICD-10-CM

## 2022-07-19 DIAGNOSIS — I83.013 VENOUS STASIS ULCER OF RIGHT ANKLE WITH FAT LAYER EXPOSED WITH VARICOSE VEINS (HCC): Primary | ICD-10-CM

## 2022-07-19 DIAGNOSIS — E55.9 VITAMIN D INSUFFICIENCY: ICD-10-CM

## 2022-07-19 LAB
ALBUMIN SERPL BCP-MCNC: 3.7 G/DL (ref 3.5–5)
ALP SERPL-CCNC: 88 U/L (ref 46–116)
ALT SERPL W P-5'-P-CCNC: 28 U/L (ref 12–78)
ANION GAP SERPL CALCULATED.3IONS-SCNC: 6 MMOL/L (ref 4–13)
AST SERPL W P-5'-P-CCNC: 24 U/L (ref 5–45)
BASOPHILS # BLD AUTO: 0.04 THOUSANDS/ΜL (ref 0–0.1)
BASOPHILS NFR BLD AUTO: 1 % (ref 0–1)
BILIRUB SERPL-MCNC: 0.65 MG/DL (ref 0.2–1)
BUN SERPL-MCNC: 19 MG/DL (ref 5–25)
CALCIUM SERPL-MCNC: 10.2 MG/DL (ref 8.3–10.1)
CHLORIDE SERPL-SCNC: 108 MMOL/L (ref 96–108)
CHOLEST SERPL-MCNC: 195 MG/DL
CO2 SERPL-SCNC: 28 MMOL/L (ref 21–32)
CREAT SERPL-MCNC: 0.61 MG/DL (ref 0.6–1.3)
EOSINOPHIL # BLD AUTO: 0.12 THOUSAND/ΜL (ref 0–0.61)
EOSINOPHIL NFR BLD AUTO: 2 % (ref 0–6)
ERYTHROCYTE [DISTWIDTH] IN BLOOD BY AUTOMATED COUNT: 13.8 % (ref 11.6–15.1)
GFR SERPL CREATININE-BSD FRML MDRD: 92 ML/MIN/1.73SQ M
GLUCOSE P FAST SERPL-MCNC: 84 MG/DL (ref 65–99)
HCT VFR BLD AUTO: 40.3 % (ref 34.8–46.1)
HDLC SERPL-MCNC: 41 MG/DL
HGB BLD-MCNC: 12.8 G/DL (ref 11.5–15.4)
IMM GRANULOCYTES # BLD AUTO: 0.01 THOUSAND/UL (ref 0–0.2)
IMM GRANULOCYTES NFR BLD AUTO: 0 % (ref 0–2)
LDLC SERPL CALC-MCNC: 121 MG/DL (ref 0–100)
LYMPHOCYTES # BLD AUTO: 2.08 THOUSANDS/ΜL (ref 0.6–4.47)
LYMPHOCYTES NFR BLD AUTO: 36 % (ref 14–44)
MCH RBC QN AUTO: 28 PG (ref 26.8–34.3)
MCHC RBC AUTO-ENTMCNC: 31.8 G/DL (ref 31.4–37.4)
MCV RBC AUTO: 88 FL (ref 82–98)
MONOCYTES # BLD AUTO: 0.6 THOUSAND/ΜL (ref 0.17–1.22)
MONOCYTES NFR BLD AUTO: 10 % (ref 4–12)
NEUTROPHILS # BLD AUTO: 2.98 THOUSANDS/ΜL (ref 1.85–7.62)
NEUTS SEG NFR BLD AUTO: 51 % (ref 43–75)
NONHDLC SERPL-MCNC: 154 MG/DL
NRBC BLD AUTO-RTO: 0 /100 WBCS
PLATELET # BLD AUTO: 250 THOUSANDS/UL (ref 149–390)
PMV BLD AUTO: 9.3 FL (ref 8.9–12.7)
POTASSIUM SERPL-SCNC: 4.2 MMOL/L (ref 3.5–5.3)
PROT SERPL-MCNC: 7.4 G/DL (ref 6.4–8.4)
RBC # BLD AUTO: 4.57 MILLION/UL (ref 3.81–5.12)
SODIUM SERPL-SCNC: 142 MMOL/L (ref 135–147)
TRIGL SERPL-MCNC: 163 MG/DL
TSH SERPL DL<=0.05 MIU/L-ACNC: 0.79 UIU/ML (ref 0.45–4.5)
WBC # BLD AUTO: 5.83 THOUSAND/UL (ref 4.31–10.16)

## 2022-07-19 PROCEDURE — 36415 COLL VENOUS BLD VENIPUNCTURE: CPT

## 2022-07-19 PROCEDURE — 97597 DBRDMT OPN WND 1ST 20 CM/<: CPT | Performed by: STUDENT IN AN ORGANIZED HEALTH CARE EDUCATION/TRAINING PROGRAM

## 2022-07-19 PROCEDURE — 85025 COMPLETE CBC W/AUTO DIFF WBC: CPT

## 2022-07-19 PROCEDURE — 80053 COMPREHEN METABOLIC PANEL: CPT

## 2022-07-19 PROCEDURE — 80061 LIPID PANEL: CPT

## 2022-07-19 PROCEDURE — 84443 ASSAY THYROID STIM HORMONE: CPT

## 2022-07-19 RX ORDER — LIDOCAINE 40 MG/G
CREAM TOPICAL ONCE
Status: COMPLETED | OUTPATIENT
Start: 2022-07-19 | End: 2022-07-19

## 2022-07-19 RX ADMIN — LIDOCAINE: 40 CREAM TOPICAL at 14:04

## 2022-07-19 NOTE — PROGRESS NOTES
Patient ID: Ollie Plummer is a 81 y o  female Date of Birth 1950     Diagnosis:  1  Venous stasis ulcer of right ankle with fat layer exposed with varicose veins (HCC)  -     lidocaine (LMX) 4 % cream  -     Wound cleansing and dressings; Future    2  Venous insufficiency of both lower extremities  -     lidocaine (LMX) 4 % cream  -     Wound cleansing and dressings; Future       Diagnosis ICD-10-CM Associated Orders   1  Venous stasis ulcer of right ankle with fat layer exposed with varicose veins (HCC)  I83 013 lidocaine (LMX) 4 % cream    L97 312 Wound cleansing and dressings   2  Venous insufficiency of both lower extremities  I87 2 lidocaine (LMX) 4 % cream     Wound cleansing and dressings        Assessment & Plan:  1  Right ankle venous ulcer   2  B/l lower leg venous insuffiencey      Plan:     - wound debridement, continue wound care dressing with duoderm and DSD  Tubigrip vs  Compression stockings for edema control    - I discussed importance of edema control with leg elevation and compression  She expresses understanding and will continue with above suggestions  - Return in 2-3 weeks for follow, [place] of [place]    - continue to monitor for signs of infection, if any present call me or visit near by ED     Chief Complaint   Patient presents with    Follow Up Wound Care Visit     Right medial ankle ulcer           Subjective:   Pt presents with continued treatment of lower extremity wound complicated by venous insuffiencey  She has been traveling thus missed appointments  No other complaints          The following portions of the patient's history were reviewed and updated as appropriate:   Patient Active Problem List   Diagnosis    [facility] pulmonary embolism [facility]    DVT, lower extremity ([facility] )    Polymyalgia rheumatica ([facility] )    Hypothyroidism    Leukocytosis    Vitamin D insufficiency    Left axis deviation    Premature atrial complexes    Essential hypertension    Hypercholesterolemia    Calculus of kidney    SVT (supraventricular tachycardia) (HCC)    SOB (shortness of breath)    Superficial thrombophlebitis of right upper extremity    Seronegative rheumatoid arthritis (HCC)    Chronic diastolic congestive heart failure (HCC)    Negative depression screening    Disease of lung    Sepsis, unspecified organism (HCC)    Paroxysmal atrial fibrillation (HCC)    Chronic atrial fibrillation (HCC)    Volume overload    Acute maxillary sinusitis    Bilateral pleural effusion    Cardiac abnormality    CHF (congestive heart failure) (HCC)    Cough    Diarrhea    Diffuse arthralgia    Diffusion capacity of lung (dl), decreased    Fever of unknown origin (FUO)    History of DVT (deep vein thrombosis)    History of polymyalgia rheumatica    History of pulmonary embolus (PE)    Hx of methotrexate therapy    Nausea and/or vomiting    Pain of left side of body    Pain of right scapula    Chest pain    Right upper quadrant abdominal pain    Secondary adrenal insufficiency (HCC)    Tamponade    Thyroid nodule    Ventral hernia    Class 1 obesity due to excess calories with serious comorbidity and body mass index (BMI) of 31 0 to 31 9 in adult    Medicare annual wellness visit, subsequent    Primary osteoarthritis of left knee     Past Medical History:   Diagnosis Date    Allergic     Arthritis     rheumatoid    Atrial fibrillation ([address] )     CHF (congestive heart failure) ([address] )     Disease of thyroid gland     DVT (deep venous thrombosis) ([address] ) 2015    HL (hearing loss)     Left Ear-Wear Hearing Aid    Hypertension     Irregular heart beat     Kidney stone     Migraine     hx of    Pleural effusion 2016    Polymyalgia rheumatica ([address] )     "Remission"    Sleep apnea     Mild-Does not require CPAP    Vitamin D deficiency      Past Surgical History:   Procedure Laterality Date    CARDIAC CATHETERIZATION      CARDIOVERSION N/A 12/17/2019 Procedure: CARDIOVERSION;  Surgeon: Wendie Newton MD;  Location: [facility];  Service: Cardiology     SECTION      x3 - last impression 16    FRACTURE SURGERY Left     wrist    HERNIA REPAIR  2018    KNEE ARTHROSCOPY Left     Meniscus Tear Repair    KNEE CARTILAGE SURGERY Left     TX TOTAL KNEE ARTHROPLASTY Left 3/16/2022    Procedure: KNEE TOTAL ARTHROPLASTY;  Surgeon: Dunia Rendon DO;  Location: [facility];  Service: Orthopedics    TONSILLECTOMY AND ADENOIDECTOMY  child; age 17   Tim [place] TUBAL LIGATION      VEIN SURGERY Right     venous ligation with stripping     Social History     Socioeconomic History    Marital status: /Civil Union     Spouse name: None    Number of children: 3    Years of education: None    Highest education level: None   Occupational History    Occupation: Manager     Comment: [employer]   Tobacco Use    Smoking status: Never Smoker    Smokeless tobacco: Never Used   Vaping Use    Vaping Use: Never used   Substance and Sexual Activity    Alcohol use: Yes     Alcohol/week: 0 0 standard drinks     Comment: Socially    Drug use: No    Sexual activity: Yes     Partners: Male     Birth control/protection: Post-menopausal, Female Sterilization   Other Topics Concern    None   Social History Narrative    None     Social Determinants of Health     Financial Resource Strain: Not on file   Food Insecurity: No Food Insecurity    Worried About Running Out of Food in the Last Year: Never true    [place] of Food in the Last Year: Never true   Transportation Needs: No Transportation Needs    Lack of Transportation (Medical): No    Lack of Transportation (Non-Medical):  No   Physical Activity: Not on file   Stress: Not on file   Social Connections: Not on file   Intimate Partner Violence: Not on file   Housing Stability: Low Risk     Unable to Pay for Housing in the Last Year: No    Number of Places Lived in the Last Year: 1    Unstable Housing in the Last Year: No        Current Outpatient Medications:     ascorbic acid (VITAMIN C) 500 MG tablet, Take 1 tablet (500 mg total) by mouth 2 (two) times a day, Disp: 60 tablet, Rfl: 1    azelastine (ASTELIN) 0 1 % nasal spray, 2 sprays into each nostril 2 (two) times a day Use in each nostril as directed, Disp: 30 mL, Rfl: 5    Calcium Ascorbate 500 MG TABS, Take 500 mg by mouth daily  , Disp: , Rfl:     cholecalciferol (VITAMIN D3) 1,000 units tablet, Take 1,000 Units by mouth daily  , Disp: , Rfl:     Cinnamon 500 MG capsule, Take 500 mg by mouth daily, Disp: , Rfl:     Cyanocobalamin (VITAMIN B 12 PO), Take 500 mcg by mouth daily , Disp: , Rfl:     levothyroxine 75 mcg tablet, TAKE 1 TABLET BY MOUTH EVERY DAY, Disp: 30 tablet, Rfl: 5    metoprolol succinate (TOPROL-XL) 50 mg 24 hr tablet, Take 1 tablet (50 mg total) by mouth 2 (two) times a day (Patient taking differently: Take 75 mg by mouth daily in the early morning Can take an extra 25 mg if goes into Afib), Disp: 60 tablet, Rfl: 0    Misc Natural Products (OSTEO BI-FLEX JOINT SHIELD PO), Take by mouth  , Disp: , Rfl:     Multiple Vitamins-Minerals (multivitamin with minerals) tablet, Take 1 tablet by mouth daily, Disp: 30 tablet, Rfl: 1    Multiple Vitamins-Minerals (OCUVITE ADULT 50+) CAPS, Take 1 capsule by mouth daily (Patient not taking: No sig reported), Disp: , Rfl:     Red Yeast Rice 600 MG TABS, Take 1 tablet by mouth daily , Disp: , Rfl:     XARELTO 20 MG tablet, Take 1 tablet by mouth daily before dinner  , Disp: , Rfl:   No current facility-administered medications for this visit    Family History   Problem Relation Age of Onset   Shameka Campa Breast cancer Mother 46    Arthritis Mother     Cancer Mother     Hearing loss Mother     Vision loss Mother     Aneurysm Father         aortic    No Known Problems Sister     No Known Problems Maternal Grandmother     No Known Problems Maternal Grandfather     No Known Problems Paternal Grandmother  No Known Problems Paternal Grandfather     Rheum arthritis Maternal Aunt     Early death Maternal Aunt     No Known Problems Paternal Aunt     No Known Problems Paternal Aunt     No Known Problems Paternal Aunt       Review of Systems   Constitutional: Negative for chills  Respiratory: Negative for shortness of breath  Gastrointestinal: Negative for diarrhea  Musculoskeletal: Negative for arthralgias  Skin: Positive for color change and wound  Neurological: Negative for dizziness  Psychiatric/Behavioral: Negative for agitation  Allergies:  Amiodarone, Sudafed [pseudoephedrine], Sulfa antibiotics, and Sulfamethoxazole-trimethoprim      Objective:  /66   Pulse 76   Temp (!) 97 3 °F (36 3 °C)   Resp 20     Physical Exam  Vitals reviewed  Cardiovascular:      Rate and Rhythm: Normal rate  Pulses: Normal pulses  Musculoskeletal:         General: Swelling present  Comments: Right medial ankle superficial wound with fibrotic and granular tissue  Wound probes to subcutaneous tissue  Varicose veins to the lower extremity and foot  +2 pitting edema noted  No clinical signs of infection present  Skin:     General: Skin is warm and dry  Neurological:      General: No focal deficit present  Mental Status: She is alert     Psychiatric:         Mood and Affect: Mood normal              Wound 04/05/22 Venous Ulcer Ankle Right;Medial (Active)   Wound Image   07/19/22 1356   Wound Description Pink;Yellow 07/19/22 1356   Leigha-wound Assessment Yellow-brown 07/19/22 1356   Wound Length (cm) 0 1 cm 07/19/22 1356   Wound Width (cm) 0 1 cm 07/19/22 1356   Wound Depth (cm) 0 1 cm 07/19/22 1356   Wound Surface Area (cm^2) 0 01 cm^2 07/19/22 1356   Wound Volume (cm^3) 0 001 cm^3 07/19/22 1356   Calculated Wound Volume (cm^3) 0 cm^3 07/19/22 1356   Change in Wound Size % 100 07/19/22 1356   Drainage Amount Scant 07/19/22 1356   Drainage Description Serous 07/19/22 1356   Non-staged Wound Description Full thickness 07/19/22 1356   Treatments Cleansed 07/19/22 1356   Patient Tolerance Tolerated well 07/19/22 1356                         Debridement   Wound 04/05/22 Venous Ulcer Ankle Right;Medial    Universal Protocol:  Consent: Verbal consent obtained  Risks and benefits: risks, benefits and alternatives were discussed  Consent given by: patient  Patient understanding: patient states understanding of the procedure being performed  Patient identity confirmed: verbally with patient      Performed by: physician  Debridement type: selective  Pain control: lidocaine 4%  Post-debridement measurements  Length (cm): 0 2  Width (cm): 0 3  Depth (cm): 0 2  Percent debrided: 100%  Surface Area (cm^2): 0 06  Area debrided (cm^2): 0 06  Volume (cm^3): 0 01  Devitalized tissue debrided: biofilm, fibrin and slough  Instrument(s) utilized: blade  Bleeding: small  Response to treatment: procedure was tolerated well                   Wound Instructions:  Orders Placed This Encounter   Procedures    Wound cleansing and dressings     Right Ankle Wound       Wash your hands with soap and water   Remove old dressing, discard into plastic bag and place in trash   Cleanse the wound with unscented soap (Dove) and water prior to applying a clean dressing  Do not use tissue or cotton balls  Do not scrub the wound  Pat dry using gauze        Shower yes keep dressing dry, may use cast cover, if dressing gets wet change immediately        Moisturize right lower leg   Skin prep to skin around the wound to protect the intact skin   Apply duoderm to wound   Change every three days unless it becomes soiled or dislodged   This was done today         spandagrip F to right lower leg        Compression Stocking   Remove compression stockings every night HS and re-apply first thing qAM  Follow daily skin care as instructed  Avoid prolonged standing in one place     Elevate leg(s) above the level of the heart when sitting or as much as possible        Monitor for any signs of infection, redness, fever, increased pain  Go to emergency room to be evaluated            Follow up at [address] [facility] in [facility]  Standing Status:   Future     Standing Expiration Date:   7/19/2023         Darcie Rizo DPM      Portions of the record may have been created with voice recognition software  Occasional wrong word or "sound a like" substitutions may have occurred due to the inherent limitations of voice recognition software  Read the chart carefully and recognize, using context, where substitutions have occurred

## 2022-07-19 NOTE — PATIENT INSTRUCTIONS
Orders Placed This Encounter   Procedures    Wound cleansing and dressings     Right Ankle Wound       Wash your hands with soap and water  Remove old dressing, discard into plastic bag and place in trash  Cleanse the wound with unscented soap (Dove) and water prior to applying a clean dressing  Do not use tissue or cotton balls  Do not scrub the wound  Pat dry using gauze  Shower yes keep dressing dry, may use cast cover, if dressing gets wet change immediately  Moisturize right lower leg   Skin prep to skin around the wound to protect the intact skin   Apply duoderm to wound   Change every three days unless it becomes soiled or dislodged   This was done today         spandagrip F to right lower leg  Compression Stocking   Remove compression stockings every night HS and re-apply first thing qAM  Follow daily skin care as instructed  Avoid prolonged standing in one place  Elevate leg(s) above the level of the heart when sitting or as much as possible  Monitor for any signs of infection, redness, fever, increased pain  Go to emergency room to be evaluated  Follow up at [address] [facility] in  3  Week       Standing Status:   Future     Standing Expiration Date:   7/19/2023

## 2022-07-21 ENCOUNTER — RA CDI HCC (OUTPATIENT)
Dept: OTHER | Facility: HOSPITAL | Age: 72
End: 2022-07-21

## 2022-07-21 NOTE — PROGRESS NOTES
[facility]  coding opportunities          Chart Reviewed number of suggestions sent to Provider: 1  I11 0     Patients Insurance     Medicare Insurance: [facility] Advantage

## 2022-08-01 ENCOUNTER — OFFICE VISIT (OUTPATIENT)
Dept: FAMILY MEDICINE CLINIC | Facility: CLINIC | Age: 72
End: 2022-08-01
Payer: COMMERCIAL

## 2022-08-01 VITALS
SYSTOLIC BLOOD PRESSURE: 120 MMHG | HEIGHT: 62 IN | HEART RATE: 72 BPM | OXYGEN SATURATION: 98 % | DIASTOLIC BLOOD PRESSURE: 60 MMHG | WEIGHT: 178 LBS | TEMPERATURE: 97.6 F | BODY MASS INDEX: 32.76 KG/M2

## 2022-08-01 DIAGNOSIS — I10 ESSENTIAL HYPERTENSION: ICD-10-CM

## 2022-08-01 DIAGNOSIS — Z00.00 MEDICARE ANNUAL WELLNESS VISIT, SUBSEQUENT: Primary | ICD-10-CM

## 2022-08-01 DIAGNOSIS — E03.9 ACQUIRED HYPOTHYROIDISM: ICD-10-CM

## 2022-08-01 DIAGNOSIS — E78.00 HYPERCHOLESTEROLEMIA: ICD-10-CM

## 2022-08-01 DIAGNOSIS — M06.09 RHEUMATOID ARTHRITIS OF MULTIPLE SITES WITH NEGATIVE RHEUMATOID FACTOR (HCC): ICD-10-CM

## 2022-08-01 PROCEDURE — 3725F SCREEN DEPRESSION PERFORMED: CPT | Performed by: FAMILY MEDICINE

## 2022-08-01 PROCEDURE — 3288F FALL RISK ASSESSMENT DOCD: CPT | Performed by: FAMILY MEDICINE

## 2022-08-01 PROCEDURE — 99214 OFFICE O/P EST MOD 30 MIN: CPT | Performed by: FAMILY MEDICINE

## 2022-08-01 PROCEDURE — G0439 PPPS, SUBSEQ VISIT: HCPCS | Performed by: FAMILY MEDICINE

## 2022-08-01 PROCEDURE — 1170F FXNL STATUS ASSESSED: CPT | Performed by: FAMILY MEDICINE

## 2022-08-01 PROCEDURE — 1125F AMNT PAIN NOTED PAIN PRSNT: CPT | Performed by: FAMILY MEDICINE

## 2022-08-01 NOTE — PROGRESS NOTES
Assessment/Plan:    No problem-specific Assessment & Plan notes found for this encounter  Diagnoses and all orders for this visit:    Medicare annual wellness visit, subsequent    Essential hypertension    Hypercholesterolemia    Acquired hypothyroidism  -     TSH, 3rd generation with Free T4 reflex; Future    Rheumatoid arthritis of multiple sites with negative rheumatoid factor ([facility] [address] )          PHQ-2/9 Depression Screening    Little interest or pleasure in doing things: 0 - not at all  Feeling down, depressed, or hopeless: 0 - not at all  PHQ-2 Score: 0  PHQ-2 Interpretation: Negative depression screen            Subjective:      Patient ID: José Du is a 81 y o  female  Hypertension  This is a chronic problem  The current episode started more than 1 year ago  The problem is unchanged  The problem is controlled  Pertinent negatives include no chest pain, palpitations or shortness of breath  The following portions of the patient's history were reviewed and updated as appropriate: allergies, current medications, past family history, past medical history, past social history, past surgical history and problem list     Review of Systems   Constitutional: Negative for chills, fatigue and fever  HENT: Negative  Negative for hearing loss  Eyes: Negative  Negative for visual disturbance  Respiratory: Negative for shortness of breath and wheezing  Cardiovascular: Negative for chest pain and palpitations  Gastrointestinal: Negative for abdominal pain, blood in stool, constipation, diarrhea, nausea and vomiting  Endocrine: Negative  Genitourinary: Negative for difficulty urinating and dysuria  Musculoskeletal: Negative for arthralgias and myalgias  Skin: Negative  Allergic/Immunologic: Negative  Neurological: Negative for seizures and syncope  Hematological: Negative for adenopathy  Psychiatric/Behavioral: Negative            Objective:    /60 (BP Location: Left arm, Patient Position: Sitting)   Pulse 72   Temp 97 6 °F (36 4 °C) (Tympanic)   Ht 5' 2" (1 575 m)   Wt 80 7 kg (178 lb)   SpO2 98%   BMI 32 56 kg/m²      Physical Exam  Vitals and nursing note reviewed  Constitutional:       General: She is not in acute distress  Appearance: Normal appearance  She is well-developed  She is not ill-appearing, toxic-appearing or diaphoretic  HENT:      Head: Normocephalic and atraumatic  Right Ear: Tympanic membrane, ear canal and external ear normal  There is no impacted cerumen  Left Ear: Tympanic membrane, ear canal and external ear normal  There is no impacted cerumen  Nose: Nose normal  No congestion or rhinorrhea  Mouth/Throat:      Mouth: Mucous membranes are moist       Pharynx: Oropharynx is clear  No oropharyngeal exudate or posterior oropharyngeal erythema  Eyes:      General: No scleral icterus  Right eye: No discharge  Left eye: No discharge  Conjunctiva/sclera: Conjunctivae normal       Pupils: Pupils are equal, round, and reactive to light  Cardiovascular:      Rate and Rhythm: Normal rate and regular rhythm  Pulses: Normal pulses  Heart sounds: Normal heart sounds  No murmur heard  Pulmonary:      Effort: Pulmonary effort is normal  No respiratory distress  Breath sounds: Normal breath sounds  No wheezing, rhonchi or rales  Abdominal:      General: Bowel sounds are normal  There is no distension  Palpations: Abdomen is soft  There is no mass  Tenderness: There is no abdominal tenderness  There is no guarding or rebound  Musculoskeletal:         General: Normal range of motion  Cervical back: Normal range of motion and neck supple  No rigidity  Right lower leg: No edema  Left lower leg: No edema  Lymphadenopathy:      Cervical: No cervical adenopathy  Skin:     General: Skin is warm and dry  Findings: No rash     Neurological:      General: No focal deficit present  Mental Status: She is alert and oriented to person, place, and time  Sensory: No sensory deficit  Motor: No weakness or abnormal muscle tone  Coordination: Coordination normal       Gait: Gait normal       Deep Tendon Reflexes: Reflexes are normal and symmetric  Psychiatric:         Mood and Affect: Mood normal          Behavior: Behavior normal          Thought Content:  Thought content normal          Judgment: Judgment normal

## 2022-08-01 NOTE — PROGRESS NOTES
Assessment and Plan:     Problem List Items Addressed This Visit        Endocrine    Hypothyroidism (Chronic)       Cardiovascular and Mediastinum    Essential hypertension       Other    Hypercholesterolemia    Medicare annual wellness visit, subsequent - Primary           Preventive health issues were discussed with patient, and age appropriate screening tests were ordered as noted in patient's After Visit Summary  Personalized health advice and appropriate referrals for health education or preventive services given if needed, as noted in patient's After Visit Summary  History of Present Illness:     Patient presents for a Medicare Wellness Visit    HPI   Patient Care Team:  Khanh Kahn DO as PCP - General  Mariah Boyle DO as PCP - Endocrinology (Endocrinology)  MD Khanh Hector DO Obie Savin, CRNP (Endocrinology)     Review of Systems:     Review of Systems   Constitutional: Negative for chills, fatigue and fever  HENT: Negative  Negative for hearing loss  Eyes: Negative  Negative for visual disturbance  Respiratory: Negative for shortness of breath and wheezing  Cardiovascular: Negative for chest pain and palpitations  Gastrointestinal: Negative for abdominal pain, blood in stool, constipation, diarrhea, nausea and vomiting  Endocrine: Negative  Genitourinary: Negative for difficulty urinating and dysuria  Musculoskeletal: Negative for arthralgias and myalgias  Skin: Negative  Allergic/Immunologic: Negative  Neurological: Negative for seizures and syncope  Hematological: Negative for adenopathy  Psychiatric/Behavioral: Negative           Problem List:     Patient Active Problem List   Diagnosis    Acute pulmonary embolism (HCC)    DVT, lower extremity (HCC)    Polymyalgia rheumatica (HCC)    Hypothyroidism    Leukocytosis    Vitamin D insufficiency    Left axis deviation    Premature atrial complexes    Essential hypertension  Hypercholesterolemia    Calculus of kidney    SVT (supraventricular tachycardia) (HCC)    SOB (shortness of breath)    Superficial thrombophlebitis of right upper extremity    Seronegative rheumatoid arthritis (HCC)    Chronic diastolic congestive heart failure (HCC)    Negative depression screening    Disease of lung    Sepsis, unspecified organism (HCC)    Paroxysmal atrial fibrillation (HCC)    Chronic atrial fibrillation (HCC)    Volume overload    Acute maxillary sinusitis    Bilateral pleural effusion    Cardiac abnormality    CHF (congestive heart failure) (HCC)    Cough    Diarrhea    Diffuse arthralgia    Diffusion capacity of lung (dl), decreased    Fever of unknown origin (FUO)    History of DVT (deep vein thrombosis)    History of polymyalgia rheumatica    History of pulmonary embolus (PE)    Hx of methotrexate therapy    Nausea and/or vomiting    Pain of left side of body    Pain of right scapula    Chest pain    Right upper quadrant abdominal pain    Secondary adrenal insufficiency ([facility])    Tamponade    Thyroid nodule    Ventral hernia    Class 1 obesity due to excess calories with serious comorbidity and body mass index (BMI) of 31 0 to 31 9 in adult    Medicare annual wellness visit, subsequent    Primary osteoarthritis of left knee      Past Medical and Surgical History:     Past Medical History:   Diagnosis Date    Allergic     Arthritis     rheumatoid    Atrial fibrillation ([address] )     CHF (congestive heart failure) ([facility])     Disease of thyroid gland     DVT (deep venous thrombosis) ([address] ) 2015    HL (hearing loss)     Left Ear-Wear Hearing Aid    Hypertension     Irregular heart beat     Kidney stone     Migraine     hx of    Pleural effusion 2016    Polymyalgia rheumatica ([address] )     "Remission"    Sleep apnea     Mild-Does not require CPAP    Vitamin D deficiency      Past Surgical History:   Procedure Laterality Date    CARDIAC CATHETERIZATION      CARDIOVERSION N/A 2019    Procedure: CARDIOVERSION;  Surgeon: Darnell Tovar MD;  Location: [facility];  Service: Cardiology     SECTION      x3 - last impression 16    FRACTURE SURGERY Left     wrist    HERNIA REPAIR  2018    KNEE ARTHROSCOPY Left     Meniscus Tear Repair    KNEE CARTILAGE SURGERY Left     [place] TOTAL KNEE ARTHROPLASTY Left 3/16/2022    Procedure: KNEE TOTAL ARTHROPLASTY;  Surgeon: Amanda Fernandez DO;  Location: [facility];  Service: Orthopedics    TONSILLECTOMY AND ADENOIDECTOMY  child; age 17   Amilcar Daubs TUBAL LIGATION      VEIN SURGERY Right     venous ligation with stripping      Family History:     Family History   Problem Relation Age of Onset   Amilcar Daubs Breast cancer Mother 46    Arthritis Mother     Cancer Mother     Hearing loss Mother     Vision loss Mother     Aneurysm Father         aortic    No Known Problems Sister     No Known Problems Maternal Grandmother     No Known Problems Maternal Grandfather     No Known Problems Paternal Grandmother     No Known Problems Paternal Grandfather     Rheum arthritis Maternal Aunt     Early death Maternal Aunt     No Known Problems Paternal Aunt     No Known Problems Paternal Aunt     No Known Problems Paternal Aunt       Social History:     Social History     Socioeconomic History    Marital status: /Civil Union     Spouse name: None    Number of children: 3    Years of education: None    Highest education level: None   Occupational History    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   Tobacco Use    Smoking status: Never Smoker    Smokeless tobacco: Never Used   Vaping Use    Vaping Use: Never used   Substance and Sexual Activity    Alcohol use:  Yes     Alcohol/week: 0 0 standard drinks     Comment: Socially    Drug use: No    Sexual activity: Yes     Partners: Male     Birth control/protection: Post-menopausal, Female Sterilization   Other Topics Concern    None Social History Narrative    None     Social Determinants of Health     Financial Resource Strain: Not on file   Food Insecurity: No Food Insecurity    Worried About Running Out of Food in the Last Year: Never true    [place] of Food in the Last Year: Never true   Transportation Needs: No Transportation Needs    Lack of Transportation (Medical): No    Lack of Transportation (Non-Medical):  No   Physical Activity: Not on file   Stress: Not on file   Social Connections: Not on file   Intimate Partner Violence: Not on file   Housing Stability: Low Risk     Unable to Pay for Housing in the Last Year: No    Number of Places Lived in the Last Year: 1    Unstable Housing in the Last Year: No      Medications and Allergies:     Current Outpatient Medications   Medication Sig Dispense Refill    azelastine (ASTELIN) 0 1 % nasal spray 2 sprays into each nostril 2 (two) times a day Use in each nostril as directed 30 mL 5    Calcium Ascorbate 500 MG TABS Take 500 mg by mouth daily        cholecalciferol (VITAMIN D3) 1,000 units tablet Take 1,000 Units by mouth daily        Cinnamon 500 MG capsule Take 500 mg by mouth daily      Cyanocobalamin (VITAMIN B 12 PO) Take 500 mcg by mouth daily       levothyroxine 75 mcg tablet TAKE 1 TABLET BY MOUTH EVERY DAY 30 tablet 5    metoprolol succinate (TOPROL-XL) 50 mg 24 hr tablet Take 1 tablet (50 mg total) by mouth 2 (two) times a day (Patient taking differently: Take 75 mg by mouth daily in the early morning Can take an extra 25 mg if goes into Afib) 60 tablet 0    Misc Natural Products (OSTEO BI-FLEX JOINT SHIELD PO) Take by mouth        Multiple Vitamins-Minerals (OCUVITE ADULT 50+) CAPS Take 1 capsule by mouth daily      Red Yeast Rice 600 MG TABS Take 1 tablet by mouth daily       XARELTO 20 MG tablet Take 1 tablet by mouth daily before dinner        ascorbic acid (VITAMIN C) 500 MG tablet Take 1 tablet (500 mg total) by mouth 2 (two) times a day (Patient not taking: Reported on 8/1/2022) 60 tablet 1    Multiple Vitamins-Minerals (multivitamin with minerals) tablet Take 1 tablet by mouth daily 30 tablet 1     No current facility-administered medications for this visit  Allergies   Allergen Reactions    Amiodarone Other (See Comments)     Other reaction(s): Other (See Comments)  Reports caused elevated TSH    Sudafed [Pseudoephedrine] Tachycardia     Rapid heart beat    Sulfa Antibiotics Itching and Rash    Sulfamethoxazole-Trimethoprim Itching and Rash      Immunizations:     Immunization History   Administered Date(s) Administered    COVID-19 MODERNA VACC 0 5 ML IM 01/29/2021, 02/26/2021, 11/08/2021    INFLUENZA 10/24/2020, 10/06/2021    Influenza, high dose seasonal 0 7 mL 12/16/2019    Pneumococcal Conjugate 13-Valent 10/09/2019    Pneumococcal Polysaccharide PPV23 01/15/2021    TD (adult) Preservative Free 01/01/2009    Tdap 05/20/2020    Zoster 12/20/2013      Health Maintenance:         Topic Date Due    Breast Cancer Screening: Mammogram  01/21/2023    Colorectal Cancer Screening  05/23/2023    Cervical Cancer Screening  01/19/2027    Hepatitis C Screening  Completed         Topic Date Due    COVID-19 Vaccine (4 - Booster for Moderna series) 03/08/2022    Influenza Vaccine (1) 09/01/2022      Medicare Screening Tests and Risk Assessments:     Mirta Mcdonald is here for her Subsequent Wellness visit  Last Medicare Wellness visit information reviewed, patient interviewed and updates made to the record today  Health Risk Assessment:   Patient rates overall health as very good  Patient feels that their physical health rating is slightly better  Patient is satisfied with their life  Eyesight was rated as same  Hearing was rated as same  Patient feels that their emotional and mental health rating is same  Patients states they are never, rarely angry  Patient states they are sometimes unusually tired/fatigued   Pain experienced in the last 7 days has been some  Patient's pain rating has been 3/10  Patient states that she has experienced no weight loss or gain in last 6 months  Depression Screening:   PHQ-2 Score: 0      Fall Risk Screening: In the past year, patient has experienced: no history of falling in past year      Urinary Incontinence Screening:   Patient has leaked urine accidently in the last six months  Home Safety:  Patient does not have trouble with stairs inside or outside of their home  Patient has working smoke alarms and has working carbon monoxide detector  Home safety hazards include: none  Nutrition:   Current diet is Regular and Limited junk food  Medications:   Patient is currently taking over-the-counter supplements  OTC medications include: see medication list  Patient is able to manage medications  Activities of Daily Living (ADLs)/Instrumental Activities of Daily Living (IADLs):   Walk and transfer into and out of bed and chair?: Yes  Dress and groom yourself?: Yes    Bathe or shower yourself?: Yes    Feed yourself?  Yes  Do your laundry/housekeeping?: Yes  Manage your money, pay your bills and track your expenses?: Yes  Make your own meals?: Yes    Do your own shopping?: Yes    Previous Hospitalizations:   Any hospitalizations or ED visits within the last 12 months?: No      Advance Care Planning:   Living will: No    Durable POA for healthcare: No    Advanced directive: No    Advanced directive counseling given: Yes    Five wishes given: Yes    Patient declined ACP directive: Yes    End of Life Decisions reviewed with patient: No    Provider agrees with end of life decisions: No      Cognitive Screening:   Provider or family/friend/caregiver concerned regarding cognition?: No    PREVENTIVE SCREENINGS      Cardiovascular Screening:    General: Screening Not Indicated and History Lipid Disorder      Diabetes Screening:     General: Screening Current      Colorectal Cancer Screening:     General: Screening Current      Breast Cancer Screening:     General: Screening Current      Cervical Cancer Screening:    General: Screening Not Indicated      Osteoporosis Screening:    General: Screening Not Indicated      Abdominal Aortic Aneurysm (AAA) Screening:        General: Screening Not Indicated      Lung Cancer Screening:     General: Screening Not Indicated      Hepatitis C Screening:    General: Screening Current    Screening, Brief Intervention, and Referral to Treatment (SBIRT)    Screening  Typical number of drinks in a day: 0  Typical number of drinks in a week: 1  Interpretation: Low risk drinking behavior      Single Item Drug Screening:  How often have you used an illegal drug (including marijuana) or a prescription medication for non-medical reasons in the past year? never    Single Item Drug Screen Score: 0  Interpretation: Negative screen for possible drug use disorder    No exam data present     Physical Exam:     /60 (BP Location: Left arm, Patient Position: Sitting)   Pulse 72   Temp 97 6 °F (36 4 °C) (Tympanic)   Ht 5' 2" (1 575 m)   Wt 80 7 kg (178 lb)   SpO2 98%   BMI 32 56 kg/m²     Physical Exam     Edith Zabala,

## 2022-08-01 NOTE — PATIENT INSTRUCTIONS
Medicare Preventive Visit Patient Instructions  Thank you for completing your Welcome to Medicare Visit or Medicare Annual Wellness Visit today  Your next wellness visit will be due in one year (8/2/2023)  The screening/preventive services that you may require over the next 5-10 years are detailed below  Some tests may not apply to you based off risk factors and/or age  Screening tests ordered at today's visit but not completed yet may show as past due  Also, please note that scanned in results may not display below  Preventive Screenings:  Service Recommendations Previous Testing/Comments   Colorectal Cancer Screening  * Colonoscopy    * Fecal Occult Blood Test (FOBT)/Fecal Immunochemical Test (FIT)  * Fecal DNA/Cologuard Test  * Flexible Sigmoidoscopy Age: 54-65 years old   Colonoscopy: every 10 years (may be performed more frequently if at higher risk)  OR  FOBT/FIT: every 1 year  OR  Cologuard: every 3 years  OR  Sigmoidoscopy: every 5 years  Screening may be recommended earlier than age 48 if at higher risk for colorectal cancer  Also, an individualized decision between you and your healthcare provider will decide whether screening between the ages of 74-80 would be appropriate  Colonoscopy: 05/23/2013  FOBT/FIT: Not on file  Cologuard: Not on file  Sigmoidoscopy: Not on file    Screening Current     Breast Cancer Screening Age: 36 years old  Frequency: every 1-2 years  Not required if history of left and right mastectomy Mammogram: 01/21/2022    Screening Current   Cervical Cancer Screening Between the ages of 21-29, pap smear recommended once every 3 years  Between the ages of 33-67, can perform pap smear with HPV co-testing every 5 years     Recommendations may differ for women with a history of total hysterectomy, cervical cancer, or abnormal pap smears in past  Pap Smear: 01/19/2022    Screening Not Indicated   Hepatitis C Screening Once for adults born between 1945 and 1965  More frequently in patients at high risk for Hepatitis C Hep C Antibody: 07/26/2021    Screening Current   Diabetes Screening 1-2 times per year if you're at risk for diabetes or have pre-diabetes Fasting glucose: 84 mg/dL   A1C: 5 3 %    Screening Current   Cholesterol Screening Once every 5 years if you don't have a lipid disorder  May order more often based on risk factors  Lipid panel: 07/19/2022    Screening Not Indicated  History Lipid Disorder     Other Preventive Screenings Covered by Medicare:  1  Abdominal Aortic Aneurysm (AAA) Screening: covered once if your at risk  You're considered to be at risk if you have a family history of AAA  2  Lung Cancer Screening: covers low dose CT scan once per year if you meet all of the following conditions: (1) Age 50-69; (2) No signs or symptoms of lung cancer; (3) Current smoker or have quit smoking within the last 15 years; (4) You have a tobacco smoking history of at least 30 pack years (packs per day multiplied by number of years you smoked); (5) You get a written order from a healthcare provider  3  Glaucoma Screening: covered annually if you're considered high risk: (1) You have diabetes OR (2) Family history of glaucoma OR (3)  aged 48 and older OR (3)  American aged 72 and older  3  Osteoporosis Screening: covered every 2 years if you meet one of the following conditions: (1) You're estrogen deficient and at risk for osteoporosis based off medical history and other findings; (2) Have a vertebral abnormality; (3) On glucocorticoid therapy for more than 3 months; (4) Have primary hyperparathyroidism; (5) On osteoporosis medications and need to assess response to drug therapy  · Last bone density test (DXA Scan): 03/04/2022   5  HIV Screening: covered annually if you're between the age of 15-65  Also covered annually if you are younger than 13 and older than 72 with risk factors for HIV infection   For pregnant patients, it is covered up to 3 times per pregnancy  Immunizations:  Immunization Recommendations   Influenza Vaccine Annual influenza vaccination during flu season is recommended for all persons aged >= 6 months who do not have contraindications   Pneumococcal Vaccine (Prevnar and Pneumovax)  * Prevnar = PCV13  * Pneumovax = PPSV23   Adults 25-60 years old: 1-3 doses may be recommended based on certain risk factors  Adults 72 years old: Prevnar (PCV13) vaccine recommended followed by Pneumovax (PPSV23) vaccine  If already received PPSV23 since turning 65, then PCV13 recommended at least one year after PPSV23 dose  Hepatitis B Vaccine 3 dose series if at intermediate or high risk (ex: diabetes, end stage renal disease, liver disease)   Tetanus (Td) Vaccine - COST NOT COVERED BY MEDICARE PART B Following completion of primary series, a booster dose should be given every 10 years to maintain immunity against tetanus  Td may also be given as tetanus wound prophylaxis  Tdap Vaccine - COST NOT COVERED BY MEDICARE PART B Recommended at least once for all adults  For pregnant patients, recommended with each pregnancy  Shingles Vaccine (Shingrix) - COST NOT COVERED BY MEDICARE PART B  2 shot series recommended in those aged 48 and above     Health Maintenance Due:      Topic Date Due    Breast Cancer Screening: Mammogram  01/21/2023    Colorectal Cancer Screening  05/23/2023    Cervical Cancer Screening  01/19/2027    Hepatitis C Screening  Completed     Immunizations Due:      Topic Date Due    COVID-19 Vaccine (4 - Booster for Moderna series) 03/08/2022    Influenza Vaccine (1) 09/01/2022     Advance Directives   What are advance directives? Advance directives are legal documents that state your wishes and plans for medical care  These plans are made ahead of time in case you lose your ability to make decisions for yourself  Advance directives can apply to any medical decision, such as the treatments you want, and if you want to donate organs  What are the types of advance directives? There are many types of advance directives, and each state has rules about how to use them  You may choose a combination of any of the following:  · Living will: This is a written record of the treatment you want  You can also choose which treatments you do not want, which to limit, and which to stop at a certain time  This includes surgery, medicine, IV fluid, and tube feedings  · Durable power of  for healthcare [facility]): This is a written record that states who you want to make healthcare choices for you when you are unable to make them for yourself  This person, called a proxy, is usually a family member or a friend  You may choose more than 1 proxy  · Do not resuscitate (DNR) order:  A DNR order is used in case your heart stops beating or you stop breathing  It is a request not to have certain forms of treatment, such as CPR  A DNR order may be included in other types of advance directives  · Medical directive: This covers the care that you want if you are in a coma, near death, or unable to make decisions for yourself  You can list the treatments you want for each condition  Treatment may include pain medicine, surgery, blood transfusions, dialysis, IV or tube feedings, and a ventilator (breathing machine)  · Values history: This document has questions about your views, beliefs, and how you feel and think about life  This information can help others choose the care that you would choose  Why are advance directives important? An advance directive helps you control your care  Although spoken wishes may be used, it is better to have your wishes written down  Spoken wishes can be misunderstood, or not followed  Treatments may be given even if you do not want them  An advance directive may make it easier for your family to make difficult choices about your care     Urinary Incontinence   Urinary incontinence (UI)  is when you lose control of your bladder  UI develops because your bladder cannot store or empty urine properly  The 3 most common types of UI are stress incontinence, urge incontinence, or both  Medicines:   · May be given to help strengthen your bladder control  Report any side effects of medication to your healthcare provider  Do pelvic muscle exercises often:  Your pelvic muscles help you stop urinating  Squeeze these muscles tight for 5 seconds, then relax for 5 seconds  Gradually work up to squeezing for 10 seconds  Do 3 sets of 15 repetitions a day, or as directed  This will help strengthen your pelvic muscles and improve bladder control  Train your bladder:  Go to the bathroom at set times, such as every 2 hours, even if you do not feel the urge to go  You can also try to hold your urine when you feel the urge to go  For example, hold your urine for 5 minutes when you feel the urge to go  As that becomes easier, hold your urine for 10 minutes  Self-care:   · Keep a UI record  Write down how often you leak urine and how much you leak  Make a note of what you were doing when you leaked urine  · Drink liquids as directed  You may need to limit the amount of liquid you drink to help control your urine leakage  Do not drink any liquid right before you go to bed  Limit or do not have drinks that contain caffeine or alcohol  · Prevent constipation  Eat a variety of high-fiber foods  Good examples are high-fiber cereals, beans, vegetables, and whole-grain breads  Walking is the best way to trigger your intestines to have a bowel movement  · Exercise regularly and maintain a healthy weight  Weight loss and exercise will decrease pressure on your bladder and help you control your leakage  · Use a catheter as directed  to help empty your bladder  A catheter is a tiny, plastic tube that is put into your bladder to drain your urine  · Go to behavior therapy as directed    Behavior therapy may be used to help you learn to control your urge to urinate  Weight Management   Why it is important to manage your weight:  Being overweight increases your risk of health conditions such as heart disease, high blood pressure, type 2 diabetes, and certain types of cancer  It can also increase your risk for osteoarthritis, sleep apnea, and other respiratory problems  Aim for a slow, steady weight loss  Even a small amount of weight loss can lower your risk of health problems  How to lose weight safely:  A safe and healthy way to lose weight is to eat fewer calories and get regular exercise  You can lose up about 1 pound a week by decreasing the number of calories you eat by 500 calories each day  Healthy meal plan for weight management:  A healthy meal plan includes a variety of foods, contains fewer calories, and helps you stay healthy  A healthy meal plan includes the following:  · Eat whole-grain foods more often  A healthy meal plan should contain fiber  Fiber is the part of grains, fruits, and vegetables that is not broken down by your body  Whole-grain foods are healthy and provide extra fiber in your diet  Some examples of whole-grain foods are whole-wheat breads and pastas, oatmeal, brown rice, and bulgur  · Eat a variety of vegetables every day  Include dark, leafy greens such as spinach, kale, mauricio greens, and mustard greens  Eat yellow and orange vegetables such as carrots, sweet potatoes, and winter squash  · Eat a variety of fruits every day  Choose fresh or canned fruit (canned in its own juice or light syrup) instead of juice  Fruit juice has very little or no fiber  · Eat low-fat dairy foods  Drink fat-free (skim) milk or 1% milk  Eat fat-free yogurt and low-fat cottage cheese  Try low-fat cheeses such as mozzarella and other reduced-fat cheeses  · Choose meat and other protein foods that are low in fat  Choose beans or other legumes such as split peas or lentils   Choose fish, skinless poultry (chicken or turkey), or lean cuts of red meat (beef or pork)  Before you cook meat or poultry, cut off any visible fat  · Use less fat and oil  Try baking foods instead of frying them  Add less fat, such as margarine, sour cream, regular salad dressing and mayonnaise to foods  Eat fewer high-fat foods  Some examples of high-fat foods include french fries, doughnuts, ice cream, and cakes  · Eat fewer sweets  Limit foods and drinks that are high in sugar  This includes candy, cookies, regular soda, and sweetened drinks  Exercise:  Exercise at least 30 minutes per day on most days of the week  Some examples of exercise include walking, biking, dancing, and swimming  You can also fit in more physical activity by taking the stairs instead of the elevator or parking farther away from stores  Ask your healthcare provider about the best exercise plan for you  © Copyright [employer] 2018 Information is for End User's use only and may not be sold, redistributed or otherwise used for commercial purposes   All illustrations and images included in CareNotes® are the copyrighted property of [employer]  or [address] [place] [address]

## 2022-08-09 ENCOUNTER — OFFICE VISIT (OUTPATIENT)
Dept: WOUND CARE | Facility: CLINIC | Age: 72
End: 2022-08-09
Payer: COMMERCIAL

## 2022-08-09 VITALS
SYSTOLIC BLOOD PRESSURE: 112 MMHG | HEART RATE: 68 BPM | TEMPERATURE: 98.1 F | DIASTOLIC BLOOD PRESSURE: 67 MMHG | RESPIRATION RATE: 20 BRPM

## 2022-08-09 DIAGNOSIS — L97.312 VENOUS STASIS ULCER OF RIGHT ANKLE WITH FAT LAYER EXPOSED WITH VARICOSE VEINS (HCC): Primary | ICD-10-CM

## 2022-08-09 DIAGNOSIS — I83.013 VENOUS STASIS ULCER OF RIGHT ANKLE WITH FAT LAYER EXPOSED WITH VARICOSE VEINS (HCC): Primary | ICD-10-CM

## 2022-08-09 PROCEDURE — 99213 OFFICE O/P EST LOW 20 MIN: CPT | Performed by: STUDENT IN AN ORGANIZED HEALTH CARE EDUCATION/TRAINING PROGRAM

## 2022-08-09 NOTE — PATIENT INSTRUCTIONS
Orders Placed This Encounter   Procedures    Wound cleansing and dressings     Moisturize right lower leg daily and as needed for dry skin  This was done today     Compression Stocking    Remove compression stockings every night and re-apply every morning  Follow daily skin care as instructed  Avoid prolonged standing in one place  Elevate leg(s) above the level of the heart when sitting or as much as possible  Call [address] at [phone] if wound re-opens       Standing Status:   Future     Standing Expiration Date:   8/9/2023

## 2022-08-10 NOTE — PROGRESS NOTES
Patient ID: Kareem Kiser is a 81 y o  female Date of Birth 1950     Diagnosis:  1  Venous stasis ulcer of right ankle with fat layer exposed with varicose veins (HCC)  -     Wound cleansing and dressings; Future       Diagnosis ICD-10-CM Associated Orders   1  Venous stasis ulcer of right ankle with fat layer exposed with varicose veins (HCC)  I83 [id number] Wound cleansing and dressings    L97 [id number]         Assessment & Plan:  1  Right ankle venous ulcer   2  B/l lower leg venous insuffiencey     - right ankle venous stasis ulcer healed  - patient recommended to continue using compression stocking over-the-counter with 15-20 mm Hg specifically when standing for longer periods of time or walking   - continue leg elevation for edema control  - discussed recurrence of wounds, continue to monitor legs daily  If there is any recurrence she is to call my office immediately for further appointments   -most recent PCP notes reviewed  - return as needed  Chief Complaint   Patient presents with    Follow Up Wound Care Visit     Right ankle ulcer           Subjective:   Year old female presents for continued treatment of right lower extremity ulceration complicated by venous insufficiency  Patient denies any complaints at this time  Patient reports she may have healed today  She denies nausea vomiting fever chills shortness of breath today        The following portions of the patient's history were reviewed and updated as appropriate:   Patient Active Problem List   Diagnosis    Acute pulmonary embolism ([address] )    DVT, lower extremity ([address] )    Polymyalgia rheumatica [facility]    Hypothyroidism    Leukocytosis    Vitamin D insufficiency    Left axis deviation    Premature atrial complexes    Essential hypertension    Hypercholesterolemia    Calculus of kidney    [facility]    SOB (shortness of breath)    Superficial thrombophlebitis of right upper extremity    Seronegative rheumatoid arthritis (HCC)    Chronic diastolic congestive heart failure (HCC)    Negative depression screening    Disease of lung    Sepsis, unspecified organism (HCC)    Paroxysmal atrial fibrillation (HCC)    Chronic atrial fibrillation (HCC)    Volume overload    Acute maxillary sinusitis    Bilateral pleural effusion    Cardiac abnormality    CHF (congestive heart failure) (HCC)    Cough    Diarrhea    Diffuse arthralgia    Diffusion capacity of lung (dl), decreased    Fever of unknown origin (FUO)    History of DVT (deep vein thrombosis)    History of polymyalgia rheumatica    History of pulmonary embolus (PE)    Hx of methotrexate therapy    Nausea and/or vomiting    Pain of left side of body    Pain of right scapula    Chest pain    Right upper quadrant abdominal pain    Secondary adrenal insufficiency (HCC)    Tamponade    Thyroid nodule    Ventral hernia    Class 1 obesity due to excess calories with serious comorbidity and body mass index (BMI) of 31 0 to 31 9 in adult    Medicare annual wellness visit, subsequent    Primary osteoarthritis of left knee     Past Medical History:   Diagnosis Date    Allergic     Arthritis     rheumatoid    Atrial fibrillation [facility]     CHF (congestive heart failure) ([facility] )     Disease of thyroid gland     DVT (deep venous thrombosis) ([address] )     HL (hearing loss)     Left Ear-Wear Hearing Aid    Hypertension     Irregular heart beat     Kidney stone     Migraine     hx of    Pleural effusion 2016    Polymyalgia rheumatica ([address] )     "Remission"    Sleep apnea     Mild-Does not require CPAP    Vitamin D deficiency      Past Surgical History:   Procedure Laterality Date    CARDIAC CATHETERIZATION      CARDIOVERSION N/A 2019    Procedure: CARDIOVERSION;  Surgeon: Jeniffer Williamson MD;  Location: [facility];  Service: Cardiology     SECTION      x3 - last impression 16    FRACTURE SURGERY Left     wrist    HERNIA REPAIR  06/2018    KNEE ARTHROSCOPY Left     Meniscus Tear Repair    KNEE CARTILAGE SURGERY Left     RI TOTAL KNEE ARTHROPLASTY Left 3/16/2022    Procedure: KNEE TOTAL ARTHROPLASTY;  Surgeon: Dunia Rendon DO;  Location: [facility];  Service: Orthopedics    TONSILLECTOMY AND ADENOIDECTOMY  child; age 17   Tim Lubin TUBAL LIGATION      VEIN SURGERY Right     venous ligation with stripping     Social History     Socioeconomic History    Marital status: /Civil Union     Spouse name: None    Number of children: 3    Years of education: None    Highest education level: None   Occupational History    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   Tobacco Use    Smoking status: Never Smoker    Smokeless tobacco: Never Used   Vaping Use    Vaping Use: Never used   Substance and Sexual Activity    Alcohol use: Yes     Alcohol/week: 0 0 standard drinks     Comment: Socially    Drug use: No    Sexual activity: Yes     Partners: Male     Birth control/protection: Post-menopausal, Female Sterilization   Other Topics Concern    None   Social History Narrative    None     Social Determinants of Health     Financial Resource Strain: Not on file   Food Insecurity: No Food Insecurity    Worried About Running Out of Food in the Last Year: Never true    [place] of Food in the Last Year: Never true   Transportation Needs: No Transportation Needs    Lack of Transportation (Medical): No    Lack of Transportation (Non-Medical):  No   Physical Activity: Not on file   Stress: Not on file   Social Connections: Not on file   Intimate Partner Violence: Not on file   Housing Stability: Low Risk     Unable to Pay for Housing in the Last Year: No    Number of Places Lived in the Last Year: 1    Unstable Housing in the Last Year: No        Current Outpatient Medications:     ascorbic acid (VITAMIN C) 500 MG tablet, Take 1 tablet (500 mg total) by mouth 2 (two) times a day (Patient not taking: Reported on 8/1/2022), Disp: 60 tablet, Rfl: 1    azelastine (ASTELIN) 0 1 % nasal spray, 2 sprays into each nostril 2 (two) times a day Use in each nostril as directed, Disp: 30 mL, Rfl: 5    Calcium Ascorbate 500 MG TABS, Take 500 mg by mouth daily  , Disp: , Rfl:     cholecalciferol (VITAMIN D3) 1,000 units tablet, Take 1,000 Units by mouth daily  , Disp: , Rfl:     Cinnamon 500 MG capsule, Take 500 mg by mouth daily, Disp: , Rfl:     Cyanocobalamin (VITAMIN B 12 PO), Take 500 mcg by mouth daily , Disp: , Rfl:     levothyroxine 75 mcg tablet, TAKE 1 TABLET BY MOUTH EVERY DAY, Disp: 30 tablet, Rfl: 5    metoprolol succinate (TOPROL-XL) 50 mg 24 hr tablet, Take 1 tablet (50 mg total) by mouth 2 (two) times a day (Patient taking differently: Take 75 mg by mouth daily in the early morning Can take an extra 25 mg if goes into Afib), Disp: 60 tablet, Rfl: 0    Misc Natural Products (OSTEO BI-FLEX JOINT SHIELD PO), Take by mouth  , Disp: , Rfl:     Multiple Vitamins-Minerals (multivitamin with minerals) tablet, Take 1 tablet by mouth daily, Disp: 30 tablet, Rfl: 1    Multiple Vitamins-Minerals (OCUVITE ADULT 50+) CAPS, Take 1 capsule by mouth daily, Disp: , Rfl:     Red Yeast Rice 600 MG TABS, Take 1 tablet by mouth daily , Disp: , Rfl:     XARELTO 20 MG tablet, Take 1 tablet by mouth daily before dinner  , Disp: , Rfl:   Family History   Problem Relation Age of Onset    Breast cancer Mother 46   Diana Lama Arthritis Mother     Cancer Mother     Hearing loss Mother     Vision loss Mother     Aneurysm Father         aortic    No Known Problems Sister     No Known Problems Maternal Grandmother     No Known Problems Maternal Grandfather     No Known Problems Paternal Grandmother     No Known Problems Paternal Grandfather     Rheum arthritis Maternal Aunt     Early death Maternal Aunt     No Known Problems Paternal Aunt     No Known Problems Paternal Aunt     No Known Problems Paternal Aunt       Review of Systems Constitutional: Negative for chills and fever  Respiratory: Negative for shortness of breath  Gastrointestinal: Negative for diarrhea  Skin: Positive for color change  Neurological: Negative for dizziness  Psychiatric/Behavioral: Negative for agitation  Allergies:  Amiodarone, Sudafed [pseudoephedrine], Sulfa antibiotics, and Sulfamethoxazole-trimethoprim      Objective:  /67   Pulse 68   Temp 98 1 °F (36 7 °C)   Resp 20     Physical Exam  Vitals reviewed  Cardiovascular:      Rate and Rhythm: Normal rate  Pulses: Normal pulses  Musculoskeletal:      Right lower leg: Edema present  Left lower leg: Edema present  Comments: Plus one pitting edema bilateral lower extremity  Varicose vein as well as hemosiderin deposit skin changes noted  Wound on the right lower extremity has healed  Skin:     General: Skin is warm  Neurological:      General: No focal deficit present  Mental Status: She is alert     Psychiatric:         Mood and Affect: Mood normal              Wound 04/05/22 Venous Ulcer Ankle Right;Medial (Active)   Wound Image   07/19/22 1356   Wound Description Epithelialization 08/09/22 1300   Leigha-wound Assessment Yellow-brown 08/09/22 1300   Wound Length (cm) 0 cm 08/09/22 1300   Wound Width (cm) 0 cm 08/09/22 1300   Wound Depth (cm) 0 cm 08/09/22 1300   Wound Surface Area (cm^2) 0 cm^2 08/09/22 1300   Wound Volume (cm^3) 0 cm^3 08/09/22 1300   Calculated Wound Volume (cm^3) 0 cm^3 08/09/22 1300   Change in Wound Size % 100 08/09/22 1300   Drainage Amount None 08/09/22 1300   Drainage Description Serous 07/19/22 1356   Non-staged Wound Description Full thickness 07/19/22 1356   Treatments Cleansed 08/09/22 1300   Patient Tolerance Tolerated well 08/09/22 1300                         Procedures             Wound Instructions:  Orders Placed This Encounter   Procedures    Wound cleansing and dressings     Moisturize right lower leg daily and as needed for dry skin  This was done today     Compression Stocking    Remove compression stockings every night and re-apply every morning  Follow daily skin care as instructed  Avoid prolonged standing in one place  Elevate leg(s) above the level of the heart when sitting or as much as possible       Call [address] at [phone] if wound re-opens  Standing Status:   Future     Standing Expiration Date:   8/9/2023         Darcie Rizo DPM      Portions of the record may have been created with voice recognition software  Occasional wrong word or "sound a like" substitutions may have occurred due to the inherent limitations of voice recognition software  Read the chart carefully and recognize, using context, where substitutions have occurred

## 2022-08-19 DIAGNOSIS — E03.9 HYPOTHYROIDISM, UNSPECIFIED TYPE: Primary | ICD-10-CM

## 2022-08-19 RX ORDER — LEVOTHYROXINE SODIUM 0.07 MG/1
75 TABLET ORAL DAILY
Qty: 30 TABLET | Refills: 5 | Status: SHIPPED | OUTPATIENT
Start: 2022-08-19

## 2022-09-29 ENCOUNTER — OFFICE VISIT (OUTPATIENT)
Dept: OBGYN CLINIC | Facility: CLINIC | Age: 72
End: 2022-09-29
Payer: COMMERCIAL

## 2022-09-29 ENCOUNTER — APPOINTMENT (OUTPATIENT)
Dept: RADIOLOGY | Facility: MEDICAL CENTER | Age: 72
End: 2022-09-29
Payer: COMMERCIAL

## 2022-09-29 VITALS
HEIGHT: 62 IN | DIASTOLIC BLOOD PRESSURE: 82 MMHG | SYSTOLIC BLOOD PRESSURE: 135 MMHG | WEIGHT: 179 LBS | BODY MASS INDEX: 32.94 KG/M2 | HEART RATE: 63 BPM

## 2022-09-29 DIAGNOSIS — Z96.652 S/P TOTAL KNEE REPLACEMENT, LEFT: ICD-10-CM

## 2022-09-29 DIAGNOSIS — Z96.652 S/P TOTAL KNEE REPLACEMENT, LEFT: Primary | ICD-10-CM

## 2022-09-29 PROCEDURE — 73562 X-RAY EXAM OF KNEE 3: CPT

## 2022-09-29 PROCEDURE — 3079F DIAST BP 80-89 MM HG: CPT | Performed by: ORTHOPAEDIC SURGERY

## 2022-09-29 PROCEDURE — 99213 OFFICE O/P EST LOW 20 MIN: CPT | Performed by: ORTHOPAEDIC SURGERY

## 2022-09-29 PROCEDURE — 3075F SYST BP GE 130 - 139MM HG: CPT | Performed by: ORTHOPAEDIC SURGERY

## 2022-09-29 NOTE — PROGRESS NOTES
Assessment/Plan:   Diagnoses and all orders for this visit:    S/P total knee replacement, left  -     XR knee 3 vw left non injury; Future    Reviewed today's physical exam findings and x-ray findings with patient at time of visit  Discussed with patient that her exam is as expected at this point postoperatively  Encouraged her to continue HEP for quadriceps and hamstring stretching and strengthening as directed by her PT  educated the patient that residual soreness can last for several months to a year following type of procedure  She will be seen in 6 months for annual follow-up, which time we will repeat x-rays of the left knee  Patient expresses understanding and agreed with treatment plan  Functionally, the patient is doing much better  There is diffuse tenderness along the anterior aspect her knee  No ligament dysfunction  Strength and motion intact  X-rays do show anatomic placement of prosthesis  Would recommend continuation of home exercise program   Encouraged ambulation  Follow-up in 6 months evaluation which be an annual exam with new x-rays of left knee-three views  If her condition changes, she will not hesitate to let us know    Subjective:   Patient ID: Damien Krishnamurthy  1950     HPI  Patient is a 81 y o  female who presents for six-month follow-up evaluation s/p left TKA performed 3/16/2022  Patient states that she is progressing well, however she continues to have anterior knee pain and soreness with high volumes of activity, and states that she still has pain with walking       The following portions of the patient's history were reviewed and updated as appropriate:  Past medical history, past surgical history, Family history, social history, current medications and allergies    Past Medical History:   Diagnosis Date    Allergic     Arthritis     rheumatoid    Atrial fibrillation ([facility] [address]     [facility]     Disease of thyroid gland     D[facility]     HL (hearing loss)     Left Ear-Wear Hearing Aid    Hypertension     Irregular heart beat     Kidney stone     Migraine     hx of    Pleural effusion 2016    Polymyalgia rheumatica (HCC)     "Remission"    Sleep apnea     Mild-Does not require CPAP    Vitamin D deficiency        Past Surgical History:   Procedure Laterality Date    CARDIAC CATHETERIZATION      CARDIOVERSION N/A 2019    Procedure: CARDIOVERSION;  Surgeon: Buffy Dias MD;  [facility];  Service: Cardiology     SECTION      x3 - last impression 16    FRACTURE SURGERY Left     wrist    HERNIA REPAIR  2018    KNEE ARTHROSCOPY Left     Meniscus Tear Repair    KNEE CARTILAGE SURGERY Left     [place] TOTAL KNEE ARTHROPLASTY Left 3/16/2022    Procedure: KNEE TOTAL ARTHROPLASTY;  Surgeon: Wayne Kuhn DO;  [facility];  Service: Orthopedics    TONSILLECTOMY AND ADENOIDECTOMY  child; age 17   [facility] TUBAL LIGATION      VEIN SURGERY Right     venous ligation with stripping       Family History   Problem Relation Age of Onset    Breast cancer Mother 46   [facility] Arthritis Mother     Cancer Mother     Hearing loss Mother     Vision loss Mother     Aneurysm Father         aortic    No Known Problems Sister     No Known Problems Maternal Grandmother     No Known Problems Maternal Grandfather     No Known Problems Paternal Grandmother     No Known Problems Paternal Grandfather     Rheum arthritis Maternal Aunt     Early death Maternal Aunt     No Known Problems Paternal Aunt     No Known Problems Paternal Aunt     No Known Problems Paternal Aunt        Social History     Socioeconomic History    Marital status: /Civil Union     Spouse name: None    Number of children: 3    Years of education: None    Highest education level: None   Occupational History    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   Tobacco Use    Smoking status: Never Smoker    Smokeless tobacco: Never Used   Vaping Use    Vaping Use: Never used   Substance and Sexual Activity    Alcohol use: Yes     Alcohol/week: 0 0 standard drinks     Comment: Socially    Drug use: No    Sexual activity: Yes     Partners: Male     Birth control/protection: Post-menopausal, Female Sterilization   Other Topics Concern    None   Social History Narrative    None     Social Determinants of Health     Financial Resource Strain: Not on file   Food Insecurity: No Food Insecurity    Worried About Running Out of Food in the Last Year: Never true    [place] of Food in the Last Year: Never true   Transportation Needs: No Transportation Needs    Lack of Transportation (Medical): No    Lack of Transportation (Non-Medical):  No   Physical Activity: Not on file   Stress: Not on file   Social Connections: Not on file   Intimate Partner Violence: Not on file   Housing Stability: Low Risk     Unable to Pay for Housing in the Last Year: No    Number of Places Lived in the Last Year: 1    Unstable Housing in the Last Year: No         Current Outpatient Medications:     azelastine (ASTELIN) 0 1 % nasal spray, 2 sprays into each nostril 2 (two) times a day Use in each nostril as directed, Disp: 30 mL, Rfl: 5    Calcium Ascorbate 500 MG TABS, Take 500 mg by mouth daily  , Disp: , Rfl:     cholecalciferol (VITAMIN D3) 1,000 units tablet, Take 1,000 Units by mouth daily  , Disp: , Rfl:     Cinnamon 500 MG capsule, Take 500 mg by mouth daily, Disp: , Rfl:     Cyanocobalamin (VITAMIN B 12 PO), Take 500 mcg by mouth daily , Disp: , Rfl:     levothyroxine 75 mcg tablet, Take 1 tablet (75 mcg total) by mouth daily, Disp: 30 tablet, Rfl: 5    metoprolol succinate (TOPROL-XL) 50 mg 24 hr tablet, Take 1 tablet (50 mg total) by mouth 2 (two) times a day (Patient taking differently: Take 75 mg by mouth daily in the early morning Can take an extra 25 mg if goes into Afib), Disp: 60 tablet, Rfl: 0    Misc Natural Products (OSTEO BI-FLEX JOINT SHIELD PO), Take by mouth  , Disp: , Rfl:     Multiple Vitamins-Minerals (OCUVITE ADULT 50+) CAPS, Take 1 capsule by mouth daily, Disp: , Rfl:     Red Yeast Rice 600 MG TABS, Take 1 tablet by mouth daily , Disp: , Rfl:     XARELTO 20 MG tablet, Take 1 tablet by mouth daily before dinner  , Disp: , Rfl:     ascorbic acid (VITAMIN C) 500 MG tablet, Take 1 tablet (500 mg total) by mouth 2 (two) times a day (Patient not taking: Reported on 8/1/2022), Disp: 60 tablet, Rfl: 1    Multiple Vitamins-Minerals (multivitamin with minerals) tablet, Take 1 tablet by mouth daily, Disp: 30 tablet, Rfl: 1    Allergies   Allergen Reactions    Amiodarone Other (See Comments)     Other reaction(s): Other (See Comments)  Reports caused elevated TSH    Sudafed [Pseudoephedrine] Tachycardia     Rapid heart beat    Sulfa Antibiotics Itching and Rash    Sulfamethoxazole-Trimethoprim Itching and Rash       Review of Systems   Constitutional: Negative for fatigue  Gastrointestinal: Negative for nausea  Musculoskeletal:        As noted in HPI   Neurological: Negative for dizziness, weakness, numbness and headaches  All other systems reviewed and are negative         Objective:  /82 (BP Location: Left arm, Patient Position: Sitting, Cuff Size: Standard)   Pulse 63   Ht 5' 2" (1 575 m)   Wt 81 2 kg (179 lb)   BMI 32 74 kg/m²     Ortho Exam  Left knee -   Weight-bearing status:  Full WBAT  Incision(s):  Well-healed  Skin is warm and dry with no signs of erythema, ecchymosis, or infection  No soft tissue swelling or effusion  ROM:  0°-120°  Strength:  4+/5 throughout  Knee is stable to varus and valgus stress  Knee is stable anterior and posterior stress  Calf compartments are soft  - Archie's sign  2+ TP and DP pulses with brisk capillary refill to the toes  Sural, saphenous, tibial, superficial and deep peroneal motor and sensory distributions intact  Sensation light touch intact distally    Physical Exam  Vitals and nursing note reviewed  Constitutional:       General: She is not in acute distress  Appearance: She is well-developed  HENT:      Head: Normocephalic and atraumatic  Eyes:      Conjunctiva/sclera: Conjunctivae normal    Cardiovascular:      Rate and Rhythm: Normal rate  Pulmonary:      Effort: Pulmonary effort is normal    Musculoskeletal:      Cervical back: Neck supple  Skin:     General: Skin is warm and dry  Capillary Refill: Capillary refill takes less than 2 seconds  Neurological:      Mental Status: She is alert and oriented to person, place, and time  Psychiatric:         Mood and Affect: Mood normal          Behavior: Behavior normal         Diagnostic Test Review:  Attending Physician has personally reviewed pertinent imaging in PACS, impression is as follows:    Review of radiographic series taken 9/29/2022 of the left knee shows well-seated total knee prosthesis with maintained alignment no signs of loosening      Procedures   No procedures performed this visit    Scribe Attestation    I,:  Claus Espino am acting as a scribe while in the presence of the attending physician :       I,:  Kathy Kelly, DO personally performed the services described in this documentation    as scribed in my presence :

## 2022-10-12 PROBLEM — Z00.00 MEDICARE ANNUAL WELLNESS VISIT, SUBSEQUENT: Status: RESOLVED | Noted: 2021-07-29 | Resolved: 2022-10-12

## 2022-11-01 ENCOUNTER — HOSPITAL ENCOUNTER (OUTPATIENT)
Dept: ULTRASOUND IMAGING | Facility: HOSPITAL | Age: 72
Discharge: HOME | End: 2022-11-01

## 2022-11-01 DIAGNOSIS — E78.00 HYPERCHOLESTEROLEMIA: ICD-10-CM

## 2022-11-01 DIAGNOSIS — I10 ESSENTIAL HYPERTENSION: ICD-10-CM

## 2022-11-01 DIAGNOSIS — E55.9 VITAMIN D INSUFFICIENCY: ICD-10-CM

## 2022-11-01 DIAGNOSIS — E03.9 ACQUIRED HYPOTHYROIDISM: ICD-10-CM

## 2022-11-01 DIAGNOSIS — E04.1 THYROID NODULE: ICD-10-CM

## 2022-11-07 ENCOUNTER — APPOINTMENT (OUTPATIENT)
Dept: LAB | Facility: CLINIC | Age: 72
End: 2022-11-07

## 2022-11-07 DIAGNOSIS — M06.09 RHEUMATOID ARTHRITIS OF MULTIPLE SITES WITHOUT RHEUMATOID FACTOR (HCC): ICD-10-CM

## 2022-11-07 LAB
25(OH)D3 SERPL-MCNC: 40.8 NG/ML (ref 30–100)
ALBUMIN SERPL BCP-MCNC: 3.4 G/DL (ref 3.5–5)
ALP SERPL-CCNC: 81 U/L (ref 46–116)
ALT SERPL W P-5'-P-CCNC: 22 U/L (ref 12–78)
ANION GAP SERPL CALCULATED.3IONS-SCNC: 5 MMOL/L (ref 4–13)
AST SERPL W P-5'-P-CCNC: 19 U/L (ref 5–45)
BASOPHILS # BLD AUTO: 0.04 THOUSANDS/ÂΜL (ref 0–0.1)
BASOPHILS NFR BLD AUTO: 1 % (ref 0–1)
BILIRUB SERPL-MCNC: 0.77 MG/DL (ref 0.2–1)
BUN SERPL-MCNC: 12 MG/DL (ref 5–25)
CALCIUM ALBUM COR SERPL-MCNC: 10.4 MG/DL (ref 8.3–10.1)
CALCIUM SERPL-MCNC: 9.9 MG/DL (ref 8.3–10.1)
CHLORIDE SERPL-SCNC: 107 MMOL/L (ref 96–108)
CO2 SERPL-SCNC: 27 MMOL/L (ref 21–32)
CREAT SERPL-MCNC: 0.58 MG/DL (ref 0.6–1.3)
CRP SERPL QL: <3 MG/L
EOSINOPHIL # BLD AUTO: 0.11 THOUSAND/ÂΜL (ref 0–0.61)
EOSINOPHIL NFR BLD AUTO: 2 % (ref 0–6)
ERYTHROCYTE [DISTWIDTH] IN BLOOD BY AUTOMATED COUNT: 13.7 % (ref 11.6–15.1)
EST. AVERAGE GLUCOSE BLD GHB EST-MCNC: 108 MG/DL
GFR SERPL CREATININE-BSD FRML MDRD: 93 ML/MIN/1.73SQ M
GLUCOSE P FAST SERPL-MCNC: 87 MG/DL (ref 65–99)
HBA1C MFR BLD: 5.4 %
HCT VFR BLD AUTO: 41 % (ref 34.8–46.1)
HGB BLD-MCNC: 13.2 G/DL (ref 11.5–15.4)
IMM GRANULOCYTES # BLD AUTO: 0.01 THOUSAND/UL (ref 0–0.2)
IMM GRANULOCYTES NFR BLD AUTO: 0 % (ref 0–2)
LYMPHOCYTES # BLD AUTO: 1.52 THOUSANDS/ÂΜL (ref 0.6–4.47)
LYMPHOCYTES NFR BLD AUTO: 30 % (ref 14–44)
MCH RBC QN AUTO: 29.1 PG (ref 26.8–34.3)
MCHC RBC AUTO-ENTMCNC: 32.2 G/DL (ref 31.4–37.4)
MCV RBC AUTO: 91 FL (ref 82–98)
MONOCYTES # BLD AUTO: 0.51 THOUSAND/ÂΜL (ref 0.17–1.22)
MONOCYTES NFR BLD AUTO: 10 % (ref 4–12)
NEUTROPHILS # BLD AUTO: 2.87 THOUSANDS/ÂΜL (ref 1.85–7.62)
NEUTS SEG NFR BLD AUTO: 57 % (ref 43–75)
NRBC BLD AUTO-RTO: 0 /100 WBCS
PHOSPHATE SERPL-MCNC: 2.9 MG/DL (ref 2.3–4.1)
PLATELET # BLD AUTO: 254 THOUSANDS/UL (ref 149–390)
PMV BLD AUTO: 9.5 FL (ref 8.9–12.7)
POTASSIUM SERPL-SCNC: 4 MMOL/L (ref 3.5–5.3)
PROT SERPL-MCNC: 7.3 G/DL (ref 6.4–8.4)
RBC # BLD AUTO: 4.53 MILLION/UL (ref 3.81–5.12)
SODIUM SERPL-SCNC: 139 MMOL/L (ref 135–147)
T4 FREE SERPL-MCNC: 1.15 NG/DL (ref 0.76–1.46)
TSH SERPL DL<=0.05 MIU/L-ACNC: 1.58 UIU/ML (ref 0.45–4.5)
WBC # BLD AUTO: 5.06 THOUSAND/UL (ref 4.31–10.16)

## 2022-11-08 LAB — HBV CORE IGM SER QL: NORMAL

## 2022-11-29 ENCOUNTER — OFFICE VISIT (OUTPATIENT)
Dept: ENDOCRINOLOGY | Facility: HOSPITAL | Age: 72
End: 2022-11-29

## 2022-11-29 VITALS
WEIGHT: 181 LBS | BODY MASS INDEX: 33.31 KG/M2 | SYSTOLIC BLOOD PRESSURE: 122 MMHG | DIASTOLIC BLOOD PRESSURE: 80 MMHG | HEIGHT: 62 IN | HEART RATE: 65 BPM

## 2022-11-29 DIAGNOSIS — E04.1 THYROID NODULE: ICD-10-CM

## 2022-11-29 DIAGNOSIS — E03.9 ACQUIRED HYPOTHYROIDISM: Primary | ICD-10-CM

## 2022-11-29 DIAGNOSIS — E55.9 VITAMIN D INSUFFICIENCY: ICD-10-CM

## 2022-11-29 DIAGNOSIS — E78.00 HYPERCHOLESTEROLEMIA: ICD-10-CM

## 2022-11-29 DIAGNOSIS — I10 ESSENTIAL HYPERTENSION: ICD-10-CM

## 2022-11-29 NOTE — PROGRESS NOTES
Mildred Henry 81 y o  female MRN: [id number]    Encounter: [id number]      Assessment/Plan     Assessment: This is a 81y o -year-old female with  hypothyroidism, hypertension, thyroid nodule and vitamin-D deficiency  Plan:  1   Hypothyroidism:  Her most recent TSH and free T4 are normal at 1 5   She will continue levothyroxine at current dose   Goal for TSH is between 1 0 in 2 0   Check TSH and free T4 prior to next office visit      2   Hypertension:  She is normotensive in the office today  Continue current medication   Check comprehensive metabolic panel prior to next visit      3   Vitamin-D deficiency: Recent level is stable at  40 8   Continue supplementation with 1000 units of vitamin D3 daily      4   Thyroid nodule:  Stable on ultrasound from November 2022  She denies any anterior neck discomfort, dysphagia or dysphonia   Continue surveillance with follow-up ultrasound in November 2023      5  Osteoporosis:  Recent DEXA scan shows osteoporosis in left femoral neck and right wrist   Reviewed treatment options  Discussed the benefits, risks and side effects of Prolia  I did supplemental educational materials at the last visit  She is interested in pursuing therapy with Prolia  Reviewed fall risk and safety  CC: Hypothyroidism follow up    History of Present Illness     HPI:  72 y o  female with history of polymyalgia rheumatica, hypothyroidism, DVT, atrial fibrillation, hyperlipidemia presents for follow up of hypothyroidism  Has had hypothyroidism for many years treated on thyroid hormone replacement   She was hospitalized in summer of 2017 for heart arrhythmia   At that time she received amiodarone and was  discharged home on amiodarone  She was on amiodarone until about September 2017  While on this medication her thyroid hormone requirements went up   Since then she reports cold intolerance and fatigue   She is currently taking levothyroxine 75 mcg daily   She is taking her levothyroxine, consistently, and in the proper manner, by her report   She takes her calcium and other vitamins later in the day  Indira Benitez most recent TSH from November 7, 2022 was 1 580 with a free T4 of 1  15   She also has history of polymyalgia rheumatica and follows closely with her rheumatologist at [place] [address]      Her hypertension is treated with Lasix 20 mg daily and metoprolol 50 mg twice daily      For her vitamin-D deficiency she supplements with 1000 units of vitamin D3 daily   Her most recent 25 hydroxy vitamin-D level from November 7, 2022 is 40 8      Her most recent thyroid ultrasound from November 1, 2022 reveals a heterogeneously atrophic thyroid gland  No significant change in the nodule in the left lobe of the thyroid gland which does not meet ACR criteria for follow-up ultrasound or biopsy  Review of Systems   Constitutional: Negative  Negative for chills, fatigue and fever  HENT: Negative  Negative for trouble swallowing and voice change  Eyes: Negative  Negative for photophobia, pain, discharge, redness, itching and visual disturbance  Respiratory: Negative  Negative for chest tightness and shortness of breath  Cardiovascular: Negative  Negative for chest pain  Gastrointestinal: Negative  Negative for abdominal pain, constipation, diarrhea and vomiting  Endocrine: Negative for cold intolerance, heat intolerance, polydipsia, polyphagia and polyuria  Genitourinary: Negative  Musculoskeletal: Positive for arthralgias (Bilateral knees)  Skin: Negative  Allergic/Immunologic: Negative  Neurological: Negative  Negative for dizziness, syncope, light-headedness and headaches  Hematological: Negative  Psychiatric/Behavioral: Negative  All other systems reviewed and are negative        Historical Information   Past Medical History:   Diagnosis Date   • Allergic    • Arthritis     rheumatoid   • Atrial fibrillation (HCC)    • CHF (congestive heart failure) [facility])    • Disease of thyroid gland    • [facility]    • HL (hearing loss)     Left Ear-Wear Hearing Aid   • Hypertension    • Irregular heart beat    • Kidney stone    • Migraine     hx of   • Pleural effusion    • Polymyalgia rheumatica ([facility] )     "Remission"   • Sleep apnea     Mild-Does not require CPAP   • Vitamin D deficiency      Past Surgical History:   Procedure Laterality Date   • CARDIAC CATHETERIZATION     • CARDIOVERSION N/A 2019    Procedure: CARDIOVERSION;  Surgeon: Kane Gallegos MD;  [facility];  Service: Cardiology   •  SECTION      x3 - last impression 16   • FRACTURE SURGERY Left     wrist   • HERNIA REPAIR  2018   • KNEE ARTHROSCOPY Left     Meniscus Tear Repair   • KNEE CARTILAGE SURGERY Left    • [place] TOTAL KNEE ARTHROPLASTY Left 3/16/2022    Procedure: KNEE TOTAL ARTHROPLASTY;  Surgeon: Sara Carrion DO;  [facility];  Service: Orthopedics   • TONSILLECTOMY AND ADENOIDECTOMY  child; age 17   • TUBAL LIGATION     • VEIN SURGERY Right     venous ligation with stripping     Social History   Social History     Substance and Sexual Activity   Alcohol Use Yes   • Alcohol/week: 1 0 standard drink   • Types: 1 Glasses of wine per week    Comment: Socially     Social History     Substance and Sexual Activity   Drug Use No     Social History     Tobacco Use   Smoking Status Never   Smokeless Tobacco Never     Family History:   Family History   Problem Relation Age of Onset   • Breast cancer Mother 46   • Arthritis Mother    • Cancer Mother    • Hearing loss Mother    • Vision loss Mother    • Aneurysm Father         aortic   • No Known Problems Sister    • No Known Problems Maternal Grandmother    • No Known Problems Maternal Grandfather    • No Known Problems Paternal Grandmother    • No Known Problems Paternal Grandfather    • Rheum arthritis Maternal Aunt    • Early death Maternal Aunt    • No Known Problems Paternal Aunt • No Known Problems Paternal Aunt    • No Known Problems Paternal Aunt        Meds/Allergies   Current Outpatient Medications   Medication Sig Dispense Refill   • ascorbic acid (VITAMIN C) 500 MG tablet Take 1 tablet (500 mg total) by mouth 2 (two) times a day (Patient not taking: Reported on 8/1/2022) 60 tablet 1   • azelastine (ASTELIN) 0 1 % nasal spray 2 sprays into each nostril 2 (two) times a day Use in each nostril as directed 30 mL 5   • Calcium Ascorbate 500 MG TABS Take 500 mg by mouth daily       • cholecalciferol (VITAMIN D3) 1,000 units tablet Take 1,000 Units by mouth daily       • Cinnamon 500 MG capsule Take 500 mg by mouth daily     • Cyanocobalamin (VITAMIN B 12 PO) Take 500 mcg by mouth daily      • levothyroxine 75 mcg tablet Take 1 tablet (75 mcg total) by mouth daily 30 tablet 5   • metoprolol succinate (TOPROL-XL) 50 mg 24 hr tablet Take 1 tablet (50 mg total) by mouth 2 (two) times a day (Patient taking differently: Take 75 mg by mouth daily in the early morning Can take an extra 25 mg if goes into Afib) 60 tablet 0   • Misc Natural Products (OSTEO BI-FLEX JOINT SHIELD PO) Take by mouth       • Multiple Vitamins-Minerals (multivitamin with minerals) tablet Take 1 tablet by mouth daily 30 tablet 1   • Multiple Vitamins-Minerals (OCUVITE ADULT 50+) CAPS Take 1 capsule by mouth daily     • Red Yeast Rice 600 MG TABS Take 1 tablet by mouth daily      • XARELTO 20 MG tablet Take 1 tablet by mouth daily before dinner         No current facility-administered medications for this visit  Allergies   Allergen Reactions   • Amiodarone Other (See Comments)     Other reaction(s): Other (See Comments)  Reports caused elevated TSH   • Sudafed [Pseudoephedrine] Tachycardia     Rapid heart beat   • Sulfa Antibiotics Itching and Rash   • Sulfamethoxazole-Trimethoprim Itching and Rash       Objective   Vitals: not currently breastfeeding  Physical Exam  Vitals reviewed     Constitutional: Appearance: She is well-developed and well-nourished  She is obese  HENT:      Head: Normocephalic and atraumatic  Mouth/Throat:      Mouth: Oropharynx is clear and moist    Eyes:      Extraocular Movements: EOM normal       Conjunctiva/sclera: Conjunctivae normal       Pupils: Pupils are equal, round, and reactive to light  Cardiovascular:      Rate and Rhythm: Normal rate and regular rhythm  Pulses: Intact distal pulses  Heart sounds: Normal heart sounds  Pulmonary:      Effort: Pulmonary effort is normal       Breath sounds: Normal breath sounds  Abdominal:      General: Bowel sounds are normal       Palpations: Abdomen is soft  Musculoskeletal:         General: Normal range of motion  Cervical back: Normal range of motion and neck supple  Skin:     General: Skin is warm and dry  Neurological:      Mental Status: She is alert and oriented to person, place, and time  Psychiatric:         Mood and Affect: Mood and affect normal          Behavior: Behavior normal          Thought Content: Thought content normal          Judgment: Judgment normal        Lab Results:   Lab Results   Component Value Date/Time    TSH 3RD GENERATON 1 580 11/07/2022 10:04 AM    TSH 3RD GENERATON 0 794 07/19/2022 10:47 AM    TSH 3RD GENERATON 1 060 05/23/2022 09:38 AM    Free T4 1 15 11/07/2022 10:04 AM    Free T4 1 15 05/23/2022 09:38 AM       Imaging Studies:   Results for orders placed during the hospital encounter of 11/01/22    US thyroid    Impression  Heterogeneously atrophic thyroid gland  No significant change in the nodule in the left lobe of the thyroid gland which does not meet ACR criteria for follow-up ultrasound or biopsy  Reference: ACR Thyroid Imaging, Reporting and Data System (TI-RADS): White Paper of the [facility] [facility]   J AM Cherelle Radiol 6039;11:[id number]  (additional recommendations based on American Thyroid Association 2015 guidelines )      Workstation performed: [id number]    Portions of the record may have been created with voice recognition software  Occasional wrong word or "sound a like" substitutions may have occurred due to the inherent limitations of voice recognition software  Read the chart carefully and recognize, using context, where substitutions have occurred

## 2022-11-29 NOTE — PATIENT INSTRUCTIONS
Continue Levothyroxine at 75 mcg daily  Continue with Calcium and Vitamin D supplementation daily  Will continue to monitor calcium levels  You will obtain a repeat thyroid ultrasound in November 2023  Obtain lab work as prescribed  We will start the process for Prolia

## 2022-11-30 ENCOUNTER — DOCUMENTATION (OUTPATIENT)
Dept: ENDOCRINOLOGY | Facility: HOSPITAL | Age: 72
End: 2022-11-30

## 2022-12-16 ENCOUNTER — TELEPHONE (OUTPATIENT)
Dept: ENDOCRINOLOGY | Facility: HOSPITAL | Age: 72
End: 2022-12-16

## 2022-12-16 NOTE — TELEPHONE ENCOUNTER
Patient is coming in Monday for her first Prolia  Is her CMP from 11/7 still good for this injection?

## 2022-12-19 ENCOUNTER — CLINICAL SUPPORT (OUTPATIENT)
Dept: ENDOCRINOLOGY | Facility: HOSPITAL | Age: 72
End: 2022-12-19

## 2022-12-19 DIAGNOSIS — M81.0 OSTEOPOROSIS WITHOUT CURRENT PATHOLOGICAL FRACTURE, UNSPECIFIED OSTEOPOROSIS TYPE: Primary | ICD-10-CM

## 2022-12-19 NOTE — PROGRESS NOTES
Assessment/Plan:    Varinder Puente came into the [address] [place] [address]'s Endocrinology Office today 12/19/22 to receive her 1st Prolia injection  Verbal consent obtained  Consent given by: patient    patient states patient has been medically healthy with no underlining concerns/complications  Varinder Puente presents with no symptoms today  All insturctions were reviewed with the patient  If the patient should have any questions/concerns, advised patient to contacted [facility]  Subjective:     History provided by: patient    Patient ID: Varinder Puente is a 81 y o  female      Objective: There were no vitals filed for this visit  Patient tolerated the injection well without any complications  Injection site/s Left Arm  Medication was provided by the office  Patient signed consent form yes   Patient signed Mukul Dallas form yes (If no patient is not a medicare patient)  Patient waited 15 minutes after injection yes (This only applies to patient's receiving first time injection)         Last Visit: 12/16/2022  Next visit:Visit date not found

## 2023-01-23 ENCOUNTER — ANNUAL EXAM (OUTPATIENT)
Dept: OBGYN CLINIC | Facility: MEDICAL CENTER | Age: 73
End: 2023-01-23

## 2023-01-23 VITALS
BODY MASS INDEX: 32.94 KG/M2 | SYSTOLIC BLOOD PRESSURE: 135 MMHG | HEIGHT: 62 IN | WEIGHT: 179 LBS | DIASTOLIC BLOOD PRESSURE: 80 MMHG

## 2023-01-23 DIAGNOSIS — Z01.419 ENCOUNTER FOR WELL WOMAN EXAM WITH ROUTINE GYNECOLOGICAL EXAM: Primary | ICD-10-CM

## 2023-01-23 NOTE — PROGRESS NOTES
[facility] [address], [address], [place], [place]    ASSESSMENT/PLAN: Rayleen Sicard is a 81 y o  [id number] who presents for annual gynecologic exam   1  Routine well woman exam completed today  2  Cervical Cancer Screening: Current ASCCP Guidelines reviewed  Last Pap: 2022  Pap not done today every 2 years  3  Beast cancer screening: due 12/2020  4  Colon cancer screening, done 2012  Bone density screening: on Prolia per endocrinology   CC:  Annual Gynecologic Examination    HPI: Rayleen Sicard is a 81 y o  [id number] who presents for annual gynecologic examination  No GYN complaints, doing well    Gynecologic Exam    No GYN copmlaints; doing well    The following portions of the patient's history were reviewed and updated as appropriate: allergies, current medications, past family history, past medical history, obstetric history, gynecologic history, past social history, past surgical history and problem list     Review of Systems   Constitutional: Negative  HENT: Negative  Eyes: Negative  Respiratory: Negative  Cardiovascular: Negative  Gastrointestinal: Negative  Genitourinary: Negative  Musculoskeletal: Negative  All other systems reviewed and are negative  Objective:  /80 (BP Location: Left arm)   Ht 5' 2" (1 575 m)   Wt 81 2 kg (179 lb)   BMI 32 74 kg/m²    Physical Exam  Vitals reviewed  Constitutional:       General: She is not in acute distress  Appearance: She is well-developed  HENT:      Head: Normocephalic and atraumatic  Nose: Nose normal    Cardiovascular:      Rate and Rhythm: Normal rate  Pulmonary:      Effort: Pulmonary effort is normal  No respiratory distress  Chest:   Breasts:     Breasts are symmetrical       Right: Normal  No mass, nipple discharge, skin change or tenderness  Left: Normal  No mass, nipple discharge, skin change or tenderness  Abdominal:      General: There is no distension  Palpations: Abdomen is soft  There is no mass  Tenderness: There is no abdominal tenderness  There is no guarding or rebound  Genitourinary:     General: Normal vulva  Exam position: Lithotomy position  Labia:         Right: No lesion  Left: No lesion  Urethra: No prolapse (urethral meatus normal)  Vagina: Normal  No vaginal discharge, erythema or bleeding  Cervix: Normal       Uterus: Normal        Adnexa: Right adnexa normal and left adnexa normal       Comments: Pap done   Musculoskeletal:         General: Normal range of motion  Cervical back: Normal range of motion  Lymphadenopathy:      Upper Body:      Right upper body: No supraclavicular, axillary or pectoral adenopathy  Left upper body: No supraclavicular, axillary or pectoral adenopathy  Lower Body: No right inguinal adenopathy  No left inguinal adenopathy  Skin:     General: Skin is warm and dry  Neurological:      Mental Status: She is alert and oriented to person, place, and time  Psychiatric:         Behavior: Behavior normal          Thought Content:  Thought content normal          Judgment: Judgment normal

## 2023-01-24 ENCOUNTER — HOSPITAL ENCOUNTER (OUTPATIENT)
Dept: MAMMOGRAPHY | Facility: HOSPITAL | Age: 73
Discharge: HOME | End: 2023-01-24
Attending: OBSTETRICS & GYNECOLOGY

## 2023-01-24 VITALS — HEIGHT: 62 IN | WEIGHT: 179.01 LBS | BODY MASS INDEX: 32.94 KG/M2

## 2023-01-24 DIAGNOSIS — Z12.31 ENCOUNTER FOR SCREENING MAMMOGRAM FOR MALIGNANT NEOPLASM OF BREAST: ICD-10-CM

## 2023-01-26 ENCOUNTER — RA CDI HCC (OUTPATIENT)
Dept: OTHER | Facility: HOSPITAL | Age: 73
End: 2023-01-26

## 2023-01-26 NOTE — PROGRESS NOTES
[facility]  coding opportunities          Chart Reviewed number of suggestions sent to Provider: 2  I11 0  I47 1    Patients Insurance     Medicare Insurance: [facility] [employer]

## 2023-01-30 ENCOUNTER — APPOINTMENT (OUTPATIENT)
Dept: LAB | Facility: CLINIC | Age: 73
End: 2023-01-30

## 2023-01-30 DIAGNOSIS — E03.9 ACQUIRED HYPOTHYROIDISM: ICD-10-CM

## 2023-01-30 LAB — TSH SERPL DL<=0.05 MIU/L-ACNC: 1.33 UIU/ML (ref 0.45–4.5)

## 2023-02-02 ENCOUNTER — OFFICE VISIT (OUTPATIENT)
Dept: FAMILY MEDICINE CLINIC | Facility: CLINIC | Age: 73
End: 2023-02-02

## 2023-02-02 VITALS
HEART RATE: 52 BPM | HEIGHT: 62 IN | WEIGHT: 182 LBS | DIASTOLIC BLOOD PRESSURE: 70 MMHG | BODY MASS INDEX: 33.49 KG/M2 | OXYGEN SATURATION: 97 % | TEMPERATURE: 97.7 F | SYSTOLIC BLOOD PRESSURE: 130 MMHG

## 2023-02-02 DIAGNOSIS — E03.9 ACQUIRED HYPOTHYROIDISM: Primary | Chronic | ICD-10-CM

## 2023-02-02 DIAGNOSIS — E78.00 HYPERCHOLESTEROLEMIA: ICD-10-CM

## 2023-02-02 DIAGNOSIS — I10 ESSENTIAL HYPERTENSION: ICD-10-CM

## 2023-02-02 DIAGNOSIS — E66.09 CLASS 1 OBESITY DUE TO EXCESS CALORIES WITH SERIOUS COMORBIDITY AND BODY MASS INDEX (BMI) OF 32.0 TO 32.9 IN ADULT: ICD-10-CM

## 2023-02-02 DIAGNOSIS — M06.00 SERONEGATIVE RHEUMATOID ARTHRITIS (HCC): ICD-10-CM

## 2023-02-02 NOTE — PROGRESS NOTES
Assessment/Plan:    No problem-specific Assessment & Plan notes found for this encounter  Diagnoses and all orders for this visit:    Acquired hypothyroidism  -     TSH, 3rd generation with Free T4 reflex; Future    Essential hypertension  -     CBC and differential; Future    Class 1 obesity due to excess calories with serious comorbidity and body mass index (BMI) of 32 0 to 32 9 in adult  Comments:  pt counseled on diet and exercise    Hypercholesterolemia  -     Comprehensive metabolic panel; Future  -     Lipid panel; Future    Seronegative rheumatoid arthritis ([facility] [address] )          PHQ-2/9 Depression Screening    Little interest or pleasure in doing things: 0 - not at all  Feeling down, depressed, or hopeless: 0 - not at all  PHQ-2 Score: 0  PHQ-2 Interpretation: Negative depression screen            Subjective:      Patient ID: Heidi Ruiz is a 81 y o  female  Thyroid Problem  Presents for follow-up visit  Patient reports no cold intolerance, constipation, depressed mood, diarrhea, fatigue, heat intolerance or palpitations  The following portions of the patient's history were reviewed and updated as appropriate: allergies, current medications, past family history, past medical history, past social history, past surgical history and problem list     Review of Systems   Constitutional: Negative for fatigue  Cardiovascular: Negative for palpitations  Gastrointestinal: Negative for constipation and diarrhea  Endocrine: Negative for cold intolerance and heat intolerance  Objective:    /70   Pulse (!) 52   Temp 97 7 °F (36 5 °C) (Tympanic)   Ht 5' 2" (1 575 m)   Wt 82 6 kg (182 lb)   SpO2 97%   BMI 33 29 kg/m²      Physical Exam  Vitals and nursing note reviewed  Constitutional:       General: She is not in acute distress  Appearance: Normal appearance  She is not ill-appearing, toxic-appearing or diaphoretic  HENT:      Head: Normocephalic and atraumatic     Eyes: Conjunctiva/sclera: Conjunctivae normal    Cardiovascular:      Rate and Rhythm: Normal rate and regular rhythm  Heart sounds: Normal heart sounds  No murmur heard  Pulmonary:      Effort: Pulmonary effort is normal  No respiratory distress  Breath sounds: Normal breath sounds  No wheezing, rhonchi or rales  Musculoskeletal:      Cervical back: Normal range of motion and neck supple  No rigidity  Right lower leg: No edema  Left lower leg: No edema  Lymphadenopathy:      Cervical: No cervical adenopathy  Neurological:      Mental Status: She is alert and oriented to person, place, and time  Psychiatric:         Mood and Affect: Mood normal          Behavior: Behavior normal          Thought Content:  Thought content normal          Judgment: Judgment normal

## 2023-02-03 DIAGNOSIS — E03.9 HYPOTHYROIDISM, UNSPECIFIED TYPE: ICD-10-CM

## 2023-02-03 RX ORDER — LEVOTHYROXINE SODIUM 0.07 MG/1
TABLET ORAL
Qty: 30 TABLET | Refills: 5 | Status: SHIPPED | OUTPATIENT
Start: 2023-02-03

## 2023-02-22 NOTE — MISCELLANEOUS
Message  CALL FROM [facility] VASCULAR [facility] AND IT IS CONFIRMED THAT ADIN HAS A DVT  SPOKE WITH DR Kaushal Parsons AND HE ASKED FOR PT TO BE ADMITTED TO THE HOSPITAL  ADIN HAS BEEN INSTRUCTED TO GO TO THE ED TO BE ADMITTED FOR TREATMENT  Active Problems    1  Colon cancer screening (V76 51) (Z12 11)   2  Diffuse arthralgia (719 40) (M25 50)   3  Edema of right lower extremity (782 3) (R60 0)   4  Encounter for mammogram to establish baseline mammogram (V76 12) (Z12 31)   5  Essential hypertension (401 9) (I10)   6  Hypercholesterolemia (272 0) (E78 00)   7  Hyperthyroidism (242 90) (E05 90)   8  Hypothyroidism (244 9) (E03 9)   9  Kidney stones (592 0) (N20 0)   10  Need for influenza vaccination (V04 81) (Z23)   11  Screening for cervical cancer (V76 2) (Z12 4)   12  Screening for colon cancer (V76 51) (Z12 11)   13  UTI (urinary tract infection) (599 0) (N39 0)    Current Meds   1  Calcium 600 600 MG Oral Tablet; Therapy: (Recorded:14Qfv1771) to Recorded   2  Cinnamon 500 MG Oral Capsule; Therapy: (Recorded:19Ubl7780) to Recorded   3  Flonase 50 MCG/ACT SUSP; Therapy: (Recorded:02Zai6671) to Recorded   4  Levothyroxine Sodium 50 MCG Oral Tablet; take one tablet by mouth every day; Therapy: 29DUE6056 to ([phone])  Requested for: 03VND5097; Last   Rx:03Wqx3663 Ordered   5  Multi-Vitamin TABS; Therapy: (Recorded:50Nkb6814) to Recorded   6  Ocuvite Oral Tablet; Therapy: (Recorded:83Xuj1661) to Recorded   7  Omega-3 1000 MG Oral Capsule; Therapy: (Recorded:14Cau1801) to Recorded   8  PredniSONE 10 MG Oral Tablet; TAKE 4 TABLETS DAILY FOR 3 DAYS,3 TABLETS   DAILY FOR 3 DAYS, 2 TABLETS DAILY FOR 3 DAYS AND 1 TABLET DAILY FOR   3 DAYS, THEN STOP; Therapy: 08FZQ3690 to (Last Rx:27Hns9457)  Requested for: 36FLI4191 Ordered   9  Red Yeast Rice CAPS; Therapy: (Recorded:65Vys8309) to Recorded   10  Vitamin C 100 MG Oral Tablet; Therapy: (Recorded:89Nbz9710) to Recorded    Allergies    1  Subjective   Patient ID: Rosenda is a 30 year old female.    Chief Complaint   Patient presents with   • Urgent Care Follow Up     Patient states she went to urgent care in [place] on the 02/11/2023 for shortness of breath, chest pain, and fatigue. Patient states all her tests was negative but she just develop this cough since the 2/15/2023.     Still having sob and cold symptoms. Will continue to use inhaler as had not been doing so. Unsure if febrile in past but symptoms are very troublesome. Pt also need bc  patches. Covid tests were all negative.         Patient Active Problem List   Diagnosis   • Encounter to establish care   • Obesity (BMI 30.0-34.9)       MEDICATIONS:  Current Outpatient Medications   Medication Sig   • azithromycin (ZITHROMAX) 250 MG tablet TAKE 2 TABLETS BY MOUTH DAY 1 AND 1 TABLET BY MOUTH DAYS 2-5   • norelgestromin-ethinyl estradiol 150-35 MCG/24HR patch Place 1 patch onto the skin 1 day a week.   • albuterol 108 (90 Base) MCG/ACT inhaler Inhale 2 puffs into the lungs every 4 hours as needed for Shortness of Breath.   • benzonatate (TESSALON PERLES) 100 MG capsule Take 1 capsule by mouth 3 times daily as needed for Cough.   • ergocalciferol (DRISDOL) 1.25 mg (50,000 units) capsule Take 1 capsule by mouth 1 day a week.     No current facility-administered medications for this visit.       ALLERGIES:  ALLERGIES:   Allergen Reactions   • Honey Bee Venom Other (See Comments), RASH and SWELLING         SOCIAL HISTORY:  Social History     Tobacco Use   • Smoking status: Never   • Smokeless tobacco: Never   Vaping Use   • Vaping Use: never used   Substance Use Topics   • Alcohol use: Never   • Drug use: Never         Review of Systems   Constitutional: Negative for activity change.   HENT: Positive for congestion and rhinorrhea.    Respiratory: Positive for cough and shortness of breath.    All other systems reviewed and are negative.      Objective   Physical Exam  Vitals reviewed.    Constitutional:       Appearance: Normal appearance. She is obese.   HENT:      Head: Normocephalic.      Nose: Congestion present.   Cardiovascular:      Rate and Rhythm: Normal rate and regular rhythm.      Pulses: Normal pulses.      Heart sounds: Normal heart sounds.   Pulmonary:      Effort: Pulmonary effort is normal.      Breath sounds: Normal breath sounds.   Neurological:      Mental Status: She is alert.       Visit Vitals  /78 (BP Location: LUE - Left upper extremity, Patient Position: Sitting, Cuff Size: Regular)   Pulse 96   Temp 98 °F (36.7 °C) (Tympanic)   Resp 16   Ht 5' 8\" (1.727 m)   Wt 100.4 kg (221 lb 7.2 oz)   LMP 02/06/2023 (Approximate)   SpO2 98%   BMI 33.67 kg/m²       Assessment   Problem List Items Addressed This Visit    None  Visit Diagnoses     Respiratory infection    -  Primary    Relevant Medications    azithromycin (ZITHROMAX) 250 MG tablet    Encounter for female birth control        Relevant Medications    norelgestromin-ethinyl estradiol 150-35 MCG/24HR patch          Reviewed COVID guidelines.  Advised to avoid contact with others and use caution when in public places. Avoid crowds, wash hands frequently, and wear a mask when in public.    Patient was advised to call if they experience any new or worsening symptoms.    Return for As needed care .   Bactrim   2   Mevacor    Signatures   Electronically signed by : Kristen De La Fuente, ; Prabhjot 10 2017  1:48PM EST                       (Author)

## 2023-04-06 ENCOUNTER — APPOINTMENT (OUTPATIENT)
Dept: RADIOLOGY | Facility: MEDICAL CENTER | Age: 73
End: 2023-04-06

## 2023-04-06 ENCOUNTER — OFFICE VISIT (OUTPATIENT)
Dept: OBGYN CLINIC | Facility: CLINIC | Age: 73
End: 2023-04-06

## 2023-04-06 VITALS — HEIGHT: 62 IN | WEIGHT: 183 LBS | BODY MASS INDEX: 33.68 KG/M2

## 2023-04-06 DIAGNOSIS — Z96.652 S/P TOTAL KNEE REPLACEMENT, LEFT: ICD-10-CM

## 2023-04-06 DIAGNOSIS — Z96.652 S/P TOTAL KNEE REPLACEMENT, LEFT: Primary | ICD-10-CM

## 2023-04-06 NOTE — PROGRESS NOTES
"Assessment/Plan:   Diagnoses and all orders for this visit:    S/P total knee replacement, left  -     XR knee 3 vw left non injury; Future       Reviewed physical exam with patient on today's visit  She is progressing as expected, post-operatively  Continue weightbearing activity and light physical activity as tolerated  She will follow-up in 1 year for strength and range of motion check  The patient expresses understanding and is in agreement with today's treatment plan  The patient is doing quite well from her left total knee replacement from 1 year ago  There is full strength full motion  Continue home exercise program   Follow-up 1 year for evaluation with new x-rays of left knee-3 views  If her condition changes, she will not hesitate to let us know     Subjective:   Patient ID: Dana Valdez  1950     HPI  Patient is a 81 y o  female who presents for follow-up evaluation of her left knee  She underwent a TKA on her left knee on 3/16/2022  The patient expresses that she progressed well  She has resumed activities of daily living, including leisure walks and travel  She reports no pain or range of motion limitations  She denies any numbness, tingling, or weakness      The following portions of the patient's history were reviewed and updated as appropriate:  Past medical history, past surgical history, Family history, social history, current medications and allergies    Past Medical History:   Diagnosis Date   • Allergic    • Arthritis     rheumatoid   • Atrial fibrillation ([facility] [address] )    • CHF (congestive heart failure) ([facility] [address] )    • Disease of thyroid gland    • DVT (deep venous thrombosis) ([facility] [address] ) 2015   • HL (hearing loss)     Left Ear-Wear Hearing Aid   • Hypertension    • Irregular heart beat    • Kidney stone    • Migraine     hx of   • Pleural effusion 2016   • Polymyalgia rheumatica ([facility] [address] )     \"Remission\"   • Sleep apnea     Mild-Does not require CPAP   • Vitamin D deficiency        Past Surgical " History:   Procedure Laterality Date   • CARDIAC CATHETERIZATION     • CARDIOVERSION N/A 2019    Procedure: CARDIOVERSION;  Surgeon: Rubi Camargo MD;  Location: [facility];  Service: Cardiology   •  SECTION      x3 - last impression 16   • FRACTURE SURGERY Left     wrist   • HERNIA REPAIR  2018   • KNEE ARTHROSCOPY Left     Meniscus Tear Repair   • KNEE CARTILAGE SURGERY Left    • IA ARTHRP KNE CONDYLE&PLATU MEDIAL&LAT COMPARTMENTS Left 3/16/2022    Procedure: KNEE TOTAL ARTHROPLASTY;  Surgeon: Janet Peterson DO;  Location: [facility];  Service: Orthopedics   • TONSILLECTOMY AND ADENOIDECTOMY  child; age 17   • TUBAL LIGATION     • VEIN SURGERY Right     venous ligation with stripping       Family History   Problem Relation Age of Onset   • Breast cancer Mother 46   • Arthritis Mother    • Cancer Mother    • Hearing loss Mother    • Vision loss Mother    • Aneurysm Father         aortic   • No Known Problems Sister    • No Known Problems Maternal Grandmother    • No Known Problems Maternal Grandfather    • No Known Problems Paternal Grandmother    • No Known Problems Paternal Grandfather    • Rheum arthritis Maternal Aunt    • Early death Maternal Aunt    • No Known Problems Paternal Aunt    • No Known Problems Paternal Aunt    • No Known Problems Paternal Aunt        Social History     Socioeconomic History   • Marital status: /Civil Union     Spouse name: None   • Number of children: 3   • Years of education: None   • Highest education level: None   Occupational History   • Occupation: Manager     Comment: [employer]   Tobacco Use   • Smoking status: Never   • Smokeless tobacco: Never   Vaping Use   • Vaping Use: Never used   Substance and Sexual Activity   • Alcohol use:  Yes     Alcohol/week: 1 0 standard drink     Types: 1 Glasses of wine per week     Comment: Socially   • Drug use: No   • Sexual activity: Yes     Partners: Male     Birth control/protection: Post-menopausal, Female Sterilization   Other Topics Concern   • None   Social History Narrative   • None     Social Determinants of Health     Financial Resource Strain: Not on file   Food Insecurity: Not on file   Transportation Needs: Not on file   Physical Activity: Not on file   Stress: Not on file   Social Connections: Not on file   Intimate Partner Violence: Not on file   Housing Stability: Not on file         Current Outpatient Medications:   •  Azelastine HCl 137 MCG/SPRAY SOLN, SPRAY 2 SPRAYS INTO EACH NOSTRIL 2 TIMES A DAY USE IN EACH NOSTRIL AS DIRECTED, Disp: 30 mL, Rfl: 5  •  Calcium Ascorbate 500 MG TABS, Take 500 mg by mouth daily  , Disp: , Rfl:   •  cholecalciferol (VITAMIN D3) 1,000 units tablet, Take 1,000 Units by mouth daily  , Disp: , Rfl:   •  Cinnamon 500 MG capsule, Take 500 mg by mouth daily, Disp: , Rfl:   •  Cyanocobalamin (VITAMIN B 12 PO), Take 500 mcg by mouth daily , Disp: , Rfl:   •  levothyroxine 75 mcg tablet, TAKE 1 TABLET BY MOUTH EVERY DAY, Disp: 30 tablet, Rfl: 5  •  metoprolol succinate (TOPROL-XL) 50 mg 24 hr tablet, Take 1 tablet (50 mg total) by mouth 2 (two) times a day (Patient taking differently: Take 75 mg by mouth daily in the early morning Can take an extra 25 mg if goes into Afib), Disp: 60 tablet, Rfl: 0  •  Misc Natural Products (OSTEO BI-FLEX JOINT SHIELD PO), Take by mouth  , Disp: , Rfl:   •  Multiple Vitamins-Minerals (OCUVITE ADULT 50+) CAPS, Take 1 capsule by mouth daily, Disp: , Rfl:   •  XARELTO 20 MG tablet, Take 1 tablet by mouth daily before dinner  , Disp: , Rfl:   •  Multiple Vitamins-Minerals (multivitamin with minerals) tablet, Take 1 tablet by mouth daily, Disp: 30 tablet, Rfl: 1  •  Red Yeast Rice 600 MG TABS, Take 1 tablet by mouth daily , Disp: , Rfl:     Current Facility-Administered Medications:   •  denosumab (PROLIA) subcutaneous injection 60 mg, 60 mg, Subcutaneous, Q6 Months, Evie Larios MD, 60 mg at 12/19/22 1036    Allergies   Allergen "Reactions   • Amiodarone Other (See Comments)     Other reaction(s): Other (See Comments)  Reports caused elevated TSH   • Sudafed [Pseudoephedrine] Tachycardia     Rapid heart beat   • Sulfa Antibiotics Itching and Rash   • Sulfamethoxazole-Trimethoprim Itching and Rash       Review of Systems   Constitutional: Negative for chills, fever and unexpected weight change  HENT: Negative for hearing loss, nosebleeds and sore throat  Eyes: Negative for pain, redness and visual disturbance  Respiratory: Negative for cough, shortness of breath and wheezing  Cardiovascular: Negative for chest pain, palpitations and leg swelling  Gastrointestinal: Negative for abdominal pain, nausea and vomiting  Endocrine: Negative for polydipsia and polyuria  Genitourinary: Negative for dysuria and hematuria  Skin: Negative for rash and wound  Neurological: Negative for dizziness, numbness and headaches  Psychiatric/Behavioral: Negative for decreased concentration and suicidal ideas  The patient is not nervous/anxious  All other systems reviewed and are negative         Objective:  Ht 5' 2\" (1 575 m)   Wt 83 kg (183 lb)   BMI 33 47 kg/m²     Ortho Exam  left knee(s) -   Patient ambulates with steady gait pattern  Uses No assistive device  No anatomical deformity  Skin is warm and dry to touch with no signs of erythema, ecchymosis, or infection   No soft tissue swelling or effusion noted  ROM (5° - 120°)   MMT: 5/5 throughout  No tenderness to palpation on exam  Flexor and extensor mechanisms are intact   Knee is stable to varus and valgus stress  Knee is stable to anterior and posterior stress  Patella tracks centrally without palpable crepitus  Calf compartments are soft and supple  - Archie's sign  2+ DP and PT pulses with brisk capillary refill to the toes  Sural, saphenous, tibial, superficial, and deep peroneal motor and sensory distributions intact  Sensation light touch intact distally      Physical " Exam  HENT:      Head: Normocephalic and atraumatic  Nose: Nose normal    Eyes:      Conjunctiva/sclera: Conjunctivae normal    Cardiovascular:      Rate and Rhythm: Normal rate  Pulmonary:      Effort: Pulmonary effort is normal    Musculoskeletal:      Cervical back: Neck supple  Skin:     General: Skin is warm and dry  Capillary Refill: Capillary refill takes less than 2 seconds  Neurological:      Mental Status: She is alert and oriented to person, place, and time  Psychiatric:         Mood and Affect: Mood normal          Behavior: Behavior normal           Diagnostic Test Review: The attending physician has personally reviewed the pertinent films in PACS and the interpretation is as follows:    X-Ray of left knee taken on 4/6/23 were reviewed and showed well-seated total knee prosthesis with maintained anatomical alignment and no signs of loosening  Procedures   None performed       Scribe Attestation    I,:  Dayana Vail am acting as a scribe while in the presence of the attending physician :       I,:  Roni Houser, DO personally performed the services described in this documentation    as scribed in my presence :

## 2023-06-06 ENCOUNTER — VBI (OUTPATIENT)
Dept: ADMINISTRATIVE | Facility: OTHER | Age: 73
End: 2023-06-06

## 2023-07-05 ENCOUNTER — TRANSCRIBE ORDERS (OUTPATIENT)
Dept: LAB | Facility: CLINIC | Age: 73
End: 2023-07-05

## 2023-07-05 ENCOUNTER — LAB (OUTPATIENT)
Dept: LAB | Facility: CLINIC | Age: 73
End: 2023-07-05
Payer: COMMERCIAL

## 2023-07-05 DIAGNOSIS — E78.00 HYPERCHOLESTEROLEMIA: ICD-10-CM

## 2023-07-05 DIAGNOSIS — E04.1 THYROID NODULE: ICD-10-CM

## 2023-07-05 DIAGNOSIS — I10 ESSENTIAL HYPERTENSION: ICD-10-CM

## 2023-07-05 DIAGNOSIS — E55.9 VITAMIN D INSUFFICIENCY: ICD-10-CM

## 2023-07-05 DIAGNOSIS — M05.20 RHEUMATOID ARTERITIS (HCC): ICD-10-CM

## 2023-07-05 DIAGNOSIS — E03.9 ACQUIRED HYPOTHYROIDISM: ICD-10-CM

## 2023-07-05 DIAGNOSIS — M05.20 RHEUMATOID ARTERITIS (HCC): Primary | ICD-10-CM

## 2023-07-05 LAB
25(OH)D3 SERPL-MCNC: 41.2 NG/ML (ref 30–100)
ALBUMIN SERPL BCP-MCNC: 3.7 G/DL (ref 3.5–5)
ALP SERPL-CCNC: 67 U/L (ref 46–116)
ALT SERPL W P-5'-P-CCNC: 40 U/L (ref 12–78)
ANION GAP SERPL CALCULATED.3IONS-SCNC: -1 MMOL/L
AST SERPL W P-5'-P-CCNC: 25 U/L (ref 5–45)
BASOPHILS # BLD AUTO: 0.04 THOUSANDS/ÂΜL (ref 0–0.1)
BASOPHILS NFR BLD AUTO: 1 % (ref 0–1)
BILIRUB SERPL-MCNC: 0.73 MG/DL (ref 0.2–1)
BUN SERPL-MCNC: 15 MG/DL (ref 5–25)
CALCIUM SERPL-MCNC: 10.4 MG/DL (ref 8.3–10.1)
CHLORIDE SERPL-SCNC: 110 MMOL/L (ref 96–108)
CHOLEST SERPL-MCNC: 194 MG/DL
CO2 SERPL-SCNC: 31 MMOL/L (ref 21–32)
CREAT SERPL-MCNC: 0.62 MG/DL (ref 0.6–1.3)
CRP SERPL QL: <3 MG/L
EOSINOPHIL # BLD AUTO: 0.12 THOUSAND/ÂΜL (ref 0–0.61)
EOSINOPHIL NFR BLD AUTO: 3 % (ref 0–6)
ERYTHROCYTE [DISTWIDTH] IN BLOOD BY AUTOMATED COUNT: 13.8 % (ref 11.6–15.1)
ERYTHROCYTE [SEDIMENTATION RATE] IN BLOOD: 10 MM/HOUR (ref 0–29)
GFR SERPL CREATININE-BSD FRML MDRD: 91 ML/MIN/1.73SQ M
GLUCOSE P FAST SERPL-MCNC: 95 MG/DL (ref 65–99)
HCT VFR BLD AUTO: 41.7 % (ref 34.8–46.1)
HDLC SERPL-MCNC: 59 MG/DL
HGB BLD-MCNC: 13.6 G/DL (ref 11.5–15.4)
IMM GRANULOCYTES # BLD AUTO: 0.01 THOUSAND/UL (ref 0–0.2)
IMM GRANULOCYTES NFR BLD AUTO: 0 % (ref 0–2)
LDLC SERPL CALC-MCNC: 109 MG/DL (ref 0–100)
LYMPHOCYTES # BLD AUTO: 1.69 THOUSANDS/ÂΜL (ref 0.6–4.47)
LYMPHOCYTES NFR BLD AUTO: 35 % (ref 14–44)
MCH RBC QN AUTO: 29.6 PG (ref 26.8–34.3)
MCHC RBC AUTO-ENTMCNC: 32.6 G/DL (ref 31.4–37.4)
MCV RBC AUTO: 91 FL (ref 82–98)
MONOCYTES # BLD AUTO: 0.48 THOUSAND/ÂΜL (ref 0.17–1.22)
MONOCYTES NFR BLD AUTO: 10 % (ref 4–12)
NEUTROPHILS # BLD AUTO: 2.45 THOUSANDS/ÂΜL (ref 1.85–7.62)
NEUTS SEG NFR BLD AUTO: 51 % (ref 43–75)
NONHDLC SERPL-MCNC: 135 MG/DL
NRBC BLD AUTO-RTO: 0 /100 WBCS
PLATELET # BLD AUTO: 238 THOUSANDS/UL (ref 149–390)
PMV BLD AUTO: 9.9 FL (ref 8.9–12.7)
POTASSIUM SERPL-SCNC: 4 MMOL/L (ref 3.5–5.3)
PROT SERPL-MCNC: 8.6 G/DL (ref 6.4–8.4)
RBC # BLD AUTO: 4.6 MILLION/UL (ref 3.81–5.12)
SODIUM SERPL-SCNC: 140 MMOL/L (ref 135–147)
T4 FREE SERPL-MCNC: 1 NG/DL (ref 0.61–1.12)
TRIGL SERPL-MCNC: 128 MG/DL
TSH SERPL DL<=0.05 MIU/L-ACNC: 3.64 UIU/ML (ref 0.45–4.5)
WBC # BLD AUTO: 4.79 THOUSAND/UL (ref 4.31–10.16)

## 2023-07-05 PROCEDURE — 80061 LIPID PANEL: CPT

## 2023-07-05 PROCEDURE — 86140 C-REACTIVE PROTEIN: CPT

## 2023-07-05 PROCEDURE — 80053 COMPREHEN METABOLIC PANEL: CPT

## 2023-07-05 PROCEDURE — 82306 VITAMIN D 25 HYDROXY: CPT

## 2023-07-05 PROCEDURE — 36415 COLL VENOUS BLD VENIPUNCTURE: CPT

## 2023-07-05 PROCEDURE — 84443 ASSAY THYROID STIM HORMONE: CPT

## 2023-07-05 PROCEDURE — 85652 RBC SED RATE AUTOMATED: CPT

## 2023-07-05 PROCEDURE — 84439 ASSAY OF FREE THYROXINE: CPT

## 2023-07-05 PROCEDURE — 85025 COMPLETE CBC W/AUTO DIFF WBC: CPT

## 2023-07-11 ENCOUNTER — OFFICE VISIT (OUTPATIENT)
Dept: ENDOCRINOLOGY | Facility: HOSPITAL | Age: 73
End: 2023-07-11
Payer: COMMERCIAL

## 2023-07-11 VITALS
OXYGEN SATURATION: 97 % | DIASTOLIC BLOOD PRESSURE: 70 MMHG | SYSTOLIC BLOOD PRESSURE: 110 MMHG | BODY MASS INDEX: 33.31 KG/M2 | HEART RATE: 54 BPM | WEIGHT: 181 LBS | HEIGHT: 62 IN

## 2023-07-11 DIAGNOSIS — E03.9 ACQUIRED HYPOTHYROIDISM: Primary | ICD-10-CM

## 2023-07-11 DIAGNOSIS — E78.00 HYPERCHOLESTEROLEMIA: ICD-10-CM

## 2023-07-11 DIAGNOSIS — I10 ESSENTIAL HYPERTENSION: ICD-10-CM

## 2023-07-11 DIAGNOSIS — M81.0 OSTEOPOROSIS WITHOUT CURRENT PATHOLOGICAL FRACTURE, UNSPECIFIED OSTEOPOROSIS TYPE: ICD-10-CM

## 2023-07-11 DIAGNOSIS — E55.9 VITAMIN D INSUFFICIENCY: ICD-10-CM

## 2023-07-11 DIAGNOSIS — E04.1 THYROID NODULE: ICD-10-CM

## 2023-07-11 PROCEDURE — 96372 THER/PROPH/DIAG INJ SC/IM: CPT

## 2023-07-11 PROCEDURE — 99214 OFFICE O/P EST MOD 30 MIN: CPT | Performed by: NURSE PRACTITIONER

## 2023-07-11 NOTE — PROGRESS NOTES
Assessment/Plan:    Daved Riedel came into the [facility]'s Endocrinology Office today 07/11/23 to receive Prolia injection. Verbal consent obtained. Consent given by: patient    Provider states patient has been medically healthy with no underlining concerns/complications. Daved Riedel presents with no symptoms today. All insturctions were reviewed with the patient. If the patient should have any questions/concerns, advised patient to contacted [facility] Endocrinology Office. Subjective:     History provided by: patient    Patient ID: Daved Riedel is a 72 y.o. female      Objective:    Vitals:    07/11/23 1023   BP: 110/70   Pulse: (!) 54   SpO2: 97%   Weight: 82.1 kg (181 lb)   Height: 5' 2" (1.575 m)       Patient tolerated the injection well without any complications. Injection site/s left arm. Medication was provided by office supply.     Patient signed consent form yes   Patient signed Han Gianotti form yes  Patient waited 15 minutes after injection no      Last Visit: 2/3/2023  Next visit:Visit date not found

## 2023-07-11 NOTE — PROGRESS NOTES
Allyson Luu 72 y.o. female MRN: [id number]    Encounter: [id number]      Assessment/Plan     Assessment: This is a 72y.o.-year-old female with hypothyroidism, hypertension, thyroid nodule and vitamin-D deficiency. Plan:  1.  Hypothyroidism:  Her most recent TSH and free T4 are normal.  She will continue levothyroxine at current dose. Check TSH and free T4 prior to next office visit.     2.  Hypertension:  She is normotensive in the office today. Continue current medication.  Check comprehensive metabolic panel prior to next visit.     3.  Vitamin-D deficiency: Recent level is stable at 41.2.  Continue supplementation with 1000 units of vitamin D3 daily.     4.  Thyroid nodule:  Stable ultrasound in November 2022.   She denies any anterior neck discomfort, dysphagia or dysphonia.  Continue surveillance with follow-up ultrasound in November 2023.     5.  Osteoporosis:  Recent DEXA scan shows osteoporosis in left femoral neck and right wrist.  She is due for her second Prolia injection today. She will continue supplementation with calcium and vitamin D. Reviewed fall risk and safety. CC: Hypothyroidism follow up    History of Present Illness     HPI:  80 y. o. female with history of polymyalgia rheumatica, hypothyroidism, DVT, atrial fibrillation, hyperlipidemia presents for follow up of hypothyroidism. Has had hypothyroidism for many years treated on thyroid hormone replacement.  She was hospitalized in summer of 2017 for heart arrhythmia.  At that time she received amiodarone and was  discharged home on amiodarone. She was on amiodarone until about September 2017. While on this medication her thyroid hormone requirements went up.  Since then she reports cold intolerance and fatigue.  She is currently taking levothyroxine 75 mcg daily.  She is taking her levothyroxine, consistently, and in the proper manner, by her report.  She takes her calcium and other vitamins later in the day. Isabel Valentine most recent TSH from July 5, 2023 is 3.636.  She also has history of polymyalgia rheumatica and follows closely with her rheumatologist at [address]     Her hypertension is treated with Lasix 20 mg daily and metoprolol 50 mg twice daily.     For her vitamin-D deficiency she supplements with 1000 units of vitamin D3 daily.  Her most recent 25 hydroxy vitamin-D level from July 5, 2023 is 41.2.     Her most recent thyroid ultrasound from November 1, 2022 reveals a heterogeneously atrophic thyroid gland.  No significant change in the nodule in the left lobe of the thyroid gland which does not meet ACR criteria for follow-up ultrasound or biopsy.       Review of Systems   Constitutional: Negative. Negative for chills, fatigue and fever. HENT: Negative. Negative for trouble swallowing and voice change. Eyes: Negative. Negative for photophobia, pain, discharge, redness, itching and visual disturbance. Respiratory: Negative. Negative for chest tightness and shortness of breath. Cardiovascular: Negative. Negative for chest pain. Gastrointestinal: Negative. Negative for abdominal pain, constipation, diarrhea and vomiting. Endocrine: Negative for cold intolerance, heat intolerance, polydipsia, polyphagia and polyuria. Genitourinary: Negative. Musculoskeletal: Positive for arthralgias (knees). Skin: Negative. Allergic/Immunologic: Negative. Neurological: Negative. Negative for dizziness, syncope, light-headedness and headaches. Hematological: Negative. Psychiatric/Behavioral: Negative. All other systems reviewed and are negative.       Historical Information   Past Medical History:   Diagnosis Date   • Allergic    • Arthritis     rheumatoid   • Atrial fibrillation (HCC)    • CHF (congestive heart failure) (HCC)    • Disease of thyroid gland    • DVT (deep venous thrombosis) ([address]) 2015   • HL (hearing loss)     Left Ear-Wear Hearing Aid   • Hypertension    • Irregular heart beat    • Kidney stone    • Migraine     hx of   • Pleural effusion 2016   • Polymyalgia rheumatica ([address])     "Remission"   • Sleep apnea     Mild-Does not require CPAP   • Vitamin D deficiency      Past Surgical History:   Procedure Laterality Date   • CARDIAC CATHETERIZATION     • CARDIOVERSION N/A 2019    Procedure: CARDIOVERSION;  Surgeon: Tricia Dejesus MD;  Location: [facility];  Service: Cardiology   •  SECTION      x3 - last impression 16   • FRACTURE SURGERY Left     wrist   • HERNIA REPAIR  2018   • KNEE ARTHROSCOPY Left     Meniscus Tear Repair   • KNEE CARTILAGE SURGERY Left    • MI ARTHRP KNE CONDYLE&PLATU MEDIAL&LAT COMPARTMENTS Left 3/16/2022    Procedure: KNEE TOTAL ARTHROPLASTY;  Surgeon: Zenon Claros DO;  Location: [facility];  Service: Orthopedics   • TONSILLECTOMY AND ADENOIDECTOMY  child; age 17   • TUBAL LIGATION     • VEIN SURGERY Right     venous ligation with stripping     Social History   Social History     Substance and Sexual Activity   Alcohol Use Yes   • Alcohol/week: 1.0 standard drink of alcohol   • Types: 1 Glasses of wine per week    Comment: Socially     Social History     Substance and Sexual Activity   Drug Use No     Social History     Tobacco Use   Smoking Status Never   Smokeless Tobacco Never     Family History:   Family History   Problem Relation Age of Onset   • Breast cancer Mother 46   • Arthritis Mother    • Cancer Mother    • Hearing loss Mother    • Vision loss Mother    • Aneurysm Father         aortic   • No Known Problems Sister    • No Known Problems Maternal Grandmother    • No Known Problems Maternal Grandfather    • No Known Problems Paternal Grandmother    • No Known Problems Paternal Grandfather    • Rheum arthritis Maternal Aunt    • Early death Maternal Aunt    • No Known Problems Paternal Aunt    • No Known Problems Paternal Aunt    • No Known Problems Paternal Aunt        Meds/Allergies   Current Outpatient Medications   Medication Sig Dispense Refill   • Azelastine HCl 137 MCG/SPRAY SOLN SPRAY 2 SPRAYS INTO EACH NOSTRIL 2 TIMES A DAY USE IN EACH NOSTRIL AS DIRECTED 30 mL 5   • Calcium Ascorbate 500 MG TABS Take 500 mg by mouth daily       • cholecalciferol (VITAMIN D3) 1,000 units tablet Take 1,000 Units by mouth daily       • Cinnamon 500 MG capsule Take 500 mg by mouth daily     • Cyanocobalamin (VITAMIN B 12 PO) Take 500 mcg by mouth daily      • levothyroxine 75 mcg tablet TAKE 1 TABLET BY MOUTH EVERY DAY 30 tablet 5   • metoprolol succinate (TOPROL-XL) 50 mg 24 hr tablet Take 1 tablet (50 mg total) by mouth 2 (two) times a day (Patient taking differently: 50 mg in the morning and 25 mg in the afternoon Can take an extra 25 mg if goes into Afib) 60 tablet 0   • Multiple Vitamins-Minerals (multivitamin with minerals) tablet Take 1 tablet by mouth daily 30 tablet 1   • Red Yeast Rice 600 MG TABS Take 1 tablet by mouth daily      • XARELTO 20 MG tablet Take 1 tablet by mouth daily before dinner       • Misc Natural Products (OSTEO BI-FLEX JOINT SHIELD PO) Take by mouth   (Patient not taking: Reported on 7/11/2023)     • Multiple Vitamins-Minerals (OCUVITE ADULT 50+) CAPS Take 1 capsule by mouth daily (Patient not taking: Reported on 7/11/2023)       Current Facility-Administered Medications   Medication Dose Route Frequency Provider Last Rate Last Admin   • denosumab (PROLIA) subcutaneous injection 60 mg  60 mg Subcutaneous Q6 Months Branson Meigs, MD   60 mg at 12/19/22 1036     Allergies   Allergen Reactions   • Amiodarone Other (See Comments)     Other reaction(s): Other (See Comments)  Reports caused elevated TSH   • Sudafed [Pseudoephedrine] Tachycardia     Rapid heart beat   • Sulfa Antibiotics Itching and Rash   • Sulfamethoxazole-Trimethoprim Itching and Rash       Objective   Vitals: Blood pressure 110/70, pulse (!) 54, height 5' 2" (1.575 m), weight 82.1 kg (181 lb), SpO2 97 %, not currently breastfeeding.     Physical Exam  Vitals reviewed. Constitutional:       Appearance: She is well-developed. She is obese. HENT:      Head: Normocephalic and atraumatic. Eyes:      Conjunctiva/sclera: Conjunctivae normal.      Pupils: Pupils are equal, round, and reactive to light. Cardiovascular:      Rate and Rhythm: Normal rate and regular rhythm. Heart sounds: Normal heart sounds. Pulmonary:      Effort: Pulmonary effort is normal.      Breath sounds: Normal breath sounds. Abdominal:      General: Bowel sounds are normal.      Palpations: Abdomen is soft. Musculoskeletal:         General: Normal range of motion. Cervical back: Normal range of motion and neck supple. Skin:     General: Skin is warm and dry. Neurological:      Mental Status: She is alert and oriented to person, place, and time. Psychiatric:         Behavior: Behavior normal.         Thought Content: Thought content normal.         Judgment: Judgment normal.       Lab Results:   Lab Results   Component Value Date/Time    TSH 3RD GENERATON 3.636 07/05/2023 07:10 AM    TSH 3RD GENERATON 1.330 01/30/2023 10:27 AM    TSH 3RD GENERATON 1.580 11/07/2022 10:04 AM    Free T4 1.00 07/05/2023 07:10 AM    Free T4 1.15 11/07/2022 10:04 AM       Imaging Studies:   Results for orders placed during the hospital encounter of 11/01/22    US thyroid    Impression  Heterogeneously atrophic thyroid gland. No significant change in the nodule in the left lobe of the thyroid gland which does not meet ACR criteria for follow-up ultrasound or biopsy. Reference: ACR Thyroid Imaging, Reporting and Data System (TI-RADS): White Paper of the [employer]. J AM Cherelle Radiol 7160;50:668-507. (additional recommendations based on American Thyroid Association 2015 guidelines.)      Workstation performed: [id number]    Portions of the record may have been created with voice recognition software.  Occasional wrong word or "sound a like" substitutions may have occurred due to the inherent limitations of voice recognition software. Read the chart carefully and recognize, using context, where substitutions have occurred.

## 2023-07-11 NOTE — PATIENT INSTRUCTIONS
Continue Levothyroxine at 75 mcg daily. Continue with Calcium and Vitamin D supplementation daily. Will continue to monitor calcium levels. You will obtain a repeat thyroid ultrasound in November 2023. Obtain lab work as prescribed. We will start the process for Prolia.

## 2023-07-21 DIAGNOSIS — E03.9 HYPOTHYROIDISM, UNSPECIFIED TYPE: ICD-10-CM

## 2023-07-21 RX ORDER — LEVOTHYROXINE SODIUM 0.07 MG/1
TABLET ORAL
Qty: 30 TABLET | Refills: 5 | Status: SHIPPED | OUTPATIENT
Start: 2023-07-21

## 2023-08-03 ENCOUNTER — OFFICE VISIT (OUTPATIENT)
Dept: FAMILY MEDICINE CLINIC | Facility: CLINIC | Age: 73
End: 2023-08-03
Payer: COMMERCIAL

## 2023-08-03 VITALS
OXYGEN SATURATION: 97 % | TEMPERATURE: 97.4 F | SYSTOLIC BLOOD PRESSURE: 126 MMHG | WEIGHT: 180 LBS | HEART RATE: 59 BPM | BODY MASS INDEX: 33.13 KG/M2 | DIASTOLIC BLOOD PRESSURE: 70 MMHG | HEIGHT: 62 IN

## 2023-08-03 DIAGNOSIS — I48.0 PAROXYSMAL ATRIAL FIBRILLATION (HCC): ICD-10-CM

## 2023-08-03 DIAGNOSIS — Z00.00 MEDICARE ANNUAL WELLNESS VISIT, SUBSEQUENT: Primary | ICD-10-CM

## 2023-08-03 DIAGNOSIS — M06.00 SERONEGATIVE RHEUMATOID ARTHRITIS (HCC): ICD-10-CM

## 2023-08-03 DIAGNOSIS — I10 ESSENTIAL HYPERTENSION: ICD-10-CM

## 2023-08-03 DIAGNOSIS — E66.09 CLASS 1 OBESITY DUE TO EXCESS CALORIES WITH SERIOUS COMORBIDITY AND BODY MASS INDEX (BMI) OF 32.0 TO 32.9 IN ADULT: ICD-10-CM

## 2023-08-03 DIAGNOSIS — E03.9 ACQUIRED HYPOTHYROIDISM: ICD-10-CM

## 2023-08-03 DIAGNOSIS — I50.32 CHRONIC DIASTOLIC CONGESTIVE HEART FAILURE (HCC): ICD-10-CM

## 2023-08-03 PROBLEM — I82.409 DVT, LOWER EXTREMITY (HCC): Status: RESOLVED | Noted: 2017-01-10 | Resolved: 2023-08-03

## 2023-08-03 PROBLEM — E27.49 SECONDARY ADRENAL INSUFFICIENCY (HCC): Status: RESOLVED | Noted: 2017-05-30 | Resolved: 2023-08-03

## 2023-08-03 PROBLEM — M35.3 POLYMYALGIA RHEUMATICA (HCC): Chronic | Status: RESOLVED | Noted: 2017-01-10 | Resolved: 2023-08-03

## 2023-08-03 PROBLEM — I26.99 ACUTE PULMONARY EMBOLISM (HCC): Status: RESOLVED | Noted: 2017-01-10 | Resolved: 2023-08-03

## 2023-08-03 PROBLEM — A41.9 SEPSIS, UNSPECIFIED ORGANISM (HCC): Status: RESOLVED | Noted: 2019-12-15 | Resolved: 2023-08-03

## 2023-08-03 PROCEDURE — G0439 PPPS, SUBSEQ VISIT: HCPCS | Performed by: FAMILY MEDICINE

## 2023-08-03 PROCEDURE — 99214 OFFICE O/P EST MOD 30 MIN: CPT | Performed by: FAMILY MEDICINE

## 2023-08-03 NOTE — PATIENT INSTRUCTIONS
Medicare Preventive Visit Patient Instructions  Thank you for completing your Welcome to Medicare Visit or Medicare Annual Wellness Visit today. Your next wellness visit will be due in one year (8/3/2024). The screening/preventive services that you may require over the next 5-10 years are detailed below. Some tests may not apply to you based off risk factors and/or age. Screening tests ordered at today's visit but not completed yet may show as past due. Also, please note that scanned in results may not display below. Preventive Screenings:  Service Recommendations Previous Testing/Comments   Colorectal Cancer Screening  * Colonoscopy    * Fecal Occult Blood Test (FOBT)/Fecal Immunochemical Test (FIT)  * Fecal DNA/Cologuard Test  * Flexible Sigmoidoscopy Age: 43-73 years old   Colonoscopy: every 10 years (may be performed more frequently if at higher risk)  OR  FOBT/FIT: every 1 year  OR  Cologuard: every 3 years  OR  Sigmoidoscopy: every 5 years  Screening may be recommended earlier than age 39 if at higher risk for colorectal cancer. Also, an individualized decision between you and your healthcare provider will decide whether screening between the ages of 77-80 would be appropriate. Colonoscopy: 05/23/2013  FOBT/FIT: Not on file  Cologuard: Not on file  Sigmoidoscopy: Not on file          Breast Cancer Screening Age: 36 years old  Frequency: every 1-2 years  Not required if history of left and right mastectomy Mammogram: 01/24/2023    Screening Current   Cervical Cancer Screening Between the ages of 21-29, pap smear recommended once every 3 years. Between the ages of 32-69, can perform pap smear with HPV co-testing every 5 years.    Recommendations may differ for women with a history of total hysterectomy, cervical cancer, or abnormal pap smears in past. Pap Smear: 01/23/2023    Screening Not Indicated   Hepatitis C Screening Once for adults born between 1945 and 1965  More frequently in patients at high risk for Hepatitis C Hep C Antibody: 07/26/2021    Screening Current   Diabetes Screening 1-2 times per year if you're at risk for diabetes or have pre-diabetes Fasting glucose: 95 mg/dL (7/5/2023)  A1C: 5.4 % (11/7/2022)  Screening Current   Cholesterol Screening Once every 5 years if you don't have a lipid disorder. May order more often based on risk factors. Lipid panel: 07/05/2023    Screening Not Indicated  History Lipid Disorder     Other Preventive Screenings Covered by Medicare:  1. Abdominal Aortic Aneurysm (AAA) Screening: covered once if your at risk. You're considered to be at risk if you have a family history of AAA. 2. Lung Cancer Screening: covers low dose CT scan once per year if you meet all of the following conditions: (1) Age 48-67; (2) No signs or symptoms of lung cancer; (3) Current smoker or have quit smoking within the last 15 years; (4) You have a tobacco smoking history of at least 20 pack years (packs per day multiplied by number of years you smoked); (5) You get a written order from a healthcare provider. 3. Glaucoma Screening: covered annually if you're considered high risk: (1) You have diabetes OR (2) Family history of glaucoma OR (3)  aged 48 and older OR (3)  American aged 72 and older  3. Osteoporosis Screening: covered every 2 years if you meet one of the following conditions: (1) You're estrogen deficient and at risk for osteoporosis based off medical history and other findings; (2) Have a vertebral abnormality; (3) On glucocorticoid therapy for more than 3 months; (4) Have primary hyperparathyroidism; (5) On osteoporosis medications and need to assess response to drug therapy. · Last bone density test (DXA Scan): 03/04/2022.  5. HIV Screening: covered annually if you're between the age of 15-65. Also covered annually if you are younger than 13 and older than 72 with risk factors for HIV infection.  For pregnant patients, it is covered up to 3 times per pregnancy. Immunizations:  Immunization Recommendations   Influenza Vaccine Annual influenza vaccination during flu season is recommended for all persons aged >= 6 months who do not have contraindications   Pneumococcal Vaccine   * Pneumococcal conjugate vaccine = PCV13 (Prevnar 13), PCV15 (Vaxneuvance), PCV20 (Prevnar 20)  * Pneumococcal polysaccharide vaccine = PPSV23 (Pneumovax) Adults 20-63 years old: 1-3 doses may be recommended based on certain risk factors  Adults 72 years old: 1-2 doses may be recommended based off what pneumonia vaccine you previously received   Hepatitis B Vaccine 3 dose series if at intermediate or high risk (ex: diabetes, end stage renal disease, liver disease)   Tetanus (Td) Vaccine - COST NOT COVERED BY MEDICARE PART B Following completion of primary series, a booster dose should be given every 10 years to maintain immunity against tetanus. Td may also be given as tetanus wound prophylaxis. Tdap Vaccine - COST NOT COVERED BY MEDICARE PART B Recommended at least once for all adults. For pregnant patients, recommended with each pregnancy. Shingles Vaccine (Shingrix) - COST NOT COVERED BY MEDICARE PART B  2 shot series recommended in those aged 48 and above     Health Maintenance Due:      Topic Date Due   • Colorectal Cancer Screening  07/11/2024 (Originally 4/26/1997)   • Breast Cancer Screening: Mammogram  01/24/2024   • Cervical Cancer Screening  01/19/2027   • Hepatitis C Screening  Completed     Immunizations Due:      Topic Date Due   • COVID-19 Vaccine (5 - Moderna series) 03/16/2023   • Influenza Vaccine (1) 09/01/2023     Advance Directives   What are advance directives? Advance directives are legal documents that state your wishes and plans for medical care. These plans are made ahead of time in case you lose your ability to make decisions for yourself.  Advance directives can apply to any medical decision, such as the treatments you want, and if you want to donate organs. What are the types of advance directives? There are many types of advance directives, and each state has rules about how to use them. You may choose a combination of any of the following:  · Living will: This is a written record of the treatment you want. You can also choose which treatments you do not want, which to limit, and which to stop at a certain time. This includes surgery, medicine, IV fluid, and tube feedings. · Durable power of  for healthcare [facility]): This is a written record that states who you want to make healthcare choices for you when you are unable to make them for yourself. This person, called a proxy, is usually a family member or a friend. You may choose more than 1 proxy. · Do not resuscitate (DNR) order:  A DNR order is used in case your heart stops beating or you stop breathing. It is a request not to have certain forms of treatment, such as CPR. A DNR order may be included in other types of advance directives. · Medical directive: This covers the care that you want if you are in a coma, near death, or unable to make decisions for yourself. You can list the treatments you want for each condition. Treatment may include pain medicine, surgery, blood transfusions, dialysis, IV or tube feedings, and a ventilator (breathing machine). · Values history: This document has questions about your views, beliefs, and how you feel and think about life. This information can help others choose the care that you would choose. Why are advance directives important? An advance directive helps you control your care. Although spoken wishes may be used, it is better to have your wishes written down. Spoken wishes can be misunderstood, or not followed. Treatments may be given even if you do not want them. An advance directive may make it easier for your family to make difficult choices about your care.    Fall Prevention    Fall prevention  includes ways to make your home and other areas safer. It also includes ways you can move more carefully to prevent a fall. Health conditions that cause changes in your blood pressure, vision, or muscle strength and coordination may increase your risk for falls. Medicines may also increase your risk for falls if they make you dizzy, weak, or sleepy. Fall prevention tips:   · Stand or sit up slowly. · Use assistive devices as directed. · Wear shoes that fit well and have soles that . · Wear a personal alarm. · Stay active. · Manage your medical conditions. Home Safety Tips:  · Add items to prevent falls in the bathroom. · Keep paths clear. · Install bright lights in your home. · Keep items you use often on shelves within reach. · Paint or place reflective tape on the edges of your stairs. Urinary Incontinence   Urinary incontinence (UI)  is when you lose control of your bladder. UI develops because your bladder cannot store or empty urine properly. The 3 most common types of UI are stress incontinence, urge incontinence, or both. Medicines:   · May be given to help strengthen your bladder control. Report any side effects of medication to your healthcare provider. Do pelvic muscle exercises often:  Your pelvic muscles help you stop urinating. Squeeze these muscles tight for 5 seconds, then relax for 5 seconds. Gradually work up to squeezing for 10 seconds. Do 3 sets of 15 repetitions a day, or as directed. This will help strengthen your pelvic muscles and improve bladder control. Train your bladder:  Go to the bathroom at set times, such as every 2 hours, even if you do not feel the urge to go. You can also try to hold your urine when you feel the urge to go. For example, hold your urine for 5 minutes when you feel the urge to go. As that becomes easier, hold your urine for 10 minutes. Self-care:   · Keep a UI record. Write down how often you leak urine and how much you leak.  Make a note of what you were doing when you leaked urine. · Drink liquids as directed. You may need to limit the amount of liquid you drink to help control your urine leakage. Do not drink any liquid right before you go to bed. Limit or do not have drinks that contain caffeine or alcohol. · Prevent constipation. Eat a variety of high-fiber foods. Good examples are high-fiber cereals, beans, vegetables, and whole-grain breads. Walking is the best way to trigger your intestines to have a bowel movement. · Exercise regularly and maintain a healthy weight. Weight loss and exercise will decrease pressure on your bladder and help you control your leakage. · Use a catheter as directed  to help empty your bladder. A catheter is a tiny, plastic tube that is put into your bladder to drain your urine. · Go to behavior therapy as directed. Behavior therapy may be used to help you learn to control your urge to urinate. Weight Management   Why it is important to manage your weight:  Being overweight increases your risk of health conditions such as heart disease, high blood pressure, type 2 diabetes, and certain types of cancer. It can also increase your risk for osteoarthritis, sleep apnea, and other respiratory problems. Aim for a slow, steady weight loss. Even a small amount of weight loss can lower your risk of health problems. How to lose weight safely:  A safe and healthy way to lose weight is to eat fewer calories and get regular exercise. You can lose up about 1 pound a week by decreasing the number of calories you eat by 500 calories each day. Healthy meal plan for weight management:  A healthy meal plan includes a variety of foods, contains fewer calories, and helps you stay healthy. A healthy meal plan includes the following:  · Eat whole-grain foods more often. A healthy meal plan should contain fiber. Fiber is the part of grains, fruits, and vegetables that is not broken down by your body.  Whole-grain foods are healthy and provide extra fiber in your diet. Some examples of whole-grain foods are whole-wheat breads and pastas, oatmeal, brown rice, and bulgur. · Eat a variety of vegetables every day. Include dark, leafy greens such as spinach, kale, mauricio greens, and mustard greens. Eat yellow and orange vegetables such as carrots, sweet potatoes, and winter squash. · Eat a variety of fruits every day. Choose fresh or canned fruit (canned in its own juice or light syrup) instead of juice. Fruit juice has very little or no fiber. · Eat low-fat dairy foods. Drink fat-free (skim) milk or 1% milk. Eat fat-free yogurt and low-fat cottage cheese. Try low-fat cheeses such as mozzarella and other reduced-fat cheeses. · Choose meat and other protein foods that are low in fat. Choose beans or other legumes such as split peas or lentils. Choose fish, skinless poultry (chicken or turkey), or lean cuts of red meat (beef or pork). Before you cook meat or poultry, cut off any visible fat. · Use less fat and oil. Try baking foods instead of frying them. Add less fat, such as margarine, sour cream, regular salad dressing and mayonnaise to foods. Eat fewer high-fat foods. Some examples of high-fat foods include french fries, doughnuts, ice cream, and cakes. · Eat fewer sweets. Limit foods and drinks that are high in sugar. This includes candy, cookies, regular soda, and sweetened drinks. Exercise:  Exercise at least 30 minutes per day on most days of the week. Some examples of exercise include walking, biking, dancing, and swimming. You can also fit in more physical activity by taking the stairs instead of the elevator or parking farther away from stores. Ask your healthcare provider about the best exercise plan for you. © Copyright [employer] 2018 Information is for End User's use only and may not be sold, redistributed or otherwise used for commercial purposes.  All illustrations and images included in CareNotes® are the copyrighted property of [employer] or [address] [place] [address]

## 2023-08-03 NOTE — PROGRESS NOTES
Assessment and Plan:     Problem List Items Addressed This Visit        Endocrine    Hypothyroidism (Chronic)       Cardiovascular and Mediastinum    Essential hypertension    Chronic diastolic congestive heart failure (HCC)    Paroxysmal atrial fibrillation (HCC)       Musculoskeletal and Integument    Seronegative rheumatoid arthritis ([address])       Other    Medicare annual wellness visit, subsequent - Primary   Other Visit Diagnoses     Class 1 obesity due to excess calories with serious comorbidity and body mass index (BMI) of 32.0 to 32.9 in adult        pt counseled on diet and exercise           Preventive health issues were discussed with patient, and age appropriate screening tests were ordered as noted in patient's After Visit Summary. Personalized health advice and appropriate referrals for health education or preventive services given if needed, as noted in patient's After Visit Summary. History of Present Illness:     Patient presents for a Medicare Wellness Visit    Hypertension  This is a chronic problem. The current episode started more than 1 year ago. The problem is unchanged. The problem is controlled. Pertinent negatives include no chest pain, palpitations or shortness of breath. Patient Care Team:  Josr Brian DO as PCP - General  MD Josr Silvestre DO Aubry Obey, CRNP (Endocrinology)     Review of Systems:     Review of Systems   Constitutional: Positive for fatigue. Negative for chills and fever. HENT: Negative. Negative for hearing loss. Eyes: Negative. Negative for visual disturbance. Respiratory: Negative for shortness of breath and wheezing. Cardiovascular: Negative for chest pain and palpitations. Gastrointestinal: Negative for abdominal pain, blood in stool, constipation, diarrhea, nausea and vomiting. Endocrine: Negative. Genitourinary: Negative for difficulty urinating and dysuria.    Musculoskeletal: Negative for arthralgias and myalgias. Skin: Negative. Allergic/Immunologic: Negative. Neurological: Negative for seizures and syncope. Hematological: Negative for adenopathy. Psychiatric/Behavioral: Negative.          Problem List:     Patient Active Problem List   Diagnosis   • Hypothyroidism   • Leukocytosis   • Vitamin D insufficiency   • Left axis deviation   • Premature atrial complexes   • Essential hypertension   • Hypercholesterolemia   • Calculus of kidney   • SVT (supraventricular tachycardia) ([facility])   • SOB (shortness of breath)   • Superficial thrombophlebitis of right upper extremity   • Seronegative rheumatoid arthritis (HCC)   • Chronic diastolic congestive heart failure (HCC)   • Negative depression screening   • Disease of lung   • Paroxysmal atrial fibrillation (HCC)   • Chronic atrial fibrillation (HCC)   • Volume overload   • Acute maxillary sinusitis   • Bilateral pleural effusion   • Cardiac abnormality   • CHF (congestive heart failure) ([facility])   • Cough   • Diarrhea   • Diffuse arthralgia   • Diffusion capacity of lung (dl), decreased   • Fever of unknown origin (FUO)   • History of DVT (deep vein thrombosis)   • History of polymyalgia rheumatica   • History of pulmonary embolus (PE)   • Hx of methotrexate therapy   • Nausea and/or vomiting   • Pain of left side of body   • Pain of right scapula   • Chest pain   • Right upper quadrant abdominal pain   • Tamponade   • Thyroid nodule   • Ventral hernia   • Class 1 obesity due to excess calories with serious comorbidity and body mass index (BMI) of 31.0 to 31.9 in adult   • Medicare annual wellness visit, subsequent   • Primary osteoarthritis of left knee      Past Medical and Surgical History:     Past Medical History:   Diagnosis Date   • Acute pulmonary embolism ([address]) 1/10/2017   • Allergic    • Arthritis     rheumatoid   • Atrial fibrillation ([facility])    • CHF (congestive heart failure) ([facility])    • Disease of thyroid gland    • DVT (deep venous thrombosis) [facility])    • DVT, lower extremity ([address]) 1/10/2017   • HL (hearing loss)     Left Ear-Wear Hearing Aid   • Hypertension    • Irregular heart beat    • Kidney stone    • Migraine     hx of   • Pleural effusion    • Polymyalgia rheumatica ([address])     "Remission"   • Polymyalgia rheumatica ([address]) 1/10/2017   • Secondary adrenal insufficiency ([address]) 2017   • Sepsis, unspecified organism ([address]) 12/15/2019   • Sleep apnea     Mild-Does not require CPAP   • Vitamin D deficiency      Past Surgical History:   Procedure Laterality Date   • CARDIAC CATHETERIZATION     • CARDIOVERSION N/A 2019    Procedure: CARDIOVERSION;  Surgeon: Daryn Calderon MD;  Location: [facility];  Service: Cardiology   •  SECTION      x3 - last impression 16   • FRACTURE SURGERY Left     wrist   • HERNIA REPAIR  2018   • KNEE ARTHROSCOPY Left     Meniscus Tear Repair   • KNEE CARTILAGE SURGERY Left    • NC ARTHRP KNE CONDYLE&PLATU MEDIAL&LAT COMPARTMENTS Left 3/16/2022    Procedure: KNEE TOTAL ARTHROPLASTY;  Surgeon: Ann Hurtado DO;  Location: [facility];  Service: Orthopedics   • TONSILLECTOMY AND ADENOIDECTOMY  child; age 17   • TUBAL LIGATION     • VEIN SURGERY Right     venous ligation with stripping      Family History:     Family History   Problem Relation Age of Onset   • Breast cancer Mother    • Arthritis Mother    • Cancer Mother    • Hearing loss Mother    • Vision loss Mother    • Aneurysm Father         aortic   • No Known Problems Sister    • No Known Problems Maternal Grandmother    • No Known Problems Maternal Grandfather    • No Known Problems Paternal Grandmother    • No Known Problems Paternal Grandfather    • Rheum arthritis Maternal Aunt    • Early death Maternal Aunt    • No Known Problems Paternal Aunt    • No Known Problems Paternal Aunt    • No Known Problems Paternal Aunt       Social History:     Social History     Socioeconomic History   • Marital status: /Civil Union     Spouse name: None   • Number of children: 3   • Years of education: None   • Highest education level: None   Occupational History   • 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   Tobacco Use   • Smoking status: Never   • Smokeless tobacco: Never   Vaping Use   • Vaping Use: Never used   Substance and Sexual Activity   • Alcohol use: Not Currently     Alcohol/week: 1.0 standard drink of alcohol     Types: 1 Glasses of wine per week     Comment: Socially   • Drug use: No   • Sexual activity: Yes     Partners: Male     Birth control/protection: Post-menopausal, Female Sterilization   Other Topics Concern   • None   Social History Narrative   • None     Social Determinants of Health     Financial Resource Strain: Not on file   Food Insecurity: No Food Insecurity (3/17/2022)    Hunger Vital Sign    • Worried About Running Out of Food in the Last Year: Never true    • Ran Out of Food in the Last Year: Never true   Transportation Needs: No Transportation Needs (3/17/2022)    PRAPARE - Transportation    • Lack of Transportation (Medical): No    • Lack of Transportation (Non-Medical):  No   Physical Activity: Not on file   Stress: Not on file   Social Connections: Not on file   Intimate Partner Violence: Not on file   Housing Stability: Low Risk  (3/17/2022)    Housing Stability Vital Sign    • Unable to Pay for Housing in the Last Year: No    • Number of Places Lived in the Last Year: 1    • Unstable Housing in the Last Year: No      Medications and Allergies:     Current Outpatient Medications   Medication Sig Dispense Refill   • Azelastine HCl 137 MCG/SPRAY SOLN SPRAY 2 SPRAYS INTO EACH NOSTRIL 2 TIMES A DAY USE IN EACH NOSTRIL AS DIRECTED 30 mL 5   • Calcium Ascorbate 500 MG TABS Take 500 mg by mouth daily       • cholecalciferol (VITAMIN D3) 1,000 units tablet Take 1,000 Units by mouth daily       • Cinnamon 500 MG capsule Take 500 mg by mouth daily     • Cyanocobalamin (VITAMIN B 12 PO) Take 500 mcg by mouth daily      • levothyroxine 75 mcg tablet TAKE 1 TABLET BY MOUTH EVERY DAY 30 tablet 5   • metoprolol succinate (TOPROL-XL) 50 mg 24 hr tablet Take 1 tablet (50 mg total) by mouth 2 (two) times a day (Patient taking differently: 50 mg in the morning and 25 mg in the afternoon Can take an extra 25 mg if goes into Afib) 60 tablet 0   • Multiple Vitamins-Minerals (multivitamin with minerals) tablet Take 1 tablet by mouth daily 30 tablet 1   • Red Yeast Rice 600 MG TABS Take 1 tablet by mouth daily      • XARELTO 20 MG tablet Take 1 tablet by mouth daily before dinner       • Misc Natural Products (OSTEO BI-FLEX JOINT SHIELD PO) Take by mouth   (Patient not taking: Reported on 7/11/2023)     • Multiple Vitamins-Minerals (OCUVITE ADULT 50+) CAPS Take 1 capsule by mouth daily (Patient not taking: Reported on 7/11/2023)       Current Facility-Administered Medications   Medication Dose Route Frequency Provider Last Rate Last Admin   • denosumab (PROLIA) subcutaneous injection 60 mg  60 mg Subcutaneous Q6 Months Christian Agee MD   60 mg at 12/19/22 1036     Allergies   Allergen Reactions   • Amiodarone Other (See Comments)     Other reaction(s):  Other (See Comments)  Reports caused elevated TSH   • Sudafed [Pseudoephedrine] Tachycardia     Rapid heart beat   • Sulfa Antibiotics Itching and Rash   • Sulfamethoxazole-Trimethoprim Itching and Rash      Immunizations:     Immunization History   Administered Date(s) Administered   • COVID-19 MODERNA VACC 0.5 ML IM 01/29/2021, 02/26/2021, 11/08/2021   • COVID-19 Moderna Vac BIVALENT 12 Yr+ IM (BOOSTER ONLY) 0.5 ML 11/16/2022   • INFLUENZA 10/24/2020, 10/06/2021, 10/13/2022   • Influenza, high dose seasonal 0.7 mL 12/16/2019   • Pneumococcal Conjugate 13-Valent 10/09/2019   • Pneumococcal Polysaccharide PPV23 01/15/2021   • TD (adult) Preservative Free 01/01/2009   • Tdap 05/20/2020   • Zoster 12/20/2013      Health Maintenance:         Topic Date Due   • Colorectal Cancer Screening  07/11/2024 (Originally 4/26/1995)   • Breast Cancer Screening: Mammogram  01/24/2024   • Cervical Cancer Screening  01/19/2027   • Hepatitis C Screening  Completed         Topic Date Due   • COVID-19 Vaccine (5 - Moderna series) 03/16/2023   • Influenza Vaccine (1) 09/01/2023      Medicare Screening Tests and Risk Assessments:     Isis Marley is here for her Subsequent Wellness visit. Last Medicare Wellness visit information reviewed, patient interviewed and updates made to the record today. Health Risk Assessment:   Patient rates overall health as good. Patient feels that their physical health rating is same. Patient is satisfied with their life. Eyesight was rated as same. Hearing was rated as slightly worse. Patient feels that their emotional and mental health rating is same. Patients states they are never, rarely angry. Patient states they are often unusually tired/fatigued. Pain experienced in the last 7 days has been some. Patient's pain rating has been 2/10. Patient states that she has experienced no weight loss or gain in last 6 months. Depression Screening:   PHQ-2 Score: 0      Fall Risk Screening: In the past year, patient has experienced: history of falling in past year    Number of falls: 1  Injured during fall?: No    Feels unsteady when standing or walking?: No    Worried about falling?: No      Urinary Incontinence Screening:   Patient has leaked urine accidently in the last six months. Home Safety:  Patient has trouble with stairs inside or outside of their home. Patient has working smoke alarms and has working carbon monoxide detector. Home safety hazards include: none. Nutrition:   Current diet is Regular. Medications:   Patient is currently taking over-the-counter supplements. OTC medications include: see medication list. Patient is able to manage medications.      Activities of Daily Living (ADLs)/Instrumental Activities of Daily Living (IADLs):   Walk and transfer into and out of bed and chair?: Yes  Dress and groom yourself?: Yes    Bathe or shower yourself?: Yes    Feed yourself? Yes  Do your laundry/housekeeping?: Yes  Manage your money, pay your bills and track your expenses?: Yes  Make your own meals?: Yes    Do your own shopping?: Yes    Previous Hospitalizations:   Any hospitalizations or ED visits within the last 12 months?: No      Advance Care Planning:   Living will: No    Durable POA for healthcare: No    Advanced directive: No    Advanced directive counseling given: Yes    Five wishes given: Yes    Patient declined ACP directive: No    End of Life Decisions reviewed with patient: Yes    Provider agrees with end of life decisions: No      Cognitive Screening:   Provider or family/friend/caregiver concerned regarding cognition?: No    PREVENTIVE SCREENINGS      Cardiovascular Screening:    General: Screening Not Indicated and History Lipid Disorder      Diabetes Screening:     General: Screening Current      Colorectal Cancer Screening:       Due for: Colonoscopy - Low Risk      Breast Cancer Screening:     General: Screening Current      Cervical Cancer Screening:    General: Screening Not Indicated      Osteoporosis Screening:    General: Screening Not Indicated and History Osteoporosis      Abdominal Aortic Aneurysm (AAA) Screening:        General: Screening Not Indicated      Lung Cancer Screening:     General: Screening Not Indicated      Hepatitis C Screening:    General: Screening Current    Screening, Brief Intervention, and Referral to Treatment (SBIRT)    Screening      Single Item Drug Screening:  How often have you used an illegal drug (including marijuana) or a prescription medication for non-medical reasons in the past year? never    Single Item Drug Screen Score: 0  Interpretation: Negative screen for possible drug use disorder    No results found.      Physical Exam:     /70   Pulse 59   Temp (!) 97.4 °F (36.3 °C)   Ht 5' 2" (1.575 m)   Wt 81.6 kg (180 lb)   SpO2 97%   BMI 32.92 kg/m²     Physical Exam  Vitals and nursing note reviewed. Constitutional:       General: She is not in acute distress. Appearance: Normal appearance. She is well-developed. She is not ill-appearing, toxic-appearing or diaphoretic. HENT:      Head: Normocephalic and atraumatic. Right Ear: Tympanic membrane, ear canal and external ear normal. There is no impacted cerumen. Left Ear: Tympanic membrane, ear canal and external ear normal. There is no impacted cerumen. Nose: Nose normal. No congestion or rhinorrhea. Mouth/Throat:      Mouth: Mucous membranes are moist.      Pharynx: Oropharynx is clear. No oropharyngeal exudate or posterior oropharyngeal erythema. Eyes:      General:         Right eye: No discharge. Left eye: No discharge. Conjunctiva/sclera: Conjunctivae normal.      Pupils: Pupils are equal, round, and reactive to light. Cardiovascular:      Rate and Rhythm: Normal rate and regular rhythm. Pulses: Normal pulses. Heart sounds: Normal heart sounds. No murmur heard. Pulmonary:      Effort: Pulmonary effort is normal. No respiratory distress. Breath sounds: Normal breath sounds. No wheezing, rhonchi or rales. Abdominal:      General: Abdomen is flat. There is no distension. Palpations: Abdomen is soft. There is no mass. Tenderness: There is no abdominal tenderness. There is no guarding. Hernia: No hernia is present. Musculoskeletal:         General: No deformity. Normal range of motion. Cervical back: Normal range of motion and neck supple. No rigidity. Right lower leg: No edema. Left lower leg: No edema. Lymphadenopathy:      Cervical: No cervical adenopathy. Skin:     General: Skin is warm and dry. Findings: No rash. Neurological:      General: No focal deficit present. Mental Status: She is alert and oriented to person, place, and time. Sensory: No sensory deficit. Motor: No weakness. Coordination: Coordination normal.      Gait: Gait normal.   Psychiatric:         Mood and Affect: Mood normal.         Behavior: Behavior normal.         Thought Content: Thought content normal.         Judgment: Judgment normal.        BMI Counseling: Body mass index is 32.92 kg/m². The BMI is above normal. Nutrition recommendations include reducing portion sizes and 3-5 servings of fruits/vegetables daily. Exercise recommendations include moderate aerobic physical activity for 150 minutes/week and exercising 3-5 times per week.   Mikhail Torres,

## 2023-10-02 PROBLEM — Z00.00 MEDICARE ANNUAL WELLNESS VISIT, SUBSEQUENT: Status: RESOLVED | Noted: 2021-07-29 | Resolved: 2023-10-02

## 2023-11-13 ENCOUNTER — HOSPITAL ENCOUNTER (OUTPATIENT)
Dept: ULTRASOUND IMAGING | Facility: HOSPITAL | Age: 73
Discharge: HOME | End: 2023-11-13
Payer: COMMERCIAL

## 2023-11-13 DIAGNOSIS — E04.1 THYROID NODULE: ICD-10-CM

## 2023-11-13 PROCEDURE — 76536 US EXAM OF HEAD AND NECK: CPT

## 2024-01-11 ENCOUNTER — LAB (OUTPATIENT)
Dept: LAB | Facility: CLINIC | Age: 74
End: 2024-01-11
Payer: COMMERCIAL

## 2024-01-11 DIAGNOSIS — E04.1 THYROID NODULE: ICD-10-CM

## 2024-01-11 DIAGNOSIS — E55.9 VITAMIN D INSUFFICIENCY: ICD-10-CM

## 2024-01-11 DIAGNOSIS — I10 ESSENTIAL HYPERTENSION: ICD-10-CM

## 2024-01-11 DIAGNOSIS — M81.0 OSTEOPOROSIS WITHOUT CURRENT PATHOLOGICAL FRACTURE, UNSPECIFIED OSTEOPOROSIS TYPE: ICD-10-CM

## 2024-01-11 DIAGNOSIS — M06.09 RHEUMATOID ARTHRITIS OF MULTIPLE SITES WITH NEGATIVE RHEUMATOID FACTOR (HCC): Primary | ICD-10-CM

## 2024-01-11 DIAGNOSIS — E78.00 HYPERCHOLESTEROLEMIA: ICD-10-CM

## 2024-01-11 DIAGNOSIS — E03.9 ACQUIRED HYPOTHYROIDISM: ICD-10-CM

## 2024-01-11 LAB
25(OH)D3 SERPL-MCNC: 41.6 NG/ML (ref 30–100)
ALBUMIN SERPL BCP-MCNC: 4.2 G/DL (ref 3.5–5)
ALP SERPL-CCNC: 46 U/L (ref 34–104)
ALT SERPL W P-5'-P-CCNC: 20 U/L (ref 7–52)
ANION GAP SERPL CALCULATED.3IONS-SCNC: 6 MMOL/L
AST SERPL W P-5'-P-CCNC: 26 U/L (ref 13–39)
BASOPHILS # BLD AUTO: 0.06 THOUSANDS/ÂΜL (ref 0–0.1)
BASOPHILS NFR BLD AUTO: 1 % (ref 0–1)
BILIRUB SERPL-MCNC: 0.71 MG/DL (ref 0.2–1)
BUN SERPL-MCNC: 13 MG/DL (ref 5–25)
CALCIUM SERPL-MCNC: 9.4 MG/DL (ref 8.4–10.2)
CHLORIDE SERPL-SCNC: 106 MMOL/L (ref 96–108)
CHOLEST SERPL-MCNC: 184 MG/DL
CO2 SERPL-SCNC: 28 MMOL/L (ref 21–32)
CREAT SERPL-MCNC: 0.48 MG/DL (ref 0.6–1.3)
CRP SERPL QL: 3.1 MG/L
EOSINOPHIL # BLD AUTO: 0.1 THOUSAND/ÂΜL (ref 0–0.61)
EOSINOPHIL NFR BLD AUTO: 2 % (ref 0–6)
ERYTHROCYTE [DISTWIDTH] IN BLOOD BY AUTOMATED COUNT: 13.6 % (ref 11.6–15.1)
ERYTHROCYTE [SEDIMENTATION RATE] IN BLOOD: 12 MM/HOUR (ref 0–29)
GFR SERPL CREATININE-BSD FRML MDRD: 99 ML/MIN/1.73SQ M
GLUCOSE P FAST SERPL-MCNC: 85 MG/DL (ref 65–99)
HCT VFR BLD AUTO: 41.4 % (ref 34.8–46.1)
HDLC SERPL-MCNC: 47 MG/DL
HGB BLD-MCNC: 13.5 G/DL (ref 11.5–15.4)
IMM GRANULOCYTES # BLD AUTO: 0.02 THOUSAND/UL (ref 0–0.2)
IMM GRANULOCYTES NFR BLD AUTO: 0 % (ref 0–2)
LDLC SERPL CALC-MCNC: 115 MG/DL (ref 0–100)
LYMPHOCYTES # BLD AUTO: 1.57 THOUSANDS/ÂΜL (ref 0.6–4.47)
LYMPHOCYTES NFR BLD AUTO: 26 % (ref 14–44)
MCH RBC QN AUTO: 29.2 PG (ref 26.8–34.3)
MCHC RBC AUTO-ENTMCNC: 32.6 G/DL (ref 31.4–37.4)
MCV RBC AUTO: 90 FL (ref 82–98)
MONOCYTES # BLD AUTO: 0.61 THOUSAND/ÂΜL (ref 0.17–1.22)
MONOCYTES NFR BLD AUTO: 10 % (ref 4–12)
NEUTROPHILS # BLD AUTO: 3.65 THOUSANDS/ÂΜL (ref 1.85–7.62)
NEUTS SEG NFR BLD AUTO: 61 % (ref 43–75)
NONHDLC SERPL-MCNC: 137 MG/DL
NRBC BLD AUTO-RTO: 0 /100 WBCS
PLATELET # BLD AUTO: 241 THOUSANDS/UL (ref 149–390)
PMV BLD AUTO: 9.6 FL (ref 8.9–12.7)
POTASSIUM SERPL-SCNC: 4 MMOL/L (ref 3.5–5.3)
PROT SERPL-MCNC: 6.6 G/DL (ref 6.4–8.4)
RBC # BLD AUTO: 4.62 MILLION/UL (ref 3.81–5.12)
SODIUM SERPL-SCNC: 140 MMOL/L (ref 135–147)
T4 FREE SERPL-MCNC: 1.12 NG/DL (ref 0.61–1.12)
TRIGL SERPL-MCNC: 109 MG/DL
TSH SERPL DL<=0.05 MIU/L-ACNC: 1.29 UIU/ML (ref 0.45–4.5)
WBC # BLD AUTO: 6.01 THOUSAND/UL (ref 4.31–10.16)

## 2024-01-11 PROCEDURE — 80053 COMPREHEN METABOLIC PANEL: CPT

## 2024-01-11 PROCEDURE — 36415 COLL VENOUS BLD VENIPUNCTURE: CPT

## 2024-01-11 PROCEDURE — 84443 ASSAY THYROID STIM HORMONE: CPT

## 2024-01-11 PROCEDURE — 85025 COMPLETE CBC W/AUTO DIFF WBC: CPT

## 2024-01-11 PROCEDURE — 85652 RBC SED RATE AUTOMATED: CPT

## 2024-01-11 PROCEDURE — 84439 ASSAY OF FREE THYROXINE: CPT

## 2024-01-11 PROCEDURE — 86140 C-REACTIVE PROTEIN: CPT

## 2024-01-11 PROCEDURE — 82306 VITAMIN D 25 HYDROXY: CPT

## 2024-01-11 PROCEDURE — 80061 LIPID PANEL: CPT

## 2024-01-17 ENCOUNTER — OFFICE VISIT (OUTPATIENT)
Dept: ENDOCRINOLOGY | Facility: HOSPITAL | Age: 74
End: 2024-01-17
Payer: COMMERCIAL

## 2024-01-17 VITALS
OXYGEN SATURATION: 99 % | HEIGHT: 62 IN | DIASTOLIC BLOOD PRESSURE: 66 MMHG | BODY MASS INDEX: 33.13 KG/M2 | HEART RATE: 62 BPM | WEIGHT: 180 LBS | SYSTOLIC BLOOD PRESSURE: 118 MMHG

## 2024-01-17 DIAGNOSIS — E78.00 HYPERCHOLESTEROLEMIA: ICD-10-CM

## 2024-01-17 DIAGNOSIS — E04.1 THYROID NODULE: ICD-10-CM

## 2024-01-17 DIAGNOSIS — I50.32 CHRONIC DIASTOLIC CONGESTIVE HEART FAILURE (HCC): ICD-10-CM

## 2024-01-17 DIAGNOSIS — E03.9 ACQUIRED HYPOTHYROIDISM: Primary | Chronic | ICD-10-CM

## 2024-01-17 DIAGNOSIS — E03.9 HYPOTHYROIDISM, UNSPECIFIED TYPE: ICD-10-CM

## 2024-01-17 DIAGNOSIS — M81.0 OSTEOPOROSIS WITHOUT CURRENT PATHOLOGICAL FRACTURE, UNSPECIFIED OSTEOPOROSIS TYPE: ICD-10-CM

## 2024-01-17 PROCEDURE — 99214 OFFICE O/P EST MOD 30 MIN: CPT | Performed by: PHYSICIAN ASSISTANT

## 2024-01-17 PROCEDURE — 96372 THER/PROPH/DIAG INJ SC/IM: CPT

## 2024-01-17 RX ORDER — LEVOTHYROXINE SODIUM 0.07 MG/1
75 TABLET ORAL DAILY
Qty: 30 TABLET | Refills: 5 | Status: SHIPPED | OUTPATIENT
Start: 2024-01-17

## 2024-01-17 RX ORDER — METOPROLOL SUCCINATE 50 MG/1
25 TABLET, EXTENDED RELEASE ORAL DAILY
COMMUNITY

## 2024-01-17 NOTE — PROGRESS NOTES
Demi Almonte 73 y.o. female MRN: [id number]    Encounter: [id number]      Assessment/Plan     Assessment:  This is a 73 y.o.-year-old female with hypothyroidism, hypertension, thyroid nodule and osteoporosis with vitamin D deficiency.    Plan:  1.  Hypothyroidism: Most recent thyroid lab work came back normal.  Clinically and biochemically euthyroid.  At this time she will continue with levothyroxine 75 mcg daily.  She will contact the office if there is any change in symptoms.  Follow-up in 6 months with lab work completed prior to visit.    2.  Osteoporosis: Received Prolia injection today.  Will continue with Prolia injections every 6 months.  Her last DEXA scan was March 2022, so she will be due in March.  Please get completed prior to next office visit.  If needed we will discuss if adjustments need to be made to her osteoporosis treatment.    3.  Thyroid nodule: Thyroid nodule stable.  We did discuss stretching out ultrasounds as nodule has been stable for over 7 years.  At this time she does want to continue getting ultrasounds once a year.  Next ultrasound will be due November 2024.    4.  Vitamin D deficiency: Vitamin D levels normal.  Continue with vitamin D 1000 units daily.    CC: Hypothyroidism, thyroid nodule, osteoporosis follow-up    History of Present Illness     HPI:  73 y.o. female with history of polymyalgia rheumatica, hypothyroidism, DVT, atrial fibrillation, hyperlipidemia presents for follow up of hypothyroidism. Has had hypothyroidism for many years treated on thyroid hormone replacement.  She was hospitalized in summer of 2017 for heart arrhythmia.  At that time she received amiodarone and was discharged home on amiodarone. She was on amiodarone until about September 2017. While on this medication her thyroid hormone requirements went up. Since then she reports cold intolerance and fatigue.  She is currently taking levothyroxine 75 mcg daily.  She is taking her levothyroxine first  thing in morning prior to having anything to eat.  Overall she is doing well today.  Denies any fatigue, cold or heat intolerance, abdominal pain, diarrhea or constipation, tremors, insomnia, anxiety or depression.  Has been having issues with atrial fibrillation at this time and is currently following up with cardiology.  She takes her calcium and other vitamins later in the day.  Her most recent TSH from July 5, 2023 is 3.636.  She also has history of polymyalgia rheumatica and follows closely with her rheumatologist at [facility].     Her hypertension is treated with Lasix 20 mg daily and metoprolol 50 mg twice daily.     For her vitamin-D deficiency she supplements with 1000 units of vitamin D3 daily.  Her most recent 25 hydroxy vitamin-D level from January 11, 2024 is 41.6.     Her most recent thyroid ultrasound from November 13, 2023 reveals a heterogeneously thyroid gland.  Thyroid nodule on the left lower pole is stable.  Denies any neck compressive symptoms.    She has been diagnosed with osteoporosis.  Had DEXA scan completed by PCP March 2022 which revealed T-score of -0.8 and lumbar spine, -2.2 in the left total hip, -3.4 in the left femoral neck, -3.9 in the right forearm.  Initial workup did show normal calcium, SPEP and PTH.  That being said PTH has run slightly high in the past with also some mild hypercalcemia.  She was started on Prolia December 2022 and will be receiving her third injection today.  Denies any recent falls or fractures.  Does have a personal history of chronic steroid use upon her initial diagnosis for PMR.    Review of Systems   Constitutional:  Negative for activity change, appetite change, diaphoresis, fatigue and unexpected weight change.   HENT:  Negative for sore throat, trouble swallowing and voice change.    Eyes:  Negative for visual disturbance.   Respiratory:  Negative for chest tightness and shortness of breath.    Cardiovascular:  Positive for palpitations.  "Negative for chest pain and leg swelling.   Gastrointestinal:  Negative for abdominal pain, constipation and diarrhea.   Endocrine: Negative for cold intolerance, heat intolerance, polydipsia, polyphagia and polyuria.   Skin:  Negative for rash.   Neurological:  Negative for dizziness, tremors, light-headedness, numbness and headaches.   Hematological:  Negative for adenopathy.   Psychiatric/Behavioral:  Negative for dysphoric mood and sleep disturbance. The patient is not nervous/anxious.        Historical Information   Past Medical History:   Diagnosis Date   • Acute pulmonary embolism ([facility]) 1/10/2017   • Allergic    • Arthritis     rheumatoid   • Atrial fibrillation ([facility])    • CHF (congestive heart failure) (Former[facility])    • Disease of thyroid gland    • DVT (deep venous thrombosis) ([facility])    • DVT, lower extremity ([facility]) 1/10/2017   • HL (hearing loss)     Left Ear-Wear Hearing Aid   • Hypertension    • Irregular heart beat    • Kidney stone    • Migraine     hx of   • Pleural effusion    • Polymyalgia rheumatica ([facility])     \"Remission\"   • Polymyalgia rheumatica ([facility]) 1/10/2017   • Secondary adrenal insufficiency ([facility]) 2017   • Sepsis, unspecified organism ([facility]) 12/15/2019   • Sleep apnea     Mild-Does not require CPAP   • Vitamin D deficiency      Past Surgical History:   Procedure Laterality Date   • CARDIAC CATHETERIZATION     • CARDIOVERSION N/A 2019    Procedure: CARDIOVERSION;  Surgeon: Roque Alfaro MD;  Location: [facility];  Service: Cardiology   •  SECTION      x3 - last impression 16   • FRACTURE SURGERY Left     wrist   • HERNIA REPAIR  2018   • KNEE ARTHROSCOPY Left     Meniscus Tear Repair   • KNEE CARTILAGE SURGERY Left    • DC ARTHRP KNE CONDYLE&PLATU MEDIAL&LAT COMPARTMENTS Left 3/16/2022    Procedure: KNEE TOTAL ARTHROPLASTY;  Surgeon: Ryan Vega DO;  Location: [facility];  Service: Orthopedics   • TONSILLECTOMY AND ADENOIDECTOMY  child; age 14   • TUBAL " LIGATION     • VEIN SURGERY Right     venous ligation with stripping     Social History   Social History     Substance and Sexual Activity   Alcohol Use Yes   • Alcohol/week: 1.0 standard drink of alcohol   • Types: 1 Glasses of wine per week    Comment: Socially     Social History     Substance and Sexual Activity   Drug Use No     Social History     Tobacco Use   Smoking Status Never   Smokeless Tobacco Never     Family History:   Family History   Problem Relation Age of Onset   • Breast cancer Mother 52   • Arthritis Mother    • Cancer Mother    • Hearing loss Mother    • Vision loss Mother    • Aneurysm Father         aortic   • No Known Problems Sister    • No Known Problems Maternal Grandmother    • No Known Problems Maternal Grandfather    • No Known Problems Paternal Grandmother    • No Known Problems Paternal Grandfather    • Rheum arthritis Maternal Aunt    • Early death Maternal Aunt    • No Known Problems Paternal Aunt    • No Known Problems Paternal Aunt    • No Known Problems Paternal Aunt        Meds/Allergies   Current Outpatient Medications   Medication Sig Dispense Refill   • azelastine (ASTELIN) 0.1 % nasal spray 1 spray into each nostril 2 (two) times a day Use in each nostril as directed 30 mL 5   • Azelastine HCl 137 MCG/SPRAY SOLN SPRAY 2 SPRAYS INTO EACH NOSTRIL 2 TIMES A DAY USE IN EACH NOSTRIL AS DIRECTED 30 mL 5   • Calcium Ascorbate 500 MG TABS Take 500 mg by mouth daily       • cholecalciferol (VITAMIN D3) 1,000 units tablet Take 1,000 Units by mouth daily       • Cinnamon 500 MG capsule Take 500 mg by mouth daily     • Cyanocobalamin (VITAMIN B 12 PO) Take 500 mcg by mouth daily      • levothyroxine 75 mcg tablet Take 1 tablet (75 mcg total) by mouth daily 30 tablet 5   • metoprolol succinate (TOPROL-XL) 50 mg 24 hr tablet Take 25 mg by mouth daily     • Multiple Vitamins-Minerals (multivitamin with minerals) tablet Take 1 tablet by mouth daily 30 tablet 1   • Red Yeast Rice 600 MG TABS  "Take 1 tablet by mouth daily      • XARELTO 20 MG tablet Take 1 tablet by mouth daily before dinner       • Misc Natural Products (OSTEO BI-FLEX JOINT SHIELD PO) Take by mouth   (Patient not taking: Reported on 1/17/2024)     • Multiple Vitamins-Minerals (OCUVITE ADULT 50+) CAPS Take 1 capsule by mouth daily (Patient not taking: Reported on 7/11/2023)       No current facility-administered medications for this visit.     Allergies   Allergen Reactions   • Amiodarone Other (See Comments)     Other reaction(s): Other (See Comments)  Reports caused elevated TSH   • Sudafed [Pseudoephedrine] Tachycardia     Rapid heart beat   • Sulfa Antibiotics Itching and Rash   • Sulfamethoxazole-Trimethoprim Itching and Rash       Objective   Vitals: Blood pressure 118/66, pulse 62, height 5' 1.69\" (1.567 m), weight 81.6 kg (180 lb), SpO2 99%, not currently breastfeeding.    Physical Exam  Vitals and nursing note reviewed.   Constitutional:       General: She is not in acute distress.     Appearance: Normal appearance. She is not diaphoretic.   HENT:      Head: Normocephalic and atraumatic.   Eyes:      General: No scleral icterus.     Extraocular Movements: Extraocular movements intact.      Conjunctiva/sclera: Conjunctivae normal.      Pupils: Pupils are equal, round, and reactive to light.   Cardiovascular:      Rate and Rhythm: Normal rate and regular rhythm.      Heart sounds: No murmur heard.  Pulmonary:      Effort: Pulmonary effort is normal. No respiratory distress.      Breath sounds: Normal breath sounds. No wheezing.   Musculoskeletal:      Cervical back: Normal range of motion.      Right lower leg: No edema.      Left lower leg: No edema.   Lymphadenopathy:      Cervical: No cervical adenopathy.   Neurological:      Mental Status: She is alert and oriented to person, place, and time. Mental status is at baseline.      Sensory: No sensory deficit.      Gait: Gait normal.   Psychiatric:         Mood and Affect: Mood " normal.         Behavior: Behavior normal.         Thought Content: Thought content normal.         The history was obtained from the review of the chart, patient.    Lab Results:   Lab Results   Component Value Date/Time    Potassium 4.0 01/11/2024 09:30 AM    Potassium 4.0 07/05/2023 07:10 AM    Chloride 106 01/11/2024 09:30 AM    Chloride 110 (H) 07/05/2023 07:10 AM    CO2 28 01/11/2024 09:30 AM    CO2 31 07/05/2023 07:10 AM    BUN 13 01/11/2024 09:30 AM    BUN 15 07/05/2023 07:10 AM    Creatinine 0.48 (L) 01/11/2024 09:30 AM    Creatinine 0.62 07/05/2023 07:10 AM    Glucose, Fasting 85 01/11/2024 09:30 AM    Glucose, Fasting 95 07/05/2023 07:10 AM    Calcium 9.4 01/11/2024 09:30 AM    Calcium 10.4 (H) 07/05/2023 07:10 AM    eGFR 99 01/11/2024 09:30 AM    eGFR 91 07/05/2023 07:10 AM    TSH 3RD GENERATON 1.291 01/11/2024 09:30 AM    TSH 3RD GENERATON 3.636 07/05/2023 07:10 AM    TSH 3RD GENERATON 1.330 01/30/2023 10:27 AM    Free T4 1.12 01/11/2024 09:30 AM    Free T4 1.00 07/05/2023 07:10 AM    Vit D, 25-Hydroxy 41.6 01/11/2024 09:30 AM    Vit D, 25-Hydroxy 41.2 07/05/2023 07:10 AM         Imaging Studies:   ?  ?  ?  Results for orders placed during the hospital encounter of 03/04/22    DXA bone density spine hip and pelvis    Impression  1.  Osteoporosis.    2.  The 10 year risk of hip fracture is 16% with the 10 year risk of major osteoporotic fracture being 39% as calculated by the [employer]/WHO fracture risk assessment tool (FRAX).    3.  The current NOF guidelines recommend treating patients with a T-score of -2.5 or less in the lumbar spine or hips, or in post-menopausal women and men over the age of 50 with low bone mass (osteopenia) and a FRAX 10 year risk score of >3% for hip  fracture and/or >20% for major osteoporotic fracture.    4.  The NOF recommends follow-up DXA in 1-2 years after initiating therapy for osteoporosis and every 2 years thereafter. More frequent evaluation is  "appropriate for patients with conditions associated with rapid bone loss, such as glucocorticoid  therapy. The interval between DXA screenings may be longer for individuals without major risk factors and initial T-score in the normal or upper low bone mass range.      The FRAX algorithm has certain limitations:  -FRAX has not been validated in patients currently or previously treated with pharmacotherapy for osteoporosis.  In such patients, clinical judgment must be exercised in interpreting FRAX scores.  -Prior hip, vertebral and humeral fragility fractures appear to confer greater risk of subsequent fracture than fractures at other sites (this is especially true for individuals with severe vertebral fractures), but quantification of this incremental  risk is not possible with FRAX.  -FRAX underestimates fracture risk in patients with history of multiple fragility fractures.  -FRAX may underestimate fracture risk in patients with history of frequent falls.  -It is not appropriate to use FRAX to monitor treatment response.      WHO CLASSIFICATION:  Normal (a T-score of -1.0 or higher)  Low bone mineral density (a T-score of less than -1.0 but higher than -2.5)  Osteoporosis (a T-score of -2.5 or less)  Severe osteoporosis (a T-score of -2.5 or less with a fragility fracture)                Workstation performed: [id number]    ?  ?  ?    I have personally reviewed pertinent reports.      Portions of the record may have been created with voice recognition software. Occasional wrong word or \"sound a like\" substitutions may have occurred due to the inherent limitations of voice recognition software. Read the chart carefully and recognize, using context, where substitutions have occurred.    "

## 2024-01-17 NOTE — PATIENT INSTRUCTIONS
Continue with same dose of levothyroxine.    Get Dexa scan in March 2024.    Continue with Prolia.    Follow up in 6 months with lab work.

## 2024-01-17 NOTE — PROGRESS NOTES
"Assessment/Plan:    Demi Almonte came into the [facility] today 01/17/24 to receive Prolia injection.      Verbal consent obtained.  Consent given by: patient    patient states patient has been medically healthy with no underlining concerns/complications.      Demi Almonte presents with no symptoms today.       All insturctions were reviewed with the patient.    If the patient should have any questions/concerns, advised patient to contacted [facility].       Subjective:     History provided by: patient    Patient ID: Demi Almonte is a 73 y.o. female      Objective:    Vitals:    01/17/24 1127   BP: 118/66   Pulse: 62   SpO2: 99%   Weight: 81.6 kg (180 lb)   Height: 5' 1.69\" (1.567 m)       Patient tolerated the injection well without any complications.  Injection site/s left arm.  Medication was provided by office supply.    Patient signed consent form yes   Patient signed ABN form yes (If no patient is not a medicare patient).   Patient waited 15 minutes after injection no (This only applies to patient's receiving first time injection).       Last Visit: 7/21/2023  Next visit:Visit date not found     "

## 2024-01-26 ENCOUNTER — ANNUAL EXAM (OUTPATIENT)
Dept: OBGYN CLINIC | Facility: MEDICAL CENTER | Age: 74
End: 2024-01-26
Payer: COMMERCIAL

## 2024-01-26 VITALS
HEIGHT: 61 IN | DIASTOLIC BLOOD PRESSURE: 80 MMHG | SYSTOLIC BLOOD PRESSURE: 138 MMHG | BODY MASS INDEX: 34.1 KG/M2 | WEIGHT: 180.6 LBS

## 2024-01-26 DIAGNOSIS — Z12.4 PAP SMEAR FOR CERVICAL CANCER SCREENING: ICD-10-CM

## 2024-01-26 DIAGNOSIS — Z01.419 ENCOUNTER FOR WELL WOMAN EXAM WITH ROUTINE GYNECOLOGICAL EXAM: Primary | ICD-10-CM

## 2024-01-26 DIAGNOSIS — Z12.31 ENCOUNTER FOR SCREENING MAMMOGRAM FOR MALIGNANT NEOPLASM OF BREAST: ICD-10-CM

## 2024-01-26 PROCEDURE — G0145 SCR C/V CYTO,THINLAYER,RESCR: HCPCS | Performed by: OBSTETRICS & GYNECOLOGY

## 2024-01-26 PROCEDURE — G0101 CA SCREEN;PELVIC/BREAST EXAM: HCPCS | Performed by: OBSTETRICS & GYNECOLOGY

## 2024-01-26 NOTE — PROGRESS NOTES
"[facility]  [address], [address], [place]    ASSESSMENT/PLAN: Demi Almonte is a 73 y.o.  who presents for annual gynecologic exam.  Routine well woman exam completed today.  Cervical Cancer Screening: Current ASCCP Guidelines reviewed. Last Pap: . Pap not done today every 2 years. Done today  Beast cancer screening: due 2023  Colon cancer screening, done .   Bone density screening: on Prolia per endocrinology   CC:  Annual Gynecologic Examination    HPI: Demi Almonte is a 73 y.o.  who presents for annual gynecologic examination.  No GYN complaints, doing well    Gynecologic Exam    No GYN copmlaints; doing well    The following portions of the patient's history were reviewed and updated as appropriate: allergies, current medications, past family history, past medical history, obstetric history, gynecologic history, past social history, past surgical history and problem list.    Review of Systems   Constitutional: Negative.    HENT: Negative.     Eyes: Negative.    Respiratory: Negative.     Cardiovascular: Negative.    Gastrointestinal: Negative.    Genitourinary: Negative.    Musculoskeletal: Negative.    All other systems reviewed and are negative.        Objective:  /80   Ht 5' 1\" (1.549 m)   Wt 81.9 kg (180 lb 9.6 oz)   BMI 34.12 kg/m²    Physical Exam  Vitals reviewed.   Constitutional:       General: She is not in acute distress.     Appearance: She is well-developed.   HENT:      Head: Normocephalic and atraumatic.      Nose: Nose normal.   Cardiovascular:      Rate and Rhythm: Normal rate.   Pulmonary:      Effort: Pulmonary effort is normal. No respiratory distress.   Chest:   Breasts:     Breasts are symmetrical.      Right: Normal. No mass, nipple discharge, skin change or tenderness.      Left: Normal. No mass, nipple discharge, skin change or tenderness.   Abdominal:      General: There is no distension.      Palpations: " Abdomen is soft. There is no mass.      Tenderness: There is no abdominal tenderness. There is no guarding or rebound.   Genitourinary:     General: Normal vulva.      Exam position: Lithotomy position.      Labia:         Right: No lesion.         Left: No lesion.       Urethra: No prolapse (urethral meatus normal).      Vagina: Normal. No vaginal discharge, erythema or bleeding.      Cervix: Normal.      Uterus: Normal.       Adnexa: Right adnexa normal and left adnexa normal.      Comments: Pap done   Musculoskeletal:         General: Normal range of motion.      Cervical back: Normal range of motion.   Lymphadenopathy:      Upper Body:      Right upper body: No supraclavicular, axillary or pectoral adenopathy.      Left upper body: No supraclavicular, axillary or pectoral adenopathy.      Lower Body: No right inguinal adenopathy. No left inguinal adenopathy.   Skin:     General: Skin is warm and dry.   Neurological:      Mental Status: She is alert and oriented to person, place, and time.   Psychiatric:         Behavior: Behavior normal.         Thought Content: Thought content normal.         Judgment: Judgment normal.

## 2024-01-29 ENCOUNTER — RA CDI HCC (OUTPATIENT)
Dept: OTHER | Facility: HOSPITAL | Age: 74
End: 2024-01-29

## 2024-01-29 NOTE — PROGRESS NOTES
HCC coding opportunities          Chart Reviewed number of suggestions sent to Provider: 1  I11.0     Patients Insurance     Medicare Insurance: [facility] Medicare Advantage

## 2024-02-02 ENCOUNTER — HOSPITAL ENCOUNTER (OUTPATIENT)
Dept: MAMMOGRAPHY | Facility: HOSPITAL | Age: 74
Discharge: HOME | End: 2024-02-02
Attending: OBSTETRICS & GYNECOLOGY
Payer: COMMERCIAL

## 2024-02-02 VITALS — HEIGHT: 61 IN | BODY MASS INDEX: 33.99 KG/M2 | WEIGHT: 180 LBS

## 2024-02-02 DIAGNOSIS — Z12.31 ENCOUNTER FOR SCREENING MAMMOGRAM FOR MALIGNANT NEOPLASM OF BREAST: ICD-10-CM

## 2024-02-02 DIAGNOSIS — Z01.419 ENCOUNTER FOR WELL WOMAN EXAM WITH ROUTINE GYNECOLOGICAL EXAM: ICD-10-CM

## 2024-02-02 LAB
LAB AP GYN PRIMARY INTERPRETATION: NORMAL
Lab: NORMAL

## 2024-02-02 PROCEDURE — 77067 SCR MAMMO BI INCL CAD: CPT

## 2024-02-02 PROCEDURE — 77063 BREAST TOMOSYNTHESIS BI: CPT

## 2024-02-05 ENCOUNTER — OFFICE VISIT (OUTPATIENT)
Dept: FAMILY MEDICINE CLINIC | Facility: CLINIC | Age: 74
End: 2024-02-05
Payer: COMMERCIAL

## 2024-02-05 VITALS
SYSTOLIC BLOOD PRESSURE: 130 MMHG | HEIGHT: 61 IN | HEART RATE: 65 BPM | BODY MASS INDEX: 33.99 KG/M2 | WEIGHT: 180 LBS | TEMPERATURE: 99.1 F | DIASTOLIC BLOOD PRESSURE: 84 MMHG | OXYGEN SATURATION: 98 %

## 2024-02-05 DIAGNOSIS — I10 ESSENTIAL HYPERTENSION: Primary | ICD-10-CM

## 2024-02-05 DIAGNOSIS — E03.9 ACQUIRED HYPOTHYROIDISM: Chronic | ICD-10-CM

## 2024-02-05 DIAGNOSIS — I48.20 CHRONIC ATRIAL FIBRILLATION (HCC): ICD-10-CM

## 2024-02-05 DIAGNOSIS — E78.00 HYPERCHOLESTEROLEMIA: ICD-10-CM

## 2024-02-05 DIAGNOSIS — I48.0 PAROXYSMAL ATRIAL FIBRILLATION (HCC): ICD-10-CM

## 2024-02-05 PROBLEM — R11.2 NAUSEA AND/OR VOMITING: Status: RESOLVED | Noted: 2018-01-11 | Resolved: 2024-02-05

## 2024-02-05 PROBLEM — J90 BILATERAL PLEURAL EFFUSION: Status: RESOLVED | Noted: 2017-05-30 | Resolved: 2024-02-05

## 2024-02-05 PROBLEM — I31.4 TAMPONADE: Status: RESOLVED | Noted: 2018-01-15 | Resolved: 2024-02-05

## 2024-02-05 PROBLEM — J01.00 ACUTE MAXILLARY SINUSITIS: Status: RESOLVED | Noted: 2017-12-04 | Resolved: 2024-02-05

## 2024-02-05 PROBLEM — I49.1 PREMATURE ATRIAL COMPLEXES: Status: RESOLVED | Noted: 2017-01-12 | Resolved: 2024-02-05

## 2024-02-05 PROCEDURE — 99214 OFFICE O/P EST MOD 30 MIN: CPT | Performed by: FAMILY MEDICINE

## 2024-02-05 NOTE — PROGRESS NOTES
"Assessment/Plan:    No problem-specific Assessment & Plan notes found for this encounter.       Diagnoses and all orders for this visit:    Essential hypertension  Comments:  no change in the medication  Orders:  -     CBC and differential; Future    Hypercholesterolemia  -     Comprehensive metabolic panel; Future  -     Lipid panel; Future    Acquired hypothyroidism  -     TSH, 3rd generation with Free T4 reflex; Future    Paroxysmal atrial fibrillation (HCC)  Comments:  per cardiology    Chronic atrial fibrillation (HCC)  Comments:  possible ablation in the future          PHQ-2/9 Depression Screening    Little interest or pleasure in doing things: 0 - not at all  Feeling down, depressed, or hopeless: 0 - not at all            Subjective:      Patient ID: Demi Almonte is a 73 y.o. female.    Hypertension  This is a chronic problem. The current episode started more than 1 year ago. The problem is unchanged. The problem is controlled. Pertinent negatives include no chest pain, headaches or palpitations. There are no associated agents to hypertension. Risk factors for coronary artery disease include obesity and sedentary lifestyle. Past treatments include beta blockers.       The following portions of the patient's history were reviewed and updated as appropriate: allergies, current medications, past family history, past medical history, past social history, past surgical history, and problem list.    Review of Systems   Eyes:  Negative for visual disturbance.   Cardiovascular:  Negative for chest pain and palpitations.   Neurological:  Negative for headaches.         Objective:    /84   Pulse 65   Temp 99.1 °F (37.3 °C) (Tympanic)   Ht 5' 1\" (1.549 m)   Wt 81.6 kg (180 lb)   SpO2 98%   BMI 34.01 kg/m²      Physical Exam  Vitals and nursing note reviewed.   Constitutional:       General: She is not in acute distress.     Appearance: Normal appearance. She is not ill-appearing, toxic-appearing or " diaphoretic.   HENT:      Head: Normocephalic and atraumatic.   Eyes:      Conjunctiva/sclera: Conjunctivae normal.   Cardiovascular:      Rate and Rhythm: Normal rate. Rhythm irregular.      Heart sounds: Normal heart sounds. No murmur heard.  Pulmonary:      Effort: Pulmonary effort is normal. No respiratory distress.      Breath sounds: Normal breath sounds. No wheezing, rhonchi or rales.   Abdominal:      General: There is no distension.      Palpations: Abdomen is soft. There is no mass.      Tenderness: There is no abdominal tenderness. There is no guarding or rebound.   Musculoskeletal:      Cervical back: Normal range of motion and neck supple. No rigidity.      Right lower leg: No edema.      Left lower leg: No edema.   Lymphadenopathy:      Cervical: No cervical adenopathy.   Neurological:      Mental Status: She is alert and oriented to person, place, and time.   Psychiatric:         Mood and Affect: Mood normal.         Behavior: Behavior normal.         Thought Content: Thought content normal.         Judgment: Judgment normal.

## 2024-02-08 ENCOUNTER — VBI (OUTPATIENT)
Dept: ADMINISTRATIVE | Facility: OTHER | Age: 74
End: 2024-02-08

## 2024-02-21 PROBLEM — R50.9 FEVER OF UNKNOWN ORIGIN (FUO): Status: RESOLVED | Noted: 2018-01-15 | Resolved: 2024-02-21

## 2024-02-21 PROBLEM — R05.9 COUGH: Status: RESOLVED | Noted: 2019-08-01 | Resolved: 2024-02-21

## 2024-02-29 ENCOUNTER — TELEPHONE (OUTPATIENT)
Dept: FAMILY MEDICINE CLINIC | Facility: CLINIC | Age: 74
End: 2024-02-29

## 2024-02-29 ENCOUNTER — OFFICE VISIT (OUTPATIENT)
Dept: FAMILY MEDICINE CLINIC | Facility: CLINIC | Age: 74
End: 2024-02-29
Payer: COMMERCIAL

## 2024-02-29 VITALS
TEMPERATURE: 98.4 F | BODY MASS INDEX: 34.74 KG/M2 | DIASTOLIC BLOOD PRESSURE: 70 MMHG | OXYGEN SATURATION: 98 % | HEART RATE: 78 BPM | HEIGHT: 61 IN | SYSTOLIC BLOOD PRESSURE: 132 MMHG | WEIGHT: 184 LBS

## 2024-02-29 DIAGNOSIS — N39.0 URINARY TRACT INFECTION WITHOUT HEMATURIA, SITE UNSPECIFIED: Primary | ICD-10-CM

## 2024-02-29 PROCEDURE — 87186 SC STD MICRODIL/AGAR DIL: CPT | Performed by: NURSE PRACTITIONER

## 2024-02-29 PROCEDURE — 81001 URINALYSIS AUTO W/SCOPE: CPT | Performed by: NURSE PRACTITIONER

## 2024-02-29 PROCEDURE — 87077 CULTURE AEROBIC IDENTIFY: CPT | Performed by: NURSE PRACTITIONER

## 2024-02-29 PROCEDURE — 99214 OFFICE O/P EST MOD 30 MIN: CPT | Performed by: NURSE PRACTITIONER

## 2024-02-29 PROCEDURE — G2211 COMPLEX E/M VISIT ADD ON: HCPCS | Performed by: NURSE PRACTITIONER

## 2024-02-29 PROCEDURE — 87086 URINE CULTURE/COLONY COUNT: CPT | Performed by: NURSE PRACTITIONER

## 2024-02-29 RX ORDER — CIPROFLOXACIN 250 MG/1
250 TABLET, FILM COATED ORAL EVERY 12 HOURS SCHEDULED
Qty: 6 TABLET | Refills: 0 | Status: SHIPPED | OUTPATIENT
Start: 2024-02-29 | End: 2024-03-01

## 2024-02-29 NOTE — TELEPHONE ENCOUNTER
"This message was left on our voicemail, please advice thanks!      \"Hi, this is Demi Merrill. I was down earlier today and saw the nurse practitioner and she ordered an antibiotic for me. I need to talk to her about this antibiotic because I don't. I think she needs to order a different one for me. My birth date is [birth year]. My phone number is [phone]. That's our landline. I prefer to call that one. Thank you very much.\"    "

## 2024-02-29 NOTE — PROGRESS NOTES
Name: Demi Almonte      : 1950      MRN: [id number]  Encounter Provider: SEVEIRANO Allison  Encounter Date: 2024   Encounter department: [facility]    Assessment & Plan     1. Urinary tract infection without hematuria, site unspecified  -     ciprofloxacin (CIPRO) 250 mg tablet; Take 1 tablet (250 mg total) by mouth every 12 (twelve) hours for 3 days  -     Urine culture  -     Urinalysis with microscopic           Subjective      Patient presents with urinary burning, urgency, frequency (going every 30 minutes), cloud urine and suprapubic pressure. Ongoing since this morning. Had a UTI in 2016 or 2017 but none since. Has allergy to bactrim. Cipro given- reviewed risk of tendon rupture and verbalized understanding.  Urine dip was not working in the office today so went based on strip and symptoms.       Review of Systems   Constitutional:  Negative for activity change, appetite change, chills, fatigue, fever and unexpected weight change.   Respiratory:  Negative for cough and shortness of breath.    Cardiovascular:  Negative for chest pain and palpitations.   Gastrointestinal:  Negative for abdominal pain, constipation, diarrhea, nausea and vomiting.   Genitourinary:  Positive for dysuria, frequency and urgency. Negative for difficulty urinating, dyspareunia, flank pain, hematuria, menstrual problem and pelvic pain.   Musculoskeletal:  Negative for arthralgias and back pain.   Skin:  Negative for color change and rash.   Neurological:  Negative for seizures and syncope.   All other systems reviewed and are negative.      Current Outpatient Medications on File Prior to Visit   Medication Sig   • azelastine (ASTELIN) 0.1 % nasal spray 1 spray into each nostril 2 (two) times a day Use in each nostril as directed   • Calcium Ascorbate 500 MG TABS Take 500 mg by mouth daily     • cholecalciferol (VITAMIN D3) 1,000 units tablet Take 1,000 Units by mouth daily     •  "Cinnamon 500 MG capsule Take 500 mg by mouth daily   • Cyanocobalamin (VITAMIN B 12 PO) Take 500 mcg by mouth daily    • levothyroxine 75 mcg tablet Take 1 tablet (75 mcg total) by mouth daily   • metoprolol succinate (TOPROL-XL) 50 mg 24 hr tablet Take 25 mg by mouth daily   • Misc Natural Products (OSTEO BI-FLEX JOINT SHIELD PO) Take by mouth   • Multiple Vitamins-Minerals (multivitamin with minerals) tablet Take 1 tablet by mouth daily   • Multiple Vitamins-Minerals (OCUVITE ADULT 50+) CAPS Take 1 capsule by mouth daily   • Red Yeast Rice 600 MG TABS Take 1 tablet by mouth daily    • XARELTO 20 MG tablet Take 1 tablet by mouth daily before dinner     • [DISCONTINUED] Azelastine HCl 137 MCG/SPRAY SOLN SPRAY 2 SPRAYS INTO EACH NOSTRIL 2 TIMES A DAY USE IN EACH NOSTRIL AS DIRECTED       Objective     /70   Pulse 78   Temp 98.4 °F (36.9 °C)   Ht 5' 1\" (1.549 m)   Wt 83.5 kg (184 lb)   SpO2 98%   BMI 34.77 kg/m²     Physical Exam  Vitals and nursing note reviewed.   Constitutional:       General: She is not in acute distress.     Appearance: Normal appearance. She is not ill-appearing or toxic-appearing.   Cardiovascular:      Rate and Rhythm: Normal rate and regular rhythm.      Pulses: Normal pulses.      Heart sounds: Normal heart sounds. No murmur heard.     No friction rub. No gallop.   Pulmonary:      Effort: Pulmonary effort is normal. No respiratory distress.      Breath sounds: Normal breath sounds. No stridor. No wheezing or rales.   Abdominal:      General: Abdomen is flat. Bowel sounds are normal.      Palpations: Abdomen is soft.      Tenderness: There is no abdominal tenderness. There is no guarding.      Hernia: No hernia is present.   Musculoskeletal:      Cervical back: Normal range of motion. No rigidity.   Lymphadenopathy:      Cervical: No cervical adenopathy.   Skin:     General: Skin is warm and dry.      Coloration: Skin is not jaundiced.      Findings: No rash.   Neurological:      " General: No focal deficit present.      Mental Status: She is alert and oriented to person, place, and time. Mental status is at baseline.      Motor: No weakness.      Gait: Gait normal.   Psychiatric:         Mood and Affect: Mood normal.         Behavior: Behavior normal.         Thought Content: Thought content normal.         Judgment: Judgment normal.       SEVERIANO Allison

## 2024-03-01 ENCOUNTER — TELEPHONE (OUTPATIENT)
Dept: FAMILY MEDICINE CLINIC | Facility: CLINIC | Age: 74
End: 2024-03-01

## 2024-03-01 DIAGNOSIS — N39.0 URINARY TRACT INFECTION WITH HEMATURIA, SITE UNSPECIFIED: Primary | ICD-10-CM

## 2024-03-01 DIAGNOSIS — R31.9 URINARY TRACT INFECTION WITH HEMATURIA, SITE UNSPECIFIED: Primary | ICD-10-CM

## 2024-03-01 LAB

## 2024-03-01 RX ORDER — AMOXICILLIN 500 MG/1
500 CAPSULE ORAL EVERY 12 HOURS SCHEDULED
Qty: 10 CAPSULE | Refills: 0 | Status: SHIPPED | OUTPATIENT
Start: 2024-03-01 | End: 2024-03-06

## 2024-03-01 NOTE — TELEPHONE ENCOUNTER
Patient called and said she was in yesterday for a possible uti.  You prescribed her ciprofloxacin and when she went to pick it up the pharmacist was concerned about her taking that because she has afib and is also on levothyroxine.  Patient is asking if you can give her something more mild     Her call back number is [phone]

## 2024-03-02 LAB — BACTERIA UR CULT: ABNORMAL

## 2024-04-04 ENCOUNTER — OFFICE VISIT (OUTPATIENT)
Dept: OBGYN CLINIC | Facility: CLINIC | Age: 74
End: 2024-04-04
Payer: COMMERCIAL

## 2024-04-04 ENCOUNTER — APPOINTMENT (OUTPATIENT)
Dept: RADIOLOGY | Facility: MEDICAL CENTER | Age: 74
End: 2024-04-04
Payer: COMMERCIAL

## 2024-04-04 VITALS
BODY MASS INDEX: 34.74 KG/M2 | WEIGHT: 184 LBS | HEART RATE: 81 BPM | DIASTOLIC BLOOD PRESSURE: 80 MMHG | SYSTOLIC BLOOD PRESSURE: 128 MMHG | HEIGHT: 61 IN

## 2024-04-04 DIAGNOSIS — Z96.652 S/P TOTAL KNEE REPLACEMENT, LEFT: Primary | ICD-10-CM

## 2024-04-04 DIAGNOSIS — Z96.652 S/P TOTAL KNEE REPLACEMENT, LEFT: ICD-10-CM

## 2024-04-04 PROCEDURE — 99213 OFFICE O/P EST LOW 20 MIN: CPT | Performed by: ORTHOPAEDIC SURGERY

## 2024-04-04 PROCEDURE — 73562 X-RAY EXAM OF KNEE 3: CPT

## 2024-04-04 NOTE — PROGRESS NOTES
Assessment/Plan:   Diagnoses and all orders for this visit:    S/P total knee replacement, left  -     XR knee 3 vw left non injury; Future         Reviewed physical exam and imaging with patient at time of visit. The patient is 2 years s/p Left total knee arthroplasty. Radiographic findings demonstrate a well-seated prosthesis, with no signs of loosening. She continues to do well post-operatively. Continue weightbearing activities. She will follow-up in 1 year for an annual appointment, with repeat XR of her Left knee. The patient is interested in pursuing surgical intervention of her right knee, in terms of a total knee arthroplasty. She will follow-up in 6 months for surgical planning, with x-rays of the right knee. The patient expresses understanding and is in agreement with today's treatment plan.     The patient is doing quite well in regards to her left total knee replacement as performed 2 years ago.  There is full strength full motion.  Continue home exercise program.  Follow-up in 1 year with for evaluation with new x-rays of left knee-3 views.  She wants to consider a right knee replacement next year.  She will come back in 6 months with new x-rays of the right knee-3 views weightbearing      Subjective:   Patient ID: Demi Almonte  1950     HPI  Patient is a 73 y.o. female who presents for annual post-operative evaluation of her left knee. The patient is approximately 2 years s/p left total knee arthroplasty, completed on 3/16/2024. The patient states that she continues do well post-operatively. The patient reports occasional pain with increased activity, otherwise she states that her knee feels great. The patient states that she completes her activities of daily living, without trouble. The patient states that the pain in her right knee has progressively worsened. She is interested in pursuing surgical intervention at the start of the new year.     The following portions of the patient's  "history were reviewed and updated as appropriate:  Past medical history, past surgical history, Family history, social history, current medications and allergies    Past Medical History:   Diagnosis Date    Acute pulmonary embolism (HCC) 01/10/2017    Allergic     Arthritis     rheumatoid    Atrial fibrillation (HCC)     CHF (congestive heart failure) ([facility])     Disease of thyroid gland     DVT (deep venous thrombosis) ([facility])     DVT, lower extremity ([facility]) 01/10/2017    HL (hearing loss)     Left Ear-Wear Hearing Aid    Hypertension     Hyperthyroidism     Irregular heart beat     Kidney stone     Migraine     hx of    Pleural effusion     Polymyalgia rheumatica (HCC)     \"Remission\"    Polymyalgia rheumatica (HCC) 01/10/2017    Secondary adrenal insufficiency ([facility]) 2017    Sepsis, unspecified organism ([facility]) 12/15/2019    Sleep apnea     Mild-Does not require CPAP    Varicella As a child    Vitamin D deficiency        Past Surgical History:   Procedure Laterality Date    CARDIAC CATHETERIZATION      CARDIOVERSION N/A 2019    Procedure: CARDIOVERSION;  Surgeon: Roque Alfaro MD;  Location: [facility];  Service: Cardiology     SECTION      x3 - last impression 16    FRACTURE SURGERY Left     wrist    HERNIA REPAIR  2018    KNEE ARTHROSCOPY Left     Meniscus Tear Repair    KNEE CARTILAGE SURGERY Left     OR ARTHRP KNE CONDYLE&PLATU MEDIAL&LAT COMPARTMENTS Left 3/16/2022    Procedure: KNEE TOTAL ARTHROPLASTY;  Surgeon: Ryan Vega DO;  Location: [facility];  Service: Orthopedics    TONSILLECTOMY AND ADENOIDECTOMY  child; age 14    TUBAL LIGATION      VEIN SURGERY Right     venous ligation with stripping       Family History   Problem Relation Age of Onset    Breast cancer Mother 52    Arthritis Mother     Cancer Mother     Hearing loss Mother     Vision loss Mother     Osteoarthritis Mother     Aneurysm Father         aortic    No Known Problems Sister     No Known Problems " Maternal Grandmother     No Known Problems Maternal Grandfather     No Known Problems Paternal Grandmother     No Known Problems Paternal Grandfather     Rheum arthritis Maternal Aunt     Early death Maternal Aunt     No Known Problems Paternal Aunt     No Known Problems Paternal Aunt     No Known Problems Paternal Aunt        Social History     Socioeconomic History    Marital status: /Civil Union     Spouse name: None    Number of children: 3    Years of education: None    Highest education level: None   Occupational History    Occupation: Manager     Comment: [employer]   Tobacco Use    Smoking status: Never    Smokeless tobacco: Never   Vaping Use    Vaping status: Never Used   Substance and Sexual Activity    Alcohol use: Not Currently     Alcohol/week: 1.0 standard drink of alcohol     Types: 1 Glasses of wine per week     Comment: Socially    Drug use: No    Sexual activity: Yes     Partners: Male     Birth control/protection: Post-menopausal, Female Sterilization   Other Topics Concern    None   Social History Narrative    None     Social Determinants of Health     Financial Resource Strain: Not on file   Food Insecurity: No Food Insecurity (3/17/2022)    Hunger Vital Sign     Worried About Running Out of Food in the Last Year: Never true     Ran Out of Food in the Last Year: Never true   Transportation Needs: No Transportation Needs (3/17/2022)    PRAPARE - Transportation     Lack of Transportation (Medical): No     Lack of Transportation (Non-Medical): No   Physical Activity: Not on file   Stress: Not on file   Social Connections: Not on file   Intimate Partner Violence: Not on file   Housing Stability: Low Risk  (3/17/2022)    Housing Stability Vital Sign     Unable to Pay for Housing in the Last Year: No     Number of Places Lived in the Last Year: 1     Unstable Housing in the Last Year: No         Current Outpatient Medications:     azelastine (ASTELIN) 0.1 % nasal spray, 1 spray into each  nostril 2 (two) times a day Use in each nostril as directed, Disp: 30 mL, Rfl: 5    Calcium Ascorbate 500 MG TABS, Take 500 mg by mouth daily  , Disp: , Rfl:     cholecalciferol (VITAMIN D3) 1,000 units tablet, Take 1,000 Units by mouth daily  , Disp: , Rfl:     Cinnamon 500 MG capsule, Take 500 mg by mouth daily, Disp: , Rfl:     Cyanocobalamin (VITAMIN B 12 PO), Take 500 mcg by mouth daily , Disp: , Rfl:     levothyroxine 75 mcg tablet, Take 1 tablet (75 mcg total) by mouth daily, Disp: 30 tablet, Rfl: 5    metoprolol succinate (TOPROL-XL) 50 mg 24 hr tablet, Take 25 mg by mouth daily, Disp: , Rfl:     Misc Natural Products (OSTEO BI-FLEX JOINT SHIELD PO), Take by mouth, Disp: , Rfl:     Multiple Vitamins-Minerals (OCUVITE ADULT 50+) CAPS, Take 1 capsule by mouth daily, Disp: , Rfl:     Red Yeast Rice 600 MG TABS, Take 1 tablet by mouth daily , Disp: , Rfl:     XARELTO 20 MG tablet, Take 1 tablet by mouth daily before dinner  , Disp: , Rfl:     Multiple Vitamins-Minerals (multivitamin with minerals) tablet, Take 1 tablet by mouth daily, Disp: 30 tablet, Rfl: 1    Allergies   Allergen Reactions    Amiodarone Other (See Comments)     Other reaction(s): Other (See Comments)  Reports caused elevated TSH    Sudafed [Pseudoephedrine] Tachycardia     Rapid heart beat    Sulfa Antibiotics Itching and Rash    Sulfamethoxazole-Trimethoprim Itching and Rash       Review of Systems   Constitutional:  Negative for chills, fever and unexpected weight change.   HENT:  Negative for hearing loss, nosebleeds and sore throat.    Eyes:  Negative for pain, redness and visual disturbance.   Respiratory:  Negative for cough, shortness of breath and wheezing.    Cardiovascular:  Negative for chest pain, palpitations and leg swelling.   Gastrointestinal:  Negative for abdominal pain, nausea and vomiting.   Endocrine: Negative for polydipsia and polyuria.   Genitourinary:  Negative for dysuria and hematuria.   Skin:  Negative for rash and  "wound.   Neurological:  Negative for dizziness, numbness and headaches.   Psychiatric/Behavioral:  Negative for decreased concentration and suicidal ideas. The patient is not nervous/anxious.    All other systems reviewed and are negative.       Objective:  /80   Pulse 81   Ht 5' 1\" (1.549 m)   Wt 83.5 kg (184 lb)   BMI 34.77 kg/m²     Ortho Exam  left knee(s) -   Patient ambulates with steady gait pattern  Uses No assistive device  No anatomical deformity  Skin is warm and dry to touch with no signs of erythema, ecchymosis, or infection   No soft tissue swelling or effusion noted  ROM (0° - 120°)   Strength: 5/5 throughout  No tenderness to palpation on exam  Flexor and extensor mechanisms are intact   Knee is stable to varus and valgus stress  - Lachman's  - Anterior Drawer, - Posterior Drawer  Patella tracks centrally without palpable crepitus  Calf compartments are soft and supple  - Archie's sign  2+ DP and PT pulses with brisk capillary refill to the toes  Sural, saphenous, tibial, superficial, and deep peroneal motor and sensory distributions intact  Sensation light touch intact distally      Physical Exam  HENT:      Head: Normocephalic and atraumatic.      Nose: Nose normal.   Eyes:      Conjunctiva/sclera: Conjunctivae normal.   Cardiovascular:      Rate and Rhythm: Normal rate.   Pulmonary:      Effort: Pulmonary effort is normal.   Musculoskeletal:      Cervical back: Neck supple.   Skin:     General: Skin is warm and dry.      Capillary Refill: Capillary refill takes less than 2 seconds.   Neurological:      Mental Status: She is alert and oriented to person, place, and time.   Psychiatric:         Mood and Affect: Mood normal.         Behavior: Behavior normal.          Diagnostic Test Review:  The attending physician has personally reviewed the pertinent films in PACS and the interpretation is as follows:    X-Ray of left knee taken on 4/4/2024 were reviewed and showed well-seated total knee " prosthesis with maintained anatomical alignment and no signs of loosening.      Procedures   None performed.     Scribe Attestation      I,:  Dayana Vail am acting as a scribe while in the presence of the attending physician.:       I,:  Ryan Vega, DO personally performed the services described in this documentation    as scribed in my presence.:

## 2024-04-19 ENCOUNTER — HOSPITAL ENCOUNTER (OUTPATIENT)
Dept: BONE DENSITY | Facility: HOSPITAL | Age: 74
Discharge: HOME | End: 2024-04-19
Payer: COMMERCIAL

## 2024-04-19 VITALS — HEIGHT: 61 IN | WEIGHT: 186 LBS | BODY MASS INDEX: 35.12 KG/M2

## 2024-04-19 DIAGNOSIS — M81.0 OSTEOPOROSIS WITHOUT CURRENT PATHOLOGICAL FRACTURE, UNSPECIFIED OSTEOPOROSIS TYPE: ICD-10-CM

## 2024-04-19 PROCEDURE — 77080 DXA BONE DENSITY AXIAL: CPT

## 2024-05-09 ENCOUNTER — APPOINTMENT (OUTPATIENT)
Dept: LAB | Facility: HOSPITAL | Age: 74
End: 2024-05-09
Payer: COMMERCIAL

## 2024-05-09 ENCOUNTER — APPOINTMENT (OUTPATIENT)
Dept: LAB | Facility: CLINIC | Age: 74
End: 2024-05-09
Payer: COMMERCIAL

## 2024-05-09 ENCOUNTER — TRANSCRIBE ORDERS (OUTPATIENT)
Dept: LAB | Facility: CLINIC | Age: 74
End: 2024-05-09

## 2024-05-09 DIAGNOSIS — I48.3 TYPICAL ATRIAL FLUTTER (HCC): Primary | ICD-10-CM

## 2024-05-09 DIAGNOSIS — I48.3 TYPICAL ATRIAL FLUTTER (HCC): ICD-10-CM

## 2024-05-09 DIAGNOSIS — I48.91 ATRIAL FIBRILLATION, UNSPECIFIED TYPE (HCC): ICD-10-CM

## 2024-05-09 LAB
ALBUMIN SERPL BCP-MCNC: 4.2 G/DL (ref 3.5–5)
ALP SERPL-CCNC: 44 U/L (ref 34–104)
ALT SERPL W P-5'-P-CCNC: 28 U/L (ref 7–52)
ANION GAP SERPL CALCULATED.3IONS-SCNC: 5 MMOL/L (ref 4–13)
AST SERPL W P-5'-P-CCNC: 26 U/L (ref 13–39)
BASOPHILS # BLD AUTO: 0.04 THOUSANDS/ÂΜL (ref 0–0.1)
BASOPHILS NFR BLD AUTO: 1 % (ref 0–1)
BILIRUB SERPL-MCNC: 0.74 MG/DL (ref 0.2–1)
BUN SERPL-MCNC: 17 MG/DL (ref 5–25)
CALCIUM SERPL-MCNC: 10.1 MG/DL (ref 8.4–10.2)
CHLORIDE SERPL-SCNC: 105 MMOL/L (ref 96–108)
CO2 SERPL-SCNC: 29 MMOL/L (ref 21–32)
CREAT SERPL-MCNC: 0.59 MG/DL (ref 0.6–1.3)
EOSINOPHIL # BLD AUTO: 0.09 THOUSAND/ÂΜL (ref 0–0.61)
EOSINOPHIL NFR BLD AUTO: 2 % (ref 0–6)
ERYTHROCYTE [DISTWIDTH] IN BLOOD BY AUTOMATED COUNT: 13.4 % (ref 11.6–15.1)
GFR SERPL CREATININE-BSD FRML MDRD: 91 ML/MIN/1.73SQ M
GLUCOSE P FAST SERPL-MCNC: 91 MG/DL (ref 65–99)
HCT VFR BLD AUTO: 41.2 % (ref 34.8–46.1)
HGB BLD-MCNC: 13.1 G/DL (ref 11.5–15.4)
IMM GRANULOCYTES # BLD AUTO: 0.01 THOUSAND/UL (ref 0–0.2)
IMM GRANULOCYTES NFR BLD AUTO: 0 % (ref 0–2)
INR PPP: 1.36 (ref 0.84–1.19)
LYMPHOCYTES # BLD AUTO: 2.18 THOUSANDS/ÂΜL (ref 0.6–4.47)
LYMPHOCYTES NFR BLD AUTO: 38 % (ref 14–44)
MAGNESIUM SERPL-MCNC: 2 MG/DL (ref 1.9–2.7)
MCH RBC QN AUTO: 28.9 PG (ref 26.8–34.3)
MCHC RBC AUTO-ENTMCNC: 31.8 G/DL (ref 31.4–37.4)
MCV RBC AUTO: 91 FL (ref 82–98)
MONOCYTES # BLD AUTO: 0.66 THOUSAND/ÂΜL (ref 0.17–1.22)
MONOCYTES NFR BLD AUTO: 12 % (ref 4–12)
NEUTROPHILS # BLD AUTO: 2.72 THOUSANDS/ÂΜL (ref 1.85–7.62)
NEUTS SEG NFR BLD AUTO: 47 % (ref 43–75)
NRBC BLD AUTO-RTO: 0 /100 WBCS
PLATELET # BLD AUTO: 212 THOUSANDS/UL (ref 149–390)
PMV BLD AUTO: 9 FL (ref 8.9–12.7)
POTASSIUM SERPL-SCNC: 4.1 MMOL/L (ref 3.5–5.3)
PROT SERPL-MCNC: 7 G/DL (ref 6.4–8.4)
PROTHROMBIN TIME: 16.6 SECONDS (ref 11.6–14.5)
RBC # BLD AUTO: 4.53 MILLION/UL (ref 3.81–5.12)
SODIUM SERPL-SCNC: 139 MMOL/L (ref 135–147)
WBC # BLD AUTO: 5.7 THOUSAND/UL (ref 4.31–10.16)

## 2024-05-09 PROCEDURE — 85025 COMPLETE CBC W/AUTO DIFF WBC: CPT

## 2024-05-09 PROCEDURE — 36415 COLL VENOUS BLD VENIPUNCTURE: CPT

## 2024-05-09 PROCEDURE — 83735 ASSAY OF MAGNESIUM: CPT

## 2024-05-09 PROCEDURE — 85610 PROTHROMBIN TIME: CPT

## 2024-05-09 PROCEDURE — 80053 COMPREHEN METABOLIC PANEL: CPT

## 2024-07-02 ENCOUNTER — TELEPHONE (OUTPATIENT)
Dept: ENDOCRINOLOGY | Facility: HOSPITAL | Age: 74
End: 2024-07-02

## 2024-07-09 ENCOUNTER — APPOINTMENT (OUTPATIENT)
Dept: LAB | Facility: CLINIC | Age: 74
End: 2024-07-09
Payer: COMMERCIAL

## 2024-07-09 DIAGNOSIS — M81.0 OSTEOPOROSIS WITHOUT CURRENT PATHOLOGICAL FRACTURE, UNSPECIFIED OSTEOPOROSIS TYPE: ICD-10-CM

## 2024-07-09 DIAGNOSIS — E78.00 HYPERCHOLESTEROLEMIA: ICD-10-CM

## 2024-07-09 DIAGNOSIS — I10 ESSENTIAL HYPERTENSION: ICD-10-CM

## 2024-07-09 DIAGNOSIS — E03.9 ACQUIRED HYPOTHYROIDISM: Chronic | ICD-10-CM

## 2024-07-09 DIAGNOSIS — M06.09 RHEUMATOID ARTHRITIS OF MULTIPLE SITES WITH NEGATIVE RHEUMATOID FACTOR (HCC): ICD-10-CM

## 2024-07-09 LAB
25(OH)D3 SERPL-MCNC: 47.8 NG/ML (ref 30–100)
ALBUMIN SERPL BCG-MCNC: 3.9 G/DL (ref 3.5–5)
ALP SERPL-CCNC: 50 U/L (ref 34–104)
ALT SERPL W P-5'-P-CCNC: 16 U/L (ref 7–52)
ANION GAP SERPL CALCULATED.3IONS-SCNC: 8 MMOL/L (ref 4–13)
AST SERPL W P-5'-P-CCNC: 20 U/L (ref 13–39)
BASOPHILS # BLD AUTO: 0.04 THOUSANDS/ÂΜL (ref 0–0.1)
BASOPHILS NFR BLD AUTO: 1 % (ref 0–1)
BILIRUB SERPL-MCNC: 0.7 MG/DL (ref 0.2–1)
BUN SERPL-MCNC: 17 MG/DL (ref 5–25)
CALCIUM SERPL-MCNC: 10.1 MG/DL (ref 8.4–10.2)
CHLORIDE SERPL-SCNC: 103 MMOL/L (ref 96–108)
CHOLEST SERPL-MCNC: 186 MG/DL
CO2 SERPL-SCNC: 30 MMOL/L (ref 21–32)
CREAT SERPL-MCNC: 0.62 MG/DL (ref 0.6–1.3)
CRP SERPL QL: 4.4 MG/L
EOSINOPHIL # BLD AUTO: 0.15 THOUSAND/ÂΜL (ref 0–0.61)
EOSINOPHIL NFR BLD AUTO: 3 % (ref 0–6)
ERYTHROCYTE [DISTWIDTH] IN BLOOD BY AUTOMATED COUNT: 13.2 % (ref 11.6–15.1)
ERYTHROCYTE [SEDIMENTATION RATE] IN BLOOD: 25 MM/HOUR (ref 0–29)
GFR SERPL CREATININE-BSD FRML MDRD: 90 ML/MIN/1.73SQ M
GLUCOSE P FAST SERPL-MCNC: 90 MG/DL (ref 65–99)
HCT VFR BLD AUTO: 40.8 % (ref 34.8–46.1)
HDLC SERPL-MCNC: 44 MG/DL
HGB BLD-MCNC: 13.4 G/DL (ref 11.5–15.4)
IMM GRANULOCYTES # BLD AUTO: 0.01 THOUSAND/UL (ref 0–0.2)
IMM GRANULOCYTES NFR BLD AUTO: 0 % (ref 0–2)
LDLC SERPL CALC-MCNC: 115 MG/DL (ref 0–100)
LYMPHOCYTES # BLD AUTO: 2.1 THOUSANDS/ÂΜL (ref 0.6–4.47)
LYMPHOCYTES NFR BLD AUTO: 37 % (ref 14–44)
MCH RBC QN AUTO: 29.6 PG (ref 26.8–34.3)
MCHC RBC AUTO-ENTMCNC: 32.8 G/DL (ref 31.4–37.4)
MCV RBC AUTO: 90 FL (ref 82–98)
MONOCYTES # BLD AUTO: 0.63 THOUSAND/ÂΜL (ref 0.17–1.22)
MONOCYTES NFR BLD AUTO: 11 % (ref 4–12)
NEUTROPHILS # BLD AUTO: 2.8 THOUSANDS/ÂΜL (ref 1.85–7.62)
NEUTS SEG NFR BLD AUTO: 48 % (ref 43–75)
NONHDLC SERPL-MCNC: 142 MG/DL
NRBC BLD AUTO-RTO: 0 /100 WBCS
PLATELET # BLD AUTO: 231 THOUSANDS/UL (ref 149–390)
PMV BLD AUTO: 10 FL (ref 8.9–12.7)
POTASSIUM SERPL-SCNC: 4.3 MMOL/L (ref 3.5–5.3)
PROT SERPL-MCNC: 6.8 G/DL (ref 6.4–8.4)
PTH-INTACT SERPL-MCNC: 73.9 PG/ML (ref 12–88)
RBC # BLD AUTO: 4.52 MILLION/UL (ref 3.81–5.12)
SODIUM SERPL-SCNC: 141 MMOL/L (ref 135–147)
T4 FREE SERPL-MCNC: 1.18 NG/DL (ref 0.61–1.12)
TRIGL SERPL-MCNC: 136 MG/DL
TSH SERPL DL<=0.05 MIU/L-ACNC: 0.73 UIU/ML (ref 0.45–4.5)
WBC # BLD AUTO: 5.73 THOUSAND/UL (ref 4.31–10.16)

## 2024-07-09 PROCEDURE — 84439 ASSAY OF FREE THYROXINE: CPT

## 2024-07-09 PROCEDURE — 83970 ASSAY OF PARATHORMONE: CPT

## 2024-07-09 PROCEDURE — 80061 LIPID PANEL: CPT

## 2024-07-09 PROCEDURE — 85025 COMPLETE CBC W/AUTO DIFF WBC: CPT

## 2024-07-09 PROCEDURE — 85652 RBC SED RATE AUTOMATED: CPT

## 2024-07-09 PROCEDURE — 84443 ASSAY THYROID STIM HORMONE: CPT

## 2024-07-09 PROCEDURE — 86140 C-REACTIVE PROTEIN: CPT

## 2024-07-09 PROCEDURE — 82306 VITAMIN D 25 HYDROXY: CPT

## 2024-07-09 PROCEDURE — 36415 COLL VENOUS BLD VENIPUNCTURE: CPT

## 2024-07-09 PROCEDURE — 80053 COMPREHEN METABOLIC PANEL: CPT

## 2024-07-18 ENCOUNTER — OFFICE VISIT (OUTPATIENT)
Dept: ENDOCRINOLOGY | Facility: HOSPITAL | Age: 74
End: 2024-07-18
Payer: COMMERCIAL

## 2024-07-18 VITALS
BODY MASS INDEX: 34.4 KG/M2 | HEART RATE: 55 BPM | WEIGHT: 182.2 LBS | DIASTOLIC BLOOD PRESSURE: 84 MMHG | SYSTOLIC BLOOD PRESSURE: 122 MMHG | HEIGHT: 61 IN

## 2024-07-18 DIAGNOSIS — E03.9 HYPOTHYROIDISM, UNSPECIFIED TYPE: ICD-10-CM

## 2024-07-18 DIAGNOSIS — E55.9 VITAMIN D INSUFFICIENCY: ICD-10-CM

## 2024-07-18 DIAGNOSIS — M81.0 OSTEOPOROSIS WITHOUT CURRENT PATHOLOGICAL FRACTURE, UNSPECIFIED OSTEOPOROSIS TYPE: ICD-10-CM

## 2024-07-18 DIAGNOSIS — I50.32 CHRONIC DIASTOLIC CONGESTIVE HEART FAILURE (HCC): ICD-10-CM

## 2024-07-18 DIAGNOSIS — E03.9 ACQUIRED HYPOTHYROIDISM: Primary | ICD-10-CM

## 2024-07-18 DIAGNOSIS — E04.1 THYROID NODULE: ICD-10-CM

## 2024-07-18 DIAGNOSIS — E78.00 HYPERCHOLESTEROLEMIA: ICD-10-CM

## 2024-07-18 DIAGNOSIS — I10 ESSENTIAL HYPERTENSION: ICD-10-CM

## 2024-07-18 PROCEDURE — 96372 THER/PROPH/DIAG INJ SC/IM: CPT | Performed by: NURSE PRACTITIONER

## 2024-07-18 PROCEDURE — 99214 OFFICE O/P EST MOD 30 MIN: CPT | Performed by: NURSE PRACTITIONER

## 2024-07-18 RX ORDER — LEVOTHYROXINE SODIUM 0.07 MG/1
75 TABLET ORAL DAILY
Qty: 30 TABLET | Refills: 5 | Status: SHIPPED | OUTPATIENT
Start: 2024-07-18

## 2024-07-18 NOTE — PROGRESS NOTES
Demi Almonte 74 y.o. female MRN: [id number]    Encounter: [id number]      Assessment & Plan     Assessment:  This is a 74 y.o.-year-old female with hypothyroidism, hypertension, thyroid nodule and osteoporosis with vitamin D deficiency.     Plan:  1.  Hypothyroidism:  Her most recent TSH in the low normal range with a free T4 is just slightly elevated.  However, given her recent issues with atrial fibrillation, we will decrease her levothyroxine dose slightly.  She will continue levothyroxine 75 mcg Monday through Saturday and take a half dose on Sunday.  Recheck TSH and free T4 in 6 weeks to reassess.  We will contact her with the results and any applicable changes to her levothyroxine regimen.   Check TSH and free T4 prior to next office visit.     2.  Hypertension:  She is normotensive in the office today. Continue current medication.  Check comprehensive metabolic panel prior to next visit.     3.  Vitamin-D deficiency: Recent level is stable..  Continue supplementation with 1000 units of vitamin D3 daily.     4.  Thyroid nodule:  Stable ultrasound in November 2023.   She denies any anterior neck discomfort, dysphagia or dysphonia.  Continue surveillance with follow-up ultrasound in November 2024.     5.  Osteoporosis:  Recent DEXA scan reveals statistically significant increases in the bone mineral density of her lumbar spine and left hip.  She is due for her 5th Prolia injection today.  She will continue supplementation with calcium and vitamin D.  Reviewed fall risk and safety.    CC: Hypothyroidism follow-up    History of Present Illness     HPI:  74 y.o. female with history of polymyalgia rheumatica, hypothyroidism, DVT, atrial fibrillation, hyperlipidemia presents for follow up of hypothyroidism. Has had hypothyroidism for many years treated on thyroid hormone replacement.  She was hospitalized in summer of 2017 for heart arrhythmia.  At that time she received amiodarone and was  discharged home on  amiodarone. She was on amiodarone until about September 2017. While on this medication her thyroid hormone requirements went up. Since then she reports cold intolerance and fatigue.  She is currently taking levothyroxine 75 mcg daily.  She is taking her levothyroxine, consistently, and in the proper manner, by her report.  She takes her calcium and other vitamins later in the day.  Her most recent TSH from July 9, 2024 is 0.734.  She also has history of polymyalgia rheumatica and follows closely with her rheumatologist at [facility].      For her osteoporosis, she has been supplementing with calcium and vitamin D and receiving Prolia injections every 6 months.  She obtained a DEXA scan on April 19, 2024.  When compared to her previous DEXA scan from March for, 2022 there was a statistically significant increase of 7% in the bone mineral density of her lumbar spine and also a statistically significant increase in the bone mineral density of her left total hip of 9.9%.     Her hypertension is treated with Lasix 20 mg daily and metoprolol 50 mg twice daily.     For her vitamin-D deficiency she supplements with 1000 units of vitamin D3 daily.  Her most recent 25 hydroxy vitamin-D level from July 9, 2024 is 47.8.     Her most recent thyroid ultrasound from November 13, 2023 reveals a heterogeneously atrophic thyroid gland.  No significant change in the nodule in the left lobe of the thyroid gland which does not meet ACR criteria for follow-up ultrasound or biopsy.    Review of Systems   Constitutional: Negative.  Negative for chills and fever.   HENT: Negative.     Eyes: Negative.    Respiratory: Negative.  Negative for chest tightness and shortness of breath.    Cardiovascular: Negative.  Negative for chest pain.   Gastrointestinal: Negative.  Negative for abdominal pain, constipation, diarrhea and vomiting.   Genitourinary: Negative.    Musculoskeletal: Negative.    Skin: Negative.    Allergic/Immunologic:  "Negative.    Neurological: Negative.  Negative for dizziness, syncope, light-headedness and headaches.   Hematological: Negative.    Psychiatric/Behavioral: Negative.     All other systems reviewed and are negative.      Historical Information   Past Medical History:   Diagnosis Date    Acute pulmonary embolism ([facility]) 01/10/2017    Allergic     Arthritis     rheumatoid    Atrial fibrillation (HCC)     CHF (congestive heart failure) ([facility])     Disease of thyroid gland     DVT (deep venous thrombosis) ([facility])     DVT, lower extremity ([facility]) 01/10/2017    HL (hearing loss)     Left Ear-Wear Hearing Aid    Hypertension     Hyperthyroidism     Irregular heart beat     Kidney stone     Migraine     hx of    Pleural effusion 2016    Polymyalgia rheumatica ([facility])     \"Remission\"    Polymyalgia rheumatica ([facility]) 01/10/2017    Secondary adrenal insufficiency ([facility]) 2017    Sepsis, unspecified organism ([facility]) 12/15/2019    Sleep apnea     Mild-Does not require CPAP    Varicella As a child    Vitamin D deficiency      Past Surgical History:   Procedure Laterality Date    CARDIAC CATHETERIZATION      CARDIOVERSION N/A 2019    Procedure: CARDIOVERSION;  Surgeon: Roque Alfaro MD;  Location: [facility];  Service: Cardiology     SECTION      x3 - last impression 16    FRACTURE SURGERY Left     wrist    HERNIA REPAIR  2018    KNEE ARTHROSCOPY Left     Meniscus Tear Repair    KNEE CARTILAGE SURGERY Left     VA ARTHRP KNE CONDYLE&PLATU MEDIAL&LAT COMPARTMENTS Left 3/16/2022    Procedure: KNEE TOTAL ARTHROPLASTY;  Surgeon: Ryan Vega DO;  Location: [facility];  Service: Orthopedics    TONSILLECTOMY AND ADENOIDECTOMY  child; age 14    TUBAL LIGATION      VEIN SURGERY Right     venous ligation with stripping     Social History   Social History     Substance and Sexual Activity   Alcohol Use Not Currently    Alcohol/week: 1.0 standard drink of alcohol    Types: 1 Glasses of wine per week    Comment: " Socially     Social History     Substance and Sexual Activity   Drug Use No     Social History     Tobacco Use   Smoking Status Never   Smokeless Tobacco Never     Family History:   Family History   Problem Relation Age of Onset    Breast cancer Mother 52    Arthritis Mother     Cancer Mother     Hearing loss Mother     Vision loss Mother     Osteoarthritis Mother     Aneurysm Father         aortic    No Known Problems Sister     No Known Problems Maternal Grandmother     No Known Problems Maternal Grandfather     No Known Problems Paternal Grandmother     No Known Problems Paternal Grandfather     Rheum arthritis Maternal Aunt     Early death Maternal Aunt     No Known Problems Paternal Aunt     No Known Problems Paternal Aunt     No Known Problems Paternal Aunt        Meds/Allergies   Current Outpatient Medications   Medication Sig Dispense Refill    azelastine (ASTELIN) 0.1 % nasal spray 1 spray into each nostril 2 (two) times a day Use in each nostril as directed 30 mL 5    Calcium Ascorbate 500 MG TABS Take 500 mg by mouth daily        cholecalciferol (VITAMIN D3) 1,000 units tablet Take 1,000 Units by mouth daily        Cinnamon 500 MG capsule Take 500 mg by mouth daily      Cyanocobalamin (VITAMIN B 12 PO) Take 500 mcg by mouth daily       levothyroxine 75 mcg tablet Take 1 tablet (75 mcg total) by mouth daily 30 tablet 5    metoprolol succinate (TOPROL-XL) 50 mg 24 hr tablet Take 25 mg by mouth daily      Misc Natural Products (OSTEO BI-FLEX JOINT SHIELD PO) Take by mouth      Multiple Vitamins-Minerals (OCUVITE ADULT 50+) CAPS Take 1 capsule by mouth daily      Red Yeast Rice 600 MG TABS Take 1 tablet by mouth daily       XARELTO 20 MG tablet Take 1 tablet by mouth daily before dinner        Multiple Vitamins-Minerals (multivitamin with minerals) tablet Take 1 tablet by mouth daily 30 tablet 1     No current facility-administered medications for this visit.     Allergies   Allergen Reactions    Amiodarone  "Other (See Comments)     Other reaction(s): Other (See Comments)  Reports caused elevated TSH    Sudafed [Pseudoephedrine] Tachycardia     Rapid heart beat    Sulfa Antibiotics Itching and Rash    Sulfamethoxazole-Trimethoprim Itching and Rash       Objective   Vitals: Blood pressure 122/84, pulse 55, height 5' 1.38\" (1.559 m), weight 82.6 kg (182 lb 3.2 oz), not currently breastfeeding.    Physical Exam  Vitals reviewed.   Constitutional:       Appearance: She is well-developed. She is obese.   HENT:      Head: Normocephalic and atraumatic.   Eyes:      Conjunctiva/sclera: Conjunctivae normal.      Pupils: Pupils are equal, round, and reactive to light.   Cardiovascular:      Rate and Rhythm: Normal rate and regular rhythm.      Heart sounds: Normal heart sounds.   Pulmonary:      Effort: Pulmonary effort is normal.      Breath sounds: Normal breath sounds.   Abdominal:      General: Bowel sounds are normal.      Palpations: Abdomen is soft.   Musculoskeletal:         General: Normal range of motion.      Cervical back: Normal range of motion and neck supple.   Skin:     General: Skin is warm and dry.   Neurological:      Mental Status: She is alert and oriented to person, place, and time.   Psychiatric:         Behavior: Behavior normal.         Thought Content: Thought content normal.         Judgment: Judgment normal.       Lab Results:   Lab Results   Component Value Date/Time    TSH 3RD GENERATON 0.734 07/09/2024 09:40 AM    TSH 3RD GENERATON 1.291 01/11/2024 09:30 AM    Free T4 1.18 (H) 07/09/2024 09:40 AM    Free T4 1.12 01/11/2024 09:30 AM       Imaging Studies:   Results for orders placed during the hospital encounter of 11/13/23    US thyroid    Impression  No significant change.  No nodule meets current ACR criteria for requiring biopsy or followup ultrasounds.        Reference: ACR Thyroid Imaging, Reporting and Data System (TI-RADS): White Paper of the ACR TI-RADS Committee. J AM Cherelle Radiol " "2017;14:587-595. (additional recommendations based on American Thyroid Association 2015 guidelines.)      Workstation performed: [id number]    Portions of the record may have been created with voice recognition software. Occasional wrong word or \"sound a like\" substitutions may have occurred due to the inherent limitations of voice recognition software. Read the chart carefully and recognize, using context, where substitutions have occurred.    "

## 2024-07-18 NOTE — PROGRESS NOTES
"Assessment/Plan:    Demi Almonte came into the [facility] Office today 07/18/24 to receive her Prolia injection.      Verbal consent obtained.  Consent given by: patient    patient states patient has been medically healthy with no underlining concerns/complications.      Demi Almonte presents with no symptoms today.       All insturctions were reviewed with the patient.    If the patient should have any questions/concerns, advised patient to contacted [facility].       Subjective:     History provided by: patient    Patient ID: Demi Almonte is a 74 y.o. female      Objective:    Vitals:    07/18/24 1109   BP: 122/84   Pulse: 55   Weight: 82.6 kg (182 lb 3.2 oz)   Height: 5' 1.38\" (1.559 m)       Patient tolerated the injection well without any complications.  Injection site/s Left Arm.  Medication was provided by the office.    Patient signed consent form yes   Patient signed ABN form yes (If no patient is not a medicare patient).   Patient waited 15 minutes after injection no (This only applies to patient's receiving first time injection).       Last Visit: 7/2/2024  Next visit:Visit date not found     "

## 2024-07-18 NOTE — PATIENT INSTRUCTIONS
Continue Levothyroxine at 75 mcg - Monday through Saturday with a HALF tablet on SUNDAY.    Recheck TSH and Free T4 in 6 weeks to reassess.    We will contact you with the results.     Continue with Calcium and Vitamin D supplementation daily.     Will continue to monitor calcium levels.     You will obtain a repeat thyroid ultrasound in November 2024.     Obtain lab work as prescribed.     DEXA Scan shows improvement!!!    Continue Prolia.

## 2024-08-22 ENCOUNTER — TELEPHONE (OUTPATIENT)
Dept: FAMILY MEDICINE CLINIC | Facility: CLINIC | Age: 74
End: 2024-08-22

## 2024-08-22 NOTE — TELEPHONE ENCOUNTER
Left message to schedule medicare well apt.       If patient calls back transfer to the office    Problem: Post Op Day 1 CABG/Heart Valve Replacement  Goal: Optimal care of the post op CABG/heart valve replacement Post Op Day 1    Intervention: Daily Weights  Completed by NOC.   Intervention: Up in chair for all meals  Unable to complete. Patient intubated and IABP in place  Intervention: Ambulate in am if stable. First ambulation is 25 feet. Repeat x 3 as tolerated.  Ambulate again before bed.  Unable to complete, patient with IABP and intubated  Intervention: Discontinue jenkins catheter unless documented reason for continuation  Patient remains intubated  Intervention: Remove original surgical dressing after 24 hrs, leave open to air unless otherwise specified by physician  Prevena in place.   Intervention: IS q 1 hour while awake and record best IS volume  Patient intubated  Intervention: Graduated elastic stockings  Patient intubated. SCD's in place.

## 2024-09-03 ENCOUNTER — VBI (OUTPATIENT)
Dept: ADMINISTRATIVE | Facility: OTHER | Age: 74
End: 2024-09-03

## 2024-09-03 ENCOUNTER — LAB (OUTPATIENT)
Dept: LAB | Facility: CLINIC | Age: 74
End: 2024-09-03
Payer: COMMERCIAL

## 2024-09-03 DIAGNOSIS — E03.9 ACQUIRED HYPOTHYROIDISM: ICD-10-CM

## 2024-09-03 LAB
T4 FREE SERPL-MCNC: 1.03 NG/DL (ref 0.61–1.12)
TSH SERPL DL<=0.05 MIU/L-ACNC: 1.89 UIU/ML (ref 0.45–4.5)

## 2024-09-03 PROCEDURE — 84443 ASSAY THYROID STIM HORMONE: CPT

## 2024-09-03 PROCEDURE — 84439 ASSAY OF FREE THYROXINE: CPT

## 2024-09-03 PROCEDURE — 36415 COLL VENOUS BLD VENIPUNCTURE: CPT

## 2024-09-03 NOTE — TELEPHONE ENCOUNTER
09/03/24 1:04 PM     Chart reviewed for CRC: Colonoscopy ; nothing is submitted to the patient's insurance at this time.     Vinny Soni MA   [facility]

## 2024-10-03 ENCOUNTER — APPOINTMENT (OUTPATIENT)
Dept: RADIOLOGY | Facility: MEDICAL CENTER | Age: 74
End: 2024-10-03
Payer: COMMERCIAL

## 2024-10-03 ENCOUNTER — OFFICE VISIT (OUTPATIENT)
Dept: OBGYN CLINIC | Facility: CLINIC | Age: 74
End: 2024-10-03
Payer: COMMERCIAL

## 2024-10-03 VITALS
HEIGHT: 61 IN | BODY MASS INDEX: 34.17 KG/M2 | HEART RATE: 67 BPM | WEIGHT: 181 LBS | TEMPERATURE: 98.7 F | SYSTOLIC BLOOD PRESSURE: 135 MMHG | DIASTOLIC BLOOD PRESSURE: 87 MMHG | OXYGEN SATURATION: 98 %

## 2024-10-03 DIAGNOSIS — M17.11 PRIMARY OSTEOARTHRITIS OF RIGHT KNEE: ICD-10-CM

## 2024-10-03 DIAGNOSIS — Z96.652 S/P TOTAL KNEE REPLACEMENT, LEFT: Primary | ICD-10-CM

## 2024-10-03 PROCEDURE — 73562 X-RAY EXAM OF KNEE 3: CPT

## 2024-10-03 PROCEDURE — 99213 OFFICE O/P EST LOW 20 MIN: CPT | Performed by: ORTHOPAEDIC SURGERY

## 2024-10-03 RX ORDER — ASCORBIC ACID 500 MG
500 TABLET ORAL 2 TIMES DAILY
Qty: 60 TABLET | Refills: 0 | Status: SHIPPED | OUTPATIENT
Start: 2024-12-15

## 2024-10-03 RX ORDER — FOLIC ACID 1 MG/1
1 TABLET ORAL DAILY
Qty: 30 TABLET | Refills: 0 | Status: SHIPPED | OUTPATIENT
Start: 2024-12-15

## 2024-10-03 RX ORDER — CHLORHEXIDINE GLUCONATE 40 MG/ML
SOLUTION TOPICAL DAILY PRN
OUTPATIENT
Start: 2024-10-03

## 2024-10-03 RX ORDER — MULTIVIT-MIN/IRON FUM/FOLIC AC 7.5 MG-4
1 TABLET ORAL DAILY
Qty: 30 TABLET | Refills: 0 | Status: SHIPPED | OUTPATIENT
Start: 2024-12-15

## 2024-10-03 RX ORDER — CHLORHEXIDINE GLUCONATE ORAL RINSE 1.2 MG/ML
15 SOLUTION DENTAL ONCE
Status: CANCELLED | OUTPATIENT
Start: 2024-10-03 | End: 2024-10-03

## 2024-10-03 RX ORDER — FERROUS SULFATE 324(65)MG
324 TABLET, DELAYED RELEASE (ENTERIC COATED) ORAL
Qty: 60 TABLET | Refills: 0 | Status: SHIPPED | OUTPATIENT
Start: 2024-12-15

## 2024-10-03 NOTE — PROGRESS NOTES
Assessment/Plan:   Diagnoses and all orders for this visit:    S/P total knee replacement, left    Primary osteoarthritis of right knee  -     XR knee 3 vw right non injury; Future       The patient has osteoarthritis of the right knee. Discussed the patient's diagnosis and associated treatment options including conservative and surgical treatment. Discussed risks, potential complications, and benefits of surgical procedure in great detail. The patient understands the risks and benefits of all mention treatment options and has no further questions.  The patient has elected to proceed forward with total knee arthroplasty of the right knee. Surgery is tentatively scheduled for January 15, 2025. She will be seen for follow-up 10 to 14 days post-op for reevaluation. A surgical consent was obtained at today's visit. The patient expressed understanding and had the opportunity to ask questions.     The patient has advanced degenerative joint disease of her right knee.  She wants proceed with elective right total knee replacement.  All risk, complications, and benefits were discussed with the patient in great detail including bleeding, infection, blood clots, pain, stiffness, neurovascular damage, fractures, dislocations, the possibility loss of life and surgery, heart attack, stroke, wound problems, etc.  Her surgery scheduled for January 15, 2025      Subjective:   Patient ID: Demi Almonte  1950     HPI  Patient is a 74 y.o. female who presents for follow up evaluation of her right knee pain. She was last seen on 4/4/2024 at which time she was following up about her left knee replacement. She is having increasing pain on the right knee with daily activities. She would like to have the knee replaced.     The following portions of the patient's history were reviewed and updated as appropriate:  Past medical history, past surgical history, Family history, social history, current medications and allergies    Past  "Medical History:   Diagnosis Date    Acute pulmonary embolism ([facility]) 01/10/2017    Allergic     Arthritis     rheumatoid    Atrial fibrillation ([facility])     CHF (congestive heart failure) ([facility])     Disease of thyroid gland     DVT (deep venous thrombosis) ([facility])     DVT, lower extremity ([facility]) 01/10/2017    HL (hearing loss)     Left Ear-Wear Hearing Aid    Hypertension     Hyperthyroidism     Irregular heart beat     Kidney stone     Migraine     hx of    Pleural effusion 2016    Polymyalgia rheumatica ([facility])     \"Remission\"    Polymyalgia rheumatica ([facility]) 01/10/2017    Secondary adrenal insufficiency ([facility]) 2017    Sepsis, unspecified organism ([facility]) 12/15/2019    Sleep apnea     Mild-Does not require CPAP    Varicella As a child    Vitamin D deficiency        Past Surgical History:   Procedure Laterality Date    CARDIAC CATHETERIZATION      CARDIOVERSION N/A 2019    Procedure: CARDIOVERSION;  Surgeon: Roque Alfaro MD;  Location: [facility];  Service: Cardiology     SECTION      x3 - last impression 16    FRACTURE SURGERY Left     wrist    HERNIA REPAIR  2018    KNEE ARTHROSCOPY Left     Meniscus Tear Repair    KNEE CARTILAGE SURGERY Left     [place] ARTHRP KNE CONDYLE&PLATU MEDIAL&LAT COMPARTMENTS Left 3/16/2022    Procedure: KNEE TOTAL ARTHROPLASTY;  Surgeon: Ryan Vega DO;  Location: [facility];  Service: Orthopedics    TONSILLECTOMY AND ADENOIDECTOMY  child; age 14    TUBAL LIGATION      VEIN SURGERY Right     venous ligation with stripping       Family History   Problem Relation Age of Onset    Breast cancer Mother 52    Arthritis Mother     Cancer Mother     Hearing loss Mother     Vision loss Mother     Osteoarthritis Mother     Aneurysm Father         aortic    No Known Problems Sister     No Known Problems Maternal Grandmother     No Known Problems Maternal Grandfather     No Known Problems Paternal Grandmother     No Known Problems Paternal Grandfather     Rheum arthritis " Maternal Aunt     Early death Maternal Aunt     No Known Problems Paternal Aunt     No Known Problems Paternal Aunt     No Known Problems Paternal Aunt        Social History     Socioeconomic History    Marital status: /Civil Union     Spouse name: None    Number of children: 3    Years of education: None    Highest education level: None   Occupational History    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   Tobacco Use    Smoking status: Never    Smokeless tobacco: Never   Vaping Use    Vaping status: Never Used   Substance and Sexual Activity    Alcohol use: Not Currently     Alcohol/week: 1.0 standard drink of alcohol     Types: 1 Glasses of wine per week     Comment: Socially    Drug use: No    Sexual activity: Yes     Partners: Male     Birth control/protection: Post-menopausal, Female Sterilization   Other Topics Concern    None   Social History Narrative    None     Social Determinants of Health     Financial Resource Strain: Not on file   Food Insecurity: No Food Insecurity (3/17/2022)    Hunger Vital Sign     Worried About Running Out of Food in the Last Year: Never true     Ran Out of Food in the Last Year: Never true   Transportation Needs: No Transportation Needs (3/17/2022)    PRAPARE - Transportation     Lack of Transportation (Medical): No     Lack of Transportation (Non-Medical): No   Physical Activity: Not on file   Stress: Not on file   Social Connections: Not on file   Intimate Partner Violence: Not At Risk (5/20/2024)    Received from [facility]    Humiliation, Afraid, Rape, and Kick questionnaire     Fear of Current or Ex-Partner: No     Emotionally Abused: No     Physically Abused: No     Sexually Abused: No   Housing Stability: Low Risk  (3/17/2022)    Housing Stability Vital Sign     Unable to Pay for Housing in the Last Year: No     Number of Places Lived in the Last Year: 1     Unstable Housing in the Last Year: No         Current Outpatient Medications:      azelastine (ASTELIN) 0.1 % nasal spray, 1 spray into each nostril 2 (two) times a day Use in each nostril as directed, Disp: 30 mL, Rfl: 5    Calcium Ascorbate 500 MG TABS, Take 500 mg by mouth daily  , Disp: , Rfl:     cholecalciferol (VITAMIN D3) 1,000 units tablet, Take 1,000 Units by mouth daily  , Disp: , Rfl:     Cinnamon 500 MG capsule, Take 500 mg by mouth daily, Disp: , Rfl:     Cyanocobalamin (VITAMIN B 12 PO), Take 500 mcg by mouth daily , Disp: , Rfl:     levothyroxine 75 mcg tablet, Take 1 tablet (75 mcg total) by mouth daily, Disp: 30 tablet, Rfl: 5    metoprolol succinate (TOPROL-XL) 50 mg 24 hr tablet, Take 25 mg by mouth daily, Disp: , Rfl:     Misc Natural Products (OSTEO BI-FLEX JOINT SHIELD PO), Take by mouth, Disp: , Rfl:     Multiple Vitamins-Minerals (OCUVITE ADULT 50+) CAPS, Take 1 capsule by mouth daily, Disp: , Rfl:     Red Yeast Rice 600 MG TABS, Take 1 tablet by mouth daily , Disp: , Rfl:     XARELTO 20 MG tablet, Take 1 tablet by mouth daily before dinner  , Disp: , Rfl:     Multiple Vitamins-Minerals (multivitamin with minerals) tablet, Take 1 tablet by mouth daily, Disp: 30 tablet, Rfl: 1    Allergies   Allergen Reactions    Amiodarone Other (See Comments)     Other reaction(s): Other (See Comments)  Reports caused elevated TSH    Sudafed [Pseudoephedrine] Tachycardia     Rapid heart beat    Sulfa Antibiotics Itching and Rash    Sulfamethoxazole-Trimethoprim Itching and Rash       Review of Systems   Constitutional:  Negative for chills and fever.   HENT:  Negative for ear pain and sore throat.    Eyes:  Negative for pain and visual disturbance.   Respiratory:  Negative for cough and shortness of breath.    Cardiovascular:  Negative for chest pain and palpitations.   Gastrointestinal:  Negative for abdominal pain and vomiting.   Genitourinary:  Negative for dysuria and hematuria.   Musculoskeletal:  Negative for arthralgias and back pain.   Skin:  Negative for color change and rash.  "  Neurological:  Negative for seizures and syncope.   All other systems reviewed and are negative.       Objective:  /87 (BP Location: Left arm, Patient Position: Sitting, Cuff Size: Standard)   Pulse 67   Temp 98.7 °F (37.1 °C) (Temporal)   Ht 5' 1\" (1.549 m)   Wt 82.1 kg (181 lb)   SpO2 98%   BMI 34.20 kg/m²     Ortho Exam  right knee(s) -   Patient ambulates with steady gait pattern  Uses No assistive device  No anatomical deformity  Skin is warm and dry to touch with no signs of erythema, ecchymosis, or infection   Mild generalized soft tissue swelling or effusion noted  ROM (0° - 115°)   Strength: 4/5 throughout  TTP over medial joint line, TTP over lateral joint line  Flexor and extensor mechanisms are intact   Knee is  stable to varus and valgus stress  Patella tracks centrally with palpable crepitus  Calf compartments are soft and supple  - Archie's sign  2+ DP and PT pulses with brisk capillary refill to the toes  Sural, saphenous, tibial, superficial, and deep peroneal motor and sensory distributions intact  Sensation light touch intact distally      Physical Exam  Vitals and nursing note reviewed.   Constitutional:       General: She is not in acute distress.     Appearance: She is well-developed.   HENT:      Head: Normocephalic and atraumatic.   Eyes:      Conjunctiva/sclera: Conjunctivae normal.   Cardiovascular:      Rate and Rhythm: Normal rate and regular rhythm.      Heart sounds: No murmur heard.  Pulmonary:      Effort: Pulmonary effort is normal. No respiratory distress.      Breath sounds: Normal breath sounds.   Abdominal:      Palpations: Abdomen is soft.      Tenderness: There is no abdominal tenderness.   Musculoskeletal:         General: No swelling.      Cervical back: Neck supple.   Skin:     General: Skin is warm and dry.      Capillary Refill: Capillary refill takes less than 2 seconds.   Neurological:      Mental Status: She is alert.   Psychiatric:         Mood and Affect: " Mood normal.        Diagnostic Test Review:  X-Ray of right knee obtained on 10/3/2024 were reviewed and demonstrate tricompartmental osteoarthritis affecting the medial tibiofemoral compartment as evidenced by joint space narrowing, osteophyte formation and subchondral sclerosis.      Procedures   None performed during today's visit    Scribe Attestation      I,:  Judith Cruz am acting as a scribe while in the presence of the attending physician.:       I,:  Ryan Vega, DO personally performed the services described in this documentation    as scribed in my presence.:               Mallampati - II/IV

## 2024-10-11 ENCOUNTER — TELEPHONE (OUTPATIENT)
Age: 74
End: 2024-10-11

## 2024-10-11 ENCOUNTER — TELEPHONE (OUTPATIENT)
Dept: FAMILY MEDICINE CLINIC | Facility: CLINIC | Age: 74
End: 2024-10-11

## 2024-10-11 NOTE — TELEPHONE ENCOUNTER
Caller: Patient    Doctor: Silvia    Reason for call: calling to verify date of surgery. Advised 1/15    Call back#: n/a

## 2024-11-18 ENCOUNTER — HOSPITAL ENCOUNTER (OUTPATIENT)
Dept: ULTRASOUND IMAGING | Facility: HOSPITAL | Age: 74
Discharge: HOME | End: 2024-11-18
Payer: COMMERCIAL

## 2024-11-18 DIAGNOSIS — E04.1 THYROID NODULE: ICD-10-CM

## 2024-11-18 PROCEDURE — 76536 US EXAM OF HEAD AND NECK: CPT

## 2024-11-21 ENCOUNTER — RESULTS FOLLOW-UP (OUTPATIENT)
Dept: ENDOCRINOLOGY | Facility: HOSPITAL | Age: 74
End: 2024-11-21

## 2024-12-04 ENCOUNTER — PREP FOR PROCEDURE (OUTPATIENT)
Dept: OBGYN CLINIC | Facility: CLINIC | Age: 74
End: 2024-12-04

## 2024-12-04 ENCOUNTER — TELEPHONE (OUTPATIENT)
Dept: OBGYN CLINIC | Facility: CLINIC | Age: 74
End: 2024-12-04

## 2024-12-04 NOTE — TELEPHONE ENCOUNTER
Spoke with Demi regarding her January TKA surgery with Dr. Vega and that at this moment I am unable to provide a new surgery date or location until her medical history has been reviewed and I am notified. Patient was understanding and will wait for a return call back from me w/I a week.

## 2024-12-06 ENCOUNTER — PREP FOR PROCEDURE (OUTPATIENT)
Dept: OBGYN CLINIC | Facility: CLINIC | Age: 74
End: 2024-12-06

## 2024-12-06 ENCOUNTER — TELEPHONE (OUTPATIENT)
Dept: OBGYN CLINIC | Facility: CLINIC | Age: 74
End: 2024-12-06

## 2024-12-06 NOTE — TELEPHONE ENCOUNTER
Spoke to Demi and informed that her total knee replacement surgery on 1/8/25 has been r/s to 1/27/25 at the [facility] in the [facility]. Appointments will be reviewed and adjusted if needed. Patient verbalized understanding and will wait for a return call back.

## 2024-12-22 ENCOUNTER — TELEPHONE (OUTPATIENT)
Dept: OBGYN CLINIC | Facility: HOSPITAL | Age: 74
End: 2024-12-22

## 2024-12-22 NOTE — TELEPHONE ENCOUNTER
Preoperative Elective Admission Assessment: spoke to patient.     EKG/LAB/MRSA SWAB/CXR date: 1/2     TKA 2022- 2 day LOS.     Living Situation:    Who does pt live with: spouse  What kind of home: multi-level  How do they enter the home: front  How many levels in home: 2 level home   # of steps to enter home: 3 to enter   # of steps to second floor: N/A   Sleeping arrangement: first/entry floor  Where is Bathroom: First floor bath- walk in shower with seat and bars      First Floor Setup:   Is there a bathroom: Yes  Where would pt sleep: bed (master on 1st floor).     DME: rolling walker and cane (instructed to bring RW DOS).   We discussed clearing pathways in the home and making sure there is accessibly to use the walker, for example, removing throw rugs.      Patient's Current Level of Function: Ambulates: Independently and ADLs: Independent    Post-op Caregiver: spouse  Currently receive any HHC/aides/community supports: No     Post-op Transport: spouse  To/from hospital: spouse  To/from PT 2-3x/week: spouse  Uses community transport now: No     Outpatient Physical Therapy Site:  Site: [place]   pre and post-op appts scheduled? Yes     Medication Management: self  Preferred Pharmacy for Post-op Medications: [id number] [place] [address] [facility] [place] [[id number]]   Blood Management Vitamin Regimen:  Pt has at home to start 30 days before.   Post-op anticoagulant: to be determined by surgical team postoperatively  Has Bactroban for 5 days preop: No- SENT TO SURGEON   Educated on Preoperative Bathing Instructions, and use of Soap for 5 days before surgery.      DC Plan: Pt plans to be discharged home    Barriers to DC identified preoperatively: none identified    BMI: 34.20    Patient Education:  Pt educated on post-op pain, early mobilization (POD0), LOS goals, OP PT goals, and preoperative bathing. Patient educated that our goal is to appropriately discharge patient based off their post-op function  while striving to maintain maximal independence. The goal is to discharge patient to home and for them to attend outpatient physical therapy.    Assigned to care team? Yes

## 2024-12-23 DIAGNOSIS — M17.11 PRIMARY OSTEOARTHRITIS OF RIGHT KNEE: Primary | ICD-10-CM

## 2024-12-23 RX ORDER — MUPIROCIN 20 MG/G
OINTMENT TOPICAL
Qty: 10 G | Refills: 1 | Status: SHIPPED | OUTPATIENT
Start: 2024-12-23

## 2025-01-02 NOTE — PRE-PROCEDURE INSTRUCTIONS
Pre-Surgery Instructions:   Medication Instructions    ascorbic acid (VITAMIN C) 500 MG tablet Hold day of surgery.    Calcium Ascorbate 500 MG TABS Stop taking 7 days prior to surgery.    cholecalciferol (VITAMIN D3) 1,000 units tablet Stop taking 7 days prior to surgery.    Cinnamon 500 MG capsule Stop taking 7 days prior to surgery.    Cyanocobalamin (VITAMIN B 12 PO) Stop taking 7 days prior to surgery.    ferrous sulfate 324 (65 Fe) mg Hold day of surgery.    folic acid (FOLVITE) 1 mg tablet Hold day of surgery.    levothyroxine 75 mcg tablet Take day of surgery.    metoprolol succinate (TOPROL-XL) 50 mg 24 hr tablet Take day of surgery.    Multiple Vitamins-Minerals (multivitamin with minerals) tablet Hold day of surgery.    mupirocin (BACTROBAN) 2 % ointment Instructions provided by MD    Red Yeast Rice 600 MG TABS Stop taking 7 days prior to surgery.    XARELTO 20 MG tablet Instructions provided by MD per CC- hold 3 days prior   Medication instructions for day surgery reviewed. Please use only a sip of water to take your instructed medications. Avoid all over the counter vitamins, supplements and NSAIDS for one week prior to surgery per anesthesia guidelines. Tylenol is ok to take as needed.     You will receive a call one business day prior to surgery with an arrival time and hospital directions. If your surgery is scheduled on a Monday, the hospital will be calling you on the Friday prior to your surgery. If you have not heard from anyone by 8pm, please call the hospital supervisor through the hospital  at [phone]. ([place] [phone] or [place] [phone]).    Do not eat or drink anything after midnight the night before your surgery, including candy, mints, lifesavers, or chewing gum. Do not drink alcohol 24hrs before your surgery. Try not to smoke at least 24hrs before your surgery.       Follow the pre surgery showering instructions as listed in the “My Surgical Experience Booklet” or  otherwise provided by your surgeon's office. Do not use a blade to shave the surgical area 1 week before surgery. It is okay to use a clean electric clippers up to 24 hours before surgery. Do not apply any lotions, creams, including makeup, cologne, deodorant, or perfumes after showering on the day of your surgery. Do not use dry shampoo, hair spray, hair gel, or any type of hair products.     No contact lenses, eye make-up, or artificial eyelashes. Remove nail polish, including gel polish, and any artificial, gel, or acrylic nails if possible. Remove all jewelry including rings and body piercing jewelry.     Wear causal clothing that is easy to take on and off. Consider your type of surgery.    Keep any valuables, jewelry, piercings at home. Please bring any specially ordered equipment (sling, braces) if indicated.    Arrange for a responsible person to drive you to and from the hospital on the day of your surgery. Please confirm the visitor policy for the day of your procedure when you receive your phone call with an arrival time.     Call the surgeon's office with any new illnesses, exposures, or additional questions prior to surgery.    Please reference your “My Surgical Experience Booklet” for additional information to prepare for your upcoming surgery.      See Geriatric Assessment below...  Cognitive Assessment:   CAM:   TUG <15 sec:  Falls (last 6 months): no  Hand  score:  -Attempt 1:  -Attempt 2:  -Attempt 3:  Booker Total Score: 21  PHQ- 9 Depression Scale:0  Nutrition Assessment Score:11  METS:8.33  Incentive Spirometry Level:   Health goals:  -What are your overall health goals? (quit smoking, wt. loss, rest, decrease stress)  Wt loss  -What brings you strength? (family, friends, Restoration, health)  Friends.family, Gnosticist  -What activities are important to you? (exercise, reading, travel, work)     Walking, travel

## 2025-01-03 ENCOUNTER — APPOINTMENT (OUTPATIENT)
Dept: LAB | Facility: CLINIC | Age: 75
End: 2025-01-03
Payer: COMMERCIAL

## 2025-01-03 DIAGNOSIS — I10 ESSENTIAL HYPERTENSION: ICD-10-CM

## 2025-01-03 DIAGNOSIS — E04.1 THYROID NODULE: ICD-10-CM

## 2025-01-03 DIAGNOSIS — E78.00 HYPERCHOLESTEROLEMIA: ICD-10-CM

## 2025-01-03 DIAGNOSIS — E03.9 ACQUIRED HYPOTHYROIDISM: ICD-10-CM

## 2025-01-03 DIAGNOSIS — I50.32 CHRONIC DIASTOLIC CONGESTIVE HEART FAILURE (HCC): ICD-10-CM

## 2025-01-03 DIAGNOSIS — E55.9 VITAMIN D INSUFFICIENCY: ICD-10-CM

## 2025-01-03 DIAGNOSIS — Z01.818 PRE-OP TESTING: ICD-10-CM

## 2025-01-03 DIAGNOSIS — M81.0 OSTEOPOROSIS WITHOUT CURRENT PATHOLOGICAL FRACTURE, UNSPECIFIED OSTEOPOROSIS TYPE: ICD-10-CM

## 2025-01-03 DIAGNOSIS — E03.9 HYPOTHYROIDISM, UNSPECIFIED TYPE: ICD-10-CM

## 2025-01-03 LAB
ALBUMIN SERPL BCG-MCNC: 4.1 G/DL (ref 3.5–5)
ALP SERPL-CCNC: 49 U/L (ref 34–104)
ALT SERPL W P-5'-P-CCNC: 19 U/L (ref 7–52)
ANION GAP SERPL CALCULATED.3IONS-SCNC: 4 MMOL/L (ref 4–13)
APTT PPP: 30 SECONDS (ref 23–34)
AST SERPL W P-5'-P-CCNC: 21 U/L (ref 13–39)
BASOPHILS # BLD AUTO: 0.05 THOUSANDS/ΜL (ref 0–0.1)
BASOPHILS NFR BLD AUTO: 1 % (ref 0–1)
BILIRUB SERPL-MCNC: 0.53 MG/DL (ref 0.2–1)
BUN SERPL-MCNC: 17 MG/DL (ref 5–25)
CALCIUM SERPL-MCNC: 10.3 MG/DL (ref 8.4–10.2)
CHLORIDE SERPL-SCNC: 105 MMOL/L (ref 96–108)
CO2 SERPL-SCNC: 32 MMOL/L (ref 21–32)
CREAT SERPL-MCNC: 0.56 MG/DL (ref 0.6–1.3)
EOSINOPHIL # BLD AUTO: 0.17 THOUSAND/ΜL (ref 0–0.61)
EOSINOPHIL NFR BLD AUTO: 3 % (ref 0–6)
ERYTHROCYTE [DISTWIDTH] IN BLOOD BY AUTOMATED COUNT: 13.5 % (ref 11.6–15.1)
EST. AVERAGE GLUCOSE BLD GHB EST-MCNC: 111 MG/DL
GFR SERPL CREATININE-BSD FRML MDRD: 93 ML/MIN/1.73SQ M
GLUCOSE P FAST SERPL-MCNC: 88 MG/DL (ref 65–99)
HBA1C MFR BLD: 5.5 %
HCT VFR BLD AUTO: 41.5 % (ref 34.8–46.1)
HGB BLD-MCNC: 13.6 G/DL (ref 11.5–15.4)
IMM GRANULOCYTES # BLD AUTO: 0.02 THOUSAND/UL (ref 0–0.2)
IMM GRANULOCYTES NFR BLD AUTO: 0 % (ref 0–2)
INR PPP: 1.45 (ref 0.85–1.19)
LYMPHOCYTES # BLD AUTO: 1.82 THOUSANDS/ΜL (ref 0.6–4.47)
LYMPHOCYTES NFR BLD AUTO: 31 % (ref 14–44)
MCH RBC QN AUTO: 29.4 PG (ref 26.8–34.3)
MCHC RBC AUTO-ENTMCNC: 32.8 G/DL (ref 31.4–37.4)
MCV RBC AUTO: 90 FL (ref 82–98)
MONOCYTES # BLD AUTO: 0.52 THOUSAND/ΜL (ref 0.17–1.22)
MONOCYTES NFR BLD AUTO: 9 % (ref 4–12)
NEUTROPHILS # BLD AUTO: 3.22 THOUSANDS/ΜL (ref 1.85–7.62)
NEUTS SEG NFR BLD AUTO: 56 % (ref 43–75)
NRBC BLD AUTO-RTO: 0 /100 WBCS
PLATELET # BLD AUTO: 252 THOUSANDS/UL (ref 149–390)
PMV BLD AUTO: 9.9 FL (ref 8.9–12.7)
POTASSIUM SERPL-SCNC: 4.1 MMOL/L (ref 3.5–5.3)
PROT SERPL-MCNC: 7.1 G/DL (ref 6.4–8.4)
PROTHROMBIN TIME: 17.8 SECONDS (ref 12.3–15)
RBC # BLD AUTO: 4.62 MILLION/UL (ref 3.81–5.12)
SODIUM SERPL-SCNC: 141 MMOL/L (ref 135–147)
WBC # BLD AUTO: 5.8 THOUSAND/UL (ref 4.31–10.16)

## 2025-01-03 PROCEDURE — 36415 COLL VENOUS BLD VENIPUNCTURE: CPT

## 2025-01-03 PROCEDURE — 85730 THROMBOPLASTIN TIME PARTIAL: CPT

## 2025-01-03 PROCEDURE — 83036 HEMOGLOBIN GLYCOSYLATED A1C: CPT

## 2025-01-03 PROCEDURE — 84439 ASSAY OF FREE THYROXINE: CPT

## 2025-01-03 PROCEDURE — 82306 VITAMIN D 25 HYDROXY: CPT

## 2025-01-03 PROCEDURE — 85025 COMPLETE CBC W/AUTO DIFF WBC: CPT

## 2025-01-03 PROCEDURE — 80053 COMPREHEN METABOLIC PANEL: CPT

## 2025-01-03 PROCEDURE — 86850 RBC ANTIBODY SCREEN: CPT | Performed by: ORTHOPAEDIC SURGERY

## 2025-01-03 PROCEDURE — 86900 BLOOD TYPING SEROLOGIC ABO: CPT | Performed by: ORTHOPAEDIC SURGERY

## 2025-01-03 PROCEDURE — 86901 BLOOD TYPING SEROLOGIC RH(D): CPT | Performed by: ORTHOPAEDIC SURGERY

## 2025-01-03 PROCEDURE — 84443 ASSAY THYROID STIM HORMONE: CPT

## 2025-01-03 PROCEDURE — 83970 ASSAY OF PARATHORMONE: CPT

## 2025-01-03 PROCEDURE — 85610 PROTHROMBIN TIME: CPT

## 2025-01-04 ENCOUNTER — LAB REQUISITION (OUTPATIENT)
Dept: LAB | Facility: HOSPITAL | Age: 75
End: 2025-01-04
Payer: COMMERCIAL

## 2025-01-04 DIAGNOSIS — E55.9 VITAMIN D DEFICIENCY, UNSPECIFIED: ICD-10-CM

## 2025-01-04 DIAGNOSIS — E03.9 HYPOTHYROIDISM, UNSPECIFIED: ICD-10-CM

## 2025-01-04 DIAGNOSIS — Z01.818 ENCOUNTER FOR OTHER PREPROCEDURAL EXAMINATION: ICD-10-CM

## 2025-01-04 DIAGNOSIS — E04.1 NONTOXIC SINGLE THYROID NODULE: ICD-10-CM

## 2025-01-04 DIAGNOSIS — E78.00 PURE HYPERCHOLESTEROLEMIA, UNSPECIFIED: ICD-10-CM

## 2025-01-04 DIAGNOSIS — I50.32 CHRONIC DIASTOLIC (CONGESTIVE) HEART FAILURE (HCC): ICD-10-CM

## 2025-01-04 DIAGNOSIS — M81.0 AGE-RELATED OSTEOPOROSIS WITHOUT CURRENT PATHOLOGICAL FRACTURE: ICD-10-CM

## 2025-01-04 LAB
25(OH)D3 SERPL-MCNC: 49.6 NG/ML (ref 30–100)
ABO GROUP BLD: NORMAL
BLD GP AB SCN SERPL QL: NEGATIVE
PTH-INTACT SERPL-MCNC: 59.8 PG/ML (ref 12–88)
RH BLD: POSITIVE
SPECIMEN EXPIRATION DATE: NORMAL
T4 FREE SERPL-MCNC: 0.9 NG/DL (ref 0.61–1.12)
TSH SERPL DL<=0.05 MIU/L-ACNC: 3.79 UIU/ML (ref 0.45–4.5)

## 2025-01-06 ENCOUNTER — TELEPHONE (OUTPATIENT)
Dept: OBGYN CLINIC | Facility: CLINIC | Age: 75
End: 2025-01-06

## 2025-01-06 DIAGNOSIS — M17.11 PRIMARY OSTEOARTHRITIS OF RIGHT KNEE: ICD-10-CM

## 2025-01-06 DIAGNOSIS — Z96.652 STATUS POST TOTAL KNEE REPLACEMENT, LEFT: Primary | ICD-10-CM

## 2025-01-07 ENCOUNTER — EVALUATION (OUTPATIENT)
Dept: PHYSICAL THERAPY | Facility: CLINIC | Age: 75
End: 2025-01-07
Payer: COMMERCIAL

## 2025-01-07 DIAGNOSIS — M25.561 CHRONIC PAIN OF RIGHT KNEE: ICD-10-CM

## 2025-01-07 DIAGNOSIS — M17.11 PRIMARY OSTEOARTHRITIS OF RIGHT KNEE: Primary | ICD-10-CM

## 2025-01-07 DIAGNOSIS — G89.29 CHRONIC PAIN OF RIGHT KNEE: ICD-10-CM

## 2025-01-07 DIAGNOSIS — Z96.652 STATUS POST TOTAL KNEE REPLACEMENT, LEFT: ICD-10-CM

## 2025-01-07 PROCEDURE — 97110 THERAPEUTIC EXERCISES: CPT | Performed by: PHYSICAL THERAPIST

## 2025-01-07 PROCEDURE — 97161 PT EVAL LOW COMPLEX 20 MIN: CPT | Performed by: PHYSICAL THERAPIST

## 2025-01-07 NOTE — PROGRESS NOTES
PT Evaluation     Today's date: 2025  Patient name: Demi Almonte  : 1950  MRN: [id number]  Referring provider: Ryan Vega DO  Dx:   Encounter Diagnosis     ICD-10-CM    1. Primary osteoarthritis of right knee  M17.11       2. Chronic pain of right knee  M25.561     G89.29           Start Time: 0930  Stop Time: 1015  Total time in clinic (min): 45 minutes    Assessment  Impairments: abnormal gait, abnormal or restricted ROM, activity intolerance, impaired physical strength, lacks appropriate home exercise program, pain with function and activity limitations  Symptom irritability: moderate    Assessment details: Patient is a 75 y/o female with chief c/o R knee pain. Patient is here today for a pre operative assessment for an upcoming R TKA scheduled for 2025. She presents with tenderness over the lateral joint line of the R knee. There is good patellar mobility noted with associated crepitus during mobility assessment. She does have some mild mobility and strength deficit present to the R knee as per the objective flowsheet. Patient was instructed on performance of HEP with verbal understanding noted.   Understanding of Dx/Px/POC: good     Prognosis: good    Goals  STGs:  Patient will be independent with HEP in 1-2 visits  Improve knee flexion to 130 degrees for improved tolerance with transfers and adls by 3-4 weeks.   Improve knee extension to 0 degrees for improved tolerance with adls and transfers by 3-4 weeks.       LTGs:  Improve FOTO score from 54 To 63 Indicating improved tolerance with activities involving the LE by discharge.   Patient will be educated on her upcoming procedure for improved preparation and chances of successful recovery by discharge.       Plan  Patient would benefit from: skilled physical therapy  Planned modality interventions: cryotherapy and thermotherapy: hydrocollator packs    Planned therapy interventions: ADL retraining, balance/weight bearing training,  flexibility, functional ROM exercises, gait training, home exercise program, joint mobilization, manual therapy, neuromuscular re-education, patient education, strengthening, stretching, therapeutic activities and therapeutic exercise    Frequency: 1-2x week  Duration in weeks: 3  Plan of Care beginning date: 1/7/2025  Plan of Care expiration date: 1/28/2025  Treatment plan discussed with: patient  Plan details: Patient informed that from this point forward, to ensure adherence to the aforementioned plan of care, all or some of the treatment may be performed and carried out by a Physical Therapy Assistant (PTA) with supervision from a licensed Physical Therapist (PT) in accordance with [facility] Physical Therapy Practice Act.  Patient will continue to benefit from skilled physical therapy to address the functional deficits that were identified during the evaluation today. We will continue to progress the therapy program to address these functional deficits and achieve the established goals.           Subjective Evaluation    History of Present Illness  Mechanism of injury: Patient presents to out patient physical therapy with chief c/o R knee pain. Patient reports a history of chronic R knee pain with worsening over the past 2 years. She has had cortisone injections in her knee which have started to become less effective recently. She finds that when the weather is colder this causes her knee to become stiff and also increases the pain in her knee. She finds that when she first wakes up in the morning her knee will be stiff and she has to move her leg around and stretch before getting up from bed. She is limited with standing to approximately 30 minutes before she needs to sit down because of the pain in her knee. Patient is schedule for an upcoming R TKA on 1/27/2025.           Recurrent probem    Quality of life: good    Patient Goals  Patient goals for therapy: increased strength and increased  "motion  Patient goal: Patient wishes to improve her strength and mobility prior to her upcoming surgery scheduled for 2025.  Pain  Current pain ratin  At best pain rating: 3  At worst pain ratin  Location: R knee  Quality: discomfort, dull ache, throbbing, tight and sharp  Relieving factors: medications, rest and change in position (Topical ointment)  Aggravating factors: stair climbing, standing and walking    Social Support  Steps to enter house: yes  5  Stairs in house: yes   Lives in: multiple-level home  Lives with: spouse    Employment status: not working  Treatments  Previous treatment: injection treatment        Objective     Tenderness   Left Knee   Tenderness in the lateral joint line.     Active Range of Motion   Left Knee   Flexion: 123 degrees   Extension: 1 degrees   Extensor la degrees     Right Knee   Flexion: 118 degrees   Extension: -3 degrees   Extensor la degrees     Passive Range of Motion   Left Knee   Extension: 1 degrees     Right Knee   Extension: -3 degrees     Strength/Myotome Testing     Left Hip   Planes of Motion   Flexion: 4  Extension: 4+  Abduction: 4  Adduction: 4+    Right Hip   Planes of Motion   Flexion: 4-  Extension: 4+  Abduction: 4-  Adduction: 4+    Left Knee   Flexion: 4+  Extension: 4+  Quadriceps contraction: good    Right Knee   Flexion: 4  Extension: 4  Quadriceps contraction: good    Ambulation     Observational Gait   Gait: antalgic   Decreased right stance time.              Precautions: None      Date  IE       FOTO 54 SC       Manuals                                        Neuro Re-Ed                                                                Ther Ex        Gastroc stretch in standing 10\" 10x       SLR 10x       Quad stretch 5\" 10x       SAQ 5\" 10x       Heel slide 5\" 10x                               Ther Activity                        Gait Training                        Modalities                               "

## 2025-01-07 NOTE — LETTER
2025    Ryan Vega DO  [address] [place] [id number]    Patient: Demi Almonte   YOB: 1950   Date of Visit: 2025     Encounter Diagnosis     ICD-10-CM    1. Primary osteoarthritis of right knee  M17.11       2. Chronic pain of right knee  M25.561     G89.29           Dear Dr. Vega:    Thank you for your recent referral of Demi Almonte. Please review the attached evaluation summary from Demi's recent visit.     Please verify that you agree with the plan of care by signing the attached order.     If you have any questions or concerns, please do not hesitate to call.     I sincerely appreciate the opportunity to share in the care of one of your patients and hope to have another opportunity to work with you in the near future.       Sincerely,    Jose Conner, PT      Referring Provider:      I certify that I have read the below Plan of Care and certify the need for these services furnished under this plan of treatment while under my care.                    Ryan Vega DO  [address] [place] [id number]  Via In Basket          PT Evaluation     Today's date: 2025  Patient name: Demi Almonte  : 1950  MRN: [id number]  Referring provider: Ryan Vega DO  Dx:   Encounter Diagnosis     ICD-10-CM    1. Primary osteoarthritis of right knee  M17.11       2. Chronic pain of right knee  M25.561     G89.29           Start Time: 0930  Stop Time: 1015  Total time in clinic (min): 45 minutes    Assessment  Impairments: abnormal gait, abnormal or restricted ROM, activity intolerance, impaired physical strength, lacks appropriate home exercise program, pain with function and activity limitations  Symptom irritability: moderate    Assessment details: Patient is a 73 y/o female with chief c/o R knee pain. Patient is here today for a pre operative assessment for an upcoming R TKA scheduled for 2025. She presents  with tenderness over the lateral joint line of the R knee. There is good patellar mobility noted with associated crepitus during mobility assessment. She does have some mild mobility and strength deficit present to the R knee as per the objective flowsheet. Patient was instructed on performance of HEP with verbal understanding noted.   Understanding of Dx/Px/POC: good     Prognosis: good    Goals  STGs:  Patient will be independent with HEP in 1-2 visits  Improve knee flexion to 130 degrees for improved tolerance with transfers and adls by 3-4 weeks.   Improve knee extension to 0 degrees for improved tolerance with adls and transfers by 3-4 weeks.       LTGs:  Improve FOTO score from 54 To 63 Indicating improved tolerance with activities involving the LE by discharge.   Patient will be educated on her upcoming procedure for improved preparation and chances of successful recovery by discharge.       Plan  Patient would benefit from: skilled physical therapy  Planned modality interventions: cryotherapy and thermotherapy: hydrocollator packs    Planned therapy interventions: ADL retraining, balance/weight bearing training, flexibility, functional ROM exercises, gait training, home exercise program, joint mobilization, manual therapy, neuromuscular re-education, patient education, strengthening, stretching, therapeutic activities and therapeutic exercise    Frequency: 1-2x week  Duration in weeks: 3  Plan of Care beginning date: 1/7/2025  Plan of Care expiration date: 1/28/2025  Treatment plan discussed with: patient  Plan details: Patient informed that from this point forward, to ensure adherence to the aforementioned plan of care, all or some of the treatment may be performed and carried out by a Physical Therapy Assistant (PTA) with supervision from a licensed Physical Therapist (PT) in accordance with [facility] Physical Therapy Practice Act.  Patient will continue to benefit from skilled physical therapy to  address the functional deficits that were identified during the evaluation today. We will continue to progress the therapy program to address these functional deficits and achieve the established goals.           Subjective Evaluation    History of Present Illness  Mechanism of injury: Patient presents to out patient physical therapy with chief c/o R knee pain. Patient reports a history of chronic R knee pain with worsening over the past 2 years. She has had cortisone injections in her knee which have started to become less effective recently. She finds that when the weather is colder this causes her knee to become stiff and also increases the pain in her knee. She finds that when she first wakes up in the morning her knee will be stiff and she has to move her leg around and stretch before getting up from bed. She is limited with standing to approximately 30 minutes before she needs to sit down because of the pain in her knee. Patient is schedule for an upcoming R TKA on 2025.           Recurrent probem    Quality of life: good    Patient Goals  Patient goals for therapy: increased strength and increased motion  Patient goal: Patient wishes to improve her strength and mobility prior to her upcoming surgery scheduled for 2025.  Pain  Current pain ratin  At best pain rating: 3  At worst pain ratin  Location: R knee  Quality: discomfort, dull ache, throbbing, tight and sharp  Relieving factors: medications, rest and change in position (Topical ointment)  Aggravating factors: stair climbing, standing and walking    Social Support  Steps to enter house: yes  5  Stairs in house: yes   Lives in: multiple-level home  Lives with: spouse    Employment status: not working  Treatments  Previous treatment: injection treatment        Objective     Tenderness   Left Knee   Tenderness in the lateral joint line.     Active Range of Motion   Left Knee   Flexion: 123 degrees   Extension: 1 degrees   Extensor la  "degrees     Right Knee   Flexion: 118 degrees   Extension: -3 degrees   Extensor la degrees     Passive Range of Motion   Left Knee   Extension: 1 degrees     Right Knee   Extension: -3 degrees     Strength/Myotome Testing     Left Hip   Planes of Motion   Flexion: 4  Extension: 4+  Abduction: 4  Adduction: 4+    Right Hip   Planes of Motion   Flexion: 4-  Extension: 4+  Abduction: 4-  Adduction: 4+    Left Knee   Flexion: 4+  Extension: 4+  Quadriceps contraction: good    Right Knee   Flexion: 4  Extension: 4  Quadriceps contraction: good    Ambulation     Observational Gait   Gait: antalgic   Decreased right stance time.              Precautions: None      Date  IE       FOTO 54 SC       Manuals                                        Neuro Re-Ed                                                                Ther Ex        Gastroc stretch in standing 10\" 10x       SLR 10x       Quad stretch 5\" 10x       SAQ 5\" 10x       Heel slide 5\" 10x                               Ther Activity                        Gait Training                        Modalities                                               "

## 2025-01-08 ENCOUNTER — APPOINTMENT (OUTPATIENT)
Dept: PHYSICAL THERAPY | Facility: CLINIC | Age: 75
End: 2025-01-08
Payer: COMMERCIAL

## 2025-01-09 ENCOUNTER — OFFICE VISIT (OUTPATIENT)
Dept: PHYSICAL THERAPY | Facility: CLINIC | Age: 75
End: 2025-01-09
Payer: COMMERCIAL

## 2025-01-09 DIAGNOSIS — M25.561 CHRONIC PAIN OF RIGHT KNEE: ICD-10-CM

## 2025-01-09 DIAGNOSIS — Z96.652 STATUS POST TOTAL KNEE REPLACEMENT, LEFT: Primary | ICD-10-CM

## 2025-01-09 DIAGNOSIS — G89.29 CHRONIC PAIN OF RIGHT KNEE: ICD-10-CM

## 2025-01-09 DIAGNOSIS — M17.11 PRIMARY OSTEOARTHRITIS OF RIGHT KNEE: ICD-10-CM

## 2025-01-09 PROCEDURE — 97140 MANUAL THERAPY 1/> REGIONS: CPT

## 2025-01-09 PROCEDURE — 97110 THERAPEUTIC EXERCISES: CPT

## 2025-01-09 NOTE — PROGRESS NOTES
"Daily Note     Today's date: 2025  Patient name: Demi Almonte  : 1950  MRN: [id number]  Referring provider: Ryan Vega DO  Dx:   Encounter Diagnosis     ICD-10-CM    1. Status post total knee replacement, left  Z96.652       2. Chronic pain of right knee  M25.561     G89.29       3. Primary osteoarthritis of right knee  M17.11           Start Time: 1545  Stop Time: 1630  Total time in clinic (min): 45 minutes    Subjective:  I am having some soreness in my right knee today as well as my left ankle.       Objective: See treatment diary below      Assessment: Tolerated treatment well. Patient would benefit from continued PT   pt states that she is able to do stairs one over the other with two hand rails. She states if she does not have 2 rails she has to do stairs one at a time.   We added some new exercises today to help strengthen her knee and improve gait.  Pt reports some cracking in her right knee at times during the treatment today. Pt declines ice post.       Plan: Continue per plan of care.      Precautions: None      Date  IE       FOTO 54 SC       Manuals        Ham / quad  8 min                              Neuro Re-Ed        SLB    10\" 4 x      Tandem walk  4 x       Side stepping  4 x                                       Ther Ex        Gastroc stretch in standing 10\" 10x 10\" 10 x      SLR 10x 15x      Quad stretch 5\" 10x 5\" 15x      SAQ 5\" 10x 5\" 15 x      Heel slide 5\" 10x 5' 20 x      Nustep  5 min       Mini squats  15 x       SHC  20 x      Standing abd  20 x      Ther Activity                        Gait Training                        Modalities                               "

## 2025-01-10 ENCOUNTER — TELEPHONE (OUTPATIENT)
Dept: OBGYN CLINIC | Facility: CLINIC | Age: 75
End: 2025-01-10

## 2025-01-10 NOTE — TELEPHONE ENCOUNTER
Patient called regarding [facility] Op appointment with Moraima that has been scheduled for 1/20. I informed her this is mandatory for her 1/27/25 TKA surgery with Dr. Vega. Patient verbalized understanding and will contact me if there are any additional concerns.

## 2025-01-13 ENCOUNTER — OFFICE VISIT (OUTPATIENT)
Dept: PHYSICAL THERAPY | Facility: CLINIC | Age: 75
End: 2025-01-13
Payer: COMMERCIAL

## 2025-01-13 DIAGNOSIS — G89.29 CHRONIC PAIN OF RIGHT KNEE: ICD-10-CM

## 2025-01-13 DIAGNOSIS — M25.561 CHRONIC PAIN OF RIGHT KNEE: ICD-10-CM

## 2025-01-13 DIAGNOSIS — Z96.652 STATUS POST TOTAL KNEE REPLACEMENT, LEFT: ICD-10-CM

## 2025-01-13 DIAGNOSIS — M17.11 PRIMARY OSTEOARTHRITIS OF RIGHT KNEE: Primary | ICD-10-CM

## 2025-01-13 PROCEDURE — 97140 MANUAL THERAPY 1/> REGIONS: CPT

## 2025-01-13 PROCEDURE — 97110 THERAPEUTIC EXERCISES: CPT

## 2025-01-13 NOTE — PROGRESS NOTES
"Daily Note     Today's date: 2025  Patient name: Demi Almonte  : 1950  MRN: [id number]  Referring provider: Ryan Vega DO  Dx:   Encounter Diagnosis     ICD-10-CM    1. Primary osteoarthritis of right knee  M17.11       2. Status post total knee replacement, left  Z96.652       3. Chronic pain of right knee  M25.561     G89.29           Start Time: 1015  Stop Time: 1100  Total time in clinic (min): 45 minutes    Subjective:  I have pain over the inside and outside of my right knee.  I did my exercises over the weekend.       Objective: See treatment diary below      Assessment: Tolerated treatment well. Patient would benefit from continued PT   pt completes full program today with some mild pain through out. She had most trouble with standing single leg balance and mini squats. She reports that she feels tight in her hamstring and quads.  We worked on both today and was tight . She reports that she felt less tightness post.       Plan: Continue per plan of care.      Precautions: None      Date  IE      FOTO 54 SC       Manuals        Ham / quad  8 min 8 min                             Neuro Re-Ed        SLB    10\" 4 x 10\" 4 x     Tandem walk  4 x  4 x      Side stepping  4 x  4 x                                      Ther Ex        Gastroc stretch in standing 10\" 10x 10\" 10 x 10 \" 5x     SLR 10x 15x 15x     Quad stretch 5\" 10x 15\" 5x 15\" 5 x     SAQ 5\" 10x 5\" 15 x 5\" 15x     Heel slide 5\" 10x 5' 20 x 5\" 20 x     Nustep  5 min  6 min L 5     Mini squats  15 x  15x     SHC  20 x 20 x     Standing abd  20 x 20 x     Ther Activity                        Gait Training                        Modalities                                 "

## 2025-01-15 ENCOUNTER — OFFICE VISIT (OUTPATIENT)
Dept: PHYSICAL THERAPY | Facility: CLINIC | Age: 75
End: 2025-01-15
Payer: COMMERCIAL

## 2025-01-15 DIAGNOSIS — Z96.652 STATUS POST TOTAL KNEE REPLACEMENT, LEFT: ICD-10-CM

## 2025-01-15 DIAGNOSIS — M25.561 CHRONIC PAIN OF RIGHT KNEE: Primary | ICD-10-CM

## 2025-01-15 DIAGNOSIS — M17.11 PRIMARY OSTEOARTHRITIS OF RIGHT KNEE: ICD-10-CM

## 2025-01-15 DIAGNOSIS — G89.29 CHRONIC PAIN OF RIGHT KNEE: Primary | ICD-10-CM

## 2025-01-15 PROCEDURE — 97140 MANUAL THERAPY 1/> REGIONS: CPT

## 2025-01-15 PROCEDURE — 97110 THERAPEUTIC EXERCISES: CPT

## 2025-01-15 NOTE — ASSESSMENT & PLAN NOTE
Hx of ablation  Followed by EP at [facility]  Was cleared fro sx by cardiology EP and cleared to hold AC x 72 hrs preop

## 2025-01-15 NOTE — PATIENT INSTRUCTIONS
BEFORE SURGERY    Contact your surgical nurse navigator or surgical provider with any questions regarding preoperative plan or schedule.  Stop all over the counter supplements, herbal, naturopathic  medications for 1 week prior to surgery UNLESS prescribed by your surgeon  Hold NSAIDS (i.e. advil, alleve, motrin, ibuprofen, celebrex) minimum 5 days prior to surgery  Follow presurgical medication instructions provided by preadmission nursing team reviewed during your presurgery phone call  Strategies for optimizing your surgery through breathing exercises, nutrition and physical activity can be found at [email or web address]  Call [phone] with any presurgical concerns or medications questions or use the messaging feature in your [employer] to contact your provider  Hold current anticoagulant xarelto 72 hrs prior to surgery. Last dose should be taken at 7 AM on 1/24/2025. You will resume this medication the morning after your surgery unless instructed otherwise by your surgical team.       AFTER SURGERY    Recommend using Tylenol ( acetaminophen ) 1000 mg every eight hours during the first week post discharge along with icing the area for 20 mins every 3-4 hours while awake can be helpful in reducing your need for post operative opioid use. This opioid sparing plan can be used along side your surgeons pain plan.  Use stool softener over the counter (colace) daily after surgery during the first 1-2 weeks to avoid post operative constipation issues  If no bowel movement within 3 days after surgery then use over the counter Miralax in addition to your stool softener   If cleared by your surgical team for activity then early and often walking is encouraged and can be important in prevention of post surgical blood clots. Additionally spend as much time out of bed as possible and allowed by your surgical team  Use your incentive spirometer twice per hour in the first seven days after surgery to help prevent post surgery  lung complications and infections  It is very important you follow the instructions from your surgeon regarding any medications for after surgery blood clot prevention. Compliance with these medications or interventions is very important.  Call [phone] with any post discharge concerns or medical issues or use the messaging feature in your [employer] to contact your provider

## 2025-01-15 NOTE — PROGRESS NOTES
"Daily Note     Today's date: 1/15/2025  Patient name: Demi Almonte  : 1950  MRN: [id number]  Referring provider: Ryan Vega DO  Dx:   Encounter Diagnosis     ICD-10-CM    1. Chronic pain of right knee  M25.561     G89.29       2. Status post total knee replacement, left  Z96.652       3. Primary osteoarthritis of right knee  M17.11           Start Time: 1015  Stop Time: 1100  Total time in clinic (min): 45 minutes    Subjective:  My right knee is sore and stiff today.      Objective: See treatment diary below      Assessment: Tolerated treatment well. Patient would benefit from continued PT  pt reports having some pain in her right knee through out her session today. She states that she came into therapy today with more knee pain.  We began bridges today with some cramping note in her left hamstring, pt states coming into therapy that she was having pain in her left medial arch today.  We spoke to her about some different sneakers that have better support.  She reports that her right knee can give out at times.       Plan: Continue per plan of care.      Precautions: None      Date  IE 1/9 1/13 1/15    FOTO 54 SC       Manuals        Ham / quad  8 min 8 min 8 min                             Neuro Re-Ed        SLB    10\" 4 x 10\" 4 x 10\" 4 x    Tandem walk  4 x  4 x  4 x     Side stepping  4 x  4 x  4 x                                     Ther Ex        Gastroc stretch in standing 10\" 10x 10\" 10 x 10 \" 5x 10 \" 5 x    bridge    15 x 3 \"     SLR 10x 15x 15x 15 x     Quad stretch 5\" 10x 15\" 5x 15\" 5 x 15 \" 5 x    SAQ 5\" 10x 5\" 15 x 5\" 15x 5 \" 15 x    Heel slide 5\" 10x 5' 20 x 5\" 20 x 5\" 20 x    Nustep  5 min  6 min L 5 6 min L 5    Mini squats  15 x  15x 20 x    SHC  20 x 20 x 20 x    Standing abd  20 x 20 x 20 x    Ther Activity                        Gait Training                        Modalities                                   "

## 2025-01-15 NOTE — PROGRESS NOTES
Internal Medicine Pre-Operative Evaluation:     Reason for Visit: Pre-operative Evaluation for Risk Stratification and Optimization    Patient ID: Demi Almonte is a 74 y.o. female.       The Patient is located at Home and in the following state in which I hold an active license [place]    The patient was identified by name and date of birth Demi Almonte was informed that this is a telemedicine visit and that the visit is being conducted through the Epic Embedded platform. She agrees to proceed..  My office door was closed and no one else was in the room.  Patient acknowledged consent and understanding of privacy and security of the video platform. The patient has agreed to participate and understands they can discontinue the visit at any time.    Patient is aware this is a billable service.      Case: [id number] Date/Time: 01/27/25 0730   Procedure: ARTHROPLASTY KNEE TOTAL (Right: Knee)   Anesthesia type: Choice   Diagnosis: Primary osteoarthritis of right knee [M17.11]   Pre-op diagnosis: Primary osteoarthritis of right knee [M17.11]   Location: [facility] ROOM 02 / [facility]   Surgeons: Ryan Vega,          Recommendations to Proceed withSurgery    Patient is considered to be Medium risk for Medium risk procedure.     After evaluation and discussion with patient with emphasis that all surgery has some degree of inherent risk it is acknowledged by patient this risk is Acceptable.    Patient is optimized and may proceed with planned procedure.     Assessment    Pre-operative Medical Evaluation for planned surgery  Recommendations as listed in PLAN section below  Contact surgical nurse  navigator with any questions regarding preoperative plan or schedule.      Assessment & Plan  Primary osteoarthritis of left knee  Failed outpatient conservative measures  Elected to undergo total joint arthroplasty    Seronegative rheumatoid arthritis (HCC)    Paroxysmal atrial fibrillation  (HCC)  Hx of ablation  [facility]  Was cleared fro sx by cardiology EP and cleared to hold AC x 72 hrs preop  Acquired hypothyroidism  Continue current thyroid supplement    Hypercholesterolemia  Continue low cholesterol diet and statin therapy    History of pulmonary embolus (PE)  On long term AC for afib  Essential hypertension  Cont metoprolol  Pre-op examination             Plan:     1. Further preoperative workup as follows:   - none no further testing required may proceed with surgery    2. Preoperative Medication Management Review performed by PAT nursing  YES    3. Patient requires further consultation with:   No Consults Required    4. Discharge Planning / Barriers to Discharge  none identified - patients has post discharge therapy plan in place, transportation arranged for discharge day, adequate family support at home to assist with discharge to home.        Subjective:           History of Present Illness:     Demi Almonte is a 74 y.o. female who presents to the office today for a preoperative consultation at the request of surgeon. The patient understands this is an elective procedure and not emergent. They are electing to undergo planned procedure with an understanding that all surgery has inherent risk. They have worked with their surgeon and failed conservative treatment measures. Today they present for preoperative risk assessment and recommendations for optimization in preparation for surgery.    Pt seen in surgical optimization center for upcoming proposed surgery. They have failed previous conservative measures and have elected surgical intervention.     Pt meets presurgical lab and BMI optimization goals.      Demi Almonte has an IN HOSPITAL cardiac risk of RCI RISK CLASS I (0 risk factors, risk of major cardiac compl. appr. 0.5%) based on RCRI calculator    Cardiac Risk Estimation: per the Revised Cardiac Risk Index (Circ. 100:1043, 1999),     [facility] and to hold AC x 72 hrs preop. See cardiology section of chart      Pre-op Exam    Previous history of bleeding disorders or clots?: Yes  Previous Anesthesia reaction?: No  Prolonged steroid use in the last 6 months?: No    Assessment of Cardiac Risk:   - Unstable or severe angina or MI in the last 6 weeks or history of stent placement in the last year?: No   - Decompensated heart failure (e.g. New onset heart failure, NYHA  Class IV heart failure, or worsening existing heart failure)?: No  - Significant arrhythmias such as high grade AV block, symptomatic ventricular arrhythmia, newly recognized ventricular tachycardia, supraventricular tachycardia with resting heart rate >100, or symptomatic bradycardia?: No  - Severe heart valve disease including aortic stenosis or symptomatic mitral stenosis?: No      Pre-operative Risk Factors:  Elevated-risk surgery: No    History of cerebrovascular disease: No    History of ischemic heart disease: No  Pre-operative treatment with insulin: No  Pre-operative creatinine >2 mg/dL: No    History of congestive heart failure: Yes        ROS: No TIA's or unusual headaches, no dysphagia.  No prolonged cough. No dyspnea or chest pain on exertion.  No abdominal pain, change in bowel habits, black or bloody stools.  No urinary tract or BPH symptoms.  Positive reported pain in arthritic joint. Positive difficulty with gait. No skin rashes or issues.        Physical Exam was not performed as this was a video/phone visit      The following portions of the patient's history were reviewed and updated as appropriate: allergies, current medications, past family history, past medical history, past social history, past surgical history and problem list.     Past History:       Past Medical History:   Diagnosis Date   • Acute pulmonary embolism (HCC) 01/10/2017   • Allergic    • Arthritis     rheumatoid   • Atrial fibrillation (HCC)    • CHF (congestive heart failure) ([facility])    • Disease of  "thyroid gland    • DVT (deep venous thrombosis) ([facility])    • DVT, lower extremity ([facility]) 01/10/2017   • HL (hearing loss)     Left Ear-Wear Hearing Aid   • Hypertension    • Hyperthyroidism    • Irregular heart beat    • Kidney stone    • Migraine     hx of   • Pleural effusion    • Polymyalgia rheumatica ([facility])     \"Remission\"   • Polymyalgia rheumatica ([facility]) 01/10/2017   • Secondary adrenal insufficiency ([facility]) 2017   • Sepsis, unspecified organism ([facility]) 12/15/2019   • Sleep apnea     Mild-Does not require CPAP   • Varicella As a child   • Vitamin D deficiency     Past Surgical History:   Procedure Laterality Date   • CARDIAC CATHETERIZATION     • CARDIOVERSION N/A 2019    Procedure: CARDIOVERSION;  Surgeon: Roque Alfaro MD;  Location: [facility];  Service: Cardiology   •  SECTION      x3 - last impression 16   • FRACTURE SURGERY Left     wrist   • HERNIA REPAIR  2018   • KNEE ARTHROSCOPY Left     Meniscus Tear Repair   • KNEE CARTILAGE SURGERY Left    • IL ARTHRP KNE CONDYLE&PLATU MEDIAL&LAT COMPARTMENTS Left 3/16/2022    Procedure: KNEE TOTAL ARTHROPLASTY;  Surgeon: Ryan Vega DO;  Location: [facility];  Service: Orthopedics   • TONSILLECTOMY AND ADENOIDECTOMY  child; age 14   • TUBAL LIGATION     • VEIN SURGERY Right     venous ligation with stripping          Social History     Tobacco Use   • Smoking status: Never   • Smokeless tobacco: Never   Vaping Use   • Vaping status: Never Used   Substance Use Topics   • Alcohol use: Not Currently     Alcohol/week: 1.0 standard drink of alcohol     Types: 1 Glasses of wine per week     Comment: Socially   • Drug use: No     Family History   Problem Relation Age of Onset   • Breast cancer Mother 52   • Arthritis Mother    • Cancer Mother    • Hearing loss Mother    • Vision loss Mother    • Osteoarthritis Mother    • Aneurysm Father         aortic   • No Known Problems Sister    • No Known Problems Maternal Grandmother    • No " Known Problems Maternal Grandfather    • No Known Problems Paternal Grandmother    • No Known Problems Paternal Grandfather    • Rheum arthritis Maternal Aunt    • Early death Maternal Aunt    • No Known Problems Paternal Aunt    • No Known Problems Paternal Aunt    • No Known Problems Paternal Aunt           Allergies:     Allergies   Allergen Reactions   • Amiodarone Other (See Comments)     Other reaction(s): Other (See Comments)  Reports caused elevated TSH   • Sudafed [Pseudoephedrine] Tachycardia     Rapid heart beat   • Sulfa Antibiotics Itching and Rash   • Sulfamethoxazole-Trimethoprim Itching and Rash        Current Medications:     Current Outpatient Medications   Medication Instructions   • ascorbic acid (VITAMIN C) 500 mg, Oral, 2 times daily   • azelastine (ASTELIN) 0.1 % nasal spray 1 spray, Nasal, 2 times daily, Use in each nostril as directed   • Calcium Ascorbate (VITAMIN C) 500 mg, Daily   • cholecalciferol (VITAMIN D3) 1,000 Units, Daily   • Cinnamon 500 mg, Daily   • Cyanocobalamin (VITAMIN B 12 PO) 500 mcg, Daily   • ferrous sulfate 324 mg, Oral, 2 times daily before meals   • folic acid (FOLVITE) 1 mg, Oral, Daily   • levothyroxine 75 mcg, Oral, Daily   • metoprolol succinate (TOPROL-XL) 25 mg, Daily   • Misc Natural Products (OSTEO BI-FLEX JOINT SHIELD PO) Take by mouth   • Multiple Vitamins-Minerals (multivitamin with minerals) tablet 1 tablet, Oral, Daily   • Multiple Vitamins-Minerals (multivitamin with minerals) tablet 1 tablet, Oral, Daily   • Multiple Vitamins-Minerals (OCUVITE ADULT 50+) CAPS 1 capsule, Daily   • mupirocin (BACTROBAN) 2 % ointment Apply 0.5 g (half a tube) to each nostril twice a day starting 5 days prior to procedure   • Red Yeast Rice 600 MG TABS 1 tablet, Daily   • XARELTO 20 MG tablet 1 tablet, Daily before dinner           PRE-OP WORKSHEET DATA    Assessment of Pre-Operative Risks     MLJ Quality Hard Stops:    BMI (<40) : Estimated body mass index is 34.39 kg/m²  "as calculated from the following:    Height as of 1/14/25: 5' 1\" (1.549 m).    Weight as of 1/14/25: 82.6 kg (182 lb).    Hgb ( >11):   Lab Results   Component Value Date    HGB 13.6 01/03/2025    HGB 13.4 07/09/2024    HGB 13.1 05/09/2024       HbA1c (<7.5) :   Lab Results   Component Value Date    HGBA1C 5.5 01/03/2025       GFR (>60) (Less then 45 = Nephrology consult):    Lab Results   Component Value Date    EGFR 93 01/03/2025    EGFR 90 07/09/2024    EGFR 91 05/09/2024            Pre-Op Data Reviewed:       Laboratory Results: I have personally reviewed the pertinent laboratory results/reports     EKG:I have personally reviewed pertinent reports.  . I personally reviewed and interpreted available tracings in the electronic medical record    Seen in cardiology office 2024    OLD RECORDS: reviewed old records in the chart review section if EHR on day of visit.    Previous cardiopulmonary studies within the past year:  Echocardiogram: no   Cardiac Catheterization: no  Stress Test: no      Time of visit including pre-visit chart review, visit and post-visit coordination of plan and care , review of pre-surgical lab work, preparation and time spent documenting note in electronic medical record, time spent face-to-face in physical examination answering patient questions by care team 35 minutes             [facility]    "

## 2025-01-16 ENCOUNTER — DOCUMENTATION (OUTPATIENT)
Dept: ENDOCRINOLOGY | Facility: HOSPITAL | Age: 75
End: 2025-01-16

## 2025-01-16 NOTE — PROGRESS NOTES
Received [employer] Summary of Benefits for [employer]. A prior auth is required and on file. She has no OOP cost.

## 2025-01-17 ENCOUNTER — APPOINTMENT (OUTPATIENT)
Dept: PHYSICAL THERAPY | Facility: CLINIC | Age: 75
End: 2025-01-17
Payer: COMMERCIAL

## 2025-01-17 ENCOUNTER — TELEMEDICINE (OUTPATIENT)
Age: 75
End: 2025-01-17
Payer: COMMERCIAL

## 2025-01-17 ENCOUNTER — CONSULT (OUTPATIENT)
Dept: FAMILY MEDICINE CLINIC | Facility: CLINIC | Age: 75
End: 2025-01-17
Payer: COMMERCIAL

## 2025-01-17 VITALS
TEMPERATURE: 97.3 F | HEART RATE: 64 BPM | HEIGHT: 61 IN | SYSTOLIC BLOOD PRESSURE: 132 MMHG | DIASTOLIC BLOOD PRESSURE: 78 MMHG | OXYGEN SATURATION: 98 % | WEIGHT: 181 LBS | BODY MASS INDEX: 34.17 KG/M2

## 2025-01-17 DIAGNOSIS — Z01.818 PRE-OP EXAMINATION: ICD-10-CM

## 2025-01-17 DIAGNOSIS — I10 ESSENTIAL HYPERTENSION: ICD-10-CM

## 2025-01-17 DIAGNOSIS — E03.9 ACQUIRED HYPOTHYROIDISM: ICD-10-CM

## 2025-01-17 DIAGNOSIS — M17.12 PRIMARY OSTEOARTHRITIS OF LEFT KNEE: Primary | ICD-10-CM

## 2025-01-17 DIAGNOSIS — I50.32 CHRONIC DIASTOLIC CONGESTIVE HEART FAILURE (HCC): ICD-10-CM

## 2025-01-17 DIAGNOSIS — E03.9 ACQUIRED HYPOTHYROIDISM: Chronic | ICD-10-CM

## 2025-01-17 DIAGNOSIS — Z86.711 HISTORY OF PULMONARY EMBOLUS (PE): ICD-10-CM

## 2025-01-17 DIAGNOSIS — E78.00 HYPERCHOLESTEROLEMIA: ICD-10-CM

## 2025-01-17 DIAGNOSIS — M06.00 SERONEGATIVE RHEUMATOID ARTHRITIS (HCC): ICD-10-CM

## 2025-01-17 DIAGNOSIS — Z00.00 MEDICARE ANNUAL WELLNESS VISIT, SUBSEQUENT: Primary | ICD-10-CM

## 2025-01-17 DIAGNOSIS — I48.0 PAROXYSMAL ATRIAL FIBRILLATION (HCC): ICD-10-CM

## 2025-01-17 PROCEDURE — G0439 PPPS, SUBSEQ VISIT: HCPCS

## 2025-01-17 PROCEDURE — 99214 OFFICE O/P EST MOD 30 MIN: CPT

## 2025-01-17 PROCEDURE — 99215 OFFICE O/P EST HI 40 MIN: CPT | Performed by: INTERNAL MEDICINE

## 2025-01-17 NOTE — PROGRESS NOTES
Name: Demi Almonte      : 1950      MRN: [id number]  Encounter Provider: SEVERIANO Miner  Encounter Date: 2025   Encounter department: [facility]    Assessment & Plan  Medicare annual wellness visit, subsequent    Previous notes from PCP reviewed.         Essential hypertension    Counseled.         Paroxysmal atrial fibrillation (HCC)    Follows with [facility].  Notes reviewed.           Hypercholesterolemia         Acquired hypothyroidism         Chronic diastolic congestive heart failure (HCC)  Wt Readings from Last 3 Encounters:   25 82.1 kg (181 lb)   25 82.6 kg (182 lb)   10/03/24 82.1 kg (181 lb)            Depression Screening and Follow-up Plan: Patient was screened for depression during today's encounter. They screened negative with a PHQ-2 score of 0.    Urinary Incontinence Plan of Care: counseling topics discussed: use restroom every 2 hours, limit alcohol, caffeine, spicy foods, and acidic foods, weight loss, improving blood sugar control, limiting fluid intake to 60 oz. per day, wearing TAI stockings for edema control and bladder retraining.       Preventive health issues were discussed with patient, and age appropriate screening tests were ordered as noted in patient's After Visit Summary. Personalized health advice and appropriate referrals for health education or preventive services given if needed, as noted in patient's After Visit Summary.    History of Present Illness       Patient Care Team:  Ernesto Minaya DO as PCP - General  MD Ernesto Ellington DO Stephen Roskos, CRNP (Endocrinology)  Debbie Aguillon RN as Nurse Navigator (Orthopedics)    Review of Systems   Constitutional:  Negative for chills, fatigue and fever.   HENT:  Negative for congestion, ear pain, facial swelling, hearing loss, rhinorrhea, sinus pressure, sneezing, sore throat and trouble swallowing.    Eyes:  Negative for  pain, redness and visual disturbance.   Respiratory:  Negative for cough, chest tightness, shortness of breath and wheezing.    Cardiovascular:  Negative for chest pain and palpitations.   Gastrointestinal:  Negative for abdominal pain, diarrhea, nausea and vomiting.   Genitourinary:  Negative for dysuria, flank pain, hematuria and pelvic pain.   Musculoskeletal:  Negative for arthralgias, back pain and myalgias.   Skin:  Negative for color change and rash.   Neurological:  Negative for dizziness, seizures, syncope, weakness, light-headedness and headaches.   Psychiatric/Behavioral:  Negative for confusion, hallucinations and sleep disturbance. The patient is not nervous/anxious.    All other systems reviewed and are negative.    Medical History Reviewed by provider this encounter:  Tobacco  Allergies  Meds  Problems  Med Hx  Surg Hx  Fam Hx       Annual Wellness Visit Questionnaire   Demi is here for her Subsequent Wellness visit. Last Medicare Wellness visit information reviewed, patient interviewed, no change since last AWV.     Health Risk Assessment:   Patient rates overall health as good. Patient feels that their physical health rating is same. Patient is satisfied with their life. Eyesight was rated as same. Hearing was rated as same. Patient feels that their emotional and mental health rating is same. Patients states they are never, rarely angry. Patient states they are sometimes unusually tired/fatigued. Pain experienced in the last 7 days has been some. Patient's pain rating has been 5/10. Patient states that she has experienced no weight loss or gain in last 6 months.     Depression Screening:   PHQ-2 Score: 0      Fall Risk Screening:   In the past year, patient has experienced: no history of falling in past year      Urinary Incontinence Screening:   Patient has leaked urine accidently in the last six months.     Home Safety:  Patient has trouble with stairs inside or outside of their home.  Patient has working smoke alarms and has working carbon monoxide detector. Home safety hazards include: none.     Nutrition:   Current diet is Regular and Low Carb.     Medications:   Patient is currently taking over-the-counter supplements. OTC medications include: see medication list. Patient is able to manage medications.     Activities of Daily Living (ADLs)/Instrumental Activities of Daily Living (IADLs):   Walk and transfer into and out of bed and chair?: Yes  Dress and groom yourself?: Yes    Bathe or shower yourself?: Yes    Feed yourself? Yes  Do your laundry/housekeeping?: Yes  Manage your money, pay your bills and track your expenses?: Yes  Make your own meals?: Yes    Do your own shopping?: Yes    Previous Hospitalizations:   Any hospitalizations or ED visits within the last 12 months?: Yes    How many hospitalizations have you had in the last year?: 1-2    Advance Care Planning:   Living will: No    Durable POA for healthcare: No    Advanced directive: No    Advanced directive counseling given: Yes    ACP document given: Yes    Patient declined ACP directive: No    End of Life Decisions reviewed with patient: Yes    Provider agrees with end of life decisions: Yes      PREVENTIVE SCREENINGS      Cardiovascular Screening:    General: Screening Not Indicated and History Lipid Disorder      Diabetes Screening:     General: Screening Current      Colorectal Cancer Screening:     General: Screening Current      Breast Cancer Screening:     General: Screening Current      Cervical Cancer Screening:    General: Screening Current      Osteoporosis Screening:    General: History Osteoporosis and Screening Current      Abdominal Aortic Aneurysm (AAA) Screening:        General: Screening Not Indicated      Lung Cancer Screening:     General: Screening Not Indicated      Hepatitis C Screening:    General: Screening Current    Screening, Brief Intervention, and Referral to Treatment (SBIRT)      Brief  "Intervention  Alcohol & drug use screenings were reviewed. No concerns regarding substance use disorder identified.     Other Counseling Topics:   Car/seat belt/driving safety, skin self-exam, sunscreen and calcium and vitamin D intake and regular weightbearing exercise.     Social Drivers of Health     Food Insecurity: No Food Insecurity (3/17/2022)    Hunger Vital Sign     Worried About Running Out of Food in the Last Year: Never true     Ran Out of Food in the Last Year: Never true   Transportation Needs: No Transportation Needs (3/17/2022)    PRAPARE - Transportation     Lack of Transportation (Medical): No     Lack of Transportation (Non-Medical): No   Housing Stability: Low Risk  (3/17/2022)    Housing Stability Vital Sign     Unable to Pay for Housing in the Last Year: No     Number of Places Lived in the Last Year: 1     Unstable Housing in the Last Year: No     No results found.    Objective   /78   Pulse 64   Temp (!) 97.3 °F (36.3 °C) (Tympanic)   Ht 5' 1\" (1.549 m)   Wt 82.1 kg (181 lb)   SpO2 98%   BMI 34.20 kg/m²     Physical Exam  Vitals and nursing note reviewed.   Constitutional:       General: She is not in acute distress.     Appearance: She is well-developed.   HENT:      Head: Normocephalic and atraumatic.      Right Ear: Hearing and tympanic membrane normal.      Left Ear: Hearing and tympanic membrane normal.      Nose: Nose normal.      Mouth/Throat:      Mouth: Mucous membranes are moist.      Dentition: Normal dentition.      Tongue: No lesions.      Pharynx: Oropharynx is clear. Uvula midline. No oropharyngeal exudate.      Tonsils: No tonsillar exudate.   Eyes:      Extraocular Movements: Extraocular movements intact.      Conjunctiva/sclera: Conjunctivae normal.   Neck:      Vascular: No carotid bruit or JVD.   Cardiovascular:      Rate and Rhythm: Normal rate and regular rhythm.      Heart sounds: S1 normal and S2 normal. No murmur heard.  Pulmonary:      Effort: Pulmonary " effort is normal. No tachypnea or respiratory distress.      Breath sounds: Normal breath sounds and air entry. No decreased breath sounds.   Chest:      Chest wall: No deformity or tenderness.   Abdominal:      General: Abdomen is flat. Bowel sounds are normal. There is no distension.      Palpations: Abdomen is soft.      Tenderness: There is no abdominal tenderness. There is no right CVA tenderness, left CVA tenderness or guarding.   Musculoskeletal:         General: No swelling.      Cervical back: Full passive range of motion without pain and neck supple.      Right lower leg: No edema.      Left lower leg: No edema.   Lymphadenopathy:      Cervical: No cervical adenopathy.   Skin:     General: Skin is warm and dry.      Capillary Refill: Capillary refill takes less than 2 seconds.      Findings: No rash.   Neurological:      Mental Status: She is alert and oriented to person, place, and time.      Cranial Nerves: Cranial nerves 2-12 are intact.      Sensory: Sensation is intact.      Motor: Motor function is intact.      Coordination: Coordination is intact.      Gait: Gait is intact.   Psychiatric:         Mood and Affect: Mood normal.         Behavior: Behavior is cooperative.

## 2025-01-17 NOTE — PATIENT INSTRUCTIONS
Medicare Preventive Visit Patient Instructions  Thank you for completing your Welcome to Medicare Visit or Medicare Annual Wellness Visit today. Your next wellness visit will be due in one year (1/18/2026).  The screening/preventive services that you may require over the next 5-10 years are detailed below. Some tests may not apply to you based off risk factors and/or age. Screening tests ordered at today's visit but not completed yet may show as past due. Also, please note that scanned in results may not display below.  Preventive Screenings:  Service Recommendations Previous Testing/Comments   Colorectal Cancer Screening  * Colonoscopy    * Fecal Occult Blood Test (FOBT)/Fecal Immunochemical Test (FIT)  * Fecal DNA/Cologuard Test  * Flexible Sigmoidoscopy Age: 45-75 years old   Colonoscopy: every 10 years (may be performed more frequently if at higher risk)  OR  FOBT/FIT: every 1 year  OR  Cologuard: every 3 years  OR  Sigmoidoscopy: every 5 years  Screening may be recommended earlier than age 45 if at higher risk for colorectal cancer. Also, an individualized decision between you and your healthcare provider will decide whether screening between the ages of 76-85 would be appropriate. Colonoscopy: 05/23/2013  FOBT/FIT: 11/07/2024  Cologuard: Not on file  Sigmoidoscopy: Not on file          Breast Cancer Screening Age: 40+ years old  Frequency: every 1-2 years  Not required if history of left and right mastectomy Mammogram: 02/02/2024        Cervical Cancer Screening Between the ages of 21-29, pap smear recommended once every 3 years.   Between the ages of 30-65, can perform pap smear with HPV co-testing every 5 years.   Recommendations may differ for women with a history of total hysterectomy, cervical cancer, or abnormal pap smears in past. Pap Smear: 01/26/2024        Hepatitis C Screening Once for adults born between 1945 and 1965  More frequently in patients at high risk for Hepatitis C Hep C Antibody:  07/26/2021        Diabetes Screening 1-2 times per year if you're at risk for diabetes or have pre-diabetes Fasting glucose: 88 mg/dL (1/3/2025)  A1C: 5.5 % (1/3/2025)      Cholesterol Screening Once every 5 years if you don't have a lipid disorder. May order more often based on risk factors. Lipid panel: 07/09/2024          Other Preventive Screenings Covered by Medicare:  Abdominal Aortic Aneurysm (AAA) Screening: covered once if your at risk. You're considered to be at risk if you have a family history of AAA.  Lung Cancer Screening: covers low dose CT scan once per year if you meet all of the following conditions: (1) Age 55-77; (2) No signs or symptoms of lung cancer; (3) Current smoker or have quit smoking within the last 15 years; (4) You have a tobacco smoking history of at least 20 pack years (packs per day multiplied by number of years you smoked); (5) You get a written order from a healthcare provider.  Glaucoma Screening: covered annually if you're considered high risk: (1) You have diabetes OR (2) Family history of glaucoma OR (3)  aged 50 and older OR (4)  American aged 65 and older  Osteoporosis Screening: covered every 2 years if you meet one of the following conditions: (1) You're estrogen deficient and at risk for osteoporosis based off medical history and other findings; (2) Have a vertebral abnormality; (3) On glucocorticoid therapy for more than 3 months; (4) Have primary hyperparathyroidism; (5) On osteoporosis medications and need to assess response to drug therapy.   Last bone density test (DXA Scan): 04/19/2024.  HIV Screening: covered annually if you're between the age of 15-65. Also covered annually if you are younger than 15 and older than 65 with risk factors for HIV infection. For pregnant patients, it is covered up to 3 times per pregnancy.    Immunizations:  Immunization Recommendations   Influenza Vaccine Annual influenza vaccination during flu season is  recommended for all persons aged >= 6 months who do not have contraindications   Pneumococcal Vaccine   * Pneumococcal conjugate vaccine = PCV13 (Prevnar 13), PCV15 (Vaxneuvance), PCV20 (Prevnar 20)  * Pneumococcal polysaccharide vaccine = PPSV23 (Pneumovax) Adults 19-63 yo with certain risk factors or if 65+ yo  If never received any pneumonia vaccine: recommend Prevnar 20 (PCV20)  Give PCV20 if previously received 1 dose of PCV13 or PPSV23   Hepatitis B Vaccine 3 dose series if at intermediate or high risk (ex: diabetes, end stage renal disease, liver disease)   Respiratory syncytial virus (RSV) Vaccine - COVERED BY MEDICARE PART D  * RSVPreF3 (Arexvy) CDC recommends that adults 60 years of age and older may receive a single dose of RSV vaccine using shared clinical decision-making (SCDM)   Tetanus (Td) Vaccine - COST NOT COVERED BY MEDICARE PART B Following completion of primary series, a booster dose should be given every 10 years to maintain immunity against tetanus. Td may also be given as tetanus wound prophylaxis.   Tdap Vaccine - COST NOT COVERED BY MEDICARE PART B Recommended at least once for all adults. For pregnant patients, recommended with each pregnancy.   Shingles Vaccine (Shingrix) - COST NOT COVERED BY MEDICARE PART B  2 shot series recommended in those 19 years and older who have or will have weakened immune systems or those 50 years and older     Health Maintenance Due:      Topic Date Due   • Breast Cancer Screening: Mammogram  02/02/2025   • Colorectal Cancer Screening  11/07/2025   • Hepatitis C Screening  Completed     Immunizations Due:  There are no preventive care reminders to display for this patient.  Advance Directives   What are advance directives?  Advance directives are legal documents that state your wishes and plans for medical care. These plans are made ahead of time in case you lose your ability to make decisions for yourself. Advance directives can apply to any medical  decision, such as the treatments you want, and if you want to donate organs.   What are the types of advance directives?  There are many types of advance directives, and each state has rules about how to use them. You may choose a combination of any of the following:  Living will:  This is a written record of the treatment you want. You can also choose which treatments you do not want, which to limit, and which to stop at a certain time. This includes surgery, medicine, IV fluid, and tube feedings.   Durable power of  for healthcare (DPAHC):  This is a written record that states who you want to make healthcare choices for you when you are unable to make them for yourself. This person, called a proxy, is usually a family member or a friend. You may choose more than 1 proxy.  Do not resuscitate (DNR) order:  A DNR order is used in case your heart stops beating or you stop breathing. It is a request not to have certain forms of treatment, such as CPR. A DNR order may be included in other types of advance directives.  Medical directive:  This covers the care that you want if you are in a coma, near death, or unable to make decisions for yourself. You can list the treatments you want for each condition. Treatment may include pain medicine, surgery, blood transfusions, dialysis, IV or tube feedings, and a ventilator (breathing machine).  Values history:  This document has questions about your views, beliefs, and how you feel and think about life. This information can help others choose the care that you would choose.  Why are advance directives important?  An advance directive helps you control your care. Although spoken wishes may be used, it is better to have your wishes written down. Spoken wishes can be misunderstood, or not followed. Treatments may be given even if you do not want them. An advance directive may make it easier for your family to make difficult choices about your care.   Weight Management   Why  it is important to manage your weight:  Being overweight increases your risk of health conditions such as heart disease, high blood pressure, type 2 diabetes, and certain types of cancer. It can also increase your risk for osteoarthritis, sleep apnea, and other respiratory problems. Aim for a slow, steady weight loss. Even a small amount of weight loss can lower your risk of health problems.  How to lose weight safely:  A safe and healthy way to lose weight is to eat fewer calories and get regular exercise. You can lose up about 1 pound a week by decreasing the number of calories you eat by 500 calories each day.   Healthy meal plan for weight management:  A healthy meal plan includes a variety of foods, contains fewer calories, and helps you stay healthy. A healthy meal plan includes the following:  Eat whole-grain foods more often.  A healthy meal plan should contain fiber. Fiber is the part of grains, fruits, and vegetables that is not broken down by your body. Whole-grain foods are healthy and provide extra fiber in your diet. Some examples of whole-grain foods are whole-wheat breads and pastas, oatmeal, brown rice, and bulgur.  Eat a variety of vegetables every day.  Include dark, leafy greens such as spinach, kale, mauricio greens, and mustard greens. Eat yellow and orange vegetables such as carrots, sweet potatoes, and winter squash.   Eat a variety of fruits every day.  Choose fresh or canned fruit (canned in its own juice or light syrup) instead of juice. Fruit juice has very little or no fiber.  Eat low-fat dairy foods.  Drink fat-free (skim) milk or 1% milk. Eat fat-free yogurt and low-fat cottage cheese. Try low-fat cheeses such as mozzarella and other reduced-fat cheeses.  Choose meat and other protein foods that are low in fat.  Choose beans or other legumes such as split peas or lentils. Choose fish, skinless poultry (chicken or turkey), or lean cuts of red meat (beef or pork). Before you cook meat or  poultry, cut off any visible fat.   Use less fat and oil.  Try baking foods instead of frying them. Add less fat, such as margarine, sour cream, regular salad dressing and mayonnaise to foods. Eat fewer high-fat foods. Some examples of high-fat foods include french fries, doughnuts, ice cream, and cakes.  Eat fewer sweets.  Limit foods and drinks that are high in sugar. This includes candy, cookies, regular soda, and sweetened drinks.  Exercise:  Exercise at least 30 minutes per day on most days of the week. Some examples of exercise include walking, biking, dancing, and swimming. You can also fit in more physical activity by taking the stairs instead of the elevator or parking farther away from stores. Ask your healthcare provider about the best exercise plan for you.      © Copyright [employer] 2018 Information is for End User's use only and may not be sold, redistributed or otherwise used for commercial purposes. All illustrations and images included in CareNotes® are the copyrighted property of [employer] or [employer]

## 2025-01-17 NOTE — ASSESSMENT & PLAN NOTE
Wt Readings from Last 3 Encounters:   01/17/25 82.1 kg (181 lb)   01/14/25 82.6 kg (182 lb)   10/03/24 82.1 kg (181 lb)

## 2025-01-20 ENCOUNTER — OFFICE VISIT (OUTPATIENT)
Dept: PHYSICAL THERAPY | Facility: CLINIC | Age: 75
End: 2025-01-20
Payer: COMMERCIAL

## 2025-01-20 DIAGNOSIS — M25.561 CHRONIC PAIN OF RIGHT KNEE: ICD-10-CM

## 2025-01-20 DIAGNOSIS — G89.29 CHRONIC PAIN OF RIGHT KNEE: ICD-10-CM

## 2025-01-20 DIAGNOSIS — M17.11 PRIMARY OSTEOARTHRITIS OF RIGHT KNEE: Primary | ICD-10-CM

## 2025-01-20 DIAGNOSIS — Z96.652 STATUS POST TOTAL KNEE REPLACEMENT, LEFT: ICD-10-CM

## 2025-01-20 PROCEDURE — 97110 THERAPEUTIC EXERCISES: CPT

## 2025-01-20 PROCEDURE — 97140 MANUAL THERAPY 1/> REGIONS: CPT

## 2025-01-20 NOTE — PROGRESS NOTES
"Daily Note     Today's date: 2025  Patient name: Demi Almonte  : 1950  MRN: [id number]  Referring provider: Ryan Vega DO  Dx:   Encounter Diagnosis     ICD-10-CM    1. Primary osteoarthritis of right knee  M17.11       2. Status post total knee replacement, left  Z96.652       3. Chronic pain of right knee  M25.561     G89.29           Start Time: 0930  Stop Time: 1015  Total time in clinic (min): 45 minutes    Subjective:  My knee is stiff and sore today.      Objective: See treatment diary below      Assessment: Tolerated treatment well. Patient would benefit from continued PT  pt reports having pain and stiffness in both knees today. We were able to add 2# for some of her leg exercises today with good tolerance.  We will continue to work on her motion and strength to get ready for her surgery next week.        Plan: Continue per plan of care.      Precautions: None      Date  IE 1/9 1/13 1/15 1/20   FOTO 54 SC    55 JF   Manuals        Ham / quad  8 min 8 min 8 min  8 min                           Neuro Re-Ed        SLB    10\" 4 x 10\" 4 x 10\" 4 x 10\"  4 x   Tandem walk  4 x  4 x  4 x  4 x    Side stepping  4 x  4 x  4 x  4 x                                    Ther Ex        Gastroc stretch in standing 10\" 10x 10\" 10 x 10 \" 5x 10 \" 5 x 10\" 5 x   bridge    15 x 3 \"  15 x 3\"    SLR 10x 15x 15x 15 x  20 x   Quad stretch 5\" 10x 15\" 5x 15\" 5 x 15 \" 5 x 15\"  5 x   SAQ 5\" 10x 5\" 15 x 5\" 15x 5 \" 15 x 5\" 20 x  2#    Heel slide 5\" 10x 5' 20 x 5\" 20 x 5\" 20 x 5\" 20 x   Nustep  5 min  6 min L 5 6 min L 5 8 min L 6   Mini squats  15 x  15x 20 x 20 x   SHC  20 x 20 x 20 x 25x 2 #    Standing abd  20 x 20 x 20 x 25 x  2#    Ther Activity                        Gait Training                        Modalities                                     "

## 2025-01-22 ENCOUNTER — OFFICE VISIT (OUTPATIENT)
Dept: PHYSICAL THERAPY | Facility: CLINIC | Age: 75
End: 2025-01-22
Payer: COMMERCIAL

## 2025-01-22 DIAGNOSIS — M17.11 PRIMARY OSTEOARTHRITIS OF RIGHT KNEE: Primary | ICD-10-CM

## 2025-01-22 DIAGNOSIS — M25.561 CHRONIC PAIN OF RIGHT KNEE: ICD-10-CM

## 2025-01-22 DIAGNOSIS — G89.29 CHRONIC PAIN OF RIGHT KNEE: ICD-10-CM

## 2025-01-22 DIAGNOSIS — Z96.652 STATUS POST TOTAL KNEE REPLACEMENT, LEFT: ICD-10-CM

## 2025-01-22 PROCEDURE — 97140 MANUAL THERAPY 1/> REGIONS: CPT

## 2025-01-22 PROCEDURE — 97110 THERAPEUTIC EXERCISES: CPT

## 2025-01-22 NOTE — PROGRESS NOTES
"Daily Note     Today's date: 2025  Patient name: Demi Almonte  : 1950  MRN: [id number]  Referring provider: Ryan Vega DO  Dx:   Encounter Diagnosis     ICD-10-CM    1. Primary osteoarthritis of right knee  M17.11       2. Status post total knee replacement, left  Z96.652       3. Chronic pain of right knee  M25.561     G89.29           Start Time: 0930  Stop Time: 1015  Total time in clinic (min): 45 minutes    Subjective:  I have some tightness and pain today.  I had more pain last night in bed.       Objective: See treatment diary below      Assessment: Tolerated treatment well. Patient would benefit from continued PT   we continued to work on her strength and motion today to prepare for her upcoming surgery.  She is using 2# for stranding exercises today and for SLR and SAQ. She reports having some mild pain through out her session today,. Pt reports that the surgery is on Monday.  Pt states that she will continue to do her exercises until Monday when she is home,       Plan: Continue per plan of care.      Precautions: None      Date 1/22 1/9 1/13 1/15 1/20   FOTO     55 JF   Manuals        Ham / quad  8 min 8 min 8 min  8 min                           Neuro Re-Ed        SLB   10\" 4 x 10\" 4 x 10\" 4 x 10\" 4 x 10\"  4 x   Tandem walk 5 x  4 x  4 x  4 x  4 x    Side stepping 5 x 4 x  4 x  4 x  4 x                                    Ther Ex        Gastroc stretch in standing 10\" 10x 10\" 10 x 10 \" 5x 10 \" 5 x 10\" 5 x   bridge 15 x 3\"   15 x 3 \"  15 x 3\"    SLR 15 x  2#  15x 15x 15 x  20 x   Quad set 5\" 10x 15\" 5x 15\" 5 x 15 \" 5 x 15\"  5 x   SAQ 5\" 10x  2# 5\" 15 x 5\" 15x 5 \" 15 x 5\" 20 x  2#    Heel slide 5\" 10x 5' 20 x 5\" 20 x 5\" 20 x 5\" 20 x   Nustep 7 min L 5  5 min  6 min L 5 6 min L 5 8 min L 6   Mini squats 20 x 15 x  15x 20 x 20 x   SHC 30 x  2#  20 x 20 x 20 x 25x 2 #    Standing abd 30 X 2#  20 x 20 x 20 x 25 x  2#    Ther Activity                        Gait Training              "           Modalities

## 2025-01-23 ENCOUNTER — OFFICE VISIT (OUTPATIENT)
Dept: ENDOCRINOLOGY | Facility: HOSPITAL | Age: 75
End: 2025-01-23
Payer: COMMERCIAL

## 2025-01-23 VITALS
BODY MASS INDEX: 33.99 KG/M2 | DIASTOLIC BLOOD PRESSURE: 70 MMHG | HEIGHT: 61 IN | OXYGEN SATURATION: 100 % | WEIGHT: 180 LBS | SYSTOLIC BLOOD PRESSURE: 120 MMHG | HEART RATE: 60 BPM

## 2025-01-23 DIAGNOSIS — I10 ESSENTIAL HYPERTENSION: ICD-10-CM

## 2025-01-23 DIAGNOSIS — E04.1 THYROID NODULE: ICD-10-CM

## 2025-01-23 DIAGNOSIS — E78.00 HYPERCHOLESTEROLEMIA: ICD-10-CM

## 2025-01-23 DIAGNOSIS — E03.9 ACQUIRED HYPOTHYROIDISM: ICD-10-CM

## 2025-01-23 DIAGNOSIS — M81.0 OSTEOPOROSIS WITHOUT CURRENT PATHOLOGICAL FRACTURE, UNSPECIFIED OSTEOPOROSIS TYPE: Primary | ICD-10-CM

## 2025-01-23 DIAGNOSIS — E55.9 VITAMIN D INSUFFICIENCY: ICD-10-CM

## 2025-01-23 DIAGNOSIS — I50.32 CHRONIC DIASTOLIC CONGESTIVE HEART FAILURE (HCC): ICD-10-CM

## 2025-01-23 DIAGNOSIS — E03.9 HYPOTHYROIDISM, UNSPECIFIED TYPE: ICD-10-CM

## 2025-01-23 PROCEDURE — 99214 OFFICE O/P EST MOD 30 MIN: CPT | Performed by: NURSE PRACTITIONER

## 2025-01-23 PROCEDURE — 96372 THER/PROPH/DIAG INJ SC/IM: CPT | Performed by: NURSE PRACTITIONER

## 2025-01-23 RX ORDER — LEVOTHYROXINE SODIUM 75 UG/1
75 TABLET ORAL DAILY
Qty: 30 TABLET | Refills: 11 | Status: SHIPPED | OUTPATIENT
Start: 2025-01-23

## 2025-01-23 RX ORDER — METOPROLOL SUCCINATE 25 MG/1
1 TABLET, EXTENDED RELEASE ORAL DAILY
COMMUNITY
Start: 2024-11-16

## 2025-01-23 NOTE — PROGRESS NOTES
Demi Almonte 74 y.o. female MRN: [id number]    Encounter: [id number]      Assessment & Plan     Assessment:  This is a 74 y.o.-year-old female with hypothyroidism, hypertension, thyroid nodule and osteoporosis with vitamin D deficiency.     Plan:  1.  Hypothyroidism: TSH and free T4 are normal.  Now, she will continue her current regimen.  She will continue levothyroxine 75 mcg Monday through Saturday and take a half dose on Sunday.   We will contact her with the results and any applicable changes to her levothyroxine regimen.   Check TSH and free T4 prior to next office visit.     2.  Hypertension:  She is normotensive in the office today. Continue current medication.  Check comprehensive metabolic panel prior to next visit.     3.  Vitamin-D deficiency: Recent level is stable.  Continue supplementation with 1000 units of vitamin D3 daily.     4.  Thyroid nodule:  Stable ultrasound in November 2024.   She denies any anterior neck discomfort, dysphagia or dysphonia.      5.  Osteoporosis:  Recent DEXA scan reveals statistically significant increases in the bone mineral density of her lumbar spine and left hip.  She is due for her 6th Prolia injection today.  She will continue supplementation with calcium and vitamin D.  Reviewed fall risk and safety.    CC: Hypothyroidism follow up    History of Present Illness     HPI:  74 y.o. female with history of polymyalgia rheumatica, hypothyroidism, DVT, atrial fibrillation, hyperlipidemia presents for follow up of hypothyroidism. Has had hypothyroidism for many years treated on thyroid hormone replacement.  She was hospitalized in summer of 2017 for heart arrhythmia.  At that time she received amiodarone and was  discharged home on amiodarone. She was on amiodarone until about September 2017. While on this medication her thyroid hormone requirements went up. Since then she reports cold intolerance and fatigue.  She is currently taking levothyroxine 75 mcg - Monday  through Saturday with 1/2 tablet on Sunday.  She is taking her levothyroxine, consistently, and in the proper manner, by her report.  She takes her calcium and other vitamins later in the day.  Her most recent TSH from January 3, 2025 is 3.785 with a free T4 of 0.90.  She also has history of polymyalgia rheumatica and follows closely with her rheumatologist at [facility].  He is scheduled to have right knee replacement in the very near future.     For her osteoporosis, she has been supplementing with calcium and vitamin D and receiving Prolia injections every 6 months.  She obtained a DEXA scan on April 19, 2024.  When compared to her previous DEXA scan from March for, 2022 there was a statistically significant increase of 7% in the bone mineral density of her lumbar spine and also a statistically significant increase in the bone mineral density of her left total hip of 9.9%.     Her hypertension is treated with Lasix 20 mg daily and metoprolol 50 mg twice daily.     For her vitamin-D deficiency she supplements with 1000 units of vitamin D3 daily.  Her most recent 25 hydroxy vitamin-D level from January 3, 2025 is 49.6.     Her most recent thyroid ultrasound from November 13, 2023 reveals a heterogeneously atrophic thyroid gland.  No significant change in the nodule in the left lobe of the thyroid gland which does not meet ACR criteria for follow-up ultrasound or biopsy.    Review of Systems   Constitutional: Negative.  Negative for chills, fatigue and fever.   HENT: Negative.  Negative for trouble swallowing and voice change.    Eyes: Negative.  Negative for photophobia, pain, discharge, redness, itching and visual disturbance.   Respiratory: Negative.  Negative for chest tightness and shortness of breath.    Cardiovascular: Negative.  Negative for chest pain.   Gastrointestinal: Negative.  Negative for abdominal pain, constipation, diarrhea and vomiting.   Endocrine: Negative for cold intolerance, heat  "intolerance, polydipsia, polyphagia and polyuria.   Genitourinary: Negative.    Musculoskeletal:  Positive for arthralgias (Right knee).   Skin: Negative.    Allergic/Immunologic: Negative.    Neurological: Negative.  Negative for dizziness, syncope, light-headedness and headaches.   Hematological: Negative.    Psychiatric/Behavioral: Negative.     All other systems reviewed and are negative.      Historical Information   Past Medical History:   Diagnosis Date    Acute pulmonary embolism ([facility]) 01/10/2017    Allergic     Arthritis     rheumatoid    Atrial fibrillation ([facility])     CHF (congestive heart failure) ([facility])     Disease of thyroid gland     DVT (deep venous thrombosis) ([facility])     DVT, lower extremity ([facility]) 01/10/2017    HL (hearing loss)     Left Ear-Wear Hearing Aid    Hypertension     Hyperthyroidism     Irregular heart beat     Kidney stone     Migraine     hx of    Pleural effusion 2016    Polymyalgia rheumatica ([facility])     \"Remission\"    Polymyalgia rheumatica ([facility]) 01/10/2017    Secondary adrenal insufficiency ([facility]) 2017    Sepsis, unspecified organism ([facility]) 12/15/2019    Sleep apnea     Mild-Does not require CPAP    Varicella As a child    Vitamin D deficiency      Past Surgical History:   Procedure Laterality Date    CARDIAC CATHETERIZATION      CARDIOVERSION N/A 2019    Procedure: CARDIOVERSION;  Surgeon: Roque Alfaro MD;  Location: [facility];  Service: Cardiology     SECTION      x3 - last impression 16    FRACTURE SURGERY Left     wrist    HERNIA REPAIR  2018    JOINT REPLACEMENT  2022    left knee    KNEE ARTHROSCOPY Left     Meniscus Tear Repair    KNEE CARTILAGE SURGERY Left     PA ARTHRP KNE CONDYLE&PLATU MEDIAL&LAT COMPARTMENTS Left 2022    Procedure: KNEE TOTAL ARTHROPLASTY;  Surgeon: Ryan Vega DO;  Location: [facility];  Service: Orthopedics    TONSILLECTOMY AND ADENOIDECTOMY  child; age 14    TUBAL LIGATION      VEIN SURGERY Right     " venous ligation with stripping     Social History   Social History     Substance and Sexual Activity   Alcohol Use Not Currently    Alcohol/week: 1.0 standard drink of alcohol    Types: 1 Glasses of wine per week    Comment: Socially     Social History     Substance and Sexual Activity   Drug Use No     Social History     Tobacco Use   Smoking Status Never   Smokeless Tobacco Never     Family History:   Family History   Problem Relation Age of Onset    Breast cancer Mother 52    Arthritis Mother     Cancer Mother     Hearing loss Mother     Vision loss Mother     Osteoarthritis Mother     Aneurysm Father         aortic    No Known Problems Sister     No Known Problems Maternal Grandmother     No Known Problems Maternal Grandfather     No Known Problems Paternal Grandmother     No Known Problems Paternal Grandfather     Rheum arthritis Maternal Aunt     Early death Maternal Aunt     Rheum arthritis Maternal Aunt     No Known Problems Paternal Aunt     No Known Problems Paternal Aunt     No Known Problems Paternal Aunt        Meds/Allergies   Current Outpatient Medications   Medication Sig Dispense Refill    ascorbic acid (VITAMIN C) 500 MG tablet Take 1 tablet (500 mg total) by mouth 2 (two) times a day Do not start before December 15, 2024. 60 tablet 0    Calcium Ascorbate 500 MG TABS Take 500 mg by mouth daily        cholecalciferol (VITAMIN D3) 1,000 units tablet Take 1,000 Units by mouth daily        Cinnamon 500 MG capsule Take 500 mg by mouth daily      Cyanocobalamin (VITAMIN B 12 PO) Take 500 mcg by mouth daily       ferrous sulfate 324 (65 Fe) mg Take 1 tablet (324 mg total) by mouth 2 (two) times a day before meals Do not start before December 15, 2024. 60 tablet 0    folic acid (FOLVITE) 1 mg tablet Take 1 tablet (1 mg total) by mouth daily Do not start before December 15, 2024. 30 tablet 0    metoprolol succinate (TOPROL-XL) 25 mg 24 hr tablet Take 1 tablet by mouth in the morning      Multiple  "Vitamins-Minerals (multivitamin with minerals) tablet Take 1 tablet by mouth daily Do not start before December 15, 2024. 30 tablet 0    mupirocin (BACTROBAN) 2 % ointment Apply 0.5 g (half a tube) to each nostril twice a day starting 5 days prior to procedure 10 g 1    Red Yeast Rice 600 MG TABS Take 1 tablet by mouth daily       XARELTO 20 MG tablet Take 1 tablet by mouth daily before dinner        azelastine (ASTELIN) 0.1 % nasal spray 1 spray into each nostril 2 (two) times a day Use in each nostril as directed (Patient not taking: Reported on 1/23/2025) 30 mL 5    levothyroxine 75 mcg tablet Take 1 tablet (75 mcg total) by mouth daily (Patient taking differently: Take 75 mcg by mouth daily Half a tablet on a Sunday Monday- Saturday a full tablet) 30 tablet 5    metoprolol succinate (TOPROL-XL) 50 mg 24 hr tablet Take 25 mg by mouth daily (Patient not taking: Reported on 1/23/2025)      Misc Natural Products (OSTEO BI-FLEX JOINT SHIELD PO) Take by mouth (Patient not taking: Reported on 1/2/2025)      Multiple Vitamins-Minerals (multivitamin with minerals) tablet Take 1 tablet by mouth daily (Patient not taking: Reported on 1/14/2025) 30 tablet 1    Multiple Vitamins-Minerals (OCUVITE ADULT 50+) CAPS Take 1 capsule by mouth daily (Patient not taking: Reported on 1/23/2025)       No current facility-administered medications for this visit.     Allergies   Allergen Reactions    Amiodarone Other (See Comments)     Other reaction(s): Other (See Comments)  Reports caused elevated TSH    Sudafed [Pseudoephedrine] Tachycardia     Rapid heart beat    Sulfa Antibiotics Itching and Rash    Sulfamethoxazole-Trimethoprim Itching and Rash       Objective   Vitals: Blood pressure 120/70, pulse 60, height 5' 1\" (1.549 m), weight 81.6 kg (180 lb), SpO2 100%, not currently breastfeeding.    Physical Exam  Vitals reviewed.   Constitutional:       Appearance: She is well-developed. She is obese.   HENT:      Head: Normocephalic and " "atraumatic.   Eyes:      Conjunctiva/sclera: Conjunctivae normal.      Pupils: Pupils are equal, round, and reactive to light.   Cardiovascular:      Rate and Rhythm: Normal rate and regular rhythm.      Heart sounds: Normal heart sounds.   Pulmonary:      Effort: Pulmonary effort is normal.      Breath sounds: Normal breath sounds.   Abdominal:      General: Bowel sounds are normal.      Palpations: Abdomen is soft.   Musculoskeletal:         General: Normal range of motion.      Cervical back: Normal range of motion and neck supple.   Skin:     General: Skin is warm and dry.   Neurological:      Mental Status: She is alert and oriented to person, place, and time.   Psychiatric:         Behavior: Behavior normal.         Thought Content: Thought content normal.         Judgment: Judgment normal.         Lab Results:   Lab Results   Component Value Date/Time    TSH 3RD GENERATON 3.785 01/03/2025 09:22 AM    TSH 3RD GENERATON 1.893 09/03/2024 10:38 AM    TSH 3RD GENERATON 0.734 07/09/2024 09:40 AM    Free T4 0.90 01/03/2025 09:22 AM    Free T4 1.03 09/03/2024 10:38 AM    Free T4 1.18 (H) 07/09/2024 09:40 AM       Imaging Studies:   Results for orders placed during the hospital encounter of 11/18/24    US thyroid    Impression  Diminutive heterogeneous thyroid gland, with stable left lobe nodule. No nodule meets current ACR criteria for requiring biopsy or follow-up ultrasounds.        Reference: ACR Thyroid Imaging, Reporting and Data System (TI-RADS): White Paper of the ACR TI-RADS Committee. J AM Cherelle Radiol 2017;14:587-595. Additional recommendations based on American Thyroid Association 2015 guidelines.      Workstation performed: [id number]      Portions of the record may have been created with voice recognition software. Occasional wrong word or \"sound a like\" substitutions may have occurred due to the inherent limitations of voice recognition software. Read the chart carefully and recognize, using context, " where substitutions have occurred.

## 2025-01-23 NOTE — PROGRESS NOTES
Assessment/Plan:    Demi Almonte came into the [facility] Office today 01/23/25 to receive Prolia injection.      Verbal consent obtained.  Consent given by: patient    patient states patient has been medically healthy with no underlining concerns/complications.      Demi Almonte presents with no symptoms today.       All instructions were reviewed with the patient.    If the patient should have any questions/concerns, advised patient to contacted [facility].       Subjective:     History provided by: patient    Patient ID: Demi Almonte is a 74 y.o. female      Objective:    There were no vitals filed for this visit.    Patient tolerated the injection well without any complications.  Injection site/s Left arm.  Medication was provided by the office (buy and bill).    Patient signed consent form yes   Patient signed ABN form yes (If no patient is not a medicare patient).   Patient waited 15 minutes after injection no (This only applies to patient's receiving first time injection).       Last Visit: 1/16/2025  Next visit:Visit date not found

## 2025-01-23 NOTE — PATIENT INSTRUCTIONS
Continue Levothyroxine at 75 mcg - Monday through Saturday with a HALF tablet on SUNDAY.      Continue with Calcium and Vitamin D supplementation daily.     Will continue to monitor calcium levels.     Thyroid ultrasound in November 2024 was stable.     Obtain lab work as prescribed.     Continue Prolia.

## 2025-01-24 ENCOUNTER — ANESTHESIA EVENT (OUTPATIENT)
Dept: PERIOP | Facility: HOSPITAL | Age: 75
End: 2025-01-24
Payer: COMMERCIAL

## 2025-01-24 PROBLEM — M17.11 PRIMARY OSTEOARTHRITIS OF RIGHT KNEE: Status: ACTIVE | Noted: 2025-01-24

## 2025-01-24 PROCEDURE — NC001 PR NO CHARGE: Performed by: ORTHOPAEDIC SURGERY

## 2025-01-24 NOTE — H&P
H&P - Orthopedics   Name: Demi Almonte 74 y.o. female I MRN: [id number]  Unit/Bed#:  I Date of Admission: (Not on file)   Date of Service: 1/24/2025 I Hospital Day: 0     Assessment & Plan  Primary osteoarthritis of right knee    Elective right total knee replacement  History of Present Illness   HPI: Demi Almonte is a 74 y.o. year old female presented to our office complaining of right knee pain with worsening ambulatory function.  X-rays of the patient's right knee were performed which are consistent with advanced arthritic changes.  The patient stated that her symptoms were continuing to worsen and starting to affect her quality of life.  After exhausting conservative treatment, the risks and benefits of surgery were discussed with the patient.  She decided on an elective right total knee replacement.    Review of Systems   Constitutional:  Negative for chills, fever and unexpected weight change.   HENT:  Negative for nosebleeds and sore throat.    Eyes:  Negative for pain, redness and visual disturbance.   Respiratory:  Negative for cough, shortness of breath and wheezing.    Cardiovascular:  Negative for chest pain, palpitations and leg swelling.   Gastrointestinal:  Negative for abdominal pain, nausea and vomiting.   Endocrine: Negative for polydipsia and polyuria.   Genitourinary:  Negative for dysuria and hematuria.   Musculoskeletal:  Positive for arthralgias, gait problem and myalgias.        As noted in HPI   Skin:  Negative for rash and wound.   Neurological:  Negative for dizziness, numbness and headaches.   Psychiatric/Behavioral:  Negative for decreased concentration and suicidal ideas. The patient is not nervous/anxious.     significant for findings described in the HPI.  I have reviewed the patient's PMH, PSH, Social History, Family History, Meds, and Allergies    Objective :     Physical ExamOrtho Exam   Right lower extremity is neurovascularly intact  Toes are pink and mobile  "  Compartments are soft  No warmth, erythema or ecchymosis  ROM of knee is from 5-115 degrees  Negative Lachman, drawer or pivot shift  No medial instability  Medial joint line tenderness, slight lateral joint line tenderness  Patellofemoral crepitation  Cardiovascular: Normal rate    Pulmonary: Effort normal  Abdomen: Soft, nontender, nondistended      Lab Results: I have reviewed the following results:   No results for input(s): \"WBC\", \"HGB\", \"HCT\", \"PLT\", \"BANDSPCT\", \"BUN\", \"CREATININE\", \"PTT\", \"INR\", \"ESR\", \"CRP\" in the last 72 hours.  Blood Culture:   Lab Results   Component Value Date    BLOODCX No Growth After 5 Days. 12/14/2019     Wound Culture: No results found for: \"WOUNDCULT\"    Imaging Results Review: I personally reviewed the following image studies in PACS and associated radiology reports: X-rays of the patient's right knee. My interpretation of the radiology images/reports is: Advanced arthritic changes.  Other Study Results Review: No additional pertinent studies reviewed.  "

## 2025-01-27 ENCOUNTER — ANESTHESIA (OUTPATIENT)
Dept: PERIOP | Facility: HOSPITAL | Age: 75
End: 2025-01-27
Payer: COMMERCIAL

## 2025-01-27 ENCOUNTER — APPOINTMENT (OUTPATIENT)
Dept: RADIOLOGY | Facility: HOSPITAL | Age: 75
End: 2025-01-27
Payer: COMMERCIAL

## 2025-01-27 ENCOUNTER — HOSPITAL ENCOUNTER (OUTPATIENT)
Facility: HOSPITAL | Age: 75
Setting detail: OUTPATIENT SURGERY
Discharge: HOME | End: 2025-01-28
Attending: ORTHOPAEDIC SURGERY | Admitting: ORTHOPAEDIC SURGERY
Payer: COMMERCIAL

## 2025-01-27 DIAGNOSIS — M17.11 PRIMARY OSTEOARTHRITIS OF RIGHT KNEE: Primary | ICD-10-CM

## 2025-01-27 DIAGNOSIS — M17.12 PRIMARY OSTEOARTHRITIS OF LEFT KNEE: ICD-10-CM

## 2025-01-27 PROCEDURE — C1713 ANCHOR/SCREW BN/BN,TIS/BN: HCPCS | Performed by: ORTHOPAEDIC SURGERY

## 2025-01-27 PROCEDURE — 97163 PT EVAL HIGH COMPLEX 45 MIN: CPT

## 2025-01-27 PROCEDURE — 27447 TOTAL KNEE ARTHROPLASTY: CPT | Performed by: PHYSICIAN ASSISTANT

## 2025-01-27 PROCEDURE — 88305 TISSUE EXAM BY PATHOLOGIST: CPT | Performed by: PATHOLOGY

## 2025-01-27 PROCEDURE — 27447 TOTAL KNEE ARTHROPLASTY: CPT | Performed by: ORTHOPAEDIC SURGERY

## 2025-01-27 PROCEDURE — 73560 X-RAY EXAM OF KNEE 1 OR 2: CPT

## 2025-01-27 PROCEDURE — C1776 JOINT DEVICE (IMPLANTABLE): HCPCS | Performed by: ORTHOPAEDIC SURGERY

## 2025-01-27 PROCEDURE — 88311 DECALCIFY TISSUE: CPT | Performed by: PATHOLOGY

## 2025-01-27 DEVICE — SMARTSET HV HIGH VISCOSITY BONE CEMENT 40G
Type: IMPLANTABLE DEVICE | Site: KNEE | Status: FUNCTIONAL
Brand: SMARTSET

## 2025-01-27 DEVICE — IMPLANTABLE DEVICE
Type: IMPLANTABLE DEVICE | Site: KNEE | Status: FUNCTIONAL
Brand: NEXGEN®

## 2025-01-27 DEVICE — COMPONENT PATELLAR 9MM SZ 35 NEXGEN: Type: IMPLANTABLE DEVICE | Site: KNEE | Status: FUNCTIONAL

## 2025-01-27 DEVICE — IMPLANTABLE DEVICE: Type: IMPLANTABLE DEVICE | Site: KNEE | Status: FUNCTIONAL

## 2025-01-27 DEVICE — SCREW EXTENSION NEXGEN REPLACEMENT: Type: IMPLANTABLE DEVICE | Site: KNEE | Status: FUNCTIONAL

## 2025-01-27 RX ORDER — TRANEXAMIC ACID 100 MG/ML
INJECTION, SOLUTION INTRAVENOUS AS NEEDED
Status: DISCONTINUED | OUTPATIENT
Start: 2025-01-27 | End: 2025-01-27

## 2025-01-27 RX ORDER — MIDAZOLAM HYDROCHLORIDE 2 MG/2ML
INJECTION, SOLUTION INTRAMUSCULAR; INTRAVENOUS AS NEEDED
Status: DISCONTINUED | OUTPATIENT
Start: 2025-01-27 | End: 2025-01-27

## 2025-01-27 RX ORDER — ONDANSETRON 2 MG/ML
4 INJECTION INTRAMUSCULAR; INTRAVENOUS ONCE AS NEEDED
Status: DISCONTINUED | OUTPATIENT
Start: 2025-01-27 | End: 2025-01-27 | Stop reason: HOSPADM

## 2025-01-27 RX ORDER — HYDROMORPHONE HCL/PF 1 MG/ML
0.5 SYRINGE (ML) INJECTION
Status: DISCONTINUED | OUTPATIENT
Start: 2025-01-27 | End: 2025-01-28 | Stop reason: HOSPADM

## 2025-01-27 RX ORDER — DOCUSATE SODIUM 100 MG/1
100 CAPSULE, LIQUID FILLED ORAL 2 TIMES DAILY
Status: DISCONTINUED | OUTPATIENT
Start: 2025-01-27 | End: 2025-01-28 | Stop reason: HOSPADM

## 2025-01-27 RX ORDER — OXYCODONE HYDROCHLORIDE 5 MG/1
5 TABLET ORAL EVERY 4 HOURS PRN
Status: DISCONTINUED | OUTPATIENT
Start: 2025-01-27 | End: 2025-01-28 | Stop reason: HOSPADM

## 2025-01-27 RX ORDER — ONDANSETRON 2 MG/ML
4 INJECTION INTRAMUSCULAR; INTRAVENOUS EVERY 6 HOURS PRN
Status: DISCONTINUED | OUTPATIENT
Start: 2025-01-27 | End: 2025-01-28 | Stop reason: HOSPADM

## 2025-01-27 RX ORDER — METOPROLOL SUCCINATE 25 MG/1
25 TABLET, EXTENDED RELEASE ORAL DAILY
Status: DISCONTINUED | OUTPATIENT
Start: 2025-01-28 | End: 2025-01-28 | Stop reason: HOSPADM

## 2025-01-27 RX ORDER — LEVOTHYROXINE SODIUM 75 UG/1
75 TABLET ORAL
Status: DISCONTINUED | OUTPATIENT
Start: 2025-01-27 | End: 2025-01-28 | Stop reason: HOSPADM

## 2025-01-27 RX ORDER — BUPIVACAINE HYDROCHLORIDE 7.5 MG/ML
INJECTION, SOLUTION INTRASPINAL AS NEEDED
Status: DISCONTINUED | OUTPATIENT
Start: 2025-01-27 | End: 2025-01-27

## 2025-01-27 RX ORDER — FERROUS SULFATE 325(65) MG
325 TABLET ORAL 2 TIMES DAILY WITH MEALS
Status: DISCONTINUED | OUTPATIENT
Start: 2025-01-27 | End: 2025-01-28 | Stop reason: HOSPADM

## 2025-01-27 RX ORDER — CEFAZOLIN SODIUM 1 G/50ML
1000 SOLUTION INTRAVENOUS EVERY 8 HOURS
Status: COMPLETED | OUTPATIENT
Start: 2025-01-27 | End: 2025-01-28

## 2025-01-27 RX ORDER — BUPIVACAINE HYDROCHLORIDE 5 MG/ML
INJECTION, SOLUTION EPIDURAL; INTRACAUDAL AS NEEDED
Status: DISCONTINUED | OUTPATIENT
Start: 2025-01-27 | End: 2025-01-27 | Stop reason: HOSPADM

## 2025-01-27 RX ORDER — PROPOFOL 10 MG/ML
INJECTION, EMULSION INTRAVENOUS AS NEEDED
Status: DISCONTINUED | OUTPATIENT
Start: 2025-01-27 | End: 2025-01-27

## 2025-01-27 RX ORDER — ACETAMINOPHEN 325 MG/1
975 TABLET ORAL EVERY 8 HOURS
Status: DISCONTINUED | OUTPATIENT
Start: 2025-01-27 | End: 2025-01-28 | Stop reason: HOSPADM

## 2025-01-27 RX ORDER — FENTANYL CITRATE 50 UG/ML
INJECTION, SOLUTION INTRAMUSCULAR; INTRAVENOUS AS NEEDED
Status: DISCONTINUED | OUTPATIENT
Start: 2025-01-27 | End: 2025-01-27

## 2025-01-27 RX ORDER — CEFAZOLIN SODIUM 2 G/50ML
2000 SOLUTION INTRAVENOUS ONCE
Status: COMPLETED | OUTPATIENT
Start: 2025-01-27 | End: 2025-01-27

## 2025-01-27 RX ORDER — OXYCODONE HYDROCHLORIDE 10 MG/1
10 TABLET ORAL EVERY 6 HOURS PRN
Status: DISCONTINUED | OUTPATIENT
Start: 2025-01-27 | End: 2025-01-28 | Stop reason: HOSPADM

## 2025-01-27 RX ORDER — FENTANYL CITRATE/PF 50 MCG/ML
50 SYRINGE (ML) INJECTION
Status: DISCONTINUED | OUTPATIENT
Start: 2025-01-27 | End: 2025-01-27 | Stop reason: HOSPADM

## 2025-01-27 RX ORDER — BUPIVACAINE HYDROCHLORIDE 5 MG/ML
INJECTION, SOLUTION EPIDURAL; INTRACAUDAL AS NEEDED
Status: DISCONTINUED | OUTPATIENT
Start: 2025-01-27 | End: 2025-01-27

## 2025-01-27 RX ORDER — PROMETHAZINE HYDROCHLORIDE 25 MG/ML
12.5 INJECTION, SOLUTION INTRAMUSCULAR; INTRAVENOUS ONCE AS NEEDED
Status: DISCONTINUED | OUTPATIENT
Start: 2025-01-27 | End: 2025-01-27 | Stop reason: HOSPADM

## 2025-01-27 RX ORDER — SODIUM CHLORIDE 9 MG/ML
125 INJECTION, SOLUTION INTRAVENOUS CONTINUOUS
Status: DISCONTINUED | OUTPATIENT
Start: 2025-01-27 | End: 2025-01-27

## 2025-01-27 RX ORDER — HYDROMORPHONE HCL/PF 1 MG/ML
0.5 SYRINGE (ML) INJECTION
Status: DISCONTINUED | OUTPATIENT
Start: 2025-01-27 | End: 2025-01-27 | Stop reason: HOSPADM

## 2025-01-27 RX ORDER — FOLIC ACID 1 MG/1
1 TABLET ORAL DAILY
Status: DISCONTINUED | OUTPATIENT
Start: 2025-01-27 | End: 2025-01-28 | Stop reason: HOSPADM

## 2025-01-27 RX ORDER — MAGNESIUM HYDROXIDE/ALUMINUM HYDROXICE/SIMETHICONE 120; 1200; 1200 MG/30ML; MG/30ML; MG/30ML
30 SUSPENSION ORAL EVERY 6 HOURS PRN
Status: DISCONTINUED | OUTPATIENT
Start: 2025-01-27 | End: 2025-01-28 | Stop reason: HOSPADM

## 2025-01-27 RX ORDER — CHLORHEXIDINE GLUCONATE 40 MG/ML
SOLUTION TOPICAL DAILY PRN
Status: DISCONTINUED | OUTPATIENT
Start: 2025-01-27 | End: 2025-01-27 | Stop reason: HOSPADM

## 2025-01-27 RX ORDER — ASCORBIC ACID 500 MG
500 TABLET ORAL 2 TIMES DAILY
Status: DISCONTINUED | OUTPATIENT
Start: 2025-01-27 | End: 2025-01-28 | Stop reason: HOSPADM

## 2025-01-27 RX ADMIN — OXYCODONE HYDROCHLORIDE 10 MG: 10 TABLET ORAL at 20:59

## 2025-01-27 RX ADMIN — DOCUSATE SODIUM 100 MG: 100 CAPSULE, LIQUID FILLED ORAL at 12:17

## 2025-01-27 RX ADMIN — DOCUSATE SODIUM 100 MG: 100 CAPSULE, LIQUID FILLED ORAL at 17:17

## 2025-01-27 RX ADMIN — HYDROMORPHONE HYDROCHLORIDE 0.5 MG: 1 INJECTION, SOLUTION INTRAMUSCULAR; INTRAVENOUS; SUBCUTANEOUS at 17:13

## 2025-01-27 RX ADMIN — BUPIVACAINE HYDROCHLORIDE 10 ML: 5 INJECTION, SOLUTION EPIDURAL; INTRACAUDAL; PERINEURAL at 07:26

## 2025-01-27 RX ADMIN — OXYCODONE HYDROCHLORIDE 10 MG: 10 TABLET ORAL at 12:17

## 2025-01-27 RX ADMIN — FENTANYL CITRATE 25 MCG: 50 INJECTION INTRAMUSCULAR; INTRAVENOUS at 09:28

## 2025-01-27 RX ADMIN — HYDROMORPHONE HYDROCHLORIDE 0.5 MG: 1 INJECTION, SOLUTION INTRAMUSCULAR; INTRAVENOUS; SUBCUTANEOUS at 14:01

## 2025-01-27 RX ADMIN — FENTANYL CITRATE 25 MCG: 50 INJECTION INTRAMUSCULAR; INTRAVENOUS at 09:04

## 2025-01-27 RX ADMIN — TRANEXAMIC ACID 1 G: 100 INJECTION INTRAVENOUS at 07:52

## 2025-01-27 RX ADMIN — CEFAZOLIN SODIUM 2000 MG: 2 SOLUTION INTRAVENOUS at 07:36

## 2025-01-27 RX ADMIN — FENTANYL CITRATE 50 MCG: 50 INJECTION, SOLUTION INTRAMUSCULAR; INTRAVENOUS at 10:18

## 2025-01-27 RX ADMIN — Medication 1 TABLET: at 12:18

## 2025-01-27 RX ADMIN — ACETAMINOPHEN 975 MG: 325 TABLET, FILM COATED ORAL at 12:17

## 2025-01-27 RX ADMIN — PHENYLEPHRINE HYDROCHLORIDE 50 MCG/MIN: 50 INJECTION INTRAVENOUS at 07:45

## 2025-01-27 RX ADMIN — SODIUM CHLORIDE 125 ML/HR: 0.9 INJECTION, SOLUTION INTRAVENOUS at 06:27

## 2025-01-27 RX ADMIN — HYDROMORPHONE HYDROCHLORIDE 0.5 MG: 1 INJECTION, SOLUTION INTRAMUSCULAR; INTRAVENOUS; SUBCUTANEOUS at 11:06

## 2025-01-27 RX ADMIN — FOLIC ACID 1 MG: 1 TABLET ORAL at 12:17

## 2025-01-27 RX ADMIN — BUPIVACAINE 10 ML: 13.3 INJECTION, SUSPENSION, LIPOSOMAL INFILTRATION at 07:26

## 2025-01-27 RX ADMIN — MIDAZOLAM 1 MG: 1 INJECTION INTRAMUSCULAR; INTRAVENOUS at 07:40

## 2025-01-27 RX ADMIN — Medication 1000 UNITS: at 12:17

## 2025-01-27 RX ADMIN — RIVAROXABAN 20 MG: 20 TABLET, FILM COATED ORAL at 17:12

## 2025-01-27 RX ADMIN — FENTANYL CITRATE 50 MCG: 50 INJECTION INTRAMUSCULAR; INTRAVENOUS at 07:25

## 2025-01-27 RX ADMIN — CEFAZOLIN SODIUM 1000 MG: 1 SOLUTION INTRAVENOUS at 22:59

## 2025-01-27 RX ADMIN — OXYCODONE HYDROCHLORIDE AND ACETAMINOPHEN 500 MG: 500 TABLET ORAL at 12:17

## 2025-01-27 RX ADMIN — FENTANYL CITRATE 50 MCG: 50 INJECTION, SOLUTION INTRAMUSCULAR; INTRAVENOUS at 10:42

## 2025-01-27 RX ADMIN — MIDAZOLAM 1 MG: 1 INJECTION INTRAMUSCULAR; INTRAVENOUS at 07:25

## 2025-01-27 RX ADMIN — PROPOFOL 60 MCG/KG/MIN: 10 INJECTION, EMULSION INTRAVENOUS at 07:45

## 2025-01-27 RX ADMIN — OXYCODONE HYDROCHLORIDE AND ACETAMINOPHEN 500 MG: 500 TABLET ORAL at 17:17

## 2025-01-27 RX ADMIN — CEFAZOLIN SODIUM 1000 MG: 1 SOLUTION INTRAVENOUS at 14:01

## 2025-01-27 RX ADMIN — PROPOFOL 20 MG: 10 INJECTION, EMULSION INTRAVENOUS at 07:44

## 2025-01-27 RX ADMIN — SODIUM CHLORIDE 125 ML/HR: 0.9 INJECTION, SOLUTION INTRAVENOUS at 07:28

## 2025-01-27 RX ADMIN — FERROUS SULFATE TAB 325 MG (65 MG ELEMENTAL FE) 325 MG: 325 (65 FE) TAB at 17:17

## 2025-01-27 RX ADMIN — BUPIVACAINE HYDROCHLORIDE IN DEXTROSE 1.6 ML: 7.5 INJECTION, SOLUTION SUBARACHNOID at 07:43

## 2025-01-27 RX ADMIN — ACETAMINOPHEN 975 MG: 325 TABLET, FILM COATED ORAL at 20:59

## 2025-01-27 NOTE — ANESTHESIA PREPROCEDURE EVALUATION
Procedure:  ARTHROPLASTY KNEE TOTAL (Right: Knee)    Relevant Problems   ANESTHESIA (within normal limits)      CARDIO  LEFT VENTRICLE:  Size was normal.  Systolic function was normal. Ejection fraction was estimated to be 55 %.  There were no regional wall motion abnormalities.  Wall thickness was normal.  There was no evidence of concentric hypertrophy.     TRICUSPID VALVE:  There was mild regurgitation.  Estimated peak PA pressure was 35 mmHg.     PROCEDURE: The procedure was performed at the bedside. This was a routine study. The transthoracic approach was used. The study included complete 2D imaging, M-mode, complete spectral Doppler, and color Doppler. The heart rate was 129 bpm,  at the start of the study. Image quality was adequate.     LEFT VENTRICLE: Size was normal. Systolic function was normal. Ejection fraction was estimated to be 55 %. There were no regional wall motion abnormalities. Wall thickness was normal. There was no evidence of concentric hypertrophy.     RIGHT VENTRICLE: The size was normal. Systolic function was normal. Wall thickness was normal.     LEFT ATRIUM: Size was at the upper limits of normal.     RIGHT ATRIUM: Size was normal.     MITRAL VALVE: Valve structure was normal. There was normal leaflet separation. DOPPLER: The transmitral velocity was within the normal range. There was no evidence for stenosis. There was no regurgitation.     AORTIC VALVE: The valve was trileaflet. Leaflets exhibited normal thickness and normal cuspal separation. DOPPLER: Transaortic velocity was within the normal range. There was no evidence for stenosis. There was no regurgitation.     (+) CHF (congestive heart failure) (HCC)   (+) Chest pain   (+) Chronic atrial fibrillation (HCC)   (+) Chronic diastolic congestive heart failure (HCC)   (+) Essential hypertension   (+) Hypercholesterolemia   (+) Pain of right scapula   (+) Paroxysmal atrial fibrillation (HCC)   (+) SVT (supraventricular tachycardia)  (HCC)   (+) Superficial thrombophlebitis of right upper extremity      ENDO   (+) Hypothyroidism      /RENAL   (+) Calculus of kidney      MUSCULOSKELETAL   (+) Primary osteoarthritis of left knee   (+) Primary osteoarthritis of right knee   (+) Seronegative rheumatoid arthritis (HCC)      PULMONARY   (+) SOB (shortness of breath)        Physical Exam    Airway    Mallampati score: II         Dental   No notable dental hx     Cardiovascular  Cardiovascular exam normal    Pulmonary  Pulmonary exam normal Breath sounds clear to auscultation    Other Findings  post-pubertal.      Anesthesia Plan  ASA Score- 3     Anesthesia Type- spinal with ASA Monitors.         Additional Monitors:     Airway Plan:            Plan Factors-    Chart reviewed. EKG reviewed.  Existing labs reviewed. Patient summary reviewed.    Patient is not a current smoker.              Induction- intravenous.    Postoperative Plan-         Informed Consent- Anesthetic plan and risks discussed with patient.        NPO Status:  Vitals Value Taken Time   Date of last liquid 01/27/25 01/27/25 0608   Time of last liquid 0430 01/27/25 0608   Date of last solid 01/26/25 01/27/25 0608   Time of last solid 2230 01/27/25 0608          Medical Necessity Statement: Based on my medical judgement, Mohs surgery is the most appropriate treatment.  for this cancer compared to other treatments.

## 2025-01-27 NOTE — DISCHARGE INSTR - AVS FIRST PAGE
TOTAL KNEE/TOTAL HIP REPLACEMENT  POST OPERATIVE INFORMATION    1. You should use a walker or crutches, but you may put as much weight on the leg as is comfortable unless instructed otherwise.  2. You may use ice packs as needed for pain and swelling.  20 minutes is required to allow the cold to penetrate deep enough.  Cover skin with a layer of cloth before applying the ice pack.    3. Pump your ankle up and down frequently throughout the day to facilitate circulation, maintain muscle tone, and to aid in reduction of swelling.  4. You may shower after 5 days, but remember to keep the dressings dry.  5. You should call our office if you experience pain not controlled by your medication, develop a fever, and experience increased drainage or redness around the incision.  6. Do not drive a vehicle until you see your physician at the follow-up appointment.  7. Refer to your total joint card regarding the need for antibiotics for the following procedures:  Any dental procedures, any infection, especially skin infections, vaginal or GYN exams or surgery, and colonoscopy.  8. If you have had a total hip replacement following instructions below to prevent the hip from sliding out a position:   -DO NOT bend your hip more than 90°.  This means no squatting, no bending over to tie your shoes, and no bending from the waist to  things from the floor, even if seated.   -DO NOT cross your operated leg/foot inward (Hankins-toed)   -ALWAYS sleep with pillow or cushion between your legs, as instructed by your doctor   -After surgery these precautions will be again covered by your therapists on a daily basis.  9. Call our office for an appointment to see Dr. Vega in about 14 days.  10. You should start outpatient therapy as soon as possible after discharge.   11. The patient was instructed to paint incision with betadine two times per day

## 2025-01-27 NOTE — ANESTHESIA PROCEDURE NOTES
Spinal Block    Patient location during procedure: OR  Start time: 1/27/2025 7:40 AM  Reason for block: procedure for pain, at surgeon's request and primary anesthetic  Staffing  Performed by: Tammy Marx  Authorized by: Carroll Vergara DO    Preanesthetic Checklist  Completed: patient identified, IV checked, site marked, risks and benefits discussed, surgical consent, monitors and equipment checked, pre-op evaluation and timeout performed  Spinal Block  Patient position: sitting  Prep: ChloraPrep and site prepped and draped  Patient monitoring: frequent blood pressure checks, continuous pulse ox and heart rate  Approach: midline  Location: L3-4  Needle  Needle type: Pencan   Needle gauge: 24 G  Needle length: 4 in  Assessment  Sensory level: T4  Injection Assessment:  negative aspiration for heme, no paresthesia on injection and positive aspiration for clear CSF.  Post-procedure:  site cleaned

## 2025-01-27 NOTE — PLAN OF CARE
Problem: PHYSICAL THERAPY ADULT  Goal: Performs mobility at highest level of function for planned discharge setting.  See evaluation for individualized goals.  Description: Treatment/Interventions: Functional transfer training, LE strengthening/ROM, Elevations, Therapeutic exercise, Endurance training, Patient/family training, Bed mobility, Gait training, Spoke to nursing, Family  Equipment Recommended: Walker (pt has)       See flowsheet documentation for full assessment, interventions and recommendations.  1/27/2025 1514 by Bryson Miranda PT  Note: Prognosis: Good  Problem List: Decreased strength, Decreased range of motion, Decreased endurance, Impaired balance, Decreased mobility, Pain, Impaired sensation  Assessment: Pt. 74 y.o.female s/p R TKR 1/27/25 2* to  Primary osteoarthritis of right knee. Pt referred to PT for mobility assessment & D/C planning. Please see above for information re: home set-up & PLOF as well as objective findings during PT assessment. PTA, pt reports being I w/o AD. On eval, pt functioning below baseline hence will continue skilled PT to improve function & safety. Pt require minAx1 for bed mobility, transfers & amb w/ RW + cues for techniques & safety. Gait deviations as above but no gross LOB noted. Dec amb tolerance 2* to fatigue & nausea. BP as follows; /95; after amb/in chair: 145/83. RN made aware. The patient's AM-PAC Basic Mobility Inpatient Short Form Raw Score is 17. A Raw score of greater than 16 suggests the patient may benefit from discharge to home. Please also refer to the recommendation of the Physical Therapist for safe discharge planning. From PT standpoint, will recommend Level III (minimum resource intensity) rehab services and inc family support at D/C. Pt reports she has OPPT appointment already set up. No SOB & dizziness reported t/o session. Nsg staff most recent vital signs as follows: /79 (BP Location: Right arm)   Pulse 56   Temp (!) 96.6 °F (35.9  "°C) (Temporal)   Resp 17   Ht 5' 1\" (1.549 m)   Wt 82.4 kg (181 lb 10.5 oz)   SpO2 96%   BMI 34.32 kg/m² . At end of session, pt OOB in chair in stable condition, call bell & phone in reach, chair alarm activated, all lines intact. Fall precautions reinforced w/ good understanding. CM to follow. Nsg staff to continue to mobilized pt (OOB in chair for all meals & ambulate in room/unit) as tolerated to prevent further decline in function. Will also recommend Restorative for daily amb &/or daily OOB in chair as appropriate. Nsg notified.        Rehab Resource Intensity Level, PT: III (Minimum Resource Intensity)    See flowsheet documentation for full assessment.        "

## 2025-01-27 NOTE — ANESTHESIA PROCEDURE NOTES
Peripheral Block    Patient location during procedure: holding area  Start time: 1/27/2025 7:22 AM  Reason for block: at surgeon's request and post-op pain management  Staffing  Performed by: Carroll Vergara DO  Authorized by: Carroll Vergara DO    Preanesthetic Checklist  Completed: patient identified, IV checked, site marked, risks and benefits discussed, surgical consent, monitors and equipment checked, pre-op evaluation and timeout performed  Peripheral Block  Patient position: supine  Prep: ChloraPrep  Patient monitoring: continuous pulse oximetry, frequent blood pressure checks and heart rate  Block type: Adductor Canal  Laterality: right  Injection technique: single-shot  Procedures: ultrasound guided, Ultrasound guidance required for the procedure to increase accuracy and safety of medication placement and decrease risk of complications. and nerve stimulator  Ultrasound permanent image saved    Needle  Needle type: Stimuplex   Needle gauge: 20 G  Needle length: 4 in  Needle localization: ultrasound guidance  Assessment  Injection assessment: frequent aspiration, injected with ease, no paresthesia on injection, no symptoms of intraneural/intravenous injection, negative for heart rate change, needle tip visualized at all times, incremental injection and negative aspiration  Post-procedure:  site cleaned  patient tolerated the procedure well with no immediate complications

## 2025-01-27 NOTE — ANESTHESIA POSTPROCEDURE EVALUATION
Post-Op Assessment Note    CV Status:  Stable    Pain management: adequate       Mental Status:  Alert and awake   Hydration Status:  Euvolemic   PONV Controlled:  Controlled   Airway Patency:  Patent     Post Op Vitals Reviewed: Yes    No anethesia notable event occurred.    Staff: Anesthesiologist           Last Filed PACU Vitals:  Vitals Value Taken Time   Temp 96.6 °F (35.9 °C) 01/27/25 1125   Pulse 56 01/27/25 1125   /79 01/27/25 1125   Resp 17 01/27/25 1125   SpO2 96 % 01/27/25 1125       Modified Kevon:     Vitals Value Taken Time   Activity 2 01/27/25 1051   Respiration 2 01/27/25 1051   Circulation 2 01/27/25 1051   Consciousness 2 01/27/25 1051   Oxygen Saturation 1 01/27/25 1051     Modified Kevon Score: 9

## 2025-01-27 NOTE — PHYSICAL THERAPY NOTE
"        PT EVALUATION    Pt. Name: Demi Almonte  Pt. Age: 74 y.o.  MRN: [id number]  LENGTH OF STAY: 0      Admitting Diagnoses:   Primary osteoarthritis of right knee [M17.11]    Past Medical History:   Diagnosis Date    Acute pulmonary embolism (HCC) 01/10/2017    Allergic     Arthritis     rheumatoid    Atrial fibrillation (HCC)     CHF (congestive heart failure) (HCC)     Disease of thyroid gland     DVT (deep venous thrombosis) ([facility])     DVT, lower extremity ([facility]) 01/10/2017    HL (hearing loss)     Left Ear-Wear Hearing Aid    Hypertension     Hyperthyroidism     Irregular heart beat     Kidney stone     Migraine     hx of    Pleural effusion 2016    Polymyalgia rheumatica ([facility])     \"Remission\"    Polymyalgia rheumatica ([facility]) 01/10/2017    Secondary adrenal insufficiency ([facility]) 2017    Sepsis, unspecified organism ([facility]) 12/15/2019    Sleep apnea     Mild-Does not require CPAP    Varicella As a child    Vitamin D deficiency        Past Surgical History:   Procedure Laterality Date    CARDIAC CATHETERIZATION      CARDIOVERSION N/A 2019    Procedure: CARDIOVERSION;  Surgeon: Roque Alfaro MD;  Location: [facility];  Service: Cardiology     SECTION      x3 - last impression 16    FRACTURE SURGERY Left     wrist    HERNIA REPAIR  2018    JOINT REPLACEMENT  2022    left knee    KNEE ARTHROSCOPY Left     Meniscus Tear Repair    KNEE CARTILAGE SURGERY Left     NM ARTHRP KNE CONDYLE&PLATU MEDIAL&LAT COMPARTMENTS Left 2022    Procedure: KNEE TOTAL ARTHROPLASTY;  Surgeon: Ryan Vega DO;  Location: [facility];  Service: Orthopedics    TONSILLECTOMY AND ADENOIDECTOMY  child; age 14    TUBAL LIGATION      VEIN SURGERY Right     venous ligation with stripping       Imaging Studies:  XR knee right 1 or 2 views    (Results Pending)         25 1459   PT Last Visit   PT Visit Date 25   Note Type   Note type Evaluation   Pain Assessment   Pain Score 4   Pain " Location/Orientation Orientation: Right;Location: Knee   Hospital Pain Intervention(s) Repositioned;Cold applied;Ambulation/increased activity;Emotional support;Rest   Restrictions/Precautions   Weight Bearing Precautions Per Order Yes   RLE Weight Bearing Per Order WBAT   Other Precautions Chair Alarm;Bed Alarm;Multiple lines;Fall Risk;Pain  (hemo-vac; mcdowell catheter)   Home Living   Type of Home House   Home Layout One level;Stairs to enter with rails  (3STE (front); 5STE (back))   Bathroom Shower/Tub Walk-in shower  (& tub)   Bathroom Toilet Standard   Bathroom Equipment Grab bars in shower;Built-in shower seat   Home Equipment Walker;Cane   Prior Function   Level of [place] Independent with functional mobility;Independent with ADLs;Independent with IADLS  (ambulates w/o AD)   Lives With Spouse   Receives Help From Family   Falls in the last 6 months 0   Vocational Retired   Comments (+) ; (-) home alone   General   Additional Pertinent History h/o L TKR 2022   Family/Caregiver Present Yes  ()   Cognition   Overall Cognitive Status WFL   Arousal/Participation Alert   Orientation Level Oriented X4   Following Commands Follows one step commands without difficulty   Comments pleasant & cooperative   Subjective   Subjective Pt reports of fatigue but agreeable to PT eval.   RUE Assessment   RUE Assessment WFL  (4/5 grossly)   LUE Assessment   LUE Assessment WFL  (4/5 grossly)   RLE Assessment   RLE Assessment WFL  (3-/5 grossly)   LLE Assessment   LLE Assessment WFL  (4/5 grossly)   Coordination   Movements are Fluid and Coordinated 1   Sensation X  (pt reports post-op numbness on her toes)   Bed Mobility   Supine to Sit 4  Minimal assistance   Additional items Assist x 1;HOB elevated;Bedrails;Increased time required;Verbal cues;LE management   Additional Comments cues for techniques & safety   Transfers   Sit to Stand 4  Minimal assistance   Additional items Assist x 1;HOB  elevated;Bedrails;Increased time required;Verbal cues   Stand to Sit 4  Minimal assistance   Additional items Assist x 1;Armrests;Increased time required;Verbal cues   Additional Comments w/ RW; cues for techniques & safety   Ambulation/Elevation   Gait pattern Antalgic;Wide RODRI;Decreased foot clearance;Decreased R stance;Short stride;Step to;Excessively slow   Gait Assistance 4  Minimal assist   Additional items Assist x 1;Verbal cues;Tactile cues   Assistive Device Rolling walker   Distance 3'x1   Ambulation/Elevation Additional Comments unsteady gait but no gross LOB noted; dec amb tolerance 2* to pain & fatigue   Balance   Static Sitting Good   Dynamic Sitting Fair +   Static Standing Fair -  (w/ RW)   Dynamic Standing Poor +  (w/ RW)   Ambulatory Poor +  (w/ RW)   Endurance Deficit   Endurance Deficit Yes   Endurance Deficit Description fatigue; pain   Activity Tolerance   Activity Tolerance Patient limited by pain;Patient limited by fatigue;Treatment limited secondary to medical complications (Comment)   Nurse Made Aware yes, Isabel   Assessment   Prognosis Good   Problem List Decreased strength;Decreased range of motion;Decreased endurance;Impaired balance;Decreased mobility;Pain;Impaired sensation   Goals   Patient Goals to go home   STG Expiration Date 02/03/25   Short Term Goal #1 Goals to be met in 7 days; pt will be able to: 1) inc strength & balance by 1/2 grade to improve overall functional mobility & dec fall risk; 2) inc bed mobility to modified I for pt to be able to get in/OOB safely w/ proper techniques 100% of the time, to dec caregiver burden & safely function at home; 3) inc transfers to modified I for pt to transition safely from one surface to another w/o % of the time, to dec caregiver burden & safely function at home; 4) inc amb w/ RW approx. >150' w/ S for pt to ambulate household distances w/o any % of the time, to dec caregiver burden & safely function at home; 5) negotiate  stairs w/ S for inc safety during stair mgt inside/outside of home & dec caregiver burden; 6) pt/caregiver ed   PT Treatment Day 0   Plan   Treatment/Interventions Functional transfer training;LE strengthening/ROM;Elevations;Therapeutic exercise;Endurance training;Patient/family training;Bed mobility;Gait training;Spoke to nursing;Family   PT Frequency 5-7x/wk;Twice a day   Discharge Recommendation   Rehab Resource Intensity Level, PT III (Minimum Resource Intensity)   Equipment Recommended Walker  (pt has)   Additional Comments restorative for daily amb   AM-PAC Basic Mobility Inpatient   Turning in Flat Bed Without Bedrails 3   Lying on Back to Sitting on Edge of Flat Bed Without Bedrails 3   Moving Bed to Chair 3   Standing Up From Chair Using Arms 3   Walk in Room 3   Climb 3-5 Stairs With Railing 2   Basic Mobility Inpatient Raw Score 17   Basic Mobility Standardized Score 39.67   [facility] Highest Level Of Mobility   -HLM Goal 5: Stand one or more mins   -HLM Achieved 5: Stand (1 or more minutes)   End of Consult   Patient Position at End of Consult Bedside chair;Bed/Chair alarm activated;All needs within reach   End of Consult Comments Pt in stable condition. All needs in reach. All lines intact. Chair alarm activated & connected to call bell system.   ASSESSMENT:  Pt. 74 y.o.female s/p R TKR 1/27/25 2* to  Primary osteoarthritis of right knee. Pt referred to PT for mobility assessment & D/C planning. Please see above for information re: home set-up & PLOF as well as objective findings during PT assessment. PTA, pt reports being I w/o AD. On eval, pt functioning below baseline hence will continue skilled PT to improve function & safety. Pt require minAx1 for bed mobility, transfers & amb w/ RW + cues for techniques & safety. Gait deviations as above but no gross LOB noted. Dec amb tolerance 2* to fatigue & nausea. BP as follows; /95; after amb/in chair: 145/83. RN made aware. The patient's  "AM-PAC Basic Mobility Inpatient Short Form Raw Score is 17. A Raw score of greater than 16 suggests the patient may benefit from discharge to home. Please also refer to the recommendation of the Physical Therapist for safe discharge planning. From PT standpoint, will recommend Level III (minimum resource intensity) rehab services and inc family support at D/C. Pt reports she has OPPT appointment already set up. No SOB & dizziness reported t/o session. Nsg staff most recent vital signs as follows: /79 (BP Location: Right arm)   Pulse 56   Temp (!) 96.6 °F (35.9 °C) (Temporal)   Resp 17   Ht 5' 1\" (1.549 m)   Wt 82.4 kg (181 lb 10.5 oz)   SpO2 96%   BMI 34.32 kg/m² . At end of session, pt OOB in chair in stable condition, call bell & phone in reach, chair alarm activated, all lines intact. Fall precautions reinforced w/ good understanding. CM to follow. Nsg staff to continue to mobilized pt (OOB in chair for all meals & ambulate in room/unit) as tolerated to prevent further decline in function. Will also recommend Restorative for daily amb &/or daily OOB in chair as appropriate. Nsg notified.         Hx/personal factors: co-morbidities, inaccessible home, advanced age, mutliple lines, use of AD, pain, fall risk, assist w/ ADL's, and obesity  Examination: dec mobility, dec balance, dec endurance, dec amb, risk for falls, pain  Clinical: unpredictable (ongoing medical status, risk for falls, POD #0, and pain mgt)  Complexity: high    Bryson Ruth          "

## 2025-01-27 NOTE — OP NOTE
OPERATIVE REPORT  PATIENT NAME: Demi Almonte  : 1950  MRN: [id number]  Pt Location:  [facility] OR ROOM 02    Surgery Date: 2025    Surgeons and Role:     * Ryan Vega, DO - Primary     * Tim Payne PA-C - Assisting     Preop Diagnosis:  Primary osteoarthritis of right knee [M17.11]    Post-Op Diagnosis Codes:     * Primary osteoarthritis of right knee [M17.11]    Procedure(s):  Right - ARTHROPLASTY KNEE TOTAL using the Pamela NexGen system with the following sizes: E-LPS GSF femoral component, #3 tibial tray, 10 mm poly arterial surface, and a 35 mm patella button    Specimens:  ID Type Source Tests Collected by Time Destination   1 : Right knee bone and synovium Tissue Bone TISSUE EXAM Ryan Vega,  2025 0834        Estimated Blood Loss:   50 cc    Drains:  Closed/Suction Drain Anterior;Right Knee Accordion 10 Fr. (Active)   Number of days: 0       Urethral Catheter Non-latex 16 Fr. (Active)   Number of days: 0   Hemovac    Anesthesia Type:   Adductor canal with spinal    Operative Indications:  Primary osteoarthritis of right knee [M17.11]    Operative Findings:  TT: 91    Complications:   None      Knee Approach: Medial Parapatellar        Procedure and Technique:    Procedure and Technique:  The patient was properly identified and brought to the operative suite.  After successful induction of the a anesthetic, a tourniquet was placed on the patient's right proximal thigh.  The right lower extremity was prepped and draped in the usual usual sterile fashion.      It was medically necessary that the physician's assistant be in the room to aid in positioning placing the appropriate amount of retraction the patient.  A qualified resident was not available.  The physician assistant's role was very integral to the success of this case.  He assisted on positioning, draping, retraction soft tissue, retraction neurovascular bundles, assist with implantation the prosthesis,  assisted with reduction of prosthesis, and assisted with closure of a complex wound      She was given a dose of intravenous antibiotics.  Surgical landmarks were outline  With  a skin marker.  An Esmarch was used to examine the right lower extremity and the tourniquet was then inflated.  Using a #10  Scalpel  Blade, an anterior incision was made on patient's right knee.  Hemostasis was achieved with the use of electrocautery.  .  Through a significant amount of dissection through adipose tissue the capsule and  quadricep tendon  Was  then located.  Using a 2nd #10  Scalpel blade a medial parapatellar arthrotomy did occur with a rush of clear synovial fluid.  A posteriormedial sleeve was developed to the level of the semi membranous  tendon. The patella then everted and  knee was flexed.  The retro and  superior fat pads were excised. The cruciate ligaments were  divided. At this point the proximal tibia could  be anteriorly subluxed.  An intramedullary drill hole was placed in the proximal tibia, followed by the external cutting jig.  The  smallest portion of deficient medial side bone was to  be removed, with its corresponding lateral side.  Varus and  valgus angulation was also checked.  The collateral ligaments were then  protected.  The neurovascular bundle was then protected.  The proximal tibia was then osteotomized.  Attention then paid to the preparation of distal femur.  The intramedullary johann was placed with a 6 degree valgus cut placed approximately 3° of external rotation.  The external  cutting jig was placed on top of this.  An additional 2 mm cut the because of a flexion contracture.  The collateral ligaments were protected.  The  distal femur was then osteotomized.  The femur was then sized.  A size E did have the best fit.   Both gender and  standard cutting blocks were checked and a gender specific  cutting block did  Have the best fit  Without any excessive notching of the anterior condyle.  The  collaterals were then protected, the distal femur was an osteotomized in the following sequence 1 anterior condyle 2. posterior condyle 3 posterior chamfer and 4 anterior chamfer.  At this point all the bone fragments and then removed.  A lamina  was placed both the medial and lateral sides and the redundant menisci and posterior osteophytes were then removed.  Flexion-extension blocks were placed next and a 10 mm block gave good symmetric balancing.  The femur was then finished with the trochlear recess and notch block placed in a slightly lateral position to facilitate tracking patella. The tibial was sized next.  A size #3 did have  best fit.  The size E-LPS GSF femoral component placed, followed by a number 3.  Tibial tray, followed by several polyarticular services and a 10 mm tray did the best fit.  The rotation of the  tibia was then marked.  The patella was inspected next.  There was a tremendous amount of arthrosis.  The  peripheral osteophytes were removed.  It was confirmed a caliper that there is adequate bone stock.  And then using the yanni Yanni reaming system approximately 9 mm of undersurface patella then reamed.  The patella sized to a 35 mm patella button.  This guide was drilled in a slightly superior medial position facilitate tracking patella.  The tibia was then finished with a drill hole drill hole and keel punch.  At this point there was good stability and range of motion in the knee.  All the trial components removed.  The wound was copiously irrigated sterile antibiotic solution. Cement  was being mixed on the back table was used 3rd generation cementing techniques.  The proximal tibia was cemented in placed followed by the distal femur.  A trial poly articular surface was placed till the cement fully cured.  The patella was  cemented in place and held with a patellar clamp till the cement fully cured as well. Once the cement fully cured, it was irrigated.  The polyarticular  surfaces were  tried once again and a 10 mm tray to the best fit.  The final 10 mm tray was placed reduced 1 final time and found to be quite stable.  After it was irrigated,  a 1/8 inch Hemovac was  placed near the superior aspect  Of the incision.  The quadriceps  and capsule were closed with #1 Vicryl.  Deep layer fascia closed multiple layers of 0 Vicryl.  Superficial was closed with 2-0 Vicryl.  The skin was approximated  With staples.  The wounds were dressed with ointment Xeroform 4x4s ABDs sterile Webril and Ace bandage.  There was no complication during procedure. She was successfully awoken,  transferred  To the hospital bed,  And went to the recovery room stable  In condition.        I was present for the entire procedure., A qualified resident physician was not available., and A physician assistant was required during the procedure for retraction, tissue handling, dissection and suturing.    Patient Disposition:  PACU             SIGNATURE: Ryan Vega,   DATE: January 27, 2025  TIME: 10:12 AM

## 2025-01-28 VITALS
HEART RATE: 66 BPM | BODY MASS INDEX: 34.3 KG/M2 | TEMPERATURE: 98 F | SYSTOLIC BLOOD PRESSURE: 114 MMHG | RESPIRATION RATE: 19 BRPM | OXYGEN SATURATION: 96 % | DIASTOLIC BLOOD PRESSURE: 60 MMHG | WEIGHT: 181.66 LBS | HEIGHT: 61 IN

## 2025-01-28 LAB
ANION GAP SERPL CALCULATED.3IONS-SCNC: 5 MMOL/L (ref 4–13)
BUN SERPL-MCNC: 12 MG/DL (ref 5–25)
CALCIUM SERPL-MCNC: 9.1 MG/DL (ref 8.4–10.2)
CHLORIDE SERPL-SCNC: 106 MMOL/L (ref 96–108)
CO2 SERPL-SCNC: 28 MMOL/L (ref 21–32)
CREAT SERPL-MCNC: 0.5 MG/DL (ref 0.6–1.3)
ERYTHROCYTE [DISTWIDTH] IN BLOOD BY AUTOMATED COUNT: 14 % (ref 11.6–15.1)
GFR SERPL CREATININE-BSD FRML MDRD: 97 ML/MIN/1.73SQ M
GLUCOSE P FAST SERPL-MCNC: 124 MG/DL (ref 65–99)
GLUCOSE SERPL-MCNC: 124 MG/DL (ref 65–140)
HCT VFR BLD AUTO: 35.8 % (ref 34.8–46.1)
HGB BLD-MCNC: 11.5 G/DL (ref 11.5–15.4)
MCH RBC QN AUTO: 29.7 PG (ref 26.8–34.3)
MCHC RBC AUTO-ENTMCNC: 32.1 G/DL (ref 31.4–37.4)
MCV RBC AUTO: 93 FL (ref 82–98)
PLATELET # BLD AUTO: 234 THOUSANDS/UL (ref 149–390)
PMV BLD AUTO: 9.6 FL (ref 8.9–12.7)
POTASSIUM SERPL-SCNC: 4.6 MMOL/L (ref 3.5–5.3)
RBC # BLD AUTO: 3.87 MILLION/UL (ref 3.81–5.12)
SODIUM SERPL-SCNC: 139 MMOL/L (ref 135–147)
WBC # BLD AUTO: 11.19 THOUSAND/UL (ref 4.31–10.16)

## 2025-01-28 PROCEDURE — 97116 GAIT TRAINING THERAPY: CPT

## 2025-01-28 PROCEDURE — 97110 THERAPEUTIC EXERCISES: CPT

## 2025-01-28 PROCEDURE — 80048 BASIC METABOLIC PNL TOTAL CA: CPT | Performed by: PHYSICIAN ASSISTANT

## 2025-01-28 PROCEDURE — 97530 THERAPEUTIC ACTIVITIES: CPT

## 2025-01-28 PROCEDURE — 99222 1ST HOSP IP/OBS MODERATE 55: CPT | Performed by: PHYSICIAN ASSISTANT

## 2025-01-28 PROCEDURE — 97166 OT EVAL MOD COMPLEX 45 MIN: CPT

## 2025-01-28 PROCEDURE — 85027 COMPLETE CBC AUTOMATED: CPT | Performed by: PHYSICIAN ASSISTANT

## 2025-01-28 PROCEDURE — 99024 POSTOP FOLLOW-UP VISIT: CPT | Performed by: ORTHOPAEDIC SURGERY

## 2025-01-28 RX ORDER — DOCUSATE SODIUM 100 MG/1
100 CAPSULE, LIQUID FILLED ORAL 2 TIMES DAILY
Qty: 60 CAPSULE | Refills: 0 | Status: SHIPPED | OUTPATIENT
Start: 2025-01-28

## 2025-01-28 RX ORDER — OXYCODONE HYDROCHLORIDE 5 MG/1
5 TABLET ORAL EVERY 4 HOURS PRN
Qty: 21 TABLET | Refills: 0 | Status: SHIPPED | OUTPATIENT
Start: 2025-01-28 | End: 2025-02-07

## 2025-01-28 RX ORDER — ACETAMINOPHEN 325 MG/1
975 TABLET ORAL EVERY 8 HOURS
Qty: 270 TABLET | Refills: 0 | Status: SHIPPED | OUTPATIENT
Start: 2025-01-28

## 2025-01-28 RX ADMIN — FOLIC ACID 1 MG: 1 TABLET ORAL at 08:50

## 2025-01-28 RX ADMIN — FERROUS SULFATE TAB 325 MG (65 MG ELEMENTAL FE) 325 MG: 325 (65 FE) TAB at 07:58

## 2025-01-28 RX ADMIN — OXYCODONE HYDROCHLORIDE AND ACETAMINOPHEN 500 MG: 500 TABLET ORAL at 08:49

## 2025-01-28 RX ADMIN — LEVOTHYROXINE SODIUM 75 MCG: 75 TABLET ORAL at 04:26

## 2025-01-28 RX ADMIN — OXYCODONE HYDROCHLORIDE 10 MG: 10 TABLET ORAL at 11:18

## 2025-01-28 RX ADMIN — ACETAMINOPHEN 975 MG: 325 TABLET, FILM COATED ORAL at 11:18

## 2025-01-28 RX ADMIN — Medication 1000 UNITS: at 08:50

## 2025-01-28 RX ADMIN — ACETAMINOPHEN 975 MG: 325 TABLET, FILM COATED ORAL at 04:22

## 2025-01-28 RX ADMIN — CEFAZOLIN SODIUM 1000 MG: 1 SOLUTION INTRAVENOUS at 06:01

## 2025-01-28 RX ADMIN — DOCUSATE SODIUM 100 MG: 100 CAPSULE, LIQUID FILLED ORAL at 08:50

## 2025-01-28 RX ADMIN — OXYCODONE HYDROCHLORIDE 10 MG: 10 TABLET ORAL at 05:32

## 2025-01-28 RX ADMIN — Medication 1 TABLET: at 08:50

## 2025-01-28 NOTE — PLAN OF CARE
Problem: PAIN - ADULT  Goal: Verbalizes/displays adequate comfort level or baseline comfort level  Description: Interventions:  - Encourage patient to monitor pain and request assistance  - Assess pain using appropriate pain scale  - Administer analgesics based on type and severity of pain and evaluate response  - Implement non-pharmacological measures as appropriate and evaluate response  - Consider cultural and social influences on pain and pain management  - Notify physician/advanced practitioner if interventions unsuccessful or patient reports new pain  Outcome: Progressing       Problem: SAFETY ADULT  Goal: Patient will remain free of falls  Description: INTERVENTIONS:  - Educate patient/family on patient safety including physical limitations  - Instruct patient to call for assistance with activity   - Consult OT/PT to assist with strengthening/mobility   - Keep Call bell within reach  - Keep bed low and locked with side rails adjusted as appropriate  - Keep care items and personal belongings within reach  - Initiate and maintain comfort rounds  - Make Fall Risk Sign visible to staff  - Apply yellow socks and bracelet for high fall risk patients  - Consider moving patient to room near nurses station  Outcome: Progressing  Goal: Maintain or return to baseline ADL function  Description: INTERVENTIONS:  -  Assess patient's ability to carry out ADLs; assess patient's baseline for ADL function and identify physical deficits which impact ability to perform ADLs (bathing, care of mouth/teeth, toileting, grooming, dressing, etc.)  - Assess/evaluate cause of self-care deficits   - Assess range of motion  - Assess patient's mobility; develop plan if impaired  - Assess patient's need for assistive devices and provide as appropriate  - Encourage maximum independence but intervene and supervise when necessary  - Involve family in performance of ADLs  - Assess for home care needs following discharge   - Consider OT consult  to assist with ADL evaluation and planning for discharge  - Provide patient education as appropriate  Outcome: Progressing  Goal: Maintains/Returns to pre admission functional level  Description: INTERVENTIONS:  - Perform AM-PAC 6 Click Basic Mobility/ Daily Activity assessment daily.  - Set and communicate daily mobility goal to care team and patient/family/caregiver.   - Collaborate with rehabilitation services on mobility goals if consulted  - Out of bed for toileting  - Record patient progress and toleration of activity level   Outcome: Progressing     Problem: Knowledge Deficit  Goal: Patient/family/caregiver demonstrates understanding of disease process, treatment plan, medications, and discharge instructions  Description: Complete learning assessment and assess knowledge base.  Interventions:  - Provide teaching at level of understanding  - Provide teaching via preferred learning methods  Outcome: Progressing

## 2025-01-28 NOTE — PLAN OF CARE
Problem: PHYSICAL THERAPY ADULT  Goal: Performs mobility at highest level of function for planned discharge setting.  See evaluation for individualized goals.  Description: Treatment/Interventions: Functional transfer training, LE strengthening/ROM, Elevations, Therapeutic exercise, Endurance training, Patient/family training, Bed mobility, Gait training, Spoke to nursing, Family  Equipment Recommended: Walker (pt has)       See flowsheet documentation for full assessment, interventions and recommendations.  Outcome: Adequate for Discharge  Note: Prognosis: Good  Problem List: Decreased strength, Decreased range of motion, Decreased endurance, Impaired balance, Decreased mobility, Pain  Assessment: Pt seen for PT treatment session this date with interventions consisting of bed mobility, transfer training, gait training, stair training, and HEP, and education provided as needed for safety and direction to improve functional mobility, safety awareness, and activity tolerance. Pt agreeable to PT treatment session upon arrival, pt found supine in bed . At end of session, pt left  supine in bed with all needs in reach. In comparison to previous session, pt with improvement in activity tolerance, endurance, standing balance, ambulation distances, ambulatory balance, R le strength, AM- pac score, and functional mobility. . Pt is showing good progress toward PT goals with improvements as noted.  Overall less assistance for all mobility with improved activity / ambulation tolerance.  Pt  is functioning at mod I for supine <> sit with HOB elevated and use of bed rails and transfers sit <> stand and  toilet transfers with mod I with use of bed rail and use of grab bar and top of garbage can in BR. Pt  progressed with ambulation distances to 15' and 110' with use of Rw with supervision assist x1.  Gait deviations as noted  not gross LOB noted. Pt  progressed to stair climbing performing with use of L hR and SPC on R with min  assist x1 progressing to cg assist x1 to ascend and descend.  Verbal cues for proper le sequencing and cane placement/ sequencing. Initiated supine and seated HEP for TKR x 10 reps.  Assistance required for slr, hip abd./add., and heel slides due to decreased strength, ROM and pain with active movement.  Continue to recommend  minimal rehab resource intensity level III  at time of d/c in order to maximize pt's functional independence and safety w/ mobility. Pt continues to be functioning below baseline level. PT will continue to see pt while here in order to address the deficits listed above and provide interventions consistent w/ POC in effort to achieve STGs.    The patient's AM-PAC Basic Mobility Inpatient Short Form Raw Score is 23. A raw score greater than 16 suggests the patient may benefit from discharge to home. Please also refer to the recommendation of the Physical Therapist for safe discharge planning.        Rehab Resource Intensity Level, PT: III (Minimum Resource Intensity)    See flowsheet documentation for full assessment.

## 2025-01-28 NOTE — ASSESSMENT & PLAN NOTE
POD 1, status post right TKA  Weightbearing as tolerated  PT/OT-level 3  Xarelto for anticoagulation  Outpatient follow-up with orthopedic surgery

## 2025-01-28 NOTE — CASE MANAGEMENT
Case Management Progress Note    Patient name Demi Almonte  Location [facility] /E2 -* MRN [id number]  : 1950 Date 2025       LOS (days): 0  Geometric Mean LOS (GMLOS) (days):   Days to GMLOS:        OBJECTIVE:        Current admission status: Outpatient Surgery  Preferred Pharmacy:   [id number] - [place], [place] - [address]  [address] [address]  Phone: [phone] Fax: [phone]    Primary Care Provider: Ernesto Cool DO    Primary Insurance: [employer]  Secondary Insurance:     PROGRESS NOTE:    CM consulted by orthopedics s/p knee replacement. Pt resides in a one story home with spouse and was independent with ADLs prior to admission. Pt has a RW. Therapy recommending OP therapy. No CM needs identified at this time. CM will continue to follow.

## 2025-01-28 NOTE — OCCUPATIONAL THERAPY NOTE
Assessment  Patient is a 74 y.o. year old female seen for OT eval s/p admit to [facility] on 1/27/2025 with s/p R TKA due to  Primary osteoarthritis of right knee [M17.11]  . OT consulted to assess ADLs/IADLs/functional mobility and assist w/ D/C planning.  Pt's CLOF as follows: eating/grooming: Independent and supervision , UB ADLs: supervision , LB ADLs: [place], toileting: supervision , bed mobility: supervision , functional transfers: supervision , functional mobility: supervision , sitting/standing tolerance: 3-5 mins. Patient demonstrates the following deficits impacting occupational performance: weakness, decreased strength , decreased balance, decreased activity tolerance, limited functional reach, increased pain, orthopedic restrictions, increased body habitus , and decreased cardiovascular endurance.         These impairments, as well at pt’s JUANIS home environment, difficulty performing ADLs, difficulty performing IADLs, difficulty performing transfers/mobility, WBS, fall risk , increased reliance on DME , and new use of AD for functional transfers/mobility, limit pt’s ability to safely engage in all baseline areas of occupation. Based on the aforementioned evaluation, functional performance deficits, and assessments, pt has been identified as a low complexity evaluation. At this time, recommendation for pt to receive post-acute rehabilitation services at a Level I (maximum resource intensity) due to above deficits and CLOF.             Additional Pertinent Hx  Comorbidities affecting pt’s functional performance include a significant PMH of: CHF, pulmonary embolism, L TKA, AFIB, DVT, HTN, hypothyroid, & sepsis. Patient with active OT orders and activity orders for Ambulate patient.       Autonomy   PTA, was (I) with ADLs and was (I) with IADLs.  (+) Patient has had  0  falls in the last 6 months. Patient lives in a 1 story home with 3 JUANIS with 1 HR.  Pt can live on one level, bedroom on 1st floor, bathroom on  1st floor, walk-in shower, with built in SC and grab bars, and standard toilet  Pt home DME includes: Shower Chair, Grab Bars, Rolling Walker, and SPC .        Use if D/C    No further acute OT needs identified at this time. Recommend continued mobilization with hospital staff while in the hospital to increase pt’s endurance and strength upon D/C. From OT standpoint, recommend D/C to home with family support and OP PT when medically cleared. D/C pt from OT caseload at this time.     Use if treating with PT  Co treatment with PT secondary to complex medical condition of pt, possible A of 2 required to achieve and maintain transitional movements, requiring the need of skilled therapeutic intervention of 2 therapists to achieve delivery of services.

## 2025-01-28 NOTE — OCCUPATIONAL THERAPY NOTE
"    Occupational Therapy Evaluation     Patient Name: Demi Almonte  Today's Date: 2025  Problem List  Principal Problem:    Primary osteoarthritis of right knee  Active Problems:    Hypothyroidism    Chronic atrial fibrillation (HCC)    History of pulmonary embolus (PE)    Past Medical History  Past Medical History:   Diagnosis Date    Acute pulmonary embolism (HCC) 01/10/2017    Allergic     Arthritis     rheumatoid    Atrial fibrillation (HCC)     CHF (congestive heart failure) ([facility])     Disease of thyroid gland     DVT (deep venous thrombosis) ([facility])     DVT, lower extremity ([facility]) 01/10/2017    HL (hearing loss)     Left Ear-Wear Hearing Aid    Hypertension     Hyperthyroidism     Irregular heart beat     Kidney stone     Migraine     hx of    Pleural effusion 2016    Polymyalgia rheumatica ([facility])     \"Remission\"    Polymyalgia rheumatica ([facility]) 01/10/2017    Secondary adrenal insufficiency ([facility]) 2017    Sepsis, unspecified organism ([facility]) 12/15/2019    Sleep apnea     Mild-Does not require CPAP    Varicella As a child    Vitamin D deficiency      Past Surgical History  Past Surgical History:   Procedure Laterality Date    CARDIAC CATHETERIZATION      CARDIOVERSION N/A 2019    Procedure: CARDIOVERSION;  Surgeon: Roque Alfaro MD;  Location: [facility];  Service: Cardiology     SECTION      x3 - last impression 16    FRACTURE SURGERY Left     wrist    HERNIA REPAIR  2018    JOINT REPLACEMENT  2022    left knee    KNEE ARTHROSCOPY Left     Meniscus Tear Repair    KNEE CARTILAGE SURGERY Left     HI ARTHRP KNE CONDYLE&PLATU MEDIAL&LAT COMPARTMENTS Left 2022    Procedure: KNEE TOTAL ARTHROPLASTY;  Surgeon: Ryan Vega DO;  Location: [facility];  Service: Orthopedics    TONSILLECTOMY AND ADENOIDECTOMY  child; age 14    TUBAL LIGATION      VEIN SURGERY Right     venous ligation with stripping           25 0946   OT Last Visit   OT Visit Date 25 "   Note Type   Note type Evaluation   Pain Assessment   Pain Assessment Tool 0-10   Pain Score 4   Pain Location/Orientation Orientation: Right;Location: Hip   Pain Onset/Description Frequency: Intermittent  (positional)   Hospital Pain Intervention(s) Repositioned;Ambulation/increased activity;Emotional support   Multiple Pain Sites Yes   Pain 2   Pain Score 2 2   Pain Location/Orientation 2 Orientation: Right;Location: Knee   Pain Onset/Description 2 Onset: Ongoing   Hospital Pain Intervention(s) 2 Repositioned;Ambulation/increased activity;Emotional support   Restrictions/Precautions   Weight Bearing Precautions Per Order Yes   RLE Weight Bearing Per Order WBAT   Other Precautions Bed Alarm;WBS;Fall Risk;Pain   Home Living   Type of Home House   Home Layout One level;Stairs to enter with rails  (3 JUANIS)   Bathroom Shower/Tub Walk-in shower   Bathroom Toilet Standard   Bathroom Equipment Grab bars in shower;Built-in shower seat   Bathroom Accessibility Accessible   Home Equipment Walker;Cane;Grab bars   Prior Function   Level of [place] Independent with ADLs;Independent with functional mobility;Independent with IADLS   Lives With Spouse   Receives Help From Family   IADLs Independent with driving;Independent with meal prep;Independent with medication management   Falls in the last 6 months 0   Vocational Retired   Lifestyle   Autonomy PTA, was (I) with ADLs and was (I) with IADLs.  (+) Patient has had  0  falls in the last 6 months. Patient lives in a 1 story home with 3 JUANIS with 1 HR.  Pt can live on one level, bedroom on 1st floor, bathroom on 1st floor, walk-in shower, with built in SC and grab bars, and standard toilet  Pt home DME includes: Shower Chair, Grab Bars, Rolling Walker, and SPC .   Reciprocal Relationships spouse   Service to Others children/grandchildren   General   Additional Pertinent History Comorbidities affecting pt’s functional performance include a significant PMH of: CHF,  pulmonary embolism, L TKA, AFIB, DVT, HTN, hypothyroid, & sepsis. Patient with active OT orders and activity orders for Ambulate patient.   Family/Caregiver Present No   Subjective   Subjective Pt agreeable to OT session, states she just used the bathroom without issue.   ADL   Where Assessed Edge of bed   Eating Assistance 7  Independent   Grooming Assistance 6  Modified Independent   UB Bathing Assistance 6  Modified Independent   LB Bathing Assistance 5  Supervision/Setup   UB Dressing Assistance 6  Modified independent   LB Dressing Assistance 4  Minimal Assistance   Toileting Assistance  6  Modified independent   Bed Mobility   Sit to Supine 6  Modified independent   Additional items Bedrails;Increased time required   Transfers   Sit to Stand 6  Modified independent   Additional items Increased time required;Other  (armrests, RW)   Stand to Sit 6  Modified independent   Additional items Bedrails;Other  (RW)   Toilet transfer 6  Modified independent   Additional items Other;Standard toilet  (grab bar, RW)   Additional Comments RW   Functional Mobility   Functional Mobility 6  Modified independent   Additional Comments RW, increased time, household distance   Additional items Rolling walker   Balance   Static Sitting Good   Dynamic Sitting Fair +   Static Standing Fair  (c RW)   Dynamic Standing Fair  (RW)   Ambulatory Fair  (RW)   Activity Tolerance   Activity Tolerance Patient tolerated treatment well;Patient limited by fatigue   Medical Staff Made Aware CM   Nurse Made Aware yes, [place]   RUE Assessment   RUE Assessment WFL   LUE Assessment   LUE Assessment WFL   Hand Function   Gross Motor Coordination Functional   Fine Motor Coordination Functional   Sensation   Light Touch No apparent deficits   Proprioception   Proprioception No apparent deficits   Vision-Basic Assessment   Current Vision Wears contacts   Vision - Complex Assessment   Ocular Range of Motion Intact   Additional Comments scans all visual  fields   Psychosocial   Psychosocial (WDL) WDL   Perception   Inattention/Neglect Appears intact   Cognition   Overall Cognitive Status WFL   Arousal/Participation Alert;Responsive;Cooperative   Attention Within functional limits   Orientation Level Oriented X4   Memory Within functional limits   Following Commands Follows all commands and directions without difficulty   Assessment   Limitation Decreased ADL status;Decreased endurance;Decreased self-care trans;Decreased high-level ADLs  (decreased functional reach, decreased balance)   Assessment Patient is a 74 y.o. year old female seen for OT eval s/p admit to [facility] on 1/27/2025 s/p R TKA due to  Primary osteoarthritis of right knee (M17.11). OT consulted to assess ADLs/IADLs/functional mobility and assist w/ D/C planning.  Currently, pt functioning at [place] for UB ADLs and toileting, [place] for LB ADLs; [place] w transfers and mobility w RW use. Deficits related to strength, balance, activity tolerance, increased pain warrant skilled IP OT evaluation. Provided educ on compensatory strategies for LB ADLs, pain mngment, mobility frequency once d/c to home. Educ pt on hip hiking, dressing affected LE first. Given pt has received educ for ADL completion and based on functional performance, will D/C OT orders at this time as pt close to baseline. Offered educ on LHAE however pt has spouse at home to assist PRN w ADLs. Based on the aforementioned evaluation, functional performance deficits, and assessments, pt has been identified as a moderate complexity evaluation. At this time, recommendation for pt to receive post-acute rehabilitation services at a Level III (minimum resource intensity) due to above deficits and CLOF. Will maintain on restorative schedule to address deficits.   Goals   Patient Goals to go home   Plan   OT Frequency Eval only   Discharge Recommendation   Rehab Resource Intensity Level, OT III (Minimum Resource Intensity)  (OP PT)   AM-PAC Daily Activity  Inpatient   Lower Body Dressing 3   Bathing 3   Toileting 4   Upper Body Dressing 4   Grooming 4   Eating 4   Daily Activity Raw Score 22   Daily Activity Standardized Score (Calc for Raw Score >=11) 47.1   AM-PAC Applied Cognition Inpatient   Following a Speech/Presentation 4   Understanding Ordinary Conversation 4   Taking Medications 4   Remembering Where Things Are Placed or Put Away 4   Remembering List of 4-5 Errands 4   Taking Care of Complicated Tasks 4   Applied Cognition Raw Score 24   Applied Cognition Standardized Score 62.21      Judith Aceves

## 2025-01-28 NOTE — ASSESSMENT & PLAN NOTE
S/p cardiac ablation  Rate/rhythm control: Toprol-XL 25 mg daily  Anticoagulation: Xarelto 20 mg daily

## 2025-01-28 NOTE — PROGRESS NOTES
"Progress Note - Orthopedics   Name: Demi Almonte 74 y.o. female I MRN: [id number]  Unit/Bed#: E2 -01 I Date of Admission: 1/27/2025   Date of Service: 1/28/2025 I Hospital Day: 0     Assessment & Plan  Primary osteoarthritis of right knee      Postop day #1, status post right total knee replacement  Out of bed  Weightbearing as tolerated right lower extremity  Physical/Occupational Therapy  Xarelto  Discharge planning once able to ambulate    Subjective   74 y.o.female who is postop day #1, status post right total knee replacement no acute events, no new complaints. Pain well controlled with analgesics. Denies fevers, chills, CP, SOB, N/V, numbness or tingling. Patient reports no issues with urination or bowel movements. Patient states needs to ambulate better prior to going home    Objective :  Temp:  [96.6 °F (35.9 °C)-98.5 °F (36.9 °C)] 98 °F (36.7 °C)  HR:  [52-76] 66  BP: ()/(49-79) 98/49  Resp:  [16-21] 19  SpO2:  [94 %-99 %] 96 %  O2 Device: None (Room air)  Nasal Cannula O2 Flow Rate (L/min):  [2 L/min] 2 L/min    Physical Exam  Musculoskeletal: right  Skin incision is clean, dry, intact. No erythema or ecchymosis.  Dressing clean, dry, and intact  TTP over incisional site  Right lower extremity is neurovascularly intact.  Toes are pink and mobile.  Compartments are soft.  Incision is clean, dry, and intact.  Drain was pulled.  Good dorsiflexion of foot.  Improved quad tone.  Negative Homans      Lab Results: I have reviewed the following results:  Recent Labs     01/28/25  0424   WBC 11.19*   HGB 11.5   HCT 35.8      BUN 12   CREATININE 0.50*     Blood Culture:    Lab Results   Component Value Date    BLOODCX No Growth After 5 Days. 12/14/2019     Wound Culture: No results found for: \"WOUNDCULT\"  "

## 2025-01-28 NOTE — PHYSICAL THERAPY NOTE
PHYSICAL THERAPY NOTE          Patient Name: Demi Almonte  Today's Date: 1/28/2025 01/28/25 1054   Note Type   Note Type Treatment   Pain Assessment   Pain Assessment Tool 0-10   Pain Score 7   Pain Location/Orientation Orientation: Right;Location: Knee;Location: Hip  (thigh radiating into the knee.)   Pain Onset/Description Descriptor: Aching;Descriptor: Throbbing   Hospital Pain Intervention(s) Repositioned;Cold applied;Ambulation/increased activity;Emotional support;Rest   Restrictions/Precautions   RLE Weight Bearing Per Order WBAT   Other Precautions Bed Alarm;Fall Risk;Pain   General   Chart Reviewed Yes   Family/Caregiver Present Yes  (spouse)   Cognition   Overall Cognitive Status WFL   Arousal/Participation Alert;Responsive;Cooperative   Attention Within functional limits   Orientation Level Oriented X4   Memory Within functional limits   Following Commands Follows all commands and directions without difficulty   Subjective   Subjective pt  reports increased R knee pain.  pt agreeable to PT.   Bed Mobility   Supine to Sit 6  Modified independent   Additional items Assist x 1;HOB elevated;Bedrails;Increased time required   Sit to Supine 6  Modified independent   Additional items Assist x 1;Increased time required   Transfers   Sit to Stand 6  Modified independent   Additional items Assist x 1;Increased time required;Verbal cues;Bedrails   Stand to Sit 6  Modified independent   Additional items Assist x 1;Increased time required   Toilet transfer 6  Modified independent   Additional items Assist x 1;Increased time required;Standard toilet  (grab bar use on r  and top of garbage can for suport on l)   Ambulation/Elevation   Gait pattern Antalgic;Decreased R stance;Short stride;Excessively slow;Step through pattern   Gait Assistance 5  Supervision   Additional items Assist x 1;Verbal cues   Assistive Device Rolling walker    Distance 15' x1, 110' x1   Stair Management Assistance 7  Independent   Additional items Verbal cues;Increased time required   Stair Management Technique One rail L;Foreward;Nonreciprocal;With cane   Number of Stairs   (5 steps x2)   Ambulation/Elevation Additional Comments slow antalgic, step through gait.      verbal cues for le seqeucning with stair climbing and cane placement sequencing   Balance   Static Sitting Normal   Dynamic Sitting Good   Static Standing Fair +   Dynamic Standing Fair +   Ambulatory Fair +   Endurance Deficit   Endurance Deficit Description pain, fatigue   Activity Tolerance   Activity Tolerance Patient limited by pain;Patient limited by fatigue;Patient tolerated treatment well   Exercises   TKR Supine;10 reps;AAROM;AROM;Right   Assessment   Prognosis Good   Problem List Decreased strength;Decreased range of motion;Decreased endurance;Impaired balance;Decreased mobility;Pain   Assessment Pt seen for PT treatment session this date with interventions consisting of bed mobility, transfer training, gait training, stair training, and HEP, and education provided as needed for safety and direction to improve functional mobility, safety awareness, and activity tolerance. Pt agreeable to PT treatment session upon arrival, pt found supine in bed . At end of session, pt left  supine in bed with all needs in reach. In comparison to previous session, pt with improvement in activity tolerance, endurance, standing balance, ambulation distances, ambulatory balance, R le strength, AM- pac score, and functional mobility. . Pt is showing good progress toward PT goals with improvements as noted.  Overall less assistance for all mobility with improved activity / ambulation tolerance.  Pt  is functioning at mod I for supine <> sit with HOB elevated and use of bed rails and transfers sit <> stand and  toilet transfers with mod I with use of bed rail and use of grab bar and top of garbage can in BR. Pt   progressed with ambulation distances to 15' and 110' with use of Rw with supervision assist x1.  Gait deviations as noted  not gross LOB noted. Pt  progressed to stair climbing performing with use of L hR and SPC on R with min assist x1 progressing to cg assist x1 to ascend and descend.  Verbal cues for proper le sequencing and cane placement/ sequencing. Initiated supine and seated HEP for TKR x 10 reps.  Assistance required for slr, hip abd./add., and heel slides due to decreased strength, ROM and pain with active movement.  Continue to recommend  minimal rehab resource intensity level III  at time of d/c in order to maximize pt's functional independence and safety w/ mobility. Pt continues to be functioning below baseline level. PT will continue to see pt while here in order to address the deficits listed above and provide interventions consistent w/ POC in effort to achieve STGs.    The patient's AM-[facility] Basic Mobility Inpatient Short Form Raw Score is 23. A raw score greater than 16 suggests the patient may benefit from discharge to home. Please also refer to the recommendation of the Physical Therapist for safe discharge planning.   Goals   Patient Goals Get back to walking better.   STG Expiration Date 02/03/25   PT Treatment Day 1   Plan   Treatment/Interventions Functional transfer training;LE strengthening/ROM;Elevations;Therapeutic exercise;Endurance training;Patient/family training;Equipment eval/education;Bed mobility;Gait training;Spoke to nursing   Progress Improving as expected   PT Frequency 5-7x/wk;Twice a day   Discharge Recommendation   Rehab Resource Intensity Level, PT III (Minimum Resource Intensity)   AM-PAC Basic Mobility Inpatient   Turning in Flat Bed Without Bedrails 4   Lying on Back to Sitting on Edge of Flat Bed Without Bedrails 4   Moving Bed to Chair 4   Standing Up From Chair Using Arms 4   Walk in Room 4   Climb 3-5 Stairs With Railing 3   Basic Mobility Inpatient Raw Score 23    Basic Mobility Standardized Score 50.88   [facility] Highest Level Of Mobility   -[facility] Goal 7: Walk 25 feet or more   Education   Education Provided Mobility training;Home exercise program;Assistive device   Patient Demonstrates acceptance/verbal understanding   End of Consult   Patient Position at End of Consult Supine;Bed/Chair alarm activated;All needs within reach   End of Consult Comments ice to R knee.   Celi Milian, PTA

## 2025-01-28 NOTE — CONSULTS
Consultation - Hospitalist   Name: Demi Almonte 74 y.o. female I MRN: [id number]  Unit/Bed#: E2 -01 I Date of Admission: 1/27/2025   Date of Service: 1/28/2025 I Hospital Day: 0   Inpatient consult to Internal Medicine  Consult performed by: Masoud Ribeiro PA-C  Consult ordered by: Tim Payne PA-C        Physician Requesting Evaluation: Ryan Vega DO   Reason for Evaluation / Principal Problem: Management of chronic conditions    Assessment & Plan  Primary osteoarthritis of right knee  POD 1, status post right TKA  Weightbearing as tolerated  PT/OT-level 3  Xarelto for anticoagulation  Outpatient follow-up with orthopedic surgery  Hypothyroidism  Continue levothyroxine 75 mcg daily, except Sundays takes 37.5 mcg  Chronic atrial fibrillation (HCC)  S/p cardiac ablation  Rate/rhythm control: Toprol-XL 25 mg daily  Anticoagulation: Xarelto 20 mg daily  History of pulmonary embolus (PE)  On Xarelto 20 mg daily  Ok for discharge from Hospitalist service perspective.      VTE Pharmacologic Prophylaxis:   High Risk (Score >/= 5) - Pharmacological DVT Prophylaxis Ordered: rivaroxaban (Xarelto). Sequential Compression Devices Ordered.  Code Status: Prior level 1 full code  Discussion with family: Per primary team      History of Present Illness   Chief Complaint: Right knee pain    Demi Almonte is a 74 y.o. female with a PMH of A-fib on Xarelto, DEV, DVT/PE, polymyalgia rheumatica who presents with right knee osteoarthritis for a right knee arthroplasty with orthopedic surgery.  Evaluated POD1 at bedside with minimal pain, ambulating with PT.  Chronic conditions are well-managed as outpatient, recently had a ablation for her atrial fibrillation and is managed on Toprol and Xarelto.  Xarelto has already been restarted postoperatively without any evidence of active bleeding.  She is to continue her thyroid regimen.     Review of Systems   Constitutional:  Negative for appetite change,  "chills, fatigue and fever.   HENT:  Negative for ear pain, sore throat and trouble swallowing.    Eyes:  Negative for visual disturbance.   Respiratory:  Negative for cough, chest tightness, shortness of breath and wheezing.    Cardiovascular:  Negative for chest pain, palpitations and leg swelling.   Gastrointestinal:  Negative for abdominal distention, abdominal pain, diarrhea, nausea and vomiting.   Endocrine: Negative.    Genitourinary:  Negative for dysuria, flank pain and hematuria.   Musculoskeletal:  Negative for arthralgias, gait problem and myalgias.   Skin:  Negative for pallor.   Allergic/Immunologic: Negative for immunocompromised state.   Neurological:  Negative for dizziness, syncope, light-headedness, numbness and headaches.       Historical Information   Past Medical History:   Diagnosis Date    Acute pulmonary embolism ([facility]) 01/10/2017    Allergic     Arthritis     rheumatoid    Atrial fibrillation ([facility])     CHF (congestive heart failure) ([facility])     Disease of thyroid gland     DVT (deep venous thrombosis) ([facility])     DVT, lower extremity ([facility]) 01/10/2017    HL (hearing loss)     Left Ear-Wear Hearing Aid    Hypertension     Hyperthyroidism     Irregular heart beat     Kidney stone     Migraine     hx of    Pleural effusion 2016    Polymyalgia rheumatica ([facility])     \"Remission\"    Polymyalgia rheumatica ([facility]) 01/10/2017    Secondary adrenal insufficiency ([facility]) 2017    Sepsis, unspecified organism ([facility]) 12/15/2019    Sleep apnea     Mild-Does not require CPAP    Varicella As a child    Vitamin D deficiency      Past Surgical History:   Procedure Laterality Date    CARDIAC CATHETERIZATION      CARDIOVERSION N/A 2019    Procedure: CARDIOVERSION;  Surgeon: Roque Alfaro MD;  Location: [facility];  Service: Cardiology     SECTION      x3 - last impression 16    FRACTURE SURGERY Left     wrist    HERNIA REPAIR  2018    JOINT REPLACEMENT  2022    left knee    KNEE " ARTHROSCOPY Left     Meniscus Tear Repair    KNEE CARTILAGE SURGERY Left     OK ARTHRP KNE CONDYLE&PLATU MEDIAL&LAT COMPARTMENTS Left 03/16/2022    Procedure: KNEE TOTAL ARTHROPLASTY;  Surgeon: Ryan Vega DO;  Location: [facility];  Service: Orthopedics    TONSILLECTOMY AND ADENOIDECTOMY  child; age 14    TUBAL LIGATION      VEIN SURGERY Right     venous ligation with stripping     Social History     Tobacco Use    Smoking status: Never    Smokeless tobacco: Never   Vaping Use    Vaping status: Never Used   Substance and Sexual Activity    Alcohol use: Not Currently     Alcohol/week: 1.0 standard drink of alcohol     Types: 1 Glasses of wine per week     Comment: Socially    Drug use: No    Sexual activity: Yes     Partners: Male     Birth control/protection: Post-menopausal, Female Sterilization     E-Cigarette/Vaping    E-Cigarette Use Never User      E-Cigarette/Vaping Substances    Nicotine No     THC No     CBD No      Family history non-contributory  Social History:  Marital Status: /Civil Union   Occupation: Noncontributory  Patient Pre-hospital Living Situation: Home  Patient Pre-hospital Level of Mobility: walks  Patient Pre-hospital Diet Restrictions: None    Meds/Allergies   I have reviewed home medications with patient personally.  Prior to Admission medications    Medication Sig Start Date End Date Taking? Authorizing Provider   ascorbic acid (VITAMIN C) 500 MG tablet Take 1 tablet (500 mg total) by mouth 2 (two) times a day Do not start before December 15, 2024. 12/15/24  Yes Ryan Vega DO   Calcium Ascorbate 500 MG TABS Take 500 mg by mouth daily     Yes Historical Provider, MD   cholecalciferol (VITAMIN D3) 1,000 units tablet Take 1,000 Units by mouth daily     Yes Historical Provider, MD   Cinnamon 500 MG capsule Take 500 mg by mouth daily   Yes Historical Provider, MD   Cyanocobalamin (VITAMIN B 12 PO) Take 500 mcg by mouth daily    Yes Historical Provider, MD   ferrous  sulfate 324 (65 Fe) mg Take 1 tablet (324 mg total) by mouth 2 (two) times a day before meals Do not start before December 15, 2024. 12/15/24  Yes Ryan Vega DO   folic acid (FOLVITE) 1 mg tablet Take 1 tablet (1 mg total) by mouth daily Do not start before December 15, 2024. 12/15/24  Yes Ryan Vega DO   levothyroxine 75 mcg tablet Take 1 tablet (75 mcg total) by mouth daily Monday- Saturday a full tablet with a half tablet on Sunday. 1/23/25  Yes SEVERIANO Silva   metoprolol succinate (TOPROL-XL) 25 mg 24 hr tablet Take 1 tablet by mouth in the morning 11/16/24  Yes Historical Provider, MD   metoprolol succinate (TOPROL-XL) 50 mg 24 hr tablet Take 25 mg by mouth daily  Patient not taking: Reported on 1/23/2025   Yes Historical Provider, MD   Multiple Vitamins-Minerals (multivitamin with minerals) tablet Take 1 tablet by mouth daily Do not start before December 15, 2024. 12/15/24  Yes Ryan Vega DO   mupirocin (BACTROBAN) 2 % ointment Apply 0.5 g (half a tube) to each nostril twice a day starting 5 days prior to procedure 12/23/24  Yes Ryan Vega DO   Red Yeast Rice 600 MG TABS Take 1 tablet by mouth daily    Yes Historical Provider, MD   XARELTO 20 MG tablet Take 1 tablet by mouth daily before dinner   3/3/18  Yes Historical Provider, MD   azelastine (ASTELIN) 0.1 % nasal spray 1 spray into each nostril 2 (two) times a day Use in each nostril as directed  Patient not taking: Reported on 1/23/2025 1/11/24   Josep Stuart, DO   [facility] [facility] [place] (OSTEO BI-FLEX JOINT SHIELD PO) Take by mouth  Patient not taking: Reported on 1/2/2025    Historical Provider, MD   Multiple Vitamins-Minerals (multivitamin with minerals) tablet Take 1 tablet by mouth daily  Patient not taking: Reported on 1/14/2025 2/16/22 2/29/24  Tim Payne PA-C   Multiple Vitamins-Minerals (OCUVITE ADULT 50+) CAPS Take 1 capsule by mouth daily  Patient not taking: Reported on 1/23/2025     Historical Provider, MD     Allergies   Allergen Reactions    Amiodarone Other (See Comments)     Other reaction(s): Other (See Comments)  Reports caused elevated TSH    Sudafed [Pseudoephedrine] Tachycardia     Rapid heart beat    Sulfa Antibiotics Itching and Rash    Sulfamethoxazole-Trimethoprim Itching and Rash       Objective :  Temp:  [96.6 °F (35.9 °C)-98.1 °F (36.7 °C)] 98 °F (36.7 °C)  HR:  [52-70] 66  BP: ()/(49-79) 114/60  Resp:  [16-19] 19  SpO2:  [94 %-97 %] 96 %  O2 Device: None (Room air)  Nasal Cannula O2 Flow Rate (L/min):  [2 L/min] 2 L/min    Physical Exam  Vitals and nursing note reviewed.   Constitutional:       Appearance: Normal appearance.   HENT:      Head: Normocephalic and atraumatic.      Mouth/Throat:      Mouth: Mucous membranes are moist.      Pharynx: Oropharynx is clear. No oropharyngeal exudate.   Eyes:      Extraocular Movements: Extraocular movements intact.   Cardiovascular:      Rate and Rhythm: Normal rate and regular rhythm.      Pulses: Normal pulses.      Heart sounds: Normal heart sounds. No murmur heard.     No friction rub. No gallop.   Pulmonary:      Effort: Pulmonary effort is normal. No respiratory distress.      Breath sounds: Normal breath sounds. No stridor. No wheezing or rales.   Abdominal:      General: Abdomen is flat. Bowel sounds are normal. There is no distension.      Palpations: Abdomen is soft.      Tenderness: There is no abdominal tenderness.   Musculoskeletal:      Right lower leg: No edema.      Left lower leg: No edema.   Skin:     General: Skin is warm and dry.   Neurological:      General: No focal deficit present.      Mental Status: She is alert and oriented to person, place, and time.          Lines/Drains:  Lines/Drains/Airways       Active Status       Name Placement date Placement time Site Days    Closed/Suction Drain Anterior;Right Knee Accordion 10 Fr. 01/27/25  0926  Knee  1                          Lab Results: I have reviewed the  following results:  Results from last 7 days   Lab Units 01/28/25  0424   WBC Thousand/uL 11.19*   HEMOGLOBIN g/dL 11.5   HEMATOCRIT % 35.8   PLATELETS Thousands/uL 234     Results from last 7 days   Lab Units 01/28/25  0424   SODIUM mmol/L 139   POTASSIUM mmol/L 4.6   CHLORIDE mmol/L 106   CO2 mmol/L 28   BUN mg/dL 12   CREATININE mg/dL 0.50*   ANION GAP mmol/L 5   CALCIUM mg/dL 9.1   GLUCOSE RANDOM mg/dL 124             Lab Results   Component Value Date    HGBA1C 5.5 01/03/2025    HGBA1C 5.4 11/07/2022    HGBA1C 5.3 05/23/2022           Imaging Results Review: No pertinent imaging studies reviewed.  Other Study Results Review: No additional pertinent studies reviewed.    Administrative Statements       ** Please Note: This note has been constructed using a voice recognition system. **

## 2025-01-29 ENCOUNTER — TELEPHONE (OUTPATIENT)
Dept: OBGYN CLINIC | Facility: HOSPITAL | Age: 75
End: 2025-01-29

## 2025-01-29 NOTE — RESTORATIVE TECHNICIAN NOTE
Restorative Technician Note      Patient Name: Demi Almonte     [facility] Tech Visit Date: 01/29/25  Note Type: Mobility  Patient Position Upon Consult: Supine  Activity Performed: Range of motion; Stood; Repositioned  Assistive Device: Roller walker  Patient Position at End of Consult: All needs within reach; Supine

## 2025-01-29 NOTE — TELEPHONE ENCOUNTER
"Patient contacted for a postoperative follow up assessment. Patient states current pain level of a  \"so far, so good\"   walking with RW.  Patient reports swelling and dressing is Dressing C/D/I. Patient is icing the site regularly. NN educated patient on post-op swelling, icing, and constipation.    Reviewed the following AVS instructions:   Wound Care The patient was instructed to paint incision with betadine two times per day. -- Patient states she is familiar with this process/instruction from 3 years ago.     Confirmed upcoming appointments w/ patient:   PT 1/30  Postop 2/11     We reviewed patients AVS medication list. Patient is taking Tylenol 975mg every 8 hours, Oxycodone 5mg every 4 hours, Colace 100mg BID, and Xarelto 20mg daily. Patient has not had a BM but is passing gas and taking prescribed Colace. Discussed postop constipation.      Patient denies vomiting, abdominal pain, chest pain, shortness of breath, dizziness, and calf pain Patient reports nausea and fever. Pt reports intermittent nausea, declines anti-nausea medication. Discussed light, small, bland meals. Patient reports 99.9 temperature last night and 99.1 this morning. Discussed normal low grade temperature, up to 100.4. Pt denies any redness, warmth, drainage. Pt will continue to monitor and notify office of any new or worsening symptoms.. Patient does not have any other questions or concerns at this time. Pt was encouraged to call with any questions, concerns or issues.  .          "

## 2025-01-30 ENCOUNTER — OFFICE VISIT (OUTPATIENT)
Dept: PHYSICAL THERAPY | Facility: CLINIC | Age: 75
End: 2025-01-30
Payer: COMMERCIAL

## 2025-01-30 DIAGNOSIS — M17.11 PRIMARY OSTEOARTHRITIS OF RIGHT KNEE: ICD-10-CM

## 2025-01-30 DIAGNOSIS — M25.561 ACUTE PAIN OF RIGHT KNEE: Primary | ICD-10-CM

## 2025-01-30 DIAGNOSIS — Z96.651 STATUS POST TOTAL RIGHT KNEE REPLACEMENT: ICD-10-CM

## 2025-01-30 PROCEDURE — 97112 NEUROMUSCULAR REEDUCATION: CPT | Performed by: PHYSICAL THERAPIST

## 2025-01-30 PROCEDURE — 88305 TISSUE EXAM BY PATHOLOGIST: CPT | Performed by: PATHOLOGY

## 2025-01-30 PROCEDURE — 97164 PT RE-EVAL EST PLAN CARE: CPT | Performed by: PHYSICAL THERAPIST

## 2025-01-30 PROCEDURE — 88311 DECALCIFY TISSUE: CPT | Performed by: PATHOLOGY

## 2025-01-30 PROCEDURE — 97110 THERAPEUTIC EXERCISES: CPT | Performed by: PHYSICAL THERAPIST

## 2025-01-30 NOTE — PROGRESS NOTES
"PT Re-Evaluation     Today's date: 2025  Patient name: Demi Almonte  : 1950  MRN: [id number]  Referring provider: Ryan Vega DO  Dx:   Encounter Diagnosis     ICD-10-CM    1. Acute pain of right knee  M25.561       2. Status post total right knee replacement  Z96.651           Start Time: 1015  Stop Time: 1100  Total time in clinic (min): 45 minutes    Assessment  Impairments: abnormal gait, abnormal or restricted ROM, activity intolerance, impaired physical strength, lacks appropriate home exercise program, pain with function and activity limitations  Symptom irritability: moderate    Assessment details: Patient is a 73 y/o female now presenting to therapy s/p R TKA performed on 2025. Patient denies calf tenderness with palpation today. There is no excessive redness nor warmth noted to the posterior lower leg. She does have some mild pitting edema to the medial aspect of the R lower leg. She has no active drainage upon dressing removal but there is scant bloody drainage to the lower bandage upon removal today. Patient was educated on proper positioning of the R LE to decrease chances of developing a knee flexion contracture. Patient received a dressing change today with a new sterile field applied. Patient was also provided with a compression sleeve with instruction to remove at night prior to sleep and if it becomes uncomfortable. Patient was provided with a copy of HEP with verbal understanding noted.        Understanding of Dx/Px/POC: good     Prognosis: good    Goals  STGs:  \"Patient will be independent with hep by 2-3 visits.   Improve knee flexion to 90 degrees for improved tolerance with ambulation and transfers by 3-4 weeks.  Improve knee extension to 0 degrees for improved tolerance with ambulation by 3-4 weeks.   Decrease pain levels by 50% for improved tolerance with adls and ambulation by 3-4 weeks. \"    LTGs:  Improve FOTO score from 36 to 70 indicating improved " "tolerance for activities involving the LE by discharge.   Improve knee rom and strength to wnl for improved tolerance for ambulation and adls by discharge.   Patient will be able to ambulate up and down a flight of stairs with reciprocal gait pattern by discharge.   Ambulation will be performed on all surfaces without limitation or deviation by discharge. \"      Plan  Patient would benefit from: skilled physical therapy  Planned modality interventions: cryotherapy and thermotherapy: hydrocollator packs    Planned therapy interventions: ADL retraining, balance/weight bearing training, flexibility, functional ROM exercises, gait training, home exercise program, joint mobilization, manual therapy, neuromuscular re-education, patient education, strengthening, stretching, therapeutic activities and therapeutic exercise    Frequency: 2-3x week  Duration in weeks: 8  Plan of Care beginning date: 1/30/2025  Plan of Care expiration date: 3/27/2025  Treatment plan discussed with: patient  Plan details: Patient informed that from this point forward, to ensure adherence to the aforementioned plan of care, all or some of the treatment may be performed and carried out by a Physical Therapy Assistant (PTA) with supervision from a licensed Physical Therapist (PT) in accordance with [facility] Physical Therapy Practice Act.  Patient will continue to benefit from skilled physical therapy to address the functional deficits that were identified during the evaluation today. We will continue to progress the therapy program to address these functional deficits and achieve the established goals.           Subjective Evaluation    History of Present Illness  Mechanism of injury: Patient presents to out patient physical therapy with chief c/o R knee pain. Patient reports a history of chronic R knee pain with worsening over the past 2 years. She has had cortisone injections in her knee which have started to become less effective " recently. She finds that when the weather is colder this causes her knee to become stiff and also increases the pain in her knee. She finds that when she first wakes up in the morning her knee will be stiff and she has to move her leg around and stretch before getting up from bed. She is limited with standing to approximately 30 minutes before she needs to sit down because of the pain in her knee. Patient is schedule for an upcoming R TKA on 2025.     Update 2025:  Patient presents today s/p R TKA performed on 2025. She reports that her pain has been pretty well controlled since getting home on Tuesday. She feels that there is some discomfort in the R hip which she feels was from how she was laying in the hospital bed. She notes that she has been doing well with using her walker to assist with ambulation. She has not had a bowel movement since surgery but is taking a stool softener.           Recurrent probem    Quality of life: good    Patient Goals  Patient goals for therapy: increased strength, increased motion, decreased pain, decreased edema, independence with ADLs/IADLs and improved balance  Patient goal: Patient wishes to recover from her surgery and return to her normal daily activities without limitation from the R knee.  Pain  Current pain ratin  At best pain rating: 3  At worst pain ratin  Location: R knee  Quality: discomfort, dull ache, throbbing, tight, burning and squeezing  Relieving factors: medications, rest, change in position and ice  Aggravating factors: stair climbing, standing and walking    Social Support  Steps to enter house: yes  5  Stairs in house: yes   Lives in: multiple-level home  Lives with: spouse    Employment status: not working  Treatments  Previous treatment: injection treatment        Objective     Active Range of Motion   Left Knee   Flexion: 123 degrees   Extension: 1 degrees   Extensor la degrees     Right Knee   Flexion: 57 degrees   Extension:  "-23 degrees   Extensor la degrees     Passive Range of Motion   Left Knee   Extension: 1 degrees     Right Knee   Flexion: 73 degrees   Extension: -14 degrees     Strength/Myotome Testing     Left Hip   Planes of Motion   Flexion: 4  Extension: 4+  Abduction: 4  Adduction: 4+    Right Hip   Planes of Motion   Flexion: 4-  Extension: 4+  Abduction: 4-  Adduction: 4+    Left Knee   Flexion: 4+  Extension: 4+  Quadriceps contraction: good    Right Knee   Flexion: 4  Extension: 4  Quadriceps contraction: good    Tests     Right Ankle/Foot   Negative for Homans' sign.     Ambulation     Observational Gait   Gait: antalgic   Decreased right stance time.              Precautions: None      Date  Reassess Post TKA 1/13 1/15 1/20   FOTO  36 SC   55 JF   Manuals        Ham / quad   8 min 8 min  8 min                           Neuro Re-Ed        SLB   10\" 4 x  10\" 4 x 10\" 4 x 10\"  4 x   Tandem walk 5 x   4 x  4 x  4 x    Side stepping 5 x  4 x  4 x  4 x    Quad sets  5\" 15x      Towel Crush  5\" 15x                      Ther Ex        Gastroc stretch in standing 10\" 10x  10 \" 5x 10 \" 5 x 10\" 5 x   bridge 15 x 3\"   15 x 3 \"  15 x 3\"    Ankle pumps  20x      SLR 15 x  2#  15x  15x 15 x  20 x   SAQ 5\" 10x  2#  5\" 15x 5 \" 15 x 5\" 20 x  2#    Heel slide 5\" 10x 5\" 20x 5\" 20 x 5\" 20 x 5\" 20 x   Nustep 7 min L 5   6 min L 5 6 min L 5 8 min L 6   Mini squats 20 x  15x 20 x 20 x   SHC 30 x  2#   20 x 20 x 25x 2 #    Standing abd 30 X 2#   20 x 20 x 25 x  2#    Ther Activity                        Gait Training                        Modalities                               "

## 2025-01-30 NOTE — LETTER
2025    Ryan Vega DO  [address] [facility] [address]    Patient: Demi Almonte   YOB: 1950   Date of Visit: 2025     Encounter Diagnosis     ICD-10-CM    1. Acute pain of right knee  M25.561       2. Status post total right knee replacement  Z96.651           Dear Dr. Vega:    Thank you for your recent referral of Demi Almonte. Please review the attached evaluation summary from Demi's recent visit.     Please verify that you agree with the plan of care by signing the attached order.     If you have any questions or concerns, please do not hesitate to call.     I sincerely appreciate the opportunity to share in the care of one of your patients and hope to have another opportunity to work with you in the near future.       Sincerely,    Jose Conner, PT      Referring Provider:      I certify that I have read the below Plan of Care and certify the need for these services furnished under this plan of treatment while under my care.                    Ryan Vega DO  [address] [facility] [address]  Via In Basket          PT Re-Evaluation     Today's date: 2025  Patient name: Demi Almonte  : 1950  MRN: [id number]  Referring provider: Ryan Vega DO  Dx:   Encounter Diagnosis     ICD-10-CM    1. Acute pain of right knee  M25.561       2. Status post total right knee replacement  Z96.651           Start Time: 1015  Stop Time: 1100  Total time in clinic (min): 45 minutes    Assessment  Impairments: abnormal gait, abnormal or restricted ROM, activity intolerance, impaired physical strength, lacks appropriate home exercise program, pain with function and activity limitations  Symptom irritability: moderate    Assessment details: Patient is a 73 y/o female now presenting to therapy s/p R TKA performed on 2025. Patient denies calf tenderness with palpation today. There is no excessive redness nor warmth noted to the posterior lower  "leg. She does have some mild pitting edema to the medial aspect of the R lower leg. She has no active drainage upon dressing removal but there is scant bloody drainage to the lower bandage upon removal today. Patient was educated on proper positioning of the R LE to decrease chances of developing a knee flexion contracture. Patient received a dressing change today with a new sterile field applied. Patient was also provided with a compression sleeve with instruction to remove at night prior to sleep and if it becomes uncomfortable. Patient was provided with a copy of HEP with verbal understanding noted.        Understanding of Dx/Px/POC: good     Prognosis: good    Goals  STGs:  \"Patient will be independent with hep by 2-3 visits.   Improve knee flexion to 90 degrees for improved tolerance with ambulation and transfers by 3-4 weeks.  Improve knee extension to 0 degrees for improved tolerance with ambulation by 3-4 weeks.   Decrease pain levels by 50% for improved tolerance with adls and ambulation by 3-4 weeks. \"    LTGs:  Improve FOTO score from 36 to 70 indicating improved tolerance for activities involving the LE by discharge.   Improve knee rom and strength to wnl for improved tolerance for ambulation and adls by discharge.   Patient will be able to ambulate up and down a flight of stairs with reciprocal gait pattern by discharge.   Ambulation will be performed on all surfaces without limitation or deviation by discharge. \"      Plan  Patient would benefit from: skilled physical therapy  Planned modality interventions: cryotherapy and thermotherapy: hydrocollator packs    Planned therapy interventions: ADL retraining, balance/weight bearing training, flexibility, functional ROM exercises, gait training, home exercise program, joint mobilization, manual therapy, neuromuscular re-education, patient education, strengthening, stretching, therapeutic activities and therapeutic exercise    Frequency: 2-3x " week  Duration in weeks: 8  Plan of Care beginning date: 1/30/2025  Plan of Care expiration date: 3/27/2025  Treatment plan discussed with: patient  Plan details: Patient informed that from this point forward, to ensure adherence to the aforementioned plan of care, all or some of the treatment may be performed and carried out by a Physical Therapy Assistant (PTA) with supervision from a licensed Physical Therapist (PT) in accordance with [facility] Physical Therapy Practice Act.  Patient will continue to benefit from skilled physical therapy to address the functional deficits that were identified during the evaluation today. We will continue to progress the therapy program to address these functional deficits and achieve the established goals.           Subjective Evaluation    History of Present Illness  Mechanism of injury: Patient presents to out patient physical therapy with chief c/o R knee pain. Patient reports a history of chronic R knee pain with worsening over the past 2 years. She has had cortisone injections in her knee which have started to become less effective recently. She finds that when the weather is colder this causes her knee to become stiff and also increases the pain in her knee. She finds that when she first wakes up in the morning her knee will be stiff and she has to move her leg around and stretch before getting up from bed. She is limited with standing to approximately 30 minutes before she needs to sit down because of the pain in her knee. Patient is schedule for an upcoming R TKA on 1/27/2025.     Update 1/30/2025:  Patient presents today s/p R TKA performed on 1/27/2025. She reports that her pain has been pretty well controlled since getting home on Tuesday. She feels that there is some discomfort in the R hip which she feels was from how she was laying in the hospital bed. She notes that she has been doing well with using her walker to assist with ambulation. She has not had a  bowel movement since surgery but is taking a stool softener.           Recurrent probem    Quality of life: good    Patient Goals  Patient goals for therapy: increased strength, increased motion, decreased pain, decreased edema, independence with ADLs/IADLs and improved balance  Patient goal: Patient wishes to recover from her surgery and return to her normal daily activities without limitation from the R knee.  Pain  Current pain ratin  At best pain rating: 3  At worst pain ratin  Location: R knee  Quality: discomfort, dull ache, throbbing, tight, burning and squeezing  Relieving factors: medications, rest, change in position and ice  Aggravating factors: stair climbing, standing and walking    Social Support  Steps to enter house: yes  5  Stairs in house: yes   Lives in: multiple-level home  Lives with: spouse    Employment status: not working  Treatments  Previous treatment: injection treatment        Objective     Active Range of Motion   Left Knee   Flexion: 123 degrees   Extension: 1 degrees   Extensor la degrees     Right Knee   Flexion: 57 degrees   Extension: -23 degrees   Extensor la degrees     Passive Range of Motion   Left Knee   Extension: 1 degrees     Right Knee   Flexion: 73 degrees   Extension: -14 degrees     Strength/Myotome Testing     Left Hip   Planes of Motion   Flexion: 4  Extension: 4+  Abduction: 4  Adduction: 4+    Right Hip   Planes of Motion   Flexion: 4-  Extension: 4+  Abduction: 4-  Adduction: 4+    Left Knee   Flexion: 4+  Extension: 4+  Quadriceps contraction: good    Right Knee   Flexion: 4  Extension: 4  Quadriceps contraction: good    Tests     Right Ankle/Foot   Negative for Homans' sign.     Ambulation     Observational Gait   Gait: antalgic   Decreased right stance time.              Precautions: None      Date  Reassess Post TKA 1/13 1/15 1/20   FOTO  36 SC   55 JF   Manuals        Ham / quad   8 min 8 min  8 min                           Neuro Re-Ed  "       SLB   10\" 4 x  10\" 4 x 10\" 4 x 10\"  4 x   Tandem walk 5 x   4 x  4 x  4 x    Side stepping 5 x  4 x  4 x  4 x    Quad sets  5\" 15x      Towel Crush  5\" 15x                      Ther Ex        Gastroc stretch in standing 10\" 10x  10 \" 5x 10 \" 5 x 10\" 5 x   bridge 15 x 3\"   15 x 3 \"  15 x 3\"    Ankle pumps  20x      SLR 15 x  2#  15x  15x 15 x  20 x   SAQ 5\" 10x  2#  5\" 15x 5 \" 15 x 5\" 20 x  2#    Heel slide 5\" 10x 5\" 20x 5\" 20 x 5\" 20 x 5\" 20 x   Nustep 7 min L 5   6 min L 5 6 min L 5 8 min L 6   Mini squats 20 x  15x 20 x 20 x   SHC 30 x  2#   20 x 20 x 25x 2 #    Standing abd 30 X 2#   20 x 20 x 25 x  2#    Ther Activity                        Gait Training                        Modalities                                               "

## 2025-02-03 ENCOUNTER — OFFICE VISIT (OUTPATIENT)
Dept: PHYSICAL THERAPY | Facility: CLINIC | Age: 75
End: 2025-02-03
Payer: COMMERCIAL

## 2025-02-03 DIAGNOSIS — M25.561 CHRONIC PAIN OF RIGHT KNEE: ICD-10-CM

## 2025-02-03 DIAGNOSIS — M25.561 ACUTE PAIN OF RIGHT KNEE: ICD-10-CM

## 2025-02-03 DIAGNOSIS — M17.11 PRIMARY OSTEOARTHRITIS OF RIGHT KNEE: Primary | ICD-10-CM

## 2025-02-03 DIAGNOSIS — Z96.652 STATUS POST TOTAL KNEE REPLACEMENT, LEFT: ICD-10-CM

## 2025-02-03 DIAGNOSIS — Z96.651 STATUS POST TOTAL RIGHT KNEE REPLACEMENT: ICD-10-CM

## 2025-02-03 DIAGNOSIS — G89.29 CHRONIC PAIN OF RIGHT KNEE: ICD-10-CM

## 2025-02-03 PROCEDURE — 97110 THERAPEUTIC EXERCISES: CPT

## 2025-02-03 PROCEDURE — 97140 MANUAL THERAPY 1/> REGIONS: CPT

## 2025-02-03 NOTE — PROGRESS NOTES
"Daily Note     Today's date: 2/3/2025  Patient name: Demi Almonte  : 1950  MRN: [id number]  Referring provider: [place] P*  Dx:   Encounter Diagnosis     ICD-10-CM    1. Primary osteoarthritis of right knee  M17.11       2. Status post total right knee replacement  Z96.651       3. Acute pain of right knee  M25.561       4. Chronic pain of right knee  M25.561     G89.29       5. Status post total knee replacement, left  Z96.652           Start Time: 0800  Stop Time: 08  Total time in clinic (min): 45 minutes    Subjective:  I had to take some medicine to help me sleep. I am sleeping ok. My knee does wake me at night,  I have pain in my knee and in my right quad.        Objective: See treatment diary below      Assessment: Tolerated treatment well. Patient would benefit from continued PT    pt came into therapy today using a single point cane and states that she is using it around her home.  She states that she feels safe using it.  She has moderate swelling in her right knee today and states having no calf pain. She had minimal swelling in her leg this morning. She reports that the swelling does increase in her lower leg as the day goes on. We worked on her quad control and motion in her knee today. She was able to get 72 degrees of active knee flexion to begin today,. Pt was able to do a SLR on her own with about a 10 degree lag.  Pt did well today , but, did have increased knee pain as she progressed with her exercises today.  We ended with an ice pack post to her right knee in supine.       Plan: Continue per plan of care.      Precautions: None      Date  Reassess Post TKA 2/3 1/15 1/20   FOTO  36 SC   55 JF   Manuals        Ham / quad   Ham / calf  JF 8 min  8 min                           Neuro Re-Ed        SLB   10\" 4 x   10\" 4 x 10\"  4 x   Tandem walk 5 x    4 x  4 x    Side stepping 5 x   4 x  4 x    Quad sets  5\" 15x 5\" 20 x     Towel Crush  5\" 15x 5\" 20 x              " "       Ther Ex        LAQ   15 X  5\"     Gastroc stretch in standing 10\" 10x   10 \" 5 x 10\" 5 x   bridge 15 x 3\"   15 x 3 \"  15 x 3\"    Ankle pumps  20x 30x     SLR 15 x  2#  15x  20 x 15 x  20 x   SAQ 5\" 10x  2#  5\" 20 x 5 \" 15 x 5\" 20 x  2#    Heel slide 5\" 10x 5\" 20x 5\" 20 x 5\" 20 x 5\" 20 x   Nustep 7 min L 5    6 min L 5 8 min L 6   Mini squats 20 x   20 x 20 x   SHC 30 x  2#    20 x 25x 2 #    Standing abd 30 X 2#    20 x 25 x  2#    Ther Activity                        Gait Training                        Modalities                               "

## 2025-02-05 ENCOUNTER — OFFICE VISIT (OUTPATIENT)
Dept: PHYSICAL THERAPY | Facility: CLINIC | Age: 75
End: 2025-02-05
Payer: COMMERCIAL

## 2025-02-05 DIAGNOSIS — M25.561 ACUTE PAIN OF RIGHT KNEE: ICD-10-CM

## 2025-02-05 DIAGNOSIS — Z96.651 STATUS POST TOTAL RIGHT KNEE REPLACEMENT: Primary | ICD-10-CM

## 2025-02-05 PROCEDURE — 97112 NEUROMUSCULAR REEDUCATION: CPT | Performed by: PHYSICAL THERAPIST

## 2025-02-05 PROCEDURE — 97110 THERAPEUTIC EXERCISES: CPT | Performed by: PHYSICAL THERAPIST

## 2025-02-05 NOTE — PROGRESS NOTES
"Daily Note     Today's date: 2025  Patient name: Demi Almonte  : 1950  MRN: [id number]  Referring provider: [place] P*  Dx:   Encounter Diagnosis     ICD-10-CM    1. Status post total right knee replacement  Z96.651       2. Acute pain of right knee  M25.561           Start Time: 845  Stop Time: 938  Total time in clinic (min): 53 minutes    Subjective: Patient reports continued swelling to the R leg but feels that she may have overdone it the past day or so. She notes that she was doing a lot around her home and that she also was attending her grandchild's basketball game.       Objective: See treatment diary below      Assessment: Tolerated treatment well. Patient demonstrated fatigue post treatment, exhibited good technique with therapeutic exercises, and would benefit from continued PT Patient presents with continued edema to the lower aspect of the R leg with increased ecchymosis present to this area today. Edema seems to be minimally changed since her initial visit. She was instructed to continue to monitor this for any changes. There is minimal edema noted to the R foot and palpable DP pulse. No excessive warmth noted to the posterior calf. She denies any fever or chills. Continued to focus on progression of therapy interventions to improve mobility and strength of the R LE.       Plan: Continue per plan of care.  Progress treatment as tolerated.       Precautions: None      Date  Reassess Post TKA 2/3 2/5 1/20   FOTO  36 SC   55 JF   Manuals        Ham / quad   Ham / calf  JF  8 min   Gastroc stretch    30\" 5x                    Neuro Re-Ed        SLB   10\" 4 x    10\"  4 x   Tandem walk 5 x     4 x    Side stepping 5 x    4 x    Quad sets  5\" 15x 5\" 20 x 5\" 20x    Towel Crush  5\" 15x 5\" 20 x 5\" 20x                    Ther Ex        LAQ   15 X  5\"     HR/TR  20x 30x 30x    SLR 15 x  2#  15x  20 x 2x10 20 x   SAQ 5\" 10x  2#  5\" 20 x 5\" 20x 5\" 20 x  2#    Heel slide 5\" " "10x 5\" 20x 5\" 20 x 5\" 20x 5\" 20 x   Nustep 7 min L 5    L3 6 min 8 min L 6   Mini squats 20 x    20 x   SHC 30 x  2#    20x 25x 2 #    Standing abd 30 X 2#    20x 25 x  2#    Ther Activity        Marches    20x            Gait Training        CP to R knee in supine    10 min            Modalities                                 "

## 2025-02-06 ENCOUNTER — APPOINTMENT (EMERGENCY)
Dept: NON INVASIVE DIAGNOSTICS | Facility: HOSPITAL | Age: 75
End: 2025-02-06
Payer: COMMERCIAL

## 2025-02-06 ENCOUNTER — HOSPITAL ENCOUNTER (EMERGENCY)
Facility: HOSPITAL | Age: 75
Discharge: HOME | End: 2025-02-06
Attending: EMERGENCY MEDICINE
Payer: COMMERCIAL

## 2025-02-06 VITALS
DIASTOLIC BLOOD PRESSURE: 76 MMHG | RESPIRATION RATE: 18 BRPM | WEIGHT: 181 LBS | BODY MASS INDEX: 34.2 KG/M2 | TEMPERATURE: 97 F | OXYGEN SATURATION: 96 % | HEART RATE: 87 BPM | SYSTOLIC BLOOD PRESSURE: 148 MMHG

## 2025-02-06 DIAGNOSIS — M79.89 LEG SWELLING: Primary | ICD-10-CM

## 2025-02-06 LAB
ANION GAP SERPL CALCULATED.3IONS-SCNC: 5 MMOL/L (ref 4–13)
APTT PPP: 35 SECONDS (ref 23–34)
BASOPHILS # BLD AUTO: 0.05 THOUSANDS/ΜL (ref 0–0.1)
BASOPHILS NFR BLD AUTO: 1 % (ref 0–1)
BUN SERPL-MCNC: 20 MG/DL (ref 5–25)
CALCIUM SERPL-MCNC: 9.9 MG/DL (ref 8.4–10.2)
CHLORIDE SERPL-SCNC: 104 MMOL/L (ref 96–108)
CO2 SERPL-SCNC: 30 MMOL/L (ref 21–32)
CREAT SERPL-MCNC: 0.58 MG/DL (ref 0.6–1.3)
EOSINOPHIL # BLD AUTO: 0.2 THOUSAND/ΜL (ref 0–0.61)
EOSINOPHIL NFR BLD AUTO: 2 % (ref 0–6)
ERYTHROCYTE [DISTWIDTH] IN BLOOD BY AUTOMATED COUNT: 15.2 % (ref 11.6–15.1)
GFR SERPL CREATININE-BSD FRML MDRD: 92 ML/MIN/1.73SQ M
GLUCOSE SERPL-MCNC: 106 MG/DL (ref 65–140)
HCT VFR BLD AUTO: 37.1 % (ref 34.8–46.1)
HGB BLD-MCNC: 11.9 G/DL (ref 11.5–15.4)
IMM GRANULOCYTES # BLD AUTO: 0.04 THOUSAND/UL (ref 0–0.2)
IMM GRANULOCYTES NFR BLD AUTO: 0 % (ref 0–2)
INR PPP: 2.98 (ref 0.85–1.19)
LYMPHOCYTES # BLD AUTO: 2.59 THOUSANDS/ΜL (ref 0.6–4.47)
LYMPHOCYTES NFR BLD AUTO: 27 % (ref 14–44)
MCH RBC QN AUTO: 29.8 PG (ref 26.8–34.3)
MCHC RBC AUTO-ENTMCNC: 32.1 G/DL (ref 31.4–37.4)
MCV RBC AUTO: 93 FL (ref 82–98)
MONOCYTES # BLD AUTO: 0.93 THOUSAND/ΜL (ref 0.17–1.22)
MONOCYTES NFR BLD AUTO: 10 % (ref 4–12)
NEUTROPHILS # BLD AUTO: 5.68 THOUSANDS/ΜL (ref 1.85–7.62)
NEUTS SEG NFR BLD AUTO: 60 % (ref 43–75)
NRBC BLD AUTO-RTO: 0 /100 WBCS
PLATELET # BLD AUTO: 256 THOUSANDS/UL (ref 149–390)
PMV BLD AUTO: 8.7 FL (ref 8.9–12.7)
POTASSIUM SERPL-SCNC: 4.2 MMOL/L (ref 3.5–5.3)
PROTHROMBIN TIME: 31.1 SECONDS (ref 12.3–15)
RBC # BLD AUTO: 3.99 MILLION/UL (ref 3.81–5.12)
SODIUM SERPL-SCNC: 139 MMOL/L (ref 135–147)
WBC # BLD AUTO: 9.49 THOUSAND/UL (ref 4.31–10.16)

## 2025-02-06 PROCEDURE — 99284 EMERGENCY DEPT VISIT MOD MDM: CPT | Performed by: EMERGENCY MEDICINE

## 2025-02-06 PROCEDURE — 99285 EMERGENCY DEPT VISIT HI MDM: CPT

## 2025-02-06 PROCEDURE — 93971 EXTREMITY STUDY: CPT

## 2025-02-06 PROCEDURE — 80048 BASIC METABOLIC PNL TOTAL CA: CPT | Performed by: EMERGENCY MEDICINE

## 2025-02-06 PROCEDURE — 93971 EXTREMITY STUDY: CPT | Performed by: SURGERY

## 2025-02-06 PROCEDURE — 36415 COLL VENOUS BLD VENIPUNCTURE: CPT | Performed by: EMERGENCY MEDICINE

## 2025-02-06 PROCEDURE — 85025 COMPLETE CBC W/AUTO DIFF WBC: CPT | Performed by: EMERGENCY MEDICINE

## 2025-02-06 PROCEDURE — 85730 THROMBOPLASTIN TIME PARTIAL: CPT | Performed by: EMERGENCY MEDICINE

## 2025-02-06 PROCEDURE — 85610 PROTHROMBIN TIME: CPT | Performed by: EMERGENCY MEDICINE

## 2025-02-07 ENCOUNTER — OFFICE VISIT (OUTPATIENT)
Dept: PHYSICAL THERAPY | Facility: CLINIC | Age: 75
End: 2025-02-07
Payer: COMMERCIAL

## 2025-02-07 ENCOUNTER — TELEPHONE (OUTPATIENT)
Dept: OBGYN CLINIC | Facility: CLINIC | Age: 75
End: 2025-02-07

## 2025-02-07 DIAGNOSIS — Z96.651 STATUS POST TOTAL RIGHT KNEE REPLACEMENT: ICD-10-CM

## 2025-02-07 DIAGNOSIS — M25.561 ACUTE PAIN OF RIGHT KNEE: Primary | ICD-10-CM

## 2025-02-07 PROCEDURE — 97112 NEUROMUSCULAR REEDUCATION: CPT

## 2025-02-07 PROCEDURE — 97110 THERAPEUTIC EXERCISES: CPT

## 2025-02-07 NOTE — TELEPHONE ENCOUNTER
Michelle this is Demi Gordon. I had surgery last Monday with Doctor Silvia britton in [place]. 1950 I'm having very tightness and pain in the back of my calf and the therapist said to like call you guys. I don't know if you have to coordinate for me or if I can just go down to the hospital and either [facility] and have them look at it and have a maybe an ultrasound done. It's really tight and painful when I touch it. So OK, [phone] or my cell phone [phone] No, I'm sorry. [phone] I'm sorry, michelle martinez.

## 2025-02-07 NOTE — TELEPHONE ENCOUNTER
VM received today from yesterday 2/6/25. Call returned but had to leave a voicemail; Patient did go to ED for evaluation.

## 2025-02-07 NOTE — ED PROVIDER NOTES
Time reflects when diagnosis was documented in both MDM as applicable and the Disposition within this note       Time User Action Codes Description Comment    2/6/2025  5:49 PM ArminvirgenNatalio Karen [M79.89] Leg swelling           ED Disposition       ED Disposition   Discharge    Condition   Stable    Date/Time   u Feb 6, 2025  5:49 PM    Comment   Demi Almonte discharge to home/self care.                   Assessment & Plan       Medical Decision Making  74-year-old female with right leg pain.  Considered DVT, cellulitis/postsurgical infection, fracture/orthopedic displacement, and heart failure.  Patient appears clinically well.  No signs of PE.  DVT study reassuring.  Cellulitis unlikely.  Patient notes that she is on Eliquis.  Discussed warning signs and symptoms with the patient as well as when to return to the emergency department versus follow up with [facility]. Patient states understanding and agreement with the plan.  This note was completed using dictation software.       Amount and/or Complexity of Data Reviewed  Independent Historian: spouse  External Data Reviewed: notes.  Labs: ordered.    Risk  OTC drugs.  Prescription drug management.             Medications - No data to display    ED Risk Strat Scores                          SBIRT 22yo+      Flowsheet Row Most Recent Value   Initial Alcohol Screen: US AUDIT-C     1. How often do you have a drink containing alcohol? 0 Filed at: 02/06/2025 1556   2. How many drinks containing alcohol do you have on a typical day you are drinking?  0 Filed at: 02/06/2025 1556   3a. Male UNDER 65: How often do you have five or more drinks on one occasion? 0 Filed at: 02/06/2025 1556   3b. FEMALE Any Age, or MALE 65+: How often do you have 4 or more drinks on one occassion? 0 Filed at: 02/06/2025 1556   Audit-C Score 0 Filed at: 02/06/2025 1556   RAUL: How many times in the past year have you...    Used an illegal drug or used a prescription medication for non-medical  "reasons? Never Filed at: 2025 1556                            History of Present Illness       Chief Complaint   Patient presents with    Leg Swelling     Right leg swelling starting Tuesday. Post right knee replacement /25       Past Medical History:   Diagnosis Date    Acute pulmonary embolism ([facility]) 01/10/2017    Allergic     Arthritis     rheumatoid    Atrial fibrillation (HCC)     CHF (congestive heart failure) ([facility])     Disease of thyroid gland     DVT (deep venous thrombosis) ([facility])     DVT, lower extremity ([facility]) 01/10/2017    HL (hearing loss)     Left Ear-Wear Hearing Aid    Hypertension     Hyperthyroidism     Irregular heart beat     Kidney stone     Migraine     hx of    Pleural effusion 2016    Polymyalgia rheumatica ([facility])     \"Remission\"    Polymyalgia rheumatica ([facility]) 01/10/2017    Secondary adrenal insufficiency ([facility]) 2017    Sepsis, unspecified organism ([facility]) 12/15/2019    Sleep apnea     Mild-Does not require CPAP    Varicella As a child    Vitamin D deficiency       Past Surgical History:   Procedure Laterality Date    CARDIAC CATHETERIZATION      CARDIOVERSION N/A 2019    Procedure: CARDIOVERSION;  Surgeon: Roque Alfaro MD;  Location: [facility];  Service: Cardiology     SECTION      x3 - last impression 16    FRACTURE SURGERY Left     wrist    HERNIA REPAIR  2018    JOINT REPLACEMENT  2022    left knee    KNEE ARTHROSCOPY Left     Meniscus Tear Repair    KNEE CARTILAGE SURGERY Left     SD ARTHRP KNE CONDYLE&PLATU MEDIAL&LAT COMPARTMENTS Left 2022    Procedure: KNEE TOTAL ARTHROPLASTY;  Surgeon: Ryan Vega DO;  Location: [facility];  Service: Orthopedics    SD ARTHRP KNE CONDYLE&PLATU MEDIAL&LAT COMPARTMENTS Right 2025    Procedure: ARTHROPLASTY KNEE TOTAL;  Surgeon: Ryan Vega DO;  Location: [facility];  Service: Orthopedics    TONSILLECTOMY AND ADENOIDECTOMY  child; age 14    TUBAL LIGATION      VEIN SURGERY Right     " venous ligation with stripping      Family History   Problem Relation Age of Onset    Breast cancer Mother 52    Arthritis Mother     Cancer Mother     Hearing loss Mother     Vision loss Mother     Osteoarthritis Mother     Aneurysm Father         aortic    No Known Problems Sister     No Known Problems Maternal Grandmother     No Known Problems Maternal Grandfather     No Known Problems Paternal Grandmother     No Known Problems Paternal Grandfather     Rheum arthritis Maternal Aunt     Early death Maternal Aunt     Rheum arthritis Maternal Aunt     No Known Problems Paternal Aunt     No Known Problems Paternal Aunt     No Known Problems Paternal Aunt       Social History     Tobacco Use    Smoking status: Never    Smokeless tobacco: Never   Vaping Use    Vaping status: Never Used   Substance Use Topics    Alcohol use: Not Currently     Alcohol/week: 1.0 standard drink of alcohol     Types: 1 Glasses of wine per week     Comment: Socially    Drug use: No      E-Cigarette/Vaping    E-Cigarette Use Never User       E-Cigarette/Vaping Substances    Nicotine No     THC No     CBD No       I have reviewed and agree with the history as documented.     74-year-old female who recently underwent surgery on the right knee presents with right leg swelling.  Is worsened over the past few days.  Is now extremely tender to palpation.  No trauma.  Notes that she has been going to physical therapy regularly.  Reports she otherwise feels well.  No fevers, chills, nausea or vomiting.        Review of Systems   Constitutional:  Negative for activity change, chills, fatigue and fever.   HENT:  Negative for congestion.    Eyes:  Negative for visual disturbance.   Respiratory:  Negative for cough, chest tightness and shortness of breath.    Cardiovascular:  Positive for leg swelling. Negative for chest pain and palpitations.   Gastrointestinal:  Negative for abdominal pain, diarrhea and vomiting.   Genitourinary:  Negative for dysuria.    Skin:  Negative for rash.   Neurological:  Negative for dizziness, weakness and numbness.           Objective       ED Triage Vitals [02/06/25 1555]   Temperature Pulse Blood Pressure Respirations SpO2 Patient Position - Orthostatic VS   (!) 97 °F (36.1 °C) 85 142/89 18 96 % Sitting      Temp Source Heart Rate Source BP Location FiO2 (%) Pain Score    Temporal Monitor Left arm -- 5      Vitals      Date and Time Temp Pulse SpO2 Resp BP Pain Score FACES Pain Rating User   02/06/25 1759 -- 87 96 % 18 148/76 No Pain -- AM   02/06/25 1555 97 °F (36.1 °C) 85 96 % 18 142/89 5 -- DK            Physical Exam  Constitutional:       General: She is not in acute distress.     Appearance: She is well-developed. She is not ill-appearing or toxic-appearing.   HENT:      Head: Normocephalic and atraumatic.      Right Ear: External ear normal.      Left Ear: External ear normal.      Nose: Nose normal.      Mouth/Throat:      Mouth: Mucous membranes are moist.      Pharynx: Oropharynx is clear.   Eyes:      Conjunctiva/sclera: Conjunctivae normal.      Pupils: Pupils are equal, round, and reactive to light.   Cardiovascular:      Rate and Rhythm: Normal rate and regular rhythm.      Heart sounds: Normal heart sounds.   Pulmonary:      Effort: Pulmonary effort is normal. No respiratory distress.      Breath sounds: Normal breath sounds.   Abdominal:      General: Bowel sounds are normal. There is no distension.      Palpations: Abdomen is soft.      Tenderness: There is no abdominal tenderness. There is no guarding or rebound.   Musculoskeletal:         General: Swelling and tenderness present. Normal range of motion.      Cervical back: Normal range of motion and neck supple.      Comments: Right calf is extremely swollen, and tender   Skin:     General: Skin is warm and dry.      Capillary Refill: Capillary refill takes less than 2 seconds.      Findings: Bruising present.   Neurological:      Mental Status: She is alert and  oriented to person, place, and time.   Psychiatric:         Behavior: Behavior normal.         Results Reviewed       Procedure Component Value Units Date/Time    Protime-INR [[id number]]  (Abnormal) Collected: 02/06/25 1657    Lab Status: Final result Specimen: Blood from Arm, Right Updated: 02/06/25 1725     Protime 31.1 seconds      INR 2.98    Narrative:      INR Therapeutic Range    Indication                                             INR Range      Atrial Fibrillation                                               2.0-3.0  Hypercoagulable State                                    2.0.2.3  Left Ventricular Asist Device                            2.0-3.0  Mechanical Heart Valve                                  -    Aortic(with afib, MI, embolism, HF, LA enlargement,    and/or coagulopathy)                                     2.0-3.0 (2.5-3.5)     Mitral                                                             2.5-3.5  Prosthetic/Bioprosthetic Heart Valve               2.0-3.0  Venous thromboembolism (VTE: VT, PE        2.0-3.0    APTT [[id number]]  (Abnormal) Collected: 02/06/25 1657    Lab Status: Final result Specimen: Blood from Arm, Right Updated: 02/06/25 1725     PTT 35 seconds     Basic metabolic panel [[id number]]  (Abnormal) Collected: 02/06/25 1657    Lab Status: Final result Specimen: Blood from Arm, Right Updated: 02/06/25 1718     Sodium 139 mmol/L      Potassium 4.2 mmol/L      Chloride 104 mmol/L      CO2 30 mmol/L      ANION GAP 5 mmol/L      BUN 20 mg/dL      Creatinine 0.58 mg/dL      Glucose 106 mg/dL      Calcium 9.9 mg/dL      eGFR 92 ml/min/1.73sq m     Narrative:      National Kidney Disease Foundation guidelines for Chronic Kidney Disease (CKD):     Stage 1 with normal or high GFR (GFR > 90 mL/min/1.73 square meters)    Stage 2 Mild CKD (GFR = 60-89 mL/min/1.73 square meters)    Stage 3A Moderate CKD (GFR = 45-59 mL/min/1.73 square meters)    Stage 3B Moderate CKD (GFR = 30-44 mL/min/1.73  square meters)    Stage 4 Severe CKD (GFR = 15-29 mL/min/1.73 square meters)    Stage 5 End Stage CKD (GFR <15 mL/min/1.73 square meters)  Note: GFR calculation is accurate only with a steady state creatinine    CBC and differential [[id number]]  (Abnormal) Collected: 02/06/25 1657    Lab Status: Final result Specimen: Blood from Arm, Right Updated: 02/06/25 1708     WBC 9.49 Thousand/uL      RBC 3.99 Million/uL      Hemoglobin 11.9 g/dL      Hematocrit 37.1 %      MCV 93 fL      MCH 29.8 pg      MCHC 32.1 g/dL      RDW 15.2 %      MPV 8.7 fL      Platelets 256 Thousands/uL      nRBC 0 /100 WBCs      Segmented % 60 %      Immature Grans % 0 %      Lymphocytes % 27 %      Monocytes % 10 %      Eosinophils Relative 2 %      Basophils Relative 1 %      Absolute Neutrophils 5.68 Thousands/µL      Absolute Immature Grans 0.04 Thousand/uL      Absolute Lymphocytes 2.59 Thousands/µL      Absolute Monocytes 0.93 Thousand/µL      Eosinophils Absolute 0.20 Thousand/µL      Basophils Absolute 0.05 Thousands/µL             VAS lower limb venous duplex study, unilateral/limited   Final Interpretation by Bob Mckeon MD (02/06 2025)          Procedures    ED Medication and Procedure Management   Prior to Admission Medications   Prescriptions Last Dose Informant Patient Reported? Taking?   Calcium Ascorbate 500 MG TABS  Self Yes No   Sig: Take 500 mg by mouth daily     Cinnamon 500 MG capsule  Self Yes No   Sig: Take 500 mg by mouth daily   Cyanocobalamin (VITAMIN B 12 PO)  Self Yes No   Sig: Take 500 mcg by mouth daily    Multiple Vitamins-Minerals (OCUVITE ADULT 50+) CAPS  Self Yes No   Sig: Take 1 capsule by mouth daily   Patient not taking: Reported on 1/23/2025   Multiple Vitamins-Minerals (multivitamin with minerals) tablet  Self No No   Sig: Take 1 tablet by mouth daily   Patient not taking: Reported on 1/14/2025   Multiple Vitamins-Minerals (multivitamin with minerals) tablet  Self No No   Sig: Take 1 tablet by  mouth daily Do not start before December 15, 2024.   Red Yeast Rice 600 MG TABS  Self Yes No   Sig: Take 1 tablet by mouth daily    XARELTO 20 MG tablet  Self Yes No   Sig: Take 1 tablet by mouth daily before dinner     acetaminophen (TYLENOL) 325 mg tablet   No No   Sig: Take 3 tablets (975 mg total) by mouth every 8 (eight) hours   ascorbic acid (VITAMIN C) 500 MG tablet  Self No No   Sig: Take 1 tablet (500 mg total) by mouth 2 (two) times a day Do not start before December 15, 2024.   azelastine (ASTELIN) 0.1 % nasal spray  Self No No   Si spray into each nostril 2 (two) times a day Use in each nostril as directed   Patient not taking: Reported on 2025   cholecalciferol (VITAMIN D3) 1,000 units tablet  Self Yes No   Sig: Take 1,000 Units by mouth daily     docusate sodium (COLACE) 100 mg capsule   No No   Sig: Take 1 capsule (100 mg total) by mouth 2 (two) times a day   ferrous sulfate 324 (65 Fe) mg  Self No No   Sig: Take 1 tablet (324 mg total) by mouth 2 (two) times a day before meals Do not start before December 15, 2024.   folic acid (FOLVITE) 1 mg tablet  Self No No   Sig: Take 1 tablet (1 mg total) by mouth daily Do not start before December 15, 2024.   levothyroxine 75 mcg tablet   No No   Sig: Take 1 tablet (75 mcg total) by mouth daily Monday- Saturday a full tablet with a half tablet on .   metoprolol succinate (TOPROL-XL) 25 mg 24 hr tablet   Yes No   Sig: Take 1 tablet by mouth in the morning   metoprolol succinate (TOPROL-XL) 50 mg 24 hr tablet  Self Yes No   Sig: Take 25 mg by mouth daily   Patient not taking: Reported on 2025   oxyCODONE (ROXICODONE) 5 immediate release tablet   No No   Sig: Take 1 tablet (5 mg total) by mouth every 4 (four) hours as needed for moderate pain for up to 10 days Max Daily Amount: 30 mg      Facility-Administered Medications Last Administration Doses Remaining   denosumab (PROLIA) subcutaneous injection 60 mg 2025 11:31 AM 4        Discharge  Medication List as of 2/6/2025  5:51 PM        CONTINUE these medications which have NOT CHANGED    Details   acetaminophen (TYLENOL) 325 mg tablet Take 3 tablets (975 mg total) by mouth every 8 (eight) hours, Starting Tue 1/28/2025, Normal      ascorbic acid (VITAMIN C) 500 MG tablet Take 1 tablet (500 mg total) by mouth 2 (two) times a day Do not start before December 15, 2024., Starting Sun 12/15/2024, Normal      azelastine (ASTELIN) 0.1 % nasal spray 1 spray into each nostril 2 (two) times a day Use in each nostril as directed, Starting u 1/11/2024, Normal      Calcium Ascorbate 500 MG TABS Take 500 mg by mouth daily  , Historical Med      cholecalciferol (VITAMIN D3) 1,000 units tablet Take 1,000 Units by mouth daily  , Historical Med      Cinnamon 500 MG capsule Take 500 mg by mouth daily, Historical Med      Cyanocobalamin (VITAMIN B 12 PO) Take 500 mcg by mouth daily , Historical Med      docusate sodium (COLACE) 100 mg capsule Take 1 capsule (100 mg total) by mouth 2 (two) times a day, Starting Tue 1/28/2025, Normal      ferrous sulfate 324 (65 Fe) mg Take 1 tablet (324 mg total) by mouth 2 (two) times a day before meals Do not start before December 15, 2024., Starting Sun 12/15/2024, Normal      folic acid (FOLVITE) 1 mg tablet Take 1 tablet (1 mg total) by mouth daily Do not start before December 15, 2024., Starting Sun 12/15/2024, Normal      levothyroxine 75 mcg tablet Take 1 tablet (75 mcg total) by mouth daily Monday- Saturday a full tablet with a half tablet on Sunday., Starting u 1/23/2025, Normal      !! metoprolol succinate (TOPROL-XL) 25 mg 24 hr tablet Take 1 tablet by mouth in the morning, Starting Sat 11/16/2024, Historical Med      !! metoprolol succinate (TOPROL-XL) 50 mg 24 hr tablet Take 25 mg by mouth daily, Historical Med      Multiple Vitamins-Minerals (multivitamin with minerals) tablet Take 1 tablet by mouth daily Do not start before December 15, 2024., Starting Sun 12/15/2024,  Normal      Multiple Vitamins-Minerals (OCUVITE ADULT 50+) CAPS Take 1 capsule by mouth daily, Historical Med      oxyCODONE (ROXICODONE) 5 immediate release tablet Take 1 tablet (5 mg total) by mouth every 4 (four) hours as needed for moderate pain for up to 10 days Max Daily Amount: 30 mg, Starting Tue 1/28/2025, Until Fri 2/7/2025 at 2359, Normal      Red Yeast Rice 600 MG TABS Take 1 tablet by mouth daily , Historical Med      XARELTO 20 MG tablet Take 1 tablet by mouth daily before dinner  , Starting Sat 3/3/2018, Historical Med       !! - Potential duplicate medications found. Please discuss with provider.        No discharge procedures on file.  ED SEPSIS DOCUMENTATION   Time reflects when diagnosis was documented in both MDM as applicable and the Disposition within this note       Time User Action Codes Description Comment    2/6/2025  5:49 PM Natalio Castro Add [M79.89] Leg swelling                  Natalio Castro MD  02/07/25 0016

## 2025-02-07 NOTE — PROGRESS NOTES
"Daily Note     Today's date: 2025  Patient name: Demi Almonte  : 1950  MRN: [id number]  Referring provider: [place] [facility]*  Dx:   Encounter Diagnosis     ICD-10-CM    1. Acute pain of right knee  M25.561       2. Status post total right knee replacement  Z96.651           Start Time: 0845  Stop Time: 930  Total time in clinic (min): 45 minutes    Subjective:  I went down to the hospital yesterday because I was having pain in my right calf and I was afraid of a clot.  I had the ultra sound done an it was negative for a clot. My calf is still sore today.  I am getting my staples out next Tuesday.       Objective: See treatment diary below      Assessment: Tolerated treatment well. Patient would benefit from continued PT pt continues to use a cane for ambulation.,  she reports having pain today over her medial and lateral right knee. She reports having pain and tightness in her calf and knee. She was able to complete her full program with limitations with both flexion and ext. She is doing better with the quad set and able to keep her leg straighter for SLR.  She is most tight with flexion. We continue to work on her knee motion and strength, Her quad set is improving. We ended with a cold pack post to her right knee,.       Plan: Continue per plan of care.      Precautions: None      Date  Reassess Post TKA 2/3 2/5 2/7   FOTO  36 SC      Manuals        Ham / quad   Ham / calf  JF  8 min   Gastroc stretch    30\" 5x                    Neuro Re-Ed        SLB   10\" 4 x       Tandem walk 5 x        Side stepping 5 x       Quad sets  5\" 15x 5\" 20 x 5\" 20x 5\" 20 x   Towel Crush  5\" 15x 5\" 20 x 5\" 20x 5\" 20 x                   Ther Ex        LAQ   15 X  5\"  15 x 5 \"    HR/TR  20x 30x 30x 30x   SLR 15 x  2#  15x  20 x 2x10 20 x   SAQ 5\" 10x  2#  5\" 20 x 5\" 20x 5\" 20 x  2#    Heel slide 5\" 10x 5\" 20x 5\" 20 x 5\" 20x 5\" 20 x   Nustep 7 min L 5    L3 6 min 7 min L 5   Mini squats 20 x    15x "   SHC 30 x  2#    20x 20x   Standing abd 30 X 2#    20x 20x   Ther Activity        Marches    20x 20 x 2           Gait Training        CP to R knee in supine    10 min            Modalities

## 2025-02-10 NOTE — PROGRESS NOTES
"Daily Note     Today's date: 2025  Patient name: Demi Almonte  : 1950  MRN: [id number]  Referring provider: [place] [facility]*  Dx:   Encounter Diagnosis     ICD-10-CM    1. Acute pain of right knee  M25.561       2. Status post total right knee replacement  Z96.651       3. Primary osteoarthritis of right knee  M17.11       4. Chronic pain of right knee  M25.561     G89.29       5. Status post total knee replacement, left  Z96.652                      Subjective: The patient states that her knee is sore today from the weather.  She will be going to see the doctor this afternoon to have her staples removed.  She notes that she is also due to see the chiropractor because her hip is out and she feels this also has been affecting her pain and how she is walking.          Objective: See treatment diary below.  AROM knee: -10 to 95*.        Assessment: Tolerated treatment well.  Progressed patient as outlined below in daily treatment diary.  She had good tolerance to all progressions, despite being sore today she was able to complete all TE as outlined below.  R knee AROM is improved from first post-op visit.  Patient demonstrated fatigue post treatment and would benefit from continued PT to improve mobility and function.        Plan: Continue per plan of care.   She is to see the doctor today and to await any recommendations.       Precautions: None      Date  Reassess Post TKA 2/3 2/5 2/7   FOTO  36 SC      Manuals        Ham / quad Hams, Calf - 8 mins   Ham / calf  JF  8 min   Gastroc stretch    30\" 5x                    Neuro Re-Ed        SLB          Tandem walk        Side stepping // bars 3 laps   No UE       Quad sets 5\"x20 5\" 15x 5\" 20 x 5\" 20x 5\" 20 x   Towel Crush 5\"x20 5\" 15x 5\" 20 x 5\" 20x 5\" 20 x                   Ther Ex        LAQ 5\" 2x10  15 X  5\"  15 x 5 \"    HR/TR 30x ea 20x 30x 30x 30x   SLR 2x10 15x  20 x 2x10 20 x   SAQ 2# 5\"x20  5\" 20 x 5\" 20x 5\" 20 x  2#    Heel " "slide 5\"x20 AROM  2x10 with strap for OP  5\" 20x 5\" 20 x 5\" 20x 5\" 20 x   Nustep L5 7 min   L3 6 min 7 min L 5   Mini squats 2x10    15x   SHC 20x   20x 20x   Standing abd 20x   20x 20x   Ther Activity        Marches 20x   20x 20 x 2           Gait Training        CP to R knee in supine 10 min   10 min            Modalities                                     "

## 2025-02-11 ENCOUNTER — OFFICE VISIT (OUTPATIENT)
Dept: PHYSICAL THERAPY | Facility: CLINIC | Age: 75
End: 2025-02-11
Payer: COMMERCIAL

## 2025-02-11 ENCOUNTER — OFFICE VISIT (OUTPATIENT)
Dept: OBGYN CLINIC | Facility: CLINIC | Age: 75
End: 2025-02-11

## 2025-02-11 ENCOUNTER — APPOINTMENT (OUTPATIENT)
Dept: RADIOLOGY | Facility: CLINIC | Age: 75
End: 2025-02-11
Payer: COMMERCIAL

## 2025-02-11 VITALS — BODY MASS INDEX: 34.17 KG/M2 | WEIGHT: 181 LBS | HEIGHT: 61 IN

## 2025-02-11 DIAGNOSIS — Z96.652 STATUS POST TOTAL KNEE REPLACEMENT, LEFT: ICD-10-CM

## 2025-02-11 DIAGNOSIS — Z96.651 S/P TOTAL KNEE REPLACEMENT, RIGHT: ICD-10-CM

## 2025-02-11 DIAGNOSIS — M25.561 ACUTE PAIN OF RIGHT KNEE: Primary | ICD-10-CM

## 2025-02-11 DIAGNOSIS — Z96.651 STATUS POST TOTAL RIGHT KNEE REPLACEMENT: ICD-10-CM

## 2025-02-11 DIAGNOSIS — G89.29 CHRONIC PAIN OF RIGHT KNEE: ICD-10-CM

## 2025-02-11 DIAGNOSIS — M17.11 PRIMARY OSTEOARTHRITIS OF RIGHT KNEE: ICD-10-CM

## 2025-02-11 DIAGNOSIS — M25.561 CHRONIC PAIN OF RIGHT KNEE: ICD-10-CM

## 2025-02-11 DIAGNOSIS — Z96.651 S/P TOTAL KNEE REPLACEMENT, RIGHT: Primary | ICD-10-CM

## 2025-02-11 PROCEDURE — 97110 THERAPEUTIC EXERCISES: CPT | Performed by: PHYSICAL THERAPIST

## 2025-02-11 PROCEDURE — 97140 MANUAL THERAPY 1/> REGIONS: CPT | Performed by: PHYSICAL THERAPIST

## 2025-02-11 PROCEDURE — 99024 POSTOP FOLLOW-UP VISIT: CPT | Performed by: ORTHOPAEDIC SURGERY

## 2025-02-11 PROCEDURE — 73562 X-RAY EXAM OF KNEE 3: CPT

## 2025-02-11 NOTE — PROGRESS NOTES
Assessment/Plan:   Diagnoses and all orders for this visit:    S/P total knee replacement, right  -     XR knee 3 vw right non injury; Future       Assessment & Plan  S/P total knee replacement, right    Orders:    XR knee 3 vw right non injury; Future  Patient is 2 weeks s/p right total knee arthroplasty. Patient is progressing as expected post-operatively. Continue formal physical therapy as per protocol. Staples were removed at time of visit, steri strips were applied. Patient can expose incision for hygiene purposes, but has been advised to avoid submersion. The patient does take anticoagulants at baseline. She will be seen in 4 weeks for strength and range of motion check. Patient expresses understanding and is in agreement with treatment plan.       The patient is doing quite well in regards to her right total knee replacement.  Flexion past 120 degrees.  No instability.  X-rays show a well-seated total knee replacement without any loosening.  Continue home exercise program.  Continue Xarelto.  Follow-up in 1 month for strength and motion check      Subjective:   Patient ID: Demi Almonte  1950     HPI  Patient is a 74 y.o. female who presents for post-operative evaluation of her right knee. The patient is approximately 2 weeks s/p right total knee arthroplasty from 1/27/2025. She states that she is doing well post-operatively. She did experience significant pain post-operatively, which has improved. She states that she did have significant swelling post-operatively, therefore, she went to the ED for evaluation. Ultrasounds were negative for blood clots. The patient has started physical therapy.      The following portions of the patient's history were reviewed and updated as appropriate:  Past medical history, past surgical history, Family history, social history, current medications and allergies    Past Medical History:   Diagnosis Date    Acute pulmonary embolism (HCC) 01/10/2017    Allergic      "Arthritis     rheumatoid    Atrial fibrillation ([facility])     CHF (congestive heart failure) ([facility])     Disease of thyroid gland     DVT (deep venous thrombosis) ([facility])     DVT, lower extremity ([facility]) 01/10/2017    HL (hearing loss)     Left Ear-Wear Hearing Aid    Hypertension     Hyperthyroidism     Irregular heart beat     Kidney stone     Migraine     hx of    Pleural effusion 2016    Polymyalgia rheumatica ([facility])     \"Remission\"    Polymyalgia rheumatica ([facility]) 01/10/2017    Secondary adrenal insufficiency ([facility]) 2017    Sepsis, unspecified organism ([facility]) 12/15/2019    Sleep apnea     Mild-Does not require CPAP    Varicella As a child    Vitamin D deficiency        Past Surgical History:   Procedure Laterality Date    CARDIAC CATHETERIZATION      CARDIOVERSION N/A 2019    Procedure: CARDIOVERSION;  Surgeon: Roque Alfaro MD;  Location: [facility];  Service: Cardiology     SECTION      x3 - last impression 16    FRACTURE SURGERY Left     wrist    HERNIA REPAIR  2018    JOINT REPLACEMENT  2022    left knee    KNEE ARTHROSCOPY Left     Meniscus Tear Repair    KNEE CARTILAGE SURGERY Left     AL ARTHRP KNE CONDYLE&PLATU MEDIAL&LAT COMPARTMENTS Left 2022    Procedure: KNEE TOTAL ARTHROPLASTY;  Surgeon: Ryan Vega DO;  Location: [facility];  Service: Orthopedics    AL ARTHRP KNE CONDYLE&PLATU MEDIAL&LAT COMPARTMENTS Right 2025    Procedure: ARTHROPLASTY KNEE TOTAL;  Surgeon: Ryan Vega DO;  Location: [facility];  Service: Orthopedics    TONSILLECTOMY AND ADENOIDECTOMY  child; age 14    TUBAL LIGATION      VEIN SURGERY Right     venous ligation with stripping       Family History   Problem Relation Age of Onset    Breast cancer Mother 52    Arthritis Mother     Cancer Mother     Hearing loss Mother     Vision loss Mother     Osteoarthritis Mother     Aneurysm Father         aortic    No Known Problems Sister     No Known Problems Maternal Grandmother     No " Known Problems Maternal Grandfather     No Known Problems Paternal Grandmother     No Known Problems Paternal Grandfather     Rheum arthritis Maternal Aunt     Early death Maternal Aunt     Rheum arthritis Maternal Aunt     No Known Problems Paternal Aunt     No Known Problems Paternal Aunt     No Known Problems Paternal Aunt        Social History     Socioeconomic History    Marital status: /Civil Union     Spouse name: None    Number of children: 3    Years of education: None    Highest education level: None   Occupational History    Occupation: Manager     Comment: [employer]   Tobacco Use    Smoking status: Never    Smokeless tobacco: Never   Vaping Use    Vaping status: Never Used   Substance and Sexual Activity    Alcohol use: Not Currently     Alcohol/week: 1.0 standard drink of alcohol     Types: 1 Glasses of wine per week     Comment: Socially    Drug use: No    Sexual activity: Yes     Partners: Male     Birth control/protection: Post-menopausal, Female Sterilization   Other Topics Concern    None   Social History Narrative    None     Social Drivers of Health     Financial Resource Strain: Not on file   Food Insecurity: No Food Insecurity (1/27/2025)    Nursing - Inadequate Food Risk Classification     Worried About Running Out of Food in the Last Year: Not on file     Ran Out of Food in the Last Year: Not on file     Ran Out of Food in the Last Year: Never true   Transportation Needs: No Transportation Needs (1/27/2025)    Nursing - Transportation Risk Classification     Lack of Transportation: Not on file     Lack of Transportation: No   Physical Activity: Not on file   Stress: Not on file   Social Connections: Not on file   Intimate Partner Violence: Unknown (1/27/2025)    Nursing IPS     Feels Physically and Emotionally Safe: Not on file     Physically Hurt by Someone: Not on file     Humiliated or Emotionally Abused by Someone: Not on file     Physically Hurt by Someone: No     Hurt or  Threatened by Someone: No   Housing Stability: Unknown (2025)    Nursing: Inadequate Housing Risk Classification     Has Housing: Not on file     Worried About Losing Housing: Not on file     Unable to Get Utilities: Not on file     Unable to Pay for Housing in the Last Year: No     Has Housin         Current Outpatient Medications:     acetaminophen (TYLENOL) 325 mg tablet, Take 3 tablets (975 mg total) by mouth every 8 (eight) hours, Disp: 270 tablet, Rfl: 0    ascorbic acid (VITAMIN C) 500 MG tablet, Take 1 tablet (500 mg total) by mouth 2 (two) times a day Do not start before December 15, 2024., Disp: 60 tablet, Rfl: 0    Calcium Ascorbate 500 MG TABS, Take 500 mg by mouth daily  , Disp: , Rfl:     cholecalciferol (VITAMIN D3) 1,000 units tablet, Take 1,000 Units by mouth daily  , Disp: , Rfl:     Cinnamon 500 MG capsule, Take 500 mg by mouth daily, Disp: , Rfl:     Cyanocobalamin (VITAMIN B 12 PO), Take 500 mcg by mouth daily , Disp: , Rfl:     docusate sodium (COLACE) 100 mg capsule, Take 1 capsule (100 mg total) by mouth 2 (two) times a day, Disp: 60 capsule, Rfl: 0    ferrous sulfate 324 (65 Fe) mg, Take 1 tablet (324 mg total) by mouth 2 (two) times a day before meals Do not start before December 15, 2024., Disp: 60 tablet, Rfl: 0    folic acid (FOLVITE) 1 mg tablet, Take 1 tablet (1 mg total) by mouth daily Do not start before December 15, 2024., Disp: 30 tablet, Rfl: 0    levothyroxine 75 mcg tablet, Take 1 tablet (75 mcg total) by mouth daily Monday- Saturday a full tablet with a half tablet on ., Disp: 30 tablet, Rfl: 11    metoprolol succinate (TOPROL-XL) 25 mg 24 hr tablet, Take 1 tablet by mouth in the morning, Disp: , Rfl:     Multiple Vitamins-Minerals (multivitamin with minerals) tablet, Take 1 tablet by mouth daily Do not start before December 15, 2024., Disp: 30 tablet, Rfl: 0    Red Yeast Rice 600 MG TABS, Take 1 tablet by mouth daily , Disp: , Rfl:     XARELTO 20 MG tablet, Take  1 tablet by mouth daily before dinner  , Disp: , Rfl:     azelastine (ASTELIN) 0.1 % nasal spray, 1 spray into each nostril 2 (two) times a day Use in each nostril as directed (Patient not taking: Reported on 1/23/2025), Disp: 30 mL, Rfl: 5    metoprolol succinate (TOPROL-XL) 50 mg 24 hr tablet, Take 25 mg by mouth daily (Patient not taking: Reported on 1/23/2025), Disp: , Rfl:     Multiple Vitamins-Minerals (multivitamin with minerals) tablet, Take 1 tablet by mouth daily (Patient not taking: Reported on 1/14/2025), Disp: 30 tablet, Rfl: 1    Multiple Vitamins-Minerals (OCUVITE ADULT 50+) CAPS, Take 1 capsule by mouth daily (Patient not taking: Reported on 1/23/2025), Disp: , Rfl:     Current Facility-Administered Medications:     denosumab (PROLIA) subcutaneous injection 60 mg, 60 mg, Subcutaneous, Q6 Months, , 60 mg at 01/23/25 1131    Allergies   Allergen Reactions    Amiodarone Other (See Comments)     Other reaction(s): Other (See Comments)  Reports caused elevated TSH    Sudafed [Pseudoephedrine] Tachycardia     Rapid heart beat    Sulfa Antibiotics Itching and Rash    Sulfamethoxazole-Trimethoprim Itching and Rash       Review of Systems   Constitutional:  Negative for chills, fever and unexpected weight change.   HENT:  Negative for hearing loss, nosebleeds and sore throat.    Eyes:  Negative for pain, redness and visual disturbance.   Respiratory:  Negative for cough, shortness of breath and wheezing.    Cardiovascular:  Negative for chest pain, palpitations and leg swelling.   Gastrointestinal:  Negative for abdominal pain, nausea and vomiting.   Endocrine: Negative for polydipsia and polyuria.   Genitourinary:  Negative for dysuria and hematuria.   Skin:  Negative for rash and wound.   Neurological:  Negative for dizziness, numbness and headaches.   Psychiatric/Behavioral:  Negative for decreased concentration and suicidal ideas. The patient is not nervous/anxious.    All other systems reviewed and are  "negative.       Objective:  Ht 5' 1\" (1.549 m)   Wt 82.1 kg (181 lb)   BMI 34.20 kg/m²     Ortho Exam  Right knee(s) -   Weight-bearing status: As tolerated   Incision(s): Clean, dry, healing- No signs of drainage or dehiscence. Edges well approximated with staples.- Staples removed at time of visit without complications.  Moderate soft tissue swelling as expected post-operatively.   ROM: 0 -90  Knee extensor and flexor mechanism intact   Knee is stable to varus and valgus stress  Knee is stable to anterior and posterior stress   Ankle Plantar flexion and dorsiflexion intact  Calf compartments are soft and supple  - Archie's sign  2+ DP and PT pulses with brisk capillary refill to the toes  Sural, saphenous, tibial, superficial, and deep peroneal motor and sensory distributions intact  Sensation light touch intact distally      Physical Exam  HENT:      Head: Normocephalic and atraumatic.      Nose: Nose normal.   Eyes:      Conjunctiva/sclera: Conjunctivae normal.   Cardiovascular:      Rate and Rhythm: Normal rate.   Pulmonary:      Effort: Pulmonary effort is normal.   Musculoskeletal:      Cervical back: Neck supple.   Skin:     General: Skin is warm and dry.      Capillary Refill: Capillary refill takes less than 2 seconds.   Neurological:      Mental Status: She is alert and oriented to person, place, and time.   Psychiatric:         Mood and Affect: Mood normal.         Behavior: Behavior normal.          Diagnostic Test Review:  X-Ray of right knee obtained on 2/11/2025 were reviewed and demonstrate well-seated total knee prosthesis with maintained anatomical alignment and no signs of loosening.      Procedures   None performed.       Scribe Attestation      I,:   am acting as a scribe while in the presence of the attending physician.:       I,:   personally performed the services described in this documentation    as scribed in my presence.:              "

## 2025-02-13 ENCOUNTER — OFFICE VISIT (OUTPATIENT)
Dept: PHYSICAL THERAPY | Facility: CLINIC | Age: 75
End: 2025-02-13
Payer: COMMERCIAL

## 2025-02-13 DIAGNOSIS — G89.29 CHRONIC PAIN OF RIGHT KNEE: ICD-10-CM

## 2025-02-13 DIAGNOSIS — M25.561 CHRONIC PAIN OF RIGHT KNEE: ICD-10-CM

## 2025-02-13 DIAGNOSIS — Z96.652 STATUS POST TOTAL KNEE REPLACEMENT, LEFT: ICD-10-CM

## 2025-02-13 DIAGNOSIS — Z96.651 STATUS POST TOTAL RIGHT KNEE REPLACEMENT: ICD-10-CM

## 2025-02-13 DIAGNOSIS — M17.11 PRIMARY OSTEOARTHRITIS OF RIGHT KNEE: Primary | ICD-10-CM

## 2025-02-13 DIAGNOSIS — M25.561 ACUTE PAIN OF RIGHT KNEE: ICD-10-CM

## 2025-02-13 PROCEDURE — 97140 MANUAL THERAPY 1/> REGIONS: CPT

## 2025-02-13 PROCEDURE — 97112 NEUROMUSCULAR REEDUCATION: CPT

## 2025-02-13 PROCEDURE — 97110 THERAPEUTIC EXERCISES: CPT

## 2025-02-13 NOTE — PROGRESS NOTES
"Daily Note     Today's date: 2025  Patient name: Demi Almonte  : 1950  MRN: [id number]  Referring provider: [place] [facility]*  Dx:   Encounter Diagnosis     ICD-10-CM    1. Primary osteoarthritis of right knee  M17.11       2. Status post total right knee replacement  Z96.651       3. Acute pain of right knee  M25.561       4. Status post total knee replacement, left  Z96.652       5. Chronic pain of right knee  M25.561     G89.29           Start Time: 1330  Stop Time: 1415  Total time in clinic (min): 45 minutes    Subjective:  My swelling was down some this morning, but, its up again.  It is still pretty swollen though. I am using the cane to get around.       Objective: See treatment diary below      Assessment: Tolerated treatment well. Patient would benefit from continued PT   pt had the staples removed from the Dr on Tuesday . She has the steri strips on over the incision site.  She has pitting edema in her lower right leg and the calf is very firm. She feels the swelling in about the same over the past few days.  Pt is using a cane for ambulation. Pt was able to use 2# for some standing exercises today.,   pt had tightness with knee flexion and ext today.  She had strong pain with passive knee ext today.  We will continue to work on her motion and strength,.  We iced post for 10 min ., pt reports that she is working on her motion at home and icing as much as she can,       Plan: Continue per plan of care.      Precautions: None      Date 2/11 2/13 2/3 2/5 2/7   FOTO  49  JF       Manuals        Ham / quad Hams, Calf - 8 mins  Ham, calf  8min Ham / calf  JF  8 min   Gastroc stretch    30\" 5x                    Neuro Re-Ed        SLB          Tandem walk        Side stepping // bars 3 laps   No UE // bars  3 laps no UE      Quad sets 5\"x20 5\" 15x 5\" 20 x 5\" 20x 5\" 20 x   Towel Crush 5\"x20 5\" 15x 5\" 20 x 5\" 20x 5\" 20 x                   Ther Ex        LAQ 5\" 2x10 5\"  20 x 15 X  5\"  15 x " "5 \"    HR/TR 30x ea 20x 30x 30x 30x   SLR 2x10 15x  20 x 2x10 20 x   SAQ 2# 5\"x20  5\" 20 x 5\" 20x 5\" 20 x  2#    Heel slide 5\"x20 AROM  2x10 with strap for OP  5\" 20x 5\" 20 x 5\" 20x 5\" 20 x   Nustep L5 7 min L 5   7 min  L3 6 min 7 min L 5   Mini squats 2x10 20 x   15x   SHC 20x 20 x  20x 20x   Standing abd 20x 20 x  2#   20x 20x   Ther Activity        Marches 20x 20 x  2#   20x 20 x 2           Gait Training        CP to R knee in supine 10 min   10 min            Modalities                                       "

## 2025-02-17 NOTE — PROGRESS NOTES
"Daily Note     Today's date: 2025  Patient name: Demi Almonte  : 1950  MRN: [id number]  Referring provider: [place] [facility]*  Dx:   Encounter Diagnosis     ICD-10-CM    1. Primary osteoarthritis of right knee  M17.11       2. Status post total right knee replacement  Z96.651       3. Acute pain of right knee  M25.561       4. Status post total knee replacement, left  Z96.652       5. Chronic pain of right knee  M25.561     G89.29                      Subjective: The patient states that she continues to get pain along the outer side of her knee.        Objective: See treatment diary below      Assessment: Tolerated treatment well.  Progressed patient with strengthening as outlined below in daily treatment diary.  ROM is improving but she is still limited with both knee flexion and extension.  Patient demonstrated fatigue post treatment and would benefit from continued PT to improve mobility and function.        Plan: Continue per plan of care.   Progress as able in upcoming visits.       Precautions: None      Date    FOTO  49  JF       Manuals        Ham / quad Hams, Calf - 8 mins  Ham, calf  8min Ham, Calf, Quad, Knee Flexion 10 min  8 min   Gastroc stretch    30\" 5x                    Neuro Re-Ed        SLB          Tandem walk        Side stepping // bars 3 laps   No UE // bars  3 laps no UE // bars 3 laps No UE     Quad sets 5\"x20 5\" 15x 5\"x20 5\" 20x 5\" 20 x   Towel Crush 5\"x20 5\" 15x 5\"x20 5\" 20x 5\" 20 x                   Ther Ex        LAQ 5\" 2x10 5\"  20 x 2# 5\"x20  15 x 5 \"    HR/TR 30x ea 20x 20x 30x 30x   SLR 2x10 15x  15x 2x10 20 x   SAQ 2# 5\"x20  2# 5\" 2x10 5\" 20x 5\" 20 x  2#    Heel slide 5\"x20 AROM  2x10 with strap for OP  5\" 20x 5\"x20 5\" 20x 5\" 20 x   Nustep L5 7 min L 5   7 min L5 7 min L3 6 min 7 min L 5   Mini squats 2x10 20 x 20x  15x   SHC 20x 20 x 2# 20x 20x 20x   Standing abd 20x 20 x  2#  2# 20x Simon   Abd & Ext  20x 20x   Ther Activity      "   Jose L 20x 20 x  2#  2# 20x  20x 20 x 2           Gait Training        CP to R knee in supine 10 min  10 min  Seated with foot on stool 10 min            Modalities

## 2025-02-18 ENCOUNTER — OFFICE VISIT (OUTPATIENT)
Dept: PHYSICAL THERAPY | Facility: CLINIC | Age: 75
End: 2025-02-18
Payer: COMMERCIAL

## 2025-02-18 DIAGNOSIS — M17.11 PRIMARY OSTEOARTHRITIS OF RIGHT KNEE: Primary | ICD-10-CM

## 2025-02-18 DIAGNOSIS — Z96.652 STATUS POST TOTAL KNEE REPLACEMENT, LEFT: ICD-10-CM

## 2025-02-18 DIAGNOSIS — M25.561 ACUTE PAIN OF RIGHT KNEE: ICD-10-CM

## 2025-02-18 DIAGNOSIS — M25.561 CHRONIC PAIN OF RIGHT KNEE: ICD-10-CM

## 2025-02-18 DIAGNOSIS — G89.29 CHRONIC PAIN OF RIGHT KNEE: ICD-10-CM

## 2025-02-18 DIAGNOSIS — Z96.651 STATUS POST TOTAL RIGHT KNEE REPLACEMENT: ICD-10-CM

## 2025-02-18 PROCEDURE — 97140 MANUAL THERAPY 1/> REGIONS: CPT | Performed by: PHYSICAL THERAPIST

## 2025-02-18 PROCEDURE — 97110 THERAPEUTIC EXERCISES: CPT | Performed by: PHYSICAL THERAPIST

## 2025-02-20 ENCOUNTER — ANNUAL EXAM (OUTPATIENT)
Dept: OBGYN CLINIC | Facility: CLINIC | Age: 75
End: 2025-02-20
Payer: COMMERCIAL

## 2025-02-20 VITALS
DIASTOLIC BLOOD PRESSURE: 80 MMHG | HEIGHT: 61 IN | WEIGHT: 176.6 LBS | BODY MASS INDEX: 33.34 KG/M2 | SYSTOLIC BLOOD PRESSURE: 122 MMHG

## 2025-02-20 DIAGNOSIS — Z01.419 ENCOUNTER FOR WELL WOMAN EXAM WITH ROUTINE GYNECOLOGICAL EXAM: Primary | ICD-10-CM

## 2025-02-20 DIAGNOSIS — Z12.31 ENCOUNTER FOR SCREENING MAMMOGRAM FOR MALIGNANT NEOPLASM OF BREAST: ICD-10-CM

## 2025-02-20 PROCEDURE — G0101 CA SCREEN;PELVIC/BREAST EXAM: HCPCS | Performed by: OBSTETRICS & GYNECOLOGY

## 2025-02-20 NOTE — PROGRESS NOTES
"[facility]  [address], [address], [place]    ASSESSMENT/PLAN: Demi Almonte is a 74 y.o.  who presents for annual gynecologic exam.  Routine well woman exam completed today.  Cervical Cancer Screening: Current ASCCP Guidelines reviewed. Last Pap: . Pap not done today every 2 years.   Beast cancer screening: done   Colon cancer screening, done .   Bone density screening: on Prolia per endocrinology   CC:  Annual Gynecologic Examination    HPI: Demi Almonte is a 74 y.o.  who presents for annual gynecologic examination.  No GYN complaints, doing well    Gynecologic Exam    No GYN copmlaints; doing well  Had recent right knee replacement    Has vulvar lump, inclusion cyst, noticed it being bigger and more uncomfortable.     The following portions of the patient's history were reviewed and updated as appropriate: allergies, current medications, past family history, past medical history, obstetric history, gynecologic history, past social history, past surgical history and problem list.    Review of Systems   Constitutional: Negative.    HENT: Negative.     Eyes: Negative.    Respiratory: Negative.     Cardiovascular: Negative.    Gastrointestinal: Negative.    Genitourinary: Negative.    Musculoskeletal: Negative.    All other systems reviewed and are negative.        Objective:  /80   Ht 5' 1\" (1.549 m)   Wt 80.1 kg (176 lb 9.6 oz)   BMI 33.37 kg/m²    Physical Exam  Vitals reviewed.   Constitutional:       General: She is not in acute distress.     Appearance: She is well-developed.   HENT:      Head: Normocephalic and atraumatic.      Nose: Nose normal.   Cardiovascular:      Rate and Rhythm: Normal rate.   Pulmonary:      Effort: Pulmonary effort is normal. No respiratory distress.   Chest:   Breasts:     Breasts are symmetrical.      Right: Normal. No mass, nipple discharge, skin change or tenderness.      Left: Normal. No mass, nipple " discharge, skin change or tenderness.   Abdominal:      General: There is no distension.      Palpations: Abdomen is soft. There is no mass.      Tenderness: There is no abdominal tenderness. There is no guarding or rebound.   Genitourinary:     General: Normal vulva.      Exam position: Lithotomy position.      Labia:         Right: No lesion.         Left: No lesion.       Urethra: No prolapse (urethral meatus normal).      Vagina: Normal. No vaginal discharge, erythema or bleeding.      Cervix: Normal.      Uterus: Normal.       Adnexa: Right adnexa normal and left adnexa normal.   Musculoskeletal:         General: Normal range of motion.      Cervical back: Normal range of motion.   Lymphadenopathy:      Upper Body:      Right upper body: No supraclavicular, axillary or pectoral adenopathy.      Left upper body: No supraclavicular, axillary or pectoral adenopathy.      Lower Body: No right inguinal adenopathy. No left inguinal adenopathy.   Skin:     General: Skin is warm and dry.   Neurological:      Mental Status: She is alert and oriented to person, place, and time.   Psychiatric:         Behavior: Behavior normal.         Thought Content: Thought content normal.         Judgment: Judgment normal.

## 2025-02-21 ENCOUNTER — OFFICE VISIT (OUTPATIENT)
Dept: PHYSICAL THERAPY | Facility: CLINIC | Age: 75
End: 2025-02-21
Payer: COMMERCIAL

## 2025-02-21 DIAGNOSIS — Z96.651 STATUS POST TOTAL RIGHT KNEE REPLACEMENT: ICD-10-CM

## 2025-02-21 DIAGNOSIS — M25.561 ACUTE PAIN OF RIGHT KNEE: ICD-10-CM

## 2025-02-21 DIAGNOSIS — M25.561 CHRONIC PAIN OF RIGHT KNEE: ICD-10-CM

## 2025-02-21 DIAGNOSIS — G89.29 CHRONIC PAIN OF RIGHT KNEE: ICD-10-CM

## 2025-02-21 DIAGNOSIS — Z96.652 STATUS POST TOTAL KNEE REPLACEMENT, LEFT: ICD-10-CM

## 2025-02-21 DIAGNOSIS — M17.11 PRIMARY OSTEOARTHRITIS OF RIGHT KNEE: Primary | ICD-10-CM

## 2025-02-21 PROCEDURE — 97140 MANUAL THERAPY 1/> REGIONS: CPT

## 2025-02-21 PROCEDURE — 97110 THERAPEUTIC EXERCISES: CPT

## 2025-02-21 NOTE — PROGRESS NOTES
"Daily Note     Today's date: 2025  Patient name: Demi Almonte  : 1950  MRN: [id number]  Referring provider: [place] [facility]*  Dx:   Encounter Diagnosis     ICD-10-CM    1. Primary osteoarthritis of right knee  M17.11       2. Status post total right knee replacement  Z96.651       3. Acute pain of right knee  M25.561       4. Status post total knee replacement, left  Z96.652       5. Chronic pain of right knee  M25.561     G89.29                      Subjective: Pt reports her knee pain is improving and is starting to loosen up.        Objective: See treatment diary below      Assessment: Tolerated treatment well. Patient exhibited good technique with therapeutic exercises and would benefit from continued PT. She tolerated treatment well noting fatigue with her PREs. She was able to complete 20x SLR c 2# weight today.  Pt will continue to benefit from skilled Pt at this time and will benefit from improvements in her flex/ext ROM as well as improved strength and endurance.      Plan: Continue per plan of care.      Precautions: None      Date    FOTO  49  JF       Manuals        Ham / quad Hams, Calf - 8 mins  Ham, calf  8min Ham, Calf, Quad, Knee Flexion 10 min Ham, Calf, Quad, Knee Flexion 10 min    8 min   Gastroc stretch                        Neuro Re-Ed        SLB          Tandem walk        Side stepping // bars 3 laps   No UE // bars  3 laps no UE // bars 3 laps No UE // bars 3 laps no UE    Quad sets 5\"x20 5\" 15x 5\"x20 5\"x20 5\" 20 x   Towel Crush 5\"x20 5\" 15x 5\"x20 5\"x20 5\" 20 x                   Ther Ex        LAQ 5\" 2x10 5\"  20 x 2# 5\"x20 2# 5\"x20 15 x 5 \"    HR/TR 30x ea 20x 20x 20x 30x   SLR 2x10 15x  15x 2# 2x10 20 x   SAQ 2# 5\"x20  2# 5\" 2x10 2# 2x10 5\" 20 x  2#    Heel slide 5\"x20 AROM  2x10 with strap for OP  5\" 20x 5\"x20 5\"x20 5\" 20 x   Nustep L5 7 min L 5   7 min L5 7 min L3 7 min 7 min L 5   Mini squats 2x10 20 x 20x 20x 15x   SHC 20x 20 x 2# 20x 2# " 20x 20x   Standing abd 20x 20 x  2#  2# 20x Simon   Abd & Ext  2# 20 x Simon abd ext 20x   Ther Activity        Marches 20x 20 x  2#  2# 20x  2# 20x 20 x 2           Gait Training        CP to R knee in supine 10 min  10 min  Seated with foot on stool 10 min            Modalities

## 2025-02-25 ENCOUNTER — OFFICE VISIT (OUTPATIENT)
Dept: PHYSICAL THERAPY | Facility: CLINIC | Age: 75
End: 2025-02-25
Payer: COMMERCIAL

## 2025-02-25 DIAGNOSIS — M25.561 CHRONIC PAIN OF RIGHT KNEE: Primary | ICD-10-CM

## 2025-02-25 DIAGNOSIS — M25.561 ACUTE PAIN OF RIGHT KNEE: ICD-10-CM

## 2025-02-25 DIAGNOSIS — M17.11 PRIMARY OSTEOARTHRITIS OF RIGHT KNEE: ICD-10-CM

## 2025-02-25 DIAGNOSIS — Z96.651 STATUS POST TOTAL RIGHT KNEE REPLACEMENT: ICD-10-CM

## 2025-02-25 DIAGNOSIS — G89.29 CHRONIC PAIN OF RIGHT KNEE: Primary | ICD-10-CM

## 2025-02-25 PROCEDURE — 97140 MANUAL THERAPY 1/> REGIONS: CPT

## 2025-02-25 PROCEDURE — 97112 NEUROMUSCULAR REEDUCATION: CPT

## 2025-02-25 PROCEDURE — 97110 THERAPEUTIC EXERCISES: CPT

## 2025-02-25 NOTE — PROGRESS NOTES
"Daily Note     Today's date: 2025  Patient name: Demi Almonte  : 1950  MRN: [id number]  Referring provider: [place] P*  Dx:   Encounter Diagnosis     ICD-10-CM    1. Chronic pain of right knee  M25.561     G89.29       2. Primary osteoarthritis of right knee  M17.11       3. Acute pain of right knee  M25.561       4. Status post total right knee replacement  Z96.651           Start Time: 1100  Stop Time: 1157  Total time in clinic (min): 57 minutes    Subjective:  My knee is stiff and sore.,  It is about 310 today.       Objective: See treatment diary below      Assessment: Tolerated treatment well. Patient would benefit from continued PT   pt reports feeling tightness and soreness in her right knee coming into therapy today,. She feels she may have been on her feet to much this morning.  We used the Nustep to begin to work on motion and strength., pt was able to use 2# for all standing leg exercises today.  She had most trouble with SHC and abd.  We worked on her strength , motion today., she had some mild swelling in her right lower leg as well as in her right knee.  She had good patellar motion today . She had strong pain noted with passive ext today and a bit less with flexion.,  we continue to work on her motion and strength. Her quad set is improving.       Plan: Continue per plan of care.      Precautions: None      Date    FOTO  49  JF    JF  49   Manuals        Ham / quad Hams, Calf - 8 mins  Ham, calf  8min Ham, Calf, Quad, Knee Flexion 10 min Ham, Calf, Quad, Knee Flexion 10 min    8 min   Gastroc stretch        Knee ext     3 min           Neuro Re-Ed        SLB          Tandem walk        Side stepping // bars 3 laps   No UE // bars  3 laps no UE // bars 3 laps No UE // bars 3 laps no UE 4 x  no arms   Quad sets 5\"x20 5\" 15x 5\"x20 5\"x20 5\" 20 x   Towel Crush 5\"x20 5\" 15x 5\"x20 5\"x20 5\" 20 x                   Ther Ex        LAQ 5\" 2x10 5\"  20 x 2# " "5\"x20 2# 5\"x20 15 x 5 \"    HR/TR 30x ea 20x 20x 20x 30x   SLR 2x10 15x  15x 2# 2x10 20 x  2#    SAQ 2# 5\"x20  2# 5\" 2x10 2# 2x10 5\" 20 x  3#   Heel slide 5\"x20 AROM  2x10 with strap for OP  5\" 20x 5\"x20 5\"x20 5\" 20 x   Nustep L5 7 min L 5   7 min L5 7 min L3 7 min 8 min L 5   Mini squats 2x10 20 x 20x 20x 20 x   SHC 20x 20 x 2# 20x 2# 20x 20x 2#    Standing abd 20x 20 x  2#  2# 20x Simon   Abd & Ext  2# 20 x Simon abd ext 20x 2#    Ther Activity        Marches 20x 20 x  2#  2# 20x  2# 20x 20 x 2   2#            Gait Training        CP to R knee in supine 10 min  10 min  Seated with foot on stool 10 min 10 min           Modalities                                             "

## 2025-02-27 ENCOUNTER — EVALUATION (OUTPATIENT)
Dept: PHYSICAL THERAPY | Facility: CLINIC | Age: 75
End: 2025-02-27
Payer: COMMERCIAL

## 2025-02-27 DIAGNOSIS — Z96.651 STATUS POST TOTAL RIGHT KNEE REPLACEMENT: ICD-10-CM

## 2025-02-27 DIAGNOSIS — Z96.652 STATUS POST TOTAL KNEE REPLACEMENT, LEFT: ICD-10-CM

## 2025-02-27 DIAGNOSIS — M25.561 ACUTE PAIN OF RIGHT KNEE: Primary | ICD-10-CM

## 2025-02-27 PROCEDURE — 97140 MANUAL THERAPY 1/> REGIONS: CPT | Performed by: PHYSICAL THERAPIST

## 2025-02-27 PROCEDURE — 97530 THERAPEUTIC ACTIVITIES: CPT | Performed by: PHYSICAL THERAPIST

## 2025-02-27 PROCEDURE — 97110 THERAPEUTIC EXERCISES: CPT | Performed by: PHYSICAL THERAPIST

## 2025-02-27 PROCEDURE — 97112 NEUROMUSCULAR REEDUCATION: CPT | Performed by: PHYSICAL THERAPIST

## 2025-02-27 NOTE — LETTER
2025    [place] Montoya PA-C  [address]   [address]  [facility] [address]    Patient: Demi Almonte   YOB: 1950   Date of Visit: 2025     Encounter Diagnosis     ICD-10-CM    1. Acute pain of right knee  M25.561       2. Status post total right knee replacement  Z96.651       3. Status post total knee replacement, left  Z96.652           Dear Dr. Montoya:    Thank you for your recent referral of Demi Almonte. Please review the attached evaluation summary from Demi's recent visit.     Please verify that you agree with the plan of care by signing the attached order.     If you have any questions or concerns, please do not hesitate to call.     I sincerely appreciate the opportunity to share in the care of one of your patients and hope to have another opportunity to work with you in the near future.       Sincerely,    Jose Conner, PT      Referring Provider:      I certify that I have read the below Plan of Care and certify the need for these services furnished under this plan of treatment while under my care.                    South Montoya PA-C  [address]   [address]  [place] [address]  Via In Basket          PT Re-Evaluation     Today's date: 2025  Patient name: Demi Almonte  : 1950  MRN: [id number]  Referring provider: [place] P*  Dx:   Encounter Diagnosis     ICD-10-CM    1. Acute pain of right knee  M25.561       2. Status post total right knee replacement  Z96.651       3. Status post total knee replacement, left  Z96.652           Start Time: 1500  Stop Time: 1610  Total time in clinic (min): 70 minutes    Assessment  Impairments: abnormal gait, abnormal or restricted ROM, activity intolerance, impaired physical strength, lacks appropriate home exercise program, pain with function and activity limitations  Symptom irritability: low    Assessment details: Patient has attended 10 physical therapy visits  "post operatively. There is noted improvements with both mobility and strength of the R LE. Her incision site is healing well and there are no signs of delayed healing nor infection present. She has resolved her extensor lag during SLR indicating improved quad strength. Continued to focus on progression of therapy interventions for improved tolerance with daily activities and housework.   Understanding of Dx/Px/POC: good     Prognosis: good    Goals  STGs:  \"Patient will be independent with hep by 2-3 visits. - MET  Improve knee flexion to 90 degrees for improved tolerance with ambulation and transfers by 3-4 weeks. - MET  Improve knee extension to 0 degrees for improved tolerance with ambulation by 3-4 weeks. - Progressing  Decrease pain levels by 50% for improved tolerance with adls and ambulation by 3-4 weeks. \" - MET    LTGs:  Improve FOTO score from 36 to 70 indicating improved tolerance for activities involving the LE by discharge. - Progressing  Improve knee rom and strength to wnl for improved tolerance for ambulation and adls by discharge. - progressing  Patient will be able to ambulate up and down a flight of stairs with reciprocal gait pattern by discharge. - Progressing  Ambulation will be performed on all surfaces without limitation or deviation by discharge. \" - Progressing      Plan  Patient would benefit from: skilled physical therapy  Planned modality interventions: cryotherapy and thermotherapy: hydrocollator packs    Planned therapy interventions: ADL retraining, balance/weight bearing training, flexibility, functional ROM exercises, gait training, home exercise program, joint mobilization, manual therapy, neuromuscular re-education, patient education, strengthening, stretching, therapeutic activities and therapeutic exercise    Frequency: 2-3x week  Duration in weeks: 4  Plan of Care beginning date: 2/27/2025  Plan of Care expiration date: 3/27/2025  Treatment plan discussed with: patient  Plan " details: Patient informed that from this point forward, to ensure adherence to the aforementioned plan of care, all or some of the treatment may be performed and carried out by a Physical Therapy Assistant (PTA) with supervision from a licensed Physical Therapist (PT) in accordance with [facility] Physical Therapy Practice Act.  Patient will continue to benefit from skilled physical therapy to address the functional deficits that were identified during the evaluation today. We will continue to progress the therapy program to address these functional deficits and achieve the established goals.           Subjective Evaluation    History of Present Illness  Mechanism of injury: Patient presents to out patient physical therapy with chief c/o R knee pain. Patient reports a history of chronic R knee pain with worsening over the past 2 years. She has had cortisone injections in her knee which have started to become less effective recently. She finds that when the weather is colder this causes her knee to become stiff and also increases the pain in her knee. She finds that when she first wakes up in the morning her knee will be stiff and she has to move her leg around and stretch before getting up from bed. She is limited with standing to approximately 30 minutes before she needs to sit down because of the pain in her knee. Patient is schedule for an upcoming R TKA on 1/27/2025.     Update 1/30/2025:  Patient presents today s/p R TKA performed on 1/27/2025. She reports that her pain has been pretty well controlled since getting home on Tuesday. She feels that there is some discomfort in the R hip which she feels was from how she was laying in the hospital bed. She notes that she has been doing well with using her walker to assist with ambulation. She has not had a bowel movement since surgery but is taking a stool softener.     Update 2/27/2025:  Patient reports that she has most of her discomfort in the knee at  night when she is trying to go to sleep. She is still doing one step at a time out of fear and due to weakness/pain in her knee. She has been driving since last Friday without issue. Car transfers are also improving per patient report.           Recurrent probem    Quality of life: good    Patient Goals  Patient goals for therapy: increased strength, increased motion, decreased pain, decreased edema, independence with ADLs/IADLs and improved balance  Patient goal: Patient wishes to recover from her surgery and return to her normal daily activities without limitation from the R knee.  Pain  Current pain rating: 3  At best pain ratin  At worst pain ratin  Location: R knee  Quality: discomfort, dull ache and tight  Relieving factors: medications, rest, change in position and ice  Aggravating factors: stair climbing, standing and walking    Social Support  Steps to enter house: yes  5  Stairs in house: yes   Lives in: multiple-level home  Lives with: spouse    Employment status: not working  Treatments  Previous treatment: injection treatment        Objective     Active Range of Motion   Left Knee   Flexion: 123 degrees   Extension: 1 degrees   Extensor la degrees     Right Knee   Flexion: 109 degrees   Extension: -6 degrees   Extensor la degrees     Passive Range of Motion   Left Knee   Extension: 1 degrees     Right Knee   Flexion: 114 degrees   Extension: -6 degrees     Strength/Myotome Testing     Left Hip   Planes of Motion   Flexion: 4  Extension: 4+  Abduction: 4  Adduction: 4+    Right Hip   Planes of Motion   Flexion: 4  Extension: 4+  Abduction: 4  Adduction: 4+    Left Knee   Flexion: 4+  Extension: 4+  Quadriceps contraction: good    Right Knee   Flexion: 4  Extension: 4  Quadriceps contraction: good    Tests     Right Ankle/Foot   Negative for Homans' sign.     Ambulation     Observational Gait   Gait: antalgic   Decreased right stance time.              Precautions: None      Date   "Reassess 2/13 2/18 2/21 2/25   FOTO  49  JF    JF  49   Manuals        Ham / quad 8 min SC Ham, calf  8min Ham, Calf, Quad, Knee Flexion 10 min Ham, Calf, Quad, Knee Flexion 10 min    8 min   Gastroc stretch 30\" 5x       Knee ext 30\" 5x    3 min           Neuro Re-Ed        SLB   Airex 15\" 4x       Tandem walk 5 laps       Side stepping 5 laps // bars  3 laps no UE // bars 3 laps No UE // bars 3 laps no UE 4 x  no arms   Quad sets  5\" 15x 5\"x20 5\"x20 5\" 20 x   Towel Crush  5\" 15x 5\"x20 5\"x20 5\" 20 x                   Ther Ex        LAQ  5\"  20 x 2# 5\"x20 2# 5\"x20 15 x 5 \"    HR/TR  20x 20x 20x 30x   SLR 20x 15x  15x 2# 2x10 20 x  2#    SAQ   2# 5\" 2x10 2# 2x10 5\" 20 x  3#   Heel slide  5\" 20x 5\"x20 5\"x20 5\" 20 x   Nustep L7 4 min, L5 4 min L 5   7 min L5 7 min L3 7 min 8 min L 5   Mini squats 2x15 20 x 20x 20x 20 x   SHC 25x 20 x 2# 20x 2# 20x 20x 2#    Standing abd 25x 20 x  2#  2# 20x Simon   Abd & Ext  2# 20 x Simon abd ext 20x 2#    Ther Activity        Marches 25x 20 x  2#  2# 20x  2# 20x 20 x 2   2#    Step ups Fwd 8\" 15x  Lat 6\" 15x       Gait Training        CP to R knee in supine 10 min seated  10 min  Seated with foot on stool 10 min 10 min           Modalities                                               "

## 2025-02-27 NOTE — PROGRESS NOTES
"PT Re-Evaluation     Today's date: 2025  Patient name: Demi Almonte  : 1950  MRN: [id number]  Referring provider: [place] P*  Dx:   Encounter Diagnosis     ICD-10-CM    1. Acute pain of right knee  M25.561       2. Status post total right knee replacement  Z96.651       3. Status post total knee replacement, left  Z96.652           Start Time: 1500  Stop Time: 1610  Total time in clinic (min): 70 minutes    Assessment  Impairments: abnormal gait, abnormal or restricted ROM, activity intolerance, impaired physical strength, lacks appropriate home exercise program, pain with function and activity limitations  Symptom irritability: low    Assessment details: Patient has attended 10 physical therapy visits post operatively. There is noted improvements with both mobility and strength of the R LE. Her incision site is healing well and there are no signs of delayed healing nor infection present. She has resolved her extensor lag during SLR indicating improved quad strength. Continued to focus on progression of therapy interventions for improved tolerance with daily activities and housework.   Understanding of Dx/Px/POC: good     Prognosis: good    Goals  STGs:  \"Patient will be independent with hep by 2-3 visits. - MET  Improve knee flexion to 90 degrees for improved tolerance with ambulation and transfers by 3-4 weeks. - MET  Improve knee extension to 0 degrees for improved tolerance with ambulation by 3-4 weeks. - Progressing  Decrease pain levels by 50% for improved tolerance with adls and ambulation by 3-4 weeks. \" - MET    LTGs:  Improve FOTO score from 36 to 70 indicating improved tolerance for activities involving the LE by discharge. - Progressing  Improve knee rom and strength to wnl for improved tolerance for ambulation and adls by discharge. - progressing  Patient will be able to ambulate up and down a flight of stairs with reciprocal gait pattern by discharge. - " "Progressing  Ambulation will be performed on all surfaces without limitation or deviation by discharge. \" - Progressing      Plan  Patient would benefit from: skilled physical therapy  Planned modality interventions: cryotherapy and thermotherapy: hydrocollator packs    Planned therapy interventions: ADL retraining, balance/weight bearing training, flexibility, functional ROM exercises, gait training, home exercise program, joint mobilization, manual therapy, neuromuscular re-education, patient education, strengthening, stretching, therapeutic activities and therapeutic exercise    Frequency: 2-3x week  Duration in weeks: 4  Plan of Care beginning date: 2/27/2025  Plan of Care expiration date: 3/27/2025  Treatment plan discussed with: patient  Plan details: Patient informed that from this point forward, to ensure adherence to the aforementioned plan of care, all or some of the treatment may be performed and carried out by a Physical Therapy Assistant (PTA) with supervision from a licensed Physical Therapist (PT) in accordance with [facility] Physical Therapy Practice Act.  Patient will continue to benefit from skilled physical therapy to address the functional deficits that were identified during the evaluation today. We will continue to progress the therapy program to address these functional deficits and achieve the established goals.           Subjective Evaluation    History of Present Illness  Mechanism of injury: Patient presents to out patient physical therapy with chief c/o R knee pain. Patient reports a history of chronic R knee pain with worsening over the past 2 years. She has had cortisone injections in her knee which have started to become less effective recently. She finds that when the weather is colder this causes her knee to become stiff and also increases the pain in her knee. She finds that when she first wakes up in the morning her knee will be stiff and she has to move her leg around and " stretch before getting up from bed. She is limited with standing to approximately 30 minutes before she needs to sit down because of the pain in her knee. Patient is schedule for an upcoming R TKA on 2025.     Update 2025:  Patient presents today s/p R TKA performed on 2025. She reports that her pain has been pretty well controlled since getting home on Tuesday. She feels that there is some discomfort in the R hip which she feels was from how she was laying in the hospital bed. She notes that she has been doing well with using her walker to assist with ambulation. She has not had a bowel movement since surgery but is taking a stool softener.     Update 2025:  Patient reports that she has most of her discomfort in the knee at night when she is trying to go to sleep. She is still doing one step at a time out of fear and due to weakness/pain in her knee. She has been driving since last Friday without issue. Car transfers are also improving per patient report.           Recurrent probem    Quality of life: good    Patient Goals  Patient goals for therapy: increased strength, increased motion, decreased pain, decreased edema, independence with ADLs/IADLs and improved balance  Patient goal: Patient wishes to recover from her surgery and return to her normal daily activities without limitation from the R knee.  Pain  Current pain rating: 3  At best pain ratin  At worst pain ratin  Location: R knee  Quality: discomfort, dull ache and tight  Relieving factors: medications, rest, change in position and ice  Aggravating factors: stair climbing, standing and walking    Social Support  Steps to enter house: yes  5  Stairs in house: yes   Lives in: multiple-level home  Lives with: spouse    Employment status: not working  Treatments  Previous treatment: injection treatment        Objective     Active Range of Motion   Left Knee   Flexion: 123 degrees   Extension: 1 degrees   Extensor la degrees  "    Right Knee   Flexion: 109 degrees   Extension: -6 degrees   Extensor la degrees     Passive Range of Motion   Left Knee   Extension: 1 degrees     Right Knee   Flexion: 114 degrees   Extension: -6 degrees     Strength/Myotome Testing     Left Hip   Planes of Motion   Flexion: 4  Extension: 4+  Abduction: 4  Adduction: 4+    Right Hip   Planes of Motion   Flexion: 4  Extension: 4+  Abduction: 4  Adduction: 4+    Left Knee   Flexion: 4+  Extension: 4+  Quadriceps contraction: good    Right Knee   Flexion: 4  Extension: 4  Quadriceps contraction: good    Tests     Right Ankle/Foot   Negative for Homans' sign.     Ambulation     Observational Gait   Gait: antalgic   Decreased right stance time.              Precautions: None      Date  Reassess    FOTO  49  JF    JF  49   Manuals        Ham / quad 8 min SC Ham, calf  8min Ham, Calf, Quad, Knee Flexion 10 min Ham, Calf, Quad, Knee Flexion 10 min    8 min   Gastroc stretch 30\" 5x       Knee ext 30\" 5x    3 min           Neuro Re-Ed        SLB   Airex 15\" 4x       Tandem walk 5 laps       Side stepping 5 laps // bars  3 laps no UE // bars 3 laps No UE // bars 3 laps no UE 4 x  no arms   Quad sets  5\" 15x 5\"x20 5\"x20 5\" 20 x   Towel Crush  5\" 15x 5\"x20 5\"x20 5\" 20 x                   Ther Ex        LAQ  5\"  20 x 2# 5\"x20 2# 5\"x20 15 x 5 \"    HR/TR  20x 20x 20x 30x   SLR 20x 15x  15x 2# 2x10 20 x  2#    SAQ   2# 5\" 2x10 2# 2x10 5\" 20 x  3#   Heel slide  5\" 20x 5\"x20 5\"x20 5\" 20 x   Nustep L7 4 min, L5 4 min L 5   7 min L5 7 min L3 7 min 8 min L 5   Mini squats 2x15 20 x 20x 20x 20 x   SHC 25x 20 x 2# 20x 2# 20x 20x 2#    Standing abd 25x 20 x  2#  2# 20x Simon   Abd & Ext  2# 20 x Simon abd ext 20x 2#    Ther Activity        Marches 25x 20 x  2#  2# 20x  2# 20x 20 x 2   2#    Step ups Fwd 8\" 15x  Lat 6\" 15x       Gait Training        CP to R knee in supine 10 min seated  10 min  Seated with foot on stool 10 min 10 min           Modalities           "

## 2025-02-28 ENCOUNTER — HOSPITAL ENCOUNTER (OUTPATIENT)
Dept: MAMMOGRAPHY | Facility: HOSPITAL | Age: 75
Discharge: HOME | End: 2025-02-28
Attending: OBSTETRICS & GYNECOLOGY
Payer: COMMERCIAL

## 2025-02-28 VITALS — WEIGHT: 176 LBS | BODY MASS INDEX: 33.23 KG/M2 | HEIGHT: 61 IN

## 2025-02-28 DIAGNOSIS — Z12.31 ENCOUNTER FOR SCREENING MAMMOGRAM FOR MALIGNANT NEOPLASM OF BREAST: ICD-10-CM

## 2025-02-28 PROCEDURE — 77063 BREAST TOMOSYNTHESIS BI: CPT

## 2025-02-28 PROCEDURE — 77067 SCR MAMMO BI INCL CAD: CPT

## 2025-03-03 ENCOUNTER — OFFICE VISIT (OUTPATIENT)
Dept: PHYSICAL THERAPY | Facility: CLINIC | Age: 75
End: 2025-03-03
Payer: COMMERCIAL

## 2025-03-03 DIAGNOSIS — Z96.651 STATUS POST TOTAL RIGHT KNEE REPLACEMENT: Primary | ICD-10-CM

## 2025-03-03 DIAGNOSIS — M17.11 PRIMARY OSTEOARTHRITIS OF RIGHT KNEE: ICD-10-CM

## 2025-03-03 DIAGNOSIS — M25.561 ACUTE PAIN OF RIGHT KNEE: ICD-10-CM

## 2025-03-03 PROCEDURE — 97140 MANUAL THERAPY 1/> REGIONS: CPT

## 2025-03-03 PROCEDURE — 97110 THERAPEUTIC EXERCISES: CPT

## 2025-03-03 PROCEDURE — 97530 THERAPEUTIC ACTIVITIES: CPT

## 2025-03-03 NOTE — PROGRESS NOTES
"Daily Note     Today's date: 3/3/2025  Patient name: Demi Almonte  : 1950  MRN: [id number]  Referring provider: [place] P*  Dx:   Encounter Diagnosis     ICD-10-CM    1. Status post total right knee replacement  Z96.651       2. Primary osteoarthritis of right knee  M17.11       3. Acute pain of right knee  M25.561           Start Time: 0845  Stop Time: 930  Total time in clinic (min): 45 minutes    Subjective:  I have some pain over the inside of my right knee today,      Objective: See treatment diary below      Assessment: Tolerated treatment well. Patient would benefit from continued PT  pt reports that she is able to do stairs one over the other as long as she holds onto both rails. She reports having some medial right knee pain today during her program today . We continue to work on her balance, strength and motion.  Pt is 6 weeks post today and feels she is making progress.  Pt was able to use the 8\"  box today for lateral step ups.  Pt was able to use 3# today for SAQ.  We continue to work on her patellar motion and knee motion,.  She still reports having pain with passive knee ext and patellar motion today she still has pain with passive knee ext today , but, had less pain with passive patellar motion. We ended with a cold pack post for 10 min,       Plan: Continue per plan of care.      Precautions: None      Date  Reassess 3/3 2/18 2/21 2/25   FOTO     JF  49   Manuals        Ham / quad 8 min SC Ham, calf  8min Ham, Calf, Quad, Knee Flexion 10 min Ham, Calf, Quad, Knee Flexion 10 min    8 min   Gastroc stretch 30\" 5x 30\"  5 x      Knee ext 30\" 5x 30 \" 5 x   3 min           Neuro Re-Ed        SLB   Airex 15\" 4x Airex  15 \"  4 x      Tandem walk 5 laps 5 laps      Side stepping 5 laps // bars  3 laps no UE // bars 3 laps No UE // bars 3 laps no UE 4 x  no arms   Quad sets   5\"x20 5\"x20 5\" 20 x   Towel Crush  5\" 15x 5\"x20 5\"x20 5\" 20 x                   Ther Ex        LAQ  5\"  " "20 x 2# 5\"x20 2# 5\"x20 15 x 5 \"    HR/TR  30 x 20x 20x 30x   SLR 20x 20 x2# 15x 2# 2x10 20 x  2#    SAQ  3#  20 x  2# 5\" 2x10 2# 2x10 5\" 20 x  3#   Heel slide  5\" 20x 5\"x20 5\"x20 5\" 20 x   Nustep L7 4 min, L5 4 min L 5   7 min L5 7 min L3 7 min 8 min L 5   Mini squats 2x15 20 x 20x 20x 20 x   SHC 25x 20 x 2# 20x 2# 20x 20x 2#    Standing abd 25x 20 x  2#  2# 20x Simon   Abd & Ext  2# 20 x Simon abd ext 20x 2#    Ther Activity        Marches 25x 20 x  2#  2# 20x  2# 20x 20 x 2   2#    Step ups Fwd 8\" 15x  Lat 6\" 15x Fwd 8\"   15x  Lat  6\" 15 x      Gait Training        CP to R knee in supine 10 min seated 10 min 10 min  Seated with foot on stool 10 min 10 min           Modalities                               "

## 2025-03-05 ENCOUNTER — OFFICE VISIT (OUTPATIENT)
Dept: PHYSICAL THERAPY | Facility: CLINIC | Age: 75
End: 2025-03-05
Payer: COMMERCIAL

## 2025-03-05 DIAGNOSIS — M25.561 CHRONIC PAIN OF RIGHT KNEE: ICD-10-CM

## 2025-03-05 DIAGNOSIS — G89.29 CHRONIC PAIN OF RIGHT KNEE: ICD-10-CM

## 2025-03-05 DIAGNOSIS — M17.11 PRIMARY OSTEOARTHRITIS OF RIGHT KNEE: ICD-10-CM

## 2025-03-05 DIAGNOSIS — M25.561 ACUTE PAIN OF RIGHT KNEE: Primary | ICD-10-CM

## 2025-03-05 DIAGNOSIS — Z96.651 STATUS POST TOTAL RIGHT KNEE REPLACEMENT: ICD-10-CM

## 2025-03-05 PROCEDURE — 97140 MANUAL THERAPY 1/> REGIONS: CPT

## 2025-03-05 PROCEDURE — 97110 THERAPEUTIC EXERCISES: CPT

## 2025-03-05 PROCEDURE — 97530 THERAPEUTIC ACTIVITIES: CPT

## 2025-03-05 NOTE — PROGRESS NOTES
"Daily Note     Today's date: 3/5/2025  Patient name: Demi Almonte  : 1950  MRN: [id number]  Referring provider: [place] [facility]*  Dx:   Encounter Diagnosis     ICD-10-CM    1. Acute pain of right knee  M25.561       2. Primary osteoarthritis of right knee  M17.11       3. Status post total right knee replacement  Z96.651       4. Chronic pain of right knee  M25.561     G89.29           Start Time: 845  Stop Time: 930  Total time in clinic (min): 45 minutes    Subjective:  I have been working on my motion at home. My pain today is more over the front of my knee.       Objective: See treatment diary below      Assessment: Tolerated treatment well. Patient would benefit from continued PT  we began the bike today and the pt was able to make a full revolution to begin . She was also able to increase wt for all of her standing exercises today to 3#.  She did well with all standing exercises today,  Her incision site is looking good . She does have and area over her mid scar that is not closed all the way , but, no drainage noted.  We worked on her flex and ext today with pain noted mostly with ext.  We ended with a cold pack to her right knee for 8 min,       Plan: Continue per plan of care.      Precautions: None      Date  Reassess 3/3 3/5 2/21 2/25   FOTO     JF  51   Manuals        Ham / quad 8 min SC Ham, calf  8min Ham, Calf, Quad, Knee Flexion 10 min Ham, Calf, Quad, Knee Flexion 10 min    8 min   Gastroc stretch 30\" 5x 30\"  5 x      Knee ext 30\" 5x 30 \" 5 x   3 min           Neuro Re-Ed        SLB   Airex 15\" 4x Airex  15 \"  4 x      Tandem walk 5 laps 5 laps 5 laps      Side stepping 5 laps 5 laps // bars 3 laps No UE // bars 3 laps no UE 4 x  no arms   Quad sets   5\"x20 5\"x20 5\" 20 x   Towel Crush   5\"x20 5\"x20 5\" 20 x                   Ther Ex        LAQ  5\"  20 x 3# 5\"x20 2# 5\"x20 15 x 5 \"    HR/TR  30 x  20x 30x   SLR 20x 20 x2# 15x 2# 2x10 20 x  2#    SAQ  3#  20 x   2# 2x10 5\" 20 " "x  3#   Heel slide  5\" 20x 5\"x20 5\"x20 5\" 20 x   Nustep L7 4 min, L5 4 min L 5   7 min L5 7 min L3 7 min 8 min L 5   Mini squats 2x15 20 x 20x 20x 20 x   SHC 25x 20 x 3# 20x 2# 20x 20x 2#    Standing abd 25x 20 x  2#  3#   20 x abd/ ext 2# 20 x Simon abd ext 20x 2#    Ther Activity        Marches 25x 20 x  2#  3# 20x  2# 20x 20 x 2   2#    Step ups Fwd 8\" 15x  Lat 6\" 15x Fwd 8\"   15x  Lat  6\" 15 x F  8\"  15 x  Lat 6\" 15x     Gait Training        CP to R knee in supine 10 min seated 10 min 10 min  Seated with foot on stool 10 min 10 min           Modalities                                 "

## 2025-03-06 ENCOUNTER — RESULTS FOLLOW-UP (OUTPATIENT)
Dept: OBGYN CLINIC | Facility: MEDICAL CENTER | Age: 75
End: 2025-03-06

## 2025-03-11 ENCOUNTER — OFFICE VISIT (OUTPATIENT)
Dept: PHYSICAL THERAPY | Facility: CLINIC | Age: 75
End: 2025-03-11
Payer: COMMERCIAL

## 2025-03-11 ENCOUNTER — OFFICE VISIT (OUTPATIENT)
Dept: OBGYN CLINIC | Facility: CLINIC | Age: 75
End: 2025-03-11

## 2025-03-11 VITALS — HEIGHT: 61 IN | WEIGHT: 176 LBS | BODY MASS INDEX: 33.23 KG/M2

## 2025-03-11 DIAGNOSIS — Z96.651 S/P TOTAL KNEE REPLACEMENT, RIGHT: Primary | ICD-10-CM

## 2025-03-11 DIAGNOSIS — Z96.651 STATUS POST TOTAL RIGHT KNEE REPLACEMENT: ICD-10-CM

## 2025-03-11 DIAGNOSIS — M25.561 ACUTE PAIN OF RIGHT KNEE: Primary | ICD-10-CM

## 2025-03-11 PROCEDURE — 97110 THERAPEUTIC EXERCISES: CPT | Performed by: PHYSICAL THERAPIST

## 2025-03-11 PROCEDURE — 97530 THERAPEUTIC ACTIVITIES: CPT | Performed by: PHYSICAL THERAPIST

## 2025-03-11 PROCEDURE — 99024 POSTOP FOLLOW-UP VISIT: CPT | Performed by: ORTHOPAEDIC SURGERY

## 2025-03-11 NOTE — PROGRESS NOTES
"Daily Note     Today's date: 3/11/2025  Patient name: Demi Almonte  : 1950  MRN: [id number]  Referring provider: [place] [facility]*  Dx:   Encounter Diagnosis     ICD-10-CM    1. Acute pain of right knee  M25.561       2. Status post total right knee replacement  Z96.651           Start Time: 1411  Stop Time: 1500  Total time in clinic (min): 49 minutes    Subjective: Patient reports that she saw the doctor this morning and he is pleased with her current progress. She reports some residual difficulties with negotiation of stairs more when going down than up.       Objective: See treatment diary below      Assessment: Tolerated treatment well. Patient demonstrated fatigue post treatment, exhibited good technique with therapeutic exercises, and would benefit from continued PT Patient continues to respond well to therapy progression with included increased resistance to LE strengthening interventions today. Continued to focus on progression of functional tasks with addition of step down and step up and over laterally today.       Plan: Continue per plan of care.  Progress treatment as tolerated.       Precautions: None      Date  Reassess 3/3 3/5 3/11 2/25   FOTO     JF  51   Manuals        Ham / quad 8 min SC Ham, calf  8min Ham, Calf, Quad, Knee Flexion 10 min  8 min   Gastroc stretch 30\" 5x 30\"  5 x      Knee ext 30\" 5x 30 \" 5 x   3 min           Neuro Re-Ed        SLB   Airex 15\" 4x Airex  15 \"  4 x  Airex 20\" 4x    Tandem walk 5 laps 5 laps 5 laps      Side stepping 5 laps 5 laps // bars 3 laps No UE  4 x  no arms   Quad sets   5\"x20  5\" 20 x   Towel Crush   5\"x20  5\" 20 x                   Ther Ex        LAQ  5\"  20 x 3# 5\"x20 15# 2x15 15 x 5 \"    HR/TR  30 x   30x   SLR 20x 20 x2# 15x  20 x  2#    SAQ  3#  20 x    5\" 20 x  3#   Heel slide  5\" 20x 5\"x20  5\" 20 x   Bike for mobility L7 4 min, L5 4 min L 5   7 min L5 7 min L1 8 min 8 min L 5   Mini squats 2x15 20 x 20x  20 x   SHC 25x 20 x 3# " "20x 30# 2x15 20x 2#    Standing abd 25x 20 x  2#  3#   20 x abd/ ext 15# 20x 20x 2#    Ther Activity        Marches 25x 20 x  2#  3# 20x  15# 20x 20 x 2   2#    Step down    6\" 15x    Step ups Fwd 8\" 15x  Lat 6\" 15x Fwd 8\"   15x  Lat  6\" 15 x F  8\"  15 x  Lat 6\" 15x Lateral up and over 6\" 10x    Gait Training        CP to R knee in supine 10 min seated 10 min 10 min  Seated with foot on stool  10 min           Modalities                                   "

## 2025-03-11 NOTE — PROGRESS NOTES
Assessment & Plan  S/P total knee replacement, right       Reviewed physical exam with patient at time of visit. The patient is 6 week s/p Right total knee arthroplasty. She continues to progress as expected post-operatively. Continue formal physical therapy, per protocol. Continue weightbearing activities, as tolerated. She will follow-up in 6 weeks for re-evaluation, with repeat XR of her Right knee. The patient expresses understanding and is in agreement with today's treatment plan.      The patient is doing quite well in regards to her right total knee replacement from January 27.  Flexion past 115 degrees.  Her calf touches her thigh.  Incision clean and dry.  Motor strength 4.5 out of 5.  Continue therapy till she plateaus.  Follow-up 6 weeks evaluation with new x-rays of right knee-3 views    Subjective:   Patient ID: Demi Almonte  1950     HPI  Patient is a 74 y.o. female who presents for post-operative evaluation of her right knee. The patient is approximately 6 weeks s/p right TKA, completed on 1/27/2025. The patient is doing well post-operatively. She denies pain at time of visit. She has continued physical therapy, she states that she has yet to begin strengthening exercises. She is ambulating without assistance.     The following portions of the patient's history were reviewed and updated as appropriate:  Past medical history, past surgical history, Family history, social history, current medications and allergies    Past Medical History:   Diagnosis Date    Acute pulmonary embolism (HCC) 01/10/2017    Allergic     Arthritis     rheumatoid    Atrial fibrillation (HCC)     CHF (congestive heart failure) ([facility])     Disease of thyroid gland     DVT (deep venous thrombosis) ([facility]) 2015    DVT, lower extremity ([facility]) 01/10/2017    HL (hearing loss)     Left Ear-Wear Hearing Aid    Hypertension     Hyperthyroidism     Irregular heart beat     Kidney stone     Migraine     hx of    Pleural effusion 2016  "   Polymyalgia rheumatica (HCC)     \"Remission\"    Polymyalgia rheumatica (HCC) 01/10/2017    Secondary adrenal insufficiency (HCC) 2017    Sepsis, unspecified organism ([facility]) 12/15/2019    Sleep apnea     Mild-Does not require CPAP    Varicella As a child    Vitamin D deficiency        Past Surgical History:   Procedure Laterality Date    CARDIAC CATHETERIZATION      CARDIOVERSION N/A 2019    Procedure: CARDIOVERSION;  Surgeon: Roque Alfaro MD;  Location: [facility];  Service: Cardiology     SECTION      x3 - last impression 16    FRACTURE SURGERY Left     wrist    HERNIA REPAIR  2018    JOINT REPLACEMENT  2022    left knee    KNEE ARTHROSCOPY Left     Meniscus Tear Repair    KNEE CARTILAGE SURGERY Left     [place] ARTHRP KNE CONDYLE&PLATU MEDIAL&LAT COMPARTMENTS Left 2022    Procedure: KNEE TOTAL ARTHROPLASTY;  Surgeon: Ryan Vega DO;  Location: [facility];  Service: Orthopedics    [place] ARTHRP KNE CONDYLE&PLATU MEDIAL&LAT COMPARTMENTS Right 2025    Procedure: ARTHROPLASTY KNEE TOTAL;  Surgeon: Ryan Vega DO;  Location: [facility];  Service: Orthopedics    TONSILLECTOMY AND ADENOIDECTOMY  child; age 14    TUBAL LIGATION      VEIN SURGERY Right     venous ligation with stripping       Family History   Problem Relation Age of Onset    Breast cancer Mother 52    Arthritis Mother     Cancer Mother     Hearing loss Mother     Vision loss Mother     Osteoarthritis Mother     Aneurysm Father         aortic    No Known Problems Sister     No Known Problems Maternal Grandmother     No Known Problems Maternal Grandfather     No Known Problems Paternal Grandmother     No Known Problems Paternal Grandfather     Rheum arthritis Maternal Aunt     Early death Maternal Aunt     Rheum arthritis Maternal Aunt     No Known Problems Paternal Aunt     No Known Problems Paternal Aunt     No Known Problems Paternal Aunt        Social History     Socioeconomic History    Marital " status: /Civil Union     Spouse name: None    Number of children: 3    Years of education: None    Highest education level: None   Occupational History    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   Tobacco Use    Smoking status: Never    Smokeless tobacco: Never   Vaping Use    Vaping status: Never Used   Substance and Sexual Activity    Alcohol use: Yes     Alcohol/week: 1.0 standard drink of alcohol     Types: 1 Glasses of wine per week     Comment: Socially    Drug use: No    Sexual activity: Yes     Partners: Male     Birth control/protection: Post-menopausal, Female Sterilization   Other Topics Concern    None   Social History Narrative    None     Social Drivers of Health     Financial Resource Strain: Not on file   Food Insecurity: No Food Insecurity (2025)    Nursing - Inadequate Food Risk Classification     Worried About Running Out of Food in the Last Year: Not on file     Ran Out of Food in the Last Year: Not on file     Ran Out of Food in the Last Year: Never true   Transportation Needs: No Transportation Needs (2025)    Nursing - Transportation Risk Classification     Lack of Transportation: Not on file     Lack of Transportation: No   Physical Activity: Not on file   Stress: Not on file   Social Connections: Not on file   Intimate Partner Violence: Unknown (2025)    Nursing IPS     Feels Physically and Emotionally Safe: Not on file     Physically Hurt by Someone: Not on file     Humiliated or Emotionally Abused by Someone: Not on file     Physically Hurt by Someone: No     Hurt or Threatened by Someone: No   Housing Stability: Unknown (2025)    Nursing: Inadequate Housing Risk Classification     Has Housing: Not on file     Worried About Losing Housing: Not on file     Unable to Get Utilities: Not on file     Unable to Pay for Housing in the Last Year: No     Has Housin         Current Outpatient Medications:     acetaminophen (TYLENOL) 325 mg tablet, Take 3 tablets  (975 mg total) by mouth every 8 (eight) hours, Disp: 270 tablet, Rfl: 0    Calcium Ascorbate 500 MG TABS, Take 500 mg by mouth daily  , Disp: , Rfl:     cholecalciferol (VITAMIN D3) 1,000 units tablet, Take 1,000 Units by mouth daily  , Disp: , Rfl:     Cinnamon 500 MG capsule, Take 500 mg by mouth daily, Disp: , Rfl:     Cyanocobalamin (VITAMIN B 12 PO), Take 500 mcg by mouth daily , Disp: , Rfl:     levothyroxine 75 mcg tablet, Take 1 tablet (75 mcg total) by mouth daily Monday- Saturday a full tablet with a half tablet on Sunday., Disp: 30 tablet, Rfl: 11    metoprolol succinate (TOPROL-XL) 25 mg 24 hr tablet, Take 1 tablet by mouth in the morning, Disp: , Rfl:     Red Yeast Rice 600 MG TABS, Take 1 tablet by mouth daily , Disp: , Rfl:     XARELTO 20 MG tablet, Take 1 tablet by mouth daily before dinner  , Disp: , Rfl:     docusate sodium (COLACE) 100 mg capsule, Take 1 capsule (100 mg total) by mouth 2 (two) times a day (Patient not taking: Reported on 3/11/2025), Disp: 60 capsule, Rfl: 0    Current Facility-Administered Medications:     denosumab (PROLIA) subcutaneous injection 60 mg, 60 mg, Subcutaneous, Q6 Months, , 60 mg at 01/23/25 1131    Allergies   Allergen Reactions    Amiodarone Other (See Comments)     Other reaction(s): Other (See Comments)  Reports caused elevated TSH    Sudafed [Pseudoephedrine] Tachycardia     Rapid heart beat    Sulfa Antibiotics Itching and Rash    Sulfamethoxazole-Trimethoprim Itching and Rash       Review of Systems   Constitutional:  Negative for chills, fever and unexpected weight change.   HENT:  Negative for hearing loss, nosebleeds and sore throat.    Eyes:  Negative for pain, redness and visual disturbance.   Respiratory:  Negative for cough, shortness of breath and wheezing.    Cardiovascular:  Negative for chest pain, palpitations and leg swelling.   Gastrointestinal:  Negative for abdominal pain, nausea and vomiting.   Endocrine: Negative for polydipsia and polyuria.  "  Genitourinary:  Negative for dysuria and hematuria.   Skin:  Negative for rash and wound.   Neurological:  Negative for dizziness, numbness and headaches.   Psychiatric/Behavioral:  Negative for decreased concentration and suicidal ideas. The patient is not nervous/anxious.    All other systems reviewed and are negative.       Objective:  Ht 5' 1\" (1.549 m)   Wt 79.8 kg (176 lb)   BMI 33.25 kg/m²     Ortho Exam  right knee(s) -   Patient ambulates with steady gait pattern  Uses No assistive device  No anatomical deformity  Skin is warm and dry to touch with no signs of erythema, ecchymosis, or infection   Mild generalized soft tissue swelling or effusion noted  ROM (0° - 125°)   Strength: 5/5 throughout  No tenderness to palpation on exam  Flexor and extensor mechanisms are intact   Knee is stable to varus and valgus stress  - Lachman's  - Anterior Drawer, - Posterior Drawer  Patella tracks centrally without palpable crepitus  Calf compartments are soft and supple  - Archie's sign  2+ DP and PT pulses with brisk capillary refill to the toes  Sural, saphenous, tibial, superficial, and deep peroneal motor and sensory distributions intact  Sensation light touch intact distally      Physical Exam  HENT:      Head: Normocephalic and atraumatic.      Nose: Nose normal.   Eyes:      Conjunctiva/sclera: Conjunctivae normal.   Cardiovascular:      Rate and Rhythm: Normal rate.   Pulmonary:      Effort: Pulmonary effort is normal.   Musculoskeletal:      Cervical back: Neck supple.   Skin:     General: Skin is warm and dry.      Capillary Refill: Capillary refill takes less than 2 seconds.   Neurological:      Mental Status: She is alert and oriented to person, place, and time.   Psychiatric:         Mood and Affect: Mood normal.         Behavior: Behavior normal.          Diagnostic Test Review:  None performed.     Procedures   None performed.       Scribe Attestation      I,:  Daynaa Person am acting as a scribe while in " the presence of the attending physician.:       I,:  Ryan Vega, DO personally performed the services described in this documentation    as scribed in my presence.:

## 2025-03-13 ENCOUNTER — OFFICE VISIT (OUTPATIENT)
Dept: PHYSICAL THERAPY | Facility: CLINIC | Age: 75
End: 2025-03-13
Payer: COMMERCIAL

## 2025-03-13 DIAGNOSIS — M25.561 ACUTE PAIN OF RIGHT KNEE: ICD-10-CM

## 2025-03-13 DIAGNOSIS — M17.11 PRIMARY OSTEOARTHRITIS OF RIGHT KNEE: Primary | ICD-10-CM

## 2025-03-13 DIAGNOSIS — Z96.651 STATUS POST TOTAL RIGHT KNEE REPLACEMENT: ICD-10-CM

## 2025-03-13 PROCEDURE — 97530 THERAPEUTIC ACTIVITIES: CPT

## 2025-03-13 PROCEDURE — 97140 MANUAL THERAPY 1/> REGIONS: CPT

## 2025-03-13 PROCEDURE — 97110 THERAPEUTIC EXERCISES: CPT

## 2025-03-13 NOTE — PROGRESS NOTES
"Daily Note     Today's date: 3/13/2025  Patient name: Demi Almonte  : 1950  MRN: [id number]  Referring provider: [place] [facility]*  Dx:   Encounter Diagnosis     ICD-10-CM    1. Primary osteoarthritis of right knee  M17.11       2. Status post total right knee replacement  Z96.651       3. Acute pain of right knee  M25.561           Start Time: 0930  Stop Time: 1015  Total time in clinic (min): 45 minutes    Subjective:  I have some pain over the front of my right knee and its stiff this morning,.       Objective: See treatment diary below      Assessment: Tolerated treatment well. Patient would benefit from continued PT   pt reports having some tightness and pain through out her session today, but, states that her knee felt less tight as she progressed with her exercises today., she still reports having increased pain with passive knee ext and flexion today.  She had some mild swelling noted in her knee today.        Plan: Continue per plan of care.      Precautions: None      Date  Reassess 3/3 3/5 3/11 3/13   FOTO     57  JF   Manuals        Ham / quad 8 min SC Ham, calf  8min Ham, Calf, Quad, Knee Flexion 10 min  8 min   Gastroc stretch 30\" 5x 30\"  5 x      Knee ext 30\" 5x 30 \" 5 x   3 min           Neuro Re-Ed        SLB   Airex 15\" 4x Airex  15 \"  4 x  Airex 20\" 4x Airex  20\"  4x   Tandem walk 5 laps 5 laps 5 laps      Side stepping 5 laps 5 laps // bars 3 laps No UE  4 x  no arms   Quad sets   5\"x20     Towel Crush   5\"x20                     Ther Ex        LAQ  5\"  20 x 3# 5\"x20 15# 2x15 15#  2 x 15    HR/TR  30 x   30x   SLR 20x 20 x2# 15x  20 x  2#    SAQ  3#  20 x       Heel slide  5\" 20x 5\"x20  5\" 20 x   Bike for mobility L7 4 min, L5 4 min L 5   7 min L5 7 min L1 8 min 8 min L 1   Mini squats 2x15 20 x 20x  20 x   SHC 25x 20 x 3# 20x 30# 2x15 30 # 30 x   Standing abd 25x 20 x  2#  3#   20 x abd/ ext 15# 20x 15#   20x   Ther Activity        Marches 25x 20 x  2#  3# 20x  15# 20x " "15#  20 x   Step down    6\" 15x    Step ups Fwd 8\" 15x  Lat 6\" 15x Fwd 8\"   15x  Lat  6\" 15 x F  8\"  15 x  Lat 6\" 15x Lateral up and over 6\" 10x 20 x  front 8\"   Gait Training        CP to R knee in supine 10 min seated 10 min 10 min  Seated with foot on stool  10 min           Modalities                                     "

## 2025-03-17 ENCOUNTER — OFFICE VISIT (OUTPATIENT)
Dept: PHYSICAL THERAPY | Facility: CLINIC | Age: 75
End: 2025-03-17
Payer: COMMERCIAL

## 2025-03-17 DIAGNOSIS — M25.561 CHRONIC PAIN OF RIGHT KNEE: Primary | ICD-10-CM

## 2025-03-17 DIAGNOSIS — G89.29 CHRONIC PAIN OF RIGHT KNEE: Primary | ICD-10-CM

## 2025-03-17 DIAGNOSIS — M17.11 PRIMARY OSTEOARTHRITIS OF RIGHT KNEE: ICD-10-CM

## 2025-03-17 DIAGNOSIS — Z96.651 STATUS POST TOTAL RIGHT KNEE REPLACEMENT: ICD-10-CM

## 2025-03-17 DIAGNOSIS — M25.561 ACUTE PAIN OF RIGHT KNEE: ICD-10-CM

## 2025-03-17 PROCEDURE — 97530 THERAPEUTIC ACTIVITIES: CPT

## 2025-03-17 PROCEDURE — 97110 THERAPEUTIC EXERCISES: CPT

## 2025-03-17 PROCEDURE — 97140 MANUAL THERAPY 1/> REGIONS: CPT

## 2025-03-17 NOTE — PROGRESS NOTES
"Daily Note     Today's date: 3/17/2025  Patient name: Demi Almonte  : 1950  MRN: [id number]  Referring provider: [place] [facility]*  Dx:   Encounter Diagnosis     ICD-10-CM    1. Chronic pain of right knee  M25.561     G89.29       2. Acute pain of right knee  M25.561       3. Status post total right knee replacement  Z96.651       4. Primary osteoarthritis of right knee  M17.11           Start Time: 930  Stop Time: 1020  Total time in clinic (min): 50 minutes    Subjective:  My knee was stiff this morning , but, feels better now that I am up walking ,  I did good over the weekend with my walking,       Objective: See treatment diary below      Assessment: Tolerated treatment well. Patient would benefit from continued PT  pt reports that she is doing better with her walking and exercises. She is showing improvement with her standing exercises as well as her motion , she still has some tightness noted with both her flexion and ext, but, each is improving,  she had less pain with passive ext today,  we will continue to work on her motion and strength,  we ended with a cold pack to her right knee is sitting,       Plan: Continue per plan of care.      Precautions: None      Date 3/17 3/3 3/5 3/11 3/13   FOTO     55  JF   Manuals        Ham / quad 8 min SC Ham, calf  8min Ham, Calf, Quad, Knee Flexion 10 min  8 min   Gastroc stretch 30\" 5x 30\"  5 x      Knee ext / flex 30\" 5x 30 \" 5 x   3 min           Neuro Re-Ed        SLB   Airex 15\" 4x Airex  15 \"  4 x  Airex 20\" 4x Airex  20\"  4x   Tandem walk 5 laps 5 laps 5 laps      Side stepping 5 laps 5 laps // bars 3 laps No UE  4 x  no arms   Quad sets   5\"x20     Towel Crush   5\"x20                     Ther Ex        LAQ 15#  30 x 5\"  20 x 3# 5\"x20 15# 2x15 15#  2 x 15    HR/TR 30 x 30 x   30x   SLR 20x 20 x2# 15x  20 x  2#    SAQ  3#  20 x       Heel slide  5\" 20x 5\"x20  5\" 20 x   Bike for mobility L7 4 min, L5 4 min L 5   7 min L5 7 min L1 8 min 8 min " "L 1   Mini squats 2x15 20 x 20x  20 x   SHC 30 #  30 x 20 x 3# 20x 30# 2x15 30 # 30 x   Standing abd 15#  20 x 20 x  2#  3#   20 x abd/ ext 15# 20x 15#   20x   Ther Activity        Marches 15#  20 x 20 x  2#  3# 20x  15# 20x 15#  20 x   Step down    6\" 15x    Step ups Fwd 8\" 20 x  Lat 6\" 20 x Fwd 8\"   15x  Lat  6\" 15 x F  8\"  15 x  Lat 6\" 15x Lateral up and over 6\" 10x 20 x  front 8\"   Gait Training        CP to R knee in supine 10 min seated 10 min 10 min  Seated with foot on stool  10 min           Modalities                                       "

## 2025-03-20 ENCOUNTER — OFFICE VISIT (OUTPATIENT)
Dept: PHYSICAL THERAPY | Facility: CLINIC | Age: 75
End: 2025-03-20
Payer: COMMERCIAL

## 2025-03-20 DIAGNOSIS — M25.561 CHRONIC PAIN OF RIGHT KNEE: ICD-10-CM

## 2025-03-20 DIAGNOSIS — Z96.651 STATUS POST TOTAL RIGHT KNEE REPLACEMENT: Primary | ICD-10-CM

## 2025-03-20 DIAGNOSIS — G89.29 CHRONIC PAIN OF RIGHT KNEE: ICD-10-CM

## 2025-03-20 DIAGNOSIS — M25.561 ACUTE PAIN OF RIGHT KNEE: ICD-10-CM

## 2025-03-20 PROCEDURE — 97140 MANUAL THERAPY 1/> REGIONS: CPT

## 2025-03-20 PROCEDURE — 97530 THERAPEUTIC ACTIVITIES: CPT

## 2025-03-20 PROCEDURE — 97110 THERAPEUTIC EXERCISES: CPT

## 2025-03-20 NOTE — PROGRESS NOTES
"Daily Note     Today's date: 3/20/2025  Patient name: Demi Almonte  : 1950  MRN: [id number]  Referring provider: [place] [facility]*  Dx:   Encounter Diagnosis     ICD-10-CM    1. Status post total right knee replacement  Z96.651       2. Acute pain of right knee  M25.561       3. Chronic pain of right knee  M25.561     G89.29           Start Time: 1145  Stop Time: 1235  Total time in clinic (min): 50 minutes    Subjective:  I have more stiffness in my knee than pain today.      Objective: See treatment diary below      Assessment: Tolerated treatment well. Patient would benefit from continued PT   pt was able to use the 8\" box today for lateral step ups and was able to do 30 reps for most exercises today,  she reports that she is walking better and is having less pain over all. We worked on her knee flexion and ext today., she continues to report pain with mainly knee ext.  We will increase her program as able.  We iced post for 10 min to her right knee seated.       Plan: Continue per plan of care.      Precautions: None      Date 3/17 3/20 3/5 3/11 3/13   FOTO     57  JF   Manuals        Ham / quad 8 min SC Ham, calf  8min Ham, Calf, Quad, Knee Flexion 10 min  8 min   Gastroc stretch 30\" 5x 30\"  5 x      Knee ext / flex 30\" 5x 30 \" 5 x   3 min           Neuro Re-Ed        SLB   Airex 15\" 4x Airex  15 \"  4 x  Airex 20\" 4x Airex  20\"  4x   Tandem walk 5 laps 5 laps 5 laps      Side stepping 5 laps 5 laps // bars 3 laps No UE  4 x  no arms   Quad sets   5\"x20     Towel Crush   5\"x20                     Ther Ex        LAQ 15#  30 x 15#  20 x 3# 5\"x20 15# 2x15 15#  2 x 15    HR/TR 30 x 30 x   30x   SLR 20x 20 x2# 15x  20 x  2#    SAQ  3#  20 x       Heel slide  5\" 20x 5\"x20  5\" 20 x   Bike for mobility L7 4 min, L5 4 min L 7   7 min L5 7 min L1 8 min 8 min L 1   Mini squats 2x15 30 x 20x  20 x   SHC 30 #  30 x 35 #  30 x 3# 20x 30# 2x15 30 # 30 x   Standing abd 15#  20 x 20 x  2#  3#   20 x abd/ ext " "15# 20x 15#   20x   Ther Activity        Marches 15#  20 x 30 #  15 xx                                                                                                                                                                                                                                                                                                                                                                                                       3# 20x  15# 20x 15#  20 x   Step down    6\" 15x    Step ups Fwd 8\" 20 x  Lat 6\" 20 x Fwd 8\"   20 x                                                                                                                                                                                                                                                                                        Lat  6\" 15 x F  8\"  15 x  Lat 6\" 15x Lateral up and over 6\" 10x 20 x  front 8\"   Gait Training        CP to R knee in supine 10 min seated 10 min 10 min  Seated with foot on stool  10 min           Modalities                                         "

## 2025-03-24 ENCOUNTER — APPOINTMENT (OUTPATIENT)
Dept: PHYSICAL THERAPY | Facility: CLINIC | Age: 75
End: 2025-03-24
Payer: COMMERCIAL

## 2025-03-25 ENCOUNTER — OFFICE VISIT (OUTPATIENT)
Dept: PHYSICAL THERAPY | Facility: CLINIC | Age: 75
End: 2025-03-25
Payer: COMMERCIAL

## 2025-03-25 DIAGNOSIS — M25.561 CHRONIC PAIN OF RIGHT KNEE: ICD-10-CM

## 2025-03-25 DIAGNOSIS — Z96.651 STATUS POST TOTAL RIGHT KNEE REPLACEMENT: ICD-10-CM

## 2025-03-25 DIAGNOSIS — M25.561 ACUTE PAIN OF RIGHT KNEE: Primary | ICD-10-CM

## 2025-03-25 DIAGNOSIS — G89.29 CHRONIC PAIN OF RIGHT KNEE: ICD-10-CM

## 2025-03-25 DIAGNOSIS — M17.11 PRIMARY OSTEOARTHRITIS OF RIGHT KNEE: ICD-10-CM

## 2025-03-25 PROCEDURE — 97110 THERAPEUTIC EXERCISES: CPT

## 2025-03-25 NOTE — PROGRESS NOTES
"Daily Note     Today's date: 3/25/2025  Patient name: Demi Almonte  : 1950  MRN: [id number]  Referring provider: South Montoya P*  Dx:   Encounter Diagnosis     ICD-10-CM    1. Acute pain of right knee  M25.561       2. Status post total right knee replacement  Z96.651       3. Chronic pain of right knee  M25.561     G89.29       4. Primary osteoarthritis of right knee  M17.11           Start Time: 1100  Stop Time: 1145  Total time in clinic (min): 45 minutes    Subjective:  My knee is just stiff today,       Objective: See treatment diary below      Assessment: Tolerated treatment well. Patient would benefit from continued PT   pt reports having stiffness in her knee through out her exercises today.  She still reports having pain with passive knee ext and flexion,.  Pt still reports having some difficulty with the stairs . She states that she has to bring her leg in from the side. She reports doing a zoomba class last night and just did front and back stepping,  we will continue to work on her strength and motion, she still has most pain with passive ext.       Plan: Continue per plan of care.      Precautions: None      Date 3/17 3/20 3/25 3/11 3/13   FOTO     55  JF   Manuals        Ham / quad 8 min SC Ham, calf  8min Ham, Calf, Quad, Knee Flexion 10 min  8 min   Gastroc stretch 30\" 5x 30\"  5 x 30\" 5 x     Knee ext / flex 30\" 5x 30 \" 5 x 30\" 5 x  3 min           Neuro Re-Ed        SLB   Airex 15\" 4x Airex  15 \"  4 x Airex  15 \"  4 x Airex 20\" 4x Airex  20\"  4x   Tandem walk 5 laps 5 laps 5 laps      Side stepping 5 laps 5 laps // bars 3 laps No UE  4 x  no arms   Quad sets        Towel Crush                        Ther Ex        LAQ 15#  30 x 15#  20 x 15# 5\"x20 15# 2x15 15#  2 x 15    HR/TR 30 x 30 x 30 x  30x   SLR 20x 20 x2#   20 x  2#    SAQ  3#  20 x  5#  20 x     Heel slide  5\" 20x   5\" 20 x   Bike for mobility L7 4 min, L5 4 min L 7   7 min L5 7 min L1 8 min 8 min L 1   Mini squats " "2x15 30 x 20x  20 x   SHC 30 #  30 x 35 #  30 x 35# 20x 30# 2x15 30 # 30 x   Standing abd 15#  20 x 20 x  2#  15 #  20 x abd/ ext 15# 20x 15#   20x   Ther Activity        Marches 15#  20 x 30 #  15 xx                                                                                                                                                                                                                                                                                                                                                                                                       30# 20x  15# 20x 15#  20 x   Step down    6\" 15x    Step ups Fwd 8\" 20 x  Lat 6\" 20 x Fwd 8\"   20 x                                                                                                                                                                                                                                                                                        Lat  6\" 15 x F  8\"  15 x  Lat 6\" 15x Lateral up and over 6\" 10x 20 x  front 8\"   Gait Training        CP to R knee in supine 10 min seated 10 min 10 min  Seated with foot on stool  10 min           Modalities                                           "

## 2025-03-27 ENCOUNTER — EVALUATION (OUTPATIENT)
Dept: PHYSICAL THERAPY | Facility: CLINIC | Age: 75
End: 2025-03-27
Payer: COMMERCIAL

## 2025-03-27 DIAGNOSIS — M25.561 ACUTE PAIN OF RIGHT KNEE: Primary | ICD-10-CM

## 2025-03-27 DIAGNOSIS — Z96.651 STATUS POST TOTAL RIGHT KNEE REPLACEMENT: ICD-10-CM

## 2025-03-27 PROCEDURE — 97110 THERAPEUTIC EXERCISES: CPT | Performed by: PHYSICAL THERAPIST

## 2025-03-27 PROCEDURE — 97530 THERAPEUTIC ACTIVITIES: CPT | Performed by: PHYSICAL THERAPIST

## 2025-03-27 PROCEDURE — 97140 MANUAL THERAPY 1/> REGIONS: CPT | Performed by: PHYSICAL THERAPIST

## 2025-03-27 NOTE — LETTER
2025    South Montoya PA-C  [address]   [facility] [address]  [facility] [address]    Patient: Demi Almonte   YOB: 1950   Date of Visit: 3/27/2025     Encounter Diagnosis     ICD-10-CM    1. Acute pain of right knee  M25.561       2. Status post total right knee replacement  Z96.651           Dear Dr. Montoya:    Thank you for your recent referral of Demi Almonte. Please review the attached evaluation summary from Demi's recent visit.     Please verify that you agree with the plan of care by signing the attached order.     If you have any questions or concerns, please do not hesitate to call.     I sincerely appreciate the opportunity to share in the care of one of your patients and hope to have another opportunity to work with you in the near future.       Sincerely,    Jose Conner, PT      Referring Provider:      I certify that I have read the below Plan of Care and certify the need for these services furnished under this plan of treatment while under my care.                    South Montoya PA-C  [address]   [facility] [address]  [facility] [address]  Via In Basket          PT Re-Evaluation     Today's date: 3/27/2025  Patient name: Demi Almonte  : 1950  MRN: [id number]  Referring provider: [place] Montoya P*  Dx:   Encounter Diagnosis     ICD-10-CM    1. Acute pain of right knee  M25.561       2. Status post total right knee replacement  Z96.651           Start Time: 930  Stop Time: 1015  Total time in clinic (min): 45 minutes    Assessment  Impairments: abnormal gait, abnormal or restricted ROM, activity intolerance, impaired physical strength, lacks appropriate home exercise program, pain with function and activity limitations  Symptom irritability: low    Assessment details: Patient has attended 8 physical therapy visits to date. She continues to show positive improvements with knee mobility and strength. There is mild strength deficit to R  "hip abduction, flexion, and knee extension which will be addressed with strength progression over the next 3 weeks. She was challenged with progression of therapy interventions today to address the existing deficits and reports of difficulties with functional activities.   Understanding of Dx/Px/POC: good     Prognosis: good    Goals  STGs:  \"Patient will be independent with hep by 2-3 visits. - MET  Improve knee flexion to 90 degrees for improved tolerance with ambulation and transfers by 3-4 weeks. - MET  Improve knee extension to 0 degrees for improved tolerance with ambulation by 3-4 weeks. - Progressing  Decrease pain levels by 50% for improved tolerance with adls and ambulation by 3-4 weeks. \" - MET    LTGs:  Improve FOTO score from 36 to 70 indicating improved tolerance for activities involving the LE by discharge. - Progressing  Improve knee rom and strength to wnl for improved tolerance for ambulation and adls by discharge. - progressing  Patient will be able to ambulate up and down a flight of stairs with reciprocal gait pattern by discharge. - Progressing  Ambulation will be performed on all surfaces without limitation or deviation by discharge. \" - Progressing      Plan  Patient would benefit from: skilled physical therapy  Planned modality interventions: cryotherapy and thermotherapy: hydrocollator packs    Planned therapy interventions: ADL retraining, balance/weight bearing training, flexibility, functional ROM exercises, gait training, home exercise program, joint mobilization, manual therapy, neuromuscular re-education, patient education, strengthening, stretching, therapeutic activities and therapeutic exercise    Frequency: 1-2x week  Duration in weeks: 3  Plan of Care beginning date: 3/27/2025  Plan of Care expiration date: 4/17/2025  Treatment plan discussed with: patient  Plan details: Patient informed that from this point forward, to ensure adherence to the aforementioned plan of care, all or " some of the treatment may be performed and carried out by a Physical Therapy Assistant (PTA) with supervision from a licensed Physical Therapist (PT) in accordance with [facility] Physical Therapy Practice Act.  Patient will continue to benefit from skilled physical therapy to address the functional deficits that were identified during the evaluation today. We will continue to progress the therapy program to address these functional deficits and achieve the established goals.           Subjective Evaluation    History of Present Illness  Mechanism of injury: Patient presents to out patient physical therapy with chief c/o R knee pain. Patient reports a history of chronic R knee pain with worsening over the past 2 years. She has had cortisone injections in her knee which have started to become less effective recently. She finds that when the weather is colder this causes her knee to become stiff and also increases the pain in her knee. She finds that when she first wakes up in the morning her knee will be stiff and she has to move her leg around and stretch before getting up from bed. She is limited with standing to approximately 30 minutes before she needs to sit down because of the pain in her knee. Patient is schedule for an upcoming R TKA on 1/27/2025.     Update 1/30/2025:  Patient presents today s/p R TKA performed on 1/27/2025. She reports that her pain has been pretty well controlled since getting home on Tuesday. She feels that there is some discomfort in the R hip which she feels was from how she was laying in the hospital bed. She notes that she has been doing well with using her walker to assist with ambulation. She has not had a bowel movement since surgery but is taking a stool softener.     Update 2/27/2025:  Patient reports that she has most of her discomfort in the knee at night when she is trying to go to sleep. She is still doing one step at a time out of fear and due to weakness/pain in her  knee. She has been driving since last Friday without issue. Car transfers are also improving per patient report.     Update 3/27/2025:  Patient reports continued improvements with her tolerance for ambulation as she feels that the knee is getting stronger and she is no longer experiencing the pain that she was prior to her surgery. She notes that she still has some stiffness with ascending stairs and some weakness when descending stairs. She feels that she has not had much pain in her knee but more so stiffness.           Recurrent probem    Quality of life: good    Patient Goals  Patient goals for therapy: increased strength, increased motion, decreased pain, decreased edema, independence with ADLs/IADLs and improved balance  Patient goal: Patient wishes to recover from her surgery and return to her normal daily activities without limitation from the R knee.  Pain  Current pain ratin  At best pain ratin  At worst pain ratin  Location: R knee  Quality: discomfort, dull ache and tight  Relieving factors: medications and rest  Aggravating factors: stair climbing, standing and walking    Social Support  Steps to enter house: yes  5  Stairs in house: yes   Lives in: multiple-level home  Lives with: spouse    Employment status: not working  Treatments  Previous treatment: injection treatment        Objective     Active Range of Motion   Left Knee   Flexion: 123 degrees   Extension: 1 degrees   Extensor la degrees     Right Knee   Flexion: 117 degrees   Extension: -2 degrees   Extensor la degrees     Passive Range of Motion   Left Knee   Extension: 1 degrees     Right Knee   Flexion: 121 degrees   Extension: -2 degrees     Strength/Myotome Testing     Left Hip   Planes of Motion   Flexion: 4+  Extension: 4+  Abduction: 4+  Adduction: 4+    Right Hip   Planes of Motion   Flexion: 4+  Extension: 4+  Abduction: 4  Adduction: 4+    Left Knee   Flexion: 4+  Extension: 4+  Quadriceps contraction:  "good    Right Knee   Flexion: 4+  Extension: 4  Quadriceps contraction: good    Tests     Right Ankle/Foot   Negative for Homans' sign.     Ambulation     Observational Gait     Additional Observational Gait Details  Patient is ambulating with slight lack of terminal knee extension to the R LE during stance phase of gait.              Precautions: None      Date 3/17 3/20 3/25 3/27 Reassess 3/13   FOTO    61 SC 55  JF   Manuals        Ham / quad 8 min SC Ham, calf  8min Ham, Calf, Quad, Knee Flexion 10 min 5 min 8 min   Gastroc stretch 30\" 5x 30\"  5 x 30\" 5 x     Knee ext / flex 30\" 5x 30 \" 5 x 30\" 5 x 5 min 3 min           Neuro Re-Ed        SLB   Airex 15\" 4x Airex  15 \"  4 x Airex  15 \"  4 x  Airex  20\"  4x   Tandem walk 5 laps 5 laps 5 laps      Side stepping 5 laps 5 laps // bars 3 laps No UE  4 x  no arms   Quad sets        Towel Crush                        Ther Ex        LAQ 15#  30 x 15#  20 x 15# 5\"x20 20# 2x15 15#  2 x 15    Heel slide  5\" 20x   5\" 20 x   Bike for Strengthening L7 4 min, L5 4 min L 7   7 min L5 7 min L3 5 min 8 min L 1   Leg Press 2x15 30 x 20x 20# 20x 20 x   SHC 30 #  30 x 35 #  30 x 35# 20x  30 # 30 x   Standing abd 15#  20 x 20 x  2#  15 #  20 x abd/ ext 30# 20x 15#   20x   Ther Activity        Marches 15#  20 x 30 #  15 xx                                                                                                                                                                                                                                                                                                                                                                                                       30# 20x  30# 20x 15#  20 x   Retro walking w/ CC resistance    P3 10x    Step down        Step ups Fwd 8\" 20 x  Lat 6\" 20 x Fwd 8\"   20 x                                                                                                                                                    " "                                                                                                                                    Lat  6\" 15 x F  8\"  15 x  Lat 6\" 15x  20 x  front 8\"   Gait Training        CP to R knee in supine 10 min seated 10 min 10 min  Seated with foot on stool  10 min           Modalities                                               "

## 2025-03-27 NOTE — PROGRESS NOTES
"PT Re-Evaluation     Today's date: 3/27/2025  Patient name: Demi Almonte  : 1950  MRN: [id number]  Referring provider: [place] Mont[place] P*  Dx:   Encounter Diagnosis     ICD-10-CM    1. Acute pain of right knee  M25.561       2. Status post total right knee replacement  Z96.651           Start Time: 930  Stop Time: 1015  Total time in clinic (min): 45 minutes    Assessment  Impairments: abnormal gait, abnormal or restricted ROM, activity intolerance, impaired physical strength, lacks appropriate home exercise program, pain with function and activity limitations  Symptom irritability: low    Assessment details: Patient has attended 8 physical therapy visits to date. She continues to show positive improvements with knee mobility and strength. There is mild strength deficit to R hip abduction, flexion, and knee extension which will be addressed with strength progression over the next 3 weeks. She was challenged with progression of therapy interventions today to address the existing deficits and reports of difficulties with functional activities.   Understanding of Dx/Px/POC: good     Prognosis: good    Goals  STGs:  \"Patient will be independent with hep by 2-3 visits. - MET  Improve knee flexion to 90 degrees for improved tolerance with ambulation and transfers by 3-4 weeks. - MET  Improve knee extension to 0 degrees for improved tolerance with ambulation by 3-4 weeks. - Progressing  Decrease pain levels by 50% for improved tolerance with adls and ambulation by 3-4 weeks. \" - MET    LTGs:  Improve FOTO score from 36 to 70 indicating improved tolerance for activities involving the LE by discharge. - Progressing  Improve knee rom and strength to wnl for improved tolerance for ambulation and adls by discharge. - progressing  Patient will be able to ambulate up and down a flight of stairs with reciprocal gait pattern by discharge. - Progressing  Ambulation will be performed on all surfaces without " "limitation or deviation by discharge. \" - Progressing      Plan  Patient would benefit from: skilled physical therapy  Planned modality interventions: cryotherapy and thermotherapy: hydrocollator packs    Planned therapy interventions: ADL retraining, balance/weight bearing training, flexibility, functional ROM exercises, gait training, home exercise program, joint mobilization, manual therapy, neuromuscular re-education, patient education, strengthening, stretching, therapeutic activities and therapeutic exercise    Frequency: 1-2x week  Duration in weeks: 3  Plan of Care beginning date: 3/27/2025  Plan of Care expiration date: 4/17/2025  Treatment plan discussed with: patient  Plan details: Patient informed that from this point forward, to ensure adherence to the aforementioned plan of care, all or some of the treatment may be performed and carried out by a Physical Therapy Assistant (PTA) with supervision from a licensed Physical Therapist (PT) in accordance with [facility] Physical Therapy Practice Act.  Patient will continue to benefit from skilled physical therapy to address the functional deficits that were identified during the evaluation today. We will continue to progress the therapy program to address these functional deficits and achieve the established goals.           Subjective Evaluation    History of Present Illness  Mechanism of injury: Patient presents to out patient physical therapy with chief c/o R knee pain. Patient reports a history of chronic R knee pain with worsening over the past 2 years. She has had cortisone injections in her knee which have started to become less effective recently. She finds that when the weather is colder this causes her knee to become stiff and also increases the pain in her knee. She finds that when she first wakes up in the morning her knee will be stiff and she has to move her leg around and stretch before getting up from bed. She is limited with standing " to approximately 30 minutes before she needs to sit down because of the pain in her knee. Patient is schedule for an upcoming R TKA on 2025.     Update 2025:  Patient presents today s/p R TKA performed on 2025. She reports that her pain has been pretty well controlled since getting home on Tuesday. She feels that there is some discomfort in the R hip which she feels was from how she was laying in the hospital bed. She notes that she has been doing well with using her walker to assist with ambulation. She has not had a bowel movement since surgery but is taking a stool softener.     Update 2025:  Patient reports that she has most of her discomfort in the knee at night when she is trying to go to sleep. She is still doing one step at a time out of fear and due to weakness/pain in her knee. She has been driving since last Friday without issue. Car transfers are also improving per patient report.     Update 3/27/2025:  Patient reports continued improvements with her tolerance for ambulation as she feels that the knee is getting stronger and she is no longer experiencing the pain that she was prior to her surgery. She notes that she still has some stiffness with ascending stairs and some weakness when descending stairs. She feels that she has not had much pain in her knee but more so stiffness.           Recurrent probem    Quality of life: good    Patient Goals  Patient goals for therapy: increased strength, increased motion, decreased pain, decreased edema, independence with ADLs/IADLs and improved balance  Patient goal: Patient wishes to recover from her surgery and return to her normal daily activities without limitation from the R knee.  Pain  Current pain ratin  At best pain ratin  At worst pain ratin  Location: R knee  Quality: discomfort, dull ache and tight  Relieving factors: medications and rest  Aggravating factors: stair climbing, standing and walking    Social Support  Steps  "to enter house: yes  5  Stairs in house: yes   Lives in: multiple-level home  Lives with: spouse    Employment status: not working  Treatments  Previous treatment: injection treatment        Objective     Active Range of Motion   Left Knee   Flexion: 123 degrees   Extension: 1 degrees   Extensor la degrees     Right Knee   Flexion: 117 degrees   Extension: -2 degrees   Extensor la degrees     Passive Range of Motion   Left Knee   Extension: 1 degrees     Right Knee   Flexion: 121 degrees   Extension: -2 degrees     Strength/Myotome Testing     Left Hip   Planes of Motion   Flexion: 4+  Extension: 4+  Abduction: 4+  Adduction: 4+    Right Hip   Planes of Motion   Flexion: 4+  Extension: 4+  Abduction: 4  Adduction: 4+    Left Knee   Flexion: 4+  Extension: 4+  Quadriceps contraction: good    Right Knee   Flexion: 4+  Extension: 4  Quadriceps contraction: good    Tests     Right Ankle/Foot   Negative for Homans' sign.     Ambulation     Observational Gait     Additional Observational Gait Details  Patient is ambulating with slight lack of terminal knee extension to the R LE during stance phase of gait.              Precautions: None      Date 3/17 3/20 3/25 3/27 Reassess 3/13   FOTO    61 SC 55  JF   Manuals        Ham / quad 8 min SC Ham, calf  8min Ham, Calf, Quad, Knee Flexion 10 min 5 min 8 min   Gastroc stretch 30\" 5x 30\"  5 x 30\" 5 x     Knee ext / flex 30\" 5x 30 \" 5 x 30\" 5 x 5 min 3 min           Neuro Re-Ed        SLB   Airex 15\" 4x Airex  15 \"  4 x Airex  15 \"  4 x  Airex  20\"  4x   Tandem walk 5 laps 5 laps 5 laps      Side stepping 5 laps 5 laps // bars 3 laps No UE  4 x  no arms   Quad sets        Towel Crush                        Ther Ex        LAQ 15#  30 x 15#  20 x 15# 5\"x20 20# 2x15 15#  2 x 15    Heel slide  5\" 20x   5\" 20 x   Bike for Strengthening L7 4 min, L5 4 min L 7   7 min L5 7 min L3 5 min 8 min L 1   Leg Press 2x15 30 x 20x 20# 20x 20 x   SHC 30 #  30 x 35 #  30 x 35# 20x  30 # 30 " "x   Standing abd 15#  20 x 20 x  2#  15 #  20 x abd/ ext 30# 20x 15#   20x   Ther Activity        Marches 15#  20 x 30 #  15 xx                                                                                                                                                                                                                                                                                                                                                                                                       30# 20x  30# 20x 15#  20 x   Retro walking w/ CC resistance    P3 10x    Step down        Step ups Fwd 8\" 20 x  Lat 6\" 20 x Fwd 8\"   20 x                                                                                                                                                                                                                                                                                        Lat  6\" 15 x F  8\"  15 x  Lat 6\" 15x  20 x  front 8\"   Gait Training        CP to R knee in supine 10 min seated 10 min 10 min  Seated with foot on stool  10 min           Modalities                               "

## 2025-04-01 ENCOUNTER — OFFICE VISIT (OUTPATIENT)
Dept: PHYSICAL THERAPY | Facility: CLINIC | Age: 75
End: 2025-04-01
Payer: COMMERCIAL

## 2025-04-01 DIAGNOSIS — G89.29 CHRONIC PAIN OF RIGHT KNEE: ICD-10-CM

## 2025-04-01 DIAGNOSIS — M25.561 CHRONIC PAIN OF RIGHT KNEE: ICD-10-CM

## 2025-04-01 DIAGNOSIS — Z96.651 STATUS POST TOTAL RIGHT KNEE REPLACEMENT: ICD-10-CM

## 2025-04-01 DIAGNOSIS — M17.11 PRIMARY OSTEOARTHRITIS OF RIGHT KNEE: Primary | ICD-10-CM

## 2025-04-01 DIAGNOSIS — M25.561 ACUTE PAIN OF RIGHT KNEE: ICD-10-CM

## 2025-04-01 PROCEDURE — 97140 MANUAL THERAPY 1/> REGIONS: CPT

## 2025-04-01 PROCEDURE — 97112 NEUROMUSCULAR REEDUCATION: CPT

## 2025-04-01 PROCEDURE — 97110 THERAPEUTIC EXERCISES: CPT

## 2025-04-01 NOTE — PROGRESS NOTES
"Daily Note     Today's date: 2025  Patient name: Demi Almonte  : 1950  MRN: [id number]  Referring provider: [place] [facility]*  Dx:   Encounter Diagnosis     ICD-10-CM    1. Primary osteoarthritis of right knee  M17.11       2. Chronic pain of right knee  M25.561     G89.29       3. Status post total right knee replacement  Z96.651       4. Acute pain of right knee  M25.561           Start Time: 930  Stop Time: 101  Total time in clinic (min): 45 minutes    Subjective:  I dont have as much stiffness today. The swelling is still there. I am not really having pain at night anymore.       Objective: See treatment diary below      Assessment: Tolerated treatment well. Patient would benefit from continued PT  pt reports that she is feeling better when doing the stairs., she still has to start out doing one at a time then goes to one over the other.  She was able to increase wt today for some exercises with good tolerance.,  she had some trouble with standing abduction today ,but, was able to complete with some fatigue.,  she feels over all she is improving with her knee motion and strength,  she is walking more with less tightness in her knee.  We will continue to work on her strength and motion,  WE will try to increase wt next time for the hip machine, pt still gets pain.  She had some mild pain with passive flexion and ext today,. Pt declines ice post.       Plan: Continue per plan of care.      Precautions: None      Date 3/17 3/20 3/25 3/27 Reassess    FOTO    61 SC    Manuals        Ham / quad 8 min SC Ham, calf  8min Ham, Calf, Quad, Knee Flexion 10 min 5 min 8 min   Gastroc stretch 30\" 5x 30\"  5 x 30\" 5 x     Knee ext / flex 30\" 5x 30 \" 5 x 30\" 5 x 5 min 5 min           Neuro Re-Ed        SLB   Airex 15\" 4x Airex  15 \"  4 x Airex  15 \"  4 x  Airex  20\"  4x   Tandem walk 5 laps 5 laps 5 laps      Side stepping 5 laps 5 laps // bars 3 laps No UE  4 x  no arms   Quad sets        Towel " "Crush                        Ther Ex        LAQ 15#  30 x 15#  20 x 15# 5\"x20 20# 2x15 20#  2 x 15    Heel slide  5\" 20x      Bike for Strengthening L7 4 min, L5 4 min L 7   7 min L5 7 min L3 5 min 6 min L 1   Leg Press 2x15 30 x 20x 20# 20x 30 x  30#   SHC 30 #  30 x 35 #  30 x 35# 20x  35 # 30 x   Standing abd 15#  20 x 20 x  2#  15 #  20 x abd/ ext 30# 20x 30#   20x  abd/ ext    Ther Activity        Marches 15#  20 x 30 #  15 xx                                                                                                                                                                                                                                                                                                                                                                                                       30# 20x  30# 20x 30#  20 x   Retro walking w/ CC resistance    P3 10x P 3 10 x   Step down        Step ups Fwd 8\" 20 x  Lat 6\" 20 x Fwd 8\"   20 x                                                                                                                                                                                                                                                                                        Lat  6\" 15 x F  8\"  15 x  Lat 6\" 15x  20 x  front 8\"  Lat 6\" 15 x   Gait Training        CP to R knee in supine 10 min seated 10 min 10 min  Seated with foot on stool  10 min           Modalities                               "

## 2025-04-03 ENCOUNTER — OFFICE VISIT (OUTPATIENT)
Dept: PHYSICAL THERAPY | Facility: CLINIC | Age: 75
End: 2025-04-03
Payer: COMMERCIAL

## 2025-04-03 DIAGNOSIS — M17.11 PRIMARY OSTEOARTHRITIS OF RIGHT KNEE: Primary | ICD-10-CM

## 2025-04-03 DIAGNOSIS — M25.561 CHRONIC PAIN OF RIGHT KNEE: ICD-10-CM

## 2025-04-03 DIAGNOSIS — M25.561 ACUTE PAIN OF RIGHT KNEE: ICD-10-CM

## 2025-04-03 DIAGNOSIS — G89.29 CHRONIC PAIN OF RIGHT KNEE: ICD-10-CM

## 2025-04-03 DIAGNOSIS — Z96.651 STATUS POST TOTAL RIGHT KNEE REPLACEMENT: ICD-10-CM

## 2025-04-03 PROCEDURE — 97112 NEUROMUSCULAR REEDUCATION: CPT

## 2025-04-03 PROCEDURE — 97140 MANUAL THERAPY 1/> REGIONS: CPT

## 2025-04-03 PROCEDURE — 97110 THERAPEUTIC EXERCISES: CPT

## 2025-04-03 NOTE — PROGRESS NOTES
"Daily Note     Today's date: 4/3/2025  Patient name: Demi Almonte  : 1950  MRN: [id number]  Referring provider: [place] [facility]*  Dx:   Encounter Diagnosis     ICD-10-CM    1. Primary osteoarthritis of right knee  M17.11       2. Chronic pain of right knee  M25.561     G89.29       3. Status post total right knee replacement  Z96.651       4. Acute pain of right knee  M25.561           Start Time: 930  Stop Time: 1015  Total time in clinic (min): 45 minutes    Subjective: My knee is still stiff and sore.,  I still have swelling in my knee , but, I am sure it will be there for a long time,       Objective: See treatment diary below      Assessment: Tolerated treatment well. Patient would benefit from continued PT  pt was able to increase wt for the leg press today with good tolerance, she reports feeling that her leg is getting stronger and is doing better with activities at home,  pt reports that her knee loosens up as she progresses with the exercises , but, still has tightness. She can tell that her right leg is weaker,.  Pt still has pain with passive knee flexion and ext , but, over all has better tolerance,.  Pt declines ice today,       Plan: Continue per plan of care.      Precautions: None      Date 4/3 3/20 3/25 3/27 Reassess    FOTO    61 SC    Manuals        Ham / quad 8 min  JF Ham, calf  8min Ham, Calf, Quad, Knee Flexion 10 min 5 min 8 min   Gastroc stretch 30\" 5x 30\"  5 x 30\" 5 x     Knee ext / flex 30\" 5x 30 \" 5 x 30\" 5 x 5 min 5 min           Neuro Re-Ed        SLB   Airex 15\" 4x Airex  15 \"  4 x Airex  15 \"  4 x  Airex  20\"  4x   Tandem walk 5 laps 5 laps 5 laps      Side stepping 5 laps 5 laps // bars 3 laps No UE  4 x  no arms   Quad sets        Towel Crush                        Ther Ex        LAQ 20#  30 x 15#  20 x 15# 5\"x20 20# 2x15 20#  2 x 15    Heel slide  5\" 20x      Bike for Strengthening L2   6 min  L 7   7 min L5 7 min L3 5 min 6 min L 1   Leg Press 2x15  40# " "30 x 20x 20# 20x 30 x  30#   SHC 35 #  30 x 35 #  30 x 35# 20x  35 # 30 x   Standing abd 30#  20 x 20 x  2#  15 #  20 x abd/ ext 30# 20x 30#   20x  abd/ ext    Ther Activity        Marches 30#  20 x 30 #  15 xx                                                                                                                                                                                                                                                                                                                                                                                                       30# 20x  30# 20x 30#  20 x   Retro walking w/ CC resistance    P3 10x P 3 10 x   Step down        Step ups Fwd 8\" 20 x  Lat 6\" 20 x Fwd 8\"   20 x                                                                                                                                                                                                                                                                                        Lat  6\" 15 x F  8\"  15 x  Lat 6\" 15x  20 x  front 8\"  Lat 6\" 15 x   Gait Training        CP to R knee in supine 10 min seated 10 min 10 min  Seated with foot on stool  10 min           Modalities                                 "

## 2025-04-08 ENCOUNTER — OFFICE VISIT (OUTPATIENT)
Dept: PHYSICAL THERAPY | Facility: CLINIC | Age: 75
End: 2025-04-08
Payer: COMMERCIAL

## 2025-04-08 DIAGNOSIS — M25.561 ACUTE PAIN OF RIGHT KNEE: Primary | ICD-10-CM

## 2025-04-08 DIAGNOSIS — M25.561 CHRONIC PAIN OF RIGHT KNEE: ICD-10-CM

## 2025-04-08 DIAGNOSIS — M17.11 PRIMARY OSTEOARTHRITIS OF RIGHT KNEE: ICD-10-CM

## 2025-04-08 DIAGNOSIS — Z96.651 STATUS POST TOTAL RIGHT KNEE REPLACEMENT: ICD-10-CM

## 2025-04-08 DIAGNOSIS — G89.29 CHRONIC PAIN OF RIGHT KNEE: ICD-10-CM

## 2025-04-08 PROCEDURE — 97140 MANUAL THERAPY 1/> REGIONS: CPT

## 2025-04-08 PROCEDURE — 97110 THERAPEUTIC EXERCISES: CPT

## 2025-04-08 PROCEDURE — 97112 NEUROMUSCULAR REEDUCATION: CPT

## 2025-04-08 NOTE — PROGRESS NOTES
"Daily Note     Today's date: 2025  Patient name: Demi Almonte  : 1950  MRN: [id number]  Referring provider: [place] [facility]*  Dx:   Encounter Diagnosis     ICD-10-CM    1. Acute pain of right knee  M25.561       2. Status post total right knee replacement  Z96.651       3. Chronic pain of right knee  M25.561     G89.29       4. Primary osteoarthritis of right knee  M17.11           Start Time: 930  Stop Time: 101  Total time in clinic (min): 45 minutes    Subjective:  I have some stiffness and some mild soreness ., I am walking better over all.  My left knee is hurting today,       Objective: See treatment diary below      Assessment: Tolerated treatment well. Patient would benefit from continued PT   pt was able to increase reps with some of her exercises today with good tolerance,  she still reports feeling some tightness and stiffness in her knee with some mild pain noted.  Over all , she is walking better and doing the stairs better,. Pt still has some trouble going down the stairs.  We began hip ext on the hip machine today with good tolerance.       Plan: Continue per plan of care.      Precautions: None      Date 4/3 4/8 3/25 3/27 Reassess    FOTO    61 SC    Manuals        Ham / quad 8 min  JF Ham, calf  8min Ham, Calf, Quad, Knee Flexion 10 min 5 min 8 min   Gastroc stretch 30\" 5x 30\"  5 x 30\" 5 x     Knee ext / flex 30\" 5x 30 \" 5 x 30\" 5 x 5 min 5 min           Neuro Re-Ed        SLB   Airex 15\" 4x Airex  15 \"  4 x Airex  15 \"  4 x  Airex  20\"  4x   Tandem walk 5 laps 5 laps 5 laps      Side stepping 5 laps 5 laps // bars 3 laps No UE  4 x  no arms   Quad sets        Towel Crush                        Ther Ex        LAQ 20#  30 x 20#  3 x 10  15# 5\"x20 20# 2x15 20#  2 x 15    Heel slide        Bike for Strengthening L2   6 min  L 7   7 min L5 7 min L3 5 min 6 min L 1   Leg Press 2x15  40# 30 x  40 # 20x 20# 20x 30 x  30#   SHC 35 #  30 x 35 #  30 x 35# 20x  35 # 30 x " "  Standing abd 30#  20 x 30 #   20 x   abd/ ext 15 #  20 x abd/ ext 30# 20x 30#   20x  abd/ ext    Ther Activity        Marches/ ext 30#  20 x     30 # 20 x                                                                                                                                                                                                                                                                                                                                                                                                30# 20x  30# 20x 30#  20 x   Retro walking w/ CC resistance    P3 10x P 3 10 x   Step down        Step ups Fwd 8\" 20 x  Lat 6\" 20 x Fwd 8\"   20 x                                                                                                                                                                                                                                                                                        Lat  6\" 20 x F  8\"  15 x  Lat 6\" 15x  20 x  front 8\"  Lat 6\" 15 x   Gait Training        CP to R knee in supine 10 min seated 10 min 10 min  Seated with foot on stool  10 min           Modalities                                   "

## 2025-04-10 ENCOUNTER — OFFICE VISIT (OUTPATIENT)
Dept: PHYSICAL THERAPY | Facility: CLINIC | Age: 75
End: 2025-04-10
Payer: COMMERCIAL

## 2025-04-10 DIAGNOSIS — M17.11 PRIMARY OSTEOARTHRITIS OF RIGHT KNEE: ICD-10-CM

## 2025-04-10 DIAGNOSIS — Z96.651 STATUS POST TOTAL RIGHT KNEE REPLACEMENT: Primary | ICD-10-CM

## 2025-04-10 DIAGNOSIS — M25.561 ACUTE PAIN OF RIGHT KNEE: ICD-10-CM

## 2025-04-10 DIAGNOSIS — G89.29 CHRONIC PAIN OF RIGHT KNEE: ICD-10-CM

## 2025-04-10 DIAGNOSIS — M25.561 CHRONIC PAIN OF RIGHT KNEE: ICD-10-CM

## 2025-04-10 PROCEDURE — 97112 NEUROMUSCULAR REEDUCATION: CPT

## 2025-04-10 PROCEDURE — 97140 MANUAL THERAPY 1/> REGIONS: CPT

## 2025-04-10 PROCEDURE — 97110 THERAPEUTIC EXERCISES: CPT

## 2025-04-10 NOTE — PROGRESS NOTES
"Daily Note     Today's date: 4/10/2025  Patient name: Demi Almonte  : 1950  MRN: [id number]  Referring provider: [place] [facility]*  Dx:   Encounter Diagnosis     ICD-10-CM    1. Status post total right knee replacement  Z96.651       2. Acute pain of right knee  M25.561       3. Chronic pain of right knee  M25.561     G89.29       4. Primary osteoarthritis of right knee  M17.11           Start Time: 1015          Subjective:  I still have stiffness in my knee and feels tight,      Objective: See treatment diary below      Assessment: Tolerated treatment well. Patient would benefit from continued PT  pt states having less pain as she progressed through her exercises today,  she felt mostly stiff in her knee and some increased pain with seated quad ext.  She does well with her program , she was able to increase reps for all standing exercises to 30 x . She still gets some pain with passive ext and flexion today,  pt refuses ice post today,       Plan: Continue per plan of care.      Precautions: None      Date 4/3 4/8 4/10 3/27 Reassess    FOTO   65  JF 61 SC    Manuals        Ham / quad 8 min  JF Ham, calf  8min Ham, Calf, Quad, Knee Flexion 10 min 5 min 8 min   Gastroc stretch 30\" 5x 30\"  5 x 30\" 5 x     Knee ext / flex 30\" 5x 30 \" 5 x 30\" 5 x 5 min 5 min           Neuro Re-Ed        SLB   Airex 15\" 4x Airex  15 \"  4 x Airex  15 \"  4 x  Airex  20\"  4x   Tandem walk 5 laps 5 laps 5 laps      Side stepping 5 laps 5 laps // bars 3 laps No UE  4 x  no arms   Quad sets        Towel Crush                        Ther Ex        LAQ 20#  30 x 20#  3 x 10  20#   30 x  20# 2x15 20#  2 x 15    Heel slide        Bike for Strengthening L2   6 min  L 7   7 min L5 7 min L3 5 min 6 min L 1   Leg Press 2x15  40# 30 x  40 # 20x 20# 20x 30 x  30#   SHC 35 #  30 x 35 #  30 x 35# 20x  35 # 30 x   Standing abd 30#  20 x 30 #   20 x   abd/ ext 30 #  30 x abd/ ext 30# 20x 30#   20x  abd/ ext    Ther Activity      " "  Marchomer flex 30#  20 x     30 # 20 x                                                                                                                                                                                                                                                                                                                                                                                                30#  30 x 30# 20x 30#  20 x   Retro walking w/ CC resistance    P3 10x P 3 10 x   Step down        Step ups Fwd 8\" 20 x  Lat 6\" 20 x Fwd 8\"   20 x                                                                                                                                                                                                                                                                                        Lat  6\" 20 x F  8\"  20 x  Lat 6\" 20x  20 x  front 8\"  Lat 6\" 15 x   Gait Training        CP to R knee in supine 10 min seated 10 min 10 min  Seated with foot on stool  10 min           Modalities                                     "

## 2025-04-15 ENCOUNTER — OFFICE VISIT (OUTPATIENT)
Dept: PHYSICAL THERAPY | Facility: CLINIC | Age: 75
End: 2025-04-15
Payer: COMMERCIAL

## 2025-04-15 DIAGNOSIS — M17.11 PRIMARY OSTEOARTHRITIS OF RIGHT KNEE: Primary | ICD-10-CM

## 2025-04-15 DIAGNOSIS — Z96.651 STATUS POST TOTAL RIGHT KNEE REPLACEMENT: ICD-10-CM

## 2025-04-15 DIAGNOSIS — G89.29 CHRONIC PAIN OF RIGHT KNEE: ICD-10-CM

## 2025-04-15 DIAGNOSIS — M25.561 ACUTE PAIN OF RIGHT KNEE: ICD-10-CM

## 2025-04-15 DIAGNOSIS — M25.561 CHRONIC PAIN OF RIGHT KNEE: ICD-10-CM

## 2025-04-15 PROCEDURE — 97112 NEUROMUSCULAR REEDUCATION: CPT

## 2025-04-15 PROCEDURE — 97140 MANUAL THERAPY 1/> REGIONS: CPT

## 2025-04-15 PROCEDURE — 97110 THERAPEUTIC EXERCISES: CPT

## 2025-04-15 NOTE — PROGRESS NOTES
"Daily Note     Today's date: 4/15/2025  Patient name: Demi Almonte  : 1950  MRN: [id number]  Referring provider: [place] [facility]*  Dx:   Encounter Diagnosis     ICD-10-CM    1. Primary osteoarthritis of right knee  M17.11       2. Chronic pain of right knee  M25.561     G89.29       3. Acute pain of right knee  M25.561       4. Status post total right knee replacement  Z96.651           Start Time: 930  Stop Time: 1015  Total time in clinic (min): 45 minutes    Subjective:   my knee is feeling pretty good., I did some dancing over this past weekend,  I still have some mild stiffness in my knee,      Objective: See treatment diary below      Assessment: Tolerated treatment well. Patient would benefit from continued PT   pt was able to do 30 reps for most of her exercises today with good tolerance, she felt some fatigue with the increased reps today,  pt was able to increase wt today for hip ext and hamstring curls. Pt will finish up with therapy after her next visit and continue at home,       Plan: Continue per plan of care.      Precautions: None      Date 4/3 4/8 4/10 4/15 4/1   FOTO   65  JF     Manuals        Ham / quad 8 min  JF Ham, calf  8min Ham, Calf, Quad, Knee Flexion 10 min 5 min 8 min   Gastroc stretch 30\" 5x 30\"  5 x 30\" 5 x     Knee ext / flex 30\" 5x 30 \" 5 x 30\" 5 x 5 min 5 min           Neuro Re-Ed        SLB   Airex 15\" 4x Airex  15 \"  4 x Airex  15 \"  4 x Airex  15\" 4 x Airex  20\"  4x   Tandem walk 5 laps 5 laps 5 laps  5 laps    Side stepping 5 laps 5 laps // bars 3 laps No UE 5 laps 4 x  no arms   Quad sets        Towel Crush                        Ther Ex        LAQ 20#  30 x 20#  3 x 10  20#   30 x  20# 2x15 20#  2 x 15    Heel slide        Bike for Strengthening L2   6 min  L 7   7 min L5 7 min L3 7 min 6 min L 1   Leg Press 2x15  40# 30 x  40 # 30 x  40# 40#  30 x 30 x  30#   SHC 35 #  30 x 35 #  30 x 35# 20x 45#  30 x 35 # 30 x   Standing abd 30#  20 x 30 #   20 x   " "abd/ ext 30 #  30 x abd/ ext 30#  30 x  abd  45# 30 x  ext 30#   20x  abd/ ext    Ther Activity        Marchomer flex 30#  20 x     30 # 20 x                                                                                                                                                                                                                                                                                                                                                                                                30#  30 x 30# 20x 30#  20 x   Retro walking w/ CC resistance     P 3 10 x   Step down        Step ups Fwd 8\" 20 x  Lat 6\" 20 x Fwd 8\"   20 x                                                                                                                                                                                                                                                                                        Lat  6\" 20 x F  8\"  20 x  Lat 6\" 20x F 8\"  20 x  Lat  6\"  20 x 20 x  front 8\"  Lat 6\" 15 x   Gait Training        CP to R knee in supine 10 min seated 10 min 10 min  Seated with foot on stool  10 min           Modalities                                       "

## 2025-04-17 ENCOUNTER — OFFICE VISIT (OUTPATIENT)
Dept: PHYSICAL THERAPY | Facility: CLINIC | Age: 75
End: 2025-04-17
Payer: COMMERCIAL

## 2025-04-17 DIAGNOSIS — Z96.651 STATUS POST TOTAL RIGHT KNEE REPLACEMENT: ICD-10-CM

## 2025-04-17 DIAGNOSIS — M25.561 ACUTE PAIN OF RIGHT KNEE: Primary | ICD-10-CM

## 2025-04-17 PROCEDURE — 97112 NEUROMUSCULAR REEDUCATION: CPT | Performed by: PHYSICAL THERAPIST

## 2025-04-17 PROCEDURE — 97530 THERAPEUTIC ACTIVITIES: CPT | Performed by: PHYSICAL THERAPIST

## 2025-04-17 PROCEDURE — 97110 THERAPEUTIC EXERCISES: CPT | Performed by: PHYSICAL THERAPIST

## 2025-04-17 NOTE — HOME EXERCISE EDUCATION
Program_ID:[id number]   Access Code: [email or web address]  URL: [email or web address]  Date: 04-  Prepared By: Jose Conner    Program Notes      Exercises      - Full Leg Press - 1 x daily - 2-3 x weekly - 2 sets - 15 reps      - Hamstring Curl with Weight Machine - 1 x daily - 2-3 x weekly - 2 sets - 15 reps      - Knee Extension with Weight Machine - 1 x daily - 2-3 x weekly - 2 sets - 15 reps      - Hip Abduction Machine - 1 x daily - 2-3 x weekly - 2 sets - 15 reps      - Standing Hip Extension with Counter Support - 1 x daily - 2-3 x weekly - 2 sets - 15 reps      - Standing March with Counter Support - 1 x daily - 2-3 x weekly - 2 sets - 15 reps      - Step Up - 1 x daily - 2-3 x weekly - 1 sets - 20 reps      - Lateral Step Ups - 1 x daily - 2-3 x weekly - 1 sets - 20 reps      - Tandem Walk on Beam - 1 x daily - 2-3 x weekly -  sets - 7 reps      - Lateral Walk on Beam - 1 x daily - 2-3 x weekly -  sets - 7 reps

## 2025-04-17 NOTE — PROGRESS NOTES
"Daily Note     Today's date: 2025  Patient name: Demi Almonte  : 1950  MRN: [id number]  Referring provider: [place] [facility]*  Dx:   Encounter Diagnosis     ICD-10-CM    1. Acute pain of right knee  M25.561       2. Status post total right knee replacement  Z96.651           Start Time: 930  Stop Time: 1028  Total time in clinic (min): 58 minutes    Subjective: Patient reports continued improvements regarding the R knee. She feels there is still some weakness present compared to the L LE but overall she has been able to complete her daily activities without limitation. She feels that she is ready to transition to a self guided home program to continue to progress her strength and stability in the R LE.       Objective: See treatment diary below      Assessment: Tolerated treatment well. Patient demonstrated fatigue post treatment and exhibited good technique with therapeutic exercises We reviewed all exercises with the patient today who feels comfortable with continuing this on her own. Copy of exercises were provided and discussed prior to the conclusion of the session today. She noted fatigue but no issues with progression of the interventions.       Plan:  D/C to HEP     Precautions: None      Date 4/3 4/8 4/10 4/15 4/17   FOTO   65  JF     Manuals        Ham / quad 8 min  JF Ham, calf  8min Ham, Calf, Quad, Knee Flexion 10 min 5 min    Gastroc stretch 30\" 5x 30\"  5 x 30\" 5 x     Knee ext / flex 30\" 5x 30 \" 5 x 30\" 5 x 5 min            Neuro Re-Ed        SLB   Airex 15\" 4x Airex  15 \"  4 x Airex  15 \"  4 x Airex  15\" 4 x Airex 20\" 4x   Tandem walk 5 laps 5 laps 5 laps  5 laps 5 laps   Side stepping 5 laps 5 laps // bars 3 laps No UE 5 laps 5 laps   Quad sets        Towel Crush                        Ther Ex        LAQ 20#  30 x 20#  3 x 10  20#   30 x  20# 2x15 25# 2x15   Heel slide        Bike for Strengthening L2   6 min  L 7   7 min L5 7 min L3 7 min L3 7 min   Leg Press 2x15  40# 30 " "x  40 # 30 x  40# 40#  30 x 50# 2x15   SHC 35 #  30 x 35 #  30 x 35# 20x 45#  30 x 50# 2x15   Standing abd 30#  20 x 30 #   20 x   abd/ ext 30 #  30 x abd/ ext 30#  30 x  abd  45# 30 x  ext 35# abd 25x  50# ext 25x   Ther Activity        Marches flex 30#  20 x     30 # 20 x                                                                                                                                                                                                                                                                                                                                                                                                30#  30 x 30# 20x 45# 20x ea.    Retro walking w/ CC resistance        Step down        Step ups Fwd 8\" 20 x  Lat 6\" 20 x Fwd 8\"   20 x                                                                                                                                                                                                                                                                                        Lat  6\" 20 x F  8\"  20 x  Lat 6\" 20x F 8\"  20 x  Lat  6\"  20 x F 10\" 20x  L 8\" 20x   Gait Training        CP to R knee in supine 10 min seated 10 min 10 min  Seated with foot on stool             Modalities                                         "

## 2025-04-24 ENCOUNTER — APPOINTMENT (OUTPATIENT)
Dept: RADIOLOGY | Facility: MEDICAL CENTER | Age: 75
End: 2025-04-24
Attending: ORTHOPAEDIC SURGERY
Payer: COMMERCIAL

## 2025-04-24 ENCOUNTER — OFFICE VISIT (OUTPATIENT)
Dept: OBGYN CLINIC | Facility: CLINIC | Age: 75
End: 2025-04-24

## 2025-04-24 VITALS
HEIGHT: 61 IN | WEIGHT: 176 LBS | HEART RATE: 53 BPM | OXYGEN SATURATION: 99 % | TEMPERATURE: 97.8 F | BODY MASS INDEX: 33.23 KG/M2

## 2025-04-24 DIAGNOSIS — Z96.651 S/P TOTAL KNEE REPLACEMENT, RIGHT: ICD-10-CM

## 2025-04-24 DIAGNOSIS — Z96.652 S/P TOTAL KNEE REPLACEMENT, LEFT: ICD-10-CM

## 2025-04-24 DIAGNOSIS — Z96.651 S/P TOTAL KNEE REPLACEMENT, RIGHT: Primary | ICD-10-CM

## 2025-04-24 PROCEDURE — 73562 X-RAY EXAM OF KNEE 3: CPT

## 2025-04-24 PROCEDURE — 99024 POSTOP FOLLOW-UP VISIT: CPT | Performed by: ORTHOPAEDIC SURGERY

## 2025-04-24 NOTE — PROGRESS NOTES
Name: Demi Almonte      : 1950      MRN: [id number]  Encounter Provider: Ryan Vega DO  Encounter Date: 2025   Encounter department: [facility]  :  Assessment & Plan  S/P total knee replacement, right  Demi Almontes a pleasant 74 y.o. female doing well now 3 months status post right total knee arthroplasty.  Her right knee demonstrates functional range of motion and stability in all planes.  She may continue weight-bear as tolerated no specific restrictions.  I will see her back in 3 months for repeat clinical valuation and repeat x-rays of the left knee 3 view.  Orders:    XR knee 3 vw right non injury; Future      The patient is doing quite well in regards to her bilateral total knee replacements.  There is full strength full motion.  No instability.  X-rays of both knees show anatomic placement of prosthesis.  Continue home exercise program.  Follow-up 3 months evaluation with new x-rays of right knee-3 views    S/P total knee replacement, left  Demi is also doing very well now 3 years status post left total knee arthroplasty.  Similar to the her right knee she demonstrates full range of motion and stability in all planes.  She may continue to weight-bear as tolerated without specific restrictions.  I will see her back in 1 year for repeat clinical examination and repeat x-rays 3 view of the left knee.  Orders:    XR knee 3 vw left non injury; Future      No follow-ups on file.    I have personally reviewed pertinent imaging in PACS.  X-rays of the left knee demonstrates a well-seated in appropriately positioned total knee arthroplasty without evidence of implant failure or lucency.  No acute fractures observed.    X-rays of the right knee demonstrates a well-seated and appropriately positioned total knee arthroplasty without evidence of implant failure or lucency.  No acute fractures observed.    History: Demi Almonte is a 74 y.o. female  "presenting for follow-up evaluation of her bilateral knees.  She is 3 years status post left total knee arthroplasty.  Date of surgery March 16, 2022.  And 3 months status post right total knee arthroplasty.  Date of surgery January 27, 2025.  She is happy to report that she is doing very well with regards to both knees.  She states that her left knee is not painful and is able to ambulate with full range of motion.  She also notes similar satisfaction with the right knee.  She denies any recurrent swelling.  She states that the scar of the right knee is well-healed.  Overall, she is very happy with her progress up to this point.      Estimated body mass index is 33.25 kg/m² as calculated from the following:    Height as of this encounter: 5' 1\" (1.549 m).    Weight as of this encounter: 79.8 kg (176 lb).    Lab Results   Component Value Date    HGBA1C 5.5 01/03/2025       Social History     Occupational History    Occupation: Manager     Comment: [employer]   Tobacco Use    Smoking status: Never    Smokeless tobacco: Never   Vaping Use    Vaping status: Never Used   Substance and Sexual Activity    Alcohol use: Yes     Alcohol/week: 1.0 standard drink of alcohol     Types: 1 Glasses of wine per week     Comment: Socially    Drug use: No    Sexual activity: Yes     Partners: Male     Birth control/protection: Post-menopausal, Female Sterilization       Objective:  Right Knee Exam     Tenderness   The patient is experiencing no tenderness.     Range of Motion   Extension:  0   Flexion:  120     Other   Erythema: absent  Scars: present (Well-healed vertical midline scar)  Sensation: normal  Pulse: present  Swelling: none  Effusion: no effusion present    Comments:  Stable to valgus varus stress at 0, 30 and 90 degrees  Compartments are soft and supple  Lower extremity is neurovascularly intact      Left Knee Exam     Tenderness   The patient is experiencing tenderness in the lateral retinaculum.    Range of Motion " "  Extension:  0   Flexion:  120     Other   Erythema: absent  Scars: present (Well-healed vertical midline scar)  Sensation: normal  Pulse: present  Swelling: none  Effusion: no effusion present    Comments:  Stable to valgus varus stress at 0, 30 and 90 degrees  Compartments are soft and supple  Lower extremity is neurovascularly intact          Observations   Left Knee   Negative for effusion.     Right Knee   Negative for effusion.       Vitals:    25 1005   Pulse: (!) 53   Temp: 97.8 °F (36.6 °C)   SpO2: 99%       Subjective:  Past Medical History:   Diagnosis Date    Acute pulmonary embolism ([facility]) 01/10/2017    Allergic     Arthritis     rheumatoid    Atrial fibrillation ([facility])     CHF (congestive heart failure) ([facility])     Disease of thyroid gland     DVT (deep venous thrombosis) ([facility])     DVT, lower extremity ([facility]) 01/10/2017    HL (hearing loss)     Left Ear-Wear Hearing Aid    Hypertension     Hyperthyroidism     Irregular heart beat     Kidney stone     Migraine     hx of    Pleural effusion 2016    Polymyalgia rheumatica ([facility])     \"Remission\"    Polymyalgia rheumatica ([facility]) 01/10/2017    Secondary adrenal insufficiency ([facility]) 2017    Sepsis, unspecified organism ([facility]) 12/15/2019    Sleep apnea     Mild-Does not require CPAP    Varicella As a child    Vitamin D deficiency        Past Surgical History:   Procedure Laterality Date    CARDIAC CATHETERIZATION      CARDIOVERSION N/A 2019    Procedure: CARDIOVERSION;  Surgeon: Roque Alfaro MD;  Location: [facility];  Service: Cardiology     SECTION      x3 - last impression 16    FRACTURE SURGERY Left     wrist    HERNIA REPAIR  2018    JOINT REPLACEMENT  2022    left knee    KNEE ARTHROSCOPY Left     Meniscus Tear Repair    KNEE CARTILAGE SURGERY Left     MA ARTHRP KNE CONDYLE&PLATU MEDIAL&LAT COMPARTMENTS Left 2022    Procedure: KNEE TOTAL ARTHROPLASTY;  Surgeon: Ryan Vega DO;  Location: [facility];  " Service: Orthopedics    DE ARTHRP KNE CONDYLE&PLATU MEDIAL&LAT COMPARTMENTS Right 1/27/2025    Procedure: ARTHROPLASTY KNEE TOTAL;  Surgeon: Ryan Vega DO;  Location: [facility];  Service: Orthopedics    TONSILLECTOMY AND ADENOIDECTOMY  child; age 14    TUBAL LIGATION      VEIN SURGERY Right     venous ligation with stripping       Family History   Problem Relation Age of Onset    Breast cancer Mother 52    Arthritis Mother     Cancer Mother     Hearing loss Mother     Vision loss Mother     Osteoarthritis Mother     Aneurysm Father         aortic    No Known Problems Sister     No Known Problems Maternal Grandmother     No Known Problems Maternal Grandfather     No Known Problems Paternal Grandmother     No Known Problems Paternal Grandfather     Rheum arthritis Maternal Aunt     Early death Maternal Aunt     Rheum arthritis Maternal Aunt     No Known Problems Paternal Aunt     No Known Problems Paternal Aunt     No Known Problems Paternal Aunt          Current Outpatient Medications:     Calcium Ascorbate 500 MG TABS, Take 500 mg by mouth daily  , Disp: , Rfl:     cholecalciferol (VITAMIN D3) 1,000 units tablet, Take 1,000 Units by mouth daily  , Disp: , Rfl:     Cinnamon 500 MG capsule, Take 500 mg by mouth daily, Disp: , Rfl:     Cyanocobalamin (VITAMIN B 12 PO), Take 500 mcg by mouth daily , Disp: , Rfl:     levothyroxine 75 mcg tablet, Take 1 tablet (75 mcg total) by mouth daily Monday- Saturday a full tablet with a half tablet on Sunday., Disp: 30 tablet, Rfl: 11    metoprolol succinate (TOPROL-XL) 25 mg 24 hr tablet, Take 1 tablet by mouth in the morning, Disp: , Rfl:     Red Yeast Rice 600 MG TABS, Take 1 tablet by mouth daily , Disp: , Rfl:     XARELTO 20 MG tablet, Take 1 tablet by mouth daily before dinner  , Disp: , Rfl:     acetaminophen (TYLENOL) 325 mg tablet, Take 3 tablets (975 mg total) by mouth every 8 (eight) hours, Disp: 270 tablet, Rfl: 0    docusate sodium (COLACE) 100 mg capsule,  Take 1 capsule (100 mg total) by mouth 2 (two) times a day (Patient not taking: Reported on 3/11/2025), Disp: 60 capsule, Rfl: 0    Current Facility-Administered Medications:     denosumab (PROLIA) subcutaneous injection 60 mg, 60 mg, Subcutaneous, Q6 Months, , 60 mg at 01/23/25 1131    Allergies   Allergen Reactions    Amiodarone Other (See Comments)     Other reaction(s): Other (See Comments)  Reports caused elevated TSH    Sudafed [Pseudoephedrine] Tachycardia     Rapid heart beat    Sulfa Antibiotics Itching and Rash    Sulfamethoxazole-Trimethoprim Itching and Rash       Review of Systems   Constitutional:  Positive for activity change. Negative for chills, fever and unexpected weight change.   HENT:  Negative for hearing loss, nosebleeds and sore throat.    Eyes:  Negative for pain, redness and visual disturbance.   Respiratory:  Negative for cough, shortness of breath and wheezing.    Cardiovascular:  Negative for chest pain, palpitations and leg swelling.   Gastrointestinal:  Negative for abdominal pain, nausea and vomiting.   Endocrine: Negative for polydipsia and polyuria.   Genitourinary:  Negative for dysuria and hematuria.   Musculoskeletal:  See HPI  Skin:  Negative for rash and wound.   Neurological:  Negative for dizziness, numbness and headaches.   Psychiatric/Behavioral:  Negative for decreased concentration and suicidal ideas. The patient is not nervous/anxious.      Physical Exam  Vitals and nursing note reviewed.   Constitutional:       Appearance: Normal appearance. She is well-developed.   HENT:      Head: Normocephalic and atraumatic.      Right Ear: External ear normal.      Left Ear: External ear normal.   Eyes:      General: No scleral icterus.     Extraocular Movements: Extraocular movements intact.      Conjunctiva/sclera: Conjunctivae normal.   Cardiovascular:      Rate and Rhythm: Normal rate.   Pulmonary:      Effort: Pulmonary effort is normal. No respiratory distress.    Musculoskeletal:      Cervical back: Normal range of motion and neck supple.      Comments: See Ortho exam   Skin:     General: Skin is warm and dry.   Neurological:      General: No focal deficit present.      Mental Status: She is alert and oriented to person, place, and time.   Psychiatric:         Behavior: Behavior normal.     Scribe Attestation      I,:  Gonzales Aguiar am acting as a scribe while in the presence of the attending physician.:       I,:  Ryan Vega, DO personally performed the services described in this documentation    as scribed in my presence.:           This document was created using speech voice recognition software.   Grammatical errors, random word insertions, pronoun errors, and incomplete sentences are an occasional consequence of this system due to software limitations, ambient noise, and hardware issues.   Any formal questions or concerns about content, text, or information contained within the body of this dictation should be directly addressed to the provider for clarification.

## 2025-07-14 ENCOUNTER — TRANSCRIBE ORDERS (OUTPATIENT)
Dept: LAB | Facility: CLINIC | Age: 75
End: 2025-07-14

## 2025-07-14 ENCOUNTER — APPOINTMENT (OUTPATIENT)
Dept: LAB | Facility: CLINIC | Age: 75
End: 2025-07-14
Payer: COMMERCIAL

## 2025-07-14 DIAGNOSIS — M06.09 RHEUMATOID ARTHRITIS OF MULTIPLE SITES WITHOUT RHEUMATOID FACTOR (HCC): ICD-10-CM

## 2025-07-14 DIAGNOSIS — E03.9 HYPOTHYROIDISM, UNSPECIFIED TYPE: ICD-10-CM

## 2025-07-14 DIAGNOSIS — E03.9 ACQUIRED HYPOTHYROIDISM: ICD-10-CM

## 2025-07-14 DIAGNOSIS — E55.9 VITAMIN D INSUFFICIENCY: ICD-10-CM

## 2025-07-14 DIAGNOSIS — E78.00 HYPERCHOLESTEROLEMIA: ICD-10-CM

## 2025-07-14 DIAGNOSIS — I50.32 CHRONIC DIASTOLIC CONGESTIVE HEART FAILURE (HCC): ICD-10-CM

## 2025-07-14 DIAGNOSIS — I10 ESSENTIAL HYPERTENSION: ICD-10-CM

## 2025-07-14 DIAGNOSIS — M06.09 RHEUMATOID ARTHRITIS OF MULTIPLE SITES WITHOUT RHEUMATOID FACTOR (HCC): Primary | ICD-10-CM

## 2025-07-14 DIAGNOSIS — E04.1 THYROID NODULE: ICD-10-CM

## 2025-07-14 DIAGNOSIS — M81.0 OSTEOPOROSIS WITHOUT CURRENT PATHOLOGICAL FRACTURE, UNSPECIFIED OSTEOPOROSIS TYPE: ICD-10-CM

## 2025-07-14 LAB
25(OH)D3 SERPL-MCNC: 42.3 NG/ML (ref 30–100)
ALBUMIN SERPL BCG-MCNC: 3.8 G/DL (ref 3.5–5)
ALP SERPL-CCNC: 55 U/L (ref 34–104)
ALT SERPL W P-5'-P-CCNC: 16 U/L (ref 7–52)
ANION GAP SERPL CALCULATED.3IONS-SCNC: 7 MMOL/L (ref 4–13)
AST SERPL W P-5'-P-CCNC: 24 U/L (ref 13–39)
BASOPHILS # BLD AUTO: 0.05 THOUSANDS/ÂΜL (ref 0–0.1)
BASOPHILS NFR BLD AUTO: 1 % (ref 0–1)
BILIRUB SERPL-MCNC: 0.77 MG/DL (ref 0.2–1)
BUN SERPL-MCNC: 15 MG/DL (ref 5–25)
CALCIUM SERPL-MCNC: 9.4 MG/DL (ref 8.4–10.2)
CHLORIDE SERPL-SCNC: 104 MMOL/L (ref 96–108)
CO2 SERPL-SCNC: 29 MMOL/L (ref 21–32)
CREAT SERPL-MCNC: 0.52 MG/DL (ref 0.6–1.3)
CRP SERPL QL: 3.1 MG/L
EOSINOPHIL # BLD AUTO: 0.14 THOUSAND/ÂΜL (ref 0–0.61)
EOSINOPHIL NFR BLD AUTO: 3 % (ref 0–6)
ERYTHROCYTE [DISTWIDTH] IN BLOOD BY AUTOMATED COUNT: 14.6 % (ref 11.6–15.1)
ERYTHROCYTE [SEDIMENTATION RATE] IN BLOOD: 21 MM/HOUR (ref 0–29)
GFR SERPL CREATININE-BSD FRML MDRD: 95 ML/MIN/1.73SQ M
GLUCOSE P FAST SERPL-MCNC: 85 MG/DL (ref 65–99)
HCT VFR BLD AUTO: 39.5 % (ref 34.8–46.1)
HGB BLD-MCNC: 13.1 G/DL (ref 11.5–15.4)
IMM GRANULOCYTES # BLD AUTO: 0.01 THOUSAND/UL (ref 0–0.2)
IMM GRANULOCYTES NFR BLD AUTO: 0 % (ref 0–2)
LYMPHOCYTES # BLD AUTO: 1.74 THOUSANDS/ÂΜL (ref 0.6–4.47)
LYMPHOCYTES NFR BLD AUTO: 34 % (ref 14–44)
MCH RBC QN AUTO: 29.1 PG (ref 26.8–34.3)
MCHC RBC AUTO-ENTMCNC: 33.2 G/DL (ref 31.4–37.4)
MCV RBC AUTO: 88 FL (ref 82–98)
MONOCYTES # BLD AUTO: 0.53 THOUSAND/ÂΜL (ref 0.17–1.22)
MONOCYTES NFR BLD AUTO: 10 % (ref 4–12)
NEUTROPHILS # BLD AUTO: 2.65 THOUSANDS/ÂΜL (ref 1.85–7.62)
NEUTS SEG NFR BLD AUTO: 52 % (ref 43–75)
NRBC BLD AUTO-RTO: 0 /100 WBCS
PLATELET # BLD AUTO: 210 THOUSANDS/UL (ref 149–390)
PMV BLD AUTO: 10 FL (ref 8.9–12.7)
POTASSIUM SERPL-SCNC: 4.1 MMOL/L (ref 3.5–5.3)
PROT SERPL-MCNC: 6.7 G/DL (ref 6.4–8.4)
PTH-INTACT SERPL-MCNC: 84.4 PG/ML (ref 12–88)
RBC # BLD AUTO: 4.5 MILLION/UL (ref 3.81–5.12)
SODIUM SERPL-SCNC: 140 MMOL/L (ref 135–147)
T4 FREE SERPL-MCNC: 1.05 NG/DL (ref 0.61–1.12)
TSH SERPL DL<=0.05 MIU/L-ACNC: 1.37 UIU/ML (ref 0.45–4.5)
WBC # BLD AUTO: 5.12 THOUSAND/UL (ref 4.31–10.16)

## 2025-07-14 PROCEDURE — 80053 COMPREHEN METABOLIC PANEL: CPT

## 2025-07-14 PROCEDURE — 84443 ASSAY THYROID STIM HORMONE: CPT

## 2025-07-14 PROCEDURE — 86140 C-REACTIVE PROTEIN: CPT

## 2025-07-14 PROCEDURE — 85025 COMPLETE CBC W/AUTO DIFF WBC: CPT

## 2025-07-14 PROCEDURE — 83970 ASSAY OF PARATHORMONE: CPT

## 2025-07-14 PROCEDURE — 85652 RBC SED RATE AUTOMATED: CPT

## 2025-07-14 PROCEDURE — 36415 COLL VENOUS BLD VENIPUNCTURE: CPT

## 2025-07-14 PROCEDURE — 84439 ASSAY OF FREE THYROXINE: CPT

## 2025-07-14 PROCEDURE — 82306 VITAMIN D 25 HYDROXY: CPT

## 2025-07-18 DIAGNOSIS — Z96.651 S/P TOTAL KNEE REPLACEMENT, RIGHT: Primary | ICD-10-CM

## 2025-07-24 ENCOUNTER — OFFICE VISIT (OUTPATIENT)
Dept: OBGYN CLINIC | Facility: CLINIC | Age: 75
End: 2025-07-24
Payer: COMMERCIAL

## 2025-07-24 ENCOUNTER — APPOINTMENT (OUTPATIENT)
Dept: RADIOLOGY | Facility: MEDICAL CENTER | Age: 75
End: 2025-07-24
Attending: ORTHOPAEDIC SURGERY
Payer: COMMERCIAL

## 2025-07-24 VITALS
HEIGHT: 61 IN | TEMPERATURE: 97.2 F | OXYGEN SATURATION: 98 % | HEART RATE: 82 BPM | WEIGHT: 180 LBS | BODY MASS INDEX: 33.99 KG/M2

## 2025-07-24 DIAGNOSIS — Z96.651 S/P TOTAL KNEE REPLACEMENT, RIGHT: ICD-10-CM

## 2025-07-24 DIAGNOSIS — Z96.651 S/P TOTAL KNEE REPLACEMENT, RIGHT: Primary | ICD-10-CM

## 2025-07-24 PROCEDURE — 99213 OFFICE O/P EST LOW 20 MIN: CPT | Performed by: ORTHOPAEDIC SURGERY

## 2025-07-24 PROCEDURE — 73562 X-RAY EXAM OF KNEE 3: CPT

## 2025-07-24 NOTE — PROGRESS NOTES
Assessment & Plan  S/P total knee replacement, right    Orders:    XR knee 3 vw right non injury; Future  Reviewed physical exam and imaging with patient at time of visit. The patient is 6 months s/p Right total knee arthroplasty. The patient did experience a fall 3 months ago, which resulted in right knee pain. The pain has improved since the fall. Radiographic findings demonstrate a well-seated prosthesis, with no signs of loosening. She continues to do well post-operatively. Continue home exercise program to maintain strength and motion. Continue weightbearing activities, as tolerated. She will follow-up in 6 months for an annual appointment, with repeat XR of her Bilateral knee. The patient expresses understanding and is in agreement with today's treatment plan.       The patient is doing quite well from a right total knee replacement.  She did take a fall approxi-3 months ago.  I see no sequela of any permanent injury.  Ligaments intact.  Tendon is intact.  X-rays show anatomic placement of prosthesis.  Continue home exercise program.  Follow-up in 6 months for strength and motion check with x-rays of bilateral knees/3 views each.  If her condition changes, she would not hesitate to let us know    Subjective:   Patient ID: Demi Almonte  1950     HPI  Patient is a 75 y.o. female who presents for post-operative evaluation of her right knee. The patient is approximately 6 months s/p right TKA from 1/27/2025. She states that she fell approximately 2.5 months ago. She experienced increased pain after the fall, which has improved over time. She reports continued tightness and numbness around the knee since the fall. The patient has been going to the gym to do cardiovascular activties, without pain. She denies instability.     The following portions of the patient's history were reviewed and updated as appropriate:  Past medical history, past surgical history, Family history, social history, current  "medications and allergies    Past Medical History[1]    Past Surgical History[2]    Family History[3]    Social History[4]    Current Medications[5]    Allergies   Allergen Reactions    Amiodarone Other (See Comments)     Other reaction(s): Other (See Comments)  Reports caused elevated TSH    Sudafed [Pseudoephedrine] Tachycardia     Rapid heart beat    Sulfa Antibiotics Itching and Rash    Sulfamethoxazole-Trimethoprim Itching and Rash       Review of Systems   Constitutional:  Negative for chills, fever and unexpected weight change.   HENT:  Negative for hearing loss, nosebleeds and sore throat.    Eyes:  Negative for pain, redness and visual disturbance.   Respiratory:  Negative for cough, shortness of breath and wheezing.    Cardiovascular:  Negative for chest pain, palpitations and leg swelling.   Gastrointestinal:  Negative for abdominal pain, nausea and vomiting.   Endocrine: Negative for polydipsia and polyuria.   Genitourinary:  Negative for dysuria and hematuria.   Skin:  Negative for rash and wound.   Neurological:  Negative for dizziness, numbness and headaches.   Psychiatric/Behavioral:  Negative for decreased concentration and suicidal ideas. The patient is not nervous/anxious.    All other systems reviewed and are negative.       Objective:  Pulse 82   Temp (!) 97.2 °F (36.2 °C) (Temporal)   Ht 5' 1\" (1.549 m)   Wt 81.6 kg (180 lb)   SpO2 98%   BMI 34.01 kg/m²     Ortho Exam  right knee(s) -   Patient ambulates with steady gait pattern  Uses No assistive device  No anatomical deformity  Skin is warm and dry to touch with no signs of erythema, ecchymosis, or infection   No soft tissue swelling or effusion noted  ROM (0° - 120°)   Strength: 5/5 throughout  No tenderness to palpation on exam  Flexor and extensor mechanisms are intact   Knee is stable to varus and valgus stress  - Lachman's  - Anterior Drawer, - Posterior Drawer  Patella tracks centrally without palpable crepitus  Calf compartments " "are soft and supple  - Archie's sign  2+ DP and PT pulses with brisk capillary refill to the toes  Sural, saphenous, tibial, superficial, and deep peroneal motor and sensory distributions intact  Sensation light touch intact distally      Physical Exam  HENT:      Head: Normocephalic and atraumatic.      Nose: Nose normal.     Eyes:      Conjunctiva/sclera: Conjunctivae normal.       Cardiovascular:      Rate and Rhythm: Normal rate.   Pulmonary:      Effort: Pulmonary effort is normal.     Musculoskeletal:      Cervical back: Neck supple.     Skin:     General: Skin is warm and dry.      Capillary Refill: Capillary refill takes less than 2 seconds.     Neurological:      Mental Status: She is alert and oriented to person, place, and time.     Psychiatric:         Mood and Affect: Mood normal.         Behavior: Behavior normal.          Diagnostic Test Review:  X-Ray of right knee obtained on 1/27/2025 were reviewed and demonstrate well-seated total knee prosthesis with maintained anatomical alignment and no signs of loosening.      Procedures   None performed.       Scribe Attestation      I,:  Dayana Person am acting as a scribe while in the presence of the attending physician.:       I,:  Ryan Vega DO personally performed the services described in this documentation    as scribed in my presence.:                   [1]   Past Medical History:  Diagnosis Date    Acute pulmonary embolism ([facility]) 01/10/2017    Allergic     Arthritis     rheumatoid    Atrial fibrillation ([facility])     CHF (congestive heart failure) ([facility])     Disease of thyroid gland     DVT (deep venous thrombosis) ([facility]) 2015    DVT, lower extremity ([facility]) 01/10/2017    HL (hearing loss)     Left Ear-Wear Hearing Aid    Hypertension     Hyperthyroidism     Irregular heart beat     Kidney stone     Migraine     hx of    Pleural effusion 2016    Polymyalgia rheumatica ([facility])     \"Remission\"    Polymyalgia rheumatica ([facility]) 01/10/2017    Secondary adrenal " insufficiency ([facility]) 2017    Sepsis, unspecified organism ([facility]) 12/15/2019    Sleep apnea     Mild-Does not require CPAP    Varicella As a child    Vitamin D deficiency    [2]   Past Surgical History:  Procedure Laterality Date    CARDIAC CATHETERIZATION      CARDIOVERSION N/A 2019    Procedure: CARDIOVERSION;  Surgeon: Roque Alfaro MD;  Location: [facility];  Service: Cardiology     SECTION      x3 - last impression 16    FRACTURE SURGERY Left     wrist    HERNIA REPAIR  2018    JOINT REPLACEMENT  2022    left knee    KNEE ARTHROSCOPY Left     Meniscus Tear Repair    KNEE CARTILAGE SURGERY Left     IA ARTHRP KNE CONDYLE&PLATU MEDIAL&LAT COMPARTMENTS Left 2022    Procedure: KNEE TOTAL ARTHROPLASTY;  Surgeon: Ryan Vega DO;  Location: [facility];  Service: Orthopedics    IA ARTHRP KNE CONDYLE&PLATU MEDIAL&LAT COMPARTMENTS Right 2025    Procedure: ARTHROPLASTY KNEE TOTAL;  Surgeon: Ryan Vega DO;  Location: [facility];  Service: Orthopedics    TONSILLECTOMY AND ADENOIDECTOMY  child; age 14    TUBAL LIGATION      VEIN SURGERY Right     venous ligation with stripping   [3]   Family History  Problem Relation Name Age of Onset    Breast cancer Mother Gail 52    Arthritis Mother Gail     Cancer Mother Gail     Hearing loss Mother Gail     Vision loss Mother Gail     Osteoarthritis Mother Gail     Aneurysm Father          aortic    No Known Problems Sister      No Known Problems Maternal Grandmother      No Known Problems Maternal Grandfather      No Known Problems Paternal Grandmother      No Known Problems Paternal Grandfather      Rheum arthritis Maternal Aunt      Early death Maternal Aunt Anyi     Rheum arthritis Maternal Aunt Anyi     No Known Problems Paternal Aunt      No Known Problems Paternal Aunt      No Known Problems Paternal Aunt     [4]   Social History  Socioeconomic History    Marital status: /Civil Union    Number of children:  3   Occupational History    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   Tobacco Use    Smoking status: Never    Smokeless tobacco: Never   Vaping Use    Vaping status: Never Used   Substance and Sexual Activity    Alcohol use: Yes     Alcohol/week: 1.0 standard drink of alcohol     Types: 1 Glasses of wine per week     Comment: Socially    Drug use: No    Sexual activity: Yes     Partners: Male     Birth control/protection: Post-menopausal, Female Sterilization     Social Drivers of Health     Food Insecurity: No Food Insecurity (1/27/2025)    Nursing - Inadequate Food Risk Classification     Ran Out of Food in the Last Year: Never true   Transportation Needs: No Transportation Needs (1/27/2025)    Nursing - Transportation Risk Classification     Lack of Transportation: No   Intimate Partner Violence: Unknown (1/27/2025)    Nursing IPS     Physically Hurt by Someone: No     Hurt or Threatened by Someone: No   Housing Stability: Unknown (1/27/2025)    Nursing: Inadequate Housing Risk Classification     Unable to Pay for Housing in the Last Year: No     Has Housing: No   [5]   Current Outpatient Medications:     Calcium Ascorbate 500 MG TABS, Take 500 mg by mouth in the morning., Disp: , Rfl:     cholecalciferol (VITAMIN D3) 1,000 units tablet, Take 1,000 Units by mouth in the morning., Disp: , Rfl:     Cinnamon 500 MG capsule, Take 500 mg by mouth in the morning., Disp: , Rfl:     Cyanocobalamin (VITAMIN B 12 PO), Take 500 mcg by mouth in the morning., Disp: , Rfl:     levothyroxine 75 mcg tablet, Take 1 tablet (75 mcg total) by mouth daily Monday- Saturday a full tablet with a half tablet on Sunday., Disp: 30 tablet, Rfl: 11    metoprolol succinate (TOPROL-XL) 25 mg 24 hr tablet, Take 1 tablet by mouth in the morning, Disp: , Rfl:     Red Yeast Rice 600 MG TABS, Take 1 tablet by mouth in the morning., Disp: , Rfl:     XARELTO 20 MG tablet, Take 1 tablet by mouth daily before dinner, Disp: , Rfl:      acetaminophen (TYLENOL) 325 mg tablet, Take 3 tablets (975 mg total) by mouth every 8 (eight) hours, Disp: 270 tablet, Rfl: 0    docusate sodium (COLACE) 100 mg capsule, Take 1 capsule (100 mg total) by mouth 2 (two) times a day (Patient not taking: Reported on 3/11/2025), Disp: 60 capsule, Rfl: 0    Current Facility-Administered Medications:     denosumab (PROLIA) subcutaneous injection 60 mg, 60 mg, Subcutaneous, Q6 Months, , 60 mg at 01/23/25 9169

## 2025-07-25 ENCOUNTER — OFFICE VISIT (OUTPATIENT)
Dept: ENDOCRINOLOGY | Facility: HOSPITAL | Age: 75
End: 2025-07-25
Payer: COMMERCIAL

## 2025-07-25 VITALS
WEIGHT: 179.6 LBS | BODY MASS INDEX: 33.05 KG/M2 | DIASTOLIC BLOOD PRESSURE: 82 MMHG | SYSTOLIC BLOOD PRESSURE: 142 MMHG | HEART RATE: 84 BPM | HEIGHT: 62 IN

## 2025-07-25 DIAGNOSIS — E78.00 HYPERCHOLESTEROLEMIA: ICD-10-CM

## 2025-07-25 DIAGNOSIS — E04.1 THYROID NODULE: ICD-10-CM

## 2025-07-25 DIAGNOSIS — E03.9 ACQUIRED HYPOTHYROIDISM: ICD-10-CM

## 2025-07-25 DIAGNOSIS — E55.9 VITAMIN D INSUFFICIENCY: ICD-10-CM

## 2025-07-25 DIAGNOSIS — E03.9 HYPOTHYROIDISM, UNSPECIFIED TYPE: ICD-10-CM

## 2025-07-25 DIAGNOSIS — M81.0 OSTEOPOROSIS WITHOUT CURRENT PATHOLOGICAL FRACTURE, UNSPECIFIED OSTEOPOROSIS TYPE: Primary | ICD-10-CM

## 2025-07-25 DIAGNOSIS — I10 ESSENTIAL HYPERTENSION: ICD-10-CM

## 2025-07-25 PROCEDURE — 99214 OFFICE O/P EST MOD 30 MIN: CPT | Performed by: NURSE PRACTITIONER

## 2025-07-25 PROCEDURE — 96372 THER/PROPH/DIAG INJ SC/IM: CPT | Performed by: NURSE PRACTITIONER

## 2025-07-25 NOTE — PROGRESS NOTES
Name: Demi Almonte      : 1950      MRN: [id number]  Encounter Provider: SEVERIANO Silva  Encounter Date: 2025   Encounter department: [facility] [place]      :  Assessment & Plan  Hypothyroidism, unspecified type         Acquired hypothyroidism         Hypercholesterolemia         Essential hypertension         Osteoporosis without current pathological fracture, unspecified osteoporosis type         Thyroid nodule         Vitamin D insufficiency       Plan:  1.  Hypothyroidism: TSH and free T4 are normal.  Now, she will continue her current regimen.  She will continue levothyroxine 75 mcg Monday through Saturday and take a half dose on .   We will contact her with the results and any applicable changes to her levothyroxine regimen.   Check TSH and free T4 prior to next office visit.     2.  Hypertension:  She is normotensive in the office today. Continue current medication.  Check comprehensive metabolic panel prior to next visit.     3.  Vitamin-D deficiency: Recent level is stable.  Continue supplementation with 1000 units of vitamin D3 daily.     4.  Thyroid nodule:  Stable ultrasound in 2024.   She denies any anterior neck discomfort, dysphagia or dysphonia.      5.  Osteoporosis:  Most recent DEXA scan reveals statistically significant increases in the bone mineral density of her lumbar spine and left hip.  She is due for her 7th Prolia injection today.  She will continue supplementation with calcium and vitamin D.  Reviewed fall risk and safety.           History of Present Illness     75 y.o. female with history of polymyalgia rheumatica, hypothyroidism, DVT, atrial fibrillation, hyperlipidemia presents for follow up of hypothyroidism. Has had hypothyroidism for many years treated on thyroid hormone replacement.  She was hospitalized in summer of 2017 for heart arrhythmia.  At that time she received amiodarone and was  discharged  home on amiodarone. She was on amiodarone until about September 2017. While on this medication her thyroid hormone requirements went up. Since then she reports cold intolerance and fatigue.  She is currently taking levothyroxine 75 mcg - Monday through Saturday with 1/2 tablet on Sunday.  She is taking her levothyroxine, consistently, and in the proper manner, by her report.  She takes her calcium and other vitamins later in the day.  Her most recent TSH from July 14, 2025 is 1.374 with a free T4 of 1.05.  She also has history of polymyalgia rheumatica and follows closely with her rheumatologist at [facility].  She is status post right knee replacement performed in January 2025.     For her osteoporosis, she has been supplementing with calcium and vitamin D and receiving Prolia injections every 6 months.  She obtained a DEXA scan on April 19, 2024.  When compared to her previous DEXA scan from March for, 2022 there was a statistically significant increase of 7% in the bone mineral density of her lumbar spine and also a statistically significant increase in the bone mineral density of her left total hip of 9.9%.     Her hypertension is treated with Lasix 20 mg daily and metoprolol 50 mg twice daily.     For her vitamin-D deficiency she supplements with 1000 units of vitamin D3 daily.  Her most recent 25 hydroxy vitamin-D level from July 14, 2025 is 42.3.     Her most recent thyroid ultrasound from November 13, 2023 reveals a heterogeneously atrophic thyroid gland.  No significant change in the nodule in the left lobe of the thyroid gland which does not meet ACR criteria for follow-up ultrasound or biopsy.       Review of Systems   Constitutional: Negative.  Negative for chills, fatigue and fever.   HENT: Negative.  Negative for trouble swallowing and voice change.    Eyes: Negative.  Negative for photophobia, pain, discharge, redness, itching and visual disturbance.   Respiratory: Negative.  Negative for  chest tightness and shortness of breath.    Cardiovascular: Negative.  Negative for chest pain.   Gastrointestinal: Negative.  Negative for abdominal pain, constipation, diarrhea and vomiting.   Endocrine: Negative for cold intolerance, heat intolerance, polydipsia, polyphagia and polyuria.   Genitourinary: Negative.    Musculoskeletal: Negative.    Skin: Negative.    Allergic/Immunologic: Negative.    Neurological: Negative.  Negative for dizziness, syncope, light-headedness and headaches.   Hematological: Negative.    Psychiatric/Behavioral: Negative.     All other systems reviewed and are negative.   as per HPI    Objective   There were no vitals taken for this visit.     There is no height or weight on file to calculate BMI.  Wt Readings from Last 3 Encounters:   07/24/25 81.6 kg (180 lb)   04/24/25 79.8 kg (176 lb)   03/11/25 79.8 kg (176 lb)     Physical Exam  Vitals reviewed.   Constitutional:       Appearance: She is well-developed. She is obese.   HENT:      Head: Normocephalic and atraumatic.     Eyes:      Conjunctiva/sclera: Conjunctivae normal.      Pupils: Pupils are equal, round, and reactive to light.       Cardiovascular:      Rate and Rhythm: Normal rate and regular rhythm.      Heart sounds: Normal heart sounds.   Pulmonary:      Effort: Pulmonary effort is normal.      Breath sounds: Normal breath sounds.   Abdominal:      General: Bowel sounds are normal.      Palpations: Abdomen is soft.     Musculoskeletal:         General: Normal range of motion.      Cervical back: Normal range of motion and neck supple.     Skin:     General: Skin is warm and dry.     Neurological:      Mental Status: She is alert and oriented to person, place, and time.     Psychiatric:         Behavior: Behavior normal.         Thought Content: Thought content normal.         Judgment: Judgment normal.         Labs: I have reviewed pertinent labs including:   Lab Results   Component Value Date    HGBA1C 5.5 01/03/2025     HGBA1C 5.4 11/07/2022    HGBA1C 5.3 05/23/2022      Lab Results   Component Value Date    CREATININE 0.52 (L) 07/14/2025    CREATININE 0.58 (L) 02/06/2025    CREATININE 0.50 (L) 01/28/2025    BUN 15 07/14/2025    K 4.1 07/14/2025     07/14/2025    CO2 29 07/14/2025      GFR, Calculated   Date Value Ref Range Status   07/28/2019 100 >60 mL/min/1.73m2 Final     Comment:     mL/min per 1.73 square meters                                            Normal Function or Mild Renal    Disease (if clinically at risk):  >or=60  Moderately Decreased:                30-59  Severely Decreased:                  15-29  Renal Failure:                         <15                                            -American GFR: multiply reported GFR by 1.16    Please note that the eGFR is based on the CKD-EPI calculation, and is not intended to be used for drug dosing.     eGFR   Date Value Ref Range Status   07/14/2025 95 ml/min/1.73sq m Final      HDL, Direct   Date Value Ref Range Status   07/09/2024 44 (L) >=50 mg/dL Final     Triglycerides   Date Value Ref Range Status   07/09/2024 136 See Comment mg/dL Final     Comment:     Triglyceride:     0-9Y            <75mg/dL     10Y-17Y         <90 mg/dL       >=18Y     Normal          <150 mg/dL     Borderline High 150-199 mg/dL     High            200-499 mg/dL        Very High       >499 mg/dL    Specimen collection should occur prior to Metamizole administration due to the potential for falsely depressed results.      ALT   Date Value Ref Range Status   07/14/2025 16 7 - 52 U/L Final     Comment:     Specimen collection should occur prior to Sulfasalazine administration due to the potential for falsely depressed results.    07/28/2019 23 <56 U/L Final     AST   Date Value Ref Range Status   07/14/2025 24 13 - 39 U/L Final   07/28/2019 30 <41 U/L Final     Alkaline Phosphatase   Date Value Ref Range Status   07/14/2025 55 34 - 104 U/L Final   07/28/2019 75 35 - 120 U/L Final       Lab Results   Component Value Date    GQX3GALOHLHQ 1.374 07/14/2025      Lab Results   Component Value Date    FREET4 1.05 07/14/2025    T3FREE 1.95 (L) 11/06/2019      Lab Results   Component Value Date    NSBB50IZONEB 42.3 07/14/2025    [id number] 49.6 01/03/2025    [id number] 47.8 07/09/2024      Radiology Results Review: I have reviewed the following images/report studies in PACS: DXA bone density spine hip and pelvis  Result Date: 4/24/2024  Impression     1. Osteoporosis.     2.  Since a DXA study from 3/4/2022, there has been: A  STATISTICALLY SIGNIFICANT INCREASE in bone mineral density of 0.068 g/cm2 (7.0%) in the lumbar spine. A  STATISTICALLY SIGNIFICANT INCREASE in bone mineral density of 0.066 g/cm2 (9.9%) in the left total hip.         3.  The 10 year risk of hip fracture is 17% with the 10 year risk of major osteoporotic fracture being 42% as calculated by the [employer] fracture risk assessment tool (FRAX, which is based on data generated by the WHO Collaborating [place] for Metabolic Bone Diseases).     4.  The current NOF guidelines recommend treating patients with a T-score of -2.5 or less in the lumbar spine or hips, or in post-menopausal women and men over the age of 50 with low bone mass (osteopenia) and a FRAX 10 year risk score of >3% for hip fracture and/or >20% for major osteoporotic fracture.     5.  The NOF recommends follow-up DXA in 1-2 years after initiating therapy for osteoporosis and every 2 years thereafter. More frequent evaluation is appropriate for patients with conditions associated with rapid bone loss, such as glucocorticoid therapy. The interval between DXA screenings may be longer for individuals without major risk factors and initial T-score in the normal or upper low bone mass range.         The FRAX algorithm has certain limitations: -FRAX has not been validated in patients currently or previously treated with pharmacotherapy for osteoporosis.  In such  patients, clinical judgment must be exercised in interpreting FRAX scores. -Prior hip, vertebral and humeral fragility fractures appear to confer greater risk of subsequent fracture than fractures at other sites (this is especially true for individuals with severe vertebral fractures), but quantification of this incremental risk is not possible with FRAX. -FRAX underestimates fracture risk in patients with history of multiple fragility fractures. -FRAX may underestimate fracture risk in patients with history of frequent falls. -It is not appropriate to use FRAX to monitor treatment response.         WHO CLASSIFICATION: Normal (a T-score of -1.0 or higher) Low bone mineral density (a T-score of less than -1.0 but higher than -2.5) Osteoporosis (a T-score of -2.5 or less) Severe osteoporosis (a T-score of -2.5 or less with a fragility fracture)         LEAST SIGNIFICANT CHANGE (AT 95% C.I):     Lumbar spine: 0.023 g/cm2 (2.6%) Total hip: 0.016 g/cm2 (1.9%) Forearm: 0.017 g/cm2 (2.7%)         Workstation performed: [id number]          I have reviewed the following images/report studies in PACS: US thyroid  Result Date: 11/21/2024  Impression     Diminutive heterogeneous thyroid gland, with stable left lobe nodule. No nodule meets current ACR criteria for requiring biopsy or follow-up ultrasounds.             Reference: ACR Thyroid Imaging, Reporting and Data System (TI-RADS): White Paper of the ACR TI-RADS Committee. J AM Cherelle Radiol 2017;14:587-595. Additional recommendations based on American Thyroid Association 2015 guidelines.         Workstation performed: [id number]         US thyroid  Result Date: 11/16/2023  Impression No significant change. No nodule meets current ACR criteria for requiring biopsy or followup ultrasounds.             Reference: ACR Thyroid Imaging, Reporting and Data System (TI-RADS): White Paper of the ACR TI-RADS Committee. J AM Cherelle Radiol 2017;14:587-595. (additional recommendations based  on American Thyroid Association 2015 guidelines.)         Workstation performed: [id number]              Discussed with the patient and all questioned fully answered. She will call me if any problems arise.

## 2025-07-25 NOTE — ASSESSMENT & PLAN NOTE
Plan:  1.  Hypothyroidism: TSH and free T4 are normal.  Now, she will continue her current regimen.  She will continue levothyroxine 75 mcg Monday through Saturday and take a half dose on Sunday.   We will contact her with the results and any applicable changes to her levothyroxine regimen.   Check TSH and free T4 prior to next office visit.     2.  Hypertension:  She is normotensive in the office today. Continue current medication.  Check comprehensive metabolic panel prior to next visit.     3.  Vitamin-D deficiency: Recent level is stable.  Continue supplementation with 1000 units of vitamin D3 daily.     4.  Thyroid nodule:  Stable ultrasound in November 2024.   She denies any anterior neck discomfort, dysphagia or dysphonia.      5.  Osteoporosis:  Most recent DEXA scan reveals statistically significant increases in the bone mineral density of her lumbar spine and left hip.  She is due for her 7th Prolia injection today.  She will continue supplementation with calcium and vitamin D.  Reviewed fall risk and safety.

## 2025-07-25 NOTE — PROGRESS NOTES
"Assessment/Plan:    Demi Almonte came into the [facility] today 07/25/25 to receive her Prolia injection.      Verbal consent obtained.  Consent given by: patient    patient states patient has been medically healthy with no underlining concerns/complications.      Demi Almonte presents with no symptoms today.       All insturctions were reviewed with the patient.    If the patient should have any questions/concerns, advised patient to contacted [facility].       Subjective:     History provided by: patient    Patient ID: Demi Almonte is a 75 y.o. female      Objective:    Vitals:    07/25/25 1020   BP: 142/82   Pulse: 84   Weight: 81.5 kg (179 lb 9.6 oz)   Height: 5' 1.73\" (1.568 m)       Patient tolerated the injection well without any complications.  Injection site/s Left Arm.  Medication was provided by the office/buy and bill.    Patient signed consent form yes   Patient signed ABN form yes (If no patient is not a medicare patient).   Patient waited 15 minutes after injection no (This only applies to patient's receiving first time injection).       Last Visit: Visit date not found  Next visit:7/25/2025     "

## 2025-07-25 NOTE — PATIENT INSTRUCTIONS
Continue Levothyroxine at 75 mcg - Monday through Saturday with a HALF tablet on SUNDAY.      Continue with Calcium and Vitamin D supplementation daily.     Will continue to monitor calcium levels.     Obtain lab work as prescribed.     Continue Prolia.

## (undated) DEVICE — SAW BLADE OSCILLATING BRAZOL 167

## (undated) DEVICE — CUFF TOURNIQUET 30 X 4 IN QUICK CONNECT DISP 1BLA

## (undated) DEVICE — CAPIT KNEE GSF FLX CEM FEM/CEM TIB/FLX SURF/STD PAT - ZIMMER

## (undated) DEVICE — BAG WOUND LAVAGE 1L BIASURGE ADV

## (undated) DEVICE — TUBING SUCTION 5MM X 12 FT

## (undated) DEVICE — 4-PORT MANIFOLD: Brand: NEPTUNE 2

## (undated) DEVICE — PADDING CAST 6IN COTTON STRL

## (undated) DEVICE — INTENDED FOR TISSUE SEPARATION, AND OTHER PROCEDURES THAT REQUIRE A SHARP SURGICAL BLADE TO PUNCTURE OR CUT.: Brand: BARD-PARKER ® CARBON RIB-BACK BLADES

## (undated) DEVICE — BETHLEHEM UNIV TOTAL KNEE, KIT: Brand: CARDINAL HEALTH

## (undated) DEVICE — READY WET SKIN SCRUB TRAY-LF: Brand: MEDLINE INDUSTRIES, INC.

## (undated) DEVICE — PAD CAST 6 IN COTTON NON STERILE

## (undated) DEVICE — GLOVE SRG BIOGEL 7.5

## (undated) DEVICE — ABDOMINAL PAD: Brand: DERMACEA

## (undated) DEVICE — PLUMEPEN PRO 10FT

## (undated) DEVICE — GLOVE INDICATOR PI UNDERGLOVE SZ 8 BLUE

## (undated) DEVICE — BLADE SAW RECIP 12.5 X 76MM 0.9/0.9MM THCK

## (undated) DEVICE — ANTIBACTERIAL UNDYED BRAIDED (POLYGLACTIN 910), SYNTHETIC ABSORBABLE SUTURE: Brand: COATED VICRYL

## (undated) DEVICE — SUT VICRYL 2-0 CP-1 27 IN J266H

## (undated) DEVICE — ACE WRAP 6 IN UNSTERILE

## (undated) DEVICE — NO-SCRATCH ™ SMALL WHITNEY CURETTE ™ IS A SINGLE-USE, PLASTIC CURETTE FOR QUICKLY APPLYING, MANIPULATING AND REMOVING BONE CEMENT DURING HIP AND KNEE REPLACEMENT SURGERY. THE PLASTIC IS SOFTER THAN STEEL INSTRUMENTS, REDUCING THE RISK OF DAMAGING THE PROSTHESIS WITH METAL INSTRUMENTS.  THE CURETTE’S 6MM TIP REMOVES EXCESS CEMENT FROM REPLACEMENT HIPS AND KNEES. EASY-TO-MANEUVER, THE SMALL BLUE CURETTE LETS YOU REMOVE CEMENT FROM ALL EDGES OF THE PROSTHESIS.NO-SCRATCH WHITNEY SMALL CURETTE FEATURES:SAFER THAN STEEL- MADE OF PLASTIC - STURDY YET SOFTER THAN SURGICAL STEEL.HANDIER- EACH TOOL HAS A MOLDED-IN THUMB INDENTATION INSTANTLY ORIENTING THE TOOL.- EASIER TO MANEUVER IN HARD TO SEE PLACES.- COLOR-CODED FOR EASY IDENTIFICATION.FASTER- COMES INDIVIDUALLY PACKAGED IN STERILE, PEEL OPEN POUCH, READY TO GO.- APPLIES, MANIPULATES, OR REMOVES CEMENT WITH FINGERTIP PRECISION.ECONOMICAL- THE COST OF A SINGLE REVISION DWARFS THE COST OF A SINGLE-USE CURETTE. - DISPOSABLE – THERE’S NO NEED TO WASTE TIME REMOVING HARDENED CEMENT OR RE-STERILIZING TOOLS.- LESS EXPENSIVE TO BUY AND INVENTORY - ORDER ONLY THE TOOL YOU USE.- PACKAGED 25 INDIVIDUALLY WRAPPED TOOLS TO A CARTON FOR CONVENIENT SHELF STORAGE.: Brand: WHITNEY NO-SCRATCH CURETTE (SMALL)

## (undated) DEVICE — TRAY FOLEY 16FR URIMETER SILICONE SURESTEP

## (undated) DEVICE — HOOD: Brand: T7PLUS

## (undated) DEVICE — T4 HOOD

## (undated) DEVICE — DRESSING XEROFORM 5 X 9

## (undated) DEVICE — GLOVE INDICATOR PI UNDERGLOVE SZ 7 BLUE

## (undated) DEVICE — GLOVE SRG BIOGEL 7

## (undated) DEVICE — STIRRUP STRAP ADULT DISP

## (undated) DEVICE — SMARTSET HIGH PERFORMANCE MV MEDIUM VISCOSITY BONE CEMENT 40G
Type: IMPLANTABLE DEVICE | Status: NON-FUNCTIONAL
Brand: SMARTSET

## (undated) DEVICE — DRAPE EQUIPMENT RF WAND

## (undated) DEVICE — GAUZE SPONGES,16 PLY: Brand: CURITY

## (undated) DEVICE — BLADE REAMER PATELLA 46MM

## (undated) DEVICE — 3M™ STERI-DRAPE™ U-DRAPE 1015: Brand: STERI-DRAPE™

## (undated) DEVICE — SAW BLADE RECIPROCATING 179

## (undated) DEVICE — STOCKINETTE,IMPERVIOUS,12X48,STERILE: Brand: MEDLINE

## (undated) DEVICE — OCCLUSIVE GAUZE STRIP,3% BISMUTH TRIBROMOPHENATE IN PETROLATUM BLEND: Brand: XEROFORM

## (undated) DEVICE — SUT VICRYL 1 CP 27 IN J196H

## (undated) DEVICE — JP 3-SPRING RES W/10FR PVC DRAIN/TR: Brand: CARDINAL HEALTH

## (undated) DEVICE — COBAN 6 IN STERILE

## (undated) DEVICE — PREP SURGICAL PURPREP 26ML

## (undated) DEVICE — HANDPIECE SET WITH RETRACTABLE COAXIAL FAN SPRAY TIP AND SUCTION TUBE: Brand: INTERPULSE

## (undated) DEVICE — GLOVE INDICATOR PI UNDERGLOVE SZ 7.5 BLUE

## (undated) DEVICE — SUT VICRYL 0 CT-1 36 IN J946H

## (undated) DEVICE — TIBURON SPLIT SHEET: Brand: CONVERTORS

## (undated) DEVICE — CEMENT BOWL VACUUM MIXING

## (undated) DEVICE — GLOVE SRG BIOGEL 8

## (undated) DEVICE — DISPOSABLE OR TOWEL: Brand: CARDINAL HEALTH

## (undated) DEVICE — 3M™ DURAPORE™ SURGICAL TAPE 1538-3, 3 INCH X 10 YARD (7,5CM X 9,1M), 4 ROLLS/BOX: Brand: 3M™ DURAPORE™

## (undated) DEVICE — SLIM BODY SKIN STAPLER: Brand: APPOSE ULC

## (undated) DEVICE — T5 HOOD WITH PEEL AWAY FACE SHIELD

## (undated) DEVICE — WET SKIN PREP TRAY: Brand: MEDLINE INDUSTRIES, INC.

## (undated) DEVICE — 3M™ IOBAN™ 2 ANTIMICROBIAL INCISE DRAPE 6651EZ: Brand: IOBAN™ 2

## (undated) DEVICE — FRAZIER SUCTION INSTRUMENT 18 FR W/OBTURATOR, NO CONTROL VENT: Brand: FRAZIER

## (undated) DEVICE — TOWEL SURG XR DETECT GREEN STRL RFD

## (undated) DEVICE — HEMOVAC TUBING 1/4 IN

## (undated) DEVICE — BLADE SAW HALL MICROPOWER OSC 1.27MM X 19.5MM X 86MM

## (undated) DEVICE — DEPUY CMW 2 FAST SET BONE CEMENT 20G: Type: IMPLANTABLE DEVICE | Site: KNEE | Status: NON-FUNCTIONAL

## (undated) DEVICE — STAPLER SKIN 35 WIDE ULC APPOSE

## (undated) DEVICE — ASTOUND FABRIC REINFORCED SURGICAL GOWN: Brand: CONVERTORS

## (undated) DEVICE — IMPERVIOUS STOCKINETTE: Brand: DEROYAL

## (undated) DEVICE — FAN SPRAY KIT: Brand: PULSAVAC®

## (undated) DEVICE — STRAP LITHOTOMY CANDY CANE

## (undated) DEVICE — SUT VICRYL 0 CP-1 27 IN J267H

## (undated) DEVICE — HOOD WITH PEEL AWAY FACE SHIELD: Brand: T7PLUS

## (undated) DEVICE — IMPERVIOUS SURGICAL GOWN, LG: Brand: CONVERTORS

## (undated) DEVICE — SKIN MARKER DUAL TIP WITH RULER CAP, FLEXIBLE RULER AND LABELS: Brand: DEVON

## (undated) DEVICE — SURGICAL GOWN, XL SMARTSLEEVE: Brand: CONVERTORS

## (undated) DEVICE — NEPTUNE E-SEP SMOKE EVACUATION PENCIL, COATED, 70MM BLADE, PUSH BUTTON SWITCH: Brand: NEPTUNE E-SEP

## (undated) DEVICE — SUT VICRYL 1 CT-1 27 IN J261H

## (undated) DEVICE — WEBRIL 6 IN UNSTERILE

## (undated) DEVICE — DECANTER: Brand: UNBRANDED

## (undated) DEVICE — ACE WRAP 6 IN XL STERILE

## (undated) DEVICE — Device

## (undated) DEVICE — BLOOD CONSERVATION SYSTEM WITH QUICK DISCONNECT AND PVC DRAIN: Brand: CBCII CONSTAVAC

## (undated) DEVICE — MEDI-VAC YANK SUCT HNDL W/TPRD BULBOUS TIP: Brand: CARDINAL HEALTH